# Patient Record
Sex: MALE | Race: WHITE | NOT HISPANIC OR LATINO | Employment: OTHER | URBAN - METROPOLITAN AREA
[De-identification: names, ages, dates, MRNs, and addresses within clinical notes are randomized per-mention and may not be internally consistent; named-entity substitution may affect disease eponyms.]

---

## 2017-01-04 ENCOUNTER — HOSPITAL ENCOUNTER (OUTPATIENT)
Dept: LAB | Facility: CLINIC | Age: 67
Discharge: HOME | End: 2017-01-04
Attending: INTERNAL MEDICINE | Admitting: INTERNAL MEDICINE
Payer: MEDICARE

## 2017-01-04 DIAGNOSIS — Z48.288 ENCOUNTER FOR AFTERCARE FOLLOWING MULTIPLE ORGAN TRANSPLANT: ICD-10-CM

## 2017-01-04 DIAGNOSIS — Z48.298 AFTERCARE FOLLOWING ORGAN TRANSPLANT: Primary | ICD-10-CM

## 2017-01-04 DIAGNOSIS — T86.10 COMPLICATION OF TRANSPLANTED KIDNEY: ICD-10-CM

## 2017-01-04 DIAGNOSIS — Z94.0 KIDNEY REPLACED BY TRANSPLANT: ICD-10-CM

## 2017-01-04 DIAGNOSIS — Z48.298 AFTERCARE FOLLOWING ORGAN TRANSPLANT: ICD-10-CM

## 2017-01-04 LAB
ANION GAP SERPL CALCULATED.3IONS-SCNC: 7 MMOL/L (ref 3–14)
BUN SERPL-MCNC: 31 MG/DL (ref 7–30)
CALCIUM SERPL-MCNC: 9.1 MG/DL (ref 8.5–10.1)
CHLORIDE SERPL-SCNC: 105 MMOL/L (ref 94–109)
CO2 SERPL-SCNC: 27 MMOL/L (ref 20–32)
CREAT SERPL-MCNC: 1.29 MG/DL (ref 0.66–1.25)
ERYTHROCYTE [DISTWIDTH] IN BLOOD BY AUTOMATED COUNT: 13.3 % (ref 10–15)
GFR SERPL CREATININE-BSD FRML MDRD: 56 ML/MIN/1.7M2
GLUCOSE SERPL-MCNC: 108 MG/DL (ref 70–99)
HCT VFR BLD AUTO: 40.4 % (ref 40–53)
HGB BLD-MCNC: 12.9 G/DL (ref 13.3–17.7)
MCH RBC QN AUTO: 28.8 PG (ref 26.5–33)
MCHC RBC AUTO-ENTMCNC: 31.9 G/DL (ref 31.5–36.5)
MCV RBC AUTO: 90 FL (ref 78–100)
PLATELET # BLD AUTO: 174 10E9/L (ref 150–450)
POTASSIUM SERPL-SCNC: 3.9 MMOL/L (ref 3.4–5.3)
RBC # BLD AUTO: 4.48 10E12/L (ref 4.4–5.9)
SODIUM SERPL-SCNC: 139 MMOL/L (ref 133–144)
TACROLIMUS BLD-MCNC: 9 UG/L (ref 5–15)
TME LAST DOSE: NORMAL H
WBC # BLD AUTO: 4.1 10E9/L (ref 4–11)

## 2017-01-04 PROCEDURE — 85027 COMPLETE CBC AUTOMATED: CPT | Performed by: INTERNAL MEDICINE

## 2017-01-04 PROCEDURE — 36415 COLL VENOUS BLD VENIPUNCTURE: CPT | Performed by: INTERNAL MEDICINE

## 2017-01-04 PROCEDURE — 80048 BASIC METABOLIC PNL TOTAL CA: CPT | Performed by: INTERNAL MEDICINE

## 2017-01-04 PROCEDURE — 80197 ASSAY OF TACROLIMUS: CPT | Performed by: INTERNAL MEDICINE

## 2017-01-05 LAB
CMV DNA SPEC NAA+PROBE-ACNC: NORMAL [IU]/ML
CMV DNA SPEC NAA+PROBE-LOG#: NORMAL {LOG_IU}/ML
SPECIMEN SOURCE: NORMAL

## 2017-01-09 DIAGNOSIS — Z94.0 STATUS POST KIDNEY TRANSPLANT: Primary | ICD-10-CM

## 2017-01-09 DIAGNOSIS — Z94.0 KIDNEY TRANSPLANTED: Primary | ICD-10-CM

## 2017-01-09 RX ORDER — PANTOPRAZOLE SODIUM 20 MG/1
20 TABLET, DELAYED RELEASE ORAL DAILY
Qty: 90 TABLET | Refills: 0 | Status: SHIPPED | OUTPATIENT
Start: 2017-01-09 | End: 2017-09-12

## 2017-01-09 NOTE — TELEPHONE ENCOUNTER
Last Office Visit with Nephrologist:  12/8/2016.  Medication refilled per Nephrology Clinic protocol.    Eliot Wasserman RN

## 2017-01-10 RX ORDER — TACROLIMUS 0.5 MG/1
1 CAPSULE, GELATIN COATED ORAL 2 TIMES DAILY
Qty: 120 CAPSULE | Refills: 6 | Status: SHIPPED | OUTPATIENT
Start: 2017-01-10 | End: 2017-02-09

## 2017-01-10 NOTE — TELEPHONE ENCOUNTER
Spoke to patient regarding tac level = 9.0, above goal.  Patient confirms that this was a good 12 hour trough level, confirmed current dose.  Patient verbalizes understanding of medication dose decrease to 1 mg BID.  Reviewed monthly 1 year post kidney transplant labs.  Patient states that he has a f/u appt. With Dr. Varela 2/2017.  Patient has no concerns/questions at this time.

## 2017-01-12 ENCOUNTER — TELEPHONE (OUTPATIENT)
Dept: TRANSPLANT | Facility: CLINIC | Age: 67
End: 2017-01-12

## 2017-01-12 DIAGNOSIS — I15.1 HYPERTENSION SECONDARY TO OTHER RENAL DISORDERS (CODE): Primary | ICD-10-CM

## 2017-01-12 RX ORDER — LOSARTAN POTASSIUM 25 MG/1
TABLET ORAL
Qty: 30 TABLET | Refills: 10 | Status: SHIPPED | OUTPATIENT
Start: 2017-01-12 | End: 2017-12-06

## 2017-01-12 NOTE — TELEPHONE ENCOUNTER
Last Office Visit with Nephrologist:  12/8/16.  Medication refilled per Nephrology Clinic protocol.     Kiarra Harvey RN

## 2017-01-12 NOTE — TELEPHONE ENCOUNTER
Prior Authorization Specialty Medication Request    Medication/Dose: Prograf 1 mg  Frequency: BID    Route: oral  Diagnosis and ICD: Kidney Transplant Z94.0  New/Renewal/Insurance Change PA:     Important Lab Values:     Previously Tried and Failed Therapies:     Rationale:     Would you like to include any research articles?    If yes please include the hyperlink(s) below or fax @ [phone].    (Include Name and MRN)    If you received a fax notification from an outside Pharmacy;  Pharmacy Name:[facility]  Pharmacy #:[phone]  Pharmacy Fax:

## 2017-01-16 DIAGNOSIS — Z94.0 STATUS POST KIDNEY TRANSPLANT: Primary | ICD-10-CM

## 2017-01-16 RX ORDER — MYCOPHENOLATE MOFETIL 250 MG/1
750 CAPSULE ORAL 2 TIMES DAILY
Qty: 180 CAPSULE | Refills: 11 | Status: CANCELLED | OUTPATIENT
Start: 2017-01-16

## 2017-01-16 RX ORDER — MYCOPHENOLATE MOFETIL 250 MG/1
750 CAPSULE ORAL 2 TIMES DAILY
Qty: 180 CAPSULE | Refills: 11 | Status: SHIPPED | OUTPATIENT
Start: 2017-01-16 | End: 2018-01-05

## 2017-01-16 NOTE — TELEPHONE ENCOUNTER
Drug Name: cellcept 250mg  Last Fill Date: 12/16/16  Quantity: 180    Dede Elia   [facility]  [phone]

## 2017-01-20 NOTE — TELEPHONE ENCOUNTER
[facility] Prior Authorization Team   Phone: [phone]  Fax: [phone]    Prior Authorization Not Needed    Medication: Prograf 1 mg - PA Not Needed  Approved Dose/Quantity: 150 per 30 days  Reference #:     Insurance Company: Other (see comments)Comment:  Medicare Part B  Expected CoPay:       CoPay Card Available:      Foundation Assistance Needed:    Which Pharmacy is filling the prescription (Not needed for infusion/clinic administered): [facility] - [place], [place] - [facility] [address]   Pharmacy Notified: Yes    Patient received 30 day supply of Prograf from [facility] on 1/18/17.  Patient Notified: Yes

## 2017-01-25 ENCOUNTER — TELEPHONE (OUTPATIENT)
Dept: PHARMACY | Facility: CLINIC | Age: 67
End: 2017-01-25

## 2017-02-07 ENCOUNTER — RESULTS ONLY (OUTPATIENT)
Dept: OTHER | Facility: CLINIC | Age: 67
End: 2017-02-07

## 2017-02-07 ENCOUNTER — HOSPITAL ENCOUNTER (OUTPATIENT)
Dept: LAB | Facility: CLINIC | Age: 67
Discharge: HOME | End: 2017-02-07
Attending: INTERNAL MEDICINE | Admitting: TRANSPLANT SURGERY
Payer: MEDICARE

## 2017-02-07 DIAGNOSIS — Z94.0 KIDNEY REPLACED BY TRANSPLANT: ICD-10-CM

## 2017-02-07 DIAGNOSIS — Z48.298 AFTERCARE FOLLOWING ORGAN TRANSPLANT: ICD-10-CM

## 2017-02-07 DIAGNOSIS — T86.10 COMPLICATION OF TRANSPLANTED KIDNEY: ICD-10-CM

## 2017-02-07 LAB
ANION GAP SERPL CALCULATED.3IONS-SCNC: 6 MMOL/L (ref 3–14)
BUN SERPL-MCNC: 22 MG/DL (ref 7–30)
CALCIUM SERPL-MCNC: 9.2 MG/DL (ref 8.5–10.1)
CHLORIDE SERPL-SCNC: 104 MMOL/L (ref 94–109)
CO2 SERPL-SCNC: 29 MMOL/L (ref 20–32)
CREAT SERPL-MCNC: 1.26 MG/DL (ref 0.66–1.25)
ERYTHROCYTE [DISTWIDTH] IN BLOOD BY AUTOMATED COUNT: 13.7 % (ref 10–15)
GFR SERPL CREATININE-BSD FRML MDRD: 58 ML/MIN/1.7M2
GLUCOSE SERPL-MCNC: 112 MG/DL (ref 70–99)
HCT VFR BLD AUTO: 43.6 % (ref 40–53)
HGB BLD-MCNC: 13.5 G/DL (ref 13.3–17.7)
MCH RBC QN AUTO: 27.3 PG (ref 26.5–33)
MCHC RBC AUTO-ENTMCNC: 31 G/DL (ref 31.5–36.5)
MCV RBC AUTO: 88 FL (ref 78–100)
PLATELET # BLD AUTO: 209 10E9/L (ref 150–450)
POTASSIUM SERPL-SCNC: 4 MMOL/L (ref 3.4–5.3)
RBC # BLD AUTO: 4.95 10E12/L (ref 4.4–5.9)
SODIUM SERPL-SCNC: 139 MMOL/L (ref 133–144)
TACROLIMUS BLD-MCNC: 7.5 UG/L (ref 5–15)
TME LAST DOSE: NORMAL H
WBC # BLD AUTO: 5.1 10E9/L (ref 4–11)

## 2017-02-07 PROCEDURE — 87799 DETECT AGENT NOS DNA QUANT: CPT | Performed by: INTERNAL MEDICINE

## 2017-02-07 PROCEDURE — 85027 COMPLETE CBC AUTOMATED: CPT | Performed by: INTERNAL MEDICINE

## 2017-02-07 PROCEDURE — 86833 HLA CLASS II HIGH DEFIN QUAL: CPT | Performed by: TRANSPLANT SURGERY

## 2017-02-07 PROCEDURE — 80048 BASIC METABOLIC PNL TOTAL CA: CPT | Performed by: INTERNAL MEDICINE

## 2017-02-07 PROCEDURE — 36415 COLL VENOUS BLD VENIPUNCTURE: CPT | Performed by: INTERNAL MEDICINE

## 2017-02-07 PROCEDURE — 86832 HLA CLASS I HIGH DEFIN QUAL: CPT | Performed by: TRANSPLANT SURGERY

## 2017-02-07 PROCEDURE — 80197 ASSAY OF TACROLIMUS: CPT | Performed by: INTERNAL MEDICINE

## 2017-02-08 LAB
BKV DNA # SPEC NAA+PROBE: NORMAL COPIES/ML
BKV DNA SPEC NAA+PROBE-LOG#: NORMAL LOG COPIES/ML
CMV DNA SPEC NAA+PROBE-ACNC: NORMAL [IU]/ML
CMV DNA SPEC NAA+PROBE-LOG#: NORMAL {LOG_IU}/ML
PRA DONOR SPECIFIC ABY: NORMAL
SPECIMEN SOURCE: NORMAL
SPECIMEN SOURCE: NORMAL

## 2017-02-09 DIAGNOSIS — Z94.0 STATUS POST KIDNEY TRANSPLANT: Primary | ICD-10-CM

## 2017-02-09 NOTE — TELEPHONE ENCOUNTER
Spoke with patient: Patient states a good twelve hour level and confirmed current dose. Patient verbalize understanding decrease dose to 1 mg in the am and 0.5 mg in the pm and that he will repeat his prograf level in one week

## 2017-02-09 NOTE — TELEPHONE ENCOUNTER
Issue Prograf tacrolimus level 7.5  Slight above goal level   Plan   Call confirm 12 hour trough Prograf tacrolimus level   Confirm taking 1.0 mg twice per day   IF the above accurate lower Prograf tacrolimus 1.0 in am and 0.5 mg in pm   Repeat Prograf tacrolimus level one week after dose change

## 2017-02-14 LAB
DONOR IDENTIFICATION: NORMAL
DSA COMMENTS: NORMAL
DSA PRESENT: NO
DSA TEST METHOD: NORMAL
ORGAN: NORMAL
SA1 CELL: NORMAL
SA1 COMMENTS: NORMAL
SA1 HI RISK ABY: NORMAL
SA1 MOD RISK ABY: NORMAL
SA1 TEST METHOD: NORMAL
SA2 CELL: NORMAL
SA2 COMMENTS: NORMAL
SA2 HI RISK ABY UA: NORMAL
SA2 MOD RISK ABY: NORMAL
SA2 TEST METHOD: NORMAL

## 2017-02-16 DIAGNOSIS — Z94.0 STATUS POST KIDNEY TRANSPLANT: Primary | ICD-10-CM

## 2017-02-16 DIAGNOSIS — Z94.0 RENAL TRANSPLANT RECIPIENT: ICD-10-CM

## 2017-02-16 RX ORDER — SULFAMETHOXAZOLE AND TRIMETHOPRIM 400; 80 MG/1; MG/1
1 TABLET ORAL
Qty: 14 TABLET | Refills: 11 | Status: SHIPPED | OUTPATIENT
Start: 2017-02-17 | End: 2018-02-22

## 2017-02-16 RX ORDER — TACROLIMUS 0.5 MG/1
1.5 CAPSULE, GELATIN COATED ORAL 2 TIMES DAILY
Qty: 120 CAPSULE | Refills: 6 | Status: SHIPPED | OUTPATIENT
Start: 2017-02-16 | End: 2017-04-19

## 2017-02-16 NOTE — TELEPHONE ENCOUNTER
Drug Name: smz/tmp 400-80mg  Last Fill Date: 12/8/16  Quantity: 30    Dedehans Ferreiraroger   [facility]  [phone]

## 2017-02-20 ENCOUNTER — OFFICE VISIT (OUTPATIENT)
Dept: NEPHROLOGY | Facility: CLINIC | Age: 67
End: 2017-02-20
Attending: INTERNAL MEDICINE
Payer: MEDICARE

## 2017-02-20 VITALS
SYSTOLIC BLOOD PRESSURE: 129 MMHG | WEIGHT: 174.8 LBS | TEMPERATURE: 97.6 F | HEIGHT: 70 IN | HEART RATE: 64 BPM | DIASTOLIC BLOOD PRESSURE: 86 MMHG | BODY MASS INDEX: 25.03 KG/M2

## 2017-02-20 DIAGNOSIS — E55.9 VITAMIN D DEFICIENCY: ICD-10-CM

## 2017-02-20 DIAGNOSIS — Z48.298 AFTERCARE FOLLOWING ORGAN TRANSPLANT: ICD-10-CM

## 2017-02-20 DIAGNOSIS — N25.81 SECONDARY RENAL HYPERPARATHYROIDISM (H): Primary | ICD-10-CM

## 2017-02-20 DIAGNOSIS — D84.9 IMMUNOSUPPRESSED STATUS (H): ICD-10-CM

## 2017-02-20 DIAGNOSIS — Z94.0 KIDNEY REPLACED BY TRANSPLANT: ICD-10-CM

## 2017-02-20 DIAGNOSIS — I15.1 HYPERTENSION SECONDARY TO OTHER RENAL DISORDERS: ICD-10-CM

## 2017-02-20 PROCEDURE — 99212 OFFICE O/P EST SF 10 MIN: CPT | Mod: ZF

## 2017-02-20 ASSESSMENT — PAIN SCALES - GENERAL: PAINLEVEL: NO PAIN (0)

## 2017-02-20 NOTE — MR AVS SNAPSHOT
After Visit Summary   2/20/2017    Marlo Vee    MRN: [id number]           Patient Information     Date Of Birth          1950        Visit Information        Provider Department      2/20/2017 11:30 AM Samuel Varela MD [facility] Nephrology        Today's Diagnoses     Secondary renal hyperparathyroidism (H)    -  1    Vitamin D deficiency        Kidney replaced by transplant        Immunosuppressed status (H)        Aftercare following organ transplant        Hypertension secondary to other renal disorders           Follow-ups after your visit        Follow-up notes from your care team     Return in about 6 months (around 8/20/2017).      Your next 10 appointments already scheduled     Sep 12, 2017  3:10 PM CDT   (Arrive by 2:40 PM)   Return Kidney Transplant with Samuel Varela MD   [facility] Nephrology ([facility])    [address] [place] [id number] [phone]              Future tests that were ordered for you today     Open Future Orders        Priority Expected Expires Ordered    Parathyroid Hormone Intact Routine  3/22/2017 2/20/2017    Vitamin D Deficiency Routine  3/22/2017 2/20/2017            Who to contact     If you have questions or need follow up information about today's clinic visit or your schedule please contact [facility] NEPHROLOGY directly at [phone].  Normal or non-critical lab and imaging results will be communicated to you by MyChart, letter or phone within 4 business days after the clinic has received the results. If you do not hear from us within 7 days, please contact the clinic through MyChart or phone. If you have a critical or abnormal lab result, we will notify you by phone as soon as possible.  Submit refill requests through [employer] or call your pharmacy and they will forward the refill request to us. Please allow 3 business days for your refill to be completed.          Additional  "Information About Your Visit        MyChart Information     [employer] gives you secure access to your electronic health record. If you see a primary care provider, you can also send messages to your care team and make appointments. If you have questions, please call your primary care clinic.  If you do not have a primary care provider, please call [phone] and they will assist you.        Care EveryWhere ID     This is your Care EveryWhere ID. This could be used by other organizations to access your [place] medical records  [id number]        Your Vitals Were     Pulse Temperature Height BMI (Body Mass Index)          64 97.6  F (36.4  C) (Oral) 1.778 m (5' 10\") 25.08 kg/m2         Blood Pressure from Last 3 Encounters:   02/20/17 129/86   11/29/16 (!) 155/99   08/02/16 129/89    Weight from Last 3 Encounters:   02/20/17 79.3 kg (174 lb 12.8 oz)   11/29/16 79.4 kg (175 lb 1.6 oz)   08/02/16 73.9 kg (163 lb)                 Today's Medication Changes          These changes are accurate as of: 2/20/17 11:51 AM.  If you have any questions, ask your nurse or doctor.               These medicines have changed or have updated prescriptions.        Dose/Directions    COREG PO   This may have changed:  Another medication with the same name was removed. Continue taking this medication, and follow the directions you see here.        Dose:  25 mg   Take 25 mg by mouth daily   Refills:  0                Primary Care Provider Office Phone # Fax #    Yassine Mary [phone]       [facility] [address]   [facility] [address]        Thank you!     Thank you for choosing [facility] NEPHROLOGY  for your care. Our goal is always to provide you with excellent care. Hearing back from our patients is one way we can continue to improve our services. Please take a few minutes to complete the written survey that you may receive in the mail after your visit with us. Thank you!             Your Updated " Medication List - Protect others around you: Learn how to safely use, store and throw away your medicines at www.disposemymeds.org.          This list is accurate as of: 2/20/17 11:51 AM.  Always use your most recent med list.                   Brand Name Dispense Instructions for use    aspirin 81 MG chewable tablet     36 tablet    Take 1 tablet (81 mg) by mouth daily       atorvastatin 10 MG tablet    LIPITOR     Take 0.5 tablets (5 mg) by mouth daily       clopidogrel 75 MG tablet    PLAVIX    90 tablet    Take 1 tablet (75 mg) by mouth daily       COREG PO      Take 25 mg by mouth daily       losartan 25 MG tablet    COZAAR    30 tablet    TAKE 1 TABLET BY MOUTH DAILY       mycophenolate 250 MG capsule    CELLCEPT - GENERIC EQUIVALENT    180 capsule    Take 3 capsules (750 mg) by mouth 2 times daily       pantoprazole 20 MG EC tablet    PROTONIX    90 tablet    Take 1 tablet (20 mg) by mouth daily       SERTRALINE HCL PO      Take 25 mg by mouth daily       sulfamethoxazole-trimethoprim 400-80 MG per tablet    BACTRIM/SEPTRA    14 tablet    Take 1 tablet by mouth Every Mon, Wed, Fri Morning       tacrolimus capsule     120 capsule    Take 3 capsules (1.5 mg) by mouth 2 times daily       VITAMIN D3 PO      Take 2,000 Units by mouth daily

## 2017-02-20 NOTE — LETTER
2/20/2017      RE: Marlo Vee  [address] [facility] [address]       Assessment and Plan:  1. LDKT - baseline Cr ~ 1.0-1.2, which has remained stable running at high end of baseline lately.  Minimal proteinuria.  No DSA.  Will make no changes in immunosuppression.  2. HTN - well controlled at target of less than 140/90.  No changes.  3. CAD - asymptomatic.  Recommend follow up with Cardiology.  4. Secondary renal hyperparathyroidism - mildly elevated PTH when last checked.  Will treat vitamin D deficiency and recheck PTH with next labs.  5. Vitamin D deficiency - low vitamin D level and finished course of ergocalciferol, now on oral cholecalciferol 2000 iu daily.  Will recheck vitamin D level with next labs.  6. CMV IgG Ab discordance - patient is now off Valcyte.  He has seroconverted with detectable CMV PCR, although less than quantifiable and patient is asymptomatic.  No CMV PCR with last check.  No need to follow any further unless patient has symptoms.  7. Skin cancer risk - no new skin cancer lesions.  Recommend regular follow up with Dermatology.  8. GERD - no symptoms and recommend patient decrease or stop PPI.  9. Recommend return visit in 6 months.    Assessment and plan was discussed with patient and he voiced his understanding and agreement.    Reason for Visit:  Mr. Vee is here for routine follow up.    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         Transplant Hx:       Tx: LDKT  Date: 1/21/16       Present Maintenance IS: Tacrolimus and Mycophenolate mofetil       Baseline Creatinine: 1.0-1.2       Recent DSA: No  Date last checked: 2/2017       Biopsy: No    Mr. Vee reports feeling good overall with minimal medical complaints.  His energy level is good and remains normal.  He is active and does get some exercise.  Denies any chest pain or shortness of breath with exertion.  Appetite  is good and weight has been stable.  No nausea, vomiting or diarrhea.  No fever, sweats or chills.  No leg swelling.    Home BP: 110-120/70-80s and 100s systolic standing.      ROS:   A comprehensive review of systems was obtained and negative, except as noted in the HPI or PMH.    Active Medical Problems:  Patient Active Problem List   Diagnosis     Hypertension secondary to other renal disorders     Membranous glomerulonephritis     Secondary renal hyperparathyroidism (H)     Vitamin D deficiency     Dyslipidemia     Anxiety     Status post coronary angiogram     CAD, multiple vessel     Multiple vessel coronary artery disease     Kidney replaced by transplant     Immunosuppressed status (H)     Aftercare following organ transplant       Personal Hx:  Social History     Social History     Marital status:      Spouse name: N/A     Number of children: 4     Years of education: N/A     Occupational History     Not on file.     Social History Main Topics     Smoking status: Never Smoker     Smokeless tobacco: Never Used     Alcohol use No      Comment: Patient reports drinking beer on a rare ocassion.     Drug use: No     Sexual activity: Not on file     Other Topics Concern     Not on file     Social History Narrative       Allergies:  No Known Allergies    Medications:  Prior to Admission medications    Medication Sig Start Date End Date Taking? Authorizing Provider   phosphorus tablet 250 mg (K PHOS NEUTRAL) 250 MG tablet Take 1 tablet (250 mg) by mouth 2 times daily 1/27/16   Samuel Varela MD   PROGRAF 0.5 MG PO CAPSULE Take 1 capsule (0.5 mg) by mouth 2 times daily 1/27/16   Samuel Varela MD   HYDROmorphone (DILAUDID) 2 MG tablet Take 1 tablet (2 mg) by mouth every 4 hours as needed for moderate to severe pain 1/26/16   Maryellen Joe PA-C   atorvastatin (LIPITOR) 10 MG tablet Take 0.5 tablets (5 mg) by mouth daily 1/26/16   Maryellen Joe PA-C   hydrALAZINE (APRESOLINE) 100  "MG TABS Take 1 tablet (100 mg) by mouth every 8 hours 1/26/16   Maryellen Joe PA-C   valGANciclovir (VALCYTE) 450 MG tablet Take 1 tablet (450 mg) by mouth daily 1/26/16   Maryellen Joe PA-C   PROGRAF 1 MG PO CAPSULE Take 2 capsules (2 mg) by mouth 2 times daily 1/26/16 1/20/17  Maryellen Joe PA-C   mycophenolate (CELLCEPT - GENERIC EQUIVALENT) 250 MG capsule Take 3 capsules (750 mg) by mouth 2 times daily 1/26/16 1/20/17  Maryellen Joe PA-C   carvedilol (COREG) 25 MG tablet Take 1 tablet (25 mg) by mouth 2 times daily (with meals) 1/26/16   Maryellen Joe PA-C   senna-docusate (SENOKOT-S;PERICOLACE) 8.6-50 MG per tablet Take 1-2 tablets by mouth 2 times daily 1/26/16   Maryellen Joe PA-C   sulfamethoxazole-trimethoprim (BACTRIM,SEPTRA) 400-80 MG per tablet Take 1 tablet by mouth daily 1/26/16   Maryellen Joe PA-C   clotrimazole (MYCELEX) 10 MG LOZG Place 1 lozenge (10 mg) inside cheek 3 times daily 1/26/16   Maryellen Joe PA-C   pantoprazole (PROTONIX) 40 MG enteric coated tablet Take 1 tablet (40 mg) by mouth daily 1/26/16 4/25/16  Maryellen Joe PA-C   Clopidogrel Bisulfate (PLAVIX PO) Take 75 mg by mouth daily     Reported, Patient   aspirin 81 MG chewable tablet Take 1 tablet (81 mg) by mouth daily 4/25/15   Jimmy Jennings MD   B Complex-C-Folic Acid (TRIPHROCAPS) 1 MG CAPS Take 1 capsule by mouth daily    Reported, Patient   Cholecalciferol (VITAMIN D3 PO) Take 2,000 Units by mouth daily    Reported, Patient   SERTRALINE HCL PO Take 25 mg by mouth daily    Reported, Patient   TRAZODONE HCL PO Take 50 mg by mouth At Bedtime    Reported, Patient       Vitals:  /86  Pulse 64  Temp 97.6  F (36.4  C) (Oral)  Ht 1.778 m (5' 10\")  Wt 79.3 kg (174 lb 12.8 oz)  BMI 25.08 kg/m2    Exam:   GENERAL APPEARANCE: alert and no distress  HENT: mouth without ulcers or lesions  LYMPHATICS: no cervical or supraclavicular nodes  RESP: lungs clear to " auscultation - no rales, rhonchi or wheezes  CV: regular rhythm, normal rate, no rub, no murmur  EDEMA: no LE edema bilaterally  ABDOMEN: soft, nondistended, nontender, bowel sounds normal  MS: extremities normal - no gross deformities noted, no evidence of inflammation in joints, no muscle tenderness  SKIN: no rash  TX KIDNEY: normal    Results:   Recent Results (from the past 504 hour(s))   HLA Donor Specific Antibody    Collection Time: 02/07/17 12:00 AM   Result Value Ref Range    Donor Identification 01/21/2016     Organ Left Kidney     DSA Present NO     DSA Comments        Flow Single Antigen Beads assays are intended for   detection/identification of IgG anti-HLA antibodies. Mfi values may not   accurately quantify donor-specific antibody levels in all instances.      DSA Test Method SA FCS    HLA Leticia Class I Single Antigen    Collection Time: 02/07/17 12:00 AM   Result Value Ref Range    SA1 Test Method  FCS     SA1 Cell Class I     SA1 Hi Risk Leticia None     SA1 Mod Risk Leticia B:75 Cw:7     SA1 Comments       Test performed by modified procedure. Serum heat inactivated and tested by   a modified ([facility]ondo) protocol including fetal calf serum addition.   High-risk, mfi >3,000. Mod-risk, mfi 500-3,000.      HLA Leticia Class II Single Antigen    Collection Time: 02/07/17 12:00 AM   Result Value Ref Range    SA2 Test Method  FCS     SA2 Cell Class II     SA2 Hi Risk Leticia None     SA2 Mod Risk Leticia DQ:5     SA2 Comments       Test performed by modified procedure. Serum heat inactivated and tested by   a modified (Hondo) protocol including fetal calf serum addition.   High-risk, mfi >3,000. Mod-risk, mfi 500-3,000.      CMV DNA quantification    Collection Time: 02/07/17 10:06 AM   Result Value Ref Range    CMV DNA Quantitation Specimen EDTA PLASMA     CMV Quant IU/mL  CMVND [IU]/mL     CMV DNA Not Detected   The ALYSON AmpliPrep/ALYSON TaqMan CMV Test is an FDA-approved in vitro nucleic   acid amplification test for the  quantitation of cytomegalovirus DNA in human   plasma (EDTA plasma) using the [employer]YSON AmpliPrep Instrument for automated viral   nucleic acid extraction and the ALYSON TaqMan Analyzer or ALYSON TaqMan for   automated Real Time amplification and detection of the viral nucleic acid   target.   Titer results are reported in International Units/mL (IU/mL using 1st WHO   International standard for Human Cytomegalovirus for Nucleic Acid Amplification   based assays. The conversion factor between CMV DNA copis/mL (as defined by the   Roche ALYSON TaqMan CMV test) and International Units is the CMV DNA   concentration in IU/mL x 1.1 copies/IU = CMV DNA in copies/mL.   This assay has received FDA approval for the testing of human plasma only. The   Infectious Disease Diagnostic Laboratory at the [facility], [place], has validated the pe rformance characteristics of the Roche   CMV assay for plasma, bronchial alveolar lavage/wash and urine.      Log IU/mL of CMVQNT Not Calculated <2.1 [Log_IU]/mL   CBC with platelets    Collection Time: 02/07/17 10:06 AM   Result Value Ref Range    WBC 5.1 4.0 - 11.0 10e9/L    RBC Count 4.95 4.4 - 5.9 10e12/L    Hemoglobin 13.5 13.3 - 17.7 g/dL    Hematocrit 43.6 40.0 - 53.0 %    MCV 88 78 - 100 fl    MCH 27.3 26.5 - 33.0 pg    MCHC 31.0 (L) 31.5 - 36.5 g/dL    RDW 13.7 10.0 - 15.0 %    Platelet Count 209 150 - 450 10e9/L   Basic metabolic panel    Collection Time: 02/07/17 10:06 AM   Result Value Ref Range    Sodium 139 133 - 144 mmol/L    Potassium 4.0 3.4 - 5.3 mmol/L    Chloride 104 94 - 109 mmol/L    Carbon Dioxide 29 20 - 32 mmol/L    Anion Gap 6 3 - 14 mmol/L    Glucose 112 (H) 70 - 99 mg/dL    Urea Nitrogen 22 7 - 30 mg/dL    Creatinine 1.26 (H) 0.66 - 1.25 mg/dL    GFR Estimate 58 (L) >60 mL/min/1.7m2    GFR Estimate If Black 70 >60 mL/min/1.7m2    Calcium 9.2 8.5 - 10.1 mg/dL   Tacrolimus level    Collection Time: 02/07/17 10:06 AM   Result Value Ref Range     Tacrolimus Last Dose 2.6.17 @ 2030     Tacrolimus Level 7.5 5.0 - 15.0 ug/L   BK virus PCR quantitative    Collection Time: 02/07/17 10:06 AM   Result Value Ref Range    BK Virus Specimen Plasma     BK Virus Result BK Virus DNA Not Detected BKNEG copies/mL    BK Virus Log  <2.7 Log copies/mL     Not Calculated   The Real-Time quantitative BK Virus assay was developed and its performance   characteristics determined by the Infectious Diseases Diagnostic Laboratory at   the [facility] [place] in [place], [place]. The   primers and probes for each analyte are Analyte Specific Reagents (ASRs)   manufactured by [employer].   ASRs are used in many laboratory tests necessary for standard medical care and   generally do not require U.S. Food and Drug Administration approval. The FDA   has determined that such clearance or approval is not necessary.   This test is used for clinical purposes. It should not be regarded as   investigational or for research. This laboratory is certified under the   Clinical Laboratory Improvement Amendments of 1988 (CLIA-88) as qualified to   perform high complexity clinical laboratory testing.     PRA Donor Specific Antibody    Collection Time: 02/07/17 10:06 AM   Result Value Ref Range    PRA Donor Specific Leticia       Specimen received - Immunology report to follow upon completion.   UNOS Unacceptable Antigens    Collection Time: 02/14/17 12:00 AM   Result Value Ref Range    Protocol Cutoff Plan A FCS treated sera, 500 mfi cumulative     Unacceptable Antigen A:25 26 43 66 69 B:37 57 58 75 DQ:5 6    UNOS cPRA    Collection Time: 02/14/17 12:00 AM   Result Value Ref Range    UNOS cPRA 72        Samuel Varela MD

## 2017-02-20 NOTE — LETTER
2/20/2017       RE: Marlo Vee  [address] [facility] [address]     Dear Colleague,    Thank you for referring your patient, Marlo Vee, to the [facility] NEPHROLOGY at [facility]. Please see a copy of my visit note below.    Assessment and Plan:  1. LDKT - baseline Cr ~ 1.0-1.2, which has remained stable running at high end of baseline lately.  Minimal proteinuria.  No DSA.  Will make no changes in immunosuppression.  2. HTN - well controlled at target of less than 140/90.  No changes.  3. CAD - asymptomatic.  Recommend follow up with Cardiology.  4. Secondary renal hyperparathyroidism - mildly elevated PTH when last checked.  Will treat vitamin D deficiency and recheck PTH with next labs.  5. Vitamin D deficiency - low vitamin D level and finished course of ergocalciferol, now on oral cholecalciferol 2000 iu daily.  Will recheck vitamin D level with next labs.  6. CMV IgG Ab discordance - patient is now off Valcyte.  He has seroconverted with detectable CMV PCR, although less than quantifiable and patient is asymptomatic.  No CMV PCR with last check.  No need to follow any further unless patient has symptoms.  7. Skin cancer risk - no new skin cancer lesions.  Recommend regular follow up with Dermatology.  8. GERD - no symptoms and recommend patient decrease or stop PPI.  9. Recommend return visit in 6 months.    Assessment and plan was discussed with patient and he voiced his understanding and agreement.    Reason for Visit:  Mr. Vee is here for routine follow up.    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         Transplant Hx:       Tx: LDKT  Date: 1/21/16       Present Maintenance IS: Tacrolimus and Mycophenolate mofetil       Baseline Creatinine: 1.0-1.2       Recent DSA: No  Date last checked: 2/2017       Biopsy: No    Mr. Vee reports feeling  good overall with minimal medical complaints.  His energy level is good and remains normal.  He is active and does get some exercise.  Denies any chest pain or shortness of breath with exertion.  Appetite is good and weight has been stable.  No nausea, vomiting or diarrhea.  No fever, sweats or chills.  No leg swelling.    Home BP: 110-120/70-80s and 100s systolic standing.      ROS:   A comprehensive review of systems was obtained and negative, except as noted in the HPI or PMH.    Active Medical Problems:  Patient Active Problem List   Diagnosis     Hypertension secondary to other renal disorders     Membranous glomerulonephritis     Secondary renal hyperparathyroidism (H)     Vitamin D deficiency     Dyslipidemia     Anxiety     Status post coronary angiogram     CAD, multiple vessel     Multiple vessel coronary artery disease     Kidney replaced by transplant     Immunosuppressed status (H)     Aftercare following organ transplant       Personal Hx:  Social History     Social History     Marital status:      Spouse name: N/A     Number of children: 4     Years of education: N/A     Occupational History     Not on file.     Social History Main Topics     Smoking status: Never Smoker     Smokeless tobacco: Never Used     Alcohol use No      Comment: Patient reports drinking beer on a rare ocassion.     Drug use: No     Sexual activity: Not on file     Other Topics Concern     Not on file     Social History Narrative       Allergies:  No Known Allergies    Medications:  Prior to Admission medications    Medication Sig Start Date End Date Taking? Authorizing Provider   phosphorus tablet 250 mg (K PHOS NEUTRAL) 250 MG tablet Take 1 tablet (250 mg) by mouth 2 times daily 1/27/16   Samuel Varela MD   PROGRAF 0.5 MG PO CAPSULE Take 1 capsule (0.5 mg) by mouth 2 times daily 1/27/16   Samuel Varela MD   HYDROmorphone (DILAUDID) 2 MG tablet Take 1 tablet (2 mg) by mouth every 4 hours as needed for  "moderate to severe pain 1/26/16   Maryellen Joe PA-C   atorvastatin (LIPITOR) 10 MG tablet Take 0.5 tablets (5 mg) by mouth daily 1/26/16   Maryellen Joe PA-C   hydrALAZINE (APRESOLINE) 100 MG TABS Take 1 tablet (100 mg) by mouth every 8 hours 1/26/16   Maryellen Joe PA-C   valGANciclovir (VALCYTE) 450 MG tablet Take 1 tablet (450 mg) by mouth daily 1/26/16   Maryellen Joe PA-C   PROGRAF 1 MG PO CAPSULE Take 2 capsules (2 mg) by mouth 2 times daily 1/26/16 1/20/17  Maryellen Joe PA-C   mycophenolate (CELLCEPT - GENERIC EQUIVALENT) 250 MG capsule Take 3 capsules (750 mg) by mouth 2 times daily 1/26/16 1/20/17  Maryellen Joe PA-C   carvedilol (COREG) 25 MG tablet Take 1 tablet (25 mg) by mouth 2 times daily (with meals) 1/26/16   Maryellen Joe PA-C   senna-docusate (SENOKOT-S;PERICOLACE) 8.6-50 MG per tablet Take 1-2 tablets by mouth 2 times daily 1/26/16   Maryellen Joe PA-C   sulfamethoxazole-trimethoprim (BACTRIM,SEPTRA) 400-80 MG per tablet Take 1 tablet by mouth daily 1/26/16   Maryellen Joe PA-C   clotrimazole (MYCELEX) 10 MG LOZG Place 1 lozenge (10 mg) inside cheek 3 times daily 1/26/16   Maryellen Joe PA-C   pantoprazole (PROTONIX) 40 MG enteric coated tablet Take 1 tablet (40 mg) by mouth daily 1/26/16 4/25/16  Maryellen Joe PA-C   Clopidogrel Bisulfate (PLAVIX PO) Take 75 mg by mouth daily     Reported, Patient   aspirin 81 MG chewable tablet Take 1 tablet (81 mg) by mouth daily 4/25/15   Jimmy Jennings MD   B Complex-C-Folic Acid (TRIPHROCAPS) 1 MG CAPS Take 1 capsule by mouth daily    Reported, Patient   Cholecalciferol (VITAMIN D3 PO) Take 2,000 Units by mouth daily    Reported, Patient   SERTRALINE HCL PO Take 25 mg by mouth daily    Reported, Patient   TRAZODONE HCL PO Take 50 mg by mouth At Bedtime    Reported, Patient       Vitals:  /86  Pulse 64  Temp 97.6  F (36.4  C) (Oral)  Ht 1.778 m (5' 10\")  Wt 79.3 kg (174 " lb 12.8 oz)  BMI 25.08 kg/m2    Exam:   GENERAL APPEARANCE: alert and no distress  HENT: mouth without ulcers or lesions  LYMPHATICS: no cervical or supraclavicular nodes  RESP: lungs clear to auscultation - no rales, rhonchi or wheezes  CV: regular rhythm, normal rate, no rub, no murmur  EDEMA: no LE edema bilaterally  ABDOMEN: soft, nondistended, nontender, bowel sounds normal  MS: extremities normal - no gross deformities noted, no evidence of inflammation in joints, no muscle tenderness  SKIN: no rash  TX KIDNEY: normal    Results:   Recent Results (from the past 504 hour(s))   HLA Donor Specific Antibody    Collection Time: 02/07/17 12:00 AM   Result Value Ref Range    Donor Identification 01/21/2016     Organ Left Kidney     DSA Present NO     DSA Comments        Flow Single Antigen Beads assays are intended for   detection/identification of IgG anti-HLA antibodies. Mfi values may not   accurately quantify donor-specific antibody levels in all instances.      DSA Test Method SA FCS    HLA Leticia Class I Single Antigen    Collection Time: 02/07/17 12:00 AM   Result Value Ref Range    SA1 Test Method SA FCS     SA1 Cell Class I     SA1 Hi Risk Leticia None     SA1 Mod Risk Leticia B:75 Cw:7     SA1 Comments       Test performed by modified procedure. Serum heat inactivated and tested by   a modified ([place]) protocol including fetal calf serum addition.   High-risk, mfi >3,000. Mod-risk, mfi 500-3,000.      HLA Leticia Class II Single Antigen    Collection Time: 02/07/17 12:00 AM   Result Value Ref Range    SA2 Test Method SA FCS     SA2 Cell Class II     SA2 Hi Risk Leticia None     SA2 Mod Risk Leticia DQ:5     SA2 Comments       Test performed by modified procedure. Serum heat inactivated and tested by   a modified ([place]) protocol including fetal calf serum addition.   High-risk, mfi >3,000. Mod-risk, mfi 500-3,000.      CMV DNA quantification    Collection Time: 02/07/17 10:06 AM   Result Value Ref Range    CMV DNA Quantitation  Specimen EDTA PLASMA     CMV Quant IU/mL  CMVND [IU]/mL     CMV DNA Not Detected   The ALYSON AmpliPrep/ALYSON TaqMan CMV Test is an FDA-approved in vitro nucleic   acid amplification test for the quantitation of cytomegalovirus DNA in human   plasma (EDTA plasma) using the ALYS[employer] AmpliPrep Instrument for automated viral   nucleic acid extraction and the ALYSON TaqMan Analyzer or ALYSON TaqMan for   automated Real Time amplification and detection of the viral nucleic acid   target.   Titer results are reported in International Units/mL (IU/mL using 1st WHO   International standard for Human Cytomegalovirus for Nucleic Acid Amplification   based assays. The conversion factor between CMV DNA copis/mL (as defined by the   Roche ALYSON TaqMan CMV test) and International Units is the CMV DNA   concentration in IU/mL x 1.1 copies/IU = CMV DNA in copies/mL.   This assay has received FDA approval for the testing of human plasma only. The   Infectious Disease Diagnostic Laboratory at the [facility], [place], has validated the pe rformance characteristics of the Roche   CMV assay for plasma, bronchial alveolar lavage/wash and urine.      Log IU/mL of CMVQNT Not Calculated <2.1 [Log_IU]/mL   CBC with platelets    Collection Time: 02/07/17 10:06 AM   Result Value Ref Range    WBC 5.1 4.0 - 11.0 10e9/L    RBC Count 4.95 4.4 - 5.9 10e12/L    Hemoglobin 13.5 13.3 - 17.7 g/dL    Hematocrit 43.6 40.0 - 53.0 %    MCV 88 78 - 100 fl    MCH 27.3 26.5 - 33.0 pg    MCHC 31.0 (L) 31.5 - 36.5 g/dL    RDW 13.7 10.0 - 15.0 %    Platelet Count 209 150 - 450 10e9/L   Basic metabolic panel    Collection Time: 02/07/17 10:06 AM   Result Value Ref Range    Sodium 139 133 - 144 mmol/L    Potassium 4.0 3.4 - 5.3 mmol/L    Chloride 104 94 - 109 mmol/L    Carbon Dioxide 29 20 - 32 mmol/L    Anion Gap 6 3 - 14 mmol/L    Glucose 112 (H) 70 - 99 mg/dL    Urea Nitrogen 22 7 - 30 mg/dL    Creatinine 1.26 (H) 0.66 - 1.25 mg/dL     GFR Estimate 58 (L) >60 mL/min/1.7m2    GFR Estimate If Black 70 >60 mL/min/1.7m2    Calcium 9.2 8.5 - 10.1 mg/dL   Tacrolimus level    Collection Time: 02/07/17 10:06 AM   Result Value Ref Range    Tacrolimus Last Dose 2.6.17 @ 2030     Tacrolimus Level 7.5 5.0 - 15.0 ug/L   BK virus PCR quantitative    Collection Time: 02/07/17 10:06 AM   Result Value Ref Range    BK Virus Specimen Plasma     BK Virus Result BK Virus DNA Not Detected BKNEG copies/mL    BK Virus Log  <2.7 Log copies/mL     Not Calculated   The Real-Time quantitative BK Virus assay was developed and its performance   characteristics determined by the Infectious Diseases Diagnostic Laboratory at   the [facility] in [place], [place]. The   primers and probes for each analyte are Analyte Specific Reagents (ASRs)   manufactured by [employer].   ASRs are used in many laboratory tests necessary for standard medical care and   generally do not require U.S. Food and Drug Administration approval. The FDA   has determined that such clearance or approval is not necessary.   This test is used for clinical purposes. It should not be regarded as   investigational or for research. This laboratory is certified under the   Clinical Laboratory Improvement Amendments of 1988 (CLIA-88) as qualified to   perform high complexity clinical laboratory testing.     PRA Donor Specific Antibody    Collection Time: 02/07/17 10:06 AM   Result Value Ref Range    PRA Donor Specific Leticia       Specimen received - Immunology report to follow upon completion.   UNOS Unacceptable Antigens    Collection Time: 02/14/17 12:00 AM   Result Value Ref Range    Protocol Cutoff Plan A FCS treated sera, 500 mfi cumulative     Unacceptable Antigen A:25 26 43 66 69 B:37 57 58 75 DQ:5 6    UNOS cPRA    Collection Time: 02/14/17 12:00 AM   Result Value Ref Range    UNOS cPRA 72        Again, thank you for allowing me to participate in the care of your patient.       Sincerely,    Samuel Varela MD

## 2017-02-20 NOTE — PROGRESS NOTES
Assessment and Plan:  1. LDKT - baseline Cr ~ 1.0-1.2, which has remained stable running at high end of baseline lately.  Minimal proteinuria.  No DSA.  Will make no changes in immunosuppression.  2. HTN - well controlled at target of less than 140/90.  No changes.  3. CAD - asymptomatic.  Recommend follow up with Cardiology.  4. Secondary renal hyperparathyroidism - mildly elevated PTH when last checked.  Will treat vitamin D deficiency and recheck PTH with next labs.  5. Vitamin D deficiency - low vitamin D level and finished course of ergocalciferol, now on oral cholecalciferol 2000 iu daily.  Will recheck vitamin D level with next labs.  6. CMV IgG Ab discordance - patient is now off Valcyte.  He has seroconverted with detectable CMV PCR, although less than quantifiable and patient is asymptomatic.  No CMV PCR with last check.  No need to follow any further unless patient has symptoms.  7. Skin cancer risk - no new skin cancer lesions.  Recommend regular follow up with Dermatology.  8. GERD - no symptoms and recommend patient decrease or stop PPI.  9. Recommend return visit in 6 months.    Assessment and plan was discussed with patient and he voiced his understanding and agreement.    Reason for Visit:  Mr. Vee is here for routine follow up.    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         Transplant Hx:       Tx: LDKT  Date: 1/21/16       Present Maintenance IS: Tacrolimus and Mycophenolate mofetil       Baseline Creatinine: 1.0-1.2       Recent DSA: No  Date last checked: 2/2017       Biopsy: No    Mr. Vee reports feeling good overall with minimal medical complaints.  His energy level is good and remains normal.  He is active and does get some exercise.  Denies any chest pain or shortness of breath with exertion.  Appetite is good and weight has been stable.  No nausea, vomiting or diarrhea.  No fever, sweats or  chills.  No leg swelling.    Home BP: 110-120/70-80s and 100s systolic standing.      ROS:   A comprehensive review of systems was obtained and negative, except as noted in the HPI or PMH.    Active Medical Problems:  Patient Active Problem List   Diagnosis     Hypertension secondary to other renal disorders     Membranous glomerulonephritis     Secondary renal hyperparathyroidism (H)     Vitamin D deficiency     Dyslipidemia     Anxiety     Status post coronary angiogram     CAD, multiple vessel     Multiple vessel coronary artery disease     Kidney replaced by transplant     Immunosuppressed status (H)     Aftercare following organ transplant       Personal Hx:  Social History     Social History     Marital status:      Spouse name: N/A     Number of children: 4     Years of education: N/A     Occupational History     Not on file.     Social History Main Topics     Smoking status: Never Smoker     Smokeless tobacco: Never Used     Alcohol use No      Comment: Patient reports drinking beer on a rare ocassion.     Drug use: No     Sexual activity: Not on file     Other Topics Concern     Not on file     Social History Narrative       Allergies:  No Known Allergies    Medications:  Prior to Admission medications    Medication Sig Start Date End Date Taking? Authorizing Provider   phosphorus tablet 250 mg (K PHOS NEUTRAL) 250 MG tablet Take 1 tablet (250 mg) by mouth 2 times daily 1/27/16   Samuel Varela MD   PROGRAF 0.5 MG PO CAPSULE Take 1 capsule (0.5 mg) by mouth 2 times daily 1/27/16   Samuel Varela MD   HYDROmorphone (DILAUDID) 2 MG tablet Take 1 tablet (2 mg) by mouth every 4 hours as needed for moderate to severe pain 1/26/16   Maryellen Joe PA-C   atorvastatin (LIPITOR) 10 MG tablet Take 0.5 tablets (5 mg) by mouth daily 1/26/16   Maryellen Joe PA-C   hydrALAZINE (APRESOLINE) 100 MG TABS Take 1 tablet (100 mg) by mouth every 8 hours 1/26/16   Maryellen Joe PA-C  "  valGANciclovir (VALCYTE) 450 MG tablet Take 1 tablet (450 mg) by mouth daily 1/26/16   Maryellen Joe PA-C   PROGRAF 1 MG PO CAPSULE Take 2 capsules (2 mg) by mouth 2 times daily 1/26/16 1/20/17  Maryellen Joe PA-C   mycophenolate (CELLCEPT - GENERIC EQUIVALENT) 250 MG capsule Take 3 capsules (750 mg) by mouth 2 times daily 1/26/16 1/20/17  Maryellen Joe PA-C   carvedilol (COREG) 25 MG tablet Take 1 tablet (25 mg) by mouth 2 times daily (with meals) 1/26/16   Maryellen Joe PA-C   senna-docusate (SENOKOT-S;PERICOLACE) 8.6-50 MG per tablet Take 1-2 tablets by mouth 2 times daily 1/26/16   Maryellen Joe PA-C   sulfamethoxazole-trimethoprim (BACTRIM,SEPTRA) 400-80 MG per tablet Take 1 tablet by mouth daily 1/26/16   Maryellen Joe PA-C   clotrimazole (MYCELEX) 10 MG LOZG Place 1 lozenge (10 mg) inside cheek 3 times daily 1/26/16   Maryellen Joe PA-C   pantoprazole (PROTONIX) 40 MG enteric coated tablet Take 1 tablet (40 mg) by mouth daily 1/26/16 4/25/16  Maryellen Joe PA-C   Clopidogrel Bisulfate (PLAVIX PO) Take 75 mg by mouth daily     Reported, Patient   aspirin 81 MG chewable tablet Take 1 tablet (81 mg) by mouth daily 4/25/15   Jimmy Jennings MD   B Complex-C-Folic Acid (TRIPHROCAPS) 1 MG CAPS Take 1 capsule by mouth daily    Reported, Patient   Cholecalciferol (VITAMIN D3 PO) Take 2,000 Units by mouth daily    Reported, Patient   SERTRALINE HCL PO Take 25 mg by mouth daily    Reported, Patient   TRAZODONE HCL PO Take 50 mg by mouth At Bedtime    Reported, Patient       Vitals:  /86  Pulse 64  Temp 97.6  F (36.4  C) (Oral)  Ht 1.778 m (5' 10\")  Wt 79.3 kg (174 lb 12.8 oz)  BMI 25.08 kg/m2    Exam:   GENERAL APPEARANCE: alert and no distress  HENT: mouth without ulcers or lesions  LYMPHATICS: no cervical or supraclavicular nodes  RESP: lungs clear to auscultation - no rales, rhonchi or wheezes  CV: regular rhythm, normal rate, no rub, no " murmur  EDEMA: no LE edema bilaterally  ABDOMEN: soft, nondistended, nontender, bowel sounds normal  MS: extremities normal - no gross deformities noted, no evidence of inflammation in joints, no muscle tenderness  SKIN: no rash  TX KIDNEY: normal    Results:   Recent Results (from the past 504 hour(s))   HLA Donor Specific Antibody    Collection Time: 02/07/17 12:00 AM   Result Value Ref Range    Donor Identification 01/21/2016     Organ Left Kidney     DSA Present NO     DSA Comments        Flow Single Antigen Beads assays are intended for   detection/identification of IgG anti-HLA antibodies. Mfi values may not   accurately quantify donor-specific antibody levels in all instances.      DSA Test Method SA FCS    HLA Leticia Class I Single Antigen    Collection Time: 02/07/17 12:00 AM   Result Value Ref Range    SA1 Test Method SA FCS     SA1 Cell Class I     SA1 Hi Risk Leticia None     SA1 Mod Risk Leticia B:75 Cw:7     SA1 Comments       Test performed by modified procedure. Serum heat inactivated and tested by   a modified ([place]) protocol including fetal calf serum addition.   High-risk, mfi >3,000. Mod-risk, mfi 500-3,000.      HLA Leticia Class II Single Antigen    Collection Time: 02/07/17 12:00 AM   Result Value Ref Range    SA2 Test Method SA FCS     SA2 Cell Class II     SA2 Hi Risk Leticia None     SA2 Mod Risk Leticia DQ:5     SA2 Comments       Test performed by modified procedure. Serum heat inactivated and tested by   a modified ([place]) protocol including fetal calf serum addition.   High-risk, mfi >3,000. Mod-risk, mfi 500-3,000.      CMV DNA quantification    Collection Time: 02/07/17 10:06 AM   Result Value Ref Range    CMV DNA Quantitation Specimen EDTA PLASMA     CMV Quant IU/mL  CMVND [IU]/mL     CMV DNA Not Detected   The ALYSON AmpliPrep/ALYSON TaqMan CMV Test is an FDA-approved in vitro nucleic   acid amplification test for the quantitation of cytomegalovirus DNA in human   plasma (EDTA plasma) using the [employer]  [employer] Instrument for automated viral   nucleic acid extraction and the ALYSON TaqMan Analyzer or ALYSON TaqMan for   automated Real Time amplification and detection of the viral nucleic acid   target.   Titer results are reported in International Units/mL (IU/mL using 1st WHO   International standard for Human Cytomegalovirus for Nucleic Acid Amplification   based assays. The conversion factor between CMV DNA copis/mL (as defined by the   Roche ALYSON TaqMan CMV test) and International Units is the CMV DNA   concentration in IU/mL x 1.1 copies/IU = CMV DNA in copies/mL.   This assay has received FDA approval for the testing of human plasma only. The   Infectious Disease Diagnostic Laboratory at the [facility], [place], has validated the pe rformance characteristics of the Roche   CMV assay for plasma, bronchial alveolar lavage/wash and urine.      Log IU/mL of CMVQNT Not Calculated <2.1 [Log_IU]/mL   CBC with platelets    Collection Time: 02/07/17 10:06 AM   Result Value Ref Range    WBC 5.1 4.0 - 11.0 10e9/L    RBC Count 4.95 4.4 - 5.9 10e12/L    Hemoglobin 13.5 13.3 - 17.7 g/dL    Hematocrit 43.6 40.0 - 53.0 %    MCV 88 78 - 100 fl    MCH 27.3 26.5 - 33.0 pg    MCHC 31.0 (L) 31.5 - 36.5 g/dL    RDW 13.7 10.0 - 15.0 %    Platelet Count 209 150 - 450 10e9/L   Basic metabolic panel    Collection Time: 02/07/17 10:06 AM   Result Value Ref Range    Sodium 139 133 - 144 mmol/L    Potassium 4.0 3.4 - 5.3 mmol/L    Chloride 104 94 - 109 mmol/L    Carbon Dioxide 29 20 - 32 mmol/L    Anion Gap 6 3 - 14 mmol/L    Glucose 112 (H) 70 - 99 mg/dL    Urea Nitrogen 22 7 - 30 mg/dL    Creatinine 1.26 (H) 0.66 - 1.25 mg/dL    GFR Estimate 58 (L) >60 mL/min/1.7m2    GFR Estimate If Black 70 >60 mL/min/1.7m2    Calcium 9.2 8.5 - 10.1 mg/dL   Tacrolimus level    Collection Time: 02/07/17 10:06 AM   Result Value Ref Range    Tacrolimus Last Dose 2.6.17 @ 2030     Tacrolimus Level 7.5 5.0 - 15.0 ug/L   BK  virus PCR quantitative    Collection Time: 02/07/17 10:06 AM   Result Value Ref Range    BK Virus Specimen Plasma     BK Virus Result BK Virus DNA Not Detected BKNEG copies/mL    BK Virus Log  <2.7 Log copies/mL     Not Calculated   The Real-Time quantitative BK Virus assay was developed and its performance   characteristics determined by the Infectious Diseases Diagnostic Laboratory at   the [facility] in [place], [place]. The   primers and probes for each analyte are Analyte Specific Reagents (ASRs)   manufactured by [employer].   ASRs are used in many laboratory tests necessary for standard medical care and   generally do not require U.S. Food and Drug Administration approval. The FDA   has determined that such clearance or approval is not necessary.   This test is used for clinical purposes. It should not be regarded as   investigational or for research. This laboratory is certified under the   Clinical Laboratory Improvement Amendments of 1988 (CLIA-88) as qualified to   perform high complexity clinical laboratory testing.     PRA Donor Specific Antibody    Collection Time: 02/07/17 10:06 AM   Result Value Ref Range    PRA Donor Specific Leticia       Specimen received - Immunology report to follow upon completion.   UNOS Unacceptable Antigens    Collection Time: 02/14/17 12:00 AM   Result Value Ref Range    Protocol Cutoff Plan A FCS treated sera, 500 mfi cumulative     Unacceptable Antigen A:25 26 43 66 69 B:37 57 58 75 DQ:5 6    UNOS cPRA    Collection Time: 02/14/17 12:00 AM   Result Value Ref Range    UNOS cPRA 72

## 2017-02-20 NOTE — NURSING NOTE
"Chief Complaint   Patient presents with     RECHECK     Annual kidney transplant visit.       Initial /86  Pulse 64  Temp 97.6  F (36.4  C) (Oral)  Ht 1.778 m (5' 10\")  Wt 79.3 kg (174 lb 12.8 oz)  BMI 25.08 kg/m2 Estimated body mass index is 25.08 kg/(m^2) as calculated from the following:    Height as of this encounter: 1.778 m (5' 10\").    Weight as of this encounter: 79.3 kg (174 lb 12.8 oz).  Medication Reconciliation: complete  "

## 2017-03-09 ENCOUNTER — HOSPITAL ENCOUNTER (OUTPATIENT)
Dept: LAB | Facility: CLINIC | Age: 67
Discharge: HOME | End: 2017-03-09
Attending: INTERNAL MEDICINE | Admitting: INTERNAL MEDICINE
Payer: MEDICARE

## 2017-03-09 DIAGNOSIS — N25.81 SECONDARY RENAL HYPERPARATHYROIDISM (H): ICD-10-CM

## 2017-03-09 DIAGNOSIS — Z48.298 AFTERCARE FOLLOWING ORGAN TRANSPLANT: ICD-10-CM

## 2017-03-09 DIAGNOSIS — Z94.0 KIDNEY REPLACED BY TRANSPLANT: ICD-10-CM

## 2017-03-09 DIAGNOSIS — E55.9 VITAMIN D DEFICIENCY: ICD-10-CM

## 2017-03-09 LAB
ANION GAP SERPL CALCULATED.3IONS-SCNC: 7 MMOL/L (ref 3–14)
BUN SERPL-MCNC: 23 MG/DL (ref 7–30)
CALCIUM SERPL-MCNC: 9 MG/DL (ref 8.5–10.1)
CHLORIDE SERPL-SCNC: 107 MMOL/L (ref 94–109)
CO2 SERPL-SCNC: 25 MMOL/L (ref 20–32)
CREAT SERPL-MCNC: 1.25 MG/DL (ref 0.66–1.25)
DEPRECATED CALCIDIOL+CALCIFEROL SERPL-MC: 26 UG/L (ref 20–75)
ERYTHROCYTE [DISTWIDTH] IN BLOOD BY AUTOMATED COUNT: 13.4 % (ref 10–15)
GFR SERPL CREATININE-BSD FRML MDRD: 58 ML/MIN/1.7M2
GLUCOSE SERPL-MCNC: 120 MG/DL (ref 70–99)
HCT VFR BLD AUTO: 40.3 % (ref 40–53)
HGB BLD-MCNC: 12.8 G/DL (ref 13.3–17.7)
MCH RBC QN AUTO: 28.5 PG (ref 26.5–33)
MCHC RBC AUTO-ENTMCNC: 31.8 G/DL (ref 31.5–36.5)
MCV RBC AUTO: 90 FL (ref 78–100)
PLATELET # BLD AUTO: 189 10E9/L (ref 150–450)
POTASSIUM SERPL-SCNC: 3.6 MMOL/L (ref 3.4–5.3)
PTH-INTACT SERPL-MCNC: 135 PG/ML (ref 12–72)
RBC # BLD AUTO: 4.49 10E12/L (ref 4.4–5.9)
SODIUM SERPL-SCNC: 139 MMOL/L (ref 133–144)
TACROLIMUS BLD-MCNC: 6.2 UG/L (ref 5–15)
TME LAST DOSE: NORMAL H
WBC # BLD AUTO: 4.2 10E9/L (ref 4–11)

## 2017-03-09 PROCEDURE — 83970 ASSAY OF PARATHORMONE: CPT | Performed by: INTERNAL MEDICINE

## 2017-03-09 PROCEDURE — 80197 ASSAY OF TACROLIMUS: CPT | Performed by: INTERNAL MEDICINE

## 2017-03-09 PROCEDURE — 36415 COLL VENOUS BLD VENIPUNCTURE: CPT | Performed by: INTERNAL MEDICINE

## 2017-03-09 PROCEDURE — 82306 VITAMIN D 25 HYDROXY: CPT | Performed by: INTERNAL MEDICINE

## 2017-03-09 PROCEDURE — 85027 COMPLETE CBC AUTOMATED: CPT | Performed by: INTERNAL MEDICINE

## 2017-03-09 PROCEDURE — 80048 BASIC METABOLIC PNL TOTAL CA: CPT | Performed by: INTERNAL MEDICINE

## 2017-04-11 ENCOUNTER — HOSPITAL ENCOUNTER (OUTPATIENT)
Dept: LAB | Facility: CLINIC | Age: 67
Discharge: HOME | End: 2017-04-11
Attending: INTERNAL MEDICINE | Admitting: INTERNAL MEDICINE
Payer: MEDICARE

## 2017-04-11 DIAGNOSIS — Z48.298 AFTERCARE FOLLOWING ORGAN TRANSPLANT: ICD-10-CM

## 2017-04-11 DIAGNOSIS — Z94.0 KIDNEY REPLACED BY TRANSPLANT: ICD-10-CM

## 2017-04-11 LAB
ANION GAP SERPL CALCULATED.3IONS-SCNC: 7 MMOL/L (ref 3–14)
BUN SERPL-MCNC: 25 MG/DL (ref 7–30)
CALCIUM SERPL-MCNC: 9.1 MG/DL (ref 8.5–10.1)
CHLORIDE SERPL-SCNC: 107 MMOL/L (ref 94–109)
CO2 SERPL-SCNC: 26 MMOL/L (ref 20–32)
CREAT SERPL-MCNC: 1.11 MG/DL (ref 0.66–1.25)
ERYTHROCYTE [DISTWIDTH] IN BLOOD BY AUTOMATED COUNT: 13.5 % (ref 10–15)
GFR SERPL CREATININE-BSD FRML MDRD: 67 ML/MIN/1.7M2
GLUCOSE SERPL-MCNC: 112 MG/DL (ref 70–99)
HCT VFR BLD AUTO: 42.2 % (ref 40–53)
HGB BLD-MCNC: 13.4 G/DL (ref 13.3–17.7)
MCH RBC QN AUTO: 28.3 PG (ref 26.5–33)
MCHC RBC AUTO-ENTMCNC: 31.8 G/DL (ref 31.5–36.5)
MCV RBC AUTO: 89 FL (ref 78–100)
PLATELET # BLD AUTO: 180 10E9/L (ref 150–450)
POTASSIUM SERPL-SCNC: 3.8 MMOL/L (ref 3.4–5.3)
RBC # BLD AUTO: 4.73 10E12/L (ref 4.4–5.9)
SODIUM SERPL-SCNC: 140 MMOL/L (ref 133–144)
TACROLIMUS BLD-MCNC: 5.7 UG/L (ref 5–15)
TME LAST DOSE: NORMAL H
WBC # BLD AUTO: 4.9 10E9/L (ref 4–11)

## 2017-04-11 PROCEDURE — 85027 COMPLETE CBC AUTOMATED: CPT | Performed by: INTERNAL MEDICINE

## 2017-04-11 PROCEDURE — 36415 COLL VENOUS BLD VENIPUNCTURE: CPT | Performed by: INTERNAL MEDICINE

## 2017-04-11 PROCEDURE — 80048 BASIC METABOLIC PNL TOTAL CA: CPT | Performed by: INTERNAL MEDICINE

## 2017-04-11 PROCEDURE — 80197 ASSAY OF TACROLIMUS: CPT | Performed by: INTERNAL MEDICINE

## 2017-04-12 LAB
CMV DNA SPEC NAA+PROBE-ACNC: ABNORMAL [IU]/ML
CMV DNA SPEC NAA+PROBE-LOG#: <2.1 {LOG_IU}/ML
SPECIMEN SOURCE: ABNORMAL

## 2017-04-19 DIAGNOSIS — Z94.0 STATUS POST KIDNEY TRANSPLANT: ICD-10-CM

## 2017-04-19 RX ORDER — TACROLIMUS 0.5 MG/1
1.5 CAPSULE, GELATIN COATED ORAL 2 TIMES DAILY
Qty: 180 CAPSULE | Refills: 6 | Status: SHIPPED | OUTPATIENT
Start: 2017-04-19 | End: 2017-09-12

## 2017-04-19 NOTE — TELEPHONE ENCOUNTER
Drug Name: prograf 0.5mg  Last Fill Date: 2/16/17  Quantity: 150    Dede Elia   [facility]  [phone]

## 2017-04-27 ENCOUNTER — CARE COORDINATION (OUTPATIENT)
Dept: CARDIOLOGY | Facility: CLINIC | Age: 67
End: 2017-04-27

## 2017-04-27 NOTE — PROGRESS NOTES
S-(situation): patient calling in for a refill on Plavix. He has been on DAPT for 2 years. He denies chest pain and shortness of breath. States that he is in good health.    B-(background): Patient with CKD.Had multivessel PCU.Doing well.no cardiac complaints.  Patient may proceed with transplant any time.Hold Plavix for surgery and restart ASAP post Transplant.  Continue plavix richards atleast 2 years.  Check echo for LV function.  Per orders.   Return to Clinic PRN.    A-(assessment): coronary artery disease    R-(recommendations): Date: 4/27/2017    Time of Call: 3:13 PM     Diagnosis:  CAD     [ TORB ] Ordering provider: Dr. TJ Delaney  Order: discontinue the Plavix  Stay on aspirin at 81 mg by mouth for life     Order received by: Josie Jimenez RN     Follow-up/additional notes: patient understands and agrees to the plan.

## 2017-05-03 DIAGNOSIS — I15.1 HYPERTENSION SECONDARY TO OTHER RENAL DISORDERS: ICD-10-CM

## 2017-05-04 RX ORDER — CARVEDILOL 25 MG/1
TABLET ORAL
Qty: 60 TABLET | Refills: 11 | Status: SHIPPED | OUTPATIENT
Start: 2017-05-04 | End: 2017-09-12 | Stop reason: DRUGHIGH

## 2017-05-11 ENCOUNTER — RESULTS ONLY (OUTPATIENT)
Dept: OTHER | Facility: CLINIC | Age: 67
End: 2017-05-11

## 2017-05-11 ENCOUNTER — HOSPITAL ENCOUNTER (OUTPATIENT)
Dept: LAB | Facility: CLINIC | Age: 67
Discharge: HOME | End: 2017-05-11
Attending: INTERNAL MEDICINE | Admitting: TRANSPLANT SURGERY
Payer: MEDICARE

## 2017-05-11 DIAGNOSIS — Z48.298 AFTERCARE FOLLOWING ORGAN TRANSPLANT: ICD-10-CM

## 2017-05-11 DIAGNOSIS — Z94.0 KIDNEY REPLACED BY TRANSPLANT: ICD-10-CM

## 2017-05-11 LAB
ANION GAP SERPL CALCULATED.3IONS-SCNC: 8 MMOL/L (ref 3–14)
BUN SERPL-MCNC: 22 MG/DL (ref 7–30)
CALCIUM SERPL-MCNC: 8.8 MG/DL (ref 8.5–10.1)
CHLORIDE SERPL-SCNC: 105 MMOL/L (ref 94–109)
CO2 SERPL-SCNC: 25 MMOL/L (ref 20–32)
CREAT SERPL-MCNC: 1.17 MG/DL (ref 0.66–1.25)
CREAT UR-MCNC: 137 MG/DL
ERYTHROCYTE [DISTWIDTH] IN BLOOD BY AUTOMATED COUNT: 13.4 % (ref 10–15)
GFR SERPL CREATININE-BSD FRML MDRD: 63 ML/MIN/1.7M2
GLUCOSE SERPL-MCNC: 113 MG/DL (ref 70–99)
HCT VFR BLD AUTO: 42.4 % (ref 40–53)
HGB BLD-MCNC: 13.3 G/DL (ref 13.3–17.7)
MCH RBC QN AUTO: 28.2 PG (ref 26.5–33)
MCHC RBC AUTO-ENTMCNC: 31.4 G/DL (ref 31.5–36.5)
MCV RBC AUTO: 90 FL (ref 78–100)
PLATELET # BLD AUTO: 187 10E9/L (ref 150–450)
POTASSIUM SERPL-SCNC: 3.6 MMOL/L (ref 3.4–5.3)
PROT UR-MCNC: 0.36 G/L
PROT/CREAT 24H UR: 0.26 G/G CR (ref 0–0.2)
RBC # BLD AUTO: 4.71 10E12/L (ref 4.4–5.9)
SODIUM SERPL-SCNC: 138 MMOL/L (ref 133–144)
TACROLIMUS BLD-MCNC: 5.1 UG/L (ref 5–15)
TME LAST DOSE: NORMAL H
WBC # BLD AUTO: 4 10E9/L (ref 4–11)

## 2017-05-11 PROCEDURE — 36415 COLL VENOUS BLD VENIPUNCTURE: CPT | Performed by: INTERNAL MEDICINE

## 2017-05-11 PROCEDURE — 80048 BASIC METABOLIC PNL TOTAL CA: CPT | Performed by: INTERNAL MEDICINE

## 2017-05-11 PROCEDURE — 86832 HLA CLASS I HIGH DEFIN QUAL: CPT | Performed by: TRANSPLANT SURGERY

## 2017-05-11 PROCEDURE — 84156 ASSAY OF PROTEIN URINE: CPT | Performed by: INTERNAL MEDICINE

## 2017-05-11 PROCEDURE — 80197 ASSAY OF TACROLIMUS: CPT | Performed by: INTERNAL MEDICINE

## 2017-05-11 PROCEDURE — 87799 DETECT AGENT NOS DNA QUANT: CPT | Performed by: INTERNAL MEDICINE

## 2017-05-11 PROCEDURE — 86833 HLA CLASS II HIGH DEFIN QUAL: CPT | Performed by: TRANSPLANT SURGERY

## 2017-05-11 PROCEDURE — 85027 COMPLETE CBC AUTOMATED: CPT | Performed by: INTERNAL MEDICINE

## 2017-05-12 LAB
BKV DNA # SPEC NAA+PROBE: NORMAL COPIES/ML
BKV DNA SPEC NAA+PROBE-LOG#: NORMAL LOG COPIES/ML
CMV DNA SPEC NAA+PROBE-ACNC: ABNORMAL [IU]/ML
CMV DNA SPEC NAA+PROBE-LOG#: <2.1 {LOG_IU}/ML
PRA DONOR SPECIFIC ABY: NORMAL
SPECIMEN SOURCE: ABNORMAL
SPECIMEN SOURCE: NORMAL

## 2017-05-17 ENCOUNTER — APPOINTMENT (OUTPATIENT)
Age: 67
Setting detail: DERMATOLOGY
End: 2017-05-25

## 2017-05-17 PROBLEM — C44.91 BASAL CELL CARCINOMA OF SKIN, UNSPECIFIED: Status: ACTIVE | Noted: 2017-05-17

## 2017-05-17 PROCEDURE — OTHER MOHS SURGERY PHONE CONSULTATION: OTHER

## 2017-05-17 NOTE — PROCEDURE: MOHS SURGERY PHONE CONSULTATION
Does The Patient Have A Pacemaker Or Defibrillator?: No
Detail Level: Detailed
Referring Provider: Paz Alfredo MD
If Yes- Additional Details: Kidney Jan 2016
Date Of Surgical Consultation If Needed: 05/17/2017
Date Of Mohs Surgery: 05/23/2017
Does The Patient Take Blood Thinners?: Yes
If Yes- What Blood Thinners Do They Take?: Aspirin 81mg
Patient Reported Location: R side of neck
Pathology Report Dx If Different Than Diagnosis Selected: Superficial multifocal BCC with invasion
Office Location Of Mohs Surgery: [facility]

## 2017-05-23 ENCOUNTER — APPOINTMENT (OUTPATIENT)
Age: 67
Setting detail: DERMATOLOGY
End: 2017-05-25

## 2017-05-23 PROBLEM — C44.41 BASAL CELL CARCINOMA OF SKIN OF SCALP AND NECK: Status: ACTIVE | Noted: 2017-05-23

## 2017-05-23 PROCEDURE — 17312 MOHS ADDL STAGE: CPT

## 2017-05-23 PROCEDURE — 17311 MOHS 1 STAGE H/N/HF/G: CPT

## 2017-05-23 PROCEDURE — OTHER MOHS SURGERY: OTHER

## 2017-05-23 PROCEDURE — 99201: CPT | Mod: 25

## 2017-05-23 PROCEDURE — OTHER CONSULTATION FOR MOHS SURGERY: OTHER

## 2017-05-23 PROCEDURE — 14041 TIS TRNFR F/C/C/M/N/A/G/H/F: CPT

## 2017-05-23 PROCEDURE — OTHER MIPS QUALITY: OTHER

## 2017-05-23 NOTE — PROCEDURE: MOHS SURGERY
Post-Care Instructions: The patient was provided with detailed verbal and written instructions for daily wound care, including use of H2O2 cleansing, followed by application of plain Vaseline and a bandage.  These instructions including Dr. Mancuso's contact information should there be any questions or concerns.  The patient is not to engage in any heavy lifting, exercise, or swimming for the next week.  Should the patient develop any fevers, chills, bleeding, or pain not controlled by OTC analgesics, s/he should contact the office immediately.

## 2017-05-23 NOTE — PROCEDURE: CONSULTATION FOR MOHS SURGERY
Name Of The Referring Provider For Procedure: Paz Alfredo MD
Detail Level: Detailed
Body Location Override (Optional - Billing Will Still Be Based On Selected Body Map Location If Applicable): Right Infra-auricular Neck
Location Indication Override (Is Already Calculated Based On Selected Body Location): Area M
X Size Of Lesion In Cm (Optional): 0.9
Size Of Lesion: 2.5
Incorporate Mauc In Note: Yes

## 2017-05-23 NOTE — PROCEDURE: MOHS SURGERY
Body Location Override (Optional - Billing Will Still Be Based On Selected Body Map Location If Applicable): Right Infra-auricular Neck

## 2017-05-23 NOTE — PROCEDURE: MIPS QUALITY
Detail Level: Detailed
Quality 226: Preventive Care And Screening: Tobacco Use: Screening And Cessation Intervention: Patient screened for tobacco and never smoked
Quality 143: Oncology: Medical And Radiation- Pain Intensity Quantified: Pain severity quantified, no pain present
Quality 431: Preventive Care And Screening: Unhealthy Alcohol Use - Screening: Patient screened for unhealthy alcohol use using a single question and scores less than 2 times per year
Quality 110: Preventive Care And Screening: Influenza Immunization: Influenza Immunization previously received during influenza season
Quality 130: Documentation Of Current Medications In The Medical Record: Current Medications Documented

## 2017-06-01 ENCOUNTER — APPOINTMENT (OUTPATIENT)
Age: 67
Setting detail: DERMATOLOGY
End: 2017-07-04

## 2017-06-01 PROBLEM — C44.41 BASAL CELL CARCINOMA OF SKIN OF SCALP AND NECK: Status: ACTIVE | Noted: 2017-06-01

## 2017-06-01 PROCEDURE — OTHER SUTURE REMOVAL (GLOBAL PERIOD): OTHER

## 2017-06-09 ENCOUNTER — HOSPITAL ENCOUNTER (OUTPATIENT)
Dept: LAB | Facility: CLINIC | Age: 67
Discharge: HOME | End: 2017-06-09
Attending: INTERNAL MEDICINE | Admitting: INTERNAL MEDICINE
Payer: MEDICARE

## 2017-06-09 DIAGNOSIS — Z94.0 KIDNEY REPLACED BY TRANSPLANT: ICD-10-CM

## 2017-06-09 DIAGNOSIS — Z48.298 AFTERCARE FOLLOWING ORGAN TRANSPLANT: ICD-10-CM

## 2017-06-09 LAB
ANION GAP SERPL CALCULATED.3IONS-SCNC: 6 MMOL/L (ref 3–14)
BUN SERPL-MCNC: 27 MG/DL (ref 7–30)
CALCIUM SERPL-MCNC: 8.7 MG/DL (ref 8.5–10.1)
CHLORIDE SERPL-SCNC: 109 MMOL/L (ref 94–109)
CO2 SERPL-SCNC: 22 MMOL/L (ref 20–32)
CREAT SERPL-MCNC: 1.13 MG/DL (ref 0.66–1.25)
ERYTHROCYTE [DISTWIDTH] IN BLOOD BY AUTOMATED COUNT: 13.7 % (ref 10–15)
GFR SERPL CREATININE-BSD FRML MDRD: 65 ML/MIN/1.7M2
GLUCOSE SERPL-MCNC: 101 MG/DL (ref 70–99)
HCT VFR BLD AUTO: 40.3 % (ref 40–53)
HGB BLD-MCNC: 12.6 G/DL (ref 13.3–17.7)
MCH RBC QN AUTO: 27.2 PG (ref 26.5–33)
MCHC RBC AUTO-ENTMCNC: 31.3 G/DL (ref 31.5–36.5)
MCV RBC AUTO: 87 FL (ref 78–100)
PLATELET # BLD AUTO: 186 10E9/L (ref 150–450)
POTASSIUM SERPL-SCNC: 3.7 MMOL/L (ref 3.4–5.3)
RBC # BLD AUTO: 4.63 10E12/L (ref 4.4–5.9)
SODIUM SERPL-SCNC: 137 MMOL/L (ref 133–144)
TACROLIMUS BLD-MCNC: 4.1 UG/L (ref 5–15)
TME LAST DOSE: ABNORMAL H
WBC # BLD AUTO: 3.8 10E9/L (ref 4–11)

## 2017-06-09 PROCEDURE — 80197 ASSAY OF TACROLIMUS: CPT | Performed by: INTERNAL MEDICINE

## 2017-06-09 PROCEDURE — 80048 BASIC METABOLIC PNL TOTAL CA: CPT | Performed by: INTERNAL MEDICINE

## 2017-06-09 PROCEDURE — 36415 COLL VENOUS BLD VENIPUNCTURE: CPT | Performed by: INTERNAL MEDICINE

## 2017-06-09 PROCEDURE — 85027 COMPLETE CBC AUTOMATED: CPT | Performed by: INTERNAL MEDICINE

## 2017-07-14 ENCOUNTER — HOSPITAL ENCOUNTER (OUTPATIENT)
Dept: LAB | Facility: CLINIC | Age: 67
Discharge: HOME | End: 2017-07-14
Attending: INTERNAL MEDICINE | Admitting: INTERNAL MEDICINE
Payer: MEDICARE

## 2017-07-14 DIAGNOSIS — Z48.298 AFTERCARE FOLLOWING ORGAN TRANSPLANT: ICD-10-CM

## 2017-07-14 DIAGNOSIS — Z79.899 ENCOUNTER FOR LONG-TERM CURRENT USE OF MEDICATION: ICD-10-CM

## 2017-07-14 DIAGNOSIS — Z94.0 KIDNEY REPLACED BY TRANSPLANT: Primary | ICD-10-CM

## 2017-07-14 DIAGNOSIS — Z94.0 KIDNEY REPLACED BY TRANSPLANT: ICD-10-CM

## 2017-07-14 LAB
ANION GAP SERPL CALCULATED.3IONS-SCNC: 9 MMOL/L (ref 3–14)
BUN SERPL-MCNC: 21 MG/DL (ref 7–30)
CALCIUM SERPL-MCNC: 9.5 MG/DL (ref 8.5–10.1)
CHLORIDE SERPL-SCNC: 108 MMOL/L (ref 94–109)
CO2 SERPL-SCNC: 24 MMOL/L (ref 20–32)
CREAT SERPL-MCNC: 1.09 MG/DL (ref 0.66–1.25)
ERYTHROCYTE [DISTWIDTH] IN BLOOD BY AUTOMATED COUNT: 13.9 % (ref 10–15)
GFR SERPL CREATININE-BSD FRML MDRD: 68 ML/MIN/1.7M2
GLUCOSE SERPL-MCNC: 93 MG/DL (ref 70–99)
HCT VFR BLD AUTO: 40.1 % (ref 40–53)
HGB BLD-MCNC: 12.8 G/DL (ref 13.3–17.7)
MCH RBC QN AUTO: 28.1 PG (ref 26.5–33)
MCHC RBC AUTO-ENTMCNC: 31.9 G/DL (ref 31.5–36.5)
MCV RBC AUTO: 88 FL (ref 78–100)
PLATELET # BLD AUTO: 183 10E9/L (ref 150–450)
POTASSIUM SERPL-SCNC: 3.9 MMOL/L (ref 3.4–5.3)
RBC # BLD AUTO: 4.55 10E12/L (ref 4.4–5.9)
SODIUM SERPL-SCNC: 141 MMOL/L (ref 133–144)
TACROLIMUS BLD-MCNC: 4.1 UG/L (ref 5–15)
TME LAST DOSE: ABNORMAL H
WBC # BLD AUTO: 4.3 10E9/L (ref 4–11)

## 2017-07-14 PROCEDURE — 85027 COMPLETE CBC AUTOMATED: CPT | Performed by: INTERNAL MEDICINE

## 2017-07-14 PROCEDURE — 36415 COLL VENOUS BLD VENIPUNCTURE: CPT | Performed by: INTERNAL MEDICINE

## 2017-07-14 PROCEDURE — 80048 BASIC METABOLIC PNL TOTAL CA: CPT | Performed by: INTERNAL MEDICINE

## 2017-07-14 PROCEDURE — 80197 ASSAY OF TACROLIMUS: CPT | Performed by: INTERNAL MEDICINE

## 2017-07-18 ENCOUNTER — OFFICE VISIT (OUTPATIENT)
Dept: CARDIOLOGY | Facility: CLINIC | Age: 67
End: 2017-07-18
Attending: INTERNAL MEDICINE
Payer: MEDICARE

## 2017-07-18 ENCOUNTER — PRE VISIT (OUTPATIENT)
Dept: CARDIOLOGY | Facility: CLINIC | Age: 67
End: 2017-07-18

## 2017-07-18 VITALS
WEIGHT: 167.7 LBS | HEART RATE: 60 BPM | HEIGHT: 70 IN | OXYGEN SATURATION: 100 % | SYSTOLIC BLOOD PRESSURE: 130 MMHG | BODY MASS INDEX: 24.01 KG/M2 | DIASTOLIC BLOOD PRESSURE: 88 MMHG

## 2017-07-18 DIAGNOSIS — I25.10 CAD, MULTIPLE VESSEL: Primary | ICD-10-CM

## 2017-07-18 PROCEDURE — 99213 OFFICE O/P EST LOW 20 MIN: CPT | Mod: ZP | Performed by: INTERNAL MEDICINE

## 2017-07-18 PROCEDURE — 99213 OFFICE O/P EST LOW 20 MIN: CPT | Mod: ZF

## 2017-07-18 ASSESSMENT — PAIN SCALES - GENERAL: PAINLEVEL: NO PAIN (0)

## 2017-07-18 NOTE — PROGRESS NOTES
SUBJECTIVE:  Marlo Vee is a 67 year old male who presents for follow up.  S/P Renal Tx.  Had PCI/Stent to m.dRCA,mLAD and D1 on 4/24/15. EF improved from 35% to low normal 50-55%.  Currently very active with no symptoms.    Patient Active Problem List    Diagnosis Date Noted     Aftercare following organ transplant 12/08/2016     Priority: Medium     Kidney replaced by transplant 01/26/2016     Priority: Medium     Immunosuppressed status (H) 01/26/2016     Priority: Medium     Multiple vessel coronary artery disease 04/23/2015     Priority: Medium     Status post coronary angiogram 04/21/2015     Priority: Medium     CAD, multiple vessel 04/21/2015     Priority: Medium     Secondary renal hyperparathyroidism (H)      Priority: Medium     Vitamin D deficiency      Priority: Medium     Dyslipidemia      Priority: Medium     Anxiety      Priority: Medium     Hypertension secondary to other renal disorders 03/14/2015     Priority: Medium     Membranous glomerulonephritis 10/30/2002     Priority: Medium     Kidney biopsy [facility] [place] scanned into Epic       .  Current Outpatient Prescriptions   Medication Sig     PROGRAF 0.5 MG PO CAPSULE Take 3 capsules (1.5 mg) by mouth 2 times daily     Carvedilol (COREG PO) Take 25 mg by mouth daily Take 12.5mg in the am and 25mg in the pm     sulfamethoxazole-trimethoprim (BACTRIM/SEPTRA) 400-80 MG per tablet Take 1 tablet by mouth Every Mon, Wed, Fri Morning     mycophenolate (CELLCEPT - GENERIC EQUIVALENT) 250 MG capsule Take 3 capsules (750 mg) by mouth 2 times daily     losartan (COZAAR) 25 MG tablet TAKE 1 TABLET BY MOUTH DAILY     atorvastatin (LIPITOR) 10 MG tablet Take 0.5 tablets (5 mg) by mouth daily     aspirin 81 MG chewable tablet Take 1 tablet (81 mg) by mouth daily     Cholecalciferol (VITAMIN D3 PO) Take 2,000 Units by mouth daily     SERTRALINE HCL PO Take 25 mg by mouth daily     carvedilol (COREG) 25 MG tablet TAKE ONE TABLET BY MOUTH TWICE A  DAY WITH MEALS (Patient not taking: Reported on 7/18/2017)     pantoprazole (PROTONIX) 20 MG EC tablet Take 1 tablet (20 mg) by mouth daily (Patient not taking: Reported on 7/18/2017)     No current facility-administered medications for this visit.      Past Medical History:   Diagnosis Date     Anemia in ESRD (end-stage renal disease) (H)      Antiplatelet or antithrombotic long-term use      Anxiety      Dialysis patient (H)      Dyslipidemia      End stage kidney disease (H)      Heart attack (H)     not sure     Hypertension      Membranous glomerulonephritis 2002     PONV (postoperative nausea and vomiting)      PUD (peptic ulcer disease)      Secondary hyperparathyroidism (of renal origin)      Stented coronary artery      Vitamin D deficiency      Past Surgical History:   Procedure Laterality Date     CYSTOSCOPY, REMOVE STENT(S), COMBINED Right 2/23/2016    Procedure: COMBINED CYSTOSCOPY, REMOVE STENT(S);  Surgeon: Cody Temple MD;  Location: [facility] OR     s/p right upper extremity AV fistula       TRANSPLANT      kidney transplant      VASCULAR SURGERY       No Known Allergies  Social History     Social History     Marital status:      Spouse name: N/A     Number of children: 4     Years of education: N/A     Occupational History     Not on file.     Social History Main Topics     Smoking status: Never Smoker     Smokeless tobacco: Never Used     Alcohol use No      Comment: Patient reports drinking beer on a rare ocassion.     Drug use: No     Sexual activity: Not on file     Other Topics Concern     Not on file     Social History Narrative     Family History   Problem Relation Age of Onset     Breast Cancer Mother      Cancer - colorectal Father      Coronary Artery Disease Father      Hypertension Father      Breast Cancer Sister      CANCER Brother      Pancreatic CA          REVIEW OF SYSTEMS:  General: negative, fever, chills, night sweats  Skin: negative, acne, rash and scaling  Eyes: negative,  "double vision, eye pain and photophobia  Ears/Nose/Throat: negative, nasal congestion and purulent rhinorrhea  Respiratory: No dyspnea on exertion, No cough, No hemoptysis and negative  Cardiovascular: negative, palpitations, tachycardia, irregular heart beat, chest pain, exertional chest pain or pressure, paroxysmal nocturnal dyspnea, dyspnea on exertion and orthopnea       OBJECTIVE:  Blood pressure 130/88, pulse 60, height 1.778 m (5' 10\"), weight 76.1 kg (167 lb 11.2 oz), SpO2 100 %.  General Appearance: healthy, alert, active and no distress  Head: Normocephalic. No masses, lesions, tenderness or abnormalities  Eyes: conjuctiva clear, PERRL, EOM intact  Ears: External ears normal. Canals clear. TM's normal.  Nose: Nares normal  Mouth: normal  Neck: Supple, no cervical adenopathy, no thyromegaly  Lungs: clear to auscultation  Cardiac: regular rate and rhythm, normal S1 and S2, no murmur       ASSESSMENT/PLAN:  S/P PCI to m.dRCA,mLAD and D1 in 2015. Remain asymptomatic.  Had renal Tx.  Doing very well.  EF was low but near normal in last echo.  Will check an echo for LV function.  Continue current meds.  Per orders.   Return to Clinic 1yr.  "

## 2017-07-18 NOTE — LETTER
7/18/2017      RE: Marlo Vee  [address] [facility] [address]       Dear Colleague,    Thank you for the opportunity to participate in the care of your patient, Mralo Vee, at the [facility]. Please see a copy of my visit note below.       SUBJECTIVE:  Marlo Vee is a 67 year old male who presents for follow up.  S/P Renal Tx.  Had PCI/Stent to m.dRCA,mLAD and D1 on 4/24/15. EF improved from 35% to low normal 50-55%.  Currently very active with no symptoms.    Patient Active Problem List    Diagnosis Date Noted     Aftercare following organ transplant 12/08/2016     Priority: Medium     Kidney replaced by transplant 01/26/2016     Priority: Medium     Immunosuppressed status (H) 01/26/2016     Priority: Medium     Multiple vessel coronary artery disease 04/23/2015     Priority: Medium     Status post coronary angiogram 04/21/2015     Priority: Medium     CAD, multiple vessel 04/21/2015     Priority: Medium     Secondary renal hyperparathyroidism (H)      Priority: Medium     Vitamin D deficiency      Priority: Medium     Dyslipidemia      Priority: Medium     Anxiety      Priority: Medium     Hypertension secondary to other renal disorders 03/14/2015     Priority: Medium     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  Current Outpatient Prescriptions   Medication Sig     PROGRAF 0.5 MG PO CAPSULE Take 3 capsules (1.5 mg) by mouth 2 times daily     Carvedilol (COREG PO) Take 25 mg by mouth daily Take 12.5mg in the am and 25mg in the pm     sulfamethoxazole-trimethoprim (BACTRIM/SEPTRA) 400-80 MG per tablet Take 1 tablet by mouth Every Mon, Wed, Fri Morning     mycophenolate (CELLCEPT - GENERIC EQUIVALENT) 250 MG capsule Take 3 capsules (750 mg) by mouth 2 times daily     losartan (COZAAR) 25 MG tablet TAKE 1 TABLET BY MOUTH DAILY      atorvastatin (LIPITOR) 10 MG tablet Take 0.5 tablets (5 mg) by mouth daily     aspirin 81 MG chewable tablet Take 1 tablet (81 mg) by mouth daily     Cholecalciferol (VITAMIN D3 PO) Take 2,000 Units by mouth daily     SERTRALINE HCL PO Take 25 mg by mouth daily     carvedilol (COREG) 25 MG tablet TAKE ONE TABLET BY MOUTH TWICE A DAY WITH MEALS (Patient not taking: Reported on 7/18/2017)     pantoprazole (PROTONIX) 20 MG EC tablet Take 1 tablet (20 mg) by mouth daily (Patient not taking: Reported on 7/18/2017)     No current facility-administered medications for this visit.      Past Medical History:   Diagnosis Date     Anemia in ESRD (end-stage renal disease) (H)      Antiplatelet or antithrombotic long-term use      Anxiety      Dialysis patient (H)      Dyslipidemia      End stage kidney disease (H)      Heart attack (H)     not sure     Hypertension      Membranous glomerulonephritis 2002     PONV (postoperative nausea and vomiting)      PUD (peptic ulcer disease)      Secondary hyperparathyroidism (of renal origin)      Stented coronary artery      Vitamin D deficiency      Past Surgical History:   Procedure Laterality Date     CYSTOSCOPY, REMOVE STENT(S), COMBINED Right 2/23/2016    Procedure: COMBINED CYSTOSCOPY, REMOVE STENT(S);  Surgeon: Cody Temple MD;  Location: [facility] OR     s/p right upper extremity AV fistula       TRANSPLANT      kidney transplant      VASCULAR SURGERY       No Known Allergies  Social History     Social History     Marital status:      Spouse name: N/A     Number of children: 4     Years of education: N/A     Occupational History     Not on file.     Social History Main Topics     Smoking status: Never Smoker     Smokeless tobacco: Never Used     Alcohol use No      Comment: Patient reports drinking beer on a rare ocassion.     Drug use: No     Sexual activity: Not on file     Other Topics Concern     Not on file     Social History Narrative     Family History   Problem  "Relation Age of Onset     Breast Cancer Mother      Cancer - colorectal Father      Coronary Artery Disease Father      Hypertension Father      Breast Cancer Sister      CANCER Brother      Pancreatic CA          REVIEW OF SYSTEMS:  General: negative, fever, chills, night sweats  Skin: negative, acne, rash and scaling  Eyes: negative, double vision, eye pain and photophobia  Ears/Nose/Throat: negative, nasal congestion and purulent rhinorrhea  Respiratory: No dyspnea on exertion, No cough, No hemoptysis and negative  Cardiovascular: negative, palpitations, tachycardia, irregular heart beat, chest pain, exertional chest pain or pressure, paroxysmal nocturnal dyspnea, dyspnea on exertion and orthopnea       OBJECTIVE:  Blood pressure 130/88, pulse 60, height 1.778 m (5' 10\"), weight 76.1 kg (167 lb 11.2 oz), SpO2 100 %.  General Appearance: healthy, alert, active and no distress  Head: Normocephalic. No masses, lesions, tenderness or abnormalities  Eyes: conjuctiva clear, PERRL, EOM intact  Ears: External ears normal. Canals clear. TM's normal.  Nose: Nares normal  Mouth: normal  Neck: Supple, no cervical adenopathy, no thyromegaly  Lungs: clear to auscultation  Cardiac: regular rate and rhythm, normal S1 and S2, no murmur       ASSESSMENT/PLAN:  S/P PCI to m.dRCA,mLAD and D1 in 2015. Remain asymptomatic.  Had renal Tx.  Doing very well.  EF was low but near normal in last echo.  Will check an echo for LV function.  Continue current meds.  Per orders.   Return to Clinic 1yr.      ALF Ayala MD    "

## 2017-07-18 NOTE — NURSING NOTE
Cardiac Testing: Patient given instructions regarding  echocardiogram in the near future.. Discussed purpose, preparation, procedure and when to expect results reported back to the patient. Patient demonstrated understanding of this information and agreed to call with further questions or concerns.  Med Reconcile: Reviewed and verified all current medications with the patient. The updated medication list was printed and given to the patient.  Return Appointment: Follow up in one year. Patient given instructions regarding scheduling next clinic visit. Patient demonstrated understanding of this information and agreed to call with further questions or concerns.  Patient stated he understood all health information given and agreed to call with further questions or concerns.

## 2017-07-18 NOTE — NURSING NOTE
Chief Complaint   Patient presents with     Follow Up For     Cardiac evaluation related to possible kidney transplant.

## 2017-07-18 NOTE — TELEPHONE ENCOUNTER
10/20/15--Echo  Interpretation Summary  Borderline left ventricular dilation is present.  The Ejection Fraction is estimated at 50-55%.  Right ventricular function, chamber size, wall motion, and thickness are  normal.  Pulmonary artery systolic pressure is normal.  The inferior vena cava is normal.  No pericardial effusion is present.    6/30/15--Echo  Interpretation Summary     Left ventricular systolic function is moderate to severely reduced. The  visual ejection fraction is estimated at 30-35%. The left ventricle is mildly  dilated.  There is mild concentric left ventricular hypertrophy.  There is moderate global hypokinesia of the left ventricle but there is  moderately severe inferior and basal to mid-septal wall hypokinesis.  The right ventricular systolic function is moderate to severely reduced. The  right ventricle is mild to moderately dilated.  There is mild to moderate (1-2+) mitral regurgitation.  MR velocity is 160 mmHg suggesting the systemic pressure remains high.  At least moderate (46-55mmHg) pulmonary hypertension is present.  The results are similar to the prior echo.    4/24/15--Coronary angiogram  Conclusions:  1. Successful PCI to mid RCA, distal RCA, mid LAD and D1 with 5x ALANNA.     Recommendations:  1. DAPT with aspirin and clopidogrel for at least 1 year, preferably longer.  2. Continued medical management for cardiovascular risk factor optimization.  3. Routine post PCI cares (femoral access)

## 2017-07-18 NOTE — PATIENT INSTRUCTIONS
Thank you for your visit today.  Please call me with any questions or concerns.   Jae Gaines RN  Cardiology Care Coordinator  [phone], press option 1 then option 3

## 2017-07-18 NOTE — MR AVS SNAPSHOT
After Visit Summary   7/18/2017    Marlo Vee    MRN: [id number]           Patient Information     Date Of Birth          1950        Visit Information        Provider Department      7/18/2017 2:30 PM ALF Ayala MD [facility]        Today's Diagnoses     CAD, multiple vessel    -  1      Care Instructions    Thank you for your visit today.  Please call me with any questions or concerns.   Jae Gaines RN  Cardiology Care Coordinator  [phone], press option 1 then option 3          Follow-ups after your visit        Follow-up notes from your care team     Return in about 1 year (around 7/18/2018) for CAD, Dr. Delaney.      Your next 10 appointments already scheduled     Jul 25, 2017  2:00 PM CDT   Ech Complete with [id number]   [facility] CV Echocardiography (Card[facility] at [facility])    [address] [place] [id number] [phone]           1.  Please bring or wear a comfortable two-piece outfit. 2.  You may eat, drink and take your normal medicines. 3.  For any questions that cannot be answered, please contact the ordering physician            Sep 12, 2017  3:10 PM CDT   (Arrive by 2:40 PM)   Return Kidney Transplant with Samuel Varela MD   [facility] ([facility])    [address] [place] [id number] [phone]              Future tests that were ordered for you today     Open Future Orders        Priority Expected Expires Ordered    Echocardiogram Complete Routine  7/18/2018 7/18/2017            Who to contact     If you have questions or need follow up information about today's clinic visit or your schedule please contact [facility] directly at [phone].  Normal or non-critical lab and imaging results will be communicated to you by MyChart, letter or phone within 4 business days after the clinic has received the results.  "If you do not hear from us within 7 days, please contact the clinic through [facility] or phone. If you have a critical or abnormal lab result, we will notify you by phone as soon as possible.  Submit refill requests through [facility] or call your pharmacy and they will forward the refill request to us. Please allow 3 business days for your refill to be completed.          Additional Information About Your Visit        [facility] Information     [facility] gives you secure access to your electronic health record. If you see a primary care provider, you can also send messages to your care team and make appointments. If you have questions, please call your primary care clinic.  If you do not have a primary care provider, please call [phone] and they will assist you.        Care EveryWhere ID     This is your Care EveryWhere ID. This could be used by other organizations to access your [facility] medical records  [id number]        Your Vitals Were     Pulse Height Pulse Oximetry BMI (Body Mass Index)          60 1.778 m (5' 10\") 100% 24.06 kg/m2         Blood Pressure from Last 3 Encounters:   07/18/17 130/88   02/20/17 129/86   11/29/16 (!) 155/99    Weight from Last 3 Encounters:   07/18/17 76.1 kg (167 lb 11.2 oz)   02/20/17 79.3 kg (174 lb 12.8 oz)   11/29/16 79.4 kg (175 lb 1.6 oz)               Primary Care Provider Office Phone # Fax #    Yassine Hernandez [phone]       [facility] [address]   [facility] [address]        Equal Access to Services     BRADLEY JONES : Hadii aad ku hadasho Soomaali, waaxda luqadaha, qaybta kaalmada adeegyada, yves combs. So [facility] [phone].    ATENCIÓN: Si habla español, tiene a wild disposición servicios gratuitos de asistencia lingüística. Llame al [phone].    We comply with applicable federal civil rights laws and [place] laws. We do not discriminate on the basis of race, color, national origin, age, disability " sex, sexual orientation or gender identity.            Thank you!     Thank you for choosing [facility]  for your care. Our goal is always to provide you with excellent care. Hearing back from our patients is one way we can continue to improve our services. Please take a few minutes to complete the written survey that you may receive in the mail after your visit with us. Thank you!             Your Updated Medication List - Protect others around you: Learn how to safely use, store and throw away your medicines at www.disposemymeds.org.          This list is accurate as of: 7/18/17  2:44 PM.  Always use your most recent med list.                   Brand Name Dispense Instructions for use Diagnosis    aspirin 81 MG chewable tablet     36 tablet    Take 1 tablet (81 mg) by mouth daily    CAD, multiple vessel       atorvastatin 10 MG tablet    LIPITOR     Take 0.5 tablets (5 mg) by mouth daily        * COREG PO      Take 25 mg by mouth daily Take 12.5mg in the am and 25mg in the pm        * carvedilol 25 MG tablet    COREG    60 tablet    TAKE ONE TABLET BY MOUTH TWICE A DAY WITH MEALS    Hypertension secondary to other renal disorders       losartan 25 MG tablet    COZAAR    30 tablet    TAKE 1 TABLET BY MOUTH DAILY    Hypertension secondary to other renal disorders (CODE)       mycophenolate 250 MG capsule    CELLCEPT - GENERIC EQUIVALENT    180 capsule    Take 3 capsules (750 mg) by mouth 2 times daily    Status post kidney transplant       pantoprazole 20 MG EC tablet    PROTONIX    90 tablet    Take 1 tablet (20 mg) by mouth daily    Kidney transplanted       SERTRALINE HCL PO      Take 25 mg by mouth daily    Hepatitis B surface antigen positive       sulfamethoxazole-trimethoprim 400-80 MG per tablet    BACTRIM/SEPTRA    14 tablet    Take 1 tablet by mouth Every Mon, Wed, Fri Morning    Status post kidney transplant, Renal transplant recipient       tacrolimus capsule     180 capsule    Take 3 capsules  (1.5 mg) by mouth 2 times daily    Status post kidney transplant       VITAMIN D3 PO      Take 2,000 Units by mouth daily    Hepatitis B surface antigen positive       * Notice:  This list has 2 medication(s) that are the same as other medications prescribed for you. Read the directions carefully, and ask your doctor or other care provider to review them with you.

## 2017-07-25 ENCOUNTER — HOSPITAL ENCOUNTER (OUTPATIENT)
Dept: CARDIOLOGY | Facility: CLINIC | Age: 67
Discharge: HOME | End: 2017-07-25
Attending: INTERNAL MEDICINE | Admitting: INTERNAL MEDICINE
Payer: MEDICARE

## 2017-07-25 DIAGNOSIS — Z79.899 ENCOUNTER FOR LONG-TERM CURRENT USE OF MEDICATION: ICD-10-CM

## 2017-07-25 DIAGNOSIS — Z94.0 KIDNEY REPLACED BY TRANSPLANT: Primary | ICD-10-CM

## 2017-07-25 DIAGNOSIS — Z48.298 AFTERCARE FOLLOWING ORGAN TRANSPLANT: ICD-10-CM

## 2017-07-25 DIAGNOSIS — I25.10 CAD, MULTIPLE VESSEL: ICD-10-CM

## 2017-07-25 PROCEDURE — 93306 TTE W/DOPPLER COMPLETE: CPT

## 2017-07-25 PROCEDURE — 93306 TTE W/DOPPLER COMPLETE: CPT | Mod: 26 | Performed by: INTERNAL MEDICINE

## 2017-08-11 ENCOUNTER — HOSPITAL ENCOUNTER (OUTPATIENT)
Dept: LAB | Facility: CLINIC | Age: 67
Discharge: HOME | End: 2017-08-11
Admitting: INTERNAL MEDICINE
Payer: MEDICARE

## 2017-08-11 ENCOUNTER — RESULTS ONLY (OUTPATIENT)
Dept: OTHER | Facility: CLINIC | Age: 67
End: 2017-08-11

## 2017-08-11 DIAGNOSIS — Z94.0 KIDNEY REPLACED BY TRANSPLANT: ICD-10-CM

## 2017-08-11 DIAGNOSIS — Z79.899 ENCOUNTER FOR LONG-TERM CURRENT USE OF MEDICATION: ICD-10-CM

## 2017-08-11 DIAGNOSIS — Z48.298 AFTERCARE FOLLOWING ORGAN TRANSPLANT: ICD-10-CM

## 2017-08-11 LAB
ANION GAP SERPL CALCULATED.3IONS-SCNC: 6 MMOL/L (ref 3–14)
BUN SERPL-MCNC: 23 MG/DL (ref 7–30)
CALCIUM SERPL-MCNC: 9.1 MG/DL (ref 8.5–10.1)
CHLORIDE SERPL-SCNC: 106 MMOL/L (ref 94–109)
CO2 SERPL-SCNC: 25 MMOL/L (ref 20–32)
CREAT SERPL-MCNC: 1.12 MG/DL (ref 0.66–1.25)
ERYTHROCYTE [DISTWIDTH] IN BLOOD BY AUTOMATED COUNT: 13.8 % (ref 10–15)
GFR SERPL CREATININE-BSD FRML MDRD: 66 ML/MIN/1.7M2
GLUCOSE SERPL-MCNC: 101 MG/DL (ref 70–99)
HCT VFR BLD AUTO: 40.4 % (ref 40–53)
HGB BLD-MCNC: 12.8 G/DL (ref 13.3–17.7)
MCH RBC QN AUTO: 28.1 PG (ref 26.5–33)
MCHC RBC AUTO-ENTMCNC: 31.7 G/DL (ref 31.5–36.5)
MCV RBC AUTO: 89 FL (ref 78–100)
PLATELET # BLD AUTO: 182 10E9/L (ref 150–450)
POTASSIUM SERPL-SCNC: 3.9 MMOL/L (ref 3.4–5.3)
RBC # BLD AUTO: 4.56 10E12/L (ref 4.4–5.9)
SODIUM SERPL-SCNC: 137 MMOL/L (ref 133–144)
TACROLIMUS BLD-MCNC: 4.4 UG/L (ref 5–15)
TME LAST DOSE: ABNORMAL H
WBC # BLD AUTO: 4.1 10E9/L (ref 4–11)

## 2017-08-11 PROCEDURE — 85027 COMPLETE CBC AUTOMATED: CPT | Performed by: INTERNAL MEDICINE

## 2017-08-11 PROCEDURE — 80048 BASIC METABOLIC PNL TOTAL CA: CPT | Performed by: INTERNAL MEDICINE

## 2017-08-11 PROCEDURE — 36415 COLL VENOUS BLD VENIPUNCTURE: CPT | Performed by: INTERNAL MEDICINE

## 2017-08-11 PROCEDURE — 86833 HLA CLASS II HIGH DEFIN QUAL: CPT | Performed by: TRANSPLANT SURGERY

## 2017-08-11 PROCEDURE — 80197 ASSAY OF TACROLIMUS: CPT | Performed by: INTERNAL MEDICINE

## 2017-08-11 PROCEDURE — 86832 HLA CLASS I HIGH DEFIN QUAL: CPT | Performed by: TRANSPLANT SURGERY

## 2017-08-11 PROCEDURE — 87799 DETECT AGENT NOS DNA QUANT: CPT | Performed by: INTERNAL MEDICINE

## 2017-08-14 LAB
BKV DNA # SPEC NAA+PROBE: NORMAL COPIES/ML
BKV DNA SPEC NAA+PROBE-LOG#: NORMAL LOG COPIES/ML
SPECIMEN SOURCE: NORMAL

## 2017-08-16 LAB — PRA DONOR SPECIFIC ABY: NORMAL

## 2017-08-30 ENCOUNTER — TELEPHONE (OUTPATIENT)
Dept: TRANSPLANT | Facility: CLINIC | Age: 67
End: 2017-08-30

## 2017-08-30 DIAGNOSIS — Z94.0 KIDNEY REPLACED BY TRANSPLANT: Primary | ICD-10-CM

## 2017-08-30 LAB
CW4: 582
DONOR IDENTIFICATION: NORMAL
DSA COMMENTS: NORMAL
DSA PRESENT: YES
DSA TEST METHOD: NORMAL
ORGAN: NORMAL
SA1 CELL: NORMAL
SA1 COMMENTS: NORMAL
SA1 HI RISK ABY: NORMAL
SA1 MOD RISK ABY: NORMAL
SA1 TEST METHOD: NORMAL
SA2 CELL: NORMAL
SA2 COMMENTS: NORMAL
SA2 HI RISK ABY UA: NORMAL
SA2 MOD RISK ABY: NORMAL
SA2 TEST METHOD: NORMAL

## 2017-08-30 NOTE — TELEPHONE ENCOUNTER
ISSUE:  New DSA    PLAN:   Attempt to call pt: busy signal. Will try again tomorrow to assess for missed IS doses and will check DSA monthly x3 then resume Q6 month if stable.     8/31: VM left for pt.   Please adjust lab schedule to reflect DSA monthly x 3

## 2017-09-08 ENCOUNTER — RESULTS ONLY (OUTPATIENT)
Dept: OTHER | Facility: CLINIC | Age: 67
End: 2017-09-08

## 2017-09-08 ENCOUNTER — HOSPITAL ENCOUNTER (OUTPATIENT)
Dept: LAB | Facility: CLINIC | Age: 67
Discharge: HOME | End: 2017-09-08
Attending: INTERNAL MEDICINE | Admitting: TRANSPLANT SURGERY
Payer: MEDICARE

## 2017-09-08 DIAGNOSIS — Z79.899 ENCOUNTER FOR LONG-TERM CURRENT USE OF MEDICATION: ICD-10-CM

## 2017-09-08 DIAGNOSIS — Z94.0 KIDNEY REPLACED BY TRANSPLANT: ICD-10-CM

## 2017-09-08 DIAGNOSIS — Z48.298 AFTERCARE FOLLOWING ORGAN TRANSPLANT: ICD-10-CM

## 2017-09-08 LAB
ANION GAP SERPL CALCULATED.3IONS-SCNC: 8 MMOL/L (ref 3–14)
BUN SERPL-MCNC: 25 MG/DL (ref 7–30)
CALCIUM SERPL-MCNC: 9 MG/DL (ref 8.5–10.1)
CHLORIDE SERPL-SCNC: 105 MMOL/L (ref 94–109)
CO2 SERPL-SCNC: 25 MMOL/L (ref 20–32)
CREAT SERPL-MCNC: 1.19 MG/DL (ref 0.66–1.25)
CREAT UR-MCNC: 125 MG/DL
ERYTHROCYTE [DISTWIDTH] IN BLOOD BY AUTOMATED COUNT: 13.8 % (ref 10–15)
GFR SERPL CREATININE-BSD FRML MDRD: 61 ML/MIN/1.7M2
GLUCOSE SERPL-MCNC: 99 MG/DL (ref 70–99)
HCT VFR BLD AUTO: 40 % (ref 40–53)
HGB BLD-MCNC: 12.7 G/DL (ref 13.3–17.7)
MCH RBC QN AUTO: 28.3 PG (ref 26.5–33)
MCHC RBC AUTO-ENTMCNC: 31.8 G/DL (ref 31.5–36.5)
MCV RBC AUTO: 89 FL (ref 78–100)
PLATELET # BLD AUTO: 166 10E9/L (ref 150–450)
POTASSIUM SERPL-SCNC: 3.7 MMOL/L (ref 3.4–5.3)
PROT UR-MCNC: 0.28 G/L
PROT/CREAT 24H UR: 0.22 G/G CR (ref 0–0.2)
RBC # BLD AUTO: 4.49 10E12/L (ref 4.4–5.9)
SODIUM SERPL-SCNC: 138 MMOL/L (ref 133–144)
TACROLIMUS BLD-MCNC: 4.2 UG/L (ref 5–15)
TME LAST DOSE: ABNORMAL H
WBC # BLD AUTO: 3.8 10E9/L (ref 4–11)

## 2017-09-08 PROCEDURE — 36415 COLL VENOUS BLD VENIPUNCTURE: CPT | Performed by: INTERNAL MEDICINE

## 2017-09-08 PROCEDURE — 86832 HLA CLASS I HIGH DEFIN QUAL: CPT | Performed by: TRANSPLANT SURGERY

## 2017-09-08 PROCEDURE — 80197 ASSAY OF TACROLIMUS: CPT | Performed by: INTERNAL MEDICINE

## 2017-09-08 PROCEDURE — 84156 ASSAY OF PROTEIN URINE: CPT | Performed by: INTERNAL MEDICINE

## 2017-09-08 PROCEDURE — 86833 HLA CLASS II HIGH DEFIN QUAL: CPT | Performed by: TRANSPLANT SURGERY

## 2017-09-08 PROCEDURE — 85027 COMPLETE CBC AUTOMATED: CPT | Performed by: INTERNAL MEDICINE

## 2017-09-08 PROCEDURE — 80048 BASIC METABOLIC PNL TOTAL CA: CPT | Performed by: INTERNAL MEDICINE

## 2017-09-11 LAB — PRA DONOR SPECIFIC ABY: NORMAL

## 2017-09-12 ENCOUNTER — OFFICE VISIT (OUTPATIENT)
Dept: NEPHROLOGY | Facility: CLINIC | Age: 67
End: 2017-09-12
Attending: INTERNAL MEDICINE
Payer: MEDICARE

## 2017-09-12 VITALS
BODY MASS INDEX: 23.74 KG/M2 | HEART RATE: 60 BPM | DIASTOLIC BLOOD PRESSURE: 81 MMHG | WEIGHT: 165.8 LBS | HEIGHT: 70 IN | TEMPERATURE: 97.8 F | SYSTOLIC BLOOD PRESSURE: 133 MMHG

## 2017-09-12 DIAGNOSIS — D84.9 IMMUNOSUPPRESSED STATUS (H): ICD-10-CM

## 2017-09-12 DIAGNOSIS — N25.81 SECONDARY RENAL HYPERPARATHYROIDISM (H): ICD-10-CM

## 2017-09-12 DIAGNOSIS — Z94.0 KIDNEY REPLACED BY TRANSPLANT: Primary | ICD-10-CM

## 2017-09-12 DIAGNOSIS — D63.1 ANEMIA IN STAGE 3 CHRONIC KIDNEY DISEASE (H): ICD-10-CM

## 2017-09-12 DIAGNOSIS — E55.9 VITAMIN D DEFICIENCY: ICD-10-CM

## 2017-09-12 DIAGNOSIS — Z94.0 STATUS POST KIDNEY TRANSPLANT: ICD-10-CM

## 2017-09-12 DIAGNOSIS — Z48.298 AFTERCARE FOLLOWING ORGAN TRANSPLANT: ICD-10-CM

## 2017-09-12 DIAGNOSIS — N18.30 ANEMIA IN STAGE 3 CHRONIC KIDNEY DISEASE (H): ICD-10-CM

## 2017-09-12 DIAGNOSIS — I15.1 HYPERTENSION SECONDARY TO OTHER RENAL DISORDERS: ICD-10-CM

## 2017-09-12 PROCEDURE — 99212 OFFICE O/P EST SF 10 MIN: CPT | Mod: ZF

## 2017-09-12 RX ORDER — TACROLIMUS 0.5 MG/1
1 CAPSULE, GELATIN COATED ORAL 2 TIMES DAILY
Qty: 120 CAPSULE | Refills: 11 | Status: SHIPPED | OUTPATIENT
Start: 2017-09-12 | End: 2018-10-03

## 2017-09-12 ASSESSMENT — PAIN SCALES - GENERAL: PAINLEVEL: NO PAIN (0)

## 2017-09-12 NOTE — LETTER
9/12/2017      RE: Marlo Vee  [address] [facility] [address]       Assessment and Plan:  1. LDKT - baseline Cr ~ 1.0-1.2, which has remained stable.  Minimal proteinuria.  New, low level DSA to Cw, which is felt to be less antigenic.  Will increase tacrolimus to 1 mg bid with goal closer to 6 in the 4-6 range.  Will make no other changes in immunosuppression.  2. HTN - well controlled at target of less than 140/90.  No changes.  3. CAD - asymptomatic.  Recommend follow up with Cardiology.  4. Anemia in chronic renal disease - stable Hgb, near normal.  Will follow.  5. Secondary renal hyperparathyroidism - mildly elevated PTH when last checked.  Will recheck PTH with next labs.  6. Vitamin D deficiency - low vitamin D level with last check and will continue cholecalciferol.  Will recheck vitamin D level with next labs.  7. CMV IgG Ab discordance - patient is now off Valcyte.  He has seroconverted with detectable CMV PCR in the past, but last CMV PCR was negative.  No need to follow any further unless patient has symptoms.  8. Skin cancer risk - one new skin cancer lesion removed.  Recommend regular follow up with Dermatology.  9. Recommend return visit in 6 months.    Assessment and plan was discussed with patient and he voiced his understanding and agreement.    Reason for Visit:  Mr. Vee is here for routine follow up.    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         Transplant Hx:       Tx: LDKT  Date: 1/21/16       Present Maintenance IS: Tacrolimus and Mycophenolate mofetil       Baseline Creatinine: 1.0-1.2       Recent DSA: Yes  Date last checked: 8/2017       Biopsy: No    Mr. Vee reports feeling good overall with minimal medical complaints.  His energy level is good and has remained normal.  He is active and does get some exercise, mostly with walking.  Denies any chest pain or shortness  of breath with exertion.  Appetite is good and weight has been stable.  No nausea, vomiting or diarrhea.  No fever, sweats or chills.  No leg swelling.    Home BP: Not checked.      ROS:   A comprehensive review of systems was obtained and negative, except as noted in the HPI or PMH.    Active Medical Problems:  Patient Active Problem List   Diagnosis     Hypertension secondary to other renal disorders     Membranous glomerulonephritis     Anemia in chronic renal disease     Secondary renal hyperparathyroidism (H)     Vitamin D deficiency     Dyslipidemia     Anxiety     Status post coronary angiogram     CAD, multiple vessel     Multiple vessel coronary artery disease     Kidney replaced by transplant     Immunosuppressed status (H)     Aftercare following organ transplant       Personal Hx:  Social History     Social History     Marital status:      Spouse name: N/A     Number of children: 4     Years of education: N/A     Occupational History     Not on file.     Social History Main Topics     Smoking status: Never Smoker     Smokeless tobacco: Never Used     Alcohol use No      Comment: Patient reports drinking beer on a rare ocassion.     Drug use: No     Sexual activity: Not on file     Other Topics Concern     Not on file     Social History Narrative       Allergies:  No Known Allergies    Medications:  Prior to Admission medications    Medication Sig Start Date End Date Taking? Authorizing Provider   phosphorus tablet 250 mg (K PHOS NEUTRAL) 250 MG tablet Take 1 tablet (250 mg) by mouth 2 times daily 1/27/16   Samuel Varela MD   PROGRAF 0.5 MG PO CAPSULE Take 1 capsule (0.5 mg) by mouth 2 times daily 1/27/16   Samuel Varela MD   HYDROmorphone (DILAUDID) 2 MG tablet Take 1 tablet (2 mg) by mouth every 4 hours as needed for moderate to severe pain 1/26/16   Maryellen Joe PA-C   atorvastatin (LIPITOR) 10 MG tablet Take 0.5 tablets (5 mg) by mouth daily 1/26/16   Maryellen Joe  "CHRISTINA Junior   hydrALAZINE (APRESOLINE) 100 MG TABS Take 1 tablet (100 mg) by mouth every 8 hours 1/26/16   Maryellen Joe PA-C   valGANciclovir (VALCYTE) 450 MG tablet Take 1 tablet (450 mg) by mouth daily 1/26/16   Maryellen Joe PA-C   PROGRAF 1 MG PO CAPSULE Take 2 capsules (2 mg) by mouth 2 times daily 1/26/16 1/20/17  Maryellen Joe PA-C   mycophenolate (CELLCEPT - GENERIC EQUIVALENT) 250 MG capsule Take 3 capsules (750 mg) by mouth 2 times daily 1/26/16 1/20/17  Maryellen Joe PA-C   carvedilol (COREG) 25 MG tablet Take 1 tablet (25 mg) by mouth 2 times daily (with meals) 1/26/16   Maryellen Joe PA-C   senna-docusate (SENOKOT-S;PERICOLACE) 8.6-50 MG per tablet Take 1-2 tablets by mouth 2 times daily 1/26/16   Maryellen Joe PA-C   sulfamethoxazole-trimethoprim (BACTRIM,SEPTRA) 400-80 MG per tablet Take 1 tablet by mouth daily 1/26/16   Maryellen Joe PA-C   clotrimazole (MYCELEX) 10 MG LOZG Place 1 lozenge (10 mg) inside cheek 3 times daily 1/26/16   Maryellen Joe PA-C   pantoprazole (PROTONIX) 40 MG enteric coated tablet Take 1 tablet (40 mg) by mouth daily 1/26/16 4/25/16  Maryellen Joe PA-C   Clopidogrel Bisulfate (PLAVIX PO) Take 75 mg by mouth daily     Reported, Patient   aspirin 81 MG chewable tablet Take 1 tablet (81 mg) by mouth daily 4/25/15   Jimmy Jennings MD   B Complex-C-Folic Acid (TRIPHROCAPS) 1 MG CAPS Take 1 capsule by mouth daily    Reported, Patient   Cholecalciferol (VITAMIN D3 PO) Take 2,000 Units by mouth daily    Reported, Patient   SERTRALINE HCL PO Take 25 mg by mouth daily    Reported, Patient   TRAZODONE HCL PO Take 50 mg by mouth At Bedtime    Reported, Patient       Vitals:  /81  Pulse 60  Temp 97.8  F (36.6  C) (Oral)  Ht 1.778 m (5' 10\")  Wt 75.2 kg (165 lb 12.8 oz)  BMI 23.79 kg/m2    Exam:   GENERAL APPEARANCE: alert and no distress  HENT: mouth without ulcers or lesions  LYMPHATICS: no cervical or " supraclavicular nodes  RESP: lungs clear to auscultation - no rales, rhonchi or wheezes  CV: regular rhythm, normal rate, no rub, no murmur  EDEMA: no LE edema bilaterally  ABDOMEN: soft, nondistended, nontender, bowel sounds normal  MS: extremities normal - no gross deformities noted, no evidence of inflammation in joints, no muscle tenderness  SKIN: no rash  TX KIDNEY: normal    Results:   Recent Results (from the past 504 hour(s))   UNOS Unacceptable Antigens    Collection Time: 08/30/17 12:00 AM   Result Value Ref Range    Protocol Cutoff Plan A FCS treated sera, 500 mfi cumulative     Unacceptable Antigen A:25 26 30 43 66 69 B:37 57 58 75 DQ:5 6    UNOS cPRA    Collection Time: 08/30/17 12:00 AM   Result Value Ref Range    UNOS cPRA 74    CBC with platelets    Collection Time: 09/08/17  8:30 AM   Result Value Ref Range    WBC 3.8 (L) 4.0 - 11.0 10e9/L    RBC Count 4.49 4.4 - 5.9 10e12/L    Hemoglobin 12.7 (L) 13.3 - 17.7 g/dL    Hematocrit 40.0 40.0 - 53.0 %    MCV 89 78 - 100 fl    MCH 28.3 26.5 - 33.0 pg    MCHC 31.8 31.5 - 36.5 g/dL    RDW 13.8 10.0 - 15.0 %    Platelet Count 166 150 - 450 10e9/L   Basic metabolic panel    Collection Time: 09/08/17  8:30 AM   Result Value Ref Range    Sodium 138 133 - 144 mmol/L    Potassium 3.7 3.4 - 5.3 mmol/L    Chloride 105 94 - 109 mmol/L    Carbon Dioxide 25 20 - 32 mmol/L    Anion Gap 8 3 - 14 mmol/L    Glucose 99 70 - 99 mg/dL    Urea Nitrogen 25 7 - 30 mg/dL    Creatinine 1.19 0.66 - 1.25 mg/dL    GFR Estimate 61 >60 mL/min/1.7m2    GFR Estimate If Black 74 >60 mL/min/1.7m2    Calcium 9.0 8.5 - 10.1 mg/dL   Tacrolimus level    Collection Time: 09/08/17  8:30 AM   Result Value Ref Range    Tacrolimus Last Dose 2030 09/7/2017     Tacrolimus Level 4.2 (L) 5.0 - 15.0 ug/L   CMV DNA quantification    Collection Time: 09/08/17  8:30 AM   Result Value Ref Range    CMV DNA Quantitation Specimen Plasma, EDTA anticoagulant     CMV Quant IU/mL CMV DNA Not Detected CMVND^CMV  DNA Not Detected [IU]/mL    Log IU/mL of CMVQNT Not Calculated <2.1 [Log_IU]/mL   PRA Donor Specific Antibody    Collection Time: 09/08/17  8:30 AM   Result Value Ref Range    PRA Donor Specific Leticia       Specimen received - Immunology report to follow upon completion.   Protein  random urine with Creat Ratio    Collection Time: 09/08/17  8:40 AM   Result Value Ref Range    Protein Random Urine 0.28 g/L    Protein Total Urine g/gr Creatinine 0.22 (H) 0 - 0.2 g/g Cr   Creatinine urine calculation only    Collection Time: 09/08/17  8:40 AM   Result Value Ref Range    Creatinine Urine 125 mg/dL       Samuel Varela MD

## 2017-09-12 NOTE — MR AVS SNAPSHOT
After Visit Summary   9/12/2017    Marlo Vee    MRN: [id number]           Patient Information     Date Of Birth          1950        Visit Information        Provider Department      9/12/2017 3:10 PM Samuel Varlea MD [facility] Nephrology        Today's Diagnoses     Kidney replaced by transplant    -  1    Status post kidney transplant        Secondary renal hyperparathyroidism (H)        Vitamin D deficiency        Aftercare following organ transplant        Immunosuppressed status (H)        Hypertension secondary to other renal disorders        Anemia in stage 3 chronic kidney disease           Follow-ups after your visit        Follow-up notes from your care team     Return in about 6 months (around 3/12/2018).      Future tests that were ordered for you today     Open Future Orders        Priority Expected Expires Ordered    Parathyroid Hormone Intact Routine  10/12/2017 9/12/2017    Vitamin D Deficiency Routine  10/12/2017 9/12/2017            Who to contact     If you have questions or need follow up information about today's clinic visit or your schedule please contact [facility] NEPHROLOG[facility] directly at [phone].  Normal or non-critical lab and imaging results will be communicated to you by iKaazhart, letter or phone within 4 business days after the clinic has received the results. If you do not hear from us within 7 days, please contact the clinic through Mixbook or phone. If you have a critical or abnormal lab result, we will notify you by phone as soon as possible.  Submit refill requests through Mixbook or call your pharmacy and they will forward the refill request to us. Please allow 3 business days for your refill to be completed.          Additional Information About Your Visit        iKaazhart Information     Mixbook gives you secure access to your electronic health record. If you see a primary care provider, you can also send messages to your care team and make  "appointments. If you have questions, please call your primary care clinic.  If you do not have a primary care provider, please call [phone] and they will assist you.        Care EveryWhere ID     This is your Care EveryWhere ID. This could be used by other organizations to access your [place] medical records  [id number]        Your Vitals Were     Pulse Temperature Height BMI (Body Mass Index)          60 97.8  F (36.6  C) (Oral) 1.778 m (5' 10\") 23.79 kg/m2         Blood Pressure from Last 3 Encounters:   09/12/17 133/81   07/18/17 130/88   02/20/17 129/86    Weight from Last 3 Encounters:   09/12/17 75.2 kg (165 lb 12.8 oz)   07/18/17 76.1 kg (167 lb 11.2 oz)   02/20/17 79.3 kg (174 lb 12.8 oz)                 Today's Medication Changes          These changes are accurate as of: 9/12/17  3:25 PM.  If you have any questions, ask your nurse or doctor.               These medicines have changed or have updated prescriptions.        Dose/Directions    COREG PO   This may have changed:  Another medication with the same name was removed. Continue taking this medication, and follow the directions you see here.        Dose:  25 mg   Take 25 mg by mouth daily Take 12.5mg in the am and 25mg in the pm   Refills:  0       tacrolimus capsule   This may have changed:  how much to take   Used for:  Kidney replaced by transplant        Dose:  1 mg   Take 2 capsules (1 mg) by mouth 2 times daily   Quantity:  120 capsule   Refills:  11            Where to get your medicines      These medications were sent to [facility] - [place], [place] - [address]  [address] [facility] [address]    Hours:  Mon-Fri 8:30am-5:00pm Toll Free [phone] Phone:  [phone]     tacrolimus capsule                Primary Care Provider Office Phone # Fax #    Yassine Hernandez [phone]       [facility] [address] [place] [facility] [address]        Equal Access to " Services     [facility]: Hadii aad ku hadvictor manuelbrayan Mary Annali, wayaritzada luqadaha, qaybta kaalmada aman, yves maharaj . So [facility] [phone].    ATENCIÓN: Si ronniela evelio, tiene a wild disposición servicios gratuitos de asistencia lingüística. Llame al [phone].    We comply with applicable federal civil rights laws and [place] laws. We do not discriminate on the basis of race, color, national origin, age, disability sex, sexual orientation or gender identity.            Thank you!     Thank you for choosing [facility] NEPHROLOGY  for your care. Our goal is always to provide you with excellent care. Hearing back from our patients is one way we can continue to improve our services. Please take a few minutes to complete the written survey that you may receive in the mail after your visit with us. Thank you!             Your Updated Medication List - Protect others around you: Learn how to safely use, store and throw away your medicines at www.disposemymeds.org.          This list is accurate as of: 9/12/17  3:25 PM.  Always use your most recent med list.                   Brand Name Dispense Instructions for use Diagnosis    aspirin 81 MG chewable tablet     36 tablet    Take 1 tablet (81 mg) by mouth daily    CAD, multiple vessel       atorvastatin 10 MG tablet    LIPITOR     Take 0.5 tablets (5 mg) by mouth daily        COREG PO      Take 25 mg by mouth daily Take 12.5mg in the am and 25mg in the pm        losartan 25 MG tablet    COZAAR    30 tablet    TAKE 1 TABLET BY MOUTH DAILY    Hypertension secondary to other renal disorders (CODE)       mycophenolate 250 MG capsule    GENERIC EQUIVALENT    180 capsule    Take 3 capsules (750 mg) by mouth 2 times daily    Status post kidney transplant       SERTRALINE HCL PO      Take 25 mg by mouth daily    Hepatitis B surface antigen positive       sulfamethoxazole-trimethoprim 400-80 MG per tablet    BACTRIM/SEPTRA    14 tablet    Take 1 tablet  by mouth Every Mon, Wed, Fri Morning    Status post kidney transplant, Renal transplant recipient       tacrolimus capsule     120 capsule    Take 2 capsules (1 mg) by mouth 2 times daily    Kidney replaced by transplant       VITAMIN D3 PO      Take 2,000 Units by mouth daily    Hepatitis B surface antigen positive

## 2017-09-12 NOTE — NURSING NOTE
"Chief Complaint   Patient presents with     RECHECK     follow up on kidney transplant       Initial /81  Pulse 60  Temp 97.8  F (36.6  C) (Oral)  Ht 1.778 m (5' 10\")  Wt 75.2 kg (165 lb 12.8 oz)  BMI 23.79 kg/m2 Estimated body mass index is 23.79 kg/(m^2) as calculated from the following:    Height as of this encounter: 1.778 m (5' 10\").    Weight as of this encounter: 75.2 kg (165 lb 12.8 oz).  Medication Reconciliation: complete  "

## 2017-09-12 NOTE — PROGRESS NOTES
Assessment and Plan:  1. LDKT - baseline Cr ~ 1.0-1.2, which has remained stable.  Minimal proteinuria.  New, low level DSA to Cw, which is felt to be less antigenic.  Will increase tacrolimus to 1 mg bid with goal closer to 6 in the 4-6 range.  Will make no other changes in immunosuppression.  2. HTN - well controlled at target of less than 140/90.  No changes.  3. CAD - asymptomatic.  Recommend follow up with Cardiology.  4. Anemia in chronic renal disease - stable Hgb, near normal.  Will follow.  5. Secondary renal hyperparathyroidism - mildly elevated PTH when last checked.  Will recheck PTH with next labs.  6. Vitamin D deficiency - low vitamin D level with last check and will continue cholecalciferol.  Will recheck vitamin D level with next labs.  7. CMV IgG Ab discordance - patient is now off Valcyte.  He has seroconverted with detectable CMV PCR in the past, but last CMV PCR was negative.  No need to follow any further unless patient has symptoms.  8. Skin cancer risk - one new skin cancer lesion removed.  Recommend regular follow up with Dermatology.  9. Recommend return visit in 6 months.    Assessment and plan was discussed with patient and he voiced his understanding and agreement.    Reason for Visit:  Mr. Vee is here for routine follow up.    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         Transplant Hx:       Tx: LDKT  Date: 1/21/16       Present Maintenance IS: Tacrolimus and Mycophenolate mofetil       Baseline Creatinine: 1.0-1.2       Recent DSA: Yes  Date last checked: 8/2017       Biopsy: No    Mr. Vee reports feeling good overall with minimal medical complaints.  His energy level is good and has remained normal.  He is active and does get some exercise, mostly with walking.  Denies any chest pain or shortness of breath with exertion.  Appetite is good and weight has been stable.  No nausea, vomiting  or diarrhea.  No fever, sweats or chills.  No leg swelling.    Home BP: Not checked.      ROS:   A comprehensive review of systems was obtained and negative, except as noted in the HPI or PMH.    Active Medical Problems:  Patient Active Problem List   Diagnosis     Hypertension secondary to other renal disorders     Membranous glomerulonephritis     Anemia in chronic renal disease     Secondary renal hyperparathyroidism (H)     Vitamin D deficiency     Dyslipidemia     Anxiety     Status post coronary angiogram     CAD, multiple vessel     Multiple vessel coronary artery disease     Kidney replaced by transplant     Immunosuppressed status (H)     Aftercare following organ transplant       Personal Hx:  Social History     Social History     Marital status:      Spouse name: N/A     Number of children: 4     Years of education: N/A     Occupational History     Not on file.     Social History Main Topics     Smoking status: Never Smoker     Smokeless tobacco: Never Used     Alcohol use No      Comment: Patient reports drinking beer on a rare ocassion.     Drug use: No     Sexual activity: Not on file     Other Topics Concern     Not on file     Social History Narrative       Allergies:  No Known Allergies    Medications:  Prior to Admission medications    Medication Sig Start Date End Date Taking? Authorizing Provider   phosphorus tablet 250 mg (K PHOS NEUTRAL) 250 MG tablet Take 1 tablet (250 mg) by mouth 2 times daily 1/27/16   Samuel Varela MD   PROGRAF 0.5 MG PO CAPSULE Take 1 capsule (0.5 mg) by mouth 2 times daily 1/27/16   Samuel Varela MD   HYDROmorphone (DILAUDID) 2 MG tablet Take 1 tablet (2 mg) by mouth every 4 hours as needed for moderate to severe pain 1/26/16   Maryellen Joe PA-C   atorvastatin (LIPITOR) 10 MG tablet Take 0.5 tablets (5 mg) by mouth daily 1/26/16   Maryellen Joe PA-C   hydrALAZINE (APRESOLINE) 100 MG TABS Take 1 tablet (100 mg) by mouth every 8  "hours 1/26/16   Maryellen Joe PA-C   valGANciclovir (VALCYTE) 450 MG tablet Take 1 tablet (450 mg) by mouth daily 1/26/16   Maryellen Joe PA-C   PROGRAF 1 MG PO CAPSULE Take 2 capsules (2 mg) by mouth 2 times daily 1/26/16 1/20/17  Maryellen Joe PA-C   mycophenolate (CELLCEPT - GENERIC EQUIVALENT) 250 MG capsule Take 3 capsules (750 mg) by mouth 2 times daily 1/26/16 1/20/17  Maryellen Joe PA-C   carvedilol (COREG) 25 MG tablet Take 1 tablet (25 mg) by mouth 2 times daily (with meals) 1/26/16   Maryellen Joe PA-C   senna-docusate (SENOKOT-S;PERICOLACE) 8.6-50 MG per tablet Take 1-2 tablets by mouth 2 times daily 1/26/16   Maryellen Joe PA-C   sulfamethoxazole-trimethoprim (BACTRIM,SEPTRA) 400-80 MG per tablet Take 1 tablet by mouth daily 1/26/16   Maryellen Joe PA-C   clotrimazole (MYCELEX) 10 MG LOZG Place 1 lozenge (10 mg) inside cheek 3 times daily 1/26/16   Maryellen Joe PA-C   pantoprazole (PROTONIX) 40 MG enteric coated tablet Take 1 tablet (40 mg) by mouth daily 1/26/16 4/25/16  Maryellen Joe PA-C   Clopidogrel Bisulfate (PLAVIX PO) Take 75 mg by mouth daily     Reported, Patient   aspirin 81 MG chewable tablet Take 1 tablet (81 mg) by mouth daily 4/25/15   Jimmy Jennings MD   B Complex-C-Folic Acid (TRIPHROCAPS) 1 MG CAPS Take 1 capsule by mouth daily    Reported, Patient   Cholecalciferol (VITAMIN D3 PO) Take 2,000 Units by mouth daily    Reported, Patient   SERTRALINE HCL PO Take 25 mg by mouth daily    Reported, Patient   TRAZODONE HCL PO Take 50 mg by mouth At Bedtime    Reported, Patient       Vitals:  /81  Pulse 60  Temp 97.8  F (36.6  C) (Oral)  Ht 1.778 m (5' 10\")  Wt 75.2 kg (165 lb 12.8 oz)  BMI 23.79 kg/m2    Exam:   GENERAL APPEARANCE: alert and no distress  HENT: mouth without ulcers or lesions  LYMPHATICS: no cervical or supraclavicular nodes  RESP: lungs clear to auscultation - no rales, rhonchi or wheezes  CV: " regular rhythm, normal rate, no rub, no murmur  EDEMA: no LE edema bilaterally  ABDOMEN: soft, nondistended, nontender, bowel sounds normal  MS: extremities normal - no gross deformities noted, no evidence of inflammation in joints, no muscle tenderness  SKIN: no rash  TX KIDNEY: normal    Results:   Recent Results (from the past 504 hour(s))   UNOS Unacceptable Antigens    Collection Time: 08/30/17 12:00 AM   Result Value Ref Range    Protocol Cutoff Plan A FCS treated sera, 500 mfi cumulative     Unacceptable Antigen A:25 26 30 43 66 69 B:37 57 58 75 DQ:5 6    UNOS cPRA    Collection Time: 08/30/17 12:00 AM   Result Value Ref Range    UNOS cPRA 74    CBC with platelets    Collection Time: 09/08/17  8:30 AM   Result Value Ref Range    WBC 3.8 (L) 4.0 - 11.0 10e9/L    RBC Count 4.49 4.4 - 5.9 10e12/L    Hemoglobin 12.7 (L) 13.3 - 17.7 g/dL    Hematocrit 40.0 40.0 - 53.0 %    MCV 89 78 - 100 fl    MCH 28.3 26.5 - 33.0 pg    MCHC 31.8 31.5 - 36.5 g/dL    RDW 13.8 10.0 - 15.0 %    Platelet Count 166 150 - 450 10e9/L   Basic metabolic panel    Collection Time: 09/08/17  8:30 AM   Result Value Ref Range    Sodium 138 133 - 144 mmol/L    Potassium 3.7 3.4 - 5.3 mmol/L    Chloride 105 94 - 109 mmol/L    Carbon Dioxide 25 20 - 32 mmol/L    Anion Gap 8 3 - 14 mmol/L    Glucose 99 70 - 99 mg/dL    Urea Nitrogen 25 7 - 30 mg/dL    Creatinine 1.19 0.66 - 1.25 mg/dL    GFR Estimate 61 >60 mL/min/1.7m2    GFR Estimate If Black 74 >60 mL/min/1.7m2    Calcium 9.0 8.5 - 10.1 mg/dL   Tacrolimus level    Collection Time: 09/08/17  8:30 AM   Result Value Ref Range    Tacrolimus Last Dose 2030 09/7/2017     Tacrolimus Level 4.2 (L) 5.0 - 15.0 ug/L   CMV DNA quantification    Collection Time: 09/08/17  8:30 AM   Result Value Ref Range    CMV DNA Quantitation Specimen Plasma, EDTA anticoagulant     CMV Quant IU/mL CMV DNA Not Detected CMVND^CMV DNA Not Detected [IU]/mL    Log IU/mL of CMVQNT Not Calculated <2.1 [Log_IU]/mL   PRA Donor  Specific Antibody    Collection Time: 09/08/17  8:30 AM   Result Value Ref Range    PRA Donor Specific Leticia       Specimen received - Immunology report to follow upon completion.   Protein  random urine with Creat Ratio    Collection Time: 09/08/17  8:40 AM   Result Value Ref Range    Protein Random Urine 0.28 g/L    Protein Total Urine g/gr Creatinine 0.22 (H) 0 - 0.2 g/g Cr   Creatinine urine calculation only    Collection Time: 09/08/17  8:40 AM   Result Value Ref Range    Creatinine Urine 125 mg/dL

## 2017-10-09 ENCOUNTER — HOSPITAL ENCOUNTER (OUTPATIENT)
Dept: LAB | Facility: CLINIC | Age: 67
Discharge: HOME | End: 2017-10-09
Attending: INTERNAL MEDICINE | Admitting: TRANSPLANT SURGERY
Payer: MEDICARE

## 2017-10-09 ENCOUNTER — RESULTS ONLY (OUTPATIENT)
Dept: OTHER | Facility: CLINIC | Age: 67
End: 2017-10-09

## 2017-10-09 DIAGNOSIS — Z79.899 ENCOUNTER FOR LONG-TERM CURRENT USE OF MEDICATION: ICD-10-CM

## 2017-10-09 DIAGNOSIS — Z94.0 KIDNEY REPLACED BY TRANSPLANT: ICD-10-CM

## 2017-10-09 DIAGNOSIS — Z48.298 AFTERCARE FOLLOWING ORGAN TRANSPLANT: ICD-10-CM

## 2017-10-09 DIAGNOSIS — N25.81 SECONDARY RENAL HYPERPARATHYROIDISM (H): ICD-10-CM

## 2017-10-09 DIAGNOSIS — E55.9 VITAMIN D DEFICIENCY: ICD-10-CM

## 2017-10-09 LAB
ANION GAP SERPL CALCULATED.3IONS-SCNC: 5 MMOL/L (ref 3–14)
BUN SERPL-MCNC: 22 MG/DL (ref 7–30)
CALCIUM SERPL-MCNC: 9.2 MG/DL (ref 8.5–10.1)
CHLORIDE SERPL-SCNC: 105 MMOL/L (ref 94–109)
CO2 SERPL-SCNC: 27 MMOL/L (ref 20–32)
CREAT SERPL-MCNC: 1.09 MG/DL (ref 0.66–1.25)
DEPRECATED CALCIDIOL+CALCIFEROL SERPL-MC: 32 UG/L (ref 20–75)
ERYTHROCYTE [DISTWIDTH] IN BLOOD BY AUTOMATED COUNT: 13.7 % (ref 10–15)
GFR SERPL CREATININE-BSD FRML MDRD: 68 ML/MIN/1.7M2
GLUCOSE SERPL-MCNC: 97 MG/DL (ref 70–99)
HCT VFR BLD AUTO: 41.5 % (ref 40–53)
HGB BLD-MCNC: 13.1 G/DL (ref 13.3–17.7)
MCH RBC QN AUTO: 28.1 PG (ref 26.5–33)
MCHC RBC AUTO-ENTMCNC: 31.6 G/DL (ref 31.5–36.5)
MCV RBC AUTO: 89 FL (ref 78–100)
PLATELET # BLD AUTO: 168 10E9/L (ref 150–450)
POTASSIUM SERPL-SCNC: 3.8 MMOL/L (ref 3.4–5.3)
PTH-INTACT SERPL-MCNC: 94 PG/ML (ref 12–72)
RBC # BLD AUTO: 4.66 10E12/L (ref 4.4–5.9)
SODIUM SERPL-SCNC: 137 MMOL/L (ref 133–144)
TACROLIMUS BLD-MCNC: 6.3 UG/L (ref 5–15)
TME LAST DOSE: NORMAL H
WBC # BLD AUTO: 3.9 10E9/L (ref 4–11)

## 2017-10-09 PROCEDURE — 82306 VITAMIN D 25 HYDROXY: CPT | Performed by: INTERNAL MEDICINE

## 2017-10-09 PROCEDURE — 36415 COLL VENOUS BLD VENIPUNCTURE: CPT | Performed by: INTERNAL MEDICINE

## 2017-10-09 PROCEDURE — 80048 BASIC METABOLIC PNL TOTAL CA: CPT | Performed by: INTERNAL MEDICINE

## 2017-10-09 PROCEDURE — 85027 COMPLETE CBC AUTOMATED: CPT | Performed by: INTERNAL MEDICINE

## 2017-10-09 PROCEDURE — 83970 ASSAY OF PARATHORMONE: CPT | Performed by: INTERNAL MEDICINE

## 2017-10-09 PROCEDURE — 80197 ASSAY OF TACROLIMUS: CPT | Performed by: INTERNAL MEDICINE

## 2017-10-09 PROCEDURE — 86832 HLA CLASS I HIGH DEFIN QUAL: CPT | Performed by: TRANSPLANT SURGERY

## 2017-10-09 PROCEDURE — 86833 HLA CLASS II HIGH DEFIN QUAL: CPT | Performed by: TRANSPLANT SURGERY

## 2017-10-10 LAB
CMV DNA SPEC NAA+PROBE-ACNC: NORMAL [IU]/ML
CMV DNA SPEC NAA+PROBE-LOG#: NORMAL {LOG_IU}/ML
PRA DONOR SPECIFIC ABY: NORMAL
SPECIMEN SOURCE: NORMAL

## 2017-11-08 ENCOUNTER — RESULTS ONLY (OUTPATIENT)
Dept: OTHER | Facility: CLINIC | Age: 67
End: 2017-11-08

## 2017-11-08 ENCOUNTER — HOSPITAL ENCOUNTER (OUTPATIENT)
Dept: LAB | Facility: CLINIC | Age: 67
Discharge: HOME | End: 2017-11-08
Attending: INTERNAL MEDICINE | Admitting: TRANSPLANT SURGERY
Payer: MEDICARE

## 2017-11-08 DIAGNOSIS — Z79.899 ENCOUNTER FOR LONG-TERM CURRENT USE OF MEDICATION: ICD-10-CM

## 2017-11-08 DIAGNOSIS — Z48.298 AFTERCARE FOLLOWING ORGAN TRANSPLANT: ICD-10-CM

## 2017-11-08 DIAGNOSIS — Z94.0 KIDNEY REPLACED BY TRANSPLANT: ICD-10-CM

## 2017-11-08 LAB
ANION GAP SERPL CALCULATED.3IONS-SCNC: 6 MMOL/L (ref 3–14)
BUN SERPL-MCNC: 19 MG/DL (ref 7–30)
CALCIUM SERPL-MCNC: 9.2 MG/DL (ref 8.5–10.1)
CHLORIDE SERPL-SCNC: 103 MMOL/L (ref 94–109)
CO2 SERPL-SCNC: 27 MMOL/L (ref 20–32)
CREAT SERPL-MCNC: 1.17 MG/DL (ref 0.66–1.25)
ERYTHROCYTE [DISTWIDTH] IN BLOOD BY AUTOMATED COUNT: 13.5 % (ref 10–15)
GFR SERPL CREATININE-BSD FRML MDRD: 62 ML/MIN/1.7M2
GLUCOSE SERPL-MCNC: 95 MG/DL (ref 70–99)
HCT VFR BLD AUTO: 43.6 % (ref 40–53)
HGB BLD-MCNC: 13.9 G/DL (ref 13.3–17.7)
MCH RBC QN AUTO: 28.3 PG (ref 26.5–33)
MCHC RBC AUTO-ENTMCNC: 31.9 G/DL (ref 31.5–36.5)
MCV RBC AUTO: 89 FL (ref 78–100)
PLATELET # BLD AUTO: 167 10E9/L (ref 150–450)
POTASSIUM SERPL-SCNC: 3.5 MMOL/L (ref 3.4–5.3)
RBC # BLD AUTO: 4.91 10E12/L (ref 4.4–5.9)
SODIUM SERPL-SCNC: 136 MMOL/L (ref 133–144)
TACROLIMUS BLD-MCNC: 5.9 UG/L (ref 5–15)
WBC # BLD AUTO: 4.4 10E9/L (ref 4–11)

## 2017-11-08 PROCEDURE — 36415 COLL VENOUS BLD VENIPUNCTURE: CPT | Performed by: INTERNAL MEDICINE

## 2017-11-08 PROCEDURE — 86833 HLA CLASS II HIGH DEFIN QUAL: CPT | Performed by: TRANSPLANT SURGERY

## 2017-11-08 PROCEDURE — 87799 DETECT AGENT NOS DNA QUANT: CPT | Performed by: INTERNAL MEDICINE

## 2017-11-08 PROCEDURE — 86832 HLA CLASS I HIGH DEFIN QUAL: CPT | Performed by: TRANSPLANT SURGERY

## 2017-11-08 PROCEDURE — 80048 BASIC METABOLIC PNL TOTAL CA: CPT | Performed by: INTERNAL MEDICINE

## 2017-11-08 PROCEDURE — 85027 COMPLETE CBC AUTOMATED: CPT | Performed by: INTERNAL MEDICINE

## 2017-11-08 PROCEDURE — 80197 ASSAY OF TACROLIMUS: CPT | Performed by: INTERNAL MEDICINE

## 2017-11-09 LAB — PRA DONOR SPECIFIC ABY: NORMAL

## 2017-11-11 LAB
CMV DNA SPEC NAA+PROBE-ACNC: <137 [IU]/ML
CMV DNA SPEC NAA+PROBE-LOG#: <2.1 {LOG_IU}/ML
SPECIMEN SOURCE: ABNORMAL

## 2017-12-06 DIAGNOSIS — I15.1 HYPERTENSION SECONDARY TO OTHER RENAL DISORDERS (CODE): ICD-10-CM

## 2017-12-06 RX ORDER — LOSARTAN POTASSIUM 25 MG/1
TABLET ORAL
Qty: 30 TABLET | Refills: 11 | Status: SHIPPED | OUTPATIENT
Start: 2017-12-06 | End: 2018-10-03

## 2017-12-11 ENCOUNTER — HOSPITAL ENCOUNTER (OUTPATIENT)
Dept: LAB | Facility: CLINIC | Age: 67
Discharge: HOME | End: 2017-12-11
Attending: INTERNAL MEDICINE | Admitting: INTERNAL MEDICINE
Payer: MEDICARE

## 2017-12-11 DIAGNOSIS — Z79.899 ENCOUNTER FOR LONG-TERM CURRENT USE OF MEDICATION: ICD-10-CM

## 2017-12-11 DIAGNOSIS — Z94.0 KIDNEY REPLACED BY TRANSPLANT: ICD-10-CM

## 2017-12-11 DIAGNOSIS — Z48.298 AFTERCARE FOLLOWING ORGAN TRANSPLANT: ICD-10-CM

## 2017-12-11 LAB
ANION GAP SERPL CALCULATED.3IONS-SCNC: 6 MMOL/L (ref 3–14)
BUN SERPL-MCNC: 23 MG/DL (ref 7–30)
CALCIUM SERPL-MCNC: 9.4 MG/DL (ref 8.5–10.1)
CHLORIDE SERPL-SCNC: 102 MMOL/L (ref 94–109)
CO2 SERPL-SCNC: 28 MMOL/L (ref 20–32)
CREAT SERPL-MCNC: 1.18 MG/DL (ref 0.66–1.25)
CREAT UR-MCNC: 130 MG/DL
ERYTHROCYTE [DISTWIDTH] IN BLOOD BY AUTOMATED COUNT: 13.6 % (ref 10–15)
GFR SERPL CREATININE-BSD FRML MDRD: 62 ML/MIN/1.7M2
GLUCOSE SERPL-MCNC: 102 MG/DL (ref 70–99)
HCT VFR BLD AUTO: 43.4 % (ref 40–53)
HGB BLD-MCNC: 13.6 G/DL (ref 13.3–17.7)
MCH RBC QN AUTO: 27.7 PG (ref 26.5–33)
MCHC RBC AUTO-ENTMCNC: 31.3 G/DL (ref 31.5–36.5)
MCV RBC AUTO: 88 FL (ref 78–100)
PLATELET # BLD AUTO: 182 10E9/L (ref 150–450)
POTASSIUM SERPL-SCNC: 3.7 MMOL/L (ref 3.4–5.3)
PROT UR-MCNC: 0.28 G/L
PROT/CREAT 24H UR: 0.22 G/G CR (ref 0–0.2)
RBC # BLD AUTO: 4.91 10E12/L (ref 4.4–5.9)
SODIUM SERPL-SCNC: 136 MMOL/L (ref 133–144)
TACROLIMUS BLD-MCNC: 5.8 UG/L (ref 5–15)
TME LAST DOSE: 2030 H
WBC # BLD AUTO: 4.6 10E9/L (ref 4–11)

## 2017-12-11 PROCEDURE — 80197 ASSAY OF TACROLIMUS: CPT | Performed by: INTERNAL MEDICINE

## 2017-12-11 PROCEDURE — 85027 COMPLETE CBC AUTOMATED: CPT | Performed by: INTERNAL MEDICINE

## 2017-12-11 PROCEDURE — 80048 BASIC METABOLIC PNL TOTAL CA: CPT | Performed by: INTERNAL MEDICINE

## 2017-12-11 PROCEDURE — 36415 COLL VENOUS BLD VENIPUNCTURE: CPT | Performed by: INTERNAL MEDICINE

## 2017-12-11 PROCEDURE — 84156 ASSAY OF PROTEIN URINE: CPT | Performed by: INTERNAL MEDICINE

## 2018-01-05 DIAGNOSIS — Z94.0 STATUS POST KIDNEY TRANSPLANT: ICD-10-CM

## 2018-01-05 RX ORDER — MYCOPHENOLATE MOFETIL 250 MG/1
CAPSULE ORAL
Qty: 180 CAPSULE | Refills: 11 | Status: SHIPPED | OUTPATIENT
Start: 2018-01-05 | End: 2020-12-05

## 2018-01-09 ENCOUNTER — HOSPITAL ENCOUNTER (OUTPATIENT)
Dept: LAB | Facility: CLINIC | Age: 68
Discharge: HOME | End: 2018-01-09
Attending: INTERNAL MEDICINE | Admitting: TRANSPLANT SURGERY
Payer: MEDICARE

## 2018-01-09 ENCOUNTER — RESULTS ONLY (OUTPATIENT)
Dept: OTHER | Facility: CLINIC | Age: 68
End: 2018-01-09

## 2018-01-09 DIAGNOSIS — Z79.899 ENCOUNTER FOR LONG-TERM CURRENT USE OF MEDICATION: ICD-10-CM

## 2018-01-09 DIAGNOSIS — Z94.0 KIDNEY REPLACED BY TRANSPLANT: ICD-10-CM

## 2018-01-09 DIAGNOSIS — Z48.298 AFTERCARE FOLLOWING ORGAN TRANSPLANT: ICD-10-CM

## 2018-01-09 LAB
ANION GAP SERPL CALCULATED.3IONS-SCNC: 5 MMOL/L (ref 3–14)
BUN SERPL-MCNC: 23 MG/DL (ref 7–30)
CALCIUM SERPL-MCNC: 8.9 MG/DL (ref 8.5–10.1)
CHLORIDE SERPL-SCNC: 104 MMOL/L (ref 94–109)
CO2 SERPL-SCNC: 29 MMOL/L (ref 20–32)
CREAT SERPL-MCNC: 1.15 MG/DL (ref 0.66–1.25)
ERYTHROCYTE [DISTWIDTH] IN BLOOD BY AUTOMATED COUNT: 13.7 % (ref 10–15)
GFR SERPL CREATININE-BSD FRML MDRD: 64 ML/MIN/1.7M2
GLUCOSE SERPL-MCNC: 98 MG/DL (ref 70–99)
HCT VFR BLD AUTO: 41.1 % (ref 40–53)
HGB BLD-MCNC: 13 G/DL (ref 13.3–17.7)
MCH RBC QN AUTO: 28 PG (ref 26.5–33)
MCHC RBC AUTO-ENTMCNC: 31.6 G/DL (ref 31.5–36.5)
MCV RBC AUTO: 88 FL (ref 78–100)
PLATELET # BLD AUTO: 183 10E9/L (ref 150–450)
POTASSIUM SERPL-SCNC: 3.5 MMOL/L (ref 3.4–5.3)
RBC # BLD AUTO: 4.65 10E12/L (ref 4.4–5.9)
SODIUM SERPL-SCNC: 138 MMOL/L (ref 133–144)
WBC # BLD AUTO: 4 10E9/L (ref 4–11)

## 2018-01-09 PROCEDURE — 80048 BASIC METABOLIC PNL TOTAL CA: CPT | Performed by: INTERNAL MEDICINE

## 2018-01-09 PROCEDURE — 85027 COMPLETE CBC AUTOMATED: CPT | Performed by: INTERNAL MEDICINE

## 2018-01-09 PROCEDURE — 86832 HLA CLASS I HIGH DEFIN QUAL: CPT | Performed by: TRANSPLANT SURGERY

## 2018-01-09 PROCEDURE — 86833 HLA CLASS II HIGH DEFIN QUAL: CPT | Performed by: TRANSPLANT SURGERY

## 2018-01-09 PROCEDURE — 36415 COLL VENOUS BLD VENIPUNCTURE: CPT | Performed by: INTERNAL MEDICINE

## 2018-01-09 PROCEDURE — 80197 ASSAY OF TACROLIMUS: CPT | Performed by: INTERNAL MEDICINE

## 2018-01-10 LAB
CMV DNA SPEC NAA+PROBE-ACNC: <137 [IU]/ML
CMV DNA SPEC NAA+PROBE-LOG#: <2.1 {LOG_IU}/ML
PRA DONOR SPECIFIC ABY: NORMAL
SPECIMEN SOURCE: ABNORMAL
TACROLIMUS BLD-MCNC: 5 UG/L (ref 5–15)
TME LAST DOSE: NORMAL H

## 2018-01-12 ENCOUNTER — TELEPHONE (OUTPATIENT)
Dept: TRANSPLANT | Facility: CLINIC | Age: 68
End: 2018-01-12

## 2018-01-12 ENCOUNTER — TELEPHONE (OUTPATIENT)
Dept: PHARMACY | Facility: CLINIC | Age: 68
End: 2018-01-12

## 2018-01-12 NOTE — TELEPHONE ENCOUNTER
Patient Call: Transplant Lab  Reason for call: left  to have Desiree call him. ?   I tried calling him back to see what he need and left  to call the K/P pod line.

## 2018-01-15 NOTE — TELEPHONE ENCOUNTER
Spoke with Marlo. He just wanted to check on labs and see when he needed an appointment. Message to scheduling.

## 2018-02-12 ENCOUNTER — RESULTS ONLY (OUTPATIENT)
Dept: OTHER | Facility: CLINIC | Age: 68
End: 2018-02-12

## 2018-02-12 ENCOUNTER — HOSPITAL ENCOUNTER (OUTPATIENT)
Dept: LAB | Facility: CLINIC | Age: 68
Discharge: HOME | End: 2018-02-12
Attending: INTERNAL MEDICINE | Admitting: TRANSPLANT SURGERY
Payer: MEDICARE

## 2018-02-12 DIAGNOSIS — Z79.899 ENCOUNTER FOR LONG-TERM CURRENT USE OF MEDICATION: ICD-10-CM

## 2018-02-12 DIAGNOSIS — Z48.298 AFTERCARE FOLLOWING ORGAN TRANSPLANT: ICD-10-CM

## 2018-02-12 DIAGNOSIS — Z94.0 KIDNEY REPLACED BY TRANSPLANT: ICD-10-CM

## 2018-02-12 LAB
ANION GAP SERPL CALCULATED.3IONS-SCNC: 5 MMOL/L (ref 3–14)
BUN SERPL-MCNC: 21 MG/DL (ref 7–30)
CALCIUM SERPL-MCNC: 9 MG/DL (ref 8.5–10.1)
CHLORIDE SERPL-SCNC: 106 MMOL/L (ref 94–109)
CO2 SERPL-SCNC: 27 MMOL/L (ref 20–32)
CREAT SERPL-MCNC: 1.1 MG/DL (ref 0.66–1.25)
ERYTHROCYTE [DISTWIDTH] IN BLOOD BY AUTOMATED COUNT: 13.8 % (ref 10–15)
GFR SERPL CREATININE-BSD FRML MDRD: 67 ML/MIN/1.7M2
GLUCOSE SERPL-MCNC: 102 MG/DL (ref 70–99)
HCT VFR BLD AUTO: 42.3 % (ref 40–53)
HGB BLD-MCNC: 13.3 G/DL (ref 13.3–17.7)
MCH RBC QN AUTO: 28.1 PG (ref 26.5–33)
MCHC RBC AUTO-ENTMCNC: 31.4 G/DL (ref 31.5–36.5)
MCV RBC AUTO: 89 FL (ref 78–100)
PLATELET # BLD AUTO: 207 10E9/L (ref 150–450)
POTASSIUM SERPL-SCNC: 3.8 MMOL/L (ref 3.4–5.3)
RBC # BLD AUTO: 4.74 10E12/L (ref 4.4–5.9)
SODIUM SERPL-SCNC: 138 MMOL/L (ref 133–144)
TACROLIMUS BLD-MCNC: 6.5 UG/L (ref 5–15)
TME LAST DOSE: NORMAL H
WBC # BLD AUTO: 4.6 10E9/L (ref 4–11)

## 2018-02-12 PROCEDURE — 86833 HLA CLASS II HIGH DEFIN QUAL: CPT | Performed by: TRANSPLANT SURGERY

## 2018-02-12 PROCEDURE — 85027 COMPLETE CBC AUTOMATED: CPT | Performed by: INTERNAL MEDICINE

## 2018-02-12 PROCEDURE — 86832 HLA CLASS I HIGH DEFIN QUAL: CPT | Performed by: TRANSPLANT SURGERY

## 2018-02-12 PROCEDURE — 80197 ASSAY OF TACROLIMUS: CPT | Performed by: INTERNAL MEDICINE

## 2018-02-12 PROCEDURE — 87799 DETECT AGENT NOS DNA QUANT: CPT | Performed by: INTERNAL MEDICINE

## 2018-02-12 PROCEDURE — 80048 BASIC METABOLIC PNL TOTAL CA: CPT | Performed by: INTERNAL MEDICINE

## 2018-02-12 PROCEDURE — 36415 COLL VENOUS BLD VENIPUNCTURE: CPT | Performed by: INTERNAL MEDICINE

## 2018-02-22 ENCOUNTER — DOCUMENTATION ONLY (OUTPATIENT)
Dept: TRANSPLANT | Facility: CLINIC | Age: 68
End: 2018-02-22

## 2018-02-22 DIAGNOSIS — Z94.0 RENAL TRANSPLANT RECIPIENT: ICD-10-CM

## 2018-02-22 DIAGNOSIS — Z94.0 STATUS POST KIDNEY TRANSPLANT: ICD-10-CM

## 2018-02-23 RX ORDER — SULFAMETHOXAZOLE AND TRIMETHOPRIM 400; 80 MG/1; MG/1
TABLET ORAL
Qty: 14 TABLET | Refills: 11 | Status: SHIPPED | OUTPATIENT
Start: 2018-02-23 | End: 2020-12-05

## 2018-03-08 ENCOUNTER — RESULTS ONLY (OUTPATIENT)
Dept: OTHER | Facility: CLINIC | Age: 68
End: 2018-03-08

## 2018-03-08 ENCOUNTER — HOSPITAL ENCOUNTER (OUTPATIENT)
Dept: LAB | Facility: CLINIC | Age: 68
Discharge: HOME | End: 2018-03-08
Attending: INTERNAL MEDICINE | Admitting: TRANSPLANT SURGERY
Payer: MEDICARE

## 2018-03-08 DIAGNOSIS — Z94.0 KIDNEY REPLACED BY TRANSPLANT: ICD-10-CM

## 2018-03-08 DIAGNOSIS — Z79.899 ENCOUNTER FOR LONG-TERM CURRENT USE OF MEDICATION: ICD-10-CM

## 2018-03-08 DIAGNOSIS — Z48.298 AFTERCARE FOLLOWING ORGAN TRANSPLANT: ICD-10-CM

## 2018-03-08 LAB
ANION GAP SERPL CALCULATED.3IONS-SCNC: 6 MMOL/L (ref 3–14)
BUN SERPL-MCNC: 22 MG/DL (ref 7–30)
CALCIUM SERPL-MCNC: 8.8 MG/DL (ref 8.5–10.1)
CHLORIDE SERPL-SCNC: 105 MMOL/L (ref 94–109)
CO2 SERPL-SCNC: 25 MMOL/L (ref 20–32)
CREAT SERPL-MCNC: 1.14 MG/DL (ref 0.66–1.25)
ERYTHROCYTE [DISTWIDTH] IN BLOOD BY AUTOMATED COUNT: 14 % (ref 10–15)
GFR SERPL CREATININE-BSD FRML MDRD: 64 ML/MIN/1.7M2
GLUCOSE SERPL-MCNC: 102 MG/DL (ref 70–99)
HCT VFR BLD AUTO: 40.2 % (ref 40–53)
HGB BLD-MCNC: 12.5 G/DL (ref 13.3–17.7)
MCH RBC QN AUTO: 27.5 PG (ref 26.5–33)
MCHC RBC AUTO-ENTMCNC: 31.1 G/DL (ref 31.5–36.5)
MCV RBC AUTO: 88 FL (ref 78–100)
PLATELET # BLD AUTO: 176 10E9/L (ref 150–450)
POTASSIUM SERPL-SCNC: 3.8 MMOL/L (ref 3.4–5.3)
RBC # BLD AUTO: 4.55 10E12/L (ref 4.4–5.9)
SODIUM SERPL-SCNC: 136 MMOL/L (ref 133–144)
TACROLIMUS BLD-MCNC: 6.4 UG/L (ref 5–15)
TME LAST DOSE: NORMAL H
WBC # BLD AUTO: 4.6 10E9/L (ref 4–11)

## 2018-03-08 PROCEDURE — 80197 ASSAY OF TACROLIMUS: CPT | Performed by: INTERNAL MEDICINE

## 2018-03-08 PROCEDURE — 80048 BASIC METABOLIC PNL TOTAL CA: CPT | Performed by: INTERNAL MEDICINE

## 2018-03-08 PROCEDURE — 36415 COLL VENOUS BLD VENIPUNCTURE: CPT | Performed by: INTERNAL MEDICINE

## 2018-03-08 PROCEDURE — 85027 COMPLETE CBC AUTOMATED: CPT | Performed by: INTERNAL MEDICINE

## 2018-03-08 PROCEDURE — 86833 HLA CLASS II HIGH DEFIN QUAL: CPT | Performed by: TRANSPLANT SURGERY

## 2018-03-08 PROCEDURE — 86832 HLA CLASS I HIGH DEFIN QUAL: CPT | Performed by: TRANSPLANT SURGERY

## 2018-04-11 ENCOUNTER — RESULTS ONLY (OUTPATIENT)
Dept: OTHER | Facility: CLINIC | Age: 68
End: 2018-04-11

## 2018-04-11 ENCOUNTER — HOSPITAL ENCOUNTER (OUTPATIENT)
Dept: LAB | Facility: CLINIC | Age: 68
Discharge: HOME | End: 2018-04-11
Attending: INTERNAL MEDICINE | Admitting: INTERNAL MEDICINE
Payer: MEDICARE

## 2018-04-11 DIAGNOSIS — Z94.0 KIDNEY REPLACED BY TRANSPLANT: ICD-10-CM

## 2018-04-11 DIAGNOSIS — Z48.298 AFTERCARE FOLLOWING ORGAN TRANSPLANT: ICD-10-CM

## 2018-04-11 DIAGNOSIS — Z79.899 ENCOUNTER FOR LONG-TERM CURRENT USE OF MEDICATION: ICD-10-CM

## 2018-04-11 LAB
ANION GAP SERPL CALCULATED.3IONS-SCNC: 7 MMOL/L (ref 3–14)
BUN SERPL-MCNC: 21 MG/DL (ref 7–30)
CALCIUM SERPL-MCNC: 9 MG/DL (ref 8.5–10.1)
CHLORIDE SERPL-SCNC: 106 MMOL/L (ref 94–109)
CO2 SERPL-SCNC: 24 MMOL/L (ref 20–32)
CREAT SERPL-MCNC: 1.14 MG/DL (ref 0.66–1.25)
ERYTHROCYTE [DISTWIDTH] IN BLOOD BY AUTOMATED COUNT: 14 % (ref 10–15)
GFR SERPL CREATININE-BSD FRML MDRD: 64 ML/MIN/1.7M2
GLUCOSE SERPL-MCNC: 104 MG/DL (ref 70–99)
HCT VFR BLD AUTO: 40.7 % (ref 40–53)
HGB BLD-MCNC: 12.8 G/DL (ref 13.3–17.7)
MCH RBC QN AUTO: 27.8 PG (ref 26.5–33)
MCHC RBC AUTO-ENTMCNC: 31.4 G/DL (ref 31.5–36.5)
MCV RBC AUTO: 89 FL (ref 78–100)
PLATELET # BLD AUTO: 173 10E9/L (ref 150–450)
POTASSIUM SERPL-SCNC: 3.9 MMOL/L (ref 3.4–5.3)
RBC # BLD AUTO: 4.6 10E12/L (ref 4.4–5.9)
SODIUM SERPL-SCNC: 137 MMOL/L (ref 133–144)
TACROLIMUS BLD-MCNC: 5.2 UG/L (ref 5–15)
TME LAST DOSE: NORMAL H
WBC # BLD AUTO: 4.1 10E9/L (ref 4–11)

## 2018-04-11 PROCEDURE — 80197 ASSAY OF TACROLIMUS: CPT | Performed by: INTERNAL MEDICINE

## 2018-04-11 PROCEDURE — 36415 COLL VENOUS BLD VENIPUNCTURE: CPT | Performed by: INTERNAL MEDICINE

## 2018-04-11 PROCEDURE — 80048 BASIC METABOLIC PNL TOTAL CA: CPT | Performed by: INTERNAL MEDICINE

## 2018-04-11 PROCEDURE — 86833 HLA CLASS II HIGH DEFIN QUAL: CPT | Performed by: TRANSPLANT SURGERY

## 2018-04-11 PROCEDURE — 85027 COMPLETE CBC AUTOMATED: CPT | Performed by: INTERNAL MEDICINE

## 2018-04-11 PROCEDURE — 86832 HLA CLASS I HIGH DEFIN QUAL: CPT | Performed by: TRANSPLANT SURGERY

## 2018-04-12 LAB
CMV DNA SPEC NAA+PROBE-ACNC: <137 [IU]/ML
CMV DNA SPEC NAA+PROBE-LOG#: <2.1 {LOG_IU}/ML
PRA DONOR SPECIFIC ABY: NORMAL
SPECIMEN SOURCE: ABNORMAL

## 2018-05-07 DIAGNOSIS — I10 HTN (HYPERTENSION): Primary | ICD-10-CM

## 2018-05-07 RX ORDER — CARVEDILOL 25 MG/1
25 TABLET ORAL DAILY
Qty: 60 TABLET | Refills: 11 | Status: SHIPPED | OUTPATIENT
Start: 2018-05-07 | End: 2018-05-18

## 2018-05-07 NOTE — TELEPHONE ENCOUNTER
Carvedilol 25mg  QTY 60  Fast Fill 04/25/2018    Thank you  Emely Urena [facility]

## 2018-05-08 ENCOUNTER — HOSPITAL ENCOUNTER (OUTPATIENT)
Dept: LAB | Facility: CLINIC | Age: 68
Discharge: HOME | End: 2018-05-08
Attending: INTERNAL MEDICINE | Admitting: TRANSPLANT SURGERY
Payer: MEDICARE

## 2018-05-08 ENCOUNTER — RESULTS ONLY (OUTPATIENT)
Dept: OTHER | Facility: CLINIC | Age: 68
End: 2018-05-08

## 2018-05-08 DIAGNOSIS — Z48.298 AFTERCARE FOLLOWING ORGAN TRANSPLANT: ICD-10-CM

## 2018-05-08 DIAGNOSIS — Z79.899 ENCOUNTER FOR LONG-TERM CURRENT USE OF MEDICATION: ICD-10-CM

## 2018-05-08 DIAGNOSIS — Z94.0 KIDNEY REPLACED BY TRANSPLANT: ICD-10-CM

## 2018-05-08 LAB
ANION GAP SERPL CALCULATED.3IONS-SCNC: 5 MMOL/L (ref 3–14)
BUN SERPL-MCNC: 28 MG/DL (ref 7–30)
CALCIUM SERPL-MCNC: 9 MG/DL (ref 8.5–10.1)
CHLORIDE SERPL-SCNC: 107 MMOL/L (ref 94–109)
CO2 SERPL-SCNC: 27 MMOL/L (ref 20–32)
CREAT SERPL-MCNC: 1.18 MG/DL (ref 0.66–1.25)
ERYTHROCYTE [DISTWIDTH] IN BLOOD BY AUTOMATED COUNT: 13.7 % (ref 10–15)
GFR SERPL CREATININE-BSD FRML MDRD: 62 ML/MIN/1.7M2
GLUCOSE SERPL-MCNC: 109 MG/DL (ref 70–99)
HCT VFR BLD AUTO: 40.1 % (ref 40–53)
HGB BLD-MCNC: 12.5 G/DL (ref 13.3–17.7)
MCH RBC QN AUTO: 27.5 PG (ref 26.5–33)
MCHC RBC AUTO-ENTMCNC: 31.2 G/DL (ref 31.5–36.5)
MCV RBC AUTO: 88 FL (ref 78–100)
PLATELET # BLD AUTO: 176 10E9/L (ref 150–450)
POTASSIUM SERPL-SCNC: 3.8 MMOL/L (ref 3.4–5.3)
RBC # BLD AUTO: 4.55 10E12/L (ref 4.4–5.9)
SODIUM SERPL-SCNC: 139 MMOL/L (ref 133–144)
TACROLIMUS BLD-MCNC: 5.5 UG/L (ref 5–15)
TME LAST DOSE: NORMAL H
WBC # BLD AUTO: 4 10E9/L (ref 4–11)

## 2018-05-08 PROCEDURE — 80197 ASSAY OF TACROLIMUS: CPT | Performed by: INTERNAL MEDICINE

## 2018-05-08 PROCEDURE — 85027 COMPLETE CBC AUTOMATED: CPT | Performed by: INTERNAL MEDICINE

## 2018-05-08 PROCEDURE — 86833 HLA CLASS II HIGH DEFIN QUAL: CPT | Performed by: TRANSPLANT SURGERY

## 2018-05-08 PROCEDURE — 80048 BASIC METABOLIC PNL TOTAL CA: CPT | Performed by: INTERNAL MEDICINE

## 2018-05-08 PROCEDURE — 87799 DETECT AGENT NOS DNA QUANT: CPT | Performed by: INTERNAL MEDICINE

## 2018-05-08 PROCEDURE — 86832 HLA CLASS I HIGH DEFIN QUAL: CPT | Performed by: TRANSPLANT SURGERY

## 2018-05-08 PROCEDURE — 36415 COLL VENOUS BLD VENIPUNCTURE: CPT | Performed by: INTERNAL MEDICINE

## 2018-05-11 LAB — UNOS CPRA: 70

## 2018-05-16 DIAGNOSIS — Z48.298 AFTERCARE FOLLOWING ORGAN TRANSPLANT: Primary | ICD-10-CM

## 2018-05-18 ENCOUNTER — TELEPHONE (OUTPATIENT)
Dept: NEPHROLOGY | Facility: CLINIC | Age: 68
End: 2018-05-18

## 2018-05-18 DIAGNOSIS — I10 HTN (HYPERTENSION): ICD-10-CM

## 2018-05-18 RX ORDER — CARVEDILOL 25 MG/1
TABLET ORAL
Qty: 45 TABLET | Refills: 11 | Status: SHIPPED | OUTPATIENT
Start: 2018-05-18 | End: 2019-06-13

## 2018-05-18 NOTE — TELEPHONE ENCOUNTER
Spoke with patient, confirmed he takes 12.5mg in the morning and 25mg in the evening. Updated rx sent, pharmacy notified.    Kiarra Harvey RN

## 2018-05-18 NOTE — TELEPHONE ENCOUNTER
[facility]    Phone Message    May a detailed message be left on voicemail: yes    Reason for Call: Other: Requesting clarificaiton on RX Coreg directions:  ((Take 1 tablet (25 mg) by mouth daily Take 12.5mg in the am and 25mg in the pm))     Action Taken: Message routed to:  [facility] ([facility]): kathy

## 2018-06-12 ENCOUNTER — HOSPITAL ENCOUNTER (OUTPATIENT)
Dept: LAB | Facility: CLINIC | Age: 68
Discharge: HOME | End: 2018-06-12
Attending: INTERNAL MEDICINE | Admitting: INTERNAL MEDICINE
Payer: MEDICARE

## 2018-06-12 DIAGNOSIS — Z79.899 ENCOUNTER FOR LONG-TERM CURRENT USE OF MEDICATION: ICD-10-CM

## 2018-06-12 DIAGNOSIS — Z48.298 AFTERCARE FOLLOWING ORGAN TRANSPLANT: ICD-10-CM

## 2018-06-12 DIAGNOSIS — Z94.0 KIDNEY REPLACED BY TRANSPLANT: ICD-10-CM

## 2018-06-12 LAB
ANION GAP SERPL CALCULATED.3IONS-SCNC: 6 MMOL/L (ref 3–14)
BUN SERPL-MCNC: 18 MG/DL (ref 7–30)
CALCIUM SERPL-MCNC: 8.9 MG/DL (ref 8.5–10.1)
CHLORIDE SERPL-SCNC: 104 MMOL/L (ref 94–109)
CO2 SERPL-SCNC: 28 MMOL/L (ref 20–32)
CREAT SERPL-MCNC: 1.11 MG/DL (ref 0.66–1.25)
ERYTHROCYTE [DISTWIDTH] IN BLOOD BY AUTOMATED COUNT: 13.7 % (ref 10–15)
GFR SERPL CREATININE-BSD FRML MDRD: 66 ML/MIN/1.7M2
GLUCOSE SERPL-MCNC: 96 MG/DL (ref 70–99)
HCT VFR BLD AUTO: 41.6 % (ref 40–53)
HGB BLD-MCNC: 13 G/DL (ref 13.3–17.7)
MCH RBC QN AUTO: 27.6 PG (ref 26.5–33)
MCHC RBC AUTO-ENTMCNC: 31.3 G/DL (ref 31.5–36.5)
MCV RBC AUTO: 88 FL (ref 78–100)
PLATELET # BLD AUTO: 168 10E9/L (ref 150–450)
POTASSIUM SERPL-SCNC: 3.5 MMOL/L (ref 3.4–5.3)
RBC # BLD AUTO: 4.71 10E12/L (ref 4.4–5.9)
SODIUM SERPL-SCNC: 138 MMOL/L (ref 133–144)
TACROLIMUS BLD-MCNC: 6.5 UG/L (ref 5–15)
TME LAST DOSE: NORMAL H
WBC # BLD AUTO: 4.5 10E9/L (ref 4–11)

## 2018-06-12 PROCEDURE — 85027 COMPLETE CBC AUTOMATED: CPT | Performed by: INTERNAL MEDICINE

## 2018-06-12 PROCEDURE — 80197 ASSAY OF TACROLIMUS: CPT | Performed by: INTERNAL MEDICINE

## 2018-06-12 PROCEDURE — 80048 BASIC METABOLIC PNL TOTAL CA: CPT | Performed by: INTERNAL MEDICINE

## 2018-06-12 PROCEDURE — 36415 COLL VENOUS BLD VENIPUNCTURE: CPT | Performed by: INTERNAL MEDICINE

## 2018-07-11 ENCOUNTER — HOSPITAL ENCOUNTER (OUTPATIENT)
Dept: LAB | Facility: CLINIC | Age: 68
Discharge: HOME | End: 2018-07-11
Attending: INTERNAL MEDICINE | Admitting: INTERNAL MEDICINE
Payer: MEDICARE

## 2018-07-11 DIAGNOSIS — Z48.298 AFTERCARE FOLLOWING ORGAN TRANSPLANT: ICD-10-CM

## 2018-07-11 LAB
ANION GAP SERPL CALCULATED.3IONS-SCNC: 8 MMOL/L (ref 3–14)
BUN SERPL-MCNC: 20 MG/DL (ref 7–30)
CALCIUM SERPL-MCNC: 8.8 MG/DL (ref 8.5–10.1)
CHLORIDE SERPL-SCNC: 105 MMOL/L (ref 94–109)
CO2 SERPL-SCNC: 26 MMOL/L (ref 20–32)
CREAT SERPL-MCNC: 1.06 MG/DL (ref 0.66–1.25)
ERYTHROCYTE [DISTWIDTH] IN BLOOD BY AUTOMATED COUNT: 13.7 % (ref 10–15)
GFR SERPL CREATININE-BSD FRML MDRD: 70 ML/MIN/1.7M2
GLUCOSE SERPL-MCNC: 94 MG/DL (ref 70–99)
HCT VFR BLD AUTO: 38.6 % (ref 40–53)
HGB BLD-MCNC: 12 G/DL (ref 13.3–17.7)
MCH RBC QN AUTO: 27.8 PG (ref 26.5–33)
MCHC RBC AUTO-ENTMCNC: 31.1 G/DL (ref 31.5–36.5)
MCV RBC AUTO: 89 FL (ref 78–100)
PLATELET # BLD AUTO: 181 10E9/L (ref 150–450)
POTASSIUM SERPL-SCNC: 3.7 MMOL/L (ref 3.4–5.3)
RBC # BLD AUTO: 4.32 10E12/L (ref 4.4–5.9)
SODIUM SERPL-SCNC: 139 MMOL/L (ref 133–144)
TACROLIMUS BLD-MCNC: 5.6 UG/L (ref 5–15)
TME LAST DOSE: NORMAL H
WBC # BLD AUTO: 3.9 10E9/L (ref 4–11)

## 2018-07-11 PROCEDURE — 80197 ASSAY OF TACROLIMUS: CPT | Performed by: INTERNAL MEDICINE

## 2018-07-11 PROCEDURE — 80048 BASIC METABOLIC PNL TOTAL CA: CPT | Performed by: INTERNAL MEDICINE

## 2018-07-11 PROCEDURE — 85027 COMPLETE CBC AUTOMATED: CPT | Performed by: INTERNAL MEDICINE

## 2018-07-11 PROCEDURE — 36415 COLL VENOUS BLD VENIPUNCTURE: CPT | Performed by: INTERNAL MEDICINE

## 2018-07-17 ENCOUNTER — OFFICE VISIT (OUTPATIENT)
Dept: CARDIOLOGY | Facility: CLINIC | Age: 68
End: 2018-07-17
Attending: INTERNAL MEDICINE
Payer: MEDICARE

## 2018-07-17 VITALS
HEIGHT: 70 IN | SYSTOLIC BLOOD PRESSURE: 115 MMHG | DIASTOLIC BLOOD PRESSURE: 75 MMHG | BODY MASS INDEX: 23.65 KG/M2 | OXYGEN SATURATION: 98 % | WEIGHT: 165.2 LBS | HEART RATE: 61 BPM

## 2018-07-17 DIAGNOSIS — E78.5 DYSLIPIDEMIA: ICD-10-CM

## 2018-07-17 DIAGNOSIS — I25.10 CAD, MULTIPLE VESSEL: Primary | ICD-10-CM

## 2018-07-17 PROCEDURE — G0463 HOSPITAL OUTPT CLINIC VISIT: HCPCS | Mod: ZF

## 2018-07-17 PROCEDURE — 99214 OFFICE O/P EST MOD 30 MIN: CPT | Mod: ZP | Performed by: INTERNAL MEDICINE

## 2018-07-17 ASSESSMENT — PAIN SCALES - GENERAL: PAINLEVEL: NO PAIN (0)

## 2018-07-17 NOTE — NURSING NOTE
Cardiac Testing: Patient given instructions regarding  echocardiogram in one year. Discussed purpose, preparation, procedure and when to expect results reported back to the patient. Patient demonstrated understanding of this information and agreed to call with further questions or concerns.  Labs: Lipids in the near future.  Patient was instructed to return for the next laboratory testing fasting . Patient demonstrated understanding of this information and agreed to call with further questions or concerns.   Med Reconcile: Reviewed and verified all current medications with the patient. The updated medication list was printed and given to the patient.  Return Appointment: Follow up in one year. Patient given instructions regarding scheduling next clinic visit. Patient demonstrated understanding of this information and agreed to call with further questions or concerns.  Patient stated he understood all health information given and agreed to call with further questions or concerns.

## 2018-07-17 NOTE — LETTER
7/17/2018      RE: Marlo Vee  [address] [facility] [address]       Dear Colleague,    Thank you for the opportunity to participate in the care of your patient, Marlo Vee, at the [facility] at [facility]. Please see a copy of my visit note below.       SUBJECTIVE:  Marlo Vee is a 68 year old male who presents for  Yearly follow up.    Had PCI/Stent to .dRCA,mLAD and D1 on 4/24/15. EF improved from 35% to low normal 50-55%.    Had Living unrelated donor kidney Tx in 1/2016. Normal function. No complaints.    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    Do [employer] work. Very active. No symptoms.  Patient Active Problem List    Diagnosis Date Noted     Aftercare following organ transplant 12/08/2016     Priority: Medium     Kidney replaced by transplant 01/26/2016     Priority: Medium     Immunosuppressed status (H) 01/26/2016     Priority: Medium     Multiple vessel coronary artery disease 04/23/2015     Priority: Medium     Status post coronary angiogram 04/21/2015     Priority: Medium     CAD, multiple vessel 04/21/2015     Priority: Medium     Anemia in chronic renal disease      Priority: Medium     Secondary renal hyperparathyroidism (H)      Priority: Medium     Vitamin D deficiency      Priority: Medium     Dyslipidemia      Priority: Medium     Anxiety      Priority: Medium     Hypertension secondary to other renal disorders 03/14/2015     Priority: Medium     Membranous glomerulonephritis 10/30/2002     Priority: Medium     Kidney biopsy [facility] 10/30/2002 scanned into [facility]       .  Current Outpatient Prescriptions   Medication Sig     aspirin 81 MG chewable tablet Take 1 tablet (81 mg) by mouth daily     atorvastatin (LIPITOR) 10 MG tablet Take 0.5 tablets (5 mg) by mouth daily     carvedilol (COREG) 25 MG tablet Take 12.5mg in the am and 25mg in the pm     CELLCEPT (BRAND) 250 MG CAPSULE TAKE THREE CAPSULES BY MOUTH  TWICE A DAY. CLARK 1     Cholecalciferol (VITAMIN D3 PO) Take 2,000 Units by mouth daily     losartan (COZAAR) 25 MG tablet TAKE 1 TABLET BY MOUTH DAILY     PROGRAF (BRAND) 0.5 MG CAPSULE Take 2 capsules (1 mg) by mouth 2 times daily     SERTRALINE HCL PO Take 25 mg by mouth daily     sulfamethoxazole-trimethoprim (BACTRIM/SEPTRA) 400-80 MG per tablet TAKE ONE TABLET BY MOUTH THREE TIMES WEEKLY ON MONDAY, WEDNESDAY, AND FRIDAY MORNING     No current facility-administered medications for this visit.      Past Medical History:   Diagnosis Date     Anemia in ESRD (end-stage renal disease) (H)      Antiplatelet or antithrombotic long-term use      Anxiety      Dialysis patient (H)      Dyslipidemia      End stage kidney disease (H)      Heart attack     not sure     Hypertension      Membranous glomerulonephritis 2002     PONV (postoperative nausea and vomiting)      PUD (peptic ulcer disease)      Secondary hyperparathyroidism (of renal origin)      Stented coronary artery      Vitamin D deficiency      Past Surgical History:   Procedure Laterality Date     CYSTOSCOPY, REMOVE STENT(S), COMBINED Right 2/23/2016    Procedure: COMBINED CYSTOSCOPY, REMOVE STENT(S);  Surgeon: Cody Temple MD;  Location: [facility] OR     s/p right upper extremity AV fistula       TRANSPLANT      kidney transplant      VASCULAR SURGERY       No Known Allergies  Social History     Social History     Marital status:      Spouse name: N/A     Number of children: 4     Years of education: N/A     Occupational History     Not on file.     Social History Main Topics     Smoking status: Never Smoker     Smokeless tobacco: Never Used     Alcohol use No      Comment: Patient reports drinking beer on a rare ocassion.     Drug use: No     Sexual activity: Not on file     Other Topics Concern     Not on file     Social History Narrative     Family History   Problem Relation Age of Onset     Breast Cancer Mother      Cancer - colorectal Father       "Coronary Artery Disease Father      Hypertension Father      Breast Cancer Sister      Cancer Brother      Pancreatic CA          REVIEW OF SYSTEMS:  General: negative, fever, chills, night sweats  Skin: negative, acne, rash and scaling  Eyes: negative, double vision, eye pain and photophobia  Ears/Nose/Throat: negative  Respiratory: No shortness of breath, No dyspnea on exertion, No cough, No hemoptysis and negative  Cardiovascular: negative, palpitations, tachycardia, irregular heart beat, chest pain, exertional chest pain or pressure, paroxysmal nocturnal dyspnea, dyspnea on exertion and orthopnea         OBJECTIVE:  Blood pressure 115/75, pulse 61, height 1.778 m (5' 10\"), weight 74.9 kg (165 lb 3.2 oz), SpO2 98 %.  General Appearance: healthy, alert, active and no distress  Head: Normocephalic. No masses, lesions, tenderness or abnormalities  Eyes: conjuctiva clear, PERRL, EOM intact  Ears: External ears normal. Canals clear. TM's normal.  Nose: Nares normal  Mouth: normal  Neck: Supple, no cervical adenopathy, no thyromegaly  Lungs: clear to auscultation  Cardiac: regular rate and rhythm, normal S1 and S2, no murmur         ASSESSMENT/PLAN:  Very active male with no symptoms.  S/p Multivessel PCI as above.  S/P Renal Tx 1/2016.  Last EF 50-55%.  Last lipids 2016.  Will continue current meds.  Will check a Lipid profile.  Per orders.   Return to Clinic 1yr with echo.    Please do not hesitate to contact me if you have any questions/concerns.     Sincerely,     ALF Ayala MD    "

## 2018-07-17 NOTE — NURSING NOTE
Chief Complaint   Patient presents with     Follow Up For     1 Year F/U CAD, per Pt     Vitals were taken and medications were reconciled.  JONES Lr  10:28 AM

## 2018-07-17 NOTE — MR AVS SNAPSHOT
After Visit Summary   7/17/2018    Marlo Vee    MRN: [id number]           Patient Information     Date Of Birth          1950        Visit Information        Provider Department      7/17/2018 11:00 AM ALF Ayala MD [facility]        Today's Diagnoses     CAD, multiple vessel    -  1    Dyslipidemia           Follow-ups after your visit        Additional Services     Follow-Up with Cardiologist       Please schedule fasting labs in the near future, can be today if he is fasting.  Please schedule an echocardiogram with a clinic visit same day in one year.                  Your next 10 appointments already scheduled     Jul 17, 2018 11:00 AM CDT   (Arrive by 10:45 AM)   Return Visit with ALF Ayala MD   [facility] ([facility])    [address]  [address]  [facility] [id number] [phone]            Jul 18, 2018  9:45 AM CDT   LAB with  LAB   [facility] ([facility])    [address]  1st Floor  [facility] [id number] [phone]           Please do not eat 10-12 hours before your appointment if you are coming in fasting for labs on lipids, cholesterol, or glucose (sugar). This does not apply to pregnant women. Water, hot tea and black coffee (with nothing added) are okay. Do not drink other fluids, diet soda or chew gum.            Jul 23, 2018  4:25 PM CDT   (Arrive by 3:55 PM)   Return Kidney Transplant with  Kidney/Pancreas Recipient   [facility] ([facility])    [address]  [address]  [facility] [id number] [phone]              Future tests that were ordered for you today     Open Future Orders        Priority Expected Expires Ordered    Lipid Profile Routine 7/18/2018 7/17/2019 7/17/2018    Follow-Up with Cardiologist Routine 7/17/2019 7/18/2019 7/17/2018    Echo Complete Routine 7/17/2019 7/18/2019 7/17/2018        "     Who to contact     If you have questions or need follow up information about today's clinic visit or your schedule please contact [facility] directly at [phone].  Normal or non-critical lab and imaging results will be communicated to you by MyChart, letter or phone within 4 business days after the clinic has received the results. If you do not hear from us within 7 days, please contact the clinic through 3VRhart or phone. If you have a critical or abnormal lab result, we will notify you by phone as soon as possible.  Submit refill requests through RealtimeBoard or call your pharmacy and they will forward the refill request to us. Please allow 3 business days for your refill to be completed.          Additional Information About Your Visit        RealtimeBoard Information     RealtimeBoard gives you secure access to your electronic health record. If you see a primary care provider, you can also send messages to your care team and make appointments. If you have questions, please call your primary care clinic.  If you do not have a primary care provider, please call [phone] and they will assist you.        Care EveryWhere ID     This is your Care EveryWhere ID. This could be used by other organizations to access your [place] medical records  [id number]        Your Vitals Were     Pulse Height Pulse Oximetry BMI (Body Mass Index)          61 1.778 m (5' 10\") 98% 23.7 kg/m2         Blood Pressure from Last 3 Encounters:   07/17/18 115/75   09/12/17 133/81   07/18/17 130/88    Weight from Last 3 Encounters:   07/17/18 74.9 kg (165 lb 3.2 oz)   09/12/17 75.2 kg (165 lb 12.8 oz)   07/18/17 76.1 kg (167 lb 11.2 oz)               Primary Care Provider Office Phone # Fax #    Yassine Mary [phone]       [facility] [address]   [facility] [address]        Equal Access to Services     BRADLEY JONES AH: Felisha Ernst, wayaritzada luluz, qaybta kayves ward " sarkis pattonyon gibbs'aan ah. So [facility] [phone].    ATENCIÓN: Si amaury fraser, tiene a wild disposición servicios gratuitos de asistencia lingüística. [place] al [phone].    We comply with applicable federal civil rights laws and [place] laws. We do not discriminate on the basis of race, color, national origin, age, disability, sex, sexual orientation, or gender identity.            Thank you!     Thank you for choosing [facility]  for your care. Our goal is always to provide you with excellent care. Hearing back from our patients is one way we can continue to improve our services. Please take a few minutes to complete the written survey that you may receive in the mail after your visit with us. Thank you!             Your Updated Medication List - Protect others around you: Learn how to safely use, store and throw away your medicines at www.disposemymeds.org.          This list is accurate as of 7/17/18 10:59 AM.  Always use your most recent med list.                   Brand Name Dispense Instructions for use Diagnosis    aspirin 81 MG chewable tablet     36 tablet    Take 1 tablet (81 mg) by mouth daily    CAD, multiple vessel       atorvastatin 10 MG tablet    LIPITOR     Take 0.5 tablets (5 mg) by mouth daily        carvedilol 25 MG tablet    COREG    45 tablet    Take 12.5mg in the am and 25mg in the pm    HTN (hypertension)       losartan 25 MG tablet    COZAAR    30 tablet    TAKE 1 TABLET BY MOUTH DAILY    Hypertension secondary to other renal disorders (CODE)       mycophenolate 250 MG capsule     180 capsule    TAKE THREE CAPSULES BY MOUTH TWICE A DAY. CLARK 1    Status post kidney transplant       SERTRALINE HCL PO      Take 25 mg by mouth daily    Hepatitis B surface antigen positive       sulfamethoxazole-trimethoprim 400-80 MG per tablet    BACTRIM/SEPTRA    14 tablet    TAKE ONE TABLET BY MOUTH THREE TIMES WEEKLY ON MONDAY, WEDNESDAY, AND FRIDAY MORNING    Status post kidney  transplant, Renal transplant recipient       tacrolimus 0.5 MG capsule     120 capsule    Take 2 capsules (1 mg) by mouth 2 times daily    Kidney replaced by transplant       VITAMIN D3 PO      Take 2,000 Units by mouth daily    Hepatitis B surface antigen positive

## 2018-07-17 NOTE — PATIENT INSTRUCTIONS
Thank you for your visit today.  Please call me with any questions or concerns.   Jae Gaines RN  Cardiology Care Coordinator  [phone], press option [phone] then option 3

## 2018-07-17 NOTE — PROGRESS NOTES
SUBJECTIVE:  Marlo Vee is a 68 year old male who presents for  Yearly follow up.    Had PCI/Stent to m.dRCA,mLAD and D1 on 4/24/15. EF improved from 35% to low normal 50-55%.    Had Living unrelated donor kidney Tx in 1/2016. Normal function. No complaints.    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    Do [employer] work. Very active. No symptoms.  Patient Active Problem List    Diagnosis Date Noted     Aftercare following organ transplant 12/08/2016     Priority: Medium     Kidney replaced by transplant 01/26/2016     Priority: Medium     Immunosuppressed status (H) 01/26/2016     Priority: Medium     Multiple vessel coronary artery disease 04/23/2015     Priority: Medium     Status post coronary angiogram 04/21/2015     Priority: Medium     CAD, multiple vessel 04/21/2015     Priority: Medium     Anemia in chronic renal disease      Priority: Medium     Secondary renal hyperparathyroidism (H)      Priority: Medium     Vitamin D deficiency      Priority: Medium     Dyslipidemia      Priority: Medium     Anxiety      Priority: Medium     Hypertension secondary to other renal disorders 03/14/2015     Priority: Medium     Membranous glomerulonephritis 10/30/2002     Priority: Medium     Kidney [place] [facility] [place] scanned into [facility]       .  Current Outpatient Prescriptions   Medication Sig     aspirin 81 MG chewable tablet Take 1 tablet (81 mg) by mouth daily     atorvastatin (LIPITOR) 10 MG tablet Take 0.5 tablets (5 mg) by mouth daily     carvedilol (COREG) 25 MG tablet Take 12.5mg in the am and 25mg in the pm     CELLCEPT (BRAND) 250 MG CAPSULE TAKE THREE CAPSULES BY MOUTH TWICE A DAY. CLARK 1     Cholecalciferol (VITAMIN D3 PO) Take 2,000 Units by mouth daily     losartan (COZAAR) 25 MG tablet TAKE 1 TABLET BY MOUTH DAILY     PROGRAF (BRAND) 0.5 MG CAPSULE Take 2 capsules (1 mg) by mouth 2 times daily     SERTRALINE HCL PO Take 25 mg by mouth daily     sulfamethoxazole-trimethoprim (BACTRIM/SEPTRA)  400-80 MG per tablet TAKE ONE TABLET BY MOUTH THREE TIMES WEEKLY ON MONDAY, WEDNESDAY, AND FRIDAY MORNING     No current facility-administered medications for this visit.      Past Medical History:   Diagnosis Date     Anemia in ESRD (end-stage renal disease) (H)      Antiplatelet or antithrombotic long-term use      Anxiety      Dialysis patient (H)      Dyslipidemia      End stage kidney disease (H)      Heart attack     not sure     Hypertension      Membranous glomerulonephritis 2002     PONV (postoperative nausea and vomiting)      PUD (peptic ulcer disease)      Secondary hyperparathyroidism (of renal origin)      Stented coronary artery      Vitamin D deficiency      Past Surgical History:   Procedure Laterality Date     CYSTOSCOPY, REMOVE STENT(S), COMBINED Right 2/23/2016    Procedure: COMBINED CYSTOSCOPY, REMOVE STENT(S);  Surgeon: Cody Temple MD;  Location: [facility] OR     s/p right upper extremity AV fistula       TRANSPLANT      kidney transplant      VASCULAR SURGERY       No Known Allergies  Social History     Social History     Marital status:      Spouse name: N/A     Number of children: 4     Years of education: N/A     Occupational History     Not on file.     Social History Main Topics     Smoking status: Never Smoker     Smokeless tobacco: Never Used     Alcohol use No      Comment: Patient reports drinking beer on a rare ocassion.     Drug use: No     Sexual activity: Not on file     Other Topics Concern     Not on file     Social History Narrative     Family History   Problem Relation Age of Onset     Breast Cancer Mother      Cancer - colorectal Father      Coronary Artery Disease Father      Hypertension Father      Breast Cancer Sister      Cancer Brother      Pancreatic CA          REVIEW OF SYSTEMS:  General: negative, fever, chills, night sweats  Skin: negative, acne, rash and scaling  Eyes: negative, double vision, eye pain and photophobia  Ears/Nose/Throat: negative  Respiratory:  "No shortness of breath, No dyspnea on exertion, No cough, No hemoptysis and negative  Cardiovascular: negative, palpitations, tachycardia, irregular heart beat, chest pain, exertional chest pain or pressure, paroxysmal nocturnal dyspnea, dyspnea on exertion and orthopnea         OBJECTIVE:  Blood pressure 115/75, pulse 61, height 1.778 m (5' 10\"), weight 74.9 kg (165 lb 3.2 oz), SpO2 98 %.  General Appearance: healthy, alert, active and no distress  Head: Normocephalic. No masses, lesions, tenderness or abnormalities  Eyes: conjuctiva clear, PERRL, EOM intact  Ears: External ears normal. Canals clear. TM's normal.  Nose: Nares normal  Mouth: normal  Neck: Supple, no cervical adenopathy, no thyromegaly  Lungs: clear to auscultation  Cardiac: regular rate and rhythm, normal S1 and S2, no murmur         ASSESSMENT/PLAN:  Very active male with no symptoms.  S/p Multivessel PCI as above.  S/P Renal Tx 1/2016.  Last EF 50-55%.  Last lipids 2016.  Will continue current meds.  Will check a Lipid profile.  Per orders.   Return to Clinic 1yr with echo.  "

## 2018-07-18 DIAGNOSIS — E78.5 DYSLIPIDEMIA: ICD-10-CM

## 2018-07-18 LAB
CHOLEST SERPL-MCNC: 172 MG/DL
HDLC SERPL-MCNC: 30 MG/DL
LDLC SERPL CALC-MCNC: 82 MG/DL
NONHDLC SERPL-MCNC: 142 MG/DL
TRIGL SERPL-MCNC: 296 MG/DL

## 2018-07-27 ENCOUNTER — OFFICE VISIT (OUTPATIENT)
Dept: NEPHROLOGY | Facility: CLINIC | Age: 68
End: 2018-07-27
Attending: INTERNAL MEDICINE
Payer: MEDICARE

## 2018-07-27 VITALS
HEIGHT: 70 IN | WEIGHT: 165.9 LBS | DIASTOLIC BLOOD PRESSURE: 83 MMHG | OXYGEN SATURATION: 99 % | BODY MASS INDEX: 23.75 KG/M2 | SYSTOLIC BLOOD PRESSURE: 127 MMHG | HEART RATE: 64 BPM

## 2018-07-27 DIAGNOSIS — I15.1 HYPERTENSION SECONDARY TO OTHER RENAL DISORDERS: ICD-10-CM

## 2018-07-27 DIAGNOSIS — N18.30 ANEMIA IN STAGE 3 CHRONIC KIDNEY DISEASE (H): ICD-10-CM

## 2018-07-27 DIAGNOSIS — E55.9 VITAMIN D DEFICIENCY: ICD-10-CM

## 2018-07-27 DIAGNOSIS — D63.1 ANEMIA IN STAGE 3 CHRONIC KIDNEY DISEASE (H): ICD-10-CM

## 2018-07-27 DIAGNOSIS — Z48.298 AFTERCARE FOLLOWING ORGAN TRANSPLANT: ICD-10-CM

## 2018-07-27 DIAGNOSIS — D84.9 IMMUNOSUPPRESSED STATUS (H): ICD-10-CM

## 2018-07-27 DIAGNOSIS — Z94.0 KIDNEY REPLACED BY TRANSPLANT: Primary | ICD-10-CM

## 2018-07-27 PROCEDURE — G0463 HOSPITAL OUTPT CLINIC VISIT: HCPCS | Mod: ZF

## 2018-07-27 ASSESSMENT — PAIN SCALES - GENERAL: PAINLEVEL: NO PAIN (0)

## 2018-07-27 NOTE — MR AVS SNAPSHOT
After Visit Summary   7/27/2018    Marlo Vee    MRN: [id number]           Patient Information     Date Of Birth          1950        Visit Information        Provider Department      7/27/2018 1:55 PM Samuel Varela MD [facility]         Follow-ups after your visit        Follow-up notes from your care team     Return in about 6 months (around 1/27/2019).      Your next 10 appointments already scheduled     Jan 28, 2019  1:55 PM CST   (Arrive by 1:25 PM)   Return Kidney Transplant with [facility] Kidney/Pancreas Recipient   [facility] ([facility] [place])    [address]  [address]  [facility] [address] [phone]              Who to contact     If you have questions or need follow up information about today's clinic visit or your schedule please contact [facility] directly at [phone].  Normal or non-critical lab and imaging results will be communicated to you by WappZapphart, letter or phone within 4 business days after the clinic has received the results. If you do not hear from us within 7 days, please contact the clinic through WappZapphart or phone. If you have a critical or abnormal lab result, we will notify you by phone as soon as possible.  Submit refill requests through [employer] or call your pharmacy and they will forward the refill request to us. Please allow 3 business days for your refill to be completed.          Additional Information About Your Visit        MyChart Information     [employer] gives you secure access to your electronic health record. If you see a primary care provider, you can also send messages to your care team and make appointments. If you have questions, please call your primary care clinic.  If you do not have a primary care provider, please call [phone] and they will assist you.        Care EveryWhere ID     This is your Care EveryWhere ID. This could be used by other organizations to access  "your [place] medical records  [id number]        Your Vitals Were     Pulse Height Pulse Oximetry BMI (Body Mass Index)          64 1.778 m (5' 10\") 99% 23.8 kg/m2         Blood Pressure from Last 3 Encounters:   07/27/18 127/83   07/17/18 115/75   09/12/17 133/81    Weight from Last 3 Encounters:   07/27/18 75.3 kg (165 lb 14.4 oz)   07/17/18 74.9 kg (165 lb 3.2 oz)   09/12/17 75.2 kg (165 lb 12.8 oz)              Today, you had the following     No orders found for display       Primary Care Provider Office Phone # Fax #    Yassine Hernandez [phone]       [facility] [address]   [facility] [address]        Equal Access to Services     VERONICA JONES : Hadii aad ku hadasho Solatrice, waaxda luqadaha, qaybta kaalmada adeyonyaananya, yves maharaj . So [facility] [phone].    ATENCIÓN: Si habla español, tiene a wild disposición servicios gratuitos de asistencia lingüística. [place] al [phone].    We comply with applicable federal civil rights laws and [place] laws. We do not discriminate on the basis of race, color, national origin, age, disability, sex, sexual orientation, or gender identity.            Thank you!     Thank you for choosing [facility]  for your care. Our goal is always to provide you with excellent care. Hearing back from our patients is one way we can continue to improve our services. Please take a few minutes to complete the written survey that you may receive in the mail after your visit with us. Thank you!             Your Updated Medication List - Protect others around you: Learn how to safely use, store and throw away your medicines at www.disposemymeds.org.          This list is accurate as of 7/27/18  2:25 PM.  Always use your most recent med list.                   Brand Name Dispense Instructions for use Diagnosis    aspirin 81 MG chewable tablet     36 tablet    Take 1 tablet (81 mg) by mouth daily    CAD, multiple vessel "       atorvastatin 10 MG tablet    LIPITOR     Take 0.5 tablets (5 mg) by mouth daily        carvedilol 25 MG tablet    COREG    45 tablet    Take 12.5mg in the am and 25mg in the pm    HTN (hypertension)       losartan 25 MG tablet    COZAAR    30 tablet    TAKE 1 TABLET BY MOUTH DAILY    Hypertension secondary to other renal disorders (CODE)       mycophenolate 250 MG capsule     180 capsule    TAKE THREE CAPSULES BY MOUTH TWICE A DAY. CLARK 1    Status post kidney transplant       SERTRALINE HCL PO      Take 25 mg by mouth daily    Hepatitis B surface antigen positive       sulfamethoxazole-trimethoprim 400-80 MG per tablet    BACTRIM/SEPTRA    14 tablet    TAKE ONE TABLET BY MOUTH THREE TIMES WEEKLY ON MONDAY, WEDNESDAY, AND FRIDAY MORNING    Status post kidney transplant, Renal transplant recipient       tacrolimus 0.5 MG capsule     120 capsule    Take 2 capsules (1 mg) by mouth 2 times daily    Kidney replaced by transplant       VITAMIN D3 PO      Take 2,000 Units by mouth daily    Hepatitis B surface antigen positive

## 2018-07-27 NOTE — NURSING NOTE
"Chief Complaint   Patient presents with     RECHECK     Kidney tx follow up     /83  Pulse 64  Ht 1.778 m (5' 10\")  Wt 75.3 kg (165 lb 14.4 oz)  SpO2 99%  BMI 23.8 kg/m2  GENE TOBAR CMA    "

## 2018-07-27 NOTE — LETTER
7/27/2018      RE: Marlo Vee  [address] [facility] [address]       Assessment and Plan:  # LDKT - baseline Cr ~ 1.0-1.2, which has remained stable.  Minimal proteinuria.  No DSA with last check.  # Immunosuppression - maintenance immunosuppression with tacrolimus and mycophenolate mofetil.  Target tacrolimus level 4-6.  Will make no other changes in immunosuppression.  # HTN - well controlled at target of less than 140/90.  No changes.  # CAD - asymptomatic.  Recommend follow up with Cardiology as needed.  # Anemia in chronic renal disease - stable Hgb, near normal.  Will follow.  # Secondary renal hyperparathyroidism - minimally elevated PTH and no need to follow further.  # Vitamin D deficiency - normal vitamin D level with last check and will continue cholecalciferol.  # Skin cancer risk - no new skin lesions.  Recommend regular follow up with Dermatology.  # Recommend return visit in 6 months.    Assessment and plan was discussed with patient and he voiced his understanding and agreement.    Reason for Visit:  Mr. Vee is here for routine follow up.    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         Transplant Hx:       Tx: LDKT  Date: 1/21/16       Present Maintenance IS: Tacrolimus and Mycophenolate mofetil       Baseline Creatinine: 1.0-1.2       Recent DSA: Yes  Date last checked: 5/2018       Biopsy: No    Mr. Vee reports feeling good overall with minimal medical complaints.  Since last clinic visit, patient reports no hospitalizations or new medical complaints and has been doing well overall.  His energy level is good and remains normal.  He is active and gets some exercise.  Denies any chest pain or shortness of breath with exertion.  Appetite is good and weight is unchanged.  No nausea, vomiting or diarrhea.  No fever, sweats or chills.  No leg swelling.    Home BP: Not checked, but  slight lightheadedness with standing.      ROS:   A comprehensive review of systems was obtained and negative, except as noted in the HPI or PMH.    Active Medical Problems:  Patient Active Problem List   Diagnosis     Hypertension secondary to other renal disorders     Membranous glomerulonephritis     Anemia in chronic renal disease     Secondary renal hyperparathyroidism (H)     Vitamin D deficiency     Dyslipidemia     Anxiety     Status post coronary angiogram     CAD, multiple vessel     Multiple vessel coronary artery disease     Kidney replaced by transplant     Immunosuppressed status (H)     Aftercare following organ transplant       Personal Hx:  Social History     Social History     Marital status:      Spouse name: N/A     Number of children: 4     Years of education: N/A     Occupational History     Not on file.     Social History Main Topics     Smoking status: Never Smoker     Smokeless tobacco: Never Used     Alcohol use No      Comment: Patient reports drinking beer on a rare ocassion.     Drug use: No     Sexual activity: Not on file     Other Topics Concern     Not on file     Social History Narrative       Allergies:  No Known Allergies    Medications:  Prior to Admission medications    Medication Sig Start Date End Date Taking? Authorizing Provider   phosphorus tablet 250 mg (K PHOS NEUTRAL) 250 MG tablet Take 1 tablet (250 mg) by mouth 2 times daily 1/27/16   Samuel Varela MD   PROGRAF 0.5 MG PO CAPSULE Take 1 capsule (0.5 mg) by mouth 2 times daily 1/27/16   Samuel Varela MD   HYDROmorphone (DILAUDID) 2 MG tablet Take 1 tablet (2 mg) by mouth every 4 hours as needed for moderate to severe pain 1/26/16   Maryellen Joe PA-C   atorvastatin (LIPITOR) 10 MG tablet Take 0.5 tablets (5 mg) by mouth daily 1/26/16   Maryellen Joe PA-C   hydrALAZINE (APRESOLINE) 100 MG TABS Take 1 tablet (100 mg) by mouth every 8 hours 1/26/16   Maryellen Joe PA-C  "  valGANciclovir (VALCYTE) 450 MG tablet Take 1 tablet (450 mg) by mouth daily 1/26/16   Maryellen Joe PA-C   PROGRAF 1 MG PO CAPSULE Take 2 capsules (2 mg) by mouth 2 times daily 1/26/16 1/20/17  Maryellen Joe PA-C   mycophenolate (CELLCEPT - GENERIC EQUIVALENT) 250 MG capsule Take 3 capsules (750 mg) by mouth 2 times daily 1/26/16 1/20/17  Maryellen Joe PA-C   carvedilol (COREG) 25 MG tablet Take 1 tablet (25 mg) by mouth 2 times daily (with meals) 1/26/16   Maryellen Joe PA-C   senna-docusate (SENOKOT-S;PERICOLACE) 8.6-50 MG per tablet Take 1-2 tablets by mouth 2 times daily 1/26/16   Maryellen Joe PA-C   sulfamethoxazole-trimethoprim (BACTRIM,SEPTRA) 400-80 MG per tablet Take 1 tablet by mouth daily 1/26/16   Maryellen Joe PA-C   clotrimazole (MYCELEX) 10 MG LOZG Place 1 lozenge (10 mg) inside cheek 3 times daily 1/26/16   Maryellen Joe PA-C   pantoprazole (PROTONIX) 40 MG enteric coated tablet Take 1 tablet (40 mg) by mouth daily 1/26/16 4/25/16  Maryellen Joe PA-C   Clopidogrel Bisulfate (PLAVIX PO) Take 75 mg by mouth daily     Reported, Patient   aspirin 81 MG chewable tablet Take 1 tablet (81 mg) by mouth daily 4/25/15   Jimmy Jennings MD   B Complex-C-Folic Acid (TRIPHROCAPS) 1 MG CAPS Take 1 capsule by mouth daily    Reported, Patient   Cholecalciferol (VITAMIN D3 PO) Take 2,000 Units by mouth daily    Reported, Patient   SERTRALINE HCL PO Take 25 mg by mouth daily    Reported, Patient   TRAZODONE HCL PO Take 50 mg by mouth At Bedtime    Reported, Patient       Vitals:  /83  Pulse 64  Ht 1.778 m (5' 10\")  Wt 75.3 kg (165 lb 14.4 oz)  SpO2 99%  BMI 23.8 kg/m2    Exam:   GENERAL APPEARANCE: alert and no distress  HENT: mouth without ulcers or lesions  LYMPHATICS: no cervical or supraclavicular nodes  RESP: lungs clear to auscultation - no rales, rhonchi or wheezes  CV: regular rhythm, normal rate, no rub, no murmur  EDEMA: no LE edema " bilaterally  ABDOMEN: soft, nondistended, nontender, bowel sounds normal  MS: extremities normal - no gross deformities noted, no evidence of inflammation in joints, no muscle tenderness  SKIN: no rash  TX KIDNEY: normal    Results:   Recent Results (from the past 504 hour(s))   CBC with platelets    Collection Time: 07/11/18  9:21 AM   Result Value Ref Range    WBC 3.9 (L) 4.0 - 11.0 10e9/L    RBC Count 4.32 (L) 4.4 - 5.9 10e12/L    Hemoglobin 12.0 (L) 13.3 - 17.7 g/dL    Hematocrit 38.6 (L) 40.0 - 53.0 %    MCV 89 78 - 100 fl    MCH 27.8 26.5 - 33.0 pg    MCHC 31.1 (L) 31.5 - 36.5 g/dL    RDW 13.7 10.0 - 15.0 %    Platelet Count 181 150 - 450 10e9/L   Basic metabolic panel    Collection Time: 07/11/18  9:21 AM   Result Value Ref Range    Sodium 139 133 - 144 mmol/L    Potassium 3.7 3.4 - 5.3 mmol/L    Chloride 105 94 - 109 mmol/L    Carbon Dioxide 26 20 - 32 mmol/L    Anion Gap 8 3 - 14 mmol/L    Glucose 94 70 - 99 mg/dL    Urea Nitrogen 20 7 - 30 mg/dL    Creatinine 1.06 0.66 - 1.25 mg/dL    GFR Estimate 70 >60 mL/min/1.7m2    GFR Estimate If Black 85 >60 mL/min/1.7m2    Calcium 8.8 8.5 - 10.1 mg/dL   Tacrolimus level    Collection Time: 07/11/18  9:21 AM   Result Value Ref Range    Tacrolimus Last Dose 101206 AT 2100     Tacrolimus Level 5.6 5.0 - 15.0 ug/L   Lipid Profile    Collection Time: 07/18/18 10:11 AM   Result Value Ref Range    Cholesterol 172 <200 mg/dL    Triglycerides 296 (H) <150 mg/dL    HDL Cholesterol 30 (L) >39 mg/dL    LDL Cholesterol Calculated 82 <100 mg/dL    Non HDL Cholesterol 142 (H) <130 mg/dL       Samuel Varela MD

## 2018-07-29 NOTE — PROGRESS NOTES
Assessment and Plan:  # LDKT - baseline Cr ~ 1.0-1.2, which has remained stable.  Minimal proteinuria.  No DSA with last check.  # Immunosuppression - maintenance immunosuppression with tacrolimus and mycophenolate mofetil.  Target tacrolimus level 4-6.  Will make no other changes in immunosuppression.  # HTN - well controlled at target of less than 140/90.  No changes.  # CAD - asymptomatic.  Recommend follow up with Cardiology as needed.  # Anemia in chronic renal disease - stable Hgb, near normal.  Will follow.  # Secondary renal hyperparathyroidism - minimally elevated PTH and no need to follow further.  # Vitamin D deficiency - normal vitamin D level with last check and will continue cholecalciferol.  # Skin cancer risk - no new skin lesions.  Recommend regular follow up with Dermatology.  # Recommend return visit in 6 months.    Assessment and plan was discussed with patient and he voiced his understanding and agreement.    Reason for Visit:  Mr. Vee is here for routine follow up.    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         Transplant Hx:       Tx: LDKT  Date: 1/21/16       Present Maintenance IS: Tacrolimus and Mycophenolate mofetil       Baseline Creatinine: 1.0-1.2       Recent DSA: Yes  Date last checked: 5/2018       Biopsy: No    Mr. Vee reports feeling good overall with minimal medical complaints.  Since last clinic visit, patient reports no hospitalizations or new medical complaints and has been doing well overall.  His energy level is good and remains normal.  He is active and gets some exercise.  Denies any chest pain or shortness of breath with exertion.  Appetite is good and weight is unchanged.  No nausea, vomiting or diarrhea.  No fever, sweats or chills.  No leg swelling.    Home BP: Not checked, but slight lightheadedness with standing.      ROS:   A comprehensive review of systems was  obtained and negative, except as noted in the HPI or PMH.    Active Medical Problems:  Patient Active Problem List   Diagnosis     Hypertension secondary to other renal disorders     Membranous glomerulonephritis     Anemia in chronic renal disease     Secondary renal hyperparathyroidism (H)     Vitamin D deficiency     Dyslipidemia     Anxiety     Status post coronary angiogram     CAD, multiple vessel     Multiple vessel coronary artery disease     Kidney replaced by transplant     Immunosuppressed status (H)     Aftercare following organ transplant       Personal Hx:  Social History     Social History     Marital status:      Spouse name: N/A     Number of children: 4     Years of education: N/A     Occupational History     Not on file.     Social History Main Topics     Smoking status: Never Smoker     Smokeless tobacco: Never Used     Alcohol use No      Comment: Patient reports drinking beer on a rare ocassion.     Drug use: No     Sexual activity: Not on file     Other Topics Concern     Not on file     Social History Narrative       Allergies:  No Known Allergies    Medications:  Prior to Admission medications    Medication Sig Start Date End Date Taking? Authorizing Provider   phosphorus tablet 250 mg (K PHOS NEUTRAL) 250 MG tablet Take 1 tablet (250 mg) by mouth 2 times daily 1/27/16   Samuel Varela MD   PROGRAF 0.5 MG PO CAPSULE Take 1 capsule (0.5 mg) by mouth 2 times daily 1/27/16   Samuel Varela MD   HYDROmorphone (DILAUDID) 2 MG tablet Take 1 tablet (2 mg) by mouth every 4 hours as needed for moderate to severe pain 1/26/16   Maryellen Joe PA-C   atorvastatin (LIPITOR) 10 MG tablet Take 0.5 tablets (5 mg) by mouth daily 1/26/16   Maryellen Joe PA-C   hydrALAZINE (APRESOLINE) 100 MG TABS Take 1 tablet (100 mg) by mouth every 8 hours 1/26/16   Maryellen Joe PA-C   valGANciclovir (VALCYTE) 450 MG tablet Take 1 tablet (450 mg) by mouth daily 1/26/16   Tatyana  "Maryellen Junior PA-C   PROGRAF 1 MG PO CAPSULE Take 2 capsules (2 mg) by mouth 2 times daily 1/26/16 1/20/17  Maryellen Joe PA-C   mycophenolate (CELLCEPT - GENERIC EQUIVALENT) 250 MG capsule Take 3 capsules (750 mg) by mouth 2 times daily 1/26/16 1/20/17  Maryellen Joe PA-C   carvedilol (COREG) 25 MG tablet Take 1 tablet (25 mg) by mouth 2 times daily (with meals) 1/26/16   Maryellen Joe PA-C   senna-docusate (SENOKOT-S;PERICOLACE) 8.6-50 MG per tablet Take 1-2 tablets by mouth 2 times daily 1/26/16   Maryellen Joe PA-C   sulfamethoxazole-trimethoprim (BACTRIM,SEPTRA) 400-80 MG per tablet Take 1 tablet by mouth daily 1/26/16   Maryellen Joe PA-C   clotrimazole (MYCELEX) 10 MG LOZG Place 1 lozenge (10 mg) inside cheek 3 times daily 1/26/16   Maryellen Joe PA-C   pantoprazole (PROTONIX) 40 MG enteric coated tablet Take 1 tablet (40 mg) by mouth daily 1/26/16 4/25/16  Maryellen Joe PA-C   Clopidogrel Bisulfate (PLAVIX PO) Take 75 mg by mouth daily     Reported, Patient   aspirin 81 MG chewable tablet Take 1 tablet (81 mg) by mouth daily 4/25/15   Jimmy Jennings MD   B Complex-C-Folic Acid (TRIPHROCAPS) 1 MG CAPS Take 1 capsule by mouth daily    Reported, Patient   Cholecalciferol (VITAMIN D3 PO) Take 2,000 Units by mouth daily    Reported, Patient   SERTRALINE HCL PO Take 25 mg by mouth daily    Reported, Patient   TRAZODONE HCL PO Take 50 mg by mouth At Bedtime    Reported, Patient       Vitals:  /83  Pulse 64  Ht 1.778 m (5' 10\")  Wt 75.3 kg (165 lb 14.4 oz)  SpO2 99%  BMI 23.8 kg/m2    Exam:   GENERAL APPEARANCE: alert and no distress  HENT: mouth without ulcers or lesions  LYMPHATICS: no cervical or supraclavicular nodes  RESP: lungs clear to auscultation - no rales, rhonchi or wheezes  CV: regular rhythm, normal rate, no rub, no murmur  EDEMA: no LE edema bilaterally  ABDOMEN: soft, nondistended, nontender, bowel sounds normal  MS: extremities normal - " no gross deformities noted, no evidence of inflammation in joints, no muscle tenderness  SKIN: no rash  TX KIDNEY: normal    Results:   Recent Results (from the past 504 hour(s))   CBC with platelets    Collection Time: 07/11/18  9:21 AM   Result Value Ref Range    WBC 3.9 (L) 4.0 - 11.0 10e9/L    RBC Count 4.32 (L) 4.4 - 5.9 10e12/L    Hemoglobin 12.0 (L) 13.3 - 17.7 g/dL    Hematocrit 38.6 (L) 40.0 - 53.0 %    MCV 89 78 - 100 fl    MCH 27.8 26.5 - 33.0 pg    MCHC 31.1 (L) 31.5 - 36.5 g/dL    RDW 13.7 10.0 - 15.0 %    Platelet Count 181 150 - 450 10e9/L   Basic metabolic panel    Collection Time: 07/11/18  9:21 AM   Result Value Ref Range    Sodium 139 133 - 144 mmol/L    Potassium 3.7 3.4 - 5.3 mmol/L    Chloride 105 94 - 109 mmol/L    Carbon Dioxide 26 20 - 32 mmol/L    Anion Gap 8 3 - 14 mmol/L    Glucose 94 70 - 99 mg/dL    Urea Nitrogen 20 7 - 30 mg/dL    Creatinine 1.06 0.66 - 1.25 mg/dL    GFR Estimate 70 >60 mL/min/1.7m2    GFR Estimate If Black 85 >60 mL/min/1.7m2    Calcium 8.8 8.5 - 10.1 mg/dL   Tacrolimus level    Collection Time: 07/11/18  9:21 AM   Result Value Ref Range    Tacrolimus Last Dose 107110 AT 2100     Tacrolimus Level 5.6 5.0 - 15.0 ug/L   Lipid Profile    Collection Time: 07/18/18 10:11 AM   Result Value Ref Range    Cholesterol 172 <200 mg/dL    Triglycerides 296 (H) <150 mg/dL    HDL Cholesterol 30 (L) >39 mg/dL    LDL Cholesterol Calculated 82 <100 mg/dL    Non HDL Cholesterol 142 (H) <130 mg/dL

## 2018-08-10 ENCOUNTER — HOSPITAL ENCOUNTER (OUTPATIENT)
Dept: LAB | Facility: CLINIC | Age: 68
Discharge: HOME | End: 2018-08-10
Attending: INTERNAL MEDICINE | Admitting: INTERNAL MEDICINE
Payer: MEDICARE

## 2018-08-10 DIAGNOSIS — Z48.298 AFTERCARE FOLLOWING ORGAN TRANSPLANT: ICD-10-CM

## 2018-08-10 LAB
ANION GAP SERPL CALCULATED.3IONS-SCNC: 8 MMOL/L (ref 3–14)
BUN SERPL-MCNC: 26 MG/DL (ref 7–30)
CALCIUM SERPL-MCNC: 8.7 MG/DL (ref 8.5–10.1)
CHLORIDE SERPL-SCNC: 105 MMOL/L (ref 94–109)
CO2 SERPL-SCNC: 26 MMOL/L (ref 20–32)
CREAT SERPL-MCNC: 1.09 MG/DL (ref 0.66–1.25)
ERYTHROCYTE [DISTWIDTH] IN BLOOD BY AUTOMATED COUNT: 13.9 % (ref 10–15)
GFR SERPL CREATININE-BSD FRML MDRD: 68 ML/MIN/1.7M2
GLUCOSE SERPL-MCNC: 98 MG/DL (ref 70–99)
HCT VFR BLD AUTO: 39.3 % (ref 40–53)
HGB BLD-MCNC: 12.4 G/DL (ref 13.3–17.7)
MCH RBC QN AUTO: 28.2 PG (ref 26.5–33)
MCHC RBC AUTO-ENTMCNC: 31.6 G/DL (ref 31.5–36.5)
MCV RBC AUTO: 89 FL (ref 78–100)
PLATELET # BLD AUTO: 178 10E9/L (ref 150–450)
POTASSIUM SERPL-SCNC: 3.8 MMOL/L (ref 3.4–5.3)
RBC # BLD AUTO: 4.4 10E12/L (ref 4.4–5.9)
SODIUM SERPL-SCNC: 139 MMOL/L (ref 133–144)
TACROLIMUS BLD-MCNC: 5.1 UG/L (ref 5–15)
TME LAST DOSE: NORMAL H
WBC # BLD AUTO: 4.2 10E9/L (ref 4–11)

## 2018-08-10 PROCEDURE — 36415 COLL VENOUS BLD VENIPUNCTURE: CPT | Performed by: INTERNAL MEDICINE

## 2018-08-10 PROCEDURE — 80197 ASSAY OF TACROLIMUS: CPT | Performed by: INTERNAL MEDICINE

## 2018-08-10 PROCEDURE — 80048 BASIC METABOLIC PNL TOTAL CA: CPT | Performed by: INTERNAL MEDICINE

## 2018-08-10 PROCEDURE — 85027 COMPLETE CBC AUTOMATED: CPT | Performed by: INTERNAL MEDICINE

## 2018-09-13 ENCOUNTER — HOSPITAL ENCOUNTER (OUTPATIENT)
Dept: LAB | Facility: CLINIC | Age: 68
Discharge: HOME | End: 2018-09-13
Attending: INTERNAL MEDICINE | Admitting: INTERNAL MEDICINE
Payer: MEDICARE

## 2018-09-13 DIAGNOSIS — Z48.298 AFTERCARE FOLLOWING ORGAN TRANSPLANT: ICD-10-CM

## 2018-09-13 LAB
ANION GAP SERPL CALCULATED.3IONS-SCNC: 2 MMOL/L (ref 3–14)
BUN SERPL-MCNC: 19 MG/DL (ref 7–30)
CALCIUM SERPL-MCNC: 8.8 MG/DL (ref 8.5–10.1)
CHLORIDE SERPL-SCNC: 106 MMOL/L (ref 94–109)
CO2 SERPL-SCNC: 29 MMOL/L (ref 20–32)
CREAT SERPL-MCNC: 1.06 MG/DL (ref 0.66–1.25)
ERYTHROCYTE [DISTWIDTH] IN BLOOD BY AUTOMATED COUNT: 13.8 % (ref 10–15)
GFR SERPL CREATININE-BSD FRML MDRD: 70 ML/MIN/1.7M2
GLUCOSE SERPL-MCNC: 98 MG/DL (ref 70–99)
HCT VFR BLD AUTO: 39.1 % (ref 40–53)
HGB BLD-MCNC: 12.1 G/DL (ref 13.3–17.7)
MCH RBC QN AUTO: 27.8 PG (ref 26.5–33)
MCHC RBC AUTO-ENTMCNC: 30.9 G/DL (ref 31.5–36.5)
MCV RBC AUTO: 90 FL (ref 78–100)
PLATELET # BLD AUTO: 185 10E9/L (ref 150–450)
POTASSIUM SERPL-SCNC: 4.1 MMOL/L (ref 3.4–5.3)
RBC # BLD AUTO: 4.36 10E12/L (ref 4.4–5.9)
SODIUM SERPL-SCNC: 137 MMOL/L (ref 133–144)
TACROLIMUS BLD-MCNC: 6.3 UG/L (ref 5–15)
TME LAST DOSE: 2030 H
WBC # BLD AUTO: 4.3 10E9/L (ref 4–11)

## 2018-09-13 PROCEDURE — 85027 COMPLETE CBC AUTOMATED: CPT | Performed by: INTERNAL MEDICINE

## 2018-09-13 PROCEDURE — 36415 COLL VENOUS BLD VENIPUNCTURE: CPT | Performed by: INTERNAL MEDICINE

## 2018-09-13 PROCEDURE — 80048 BASIC METABOLIC PNL TOTAL CA: CPT | Performed by: INTERNAL MEDICINE

## 2018-09-13 PROCEDURE — 80197 ASSAY OF TACROLIMUS: CPT | Performed by: INTERNAL MEDICINE

## 2018-09-21 ENCOUNTER — TELEPHONE (OUTPATIENT)
Dept: TRANSPLANT | Facility: CLINIC | Age: 68
End: 2018-09-21

## 2018-09-21 NOTE — TELEPHONE ENCOUNTER
I spoke with patient. He is having routine dental cleaning on Monday at 1pm. Discussed that it is okay, he does not need prophylactic antibiotics.   I also called [facility] to let them know that he is okay for cleaning. They were not open, but I left a message that he was okay to have cleaning and if their office had questions they could call [facility] office Monday morning.

## 2018-09-21 NOTE — TELEPHONE ENCOUNTER
Patient Call: General    Reason for call: Patient called to get dental clearance from coordinator. Dentist won't see the patient until they have approval. Patient has his appt on Monday. Dental clinic is [facility].    Call back needed? Yes    Return Call Needed  Same as documented in contacts section  When to return call?: Same day: Route High Priority

## 2018-10-03 DIAGNOSIS — Z94.0 KIDNEY REPLACED BY TRANSPLANT: ICD-10-CM

## 2018-10-03 DIAGNOSIS — I15.1 HYPERTENSION SECONDARY TO OTHER RENAL DISORDERS (CODE): ICD-10-CM

## 2018-10-03 RX ORDER — LOSARTAN POTASSIUM 25 MG/1
TABLET ORAL
Qty: 30 TABLET | Refills: 11 | Status: SHIPPED | OUTPATIENT
Start: 2018-10-03 | End: 2019-10-07

## 2018-10-03 RX ORDER — TACROLIMUS 0.5 MG/1
CAPSULE, GELATIN COATED ORAL
Qty: 120 CAPSULE | Refills: 11 | Status: SHIPPED | OUTPATIENT
Start: 2018-10-03 | End: 2019-07-09

## 2018-11-01 ENCOUNTER — HOSPITAL ENCOUNTER (OUTPATIENT)
Dept: LAB | Facility: CLINIC | Age: 68
Discharge: HOME | End: 2018-11-01
Attending: INTERNAL MEDICINE | Admitting: INTERNAL MEDICINE
Payer: MEDICARE

## 2018-11-01 DIAGNOSIS — Z48.298 AFTERCARE FOLLOWING ORGAN TRANSPLANT: ICD-10-CM

## 2018-11-01 LAB
ANION GAP SERPL CALCULATED.3IONS-SCNC: 4 MMOL/L (ref 3–14)
BUN SERPL-MCNC: 25 MG/DL (ref 7–30)
CALCIUM SERPL-MCNC: 8.8 MG/DL (ref 8.5–10.1)
CHLORIDE SERPL-SCNC: 106 MMOL/L (ref 94–109)
CO2 SERPL-SCNC: 28 MMOL/L (ref 20–32)
CREAT SERPL-MCNC: 1.14 MG/DL (ref 0.66–1.25)
ERYTHROCYTE [DISTWIDTH] IN BLOOD BY AUTOMATED COUNT: 13.6 % (ref 10–15)
GFR SERPL CREATININE-BSD FRML MDRD: 64 ML/MIN/1.7M2
GLUCOSE SERPL-MCNC: 93 MG/DL (ref 70–99)
HCT VFR BLD AUTO: 40 % (ref 40–53)
HGB BLD-MCNC: 12.3 G/DL (ref 13.3–17.7)
MCH RBC QN AUTO: 27.8 PG (ref 26.5–33)
MCHC RBC AUTO-ENTMCNC: 30.8 G/DL (ref 31.5–36.5)
MCV RBC AUTO: 90 FL (ref 78–100)
PLATELET # BLD AUTO: 169 10E9/L (ref 150–450)
POTASSIUM SERPL-SCNC: 3.9 MMOL/L (ref 3.4–5.3)
RBC # BLD AUTO: 4.43 10E12/L (ref 4.4–5.9)
SODIUM SERPL-SCNC: 138 MMOL/L (ref 133–144)
TACROLIMUS BLD-MCNC: 6 UG/L (ref 5–15)
TME LAST DOSE: NORMAL H
WBC # BLD AUTO: 4.7 10E9/L (ref 4–11)

## 2018-11-01 PROCEDURE — 80197 ASSAY OF TACROLIMUS: CPT | Performed by: INTERNAL MEDICINE

## 2018-11-01 PROCEDURE — 80048 BASIC METABOLIC PNL TOTAL CA: CPT | Performed by: INTERNAL MEDICINE

## 2018-11-01 PROCEDURE — 36415 COLL VENOUS BLD VENIPUNCTURE: CPT | Performed by: INTERNAL MEDICINE

## 2018-11-01 PROCEDURE — 85027 COMPLETE CBC AUTOMATED: CPT | Performed by: INTERNAL MEDICINE

## 2018-12-03 ENCOUNTER — HOSPITAL ENCOUNTER (OUTPATIENT)
Dept: LAB | Facility: CLINIC | Age: 68
Discharge: HOME | End: 2018-12-03
Attending: INTERNAL MEDICINE | Admitting: INTERNAL MEDICINE
Payer: MEDICARE

## 2018-12-03 DIAGNOSIS — Z48.298 AFTERCARE FOLLOWING ORGAN TRANSPLANT: ICD-10-CM

## 2018-12-03 LAB
ANION GAP SERPL CALCULATED.3IONS-SCNC: 3 MMOL/L (ref 3–14)
BUN SERPL-MCNC: 25 MG/DL (ref 7–30)
CALCIUM SERPL-MCNC: 9.1 MG/DL (ref 8.5–10.1)
CHLORIDE SERPL-SCNC: 105 MMOL/L (ref 94–109)
CO2 SERPL-SCNC: 30 MMOL/L (ref 20–32)
CREAT SERPL-MCNC: 1.24 MG/DL (ref 0.66–1.25)
CREAT UR-MCNC: 103 MG/DL
ERYTHROCYTE [DISTWIDTH] IN BLOOD BY AUTOMATED COUNT: 13.5 % (ref 10–15)
GFR SERPL CREATININE-BSD FRML MDRD: 58 ML/MIN/1.7M2
GLUCOSE SERPL-MCNC: 97 MG/DL (ref 70–99)
HCT VFR BLD AUTO: 41.4 % (ref 40–53)
HGB BLD-MCNC: 13.1 G/DL (ref 13.3–17.7)
MCH RBC QN AUTO: 28.1 PG (ref 26.5–33)
MCHC RBC AUTO-ENTMCNC: 31.6 G/DL (ref 31.5–36.5)
MCV RBC AUTO: 89 FL (ref 78–100)
PLATELET # BLD AUTO: 203 10E9/L (ref 150–450)
POTASSIUM SERPL-SCNC: 3.9 MMOL/L (ref 3.4–5.3)
PROT UR-MCNC: 0.14 G/L
PROT/CREAT 24H UR: 0.13 G/G CR (ref 0–0.2)
RBC # BLD AUTO: 4.67 10E12/L (ref 4.4–5.9)
SODIUM SERPL-SCNC: 138 MMOL/L (ref 133–144)
TACROLIMUS BLD-MCNC: 6.4 UG/L (ref 5–15)
TME LAST DOSE: NORMAL H
WBC # BLD AUTO: 4.8 10E9/L (ref 4–11)

## 2018-12-03 PROCEDURE — 86833 HLA CLASS II HIGH DEFIN QUAL: CPT | Performed by: INTERNAL MEDICINE

## 2018-12-03 PROCEDURE — 36415 COLL VENOUS BLD VENIPUNCTURE: CPT | Performed by: INTERNAL MEDICINE

## 2018-12-03 PROCEDURE — 86832 HLA CLASS I HIGH DEFIN QUAL: CPT | Performed by: INTERNAL MEDICINE

## 2018-12-03 PROCEDURE — 87799 DETECT AGENT NOS DNA QUANT: CPT | Performed by: INTERNAL MEDICINE

## 2018-12-03 PROCEDURE — 84156 ASSAY OF PROTEIN URINE: CPT | Performed by: INTERNAL MEDICINE

## 2018-12-03 PROCEDURE — 85027 COMPLETE CBC AUTOMATED: CPT | Performed by: INTERNAL MEDICINE

## 2018-12-03 PROCEDURE — 80048 BASIC METABOLIC PNL TOTAL CA: CPT | Performed by: INTERNAL MEDICINE

## 2018-12-03 PROCEDURE — 80197 ASSAY OF TACROLIMUS: CPT | Performed by: INTERNAL MEDICINE

## 2018-12-04 NOTE — PROCEDURE: SUTURE REMOVAL (GLOBAL PERIOD)
Add 94145 Cpt? (Important Note: In 2017 The Use Of 49902 Is Being Tracked By Cms To Determine Future Global Period Reimbursement For Global Periods): no
Detail Level: Detailed
None

## 2018-12-06 LAB
DONOR IDENTIFICATION: NORMAL
DSA COMMENTS: NORMAL
DSA PRESENT: NO
DSA TEST METHOD: NORMAL
ORGAN: NORMAL
SA1 CELL: NORMAL
SA1 COMMENTS: NORMAL
SA1 HI RISK ABY: NORMAL
SA1 MOD RISK ABY: NORMAL
SA1 TEST METHOD: NORMAL
SA2 CELL: NORMAL
SA2 COMMENTS: NORMAL
SA2 HI RISK ABY UA: NORMAL
SA2 MOD RISK ABY: NORMAL
SA2 TEST METHOD: NORMAL
UNACCEPTABLE ANTIGEN: NORMAL
UNOS CPRA: 70

## 2018-12-13 ENCOUNTER — TELEPHONE (OUTPATIENT)
Dept: TRANSPLANT | Facility: CLINIC | Age: 68
End: 2018-12-13

## 2018-12-13 DIAGNOSIS — Z94.0 KIDNEY TRANSPLANTED: Primary | ICD-10-CM

## 2018-12-13 NOTE — TELEPHONE ENCOUNTER
Patient Call: General  Route to LPN    Reason for call: Pt would like to connect w/ his care coordinator. Please return his call when available     Call back needed? Yes    Return Call Needed  Same as documented in contacts section  When to return call?: Greater than one day: Route standard priority

## 2018-12-14 NOTE — TELEPHONE ENCOUNTER
Returned call to Marlo.  He has questions about his labs.  His creatinine was 1.24 and his baseline is 1.0-1.2.  Pt. states he has been feeling great recently, no illness or diarrhea. I explained to Marlo that his creatine was slightly elevated.  We also discussed his tacrolimus level that was 6.4, goal 4-6, and it was a 14 hour trough.  I recommend that he repeat his level next week ensuring a good 12 hour trough.  I explained to him that a higher tac level could cause his creatinine to be slightly elevated.  Pt. verbalized understanding and plans to hydrate well and repeat creatinine and tacrolimus level next week ensuring an accurate trough.  Orders entered.

## 2018-12-18 ENCOUNTER — HOSPITAL ENCOUNTER (OUTPATIENT)
Dept: LAB | Facility: CLINIC | Age: 68
Discharge: HOME | End: 2018-12-18
Attending: INTERNAL MEDICINE | Admitting: INTERNAL MEDICINE
Payer: MEDICARE

## 2018-12-18 DIAGNOSIS — Z94.0 KIDNEY TRANSPLANTED: ICD-10-CM

## 2018-12-18 LAB
CREAT SERPL-MCNC: 1.17 MG/DL (ref 0.66–1.25)
GFR SERPL CREATININE-BSD FRML MDRD: 62 ML/MIN/1.7M2
TACROLIMUS BLD-MCNC: 6.1 UG/L (ref 5–15)
TME LAST DOSE: NORMAL H

## 2018-12-18 PROCEDURE — 80197 ASSAY OF TACROLIMUS: CPT | Performed by: INTERNAL MEDICINE

## 2018-12-18 PROCEDURE — 82565 ASSAY OF CREATININE: CPT | Performed by: INTERNAL MEDICINE

## 2018-12-18 PROCEDURE — 36415 COLL VENOUS BLD VENIPUNCTURE: CPT | Performed by: INTERNAL MEDICINE

## 2018-12-22 ENCOUNTER — DOCUMENTATION ONLY (OUTPATIENT)
Dept: TRANSPLANT | Facility: CLINIC | Age: 68
End: 2018-12-22

## 2018-12-22 NOTE — LETTER
PHYSICIAN ORDERS    DATE & TIME ISSUED: 2018 11:44 PM  PATIENT NAME: Marlo Vee   : 1950     [facility] MR# [if applicable]: [id number]     DIAGNOSIS / ICD - 10 CODES    Kidney Transplanted (Z94.0)    After Care Following Organ Transplant (Z48.298)    Long Term Use of Medication (Z79.899)      Monthly    Hemogram and Platelet    Basic Metabolic Panel (Sodium,potassium,chloride,CO2,creatinine,urea,nitrogen,glucose,calcium)     / tacrolimus / Prograf drug level    Every 6 months    BK (polyoma virus) PCR Quantitative - Plasma    Urine for protein creatinine ratio    Yearly    PRA / DSA level (mailers provided by patient)      Patient should release information to the [facility].   Please fax results to the Transplant Center at [phone].  Any questions please call [phone] or [phone].      .

## 2018-12-23 NOTE — PROGRESS NOTES
Chart Prep    Clinic Visit on:  1/28/19    Last lab completed: 12/3/18    Lab letter updated: 12/22/18    Lab: [facility]    Lab orders up to date in [facility].

## 2019-01-04 DIAGNOSIS — Z94.0 STATUS POST KIDNEY TRANSPLANT: Primary | ICD-10-CM

## 2019-01-04 RX ORDER — MYCOPHENOLATE MOFETIL 250 MG/1
750 CAPSULE ORAL 2 TIMES DAILY
Qty: 180 CAPSULE | Refills: 11 | Status: SHIPPED | OUTPATIENT
Start: 2019-01-04 | End: 2019-12-30

## 2019-01-07 ENCOUNTER — HOSPITAL ENCOUNTER (OUTPATIENT)
Dept: LAB | Facility: CLINIC | Age: 69
Discharge: HOME | End: 2019-01-07
Attending: INTERNAL MEDICINE | Admitting: INTERNAL MEDICINE
Payer: MEDICARE

## 2019-01-07 DIAGNOSIS — Z48.298 AFTERCARE FOLLOWING ORGAN TRANSPLANT: ICD-10-CM

## 2019-01-07 LAB
ANION GAP SERPL CALCULATED.3IONS-SCNC: 4 MMOL/L (ref 3–14)
BUN SERPL-MCNC: 22 MG/DL (ref 7–30)
CALCIUM SERPL-MCNC: 9 MG/DL (ref 8.5–10.1)
CHLORIDE SERPL-SCNC: 106 MMOL/L (ref 94–109)
CO2 SERPL-SCNC: 27 MMOL/L (ref 20–32)
CREAT SERPL-MCNC: 1.18 MG/DL (ref 0.66–1.25)
ERYTHROCYTE [DISTWIDTH] IN BLOOD BY AUTOMATED COUNT: 13.6 % (ref 10–15)
GFR SERPL CREATININE-BSD FRML MDRD: 64 ML/MIN/{1.73_M2}
GLUCOSE SERPL-MCNC: 93 MG/DL (ref 70–99)
HCT VFR BLD AUTO: 39.2 % (ref 40–53)
HGB BLD-MCNC: 12.4 G/DL (ref 13.3–17.7)
MCH RBC QN AUTO: 27.9 PG (ref 26.5–33)
MCHC RBC AUTO-ENTMCNC: 31.6 G/DL (ref 31.5–36.5)
MCV RBC AUTO: 88 FL (ref 78–100)
PLATELET # BLD AUTO: 162 10E9/L (ref 150–450)
POTASSIUM SERPL-SCNC: 3.8 MMOL/L (ref 3.4–5.3)
RBC # BLD AUTO: 4.44 10E12/L (ref 4.4–5.9)
SODIUM SERPL-SCNC: 137 MMOL/L (ref 133–144)
TACROLIMUS BLD-MCNC: 6.9 UG/L (ref 5–15)
TME LAST DOSE: NORMAL H
WBC # BLD AUTO: 4.3 10E9/L (ref 4–11)

## 2019-01-07 PROCEDURE — 80197 ASSAY OF TACROLIMUS: CPT | Performed by: INTERNAL MEDICINE

## 2019-01-07 PROCEDURE — 85027 COMPLETE CBC AUTOMATED: CPT | Performed by: INTERNAL MEDICINE

## 2019-01-07 PROCEDURE — 80048 BASIC METABOLIC PNL TOTAL CA: CPT | Performed by: INTERNAL MEDICINE

## 2019-01-07 PROCEDURE — 36415 COLL VENOUS BLD VENIPUNCTURE: CPT | Performed by: INTERNAL MEDICINE

## 2019-01-10 ENCOUNTER — TELEPHONE (OUTPATIENT)
Dept: PHARMACY | Facility: CLINIC | Age: 69
End: 2019-01-10

## 2019-01-28 ENCOUNTER — OFFICE VISIT (OUTPATIENT)
Dept: NEPHROLOGY | Facility: CLINIC | Age: 69
End: 2019-01-28
Attending: INTERNAL MEDICINE
Payer: MEDICARE

## 2019-01-28 VITALS
OXYGEN SATURATION: 100 % | HEIGHT: 70 IN | TEMPERATURE: 97.4 F | SYSTOLIC BLOOD PRESSURE: 152 MMHG | HEART RATE: 55 BPM | WEIGHT: 167 LBS | BODY MASS INDEX: 23.91 KG/M2 | DIASTOLIC BLOOD PRESSURE: 78 MMHG

## 2019-01-28 DIAGNOSIS — D64.9 ANEMIA, UNSPECIFIED TYPE: ICD-10-CM

## 2019-01-28 DIAGNOSIS — D84.9 IMMUNOSUPPRESSION (H): ICD-10-CM

## 2019-01-28 DIAGNOSIS — Z29.89 NEED FOR PNEUMOCYSTIS PROPHYLAXIS: ICD-10-CM

## 2019-01-28 DIAGNOSIS — Z94.0 HTN, KIDNEY TRANSPLANT RELATED: ICD-10-CM

## 2019-01-28 DIAGNOSIS — Z23 NEED FOR INFLUENZA VACCINATION: ICD-10-CM

## 2019-01-28 DIAGNOSIS — I15.1 HTN, KIDNEY TRANSPLANT RELATED: ICD-10-CM

## 2019-01-28 DIAGNOSIS — Z94.0 KIDNEY REPLACED BY TRANSPLANT: Primary | ICD-10-CM

## 2019-01-28 PROCEDURE — 90662 IIV NO PRSV INCREASED AG IM: CPT | Mod: ZF | Performed by: INTERNAL MEDICINE

## 2019-01-28 PROCEDURE — G0463 HOSPITAL OUTPT CLINIC VISIT: HCPCS | Mod: 25,ZF

## 2019-01-28 PROCEDURE — G0008 ADMIN INFLUENZA VIRUS VAC: HCPCS | Mod: ZF

## 2019-01-28 PROCEDURE — 25000128 H RX IP 250 OP 636: Mod: ZF | Performed by: INTERNAL MEDICINE

## 2019-01-28 RX ADMIN — INFLUENZA A VIRUS A/MICHIGAN/45/2015 X-275 (H1N1) ANTIGEN (FORMALDEHYDE INACTIVATED), INFLUENZA A VIRUS A/SINGAPORE/INFIMH-16-0019/2016 IVR-186 (H3N2) ANTIGEN (FORMALDEHYDE INACTIVATED), AND INFLUENZA B VIRUS B/MARYLAND/15/2016 BX-69A (A B/COLORADO/6/2017-LIKE VIRUS) ANTIGEN (FORMALDEHYDE INACTIVATED) 0.5 ML: 60; 60; 60 INJECTION, SUSPENSION INTRAMUSCULAR at 14:33

## 2019-01-28 ASSESSMENT — PAIN SCALES - GENERAL: PAINLEVEL: NO PAIN (0)

## 2019-01-28 ASSESSMENT — MIFFLIN-ST. JEOR: SCORE: 1543.76

## 2019-01-28 NOTE — NURSING NOTE
"Chief Complaint   Patient presents with     RECHECK     kidney tx     /78   Pulse 55   Ht 1.778 m (5' 10\")   Wt 75.8 kg (167 lb)   SpO2 100%   BMI 23.96 kg/m    Kaitlin Horner MA    "

## 2019-01-28 NOTE — LETTER
2019      RE: Marlo Vee  [address] [facility] [place], [place] [address]           [facility]  Nephrology Clinic  Kidney/Pancreas Recipient  2019     Name: Marlo Vee  MRN: [id number]   Age: 68 year old  : 1950  Referring provider: Yassine Hernandez     CHRONIC TRANSPLANT NEPHROLOGY VISIT    Assessment and Plan:   # LDKT:    - Baseline Cr ~ 1.1-1.2, most recently 1.18; Stable   - Proteinuria: Normal 0.13 g / g on    - Date of DSA last checked: 2018 Latest DSA: No   - BK Viremia: No, last checked 2018.   - Kidney Tx Biopsy: No.    # Immunosuppression: Tacrolimus immediate release (goal  4-6) and Mycophenolate mofetil (goal  1-3.5)   - Changes: No     If continues > 6 ng / ml will drop to 0.5 mg po q am and 1 mg po q pm.    # Prophylaxis:    - PJP on Bactrim.     # Hypertension: elevated today; Goal BP: < 130/80   - Changes: No     I have recommended that the patient check his blood pressure cuff at home against a clinic cuff for concordance.  If concordant goal blood pressure is for sure less than 140/90.  If above this he will contact us and we will make adjustments.    # Anemia in chronic renal disease: Hgb: Stable, 12.4 on 2019.   - Iron studies: Not checked recently    # Mineral Bone Disorder:    - Secondary renal hyperparathyroidism; PTH level is: Not checked recently, 94 on 10/09/2017   - Vitamin D; level is: Not checked recently, 32 on 10/09/2017. On vitamin D supplement.    - Calcium; level is: Normal, 9.0 on 2019    # Electrolytes:   - Potassium; level: Normal  - Magnesium; level: Not checked recently  - Bicarbonate; level: Normal    #Flu shot today.    # Skin Cancer Risk:    - Discussed sun protection and recommend regular follow up with Dermatology.    # Medical Compliance: Yes    Follow-up:  Return in 12 months     # Transplant History:  Etiology of kidney failure: membranous nephropathy  Tx: LDKT  Transplant: 2016 (Kidney)  Donor Type:  Living Donor Class:     Transplant Office Phone Number: [phone]    Assessment and plan was discussed with the patient and they voiced understanding and agreement.    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    The patient was last evaluated on 07/27/2018 by Dr. Varela. At that time, no changes made to immunosuppression or hypertension management. Please see note for further details.     Today, the patient reports he has been doing well. Per chart review, he has not had any recent hospitalizations. He notes this past weekend he had a stomach bug, but this resolved on its own. He denies any problems with immunosuppression medications, chest pain, shortness of breath, constipation, diarrhea, leg swelling, or weight changes. He does follow-up regularly with dermatology and will be receiving a flu shot today.      Recent Hospitalizations:  [x] No [] Yes    New Medical Issues: [x] No [] Yes    Chest pain or SOB with exertion:  [x] No [] Yes    Appetite change or weight change: [x] No [] Yes    Nausea, vomiting or diarrhea:  [x] No [] Yes    Leg swelling: [x] No [] Yes      Other medical issues:  No    Home BP: lower than normal, 107 systolic.      Review of Systems:   A comprehensive review of systems was obtained and negative, except as noted in the HPI or past medical history.     Active Medications:   Current Outpatient Medications:      aspirin 81 MG chewable tablet, Take 1 tablet (81 mg) by mouth daily, Disp: 36 tablet, Rfl:      atorvastatin (LIPITOR) 10 MG tablet, Take 0.5 tablets (5 mg) by mouth daily, Disp: , Rfl:      carvedilol (COREG) 25 MG tablet, Take 12.5mg in the am and 25mg in the pm, Disp: 45 tablet, Rfl: 11     CELLCEPT (BRAND) 250 MG capsule, Take 3 capsules (750 mg) by mouth 2 times  "daily, Disp: 180 capsule, Rfl: 11     CELLCEPT (BRAND) 250 MG CAPSULE, TAKE THREE CAPSULES BY MOUTH TWICE A DAY. CLARK 1, Disp: 180 capsule, Rfl: 11     Cholecalciferol (VITAMIN D3 PO), Take 2,000 Units by mouth daily, Disp: , Rfl:      losartan (COZAAR) 25 MG tablet, TAKE 1 TABLET BY MOUTH DAILY, Disp: 30 tablet, Rfl: 11     PROGRAF (BRAND) 0.5 MG CAPSULE, TAKE TWO CAPSULES BY MOUTH TWICE A DAY (CLARK 1), Disp: 120 capsule, Rfl: 11     SERTRALINE HCL PO, Take 25 mg by mouth daily, Disp: , Rfl:      sulfamethoxazole-trimethoprim (BACTRIM/SEPTRA) 400-80 MG per tablet, TAKE ONE TABLET BY MOUTH THREE TIMES WEEKLY ON MONDAY, WEDNESDAY, AND FRIDAY MORNING, Disp: 14 tablet, Rfl: 11    Current Facility-Administered Medications:      influenza Vac Split High-Dose (FLUZONE) injection 0.5 mL, 0.5 mL, Intramuscular, Once, Hua Landin MD     influenza Vac Split High-Dose (FLUZONE) injection 0.5 mL, 0.5 mL, Intramuscular, Once, Hua Landin MD      Allergies:   Patient has no known allergies.      Active Medical Problems:  Patient Active Problem List   Diagnosis     Hypertension secondary to other renal disorders     Membranous glomerulonephritis     Anemia in chronic renal disease     Secondary renal hyperparathyroidism (H)     Vitamin D deficiency     Dyslipidemia     Anxiety     Status post coronary angiogram     CAD, multiple vessel     Multiple vessel coronary artery disease     Kidney replaced by transplant     Immunosuppressed status (H)     Aftercare following organ transplant     Social History:   The patient has never smoked. Denies alcohol use.    Physical Exam:   /78   Pulse 55   Temp 97.4  F (36.3  C) (Oral)   Ht 1.778 m (5' 10\")   Wt 75.8 kg (167 lb)   SpO2 100%   BMI 23.96 kg/m      Wt Readings from Last 4 Encounters:   01/28/19 75.8 kg (167 lb)   07/27/18 75.3 kg (165 lb 14.4 oz)   07/17/18 74.9 kg (165 lb 3.2 oz)   09/12/17 75.2 kg (165 lb 12.8 oz)     GENERAL APPEARANCE: alert and no " distress  HENT: mouth without ulcers or lesions  LYMPHATICS: no cervical or supraclavicular nodes  RESP: lungs clear to auscultation - no rales, rhonchi or wheezes  CV: regular rhythm, normal rate, no rub, no murmur  EDEMA: no LE edema bilaterally  ABDOMEN: soft, nondistended, nontender, bowel sounds normal  MS: extremities normal - no gross deformities noted, no evidence of inflammation in joints, no muscle tenderness  SKIN: no rash    Data:   Renal Latest Ref Rng & Units 1/7/2019 12/18/2018 12/3/2018   Na 133 - 144 mmol/L 137 - 138   Na (external) 133 - 144 mmol/L - - -   K 3.4 - 5.3 mmol/L 3.8 - 3.9   K (external) 3.4 - 5.3 mmol/L - - -   Cl 94 - 109 mmol/L 106 - 105   Cl (external) 94 - 109 mmol/L - - -   CO2 20 - 32 mmol/L 27 - 30   CO2 (external) 20 - 32 mmol/L - - -   BUN 7 - 30 mg/dL 22 - 25   BUN (external) 7 - 30 mg/dL - - -   Cr 0.66 - 1.25 mg/dL 1.18 1.17 1.24   Cr (external) 0.66 - 1.25 mg/dL - - -   Glucose 70 - 99 mg/dL 93 - 97   Glucose (external) 70 - 99 mg/dL - - -   Ca  8.5 - 10.1 mg/dL 9.0 - 9.1   Ca (external) 8.5 - 10.1 mg/dL - - -   Mg 1.6 - 2.3 mg/dL - - -     Bone Health Latest Ref Rng & Units 10/9/2017 3/9/2017 12/6/2016   Phos 2.5 - 4.5 mg/dL - - 2.6   PTHi 12 - 72 pg/mL 94(H) 135(H) -   Vit D Def 20 - 75 ug/L 32 26 -     Heme Latest Ref Rng & Units 1/7/2019 12/3/2018 11/1/2018   WBC 4.0 - 11.0 10e9/L 4.3 4.8 4.7   WBC (external) 4.0 - 11.0 10*9/L - - -   Hgb 13.3 - 17.7 g/dL 12.4(L) 13.1(L) 12.3(L)   Hgb (external) 13.3 - 17.7 g/dL - - -   Plt 150 - 450 10e9/L 162 203 169   Plt (external) 150 - 450 10*9/L - - -     Liver Latest Ref Rng & Units 12/6/2016 11/16/2016 7/19/2016   AP 40 - 150 U/L - 116 142   TBili 0.2 - 1.3 mg/dL - 0.7 0.4   DBili 0.0 - 0.2 mg/dL - 0.1 0.1   ALT 0 - 70 U/L - 25 21   AST 0 - 45 U/L - 15 20   Tot Protein 6.8 - 8.8 g/dL - 7.5 7.1   Albumin 3.4 - 5.0 g/dL 3.7 3.7 3.5        Iron studies Latest Ref Rng & Units 1/28/2016   Iron 35 - 180 ug/dL 62   Iron sat 15 - 46 %  34   Ferritin 26 - 388 ng/mL 787(H)     [facility] Txp Virology Latest Ref Rng & Units 12/3/2018 6/12/2018 5/8/2018   CVM DNA Quant - - Plasma, EDTA anticoagulant EDTA PLASMA   BK Spec - Plasma - Plasma   BK Res BKNEG:BK Virus DNA Not Detected copies/mL BK Virus DNA Not Detected - BK Virus DNA Not Detected   BK Log <2.7 Log copies/mL Not Calculated - Not Calculated   Hep B Core NR - - -        Recent Labs   Lab Test 12/03/18  1030 12/18/18  0842 01/07/19  0856   DOSTAC 12.2.18 2030 12/17/2018 at 2030 2030 ON 1.6.2019   TACROL 6.4 6.1 6.9     Recent Labs   Lab Test 04/18/16  0824 04/25/16  0826 05/03/16  0853   DOSMPA 04/17/2016@2030 4.24.2016 2030 2,030   MPACID 2.42 4.01* 3.81*   MPAG 43.4 46.6 38.6       Scribe Disclosure:   I, Barbara Brown, am serving as a scribe to document services personally performed by Dr. Landin at this visit, based upon the provider's statements to me. All documentation has been reviewed by the aforementioned provider prior to being entered into the official medical record.     Portions of this medical record were completed by a scribe. UPON MY REVIEW AND AUTHENTICATION BY ELECTRONIC SIGNATURE, this confirms (a) I performed the applicable clinical services, and (b) the record is accurate.   Kidney/Pancreas Recipient

## 2019-01-28 NOTE — PROGRESS NOTES
[facility]  Nephrology Clinic  Kidney/Pancreas Recipient  2019     Name: Marlo Vee  MRN: [id number]   Age: 68 year old  : 1950  Referring provider: Yassine Hernandez     CHRONIC TRANSPLANT NEPHROLOGY VISIT    Assessment and Plan:   # LDKT:    - Baseline Cr ~ 1.1-1.2, most recently 1.18; Stable   - Proteinuria: Normal 0.13 g / g on    - Date of DSA last checked: 2018 Latest DSA: No   - BK Viremia: No, last checked 2018.   - Kidney Tx Biopsy: No.    # Immunosuppression: Tacrolimus immediate release (goal  4-6) and Mycophenolate mofetil (goal  1-3.5)   - Changes: No     If continues > 6 ng / ml will drop to 0.5 mg po q am and 1 mg po q pm.    # Prophylaxis:    - PJP on Bactrim.     # Hypertension: elevated today; Goal BP: < 130/80   - Changes: No     I have recommended that the patient check his blood pressure cuff at home against a clinic cuff for concordance.  If concordant goal blood pressure is for sure less than 140/90.  If above this he will contact us and we will make adjustments.    # Anemia in chronic renal disease: Hgb: Stable, 12.4 on 2019.   - Iron studies: Not checked recently    # Mineral Bone Disorder:    - Secondary renal hyperparathyroidism; PTH level is: Not checked recently, 94 on 10/09/2017   - Vitamin D; level is: Not checked recently, 32 on 10/09/2017. On vitamin D supplement.    - Calcium; level is: Normal, 9.0 on 2019    # Electrolytes:   - Potassium; level: Normal  - Magnesium; level: Not checked recently  - Bicarbonate; level: Normal    #Flu shot today.    # Skin Cancer Risk:    - Discussed sun protection and recommend regular follow up with Dermatology.    # Medical Compliance: Yes    Follow-up:  Return in 12 months     # Transplant History:  Etiology of kidney failure: membranous nephropathy  Tx: LDKT  Transplant: 2016 (Kidney)  Donor Type: Living Donor Class:     Transplant Office Phone Number: [phone]    Assessment and plan was  discussed with the patient and they voiced understanding and agreement.    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    The patient was last evaluated on 07/27/2018 by Dr. Varela. At that time, no changes made to immunosuppression or hypertension management. Please see note for further details.     Today, the patient reports he has been doing well. Per chart review, he has not had any recent hospitalizations. He notes this past weekend he had a stomach bug, but this resolved on its own. He denies any problems with immunosuppression medications, chest pain, shortness of breath, constipation, diarrhea, leg swelling, or weight changes. He does follow-up regularly with dermatology and will be receiving a flu shot today.      Recent Hospitalizations:  [x] No [] Yes    New Medical Issues: [x] No [] Yes    Chest pain or SOB with exertion:  [x] No [] Yes    Appetite change or weight change: [x] No [] Yes    Nausea, vomiting or diarrhea:  [x] No [] Yes    Leg swelling: [x] No [] Yes      Other medical issues:  No    Home BP: lower than normal, 107 systolic.      Review of Systems:   A comprehensive review of systems was obtained and negative, except as noted in the HPI or past medical history.     Active Medications:   Current Outpatient Medications:      aspirin 81 MG chewable tablet, Take 1 tablet (81 mg) by mouth daily, Disp: 36 tablet, Rfl:      atorvastatin (LIPITOR) 10 MG tablet, Take 0.5 tablets (5 mg) by mouth daily, Disp: , Rfl:      carvedilol (COREG) 25 MG tablet, Take 12.5mg in the am and 25mg in the pm, Disp: 45 tablet, Rfl: 11     CELLCEPT (BRAND) 250 MG capsule, Take 3 capsules (750 mg) by mouth 2 times daily, Disp: 180 capsule, Rfl: 11     CELLCEPT (BRAND) 250 MG CAPSULE, TAKE THREE CAPSULES BY MOUTH  "TWICE A DAY. CLARK 1, Disp: 180 capsule, Rfl: 11     Cholecalciferol (VITAMIN D3 PO), Take 2,000 Units by mouth daily, Disp: , Rfl:      losartan (COZAAR) 25 MG tablet, TAKE 1 TABLET BY MOUTH DAILY, Disp: 30 tablet, Rfl: 11     PROGRAF (BRAND) 0.5 MG CAPSULE, TAKE TWO CAPSULES BY MOUTH TWICE A DAY (CLARK 1), Disp: 120 capsule, Rfl: 11     SERTRALINE HCL PO, Take 25 mg by mouth daily, Disp: , Rfl:      sulfamethoxazole-trimethoprim (BACTRIM/SEPTRA) 400-80 MG per tablet, TAKE ONE TABLET BY MOUTH THREE TIMES WEEKLY ON MONDAY, WEDNESDAY, AND FRIDAY MORNING, Disp: 14 tablet, Rfl: 11    Current Facility-Administered Medications:      influenza Vac Split High-Dose (FLUZONE) injection 0.5 mL, 0.5 mL, Intramuscular, Once, Hua Landin MD     influenza Vac Split High-Dose (FLUZONE) injection 0.5 mL, 0.5 mL, Intramuscular, Once, Hua Landin MD      Allergies:   Patient has no known allergies.      Active Medical Problems:  Patient Active Problem List   Diagnosis     Hypertension secondary to other renal disorders     Membranous glomerulonephritis     Anemia in chronic renal disease     Secondary renal hyperparathyroidism (H)     Vitamin D deficiency     Dyslipidemia     Anxiety     Status post coronary angiogram     CAD, multiple vessel     Multiple vessel coronary artery disease     Kidney replaced by transplant     Immunosuppressed status (H)     Aftercare following organ transplant     Social History:   The patient has never smoked. Denies alcohol use.    Physical Exam:   /78   Pulse 55   Temp 97.4  F (36.3  C) (Oral)   Ht 1.778 m (5' 10\")   Wt 75.8 kg (167 lb)   SpO2 100%   BMI 23.96 kg/m     Wt Readings from Last 4 Encounters:   01/28/19 75.8 kg (167 lb)   07/27/18 75.3 kg (165 lb 14.4 oz)   07/17/18 74.9 kg (165 lb 3.2 oz)   09/12/17 75.2 kg (165 lb 12.8 oz)     GENERAL APPEARANCE: alert and no distress  HENT: mouth without ulcers or lesions  LYMPHATICS: no cervical or supraclavicular nodes  RESP: " lungs clear to auscultation - no rales, rhonchi or wheezes  CV: regular rhythm, normal rate, no rub, no murmur  EDEMA: no LE edema bilaterally  ABDOMEN: soft, nondistended, nontender, bowel sounds normal  MS: extremities normal - no gross deformities noted, no evidence of inflammation in joints, no muscle tenderness  SKIN: no rash    Data:   Renal Latest Ref Rng & Units 1/7/2019 12/18/2018 12/3/2018   Na 133 - 144 mmol/L 137 - 138   Na (external) 133 - 144 mmol/L - - -   K 3.4 - 5.3 mmol/L 3.8 - 3.9   K (external) 3.4 - 5.3 mmol/L - - -   Cl 94 - 109 mmol/L 106 - 105   Cl (external) 94 - 109 mmol/L - - -   CO2 20 - 32 mmol/L 27 - 30   CO2 (external) 20 - 32 mmol/L - - -   BUN 7 - 30 mg/dL 22 - 25   BUN (external) 7 - 30 mg/dL - - -   Cr 0.66 - 1.25 mg/dL 1.18 1.17 1.24   Cr (external) 0.66 - 1.25 mg/dL - - -   Glucose 70 - 99 mg/dL 93 - 97   Glucose (external) 70 - 99 mg/dL - - -   Ca  8.5 - 10.1 mg/dL 9.0 - 9.1   Ca (external) 8.5 - 10.1 mg/dL - - -   Mg 1.6 - 2.3 mg/dL - - -     Bone Health Latest Ref Rng & Units 10/9/2017 3/9/2017 12/6/2016   Phos 2.5 - 4.5 mg/dL - - 2.6   PTHi 12 - 72 pg/mL 94(H) 135(H) -   Vit D Def 20 - 75 ug/L 32 26 -     Heme Latest Ref Rng & Units 1/7/2019 12/3/2018 11/1/2018   WBC 4.0 - 11.0 10e9/L 4.3 4.8 4.7   WBC (external) 4.0 - 11.0 10*9/L - - -   Hgb 13.3 - 17.7 g/dL 12.4(L) 13.1(L) 12.3(L)   Hgb (external) 13.3 - 17.7 g/dL - - -   Plt 150 - 450 10e9/L 162 203 169   Plt (external) 150 - 450 10*9/L - - -     Liver Latest Ref Rng & Units 12/6/2016 11/16/2016 7/19/2016   AP 40 - 150 U/L - 116 142   TBili 0.2 - 1.3 mg/dL - 0.7 0.4   DBili 0.0 - 0.2 mg/dL - 0.1 0.1   ALT 0 - 70 U/L - 25 21   AST 0 - 45 U/L - 15 20   Tot Protein 6.8 - 8.8 g/dL - 7.5 7.1   Albumin 3.4 - 5.0 g/dL 3.7 3.7 3.5        Iron studies Latest Ref Rng & Units 1/28/2016   Iron 35 - 180 ug/dL 62   Iron sat 15 - 46 % 34   Ferritin 26 - 388 ng/mL 787(H)     [facility] Txp Virology Latest Ref Rng & Units 12/3/2018 6/12/2018  5/8/2018   CVM DNA Quant - - Plasma, EDTA anticoagulant EDTA PLASMA   BK Spec - Plasma - Plasma   BK Res BKNEG:BK Virus DNA Not Detected copies/mL BK Virus DNA Not Detected - BK Virus DNA Not Detected   BK Log <2.7 Log copies/mL Not Calculated - Not Calculated   Hep B Core NR - - -        Recent Labs   Lab Test 12/03/18  1030 12/18/18  0842 01/07/19  0856   DOSTAC 12.2.18 2030 12/17/2018 at 2030 2030 ON 1.6.2019   TACROL 6.4 6.1 6.9     Recent Labs   Lab Test 04/18/16  0824 04/25/16  0826 05/03/16  0853   DOSMPA 04/17/2016@2030 4.24.2016 2030 2,030   MPACID 2.42 4.01* 3.81*   MPAG 43.4 46.6 38.6       Scribe Disclosure:   I, Barbara Brown, am serving as a scribe to document services personally performed by Dr. Landin at this visit, based upon the provider's statements to me. All documentation has been reviewed by the aforementioned provider prior to being entered into the official medical record.     Portions of this medical record were completed by a scribe. UPON MY REVIEW AND AUTHENTICATION BY ELECTRONIC SIGNATURE, this confirms (a) I performed the applicable clinical services, and (b) the record is accurate.

## 2019-03-04 DIAGNOSIS — Z94.0 RENAL TRANSPLANT RECIPIENT: ICD-10-CM

## 2019-03-04 DIAGNOSIS — Z94.0 STATUS POST KIDNEY TRANSPLANT: ICD-10-CM

## 2019-03-04 RX ORDER — SULFAMETHOXAZOLE AND TRIMETHOPRIM 400; 80 MG/1; MG/1
TABLET ORAL
Qty: 14 TABLET | Refills: 11 | Status: SHIPPED | OUTPATIENT
Start: 2019-03-04 | End: 2020-12-05

## 2019-03-06 ENCOUNTER — HOSPITAL ENCOUNTER (OUTPATIENT)
Dept: LAB | Facility: CLINIC | Age: 69
Discharge: HOME | End: 2019-03-06
Attending: INTERNAL MEDICINE | Admitting: INTERNAL MEDICINE
Payer: MEDICARE

## 2019-03-06 DIAGNOSIS — Z48.298 AFTERCARE FOLLOWING ORGAN TRANSPLANT: ICD-10-CM

## 2019-03-06 LAB
ANION GAP SERPL CALCULATED.3IONS-SCNC: 5 MMOL/L (ref 3–14)
BUN SERPL-MCNC: 26 MG/DL (ref 7–30)
CALCIUM SERPL-MCNC: 9.2 MG/DL (ref 8.5–10.1)
CHLORIDE SERPL-SCNC: 107 MMOL/L (ref 94–109)
CO2 SERPL-SCNC: 27 MMOL/L (ref 20–32)
CREAT SERPL-MCNC: 1.16 MG/DL (ref 0.66–1.25)
ERYTHROCYTE [DISTWIDTH] IN BLOOD BY AUTOMATED COUNT: 13.7 % (ref 10–15)
GFR SERPL CREATININE-BSD FRML MDRD: 65 ML/MIN/{1.73_M2}
GLUCOSE SERPL-MCNC: 98 MG/DL (ref 70–99)
HCT VFR BLD AUTO: 39.6 % (ref 40–53)
HGB BLD-MCNC: 12.3 G/DL (ref 13.3–17.7)
MCH RBC QN AUTO: 27.8 PG (ref 26.5–33)
MCHC RBC AUTO-ENTMCNC: 31.1 G/DL (ref 31.5–36.5)
MCV RBC AUTO: 89 FL (ref 78–100)
PLATELET # BLD AUTO: 175 10E9/L (ref 150–450)
POTASSIUM SERPL-SCNC: 3.7 MMOL/L (ref 3.4–5.3)
RBC # BLD AUTO: 4.43 10E12/L (ref 4.4–5.9)
SODIUM SERPL-SCNC: 139 MMOL/L (ref 133–144)
TACROLIMUS BLD-MCNC: 6.1 UG/L (ref 5–15)
TME LAST DOSE: NORMAL H
WBC # BLD AUTO: 4.4 10E9/L (ref 4–11)

## 2019-03-06 PROCEDURE — 36415 COLL VENOUS BLD VENIPUNCTURE: CPT | Performed by: INTERNAL MEDICINE

## 2019-03-06 PROCEDURE — 80197 ASSAY OF TACROLIMUS: CPT | Performed by: INTERNAL MEDICINE

## 2019-03-06 PROCEDURE — 85027 COMPLETE CBC AUTOMATED: CPT | Performed by: INTERNAL MEDICINE

## 2019-03-06 PROCEDURE — 80048 BASIC METABOLIC PNL TOTAL CA: CPT | Performed by: INTERNAL MEDICINE

## 2019-05-01 ENCOUNTER — DOCUMENTATION ONLY (OUTPATIENT)
Dept: CARE COORDINATION | Facility: CLINIC | Age: 69
End: 2019-05-01

## 2019-05-06 ENCOUNTER — HOSPITAL ENCOUNTER (OUTPATIENT)
Dept: LAB | Facility: CLINIC | Age: 69
Discharge: HOME | End: 2019-05-06
Attending: INTERNAL MEDICINE | Admitting: INTERNAL MEDICINE
Payer: MEDICARE

## 2019-05-06 DIAGNOSIS — Z48.298 AFTERCARE FOLLOWING ORGAN TRANSPLANT: ICD-10-CM

## 2019-05-06 LAB
ANION GAP SERPL CALCULATED.3IONS-SCNC: 7 MMOL/L (ref 3–14)
BUN SERPL-MCNC: 20 MG/DL (ref 7–30)
CALCIUM SERPL-MCNC: 9 MG/DL (ref 8.5–10.1)
CHLORIDE SERPL-SCNC: 107 MMOL/L (ref 94–109)
CO2 SERPL-SCNC: 26 MMOL/L (ref 20–32)
CREAT SERPL-MCNC: 1.13 MG/DL (ref 0.66–1.25)
ERYTHROCYTE [DISTWIDTH] IN BLOOD BY AUTOMATED COUNT: 13.5 % (ref 10–15)
GFR SERPL CREATININE-BSD FRML MDRD: 67 ML/MIN/{1.73_M2}
GLUCOSE SERPL-MCNC: 96 MG/DL (ref 70–99)
HCT VFR BLD AUTO: 39.3 % (ref 40–53)
HGB BLD-MCNC: 12.3 G/DL (ref 13.3–17.7)
MCH RBC QN AUTO: 28.1 PG (ref 26.5–33)
MCHC RBC AUTO-ENTMCNC: 31.3 G/DL (ref 31.5–36.5)
MCV RBC AUTO: 90 FL (ref 78–100)
PLATELET # BLD AUTO: 170 10E9/L (ref 150–450)
POTASSIUM SERPL-SCNC: 4 MMOL/L (ref 3.4–5.3)
RBC # BLD AUTO: 4.37 10E12/L (ref 4.4–5.9)
SODIUM SERPL-SCNC: 140 MMOL/L (ref 133–144)
TACROLIMUS BLD-MCNC: 6.2 UG/L (ref 5–15)
TME LAST DOSE: NORMAL H
WBC # BLD AUTO: 4 10E9/L (ref 4–11)

## 2019-05-06 PROCEDURE — 80197 ASSAY OF TACROLIMUS: CPT | Performed by: INTERNAL MEDICINE

## 2019-05-06 PROCEDURE — 36415 COLL VENOUS BLD VENIPUNCTURE: CPT | Performed by: INTERNAL MEDICINE

## 2019-05-06 PROCEDURE — 85027 COMPLETE CBC AUTOMATED: CPT | Performed by: INTERNAL MEDICINE

## 2019-05-06 PROCEDURE — 80048 BASIC METABOLIC PNL TOTAL CA: CPT | Performed by: INTERNAL MEDICINE

## 2019-06-05 ENCOUNTER — TELEPHONE (OUTPATIENT)
Dept: CARDIOLOGY | Facility: CLINIC | Age: 69
End: 2019-06-05

## 2019-06-05 NOTE — TELEPHONE ENCOUNTER
Patient must reschedule 7/2/19 appt with Dr. Delaney due to Dr. Delaney blocking his afternoon clinic time off. Left message on 6/5/19 requesting patient call main call center to reschedule. Ok to reschedule in new patient spot.

## 2019-06-13 DIAGNOSIS — I10 HTN (HYPERTENSION): ICD-10-CM

## 2019-06-13 RX ORDER — CARVEDILOL 25 MG/1
TABLET ORAL
Qty: 45 TABLET | Refills: 11 | Status: SHIPPED | OUTPATIENT
Start: 2019-06-13 | End: 2020-04-27

## 2019-07-08 ENCOUNTER — RESULTS ONLY (OUTPATIENT)
Dept: OTHER | Facility: CLINIC | Age: 69
End: 2019-07-08

## 2019-07-08 ENCOUNTER — HOSPITAL ENCOUNTER (OUTPATIENT)
Dept: LAB | Facility: CLINIC | Age: 69
Discharge: HOME | End: 2019-07-08
Attending: INTERNAL MEDICINE | Admitting: INTERNAL MEDICINE
Payer: MEDICARE

## 2019-07-08 ENCOUNTER — TELEPHONE (OUTPATIENT)
Dept: TRANSPLANT | Facility: CLINIC | Age: 69
End: 2019-07-08

## 2019-07-08 DIAGNOSIS — Z48.298 AFTERCARE FOLLOWING ORGAN TRANSPLANT: ICD-10-CM

## 2019-07-08 DIAGNOSIS — Z79.899 ENCOUNTER FOR LONG-TERM CURRENT USE OF MEDICATION: ICD-10-CM

## 2019-07-08 DIAGNOSIS — Z94.0 KIDNEY REPLACED BY TRANSPLANT: ICD-10-CM

## 2019-07-08 DIAGNOSIS — Z94.0 KIDNEY REPLACED BY TRANSPLANT: Primary | ICD-10-CM

## 2019-07-08 LAB
ANION GAP SERPL CALCULATED.3IONS-SCNC: 7 MMOL/L (ref 3–14)
BUN SERPL-MCNC: 27 MG/DL (ref 7–30)
CALCIUM SERPL-MCNC: 9 MG/DL (ref 8.5–10.1)
CHLORIDE SERPL-SCNC: 107 MMOL/L (ref 94–109)
CO2 SERPL-SCNC: 26 MMOL/L (ref 20–32)
CREAT SERPL-MCNC: 1.23 MG/DL (ref 0.66–1.25)
ERYTHROCYTE [DISTWIDTH] IN BLOOD BY AUTOMATED COUNT: 13.4 % (ref 10–15)
GFR SERPL CREATININE-BSD FRML MDRD: 60 ML/MIN/{1.73_M2}
GLUCOSE SERPL-MCNC: 98 MG/DL (ref 70–99)
HCT VFR BLD AUTO: 39.1 % (ref 40–53)
HGB BLD-MCNC: 12.3 G/DL (ref 13.3–17.7)
MCH RBC QN AUTO: 28 PG (ref 26.5–33)
MCHC RBC AUTO-ENTMCNC: 31.5 G/DL (ref 31.5–36.5)
MCV RBC AUTO: 89 FL (ref 78–100)
PLATELET # BLD AUTO: 179 10E9/L (ref 150–450)
POTASSIUM SERPL-SCNC: 3.8 MMOL/L (ref 3.4–5.3)
RBC # BLD AUTO: 4.4 10E12/L (ref 4.4–5.9)
SODIUM SERPL-SCNC: 140 MMOL/L (ref 133–144)
TACROLIMUS BLD-MCNC: 6.9 UG/L (ref 5–15)
TME LAST DOSE: NORMAL H
WBC # BLD AUTO: 4 10E9/L (ref 4–11)

## 2019-07-08 PROCEDURE — 80197 ASSAY OF TACROLIMUS: CPT | Performed by: INTERNAL MEDICINE

## 2019-07-08 PROCEDURE — 85027 COMPLETE CBC AUTOMATED: CPT | Performed by: INTERNAL MEDICINE

## 2019-07-08 PROCEDURE — 80048 BASIC METABOLIC PNL TOTAL CA: CPT | Performed by: INTERNAL MEDICINE

## 2019-07-08 PROCEDURE — 86833 HLA CLASS II HIGH DEFIN QUAL: CPT | Performed by: INTERNAL MEDICINE

## 2019-07-08 PROCEDURE — 36415 COLL VENOUS BLD VENIPUNCTURE: CPT | Performed by: INTERNAL MEDICINE

## 2019-07-08 PROCEDURE — 86832 HLA CLASS I HIGH DEFIN QUAL: CPT | Performed by: INTERNAL MEDICINE

## 2019-07-08 NOTE — TELEPHONE ENCOUNTER
Patient Call: Transplant Lab/Orders  Route to LPN  Post Transplant Days: 1264  When patient is less than 60 days post-transplant, route high priority    Reason for Call: need updated lab order in epic for  [facility]  Callback needed? If needed

## 2019-07-08 NOTE — TELEPHONE ENCOUNTER
Patient would like to schedule his annual appointment for next January.    RT called for duoneb administration

## 2019-07-09 DIAGNOSIS — Z94.0 KIDNEY REPLACED BY TRANSPLANT: Primary | ICD-10-CM

## 2019-07-09 RX ORDER — TACROLIMUS 0.5 MG/1
CAPSULE, GELATIN COATED ORAL
Qty: 90 CAPSULE | Refills: 11 | Status: SHIPPED | OUTPATIENT
Start: 2019-07-09 | End: 2019-10-07

## 2019-07-09 NOTE — TELEPHONE ENCOUNTER
ISSUE:   Tacrolimus level 6.9 on 7/8/19, goal 4-6, dose 1 mg BID    PLAN:   Please call pt and confirm this was a good 12-hour trough. Verify dose 1 mg BID. Confirm no new medications or illness (leonila. Diarrhea). If good trough, decrease dose to 1mg am and 0.5mg pm and recheck txp labs in the next two weeks. Update rx, enter lab order.

## 2019-07-09 NOTE — TELEPHONE ENCOUNTER
Call placed to patient. Patient confirms current dose and accurate trough level. Denies any recent illness or medication changes. Notes a little diarrhea today and yesterday. Patient v\u to decrease tacrolimus to 1/0.5 and repeat level in two weeks. Order/rx sent

## 2019-07-10 NOTE — TELEPHONE ENCOUNTER
Pt scheduled for his annual January 6th 2020. He will get labs at [facility] prior to his appointment

## 2019-07-16 ENCOUNTER — ANCILLARY PROCEDURE (OUTPATIENT)
Dept: CARDIOLOGY | Facility: CLINIC | Age: 69
End: 2019-07-16
Payer: MEDICARE

## 2019-07-16 ENCOUNTER — OFFICE VISIT (OUTPATIENT)
Dept: CARDIOLOGY | Facility: CLINIC | Age: 69
End: 2019-07-16
Attending: INTERNAL MEDICINE
Payer: MEDICARE

## 2019-07-16 VITALS
OXYGEN SATURATION: 99 % | HEIGHT: 70 IN | BODY MASS INDEX: 23.55 KG/M2 | SYSTOLIC BLOOD PRESSURE: 133 MMHG | WEIGHT: 164.5 LBS | HEART RATE: 54 BPM | DIASTOLIC BLOOD PRESSURE: 87 MMHG

## 2019-07-16 DIAGNOSIS — Z94.0 KIDNEY REPLACED BY TRANSPLANT: ICD-10-CM

## 2019-07-16 DIAGNOSIS — I25.10 CAD, MULTIPLE VESSEL: ICD-10-CM

## 2019-07-16 DIAGNOSIS — Z98.890 STATUS POST CORONARY ANGIOGRAM: Primary | ICD-10-CM

## 2019-07-16 PROCEDURE — G0463 HOSPITAL OUTPT CLINIC VISIT: HCPCS | Mod: ZF

## 2019-07-16 PROCEDURE — 99214 OFFICE O/P EST MOD 30 MIN: CPT | Mod: 25 | Performed by: INTERNAL MEDICINE

## 2019-07-16 ASSESSMENT — MIFFLIN-ST. JEOR: SCORE: 1527.42

## 2019-07-16 ASSESSMENT — PAIN SCALES - GENERAL: PAINLEVEL: NO PAIN (0)

## 2019-07-16 NOTE — LETTER
7/16/2019      RE: Marlo Vee  [address] [facility] [address]       Dear Colleague,    Thank you for the opportunity to participate in the care of your patient, Marlo Vee, at the [facility]. Please see a copy of my visit note below.       SUBJECTIVE:  Marlo Vee is a 69 year old male who presents for yearly follow-up.    Had PCI/Stent to .dRCA,mLAD and D1 on 4/24/15. EF improved from 35% to low normal 50-55%.     Had Living unrelated donor kidney Tx in 1/2016. Normal function. No complaints.     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     Patient gone on [employer].  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    Patient Active Problem List    Diagnosis Date Noted     Aftercare following organ transplant 12/08/2016     Priority: Medium     Kidney replaced by transplant 01/26/2016     Priority: Medium     Immunosuppressed status (H) 01/26/2016     Priority: Medium     Multiple vessel coronary artery disease 04/23/2015     Priority: Medium     Status post coronary angiogram 04/21/2015     Priority: Medium     CAD, multiple vessel 04/21/2015     Priority: Medium     Anemia in chronic renal disease      Priority: Medium     Secondary renal hyperparathyroidism (H)      Priority: Medium     Vitamin D deficiency      Priority: Medium     Dyslipidemia      Priority: Medium     Anxiety      Priority: Medium     Hypertension secondary to other renal disorders 03/14/2015     Priority: Medium     Membranous glomerulonephritis 10/30/2002     Priority: Medium     Kidney biopsy [facility] 10/30/2002 scanned into [facility]       .  Current Outpatient Medications   Medication Sig     aspirin 81 MG chewable tablet Take 1 tablet (81 mg) by mouth daily     atorvastatin (LIPITOR) 10 MG tablet Take 0.5 tablets (5 mg) by mouth daily     carvedilol (COREG) 25 MG tablet Take 12.5mg in the am and 25mg in the pm     CELLCEPT (BRAND) 250 MG capsule  Take 3 capsules (750 mg) by mouth 2 times daily     CELLCEPT (BRAND) 250 MG CAPSULE TAKE THREE CAPSULES BY MOUTH TWICE A DAY. CLARK 1     Cholecalciferol (VITAMIN D3 PO) Take 2,000 Units by mouth daily     losartan (COZAAR) 25 MG tablet TAKE 1 TABLET BY MOUTH DAILY     PROGRAF (BRAND) 0.5 MG capsule Take 2 capsules (1 mg) by mouth every morning AND 1 capsule (0.5 mg) every evening. Dose change     SERTRALINE HCL PO Take 25 mg by mouth daily     sulfamethoxazole-trimethoprim (BACTRIM/SEPTRA) 400-80 MG per tablet TAKE ONE TABLET BY MOUTH THREE TIMES WEEKLY ON MONDAY, WEDNESDAY, AND FRIDAY MORNING     sulfamethoxazole-trimethoprim (BACTRIM/SEPTRA) 400-80 MG tablet TAKE ONE TABLET BY MOUTH THREE TIMES WEEKLY ON MONDAY, WEDNESDAY, AND FRIDAY MORNING     No current facility-administered medications for this visit.      Past Medical History:   Diagnosis Date     Anemia in ESRD (end-stage renal disease) (H)      Antiplatelet or antithrombotic long-term use      Anxiety      Dialysis patient (H)      Dyslipidemia      End stage kidney disease (H)      Heart attack (H)     not sure     Hypertension      Membranous glomerulonephritis 2002     PONV (postoperative nausea and vomiting)      PUD (peptic ulcer disease)      Secondary hyperparathyroidism (of renal origin)      Stented coronary artery      Vitamin D deficiency      Past Surgical History:   Procedure Laterality Date     CYSTOSCOPY, REMOVE STENT(S), COMBINED Right 2/23/2016    Procedure: COMBINED CYSTOSCOPY, REMOVE STENT(S);  Surgeon: Cody Temple MD;  Location: [facility] OR     s/p right upper extremity AV fistula       TRANSPLANT      kidney transplant      VASCULAR SURGERY       No Known Allergies  Social History     Socioeconomic History     Marital status:      Spouse name: Not on file     Number of children: 4     Years of education: Not on file     Highest education level: Not on file   Occupational History     Not on file   Social Needs     Financial resource  strain: Not on file     Food insecurity:     Worry: Not on file     Inability: Not on file     Transportation needs:     Medical: Not on file     Non-medical: Not on file   Tobacco Use     Smoking status: Never Smoker     Smokeless tobacco: Never Used   Substance and Sexual Activity     Alcohol use: No     Alcohol/week: 0.0 oz     Comment: Patient reports drinking beer on a rare ocassion.     Drug use: No     Sexual activity: Not on file   Lifestyle     Physical activity:     Days per week: Not on file     Minutes per session: Not on file     Stress: Not on file   Relationships     Social connections:     Talks on phone: Not on file     Gets together: Not on file     Attends Christian service: Not on file     Active member of club or organization: Not on file     Attends meetings of clubs or organizations: Not on file     Relationship status: Not on file     Intimate partner violence:     Fear of current or ex partner: Not on file     Emotionally abused: Not on file     Physically abused: Not on file     Forced sexual activity: Not on file   Other Topics Concern     Parent/sibling w/ CABG, MI or angioplasty before 65F 55M? Not Asked   Social History Narrative     Not on file     Family History   Problem Relation Age of Onset     Breast Cancer Mother      Cancer - colorectal Father      Coronary Artery Disease Father      Hypertension Father      Breast Cancer Sister      Cancer Brother         Pancreatic CA          REVIEW OF SYSTEMS:  General: negative, fever, chills, night sweats  Skin: negative, acne, rash and scaling  Eyes: negative, double vision, eye pain and photophobia  Ears/Nose/Throat: negative, nasal congestion and purulent rhinorrhea  Respiratory: No dyspnea on exertion, No cough, No hemoptysis and negative  Cardiovascular: negative, palpitations, tachycardia, irregular heart beat, chest pain, exertional chest pain or pressure, paroxysmal nocturnal dyspnea, dyspnea on exertion and orthopnea          "OBJECTIVE:  Blood pressure 133/87, pulse 54, height 1.778 m (5' 10\"), weight 74.6 kg (164 lb 8 oz), SpO2 99 %.  General Appearance: healthy, alert, active and no distress  Head: Normocephalic. No masses, lesions, tenderness or abnormalities  Eyes: conjuctiva clear, PERRL, EOM intact  Ears: External ears normal. Canals clear. TM's normal.  Nose: Nares normal  Mouth: normal  Neck: Supple, no cervical adenopathy, no thyromegaly  Lungs: clear to auscultation  Cardiac: regular rate and rhythm, normal S1 and S2, no murmur       ASSESSMENT/PLAN:  Patient here for yearly follow-up.  Status post RCA, LAD D1 stenting in 2015.  Also had renal transplant for idiopathic membranous GN in 2016.  Patient is very active and asymptomatic.  Had no cardiac complaints today.  Reviewed the echocardiogram done today and result discussed with patient.  Normal biventricular function.  Small area of basal inferior akinesis.  No significant valvular abnormality.  Patient is advised to continue current medication.  He will return to clinic in 1 year.    Per orders.   Return to Clinic 1yr.    Please do not hesitate to contact me if you have any questions/concerns.     Sincerely,     ALF Ayala MD    "

## 2019-07-16 NOTE — PROGRESS NOTES
SUBJECTIVE:  Marlo Vee is a 69 year old male who presents for yearly follow-up.    Had PCI/Stent to m.dRCA,mLAD and D1 on 4/24/15. EF improved from 35% to low normal 50-55%.     Had Living unrelated donor kidney Tx in 1/2016. Normal function. No complaints.     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     Patient gone on [employer].  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    Patient Active Problem List    Diagnosis Date Noted     Aftercare following organ transplant 12/08/2016     Priority: Medium     Kidney replaced by transplant 01/26/2016     Priority: Medium     Immunosuppressed status (H) 01/26/2016     Priority: Medium     Multiple vessel coronary artery disease 04/23/2015     Priority: Medium     Status post coronary angiogram 04/21/2015     Priority: Medium     CAD, multiple vessel 04/21/2015     Priority: Medium     Anemia in chronic renal disease      Priority: Medium     Secondary renal hyperparathyroidism (H)      Priority: Medium     Vitamin D deficiency      Priority: Medium     Dyslipidemia      Priority: Medium     Anxiety      Priority: Medium     Hypertension secondary to other renal disorders 03/14/2015     Priority: Medium     Membranous glomerulonephritis 10/30/2002     Priority: Medium     Kidney biopsy [facility] 10/30/2002 scanned into [facility]       .  Current Outpatient Medications   Medication Sig     aspirin 81 MG chewable tablet Take 1 tablet (81 mg) by mouth daily     atorvastatin (LIPITOR) 10 MG tablet Take 0.5 tablets (5 mg) by mouth daily     carvedilol (COREG) 25 MG tablet Take 12.5mg in the am and 25mg in the pm     CELLCEPT (BRAND) 250 MG capsule Take 3 capsules (750 mg) by mouth 2 times daily     CELLCEPT (BRAND) 250 MG CAPSULE TAKE THREE CAPSULES BY MOUTH TWICE A DAY. CLARK 1     Cholecalciferol (VITAMIN D3 PO) Take 2,000 Units by mouth daily     losartan (COZAAR) 25 MG tablet TAKE 1 TABLET BY MOUTH DAILY     PROGRAF (BRAND) 0.5 MG capsule Take 2 capsules (1 mg) by mouth  every morning AND 1 capsule (0.5 mg) every evening. Dose change     SERTRALINE HCL PO Take 25 mg by mouth daily     sulfamethoxazole-trimethoprim (BACTRIM/SEPTRA) 400-80 MG per tablet TAKE ONE TABLET BY MOUTH THREE TIMES WEEKLY ON MONDAY, WEDNESDAY, AND FRIDAY MORNING     sulfamethoxazole-trimethoprim (BACTRIM/SEPTRA) 400-80 MG tablet TAKE ONE TABLET BY MOUTH THREE TIMES WEEKLY ON MONDAY, WEDNESDAY, AND FRIDAY MORNING     No current facility-administered medications for this visit.      Past Medical History:   Diagnosis Date     Anemia in ESRD (end-stage renal disease) (H)      Antiplatelet or antithrombotic long-term use      Anxiety      Dialysis patient (H)      Dyslipidemia      End stage kidney disease (H)      Heart attack (H)     not sure     Hypertension      Membranous glomerulonephritis 2002     PONV (postoperative nausea and vomiting)      PUD (peptic ulcer disease)      Secondary hyperparathyroidism (of renal origin)      Stented coronary artery      Vitamin D deficiency      Past Surgical History:   Procedure Laterality Date     CYSTOSCOPY, REMOVE STENT(S), COMBINED Right 2/23/2016    Procedure: COMBINED CYSTOSCOPY, REMOVE STENT(S);  Surgeon: Cody Temple MD;  Location: [facility] OR     s/p right upper extremity AV fistula       TRANSPLANT      kidney transplant      VASCULAR SURGERY       No Known Allergies  Social History     Socioeconomic History     Marital status:      Spouse name: Not on file     Number of children: 4     Years of education: Not on file     Highest education level: Not on file   Occupational History     Not on file   Social Needs     Financial resource strain: Not on file     Food insecurity:     Worry: Not on file     Inability: Not on file     Transportation needs:     Medical: Not on file     Non-medical: Not on file   Tobacco Use     Smoking status: Never Smoker     Smokeless tobacco: Never Used   Substance and Sexual Activity     Alcohol use: No     Alcohol/week: 0.0 oz     " Comment: Patient reports drinking beer on a rare ocassion.     Drug use: No     Sexual activity: Not on file   Lifestyle     Physical activity:     Days per week: Not on file     Minutes per session: Not on file     Stress: Not on file   Relationships     Social connections:     Talks on phone: Not on file     Gets together: Not on file     Attends Episcopalian service: Not on file     Active member of club or organization: Not on file     Attends meetings of clubs or organizations: Not on file     Relationship status: Not on file     Intimate partner violence:     Fear of current or ex partner: Not on file     Emotionally abused: Not on file     Physically abused: Not on file     Forced sexual activity: Not on file   Other Topics Concern     Parent/sibling w/ CABG, MI or angioplasty before 65F 55M? Not Asked   Social History Narrative     Not on file     Family History   Problem Relation Age of Onset     Breast Cancer Mother      Cancer - colorectal Father      Coronary Artery Disease Father      Hypertension Father      Breast Cancer Sister      Cancer Brother         Pancreatic CA          REVIEW OF SYSTEMS:  General: negative, fever, chills, night sweats  Skin: negative, acne, rash and scaling  Eyes: negative, double vision, eye pain and photophobia  Ears/Nose/Throat: negative, nasal congestion and purulent rhinorrhea  Respiratory: No dyspnea on exertion, No cough, No hemoptysis and negative  Cardiovascular: negative, palpitations, tachycardia, irregular heart beat, chest pain, exertional chest pain or pressure, paroxysmal nocturnal dyspnea, dyspnea on exertion and orthopnea         OBJECTIVE:  Blood pressure 133/87, pulse 54, height 1.778 m (5' 10\"), weight 74.6 kg (164 lb 8 oz), SpO2 99 %.  General Appearance: healthy, alert, active and no distress  Head: Normocephalic. No masses, lesions, tenderness or abnormalities  Eyes: conjuctiva clear, PERRL, EOM intact  Ears: External ears normal. Canals clear. TM's " normal.  Nose: Nares normal  Mouth: normal  Neck: Supple, no cervical adenopathy, no thyromegaly  Lungs: clear to auscultation  Cardiac: regular rate and rhythm, normal S1 and S2, no murmur       ASSESSMENT/PLAN:  Patient here for yearly follow-up.  Status post RCA, LAD D1 stenting in 2015.  Also had renal transplant for idiopathic membranous GN in 2016.  Patient is very active and asymptomatic.  Had no cardiac complaints today.  Reviewed the echocardiogram done today and result discussed with patient.  Normal biventricular function.  Small area of basal inferior akinesis.  No significant valvular abnormality.  Patient is advised to continue current medication.  He will return to clinic in 1 year.    Per orders.   Return to Clinic 1yr.  Answers for HPI/ROS submitted by the patient on 7/16/2019   General Symptoms: No  Skin Symptoms: No  HENT Symptoms: No  EYE SYMPTOMS: No  HEART SYMPTOMS: No  LUNG SYMPTOMS: No  INTESTINAL SYMPTOMS: No  URINARY SYMPTOMS: No  REPRODUCTIVE SYMPTOMS: No  SKELETAL SYMPTOMS: No  BLOOD SYMPTOMS: No  NERVOUS SYSTEM SYMPTOMS: No  MENTAL HEALTH SYMPTOMS: No

## 2019-07-16 NOTE — NURSING NOTE
Chief Complaint   Patient presents with     Follow Up     1 year return      Vitals were taken and medications were reconciled.     Wendi Bazan RMA  9:47 AM

## 2019-07-16 NOTE — PATIENT INSTRUCTIONS
Patient Instructions:  Follow up in one year with Dr. Delaney.  You will receive a scheduling reminder in advance.       It was a pleasure to see you in the cardiology clinic today.    We are encouraging the use of [employer] to communicate with your Healthcare Provider.  If you have any questions, call  Ayaz Paulino LPN, at [phone].  Press Option #1 for the [facility], and then press Option #4 for nursing.      If you have an urgent need after hours (8:00 am to 4:30 pm) please call [phone] and ask for the cardiology fellow on call.

## 2019-07-24 ENCOUNTER — HOSPITAL ENCOUNTER (OUTPATIENT)
Dept: LAB | Facility: CLINIC | Age: 69
Discharge: HOME | End: 2019-07-24
Attending: INTERNAL MEDICINE | Admitting: INTERNAL MEDICINE
Payer: MEDICARE

## 2019-07-24 DIAGNOSIS — Z94.0 KIDNEY REPLACED BY TRANSPLANT: ICD-10-CM

## 2019-07-24 LAB
TACROLIMUS BLD-MCNC: 4.7 UG/L (ref 5–15)
TME LAST DOSE: ABNORMAL H

## 2019-07-24 PROCEDURE — 80197 ASSAY OF TACROLIMUS: CPT | Performed by: INTERNAL MEDICINE

## 2019-07-24 PROCEDURE — 36415 COLL VENOUS BLD VENIPUNCTURE: CPT | Performed by: INTERNAL MEDICINE

## 2019-07-29 ENCOUNTER — TELEPHONE (OUTPATIENT)
Dept: TRANSPLANT | Facility: CLINIC | Age: 69
End: 2019-07-29

## 2019-07-29 NOTE — TELEPHONE ENCOUNTER
Returned call to Marlo.  He wanted to confirm his tacrolimus level was where it should be.  Explained it was 4.7 and his goal is 4-6.  I recommended he continue on his current dose and we will continue to monitor his routine transplant labs.  Marlo verbalized understanding.

## 2019-07-29 NOTE — TELEPHONE ENCOUNTER
Patient Call: General  Route to LPN    Reason for call: PT left vm on Jul 29, 2019 11:35:32 AM regarding a test result. Please connect.    Call back needed? Yes    Return Call Needed  Same as documented in contacts section  When to return call?: Greater than one day: Route standard priority

## 2019-09-10 ENCOUNTER — HOSPITAL ENCOUNTER (OUTPATIENT)
Dept: LAB | Facility: CLINIC | Age: 69
Discharge: HOME | End: 2019-09-10
Attending: INTERNAL MEDICINE | Admitting: INTERNAL MEDICINE
Payer: MEDICARE

## 2019-09-10 DIAGNOSIS — Z79.899 ENCOUNTER FOR LONG-TERM CURRENT USE OF MEDICATION: ICD-10-CM

## 2019-09-10 DIAGNOSIS — Z48.298 AFTERCARE FOLLOWING ORGAN TRANSPLANT: ICD-10-CM

## 2019-09-10 DIAGNOSIS — Z94.0 KIDNEY REPLACED BY TRANSPLANT: ICD-10-CM

## 2019-09-10 LAB
ANION GAP SERPL CALCULATED.3IONS-SCNC: 2 MMOL/L (ref 3–14)
BUN SERPL-MCNC: 27 MG/DL (ref 7–30)
CALCIUM SERPL-MCNC: 9.6 MG/DL (ref 8.5–10.1)
CHLORIDE SERPL-SCNC: 106 MMOL/L (ref 94–109)
CO2 SERPL-SCNC: 31 MMOL/L (ref 20–32)
CREAT SERPL-MCNC: 1.13 MG/DL (ref 0.66–1.25)
ERYTHROCYTE [DISTWIDTH] IN BLOOD BY AUTOMATED COUNT: 13.4 % (ref 10–15)
GFR SERPL CREATININE-BSD FRML MDRD: 67 ML/MIN/{1.73_M2}
GLUCOSE SERPL-MCNC: 102 MG/DL (ref 70–99)
HCT VFR BLD AUTO: 39.5 % (ref 40–53)
HGB BLD-MCNC: 12.3 G/DL (ref 13.3–17.7)
MCH RBC QN AUTO: 28.1 PG (ref 26.5–33)
MCHC RBC AUTO-ENTMCNC: 31.1 G/DL (ref 31.5–36.5)
MCV RBC AUTO: 90 FL (ref 78–100)
PLATELET # BLD AUTO: 176 10E9/L (ref 150–450)
POTASSIUM SERPL-SCNC: 3.6 MMOL/L (ref 3.4–5.3)
RBC # BLD AUTO: 4.38 10E12/L (ref 4.4–5.9)
SODIUM SERPL-SCNC: 139 MMOL/L (ref 133–144)
TACROLIMUS BLD-MCNC: 5.5 UG/L (ref 5–15)
TME LAST DOSE: NORMAL H
WBC # BLD AUTO: 4.2 10E9/L (ref 4–11)

## 2019-09-10 PROCEDURE — 80197 ASSAY OF TACROLIMUS: CPT | Performed by: INTERNAL MEDICINE

## 2019-09-10 PROCEDURE — 36415 COLL VENOUS BLD VENIPUNCTURE: CPT | Performed by: INTERNAL MEDICINE

## 2019-09-10 PROCEDURE — 85027 COMPLETE CBC AUTOMATED: CPT | Performed by: INTERNAL MEDICINE

## 2019-09-10 PROCEDURE — 80048 BASIC METABOLIC PNL TOTAL CA: CPT | Performed by: INTERNAL MEDICINE

## 2019-10-03 ENCOUNTER — HEALTH MAINTENANCE LETTER (OUTPATIENT)
Age: 69
End: 2019-10-03

## 2019-10-07 DIAGNOSIS — I15.1 HYPERTENSION SECONDARY TO OTHER RENAL DISORDERS (CODE): ICD-10-CM

## 2019-10-07 DIAGNOSIS — Z94.0 KIDNEY REPLACED BY TRANSPLANT: Primary | ICD-10-CM

## 2019-10-07 RX ORDER — LOSARTAN POTASSIUM 25 MG/1
TABLET ORAL
Qty: 30 TABLET | Refills: 11 | Status: SHIPPED | OUTPATIENT
Start: 2019-10-07 | End: 2020-08-28

## 2019-10-07 RX ORDER — TACROLIMUS 0.5 MG/1
CAPSULE, GELATIN COATED ORAL
Qty: 90 CAPSULE | Refills: 11 | Status: SHIPPED | OUTPATIENT
Start: 2019-10-07 | End: 2020-10-29

## 2019-10-24 ENCOUNTER — TELEPHONE (OUTPATIENT)
Dept: TRANSPLANT | Facility: CLINIC | Age: 69
End: 2019-10-24

## 2019-10-24 NOTE — TELEPHONE ENCOUNTER
Patient Call: General  Route to LPN    Reason for call: please connect with pt regarding his tacro levels and few other questions    Call back needed? Yes    Return Call Needed  Same as documented in contacts section  When to return call?: Greater than one day: Route standard priority

## 2019-11-12 ENCOUNTER — HOSPITAL ENCOUNTER (OUTPATIENT)
Dept: LAB | Facility: CLINIC | Age: 69
Discharge: HOME | End: 2019-11-12
Admitting: INTERNAL MEDICINE
Payer: MEDICARE

## 2019-11-12 DIAGNOSIS — Z48.298 AFTERCARE FOLLOWING ORGAN TRANSPLANT: ICD-10-CM

## 2019-11-12 DIAGNOSIS — Z94.0 KIDNEY REPLACED BY TRANSPLANT: ICD-10-CM

## 2019-11-12 DIAGNOSIS — Z79.899 ENCOUNTER FOR LONG-TERM CURRENT USE OF MEDICATION: ICD-10-CM

## 2019-11-12 LAB
ANION GAP SERPL CALCULATED.3IONS-SCNC: 3 MMOL/L (ref 3–14)
BUN SERPL-MCNC: 24 MG/DL (ref 7–30)
CALCIUM SERPL-MCNC: 9.1 MG/DL (ref 8.5–10.1)
CHLORIDE SERPL-SCNC: 105 MMOL/L (ref 94–109)
CO2 SERPL-SCNC: 29 MMOL/L (ref 20–32)
CREAT SERPL-MCNC: 1.2 MG/DL (ref 0.66–1.25)
CREAT UR-MCNC: 79 MG/DL
ERYTHROCYTE [DISTWIDTH] IN BLOOD BY AUTOMATED COUNT: 13.2 % (ref 10–15)
GFR SERPL CREATININE-BSD FRML MDRD: 62 ML/MIN/{1.73_M2}
GLUCOSE SERPL-MCNC: 98 MG/DL (ref 70–99)
HCT VFR BLD AUTO: 42.5 % (ref 40–53)
HGB BLD-MCNC: 13 G/DL (ref 13.3–17.7)
MCH RBC QN AUTO: 27.5 PG (ref 26.5–33)
MCHC RBC AUTO-ENTMCNC: 30.6 G/DL (ref 31.5–36.5)
MCV RBC AUTO: 90 FL (ref 78–100)
PLATELET # BLD AUTO: 176 10E9/L (ref 150–450)
POTASSIUM SERPL-SCNC: 3.5 MMOL/L (ref 3.4–5.3)
PROT UR-MCNC: 0.24 G/L
PROT/CREAT 24H UR: 0.31 G/G CR (ref 0–0.2)
RBC # BLD AUTO: 4.72 10E12/L (ref 4.4–5.9)
SODIUM SERPL-SCNC: 137 MMOL/L (ref 133–144)
TACROLIMUS BLD-MCNC: 4.8 UG/L (ref 5–15)
TME LAST DOSE: 11 H
WBC # BLD AUTO: 4.1 10E9/L (ref 4–11)

## 2019-11-12 PROCEDURE — 36415 COLL VENOUS BLD VENIPUNCTURE: CPT | Performed by: INTERNAL MEDICINE

## 2019-11-12 PROCEDURE — 80048 BASIC METABOLIC PNL TOTAL CA: CPT | Performed by: INTERNAL MEDICINE

## 2019-11-12 PROCEDURE — 80197 ASSAY OF TACROLIMUS: CPT | Performed by: INTERNAL MEDICINE

## 2019-11-12 PROCEDURE — 85027 COMPLETE CBC AUTOMATED: CPT | Performed by: INTERNAL MEDICINE

## 2019-11-12 PROCEDURE — 84156 ASSAY OF PROTEIN URINE: CPT | Performed by: INTERNAL MEDICINE

## 2019-12-30 ENCOUNTER — TELEPHONE (OUTPATIENT)
Dept: TRANSPLANT | Facility: CLINIC | Age: 69
End: 2019-12-30

## 2019-12-30 DIAGNOSIS — Z94.0 STATUS POST KIDNEY TRANSPLANT: ICD-10-CM

## 2019-12-30 DIAGNOSIS — Z94.0 STATUS POST KIDNEY TRANSPLANT: Primary | ICD-10-CM

## 2019-12-30 RX ORDER — MYCOPHENOLATE MOFETIL 250 MG/1
750 CAPSULE ORAL 2 TIMES DAILY
Qty: 180 CAPSULE | Refills: 11 | Status: SHIPPED | OUTPATIENT
Start: 2019-12-30 | End: 2020-12-18

## 2019-12-30 RX ORDER — MYCOPHENOLATE MOFETIL 250 MG/1
750 CAPSULE ORAL 2 TIMES DAILY
Qty: 180 CAPSULE | Refills: 11 | Status: SHIPPED | OUTPATIENT
Start: 2019-12-30 | End: 2019-12-30

## 2019-12-30 NOTE — TELEPHONE ENCOUNTER
Medication/Refill approved per CPA:    [facility] COLLABORATIVE AGREEMENT FOR  IMMUNOSUPPRESSENT PRESCRIPTION MODIFICATION.      Routing encounter to Transplant as an FYI.    Thanks,  Myra Pimentel, PharmD  Specialty Pharmacist/Transplant  [facility]  [phone]

## 2019-12-30 NOTE — TELEPHONE ENCOUNTER
Medication/Refill approved per CPA:    [facility] COLLABORATIVE AGREEMENT FOR  IMMUNOSUPPRESSENT PRESCRIPTION MODIFICATION.      Routing encounter to Transplant as an FYI.    Thanks,  Myra Pimentel, PharmD  Specialty Pharmacist/Transplant  [facility]  [phone]

## 2020-01-06 ENCOUNTER — TELEPHONE (OUTPATIENT)
Dept: PHARMACY | Facility: CLINIC | Age: 70
End: 2020-01-06

## 2020-01-06 ENCOUNTER — HOSPITAL ENCOUNTER (OUTPATIENT)
Dept: LAB | Facility: CLINIC | Age: 70
Discharge: HOME | End: 2020-01-06
Attending: INTERNAL MEDICINE | Admitting: INTERNAL MEDICINE
Payer: MEDICARE

## 2020-01-06 ENCOUNTER — RESULTS ONLY (OUTPATIENT)
Dept: OTHER | Facility: CLINIC | Age: 70
End: 2020-01-06

## 2020-01-06 ENCOUNTER — OFFICE VISIT (OUTPATIENT)
Dept: NEPHROLOGY | Facility: CLINIC | Age: 70
End: 2020-01-06
Attending: INTERNAL MEDICINE
Payer: MEDICARE

## 2020-01-06 VITALS
TEMPERATURE: 97.6 F | BODY MASS INDEX: 24.42 KG/M2 | DIASTOLIC BLOOD PRESSURE: 87 MMHG | SYSTOLIC BLOOD PRESSURE: 131 MMHG | HEART RATE: 61 BPM | WEIGHT: 170.2 LBS | OXYGEN SATURATION: 100 %

## 2020-01-06 DIAGNOSIS — Z48.298 AFTERCARE FOLLOWING ORGAN TRANSPLANT: ICD-10-CM

## 2020-01-06 DIAGNOSIS — E55.9 VITAMIN D DEFICIENCY: ICD-10-CM

## 2020-01-06 DIAGNOSIS — Z94.0 KIDNEY REPLACED BY TRANSPLANT: Primary | ICD-10-CM

## 2020-01-06 DIAGNOSIS — Z79.899 ENCOUNTER FOR LONG-TERM CURRENT USE OF MEDICATION: ICD-10-CM

## 2020-01-06 DIAGNOSIS — D84.9 IMMUNOSUPPRESSION (H): ICD-10-CM

## 2020-01-06 DIAGNOSIS — D63.1 ANEMIA IN STAGE 3 CHRONIC KIDNEY DISEASE (H): ICD-10-CM

## 2020-01-06 DIAGNOSIS — N18.30 ANEMIA IN STAGE 3 CHRONIC KIDNEY DISEASE (H): ICD-10-CM

## 2020-01-06 DIAGNOSIS — Z94.0 HTN, KIDNEY TRANSPLANT RELATED: ICD-10-CM

## 2020-01-06 DIAGNOSIS — Z94.0 KIDNEY REPLACED BY TRANSPLANT: ICD-10-CM

## 2020-01-06 DIAGNOSIS — N25.81 SECONDARY RENAL HYPERPARATHYROIDISM (H): ICD-10-CM

## 2020-01-06 DIAGNOSIS — I15.1 HTN, KIDNEY TRANSPLANT RELATED: ICD-10-CM

## 2020-01-06 LAB
ANION GAP SERPL CALCULATED.3IONS-SCNC: 2 MMOL/L (ref 3–14)
BUN SERPL-MCNC: 21 MG/DL (ref 7–30)
CALCIUM SERPL-MCNC: 9.6 MG/DL (ref 8.5–10.1)
CHLORIDE SERPL-SCNC: 108 MMOL/L (ref 94–109)
CO2 SERPL-SCNC: 30 MMOL/L (ref 20–32)
CREAT SERPL-MCNC: 1.2 MG/DL (ref 0.66–1.25)
ERYTHROCYTE [DISTWIDTH] IN BLOOD BY AUTOMATED COUNT: 13.6 % (ref 10–15)
GFR SERPL CREATININE-BSD FRML MDRD: 62 ML/MIN/{1.73_M2}
GLUCOSE SERPL-MCNC: 106 MG/DL (ref 70–99)
HCT VFR BLD AUTO: 42.9 % (ref 40–53)
HGB BLD-MCNC: 13.2 G/DL (ref 13.3–17.7)
MCH RBC QN AUTO: 27.4 PG (ref 26.5–33)
MCHC RBC AUTO-ENTMCNC: 30.8 G/DL (ref 31.5–36.5)
MCV RBC AUTO: 89 FL (ref 78–100)
PLATELET # BLD AUTO: 192 10E9/L (ref 150–450)
POTASSIUM SERPL-SCNC: 4.1 MMOL/L (ref 3.4–5.3)
RBC # BLD AUTO: 4.81 10E12/L (ref 4.4–5.9)
SODIUM SERPL-SCNC: 140 MMOL/L (ref 133–144)
TACROLIMUS BLD-MCNC: 5.9 UG/L (ref 5–15)
TME LAST DOSE: NORMAL H
WBC # BLD AUTO: 4.3 10E9/L (ref 4–11)

## 2020-01-06 PROCEDURE — 80048 BASIC METABOLIC PNL TOTAL CA: CPT | Performed by: INTERNAL MEDICINE

## 2020-01-06 PROCEDURE — 86833 HLA CLASS II HIGH DEFIN QUAL: CPT | Performed by: INTERNAL MEDICINE

## 2020-01-06 PROCEDURE — 85027 COMPLETE CBC AUTOMATED: CPT | Performed by: INTERNAL MEDICINE

## 2020-01-06 PROCEDURE — 36415 COLL VENOUS BLD VENIPUNCTURE: CPT | Performed by: INTERNAL MEDICINE

## 2020-01-06 PROCEDURE — 86832 HLA CLASS I HIGH DEFIN QUAL: CPT | Performed by: INTERNAL MEDICINE

## 2020-01-06 PROCEDURE — G0463 HOSPITAL OUTPT CLINIC VISIT: HCPCS | Mod: ZF

## 2020-01-06 PROCEDURE — 80197 ASSAY OF TACROLIMUS: CPT | Performed by: INTERNAL MEDICINE

## 2020-01-06 ASSESSMENT — PAIN SCALES - GENERAL: PAINLEVEL: NO PAIN (0)

## 2020-01-06 NOTE — NURSING NOTE
"Chief Complaint   Patient presents with     RECHECK     Follow up Kidney TX     Vital signs:  Temp: 97.6  F (36.4  C) Temp src: Oral BP: 131/87 Pulse: 61     SpO2: 100 %       Weight: 77.2 kg (170 lb 3.2 oz)  Estimated body mass index is 24.42 kg/m  as calculated from the following:    Height as of 7/16/19: 1.778 m (5' 10\").    Weight as of this encounter: 77.2 kg (170 lb 3.2 oz).        Moira Arango, CMA    "

## 2020-01-06 NOTE — LETTER
1/6/2020      RE: Marlo Vee  [address] [facility] [address]       CHRONIC TRANSPLANT NEPHROLOGY VISIT    Assessment & Plan   # LDKT: Stable   - Baseline Cr ~ 1.1-1.3   - Proteinuria: Minimal (0.2-0.5 grams)   - Date DSA Last Checked: Jul/2019      Latest DSA: No   - BK Viremia: No   - Kidney Tx Biopsy: No    # Immunosuppression: Tacrolimus immediate release (goal 4-6) and Mycophenolate mofetil (goal not followed)   - Changes: No    # Infection Prophylaxis:   - PJP: Sulfa/TMP (Bactrim)    # Hypertension: Controlled;  Goal BP: < 130/80   - Changes: No    # Anemia in Chronic Renal Disease: Hgb: Stable, near normal      LULY: No   - Iron studies: Not checked recently    # Mineral Bone Disorder:   - Vitamin D; level: Not checked recently        On Supplement: Yes  - Calcium; level: Normal        On Supplement: No    # CAD; s/p PCI: Asymptomatic    # Skin Cancer Risk:    - History of a skin lesion around his neck removed via MOHS procedure. No new lesions. Discussed sun protection and recommend regular follow up with Dermatology.    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    Return visit: Return in about 1 year (around 1/6/2021).      Chief Complaint   Mr. Vee is a 69 year old here for routine follow up.    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 Patient was last evaluated by Dr. Landin on 1/28/19; please see that note for  further details.    Patient denies any recent hospitalizations or new medical issues. Denies chest pain or shortness of breath with exertion. Energy level is stable. No recent changes in weight or appetite. No nausea, vomiting, or diarrhea. No fevers, chills, or sweats. No lower extremity edema. Patient had a skin lesion around his neck that was removed via MOHS procedure. Continues annual follow-up with Dermatology.    Recent Hospitalizations:  [x] No [] Yes    New Medical Issues: [x] No [] Yes    Decreased energy: [x] No [] Yes    Chest pain or SOB with exertion:  [x] No [] Yes    Appetite change or weight change: [x] No [] Yes    Nausea, vomiting or diarrhea:  [x] No [] Yes    Fever, sweats or chills: [x] No [] Yes    Leg swelling: [x] No [] Yes      Home BP: 110s/70s    Review of Systems   A comprehensive review of systems was obtained and negative, except as noted in the HPI or PMH.    Problem List   Patient Active Problem List   Diagnosis     HTN, kidney transplant related     Membranous glomerulonephritis     Anemia in chronic renal disease     Secondary renal hyperparathyroidism (H)     Vitamin D deficiency     Dyslipidemia     Anxiety     Status post coronary angiogram     CAD, multiple vessel     Multiple vessel coronary artery disease     Kidney replaced by transplant     Immunosuppression (H)     Aftercare following organ transplant       Social History   Social History     Tobacco Use     Smoking status: Never Smoker     Smokeless tobacco: Never Used   Substance Use Topics     Alcohol use: No     Alcohol/week: 0.0 standard drinks     Comment: Patient reports drinking beer on a rare ocassion.     Drug use: No       Allergies   No Known Allergies    Medications   Current Outpatient Medications   Medication Sig     aspirin 81 MG chewable tablet Take 1 tablet (81 mg) by mouth daily     atorvastatin (LIPITOR) 10 MG tablet Take 0.5 tablets (5 mg) by mouth daily     carvedilol (COREG) 25 MG tablet Take 12.5mg  in the am and 25mg in the pm     CELLCEPT (BRAND) 250 MG CAPSULE TAKE THREE CAPSULES BY MOUTH TWICE A DAY. CLARK 1     Cholecalciferol (VITAMIN D3 PO) Take 2,000 Units by mouth daily     losartan (COZAAR) 25 MG tablet TAKE 1 TABLET BY MOUTH DAILY     PROGRAF (BRAND) 0.5 MG capsule Take 2 capsules (1 mg) by mouth every morning AND 1 capsule (0.5 mg) every evening.     SERTRALINE HCL PO Take 25 mg by mouth daily     sulfamethoxazole-trimethoprim (BACTRIM/SEPTRA) 400-80 MG per tablet TAKE ONE TABLET BY MOUTH THREE TIMES WEEKLY ON MONDAY, WEDNESDAY, AND FRIDAY MORNING     CELLCEPT (BRAND) 250 MG capsule Take 3 capsules (750 mg) by mouth 2 times daily Do not open cap for feeding tube. BLOOD LEVEL may be needed BEFORE giving dose. (Patient not taking: Reported on 1/6/2020)     sulfamethoxazole-trimethoprim (BACTRIM/SEPTRA) 400-80 MG tablet TAKE ONE TABLET BY MOUTH THREE TIMES WEEKLY ON MONDAY, WEDNESDAY, AND FRIDAY MORNING (Patient not taking: Reported on 1/6/2020)     No current facility-administered medications for this visit.      There are no discontinued medications.    Physical Exam   Vital Signs: /87 (BP Location: Right arm)   Pulse 61   Temp 97.6  F (36.4  C) (Oral)   Wt 77.2 kg (170 lb 3.2 oz)   SpO2 100%   BMI 24.42 kg/m       GENERAL APPEARANCE: alert and no distress  HENT: mouth without ulcers or lesions  LYMPHATICS: no cervical or supraclavicular nodes  RESP: lungs clear to auscultation - no rales, rhonchi or wheezes  CV: regular rhythm, normal rate, no rub, no murmur  EDEMA: no LE edema bilaterally  ABDOMEN: soft, nondistended, nontender, bowel sounds normal  MS: extremities normal - no gross deformities noted, no evidence of inflammation in joints, no muscle tenderness  SKIN: no rash  TX KIDNEY: normal  DIALYSIS ACCESS:  None      Data     Renal Latest Ref Rng & Units 1/6/2020 11/12/2019 9/10/2019   Na 133 - 144 mmol/L 140 137 139   Na (external) 133 - 144 mmol/L - - -   K 3.4 - 5.3 mmol/L 4.1 3.5  3.6   K (external) 3.4 - 5.3 mmol/L - - -   Cl 94 - 109 mmol/L 108 105 106   Cl (external) 94 - 109 mmol/L - - -   CO2 20 - 32 mmol/L 30 29 31   CO2 (external) 20 - 32 mmol/L - - -   BUN 7 - 30 mg/dL 21 24 27   BUN (external) 7 - 30 mg/dL - - -   Cr 0.66 - 1.25 mg/dL 1.20 1.20 1.13   Cr (external) 0.66 - 1.25 mg/dL - - -   Glucose 70 - 99 mg/dL 106(H) 98 102(H)   Glucose (external) 70 - 99 mg/dL - - -   Ca  8.5 - 10.1 mg/dL 9.6 9.1 9.6   Ca (external) 8.5 - 10.1 mg/dL - - -   Mg 1.6 - 2.3 mg/dL - - -     Bone Health Latest Ref Rng & Units 10/9/2017 3/9/2017 12/6/2016   Phos 2.5 - 4.5 mg/dL - - 2.6   PTHi 12 - 72 pg/mL 94(H) 135(H) -   Vit D Def 20 - 75 ug/L 32 26 -     Heme Latest Ref Rng & Units 1/6/2020 11/12/2019 9/10/2019   WBC 4.0 - 11.0 10e9/L 4.3 4.1 4.2   WBC (external) 4.0 - 11.0 10*9/L - - -   Hgb 13.3 - 17.7 g/dL 13.2(L) 13.0(L) 12.3(L)   Hgb (external) 13.3 - 17.7 g/dL - - -   Plt 150 - 450 10e9/L 192 176 176   Plt (external) 150 - 450 10*9/L - - -     Liver Latest Ref Rng & Units 12/6/2016 11/16/2016 7/19/2016   AP 40 - 150 U/L - 116 142   TBili 0.2 - 1.3 mg/dL - 0.7 0.4   DBili 0.0 - 0.2 mg/dL - 0.1 0.1   ALT 0 - 70 U/L - 25 21   AST 0 - 45 U/L - 15 20   Tot Protein 6.8 - 8.8 g/dL - 7.5 7.1   Albumin 3.4 - 5.0 g/dL 3.7 3.7 3.5        Iron studies Latest Ref Rng & Units 1/28/2016   Iron 35 - 180 ug/dL 62   Iron sat 15 - 46 % 34   Ferritin 26 - 388 ng/mL 787(H)     [facility] Txp Virology Latest Ref Rng & Units 12/3/2018 6/12/2018 5/8/2018   CVM DNA Quant - - Plasma, EDTA anticoagulant EDTA PLASMA   BK Spec - Plasma - Plasma   BK Res BKNEG:BK Virus DNA Not Detected copies/mL BK Virus DNA Not Detected - BK Virus DNA Not Detected   BK Log <2.7 Log copies/mL Not Calculated - Not Calculated   Hep B Core NR - - -        Recent Labs   Lab Test 09/10/19  0851 11/12/19  0920 01/06/20  0940   DOSTAC 09/09/19 @2015 11 1/5/20 2030   TACROL 5.5 4.8* 5.9     Recent Labs   Lab Test 04/18/16  0824 04/25/16  0826  05/03/16  0853   DOSMPA 04/17/2016@2030 4.24.2016 2030 2,030   MPACID 2.42 4.01* 3.81*   MPAG 43.4 46.6 38.6       Scribe Disclosure:  I, Angelo Collazo, am serving as a scribe to document services personally performed by Samuel Varela MD at this visit, based upon the provider's statements to me. All documentation has been reviewed by the aforementioned provider prior to being entered into the official medical record.    Samuel Varela MD

## 2020-01-06 NOTE — PROGRESS NOTES
CHRONIC TRANSPLANT NEPHROLOGY VISIT    Assessment & Plan   # LDKT: Stable   - Baseline Cr ~ 1.1-1.3   - Proteinuria: Minimal (0.2-0.5 grams)   - Date DSA Last Checked: Jul/2019      Latest DSA: No   - BK Viremia: No   - Kidney Tx Biopsy: No    # Immunosuppression: Tacrolimus immediate release (goal 4-6) and Mycophenolate mofetil (goal not followed)   - Changes: No    # Infection Prophylaxis:   - PJP: Sulfa/TMP (Bactrim)    # Hypertension: Controlled;  Goal BP: < 130/80   - Changes: No    # Anemia in Chronic Renal Disease: Hgb: Stable, near normal      LULY: No   - Iron studies: Not checked recently    # Mineral Bone Disorder:   - Vitamin D; level: Not checked recently        On Supplement: Yes  - Calcium; level: Normal        On Supplement: No    # CAD; s/p PCI: Asymptomatic    # Skin Cancer Risk:    - History of a skin lesion around his neck removed via MOHS procedure. No new lesions. Discussed sun protection and recommend regular follow up with Dermatology.    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    Return visit: Return in about 1 year (around 1/6/2021).      Chief Complaint   Mr. Vee is a 69 year old here for routine follow up.    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 Patient was last evaluated by Dr. Landin on 1/28/19; please see that note for further details.    Patient denies any recent hospitalizations or new medical issues. Denies  chest pain or shortness of breath with exertion. Energy level is stable. No recent changes in weight or appetite. No nausea, vomiting, or diarrhea. No fevers, chills, or sweats. No lower extremity edema. Patient had a skin lesion around his neck that was removed via MOHS procedure. Continues annual follow-up with Dermatology.    Recent Hospitalizations:  [x] No [] Yes    New Medical Issues: [x] No [] Yes    Decreased energy: [x] No [] Yes    Chest pain or SOB with exertion:  [x] No [] Yes    Appetite change or weight change: [x] No [] Yes    Nausea, vomiting or diarrhea:  [x] No [] Yes    Fever, sweats or chills: [x] No [] Yes    Leg swelling: [x] No [] Yes      Home BP: 110s/70s    Review of Systems   A comprehensive review of systems was obtained and negative, except as noted in the HPI or PMH.    Problem List   Patient Active Problem List   Diagnosis     HTN, kidney transplant related     Membranous glomerulonephritis     Anemia in chronic renal disease     Secondary renal hyperparathyroidism (H)     Vitamin D deficiency     Dyslipidemia     Anxiety     Status post coronary angiogram     CAD, multiple vessel     Multiple vessel coronary artery disease     Kidney replaced by transplant     Immunosuppression (H)     Aftercare following organ transplant       Social History   Social History     Tobacco Use     Smoking status: Never Smoker     Smokeless tobacco: Never Used   Substance Use Topics     Alcohol use: No     Alcohol/week: 0.0 standard drinks     Comment: Patient reports drinking beer on a rare ocassion.     Drug use: No       Allergies   No Known Allergies    Medications   Current Outpatient Medications   Medication Sig     aspirin 81 MG chewable tablet Take 1 tablet (81 mg) by mouth daily     atorvastatin (LIPITOR) 10 MG tablet Take 0.5 tablets (5 mg) by mouth daily     carvedilol (COREG) 25 MG tablet Take 12.5mg in the am and 25mg in the pm     CELLCEPT (BRAND) 250 MG CAPSULE TAKE THREE CAPSULES BY MOUTH  TWICE A DAY. CLARK 1     Cholecalciferol (VITAMIN D3 PO) Take 2,000 Units by mouth daily     losartan (COZAAR) 25 MG tablet TAKE 1 TABLET BY MOUTH DAILY     PROGRAF (BRAND) 0.5 MG capsule Take 2 capsules (1 mg) by mouth every morning AND 1 capsule (0.5 mg) every evening.     SERTRALINE HCL PO Take 25 mg by mouth daily     sulfamethoxazole-trimethoprim (BACTRIM/SEPTRA) 400-80 MG per tablet TAKE ONE TABLET BY MOUTH THREE TIMES WEEKLY ON MONDAY, WEDNESDAY, AND FRIDAY MORNING     CELLCEPT (BRAND) 250 MG capsule Take 3 capsules (750 mg) by mouth 2 times daily Do not open cap for feeding tube. BLOOD LEVEL may be needed BEFORE giving dose. (Patient not taking: Reported on 1/6/2020)     sulfamethoxazole-trimethoprim (BACTRIM/SEPTRA) 400-80 MG tablet TAKE ONE TABLET BY MOUTH THREE TIMES WEEKLY ON MONDAY, WEDNESDAY, AND FRIDAY MORNING (Patient not taking: Reported on 1/6/2020)     No current facility-administered medications for this visit.      There are no discontinued medications.    Physical Exam   Vital Signs: /87 (BP Location: Right arm)   Pulse 61   Temp 97.6  F (36.4  C) (Oral)   Wt 77.2 kg (170 lb 3.2 oz)   SpO2 100%   BMI 24.42 kg/m      GENERAL APPEARANCE: alert and no distress  HENT: mouth without ulcers or lesions  LYMPHATICS: no cervical or supraclavicular nodes  RESP: lungs clear to auscultation - no rales, rhonchi or wheezes  CV: regular rhythm, normal rate, no rub, no murmur  EDEMA: no LE edema bilaterally  ABDOMEN: soft, nondistended, nontender, bowel sounds normal  MS: extremities normal - no gross deformities noted, no evidence of inflammation in joints, no muscle tenderness  SKIN: no rash  TX KIDNEY: normal  DIALYSIS ACCESS:  None      Data     Renal Latest Ref Rng & Units 1/6/2020 11/12/2019 9/10/2019   Na 133 - 144 mmol/L 140 137 139   Na (external) 133 - 144 mmol/L - - -   K 3.4 - 5.3 mmol/L 4.1 3.5 3.6   K (external) 3.4 - 5.3 mmol/L - - -   Cl 94 - 109 mmol/L 108 105 106   Cl (external) 94 -  109 mmol/L - - -   CO2 20 - 32 mmol/L 30 29 31   CO2 (external) 20 - 32 mmol/L - - -   BUN 7 - 30 mg/dL 21 24 27   BUN (external) 7 - 30 mg/dL - - -   Cr 0.66 - 1.25 mg/dL 1.20 1.20 1.13   Cr (external) 0.66 - 1.25 mg/dL - - -   Glucose 70 - 99 mg/dL 106(H) 98 102(H)   Glucose (external) 70 - 99 mg/dL - - -   Ca  8.5 - 10.1 mg/dL 9.6 9.1 9.6   Ca (external) 8.5 - 10.1 mg/dL - - -   Mg 1.6 - 2.3 mg/dL - - -     Bone Health Latest Ref Rng & Units 10/9/2017 3/9/2017 12/6/2016   Phos 2.5 - 4.5 mg/dL - - 2.6   PTHi 12 - 72 pg/mL 94(H) 135(H) -   Vit D Def 20 - 75 ug/L 32 26 -     Heme Latest Ref Rng & Units 1/6/2020 11/12/2019 9/10/2019   WBC 4.0 - 11.0 10e9/L 4.3 4.1 4.2   WBC (external) 4.0 - 11.0 10*9/L - - -   Hgb 13.3 - 17.7 g/dL 13.2(L) 13.0(L) 12.3(L)   Hgb (external) 13.3 - 17.7 g/dL - - -   Plt 150 - 450 10e9/L 192 176 176   Plt (external) 150 - 450 10*9/L - - -     Liver Latest Ref Rng & Units 12/6/2016 11/16/2016 7/19/2016   AP 40 - 150 U/L - 116 142   TBili 0.2 - 1.3 mg/dL - 0.7 0.4   DBili 0.0 - 0.2 mg/dL - 0.1 0.1   ALT 0 - 70 U/L - 25 21   AST 0 - 45 U/L - 15 20   Tot Protein 6.8 - 8.8 g/dL - 7.5 7.1   Albumin 3.4 - 5.0 g/dL 3.7 3.7 3.5        Iron studies Latest Ref Rng & Units 1/28/2016   Iron 35 - 180 ug/dL 62   Iron sat 15 - 46 % 34   Ferritin 26 - 388 ng/mL 787(H)     [facility] Txp Virology Latest Ref Rng & Units 12/3/2018 6/12/2018 5/8/2018   CVM DNA Quant - - Plasma, EDTA anticoagulant EDTA PLASMA   BK Spec - Plasma - Plasma   BK Res BKNEG:BK Virus DNA Not Detected copies/mL BK Virus DNA Not Detected - BK Virus DNA Not Detected   BK Log <2.7 Log copies/mL Not Calculated - Not Calculated   Hep B Core NR - - -        Recent Labs   Lab Test 09/10/19  0851 11/12/19  0920 01/06/20  0940   DOSTAC 09/09/19 @2015 11 1/5/20 2030   TACROL 5.5 4.8* 5.9     Recent Labs   Lab Test 04/18/16  0824 04/25/16  0826 05/03/16  0853   DOSMPA 04/17/2016@2030 4.24.2016 2030 2,030   MPACID 2.42 4.01* 3.81*   MPAG 43.4 46.6 38.6        Scribe Disclosure:  I, Angelo Collazo, am serving as a scribe to document services personally performed by Samuel Varela MD at this visit, based upon the provider's statements to me. All documentation has been reviewed by the aforementioned provider prior to being entered into the official medical record.

## 2020-02-08 ENCOUNTER — HEALTH MAINTENANCE LETTER (OUTPATIENT)
Age: 70
End: 2020-02-08

## 2020-03-09 ENCOUNTER — HOSPITAL ENCOUNTER (OUTPATIENT)
Dept: LAB | Facility: CLINIC | Age: 70
Discharge: HOME | End: 2020-03-09
Attending: INTERNAL MEDICINE | Admitting: INTERNAL MEDICINE
Payer: MEDICARE

## 2020-03-09 DIAGNOSIS — Z48.298 AFTERCARE FOLLOWING ORGAN TRANSPLANT: ICD-10-CM

## 2020-03-09 DIAGNOSIS — Z79.899 ENCOUNTER FOR LONG-TERM CURRENT USE OF MEDICATION: ICD-10-CM

## 2020-03-09 DIAGNOSIS — Z94.0 KIDNEY REPLACED BY TRANSPLANT: ICD-10-CM

## 2020-03-09 LAB
ANION GAP SERPL CALCULATED.3IONS-SCNC: 1 MMOL/L (ref 3–14)
BUN SERPL-MCNC: 23 MG/DL (ref 7–30)
CALCIUM SERPL-MCNC: 9.2 MG/DL (ref 8.5–10.1)
CHLORIDE SERPL-SCNC: 106 MMOL/L (ref 94–109)
CO2 SERPL-SCNC: 29 MMOL/L (ref 20–32)
CREAT SERPL-MCNC: 1.13 MG/DL (ref 0.66–1.25)
ERYTHROCYTE [DISTWIDTH] IN BLOOD BY AUTOMATED COUNT: 13.8 % (ref 10–15)
GFR SERPL CREATININE-BSD FRML MDRD: 67 ML/MIN/{1.73_M2}
GLUCOSE SERPL-MCNC: 96 MG/DL (ref 70–99)
HCT VFR BLD AUTO: 41.1 % (ref 40–53)
HGB BLD-MCNC: 12.5 G/DL (ref 13.3–17.7)
MCH RBC QN AUTO: 27.5 PG (ref 26.5–33)
MCHC RBC AUTO-ENTMCNC: 30.4 G/DL (ref 31.5–36.5)
MCV RBC AUTO: 90 FL (ref 78–100)
PLATELET # BLD AUTO: 192 10E9/L (ref 150–450)
POTASSIUM SERPL-SCNC: 3.9 MMOL/L (ref 3.4–5.3)
RBC # BLD AUTO: 4.55 10E12/L (ref 4.4–5.9)
SODIUM SERPL-SCNC: 136 MMOL/L (ref 133–144)
WBC # BLD AUTO: 4.4 10E9/L (ref 4–11)

## 2020-03-09 PROCEDURE — 36415 COLL VENOUS BLD VENIPUNCTURE: CPT | Performed by: INTERNAL MEDICINE

## 2020-03-09 PROCEDURE — 80197 ASSAY OF TACROLIMUS: CPT | Performed by: INTERNAL MEDICINE

## 2020-03-09 PROCEDURE — 80048 BASIC METABOLIC PNL TOTAL CA: CPT | Performed by: INTERNAL MEDICINE

## 2020-03-09 PROCEDURE — 85027 COMPLETE CBC AUTOMATED: CPT | Performed by: INTERNAL MEDICINE

## 2020-03-10 LAB
TACROLIMUS BLD-MCNC: 5.4 UG/L (ref 5–15)
TME LAST DOSE: NORMAL H

## 2020-03-18 DIAGNOSIS — Z94.0 STATUS POST KIDNEY TRANSPLANT: Primary | ICD-10-CM

## 2020-03-18 DIAGNOSIS — Z94.0 RENAL TRANSPLANT RECIPIENT: ICD-10-CM

## 2020-03-18 RX ORDER — SULFAMETHOXAZOLE AND TRIMETHOPRIM 400; 80 MG/1; MG/1
1 TABLET ORAL
Qty: 14 TABLET | Refills: 11 | Status: SHIPPED | OUTPATIENT
Start: 2020-03-18 | End: 2021-03-25

## 2020-04-24 DIAGNOSIS — I10 HTN (HYPERTENSION): ICD-10-CM

## 2020-04-27 RX ORDER — CARVEDILOL 25 MG/1
TABLET ORAL
Qty: 45 TABLET | Refills: 11 | Status: SHIPPED | OUTPATIENT
Start: 2020-04-27 | End: 2020-12-05

## 2020-05-05 ENCOUNTER — TELEPHONE (OUTPATIENT)
Dept: TRANSPLANT | Facility: CLINIC | Age: 70
End: 2020-05-05

## 2020-05-05 NOTE — TELEPHONE ENCOUNTER
Returned call to John.  He is wondering if he should get his labs done or wait due to COVID 19.  We discussed the need to continue to monitor his transplant labs.  I explained we have designated a lab for high risk patients and I will have our scheduling team reach out to him to get him scheduled.  Message sent to schedule labs at [facility].

## 2020-05-05 NOTE — TELEPHONE ENCOUNTER
May 5, 2020 4:32 PM -  AIVERSE1: called pt to [facility] [employer] 5/11 confirmed

## 2020-05-05 NOTE — TELEPHONE ENCOUNTER
Patient Call: Voicemail  Date/Time: 5/5/2020-11:32am  Reason for call: PT is looking for return call from coordinator

## 2020-05-11 DIAGNOSIS — Z79.899 ENCOUNTER FOR LONG-TERM CURRENT USE OF MEDICATION: ICD-10-CM

## 2020-05-11 DIAGNOSIS — Z48.298 AFTERCARE FOLLOWING ORGAN TRANSPLANT: ICD-10-CM

## 2020-05-11 DIAGNOSIS — Z94.0 KIDNEY REPLACED BY TRANSPLANT: ICD-10-CM

## 2020-05-11 LAB
ANION GAP SERPL CALCULATED.3IONS-SCNC: 4 MMOL/L (ref 3–14)
BUN SERPL-MCNC: 28 MG/DL (ref 7–30)
CALCIUM SERPL-MCNC: 9.2 MG/DL (ref 8.5–10.1)
CHLORIDE SERPL-SCNC: 107 MMOL/L (ref 94–109)
CO2 SERPL-SCNC: 28 MMOL/L (ref 20–32)
CREAT SERPL-MCNC: 1.19 MG/DL (ref 0.66–1.25)
ERYTHROCYTE [DISTWIDTH] IN BLOOD BY AUTOMATED COUNT: 13.2 % (ref 10–15)
GFR SERPL CREATININE-BSD FRML MDRD: 62 ML/MIN/{1.73_M2}
GLUCOSE SERPL-MCNC: 97 MG/DL (ref 70–99)
HCT VFR BLD AUTO: 42.5 % (ref 40–53)
HGB BLD-MCNC: 12.7 G/DL (ref 13.3–17.7)
MCH RBC QN AUTO: 27.3 PG (ref 26.5–33)
MCHC RBC AUTO-ENTMCNC: 29.9 G/DL (ref 31.5–36.5)
MCV RBC AUTO: 91 FL (ref 78–100)
PLATELET # BLD AUTO: 180 10E9/L (ref 150–450)
POTASSIUM SERPL-SCNC: 4 MMOL/L (ref 3.4–5.3)
RBC # BLD AUTO: 4.66 10E12/L (ref 4.4–5.9)
SODIUM SERPL-SCNC: 139 MMOL/L (ref 133–144)
TACROLIMUS BLD-MCNC: 4.8 UG/L (ref 5–15)
TME LAST DOSE: ABNORMAL H
WBC # BLD AUTO: 4.4 10E9/L (ref 4–11)

## 2020-05-11 PROCEDURE — 80197 ASSAY OF TACROLIMUS: CPT | Performed by: INTERNAL MEDICINE

## 2020-06-23 ENCOUNTER — TELEPHONE (OUTPATIENT)
Dept: TRANSPLANT | Facility: CLINIC | Age: 70
End: 2020-06-23

## 2020-06-23 DIAGNOSIS — R50.9 FEVER: ICD-10-CM

## 2020-06-23 DIAGNOSIS — Z94.0 KIDNEY TRANSPLANTED: ICD-10-CM

## 2020-06-23 DIAGNOSIS — Z94.0 KIDNEY TRANSPLANTED: Primary | ICD-10-CM

## 2020-06-23 LAB
ALBUMIN UR-MCNC: NEGATIVE MG/DL
ANION GAP SERPL CALCULATED.3IONS-SCNC: 2 MMOL/L (ref 3–14)
APPEARANCE UR: CLEAR
BILIRUB UR QL STRIP: NEGATIVE
BUN SERPL-MCNC: 20 MG/DL (ref 7–30)
CALCIUM SERPL-MCNC: 9 MG/DL (ref 8.5–10.1)
CHLORIDE SERPL-SCNC: 104 MMOL/L (ref 94–109)
CO2 SERPL-SCNC: 29 MMOL/L (ref 20–32)
COLOR UR AUTO: YELLOW
CREAT SERPL-MCNC: 1.28 MG/DL (ref 0.66–1.25)
ERYTHROCYTE [DISTWIDTH] IN BLOOD BY AUTOMATED COUNT: 13.3 % (ref 10–15)
GFR SERPL CREATININE-BSD FRML MDRD: 57 ML/MIN/{1.73_M2}
GLUCOSE SERPL-MCNC: 99 MG/DL (ref 70–99)
GLUCOSE UR STRIP-MCNC: NEGATIVE MG/DL
HCT VFR BLD AUTO: 42.5 % (ref 40–53)
HGB BLD-MCNC: 13 G/DL (ref 13.3–17.7)
HGB UR QL STRIP: ABNORMAL
KETONES UR STRIP-MCNC: NEGATIVE MG/DL
LEUKOCYTE ESTERASE UR QL STRIP: NEGATIVE
MCH RBC QN AUTO: 27.5 PG (ref 26.5–33)
MCHC RBC AUTO-ENTMCNC: 30.6 G/DL (ref 31.5–36.5)
MCV RBC AUTO: 90 FL (ref 78–100)
MUCOUS THREADS #/AREA URNS LPF: PRESENT /LPF
NITRATE UR QL: NEGATIVE
PH UR STRIP: 5 PH (ref 5–7)
PLATELET # BLD AUTO: 171 10E9/L (ref 150–450)
POTASSIUM SERPL-SCNC: 4 MMOL/L (ref 3.4–5.3)
RBC # BLD AUTO: 4.72 10E12/L (ref 4.4–5.9)
RBC #/AREA URNS AUTO: 1 /HPF (ref 0–2)
SODIUM SERPL-SCNC: 135 MMOL/L (ref 133–144)
SOURCE: ABNORMAL
SP GR UR STRIP: 1.01 (ref 1–1.03)
UROBILINOGEN UR STRIP-MCNC: 0 MG/DL (ref 0–2)
WBC # BLD AUTO: 7.6 10E9/L (ref 4–11)
WBC #/AREA URNS AUTO: <1 /HPF (ref 0–5)

## 2020-06-23 NOTE — TELEPHONE ENCOUNTER
Patient Call: Transplant Illness    Duration of illness: 12 hours   Transplanted organ? kidney  Illness: Fever  100.7    Has not taken any NSAID's  Is wondering what he should do??

## 2020-06-23 NOTE — TELEPHONE ENCOUNTER
Returned call to Marlo.  He reports a fever of 100.7 that started last night.  He denies any respiratory symptoms, nausea, vomiting, or symptoms of a UTI.  He denies any changes in taste or smell, increased fatigue, or malaise.  We discussed obtaining a COVID 19 swab.  Marlo is aware that scheduling will contact him to schedule the swab.  He will call back if fever increases or he develops any additional symptoms and will be referred to his PCP for further evaluation. In the mean time he will try some tylenol, hydrate, and rest.

## 2020-06-23 NOTE — TELEPHONE ENCOUNTER
Per Dr. Varela, obtain a UA/UC with transplant labs.  Lab appt. Scheduled at [facility] today.    Marlo instructed to wear a mask and notify them that he has had a fever in the last 24 hours.  He is aware that he will have to schedule the COVID swab through central scheduling who will call him.  Marlo states his fever has resolved without any tylenol.       Samuel Varela MD Hasebroock, Rebecca, RN               Still think it wise to do UA/UCx.    Previous Messages     ----- Message -----   From: Christal Porter, RN   Sent: 6/23/2020  10:49 AM CDT   To: Samuel Varela MD   Subject: fevers                                           Hi Dr. Varela-   Marlo has had a fever since last night (100.7) and some abdominal tenderness around his belly button, not over his kidney graft.  He denies any other n/v/d, respiratory symptoms, uti symptoms, fatigue, or malaise.  We discussed obtaining a covid swab and taking some tylenol.  Do you have any further recommendations?

## 2020-06-24 NOTE — TELEPHONE ENCOUNTER
Returned call to Marlo.  Discussed his creatinine was at the upper end of his baseline.  Encouraged good hydration.  UA did not show infection.  He reports his temp was 99 last night but normal this morning.  He is feeling a little nauseated, but no vomiting, diarrhea, or respiratory symptoms.  He hydrate will wait to hear to get his COVID swab scheduled.

## 2020-07-01 DIAGNOSIS — R50.9 FEVER: ICD-10-CM

## 2020-07-01 DIAGNOSIS — Z94.0 KIDNEY TRANSPLANTED: ICD-10-CM

## 2020-07-01 LAB
SARS-COV-2 RNA SPEC QL NAA+PROBE: NOT DETECTED
SPECIMEN SOURCE: NORMAL

## 2020-07-01 PROCEDURE — 99207 ZZC NO BILLABLE SERVICE THIS VISIT: CPT

## 2020-07-01 PROCEDURE — U0003 INFECTIOUS AGENT DETECTION BY NUCLEIC ACID (DNA OR RNA); SEVERE ACUTE RESPIRATORY SYNDROME CORONAVIRUS 2 (SARS-COV-2) (CORONAVIRUS DISEASE [COVID-19]), AMPLIFIED PROBE TECHNIQUE, MAKING USE OF HIGH THROUGHPUT TECHNOLOGIES AS DESCRIBED BY CMS-2020-01-R: HCPCS | Performed by: INTERNAL MEDICINE

## 2020-07-01 NOTE — LETTER
July 5, 2020        Marlo Boweryaneliannalise  [address] [facility] [address]    This letter provides a written record that you were tested for COVID-19 on 7/1/20.       Your result was negative. This means that we didn t find the virus that causes COVID-19 in your sample. A test may show negative when you do actually have the virus. This can happen when the virus is in the early stages of infection, before you feel illness symptoms.    If you have symptoms   Stay home and away from others (self-isolate) until you meet ALL of the guidelines below:    You ve had no fever--and no medicine that reduces fever--for 3 full days (72 hours). And      Your other symptoms have gotten better. For example, your cough or breathing has improved. And     At least 10 days have passed since your symptoms started.    During this time:    Stay home. Don t go to work, school or anywhere else.     Stay in your own room, including for meals. Use your own bathroom if you can.    Stay away from others in your home. No hugging, kissing or shaking hands. No visitors.    Clean  high touch  surfaces often (doorknobs, counters, handles, etc.). Use a household cleaning spray or wipes. You can find a full list on the EPA website at www.epa.gov/pesticide-registration/list-n-disinfectants-use-against-sars-cov-2.    Cover your mouth and nose with a mask, tissue or washcloth to avoid spreading germs.    Wash your hands and face often with soap and water.    Going back to work  Check with your employer for any guidelines to follow for going back to work.    Employers: This document serves as formal notice that your employee tested negative for COVID-19, as of the testing date shown above.

## 2020-07-07 ENCOUNTER — VIRTUAL VISIT (OUTPATIENT)
Dept: CARDIOLOGY | Facility: CLINIC | Age: 70
End: 2020-07-07
Attending: INTERNAL MEDICINE
Payer: MEDICARE

## 2020-07-07 DIAGNOSIS — I25.10 CAD, MULTIPLE VESSEL: ICD-10-CM

## 2020-07-07 DIAGNOSIS — I15.1 HTN, KIDNEY TRANSPLANT RELATED: ICD-10-CM

## 2020-07-07 DIAGNOSIS — N05.2 MEMBRANOUS GLOMERULONEPHRITIS: ICD-10-CM

## 2020-07-07 DIAGNOSIS — I25.10 MULTIPLE VESSEL CORONARY ARTERY DISEASE: ICD-10-CM

## 2020-07-07 DIAGNOSIS — Z98.890 STATUS POST CORONARY ANGIOGRAM: ICD-10-CM

## 2020-07-07 DIAGNOSIS — Z98.61 S/P PTCA (PERCUTANEOUS TRANSLUMINAL CORONARY ANGIOPLASTY): Primary | ICD-10-CM

## 2020-07-07 DIAGNOSIS — Z48.298 AFTERCARE FOLLOWING ORGAN TRANSPLANT: ICD-10-CM

## 2020-07-07 DIAGNOSIS — Z94.0 KIDNEY REPLACED BY TRANSPLANT: ICD-10-CM

## 2020-07-07 DIAGNOSIS — Z94.0 HTN, KIDNEY TRANSPLANT RELATED: ICD-10-CM

## 2020-07-07 PROCEDURE — 99443 ZZC PHYSICIAN TELEPHONE EVALUATION 21-30 MIN: CPT | Performed by: INTERNAL MEDICINE

## 2020-07-07 ASSESSMENT — PAIN SCALES - GENERAL: PAINLEVEL: NO PAIN (0)

## 2020-07-07 NOTE — PATIENT INSTRUCTIONS
Patient Instructions:  Return in one year for echocardiogram and follow up with Dr. Delaney.  You will receive a scheduling reminder in advance.       It was a pleasure to see you in the cardiology clinic today.    We are encouraging the use of [employer] to communicate with your Healthcare Provider.  If you have any questions, call  Ayaz Paulino LPN, at [phone].  Press Option #1 for the [facility], and then press Option #4 for nursing.  Cardiology Fax  : [phone]      If you have an urgent need after hours (8:00 am to 4:30 pm) please call [phone] and ask for the cardiology fellow on call.

## 2020-07-07 NOTE — LETTER
7/7/2020    RE: Marlo Vee  [address] [facility] [address]       Dear Colleague,    Thank you for the opportunity to participate in the care of your patient, Marlo Vee, at the [facility]. Please see a copy of my visit note below.    Marlo Vee is a 70 year old male who is being evaluated via a billable telephone visit.      Medications were reconciled.   Deborah Bazan CMA  12:28 PM      SUBJECTIVE:  Marlo Vee is a 70 year old male who presents for follow-up.    Had PCI/Stent to m.dRCA,mLAD and D1 on 4/24/15. EF improved from 35% to low normal 50-55%.     Had Living unrelated donor kidney Tx in 1/2016. Normal function. No complaints.     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      Patient gone on [employer].  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     Currently patient is active and asymptomatic.  Had no cardiac complaints today.      Patient Active Problem List    Diagnosis Date Noted     Aftercare following organ transplant 12/08/2016     Priority: Medium     Kidney replaced by transplant 01/26/2016     Priority: Medium     Immunosuppression (H) 01/26/2016     Priority: Medium     Multiple vessel coronary artery disease 04/23/2015     Priority: Medium     Status post coronary angiogram 04/21/2015     Priority: Medium     CAD, multiple vessel 04/21/2015     Priority: Medium     Anemia in chronic renal disease      Priority: Medium     Secondary renal hyperparathyroidism (H)      Priority: Medium     Vitamin D deficiency      Priority: Medium     Dyslipidemia      Priority: Medium     Anxiety      Priority: Medium     HTN, kidney transplant related 03/14/2015     Priority: Medium     Membranous glomerulonephritis 10/30/2002     Priority: Medium     [place] [facility] [place] [facility]       .  Current Outpatient Medications   Medication Sig     aspirin 81 MG chewable tablet Take 1  tablet (81 mg) by mouth daily     atorvastatin (LIPITOR) 10 MG tablet Take 0.5 tablets (5 mg) by mouth daily     carvedilol (COREG) 25 MG tablet Take 12.5mg in the am and 25mg in the pm     CELLCEPT (BRAND) 250 MG capsule Take 3 capsules (750 mg) by mouth 2 times daily Do not open cap for feeding tube. BLOOD LEVEL may be needed BEFORE giving dose.     CELLCEPT (BRAND) 250 MG CAPSULE TAKE THREE CAPSULES BY MOUTH TWICE A DAY. CLARK 1     Cholecalciferol (VITAMIN D3 PO) Take 2,000 Units by mouth daily     losartan (COZAAR) 25 MG tablet TAKE 1 TABLET BY MOUTH DAILY     PROGRAF (BRAND) 0.5 MG capsule Take 2 capsules (1 mg) by mouth every morning AND 1 capsule (0.5 mg) every evening.     SERTRALINE HCL PO Take 25 mg by mouth daily     sulfamethoxazole-trimethoprim (BACTRIM) 400-80 MG tablet Take 1 tablet by mouth Every Mon, Wed, Fri Morning     sulfamethoxazole-trimethoprim (BACTRIM/SEPTRA) 400-80 MG per tablet TAKE ONE TABLET BY MOUTH THREE TIMES WEEKLY ON MONDAY, WEDNESDAY, AND FRIDAY MORNING     sulfamethoxazole-trimethoprim (BACTRIM/SEPTRA) 400-80 MG tablet TAKE ONE TABLET BY MOUTH THREE TIMES WEEKLY ON MONDAY, WEDNESDAY, AND FRIDAY MORNING     No current facility-administered medications for this visit.      Past Medical History:   Diagnosis Date     Anemia in ESRD (end-stage renal disease) (H)      Antiplatelet or antithrombotic long-term use      Anxiety      Dialysis patient (H)      Dyslipidemia      End stage kidney disease (H)      Heart attack (H)     not sure     Hypertension      Membranous glomerulonephritis 2002     PONV (postoperative nausea and vomiting)      PUD (peptic ulcer disease)      Secondary hyperparathyroidism (of renal origin)      Stented coronary artery      Vitamin D deficiency      Past Surgical History:   Procedure Laterality Date     CYSTOSCOPY, REMOVE STENT(S), COMBINED Right 2/23/2016    Procedure: COMBINED CYSTOSCOPY, REMOVE STENT(S);  Surgeon: Cody Temple MD;  Location:  OR      s/p right upper extremity AV fistula       TRANSPLANT      kidney transplant      VASCULAR SURGERY       No Known Allergies  Social History     Socioeconomic History     Marital status:      Spouse name: Not on file     Number of children: 4     Years of education: Not on file     Highest education level: Not on file   Occupational History     Not on file   Social Needs     Financial resource strain: Not on file     Food insecurity     Worry: Not on file     Inability: Not on file     Transportation needs     Medical: Not on file     Non-medical: Not on file   Tobacco Use     Smoking status: Never Smoker     Smokeless tobacco: Never Used   Substance and Sexual Activity     Alcohol use: No     Alcohol/week: 0.0 standard drinks     Comment: Patient reports drinking beer on a rare ocassion.     Drug use: No     Sexual activity: Not on file   Lifestyle     Physical activity     Days per week: Not on file     Minutes per session: Not on file     Stress: Not on file   Relationships     Social connections     Talks on phone: Not on file     Gets together: Not on file     Attends Jainism service: Not on file     Active member of club or organization: Not on file     Attends meetings of clubs or organizations: Not on file     Relationship status: Not on file     Intimate partner violence     Fear of current or ex partner: Not on file     Emotionally abused: Not on file     Physically abused: Not on file     Forced sexual activity: Not on file   Other Topics Concern     Parent/sibling w/ CABG, MI or angioplasty before 65F 55M? Not Asked   Social History Narrative     Not on file     Family History   Problem Relation Age of Onset     Breast Cancer Mother      Cancer - colorectal Father      Coronary Artery Disease Father      Hypertension Father      Breast Cancer Sister      Cancer Brother         Pancreatic CA          REVIEW OF SYSTEMS:  General: negative, fever, chills, night sweats  Skin: negative, acne, rash,  scaling and itching  Eyes: negative, double vision, eye pain and photophobia  Ears/Nose/Throat: negative, nasal congestion and purulent rhinorrhea  Respiratory: No dyspnea on exertion, No cough, No hemoptysis and negative  Cardiovascular: negative, palpitations, tachycardia, irregular heart beat, chest pain, exertional chest pain or pressure, paroxysmal nocturnal dyspnea, dyspnea on exertion and orthopnea         ASSESSMENT:/PLAN:  Patient here for yearly follow-up.    Status post LAD RCA and D1 stenting in 2015  Status post renal transplant for CKD due to membranous glomerulonephritis.  Currently patient is very active with no cardiac symptoms.  Reviewed last echocardiogram.  LV function is normal at 55-60.  There is inferior wall echinosis.  No significant valvular abnormality.  Overall patient is doing very well.  Advised to continue current medications.  Patient will return to clinic in 1 year with an echocardiogram.      ALF Ayala MD

## 2020-07-07 NOTE — PROGRESS NOTES
"Marlo Vee is a 70 year old male who is being evaluated via a billable telephone visit.      The patient has been notified of following:     \"This telephone visit will be conducted via a call between you and your physician/provider. We have found that certain health care needs can be provided without the need for a physical exam.  This service lets us provide the care you need with a short phone conversation.  If a prescription is necessary we can send it directly to your pharmacy.  If lab work is needed we can place an order for that and you can then stop by our lab to have the test done at a later time.    Telephone visits are billed at different rates depending on your insurance coverage. During this emergency period, for some insurers they may be billed the same as an in-person visit.  Please reach out to your insurance provider with any questions.    If during the course of the call the physician/provider feels a telephone visit is not appropriate, you will not be charged for this service.\"    Patient has given verbal consent for Telephone visit?  Yes    What phone number would you like to be contacted at? [phone]    How would you like to obtain your AVS? Mail a copy    Medications were reconciled.     Deborah Bazan CMA    12:28 PM     TOTAL VISIT TIME 14 MINUTES.     SUBJECTIVE:  Marlo Vee is a 70 year old male who presents for follow-up.    Had PCI/Stent to Gerardo,mLAD and D1 on 4/24/15. EF improved from 35% to low normal 50-55%.     Had Living unrelated donor kidney Tx in 1/2016. Normal function. No complaints.     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      Patient gone on [employer].  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     Currently patient is active and asymptomatic.  Had no cardiac complaints today.      Patient Active Problem List    Diagnosis Date Noted     Aftercare following organ transplant 12/08/2016     Priority: Medium     Kidney replaced by transplant " 01/26/2016     Priority: Medium     Immunosuppression (H) 01/26/2016     Priority: Medium     Multiple vessel coronary artery disease 04/23/2015     Priority: Medium     Status post coronary angiogram 04/21/2015     Priority: Medium     CAD, multiple vessel 04/21/2015     Priority: Medium     Anemia in chronic renal disease      Priority: Medium     Secondary renal hyperparathyroidism (H)      Priority: Medium     Vitamin D deficiency      Priority: Medium     Dyslipidemia      Priority: Medium     Anxiety      Priority: Medium     HTN, kidney transplant related 03/14/2015     Priority: Medium     Membranous glomerulonephritis 10/30/2002     Priority: Medium     Kidney biopsy [facility] [place] [place] 10/30/2002 scanned into [facility]       .  Current Outpatient Medications   Medication Sig     aspirin 81 MG chewable tablet Take 1 tablet (81 mg) by mouth daily     atorvastatin (LIPITOR) 10 MG tablet Take 0.5 tablets (5 mg) by mouth daily     carvedilol (COREG) 25 MG tablet Take 12.5mg in the am and 25mg in the pm     CELLCEPT (BRAND) 250 MG capsule Take 3 capsules (750 mg) by mouth 2 times daily Do not open cap for feeding tube. BLOOD LEVEL may be needed BEFORE giving dose.     CELLCEPT (BRAND) 250 MG CAPSULE TAKE THREE CAPSULES BY MOUTH TWICE A DAY. CLARK 1     Cholecalciferol (VITAMIN D3 PO) Take 2,000 Units by mouth daily     losartan (COZAAR) 25 MG tablet TAKE 1 TABLET BY MOUTH DAILY     PROGRAF (BRAND) 0.5 MG capsule Take 2 capsules (1 mg) by mouth every morning AND 1 capsule (0.5 mg) every evening.     SERTRALINE HCL PO Take 25 mg by mouth daily     sulfamethoxazole-trimethoprim (BACTRIM) 400-80 MG tablet Take 1 tablet by mouth Every Mon, Wed, Fri Morning     sulfamethoxazole-trimethoprim (BACTRIM/SEPTRA) 400-80 MG per tablet TAKE ONE TABLET BY MOUTH THREE TIMES WEEKLY ON MONDAY, WEDNESDAY, AND FRIDAY MORNING     sulfamethoxazole-trimethoprim (BACTRIM/SEPTRA) 400-80 MG tablet TAKE ONE TABLET BY MOUTH THREE TIMES WEEKLY ON MONDAY,  WEDNESDAY, AND FRIDAY MORNING     No current facility-administered medications for this visit.      Past Medical History:   Diagnosis Date     Anemia in ESRD (end-stage renal disease) (H)      Antiplatelet or antithrombotic long-term use      Anxiety      Dialysis patient (H)      Dyslipidemia      End stage kidney disease (H)      Heart attack (H)     not sure     Hypertension      Membranous glomerulonephritis 2002     PONV (postoperative nausea and vomiting)      PUD (peptic ulcer disease)      Secondary hyperparathyroidism (of renal origin)      Stented coronary artery      Vitamin D deficiency      Past Surgical History:   Procedure Laterality Date     CYSTOSCOPY, REMOVE STENT(S), COMBINED Right 2/23/2016    Procedure: COMBINED CYSTOSCOPY, REMOVE STENT(S);  Surgeon: Cody Temple MD;  Location: [facility] OR     s/p right upper extremity AV fistula       TRANSPLANT      kidney transplant      VASCULAR SURGERY       No Known Allergies  Social History     Socioeconomic History     Marital status:      Spouse name: Not on file     Number of children: 4     Years of education: Not on file     Highest education level: Not on file   Occupational History     Not on file   Social Needs     Financial resource strain: Not on file     Food insecurity     Worry: Not on file     Inability: Not on file     Transportation needs     Medical: Not on file     Non-medical: Not on file   Tobacco Use     Smoking status: Never Smoker     Smokeless tobacco: Never Used   Substance and Sexual Activity     Alcohol use: No     Alcohol/week: 0.0 standard drinks     Comment: Patient reports drinking beer on a rare ocassion.     Drug use: No     Sexual activity: Not on file   Lifestyle     Physical activity     Days per week: Not on file     Minutes per session: Not on file     Stress: Not on file   Relationships     Social connections     Talks on phone: Not on file     Gets together: Not on file     Attends Latter day service: Not on file      Active member of club or organization: Not on file     Attends meetings of clubs or organizations: Not on file     Relationship status: Not on file     Intimate partner violence     Fear of current or ex partner: Not on file     Emotionally abused: Not on file     Physically abused: Not on file     Forced sexual activity: Not on file   Other Topics Concern     Parent/sibling w/ CABG, MI or angioplasty before 65F 55M? Not Asked   Social History Narrative     Not on file     Family History   Problem Relation Age of Onset     Breast Cancer Mother      Cancer - colorectal Father      Coronary Artery Disease Father      Hypertension Father      Breast Cancer Sister      Cancer Brother         Pancreatic CA          REVIEW OF SYSTEMS:  General: negative, fever, chills, night sweats  Skin: negative, acne, rash, scaling and itching  Eyes: negative, double vision, eye pain and photophobia  Ears/Nose/Throat: negative, nasal congestion and purulent rhinorrhea  Respiratory: No dyspnea on exertion, No cough, No hemoptysis and negative  Cardiovascular: negative, palpitations, tachycardia, irregular heart beat, chest pain, exertional chest pain or pressure, paroxysmal nocturnal dyspnea, dyspnea on exertion and orthopnea         ASSESSMENT:/PLAN:  Patient here for yearly follow-up.    Status post LAD RCA and D1 stenting in 2015  Status post renal transplant for CKD due to membranous glomerulonephritis.  Currently patient is very active with no cardiac symptoms.  Reviewed last echocardiogram.  LV function is normal at 55-60.  There is inferior wall echinosis.  No significant valvular abnormality.  Overall patient is doing very well.  Advised to continue current medications.  Patient will return to clinic in 1 year with an echocardiogram.

## 2020-08-19 ENCOUNTER — TELEPHONE (OUTPATIENT)
Dept: TRANSPLANT | Facility: CLINIC | Age: 70
End: 2020-08-19

## 2020-08-19 DIAGNOSIS — Z94.0 KIDNEY TRANSPLANTED: Primary | ICD-10-CM

## 2020-08-19 DIAGNOSIS — Z48.298 AFTERCARE FOLLOWING ORGAN TRANSPLANT: ICD-10-CM

## 2020-08-19 NOTE — TELEPHONE ENCOUNTER
Patient Call: Voicemail  Date/Time: 8/19/20 / 3:34 pm  Reason for call: Patient is leaving message for [employer] and would like a call back.  No last name for Jimena was provided.

## 2020-08-27 ENCOUNTER — RESULTS ONLY (OUTPATIENT)
Dept: OTHER | Facility: CLINIC | Age: 70
End: 2020-08-27

## 2020-08-27 DIAGNOSIS — Z79.899 ENCOUNTER FOR LONG-TERM CURRENT USE OF MEDICATION: ICD-10-CM

## 2020-08-27 DIAGNOSIS — Z94.0 KIDNEY TRANSPLANTED: ICD-10-CM

## 2020-08-27 DIAGNOSIS — Z94.0 KIDNEY REPLACED BY TRANSPLANT: ICD-10-CM

## 2020-08-27 DIAGNOSIS — Z48.298 AFTERCARE FOLLOWING ORGAN TRANSPLANT: ICD-10-CM

## 2020-08-27 LAB
ANION GAP SERPL CALCULATED.3IONS-SCNC: 5 MMOL/L (ref 3–14)
BUN SERPL-MCNC: 19 MG/DL (ref 7–30)
CALCIUM SERPL-MCNC: 8.8 MG/DL (ref 8.5–10.1)
CHLORIDE SERPL-SCNC: 106 MMOL/L (ref 94–109)
CO2 SERPL-SCNC: 27 MMOL/L (ref 20–32)
CREAT SERPL-MCNC: 1.21 MG/DL (ref 0.66–1.25)
ERYTHROCYTE [DISTWIDTH] IN BLOOD BY AUTOMATED COUNT: 13.8 % (ref 10–15)
GFR SERPL CREATININE-BSD FRML MDRD: 61 ML/MIN/{1.73_M2}
GLUCOSE SERPL-MCNC: 96 MG/DL (ref 70–99)
HCT VFR BLD AUTO: 39 % (ref 40–53)
HGB BLD-MCNC: 11.6 G/DL (ref 13.3–17.7)
MCH RBC QN AUTO: 27.2 PG (ref 26.5–33)
MCHC RBC AUTO-ENTMCNC: 29.7 G/DL (ref 31.5–36.5)
MCV RBC AUTO: 91 FL (ref 78–100)
PLATELET # BLD AUTO: 169 10E9/L (ref 150–450)
POTASSIUM SERPL-SCNC: 3.9 MMOL/L (ref 3.4–5.3)
RBC # BLD AUTO: 4.27 10E12/L (ref 4.4–5.9)
SODIUM SERPL-SCNC: 138 MMOL/L (ref 133–144)
TACROLIMUS BLD-MCNC: 4.1 UG/L (ref 5–15)
TME LAST DOSE: ABNORMAL H
WBC # BLD AUTO: 3.7 10E9/L (ref 4–11)

## 2020-08-27 PROCEDURE — 86833 HLA CLASS II HIGH DEFIN QUAL: CPT | Performed by: INTERNAL MEDICINE

## 2020-08-27 PROCEDURE — 80197 ASSAY OF TACROLIMUS: CPT | Performed by: INTERNAL MEDICINE

## 2020-08-27 PROCEDURE — 86832 HLA CLASS I HIGH DEFIN QUAL: CPT | Performed by: INTERNAL MEDICINE

## 2020-08-28 DIAGNOSIS — I15.1 HYPERTENSION SECONDARY TO OTHER RENAL DISORDERS (CODE): ICD-10-CM

## 2020-08-28 RX ORDER — LOSARTAN POTASSIUM 25 MG/1
TABLET ORAL
Qty: 30 TABLET | Refills: 11 | Status: SHIPPED | OUTPATIENT
Start: 2020-08-28 | End: 2021-09-02

## 2020-10-22 DIAGNOSIS — Z48.298 AFTERCARE FOLLOWING ORGAN TRANSPLANT: ICD-10-CM

## 2020-10-22 DIAGNOSIS — Z79.899 ENCOUNTER FOR LONG-TERM CURRENT USE OF MEDICATION: ICD-10-CM

## 2020-10-22 DIAGNOSIS — Z94.0 KIDNEY TRANSPLANTED: ICD-10-CM

## 2020-10-22 DIAGNOSIS — Z94.0 KIDNEY REPLACED BY TRANSPLANT: ICD-10-CM

## 2020-10-22 LAB
ANION GAP SERPL CALCULATED.3IONS-SCNC: 4 MMOL/L (ref 3–14)
BUN SERPL-MCNC: 21 MG/DL (ref 7–30)
CALCIUM SERPL-MCNC: 9.1 MG/DL (ref 8.5–10.1)
CHLORIDE SERPL-SCNC: 106 MMOL/L (ref 94–109)
CO2 SERPL-SCNC: 27 MMOL/L (ref 20–32)
CREAT SERPL-MCNC: 1.12 MG/DL (ref 0.66–1.25)
CREAT UR-MCNC: 9 MG/DL
ERYTHROCYTE [DISTWIDTH] IN BLOOD BY AUTOMATED COUNT: 13.6 % (ref 10–15)
GFR SERPL CREATININE-BSD FRML MDRD: 67 ML/MIN/{1.73_M2}
GLUCOSE SERPL-MCNC: 96 MG/DL (ref 70–99)
HCT VFR BLD AUTO: 42.2 % (ref 40–53)
HGB BLD-MCNC: 12.6 G/DL (ref 13.3–17.7)
MCH RBC QN AUTO: 27.1 PG (ref 26.5–33)
MCHC RBC AUTO-ENTMCNC: 29.9 G/DL (ref 31.5–36.5)
MCV RBC AUTO: 91 FL (ref 78–100)
PLATELET # BLD AUTO: 172 10E9/L (ref 150–450)
POTASSIUM SERPL-SCNC: 3.9 MMOL/L (ref 3.4–5.3)
PROT UR-MCNC: <0.05 G/L
PROT/CREAT 24H UR: NORMAL G/G CR (ref 0–0.2)
RBC # BLD AUTO: 4.65 10E12/L (ref 4.4–5.9)
SODIUM SERPL-SCNC: 137 MMOL/L (ref 133–144)
TACROLIMUS BLD-MCNC: 4.3 UG/L (ref 5–15)
TME LAST DOSE: ABNORMAL H
WBC # BLD AUTO: 4.7 10E9/L (ref 4–11)

## 2020-10-22 PROCEDURE — 80197 ASSAY OF TACROLIMUS: CPT | Performed by: PATHOLOGY

## 2020-10-22 PROCEDURE — 36415 COLL VENOUS BLD VENIPUNCTURE: CPT | Performed by: PATHOLOGY

## 2020-10-22 PROCEDURE — 80048 BASIC METABOLIC PNL TOTAL CA: CPT | Performed by: PATHOLOGY

## 2020-10-22 PROCEDURE — 84156 ASSAY OF PROTEIN URINE: CPT | Performed by: PATHOLOGY

## 2020-10-22 PROCEDURE — 85027 COMPLETE CBC AUTOMATED: CPT | Performed by: PATHOLOGY

## 2020-10-29 DIAGNOSIS — Z94.0 KIDNEY REPLACED BY TRANSPLANT: Primary | ICD-10-CM

## 2020-10-29 RX ORDER — TACROLIMUS 0.5 MG/1
CAPSULE, GELATIN COATED ORAL
Qty: 90 CAPSULE | Refills: 11 | Status: SHIPPED | OUTPATIENT
Start: 2020-10-29 | End: 2020-12-05

## 2020-10-30 ENCOUNTER — TELEPHONE (OUTPATIENT)
Dept: TRANSPLANT | Facility: CLINIC | Age: 70
End: 2020-10-30

## 2020-11-07 ENCOUNTER — HEALTH MAINTENANCE LETTER (OUTPATIENT)
Age: 70
End: 2020-11-07

## 2020-12-05 ENCOUNTER — APPOINTMENT (OUTPATIENT)
Dept: GENERAL RADIOLOGY | Facility: CLINIC | Age: 70
DRG: 481 | End: 2020-12-05
Attending: PHYSICIAN ASSISTANT
Payer: MEDICARE

## 2020-12-05 ENCOUNTER — HOSPITAL ENCOUNTER (INPATIENT)
Facility: CLINIC | Age: 70
LOS: 4 days | Discharge: HOME HEALTH | DRG: 481 | End: 2020-12-09
Attending: PHYSICIAN ASSISTANT | Admitting: STUDENT IN AN ORGANIZED HEALTH CARE EDUCATION/TRAINING PROGRAM
Payer: MEDICARE

## 2020-12-05 ENCOUNTER — APPOINTMENT (OUTPATIENT)
Dept: CT IMAGING | Facility: CLINIC | Age: 70
DRG: 481 | End: 2020-12-05
Attending: PHYSICIAN ASSISTANT
Payer: MEDICARE

## 2020-12-05 ENCOUNTER — TELEPHONE (OUTPATIENT)
Dept: TRANSPLANT | Facility: CLINIC | Age: 70
End: 2020-12-05

## 2020-12-05 DIAGNOSIS — S72.101A CLOSED FRACTURE OF TROCHANTER OF RIGHT FEMUR, INITIAL ENCOUNTER (H): ICD-10-CM

## 2020-12-05 PROBLEM — R73.01 IMPAIRED FASTING GLUCOSE: Status: ACTIVE | Noted: 2018-01-23

## 2020-12-05 LAB
ABO + RH BLD: NORMAL
ABO + RH BLD: NORMAL
ANION GAP SERPL CALCULATED.3IONS-SCNC: 5 MMOL/L (ref 3–14)
APTT PPP: 26 SEC (ref 22–37)
BASOPHILS # BLD AUTO: 0 10E9/L (ref 0–0.2)
BASOPHILS NFR BLD AUTO: 0.2 %
BLD GP AB SCN SERPL QL: NORMAL
BLOOD BANK CMNT PATIENT-IMP: NORMAL
BUN SERPL-MCNC: 30 MG/DL (ref 7–30)
CALCIUM SERPL-MCNC: 9.3 MG/DL (ref 8.5–10.1)
CHLORIDE SERPL-SCNC: 108 MMOL/L (ref 94–109)
CO2 SERPL-SCNC: 26 MMOL/L (ref 20–32)
CREAT SERPL-MCNC: 1.09 MG/DL (ref 0.66–1.25)
DIFFERENTIAL METHOD BLD: ABNORMAL
EOSINOPHIL # BLD AUTO: 0.1 10E9/L (ref 0–0.7)
EOSINOPHIL NFR BLD AUTO: 0.8 %
ERYTHROCYTE [DISTWIDTH] IN BLOOD BY AUTOMATED COUNT: 13.5 % (ref 10–15)
GFR SERPL CREATININE-BSD FRML MDRD: 69 ML/MIN/{1.73_M2}
GLUCOSE SERPL-MCNC: 98 MG/DL (ref 70–99)
HCT VFR BLD AUTO: 43.1 % (ref 40–53)
HGB BLD-MCNC: 13.5 G/DL (ref 13.3–17.7)
IMM GRANULOCYTES # BLD: 0 10E9/L (ref 0–0.4)
IMM GRANULOCYTES NFR BLD: 0.3 %
INR PPP: 1.08 (ref 0.86–1.14)
LABORATORY COMMENT REPORT: NORMAL
LYMPHOCYTES # BLD AUTO: 0.8 10E9/L (ref 0.8–5.3)
LYMPHOCYTES NFR BLD AUTO: 13.7 %
MCH RBC QN AUTO: 27.7 PG (ref 26.5–33)
MCHC RBC AUTO-ENTMCNC: 31.3 G/DL (ref 31.5–36.5)
MCV RBC AUTO: 88 FL (ref 78–100)
MONOCYTES # BLD AUTO: 0.5 10E9/L (ref 0–1.3)
MONOCYTES NFR BLD AUTO: 8.5 %
NEUTROPHILS # BLD AUTO: 4.7 10E9/L (ref 1.6–8.3)
NEUTROPHILS NFR BLD AUTO: 76.5 %
NRBC # BLD AUTO: 0 10*3/UL
NRBC BLD AUTO-RTO: 0 /100
PLATELET # BLD AUTO: 166 10E9/L (ref 150–450)
POTASSIUM SERPL-SCNC: 4.4 MMOL/L (ref 3.4–5.3)
RBC # BLD AUTO: 4.88 10E12/L (ref 4.4–5.9)
SARS-COV-2 RNA SPEC QL NAA+PROBE: NEGATIVE
SARS-COV-2 RNA SPEC QL NAA+PROBE: NORMAL
SODIUM SERPL-SCNC: 139 MMOL/L (ref 133–144)
SPECIMEN EXP DATE BLD: NORMAL
SPECIMEN SOURCE: NORMAL
SPECIMEN SOURCE: NORMAL
WBC # BLD AUTO: 6.1 10E9/L (ref 4–11)

## 2020-12-05 PROCEDURE — 72170 X-RAY EXAM OF PELVIS: CPT

## 2020-12-05 PROCEDURE — C9803 HOPD COVID-19 SPEC COLLECT: HCPCS

## 2020-12-05 PROCEDURE — 85025 COMPLETE CBC W/AUTO DIFF WBC: CPT | Performed by: PHYSICIAN ASSISTANT

## 2020-12-05 PROCEDURE — 250N000013 HC RX MED GY IP 250 OP 250 PS 637: Performed by: STUDENT IN AN ORGANIZED HEALTH CARE EDUCATION/TRAINING PROGRAM

## 2020-12-05 PROCEDURE — 86901 BLOOD TYPING SEROLOGIC RH(D): CPT | Performed by: PHYSICIAN ASSISTANT

## 2020-12-05 PROCEDURE — 86850 RBC ANTIBODY SCREEN: CPT | Performed by: PHYSICIAN ASSISTANT

## 2020-12-05 PROCEDURE — 86900 BLOOD TYPING SEROLOGIC ABO: CPT | Performed by: PHYSICIAN ASSISTANT

## 2020-12-05 PROCEDURE — 120N000001 HC R&B MED SURG/OB

## 2020-12-05 PROCEDURE — 99285 EMERGENCY DEPT VISIT HI MDM: CPT | Mod: 25

## 2020-12-05 PROCEDURE — 73700 CT LOWER EXTREMITY W/O DYE: CPT | Mod: RT

## 2020-12-05 PROCEDURE — 250N000012 HC RX MED GY IP 250 OP 636 PS 637: Performed by: STUDENT IN AN ORGANIZED HEALTH CARE EDUCATION/TRAINING PROGRAM

## 2020-12-05 PROCEDURE — 85730 THROMBOPLASTIN TIME PARTIAL: CPT | Performed by: PHYSICIAN ASSISTANT

## 2020-12-05 PROCEDURE — 80048 BASIC METABOLIC PNL TOTAL CA: CPT | Performed by: PHYSICIAN ASSISTANT

## 2020-12-05 PROCEDURE — 250N000013 HC RX MED GY IP 250 OP 250 PS 637

## 2020-12-05 PROCEDURE — 73552 X-RAY EXAM OF FEMUR 2/>: CPT | Mod: RT

## 2020-12-05 PROCEDURE — 99223 1ST HOSP IP/OBS HIGH 75: CPT | Mod: AI | Performed by: STUDENT IN AN ORGANIZED HEALTH CARE EDUCATION/TRAINING PROGRAM

## 2020-12-05 PROCEDURE — U0003 INFECTIOUS AGENT DETECTION BY NUCLEIC ACID (DNA OR RNA); SEVERE ACUTE RESPIRATORY SYNDROME CORONAVIRUS 2 (SARS-COV-2) (CORONAVIRUS DISEASE [COVID-19]), AMPLIFIED PROBE TECHNIQUE, MAKING USE OF HIGH THROUGHPUT TECHNOLOGIES AS DESCRIBED BY CMS-2020-01-R: HCPCS | Performed by: PHYSICIAN ASSISTANT

## 2020-12-05 PROCEDURE — 85610 PROTHROMBIN TIME: CPT | Performed by: PHYSICIAN ASSISTANT

## 2020-12-05 RX ORDER — SERTRALINE HYDROCHLORIDE 25 MG/1
25 TABLET, FILM COATED ORAL EVERY MORNING
Status: DISCONTINUED | OUTPATIENT
Start: 2020-12-06 | End: 2020-12-09 | Stop reason: HOSPADM

## 2020-12-05 RX ORDER — ACETAMINOPHEN 325 MG/1
650 TABLET ORAL EVERY 4 HOURS PRN
Status: DISCONTINUED | OUTPATIENT
Start: 2020-12-05 | End: 2020-12-06

## 2020-12-05 RX ORDER — CARVEDILOL 25 MG/1
25 TABLET ORAL EVERY EVENING
COMMUNITY
End: 2021-12-06

## 2020-12-05 RX ORDER — NALOXONE HYDROCHLORIDE 0.4 MG/ML
0.2 INJECTION, SOLUTION INTRAMUSCULAR; INTRAVENOUS; SUBCUTANEOUS
Status: DISCONTINUED | OUTPATIENT
Start: 2020-12-05 | End: 2020-12-06

## 2020-12-05 RX ORDER — CARVEDILOL 25 MG/1
12.5 TABLET ORAL EVERY MORNING
COMMUNITY
End: 2021-05-07

## 2020-12-05 RX ORDER — TACROLIMUS 0.5 MG/1
0.5 CAPSULE, GELATIN COATED ORAL EVERY EVENING
COMMUNITY
End: 2021-04-29 | Stop reason: DRUGHIGH

## 2020-12-05 RX ORDER — TACROLIMUS 1 MG/1
1 CAPSULE, GELATIN COATED ORAL EVERY MORNING
Status: DISCONTINUED | OUTPATIENT
Start: 2020-12-06 | End: 2020-12-09 | Stop reason: HOSPADM

## 2020-12-05 RX ORDER — AMOXICILLIN 250 MG
1 CAPSULE ORAL 2 TIMES DAILY
Status: DISCONTINUED | OUTPATIENT
Start: 2020-12-05 | End: 2020-12-06

## 2020-12-05 RX ORDER — MYCOPHENOLATE MOFETIL 250 MG/1
750 CAPSULE ORAL 2 TIMES DAILY
Status: DISCONTINUED | OUTPATIENT
Start: 2020-12-05 | End: 2020-12-09 | Stop reason: HOSPADM

## 2020-12-05 RX ORDER — CARVEDILOL 25 MG/1
25 TABLET ORAL EVERY EVENING
Status: DISCONTINUED | OUTPATIENT
Start: 2020-12-05 | End: 2020-12-09 | Stop reason: HOSPADM

## 2020-12-05 RX ORDER — CARVEDILOL 3.12 MG/1
12.5 TABLET ORAL EVERY MORNING
Status: DISCONTINUED | OUTPATIENT
Start: 2020-12-06 | End: 2020-12-09 | Stop reason: HOSPADM

## 2020-12-05 RX ORDER — OXYCODONE HYDROCHLORIDE 5 MG/1
5-10 TABLET ORAL
Status: DISCONTINUED | OUTPATIENT
Start: 2020-12-05 | End: 2020-12-06

## 2020-12-05 RX ORDER — NALOXONE HYDROCHLORIDE 0.4 MG/ML
0.4 INJECTION, SOLUTION INTRAMUSCULAR; INTRAVENOUS; SUBCUTANEOUS
Status: DISCONTINUED | OUTPATIENT
Start: 2020-12-05 | End: 2020-12-06

## 2020-12-05 RX ORDER — POLYETHYLENE GLYCOL 3350 17 G/17G
17 POWDER, FOR SOLUTION ORAL DAILY
Status: DISCONTINUED | OUTPATIENT
Start: 2020-12-05 | End: 2020-12-06

## 2020-12-05 RX ORDER — TACROLIMUS 0.5 MG/1
1 CAPSULE, GELATIN COATED ORAL EVERY MORNING
COMMUNITY
End: 2021-01-20

## 2020-12-05 RX ORDER — LOSARTAN POTASSIUM 25 MG/1
25 TABLET ORAL EVERY EVENING
Status: DISCONTINUED | OUTPATIENT
Start: 2020-12-06 | End: 2020-12-05

## 2020-12-05 RX ORDER — AMOXICILLIN 250 MG
2 CAPSULE ORAL 2 TIMES DAILY
Status: DISCONTINUED | OUTPATIENT
Start: 2020-12-05 | End: 2020-12-06

## 2020-12-05 RX ORDER — LIDOCAINE 40 MG/G
CREAM TOPICAL
Status: DISCONTINUED | OUTPATIENT
Start: 2020-12-05 | End: 2020-12-06

## 2020-12-05 RX ORDER — LOSARTAN POTASSIUM 25 MG/1
25 TABLET ORAL EVERY EVENING
Status: DISCONTINUED | OUTPATIENT
Start: 2020-12-05 | End: 2020-12-09 | Stop reason: HOSPADM

## 2020-12-05 RX ORDER — CHOLECALCIFEROL (VITAMIN D3) 50 MCG
1 TABLET ORAL EVERY MORNING
COMMUNITY

## 2020-12-05 RX ORDER — SERTRALINE HYDROCHLORIDE 25 MG/1
25 TABLET, FILM COATED ORAL EVERY MORNING
COMMUNITY
End: 2021-04-29

## 2020-12-05 RX ORDER — TACROLIMUS 0.5 MG/1
0.5 CAPSULE, GELATIN COATED ORAL EVERY EVENING
Status: DISCONTINUED | OUTPATIENT
Start: 2020-12-05 | End: 2020-12-09 | Stop reason: HOSPADM

## 2020-12-05 RX ORDER — ASPIRIN 81 MG/1
81 TABLET ORAL EVERY MORNING
COMMUNITY

## 2020-12-05 RX ORDER — SULFAMETHOXAZOLE AND TRIMETHOPRIM 400; 80 MG/1; MG/1
1 TABLET ORAL
Status: DISCONTINUED | OUTPATIENT
Start: 2020-12-07 | End: 2020-12-09 | Stop reason: HOSPADM

## 2020-12-05 RX ADMIN — CARVEDILOL 25 MG: 25 TABLET, FILM COATED ORAL at 20:33

## 2020-12-05 RX ADMIN — ACETAMINOPHEN 650 MG: 325 TABLET, FILM COATED ORAL at 22:49

## 2020-12-05 RX ADMIN — TACROLIMUS 0.5 MG: 0.5 CAPSULE, GELATIN COATED ORAL at 20:33

## 2020-12-05 RX ADMIN — LOSARTAN POTASSIUM 25 MG: 25 TABLET, FILM COATED ORAL at 22:49

## 2020-12-05 RX ADMIN — ACETAMINOPHEN 650 MG: 325 TABLET, FILM COATED ORAL at 17:52

## 2020-12-05 RX ADMIN — ATORVASTATIN CALCIUM 5 MG: 10 TABLET, FILM COATED ORAL at 22:49

## 2020-12-05 RX ADMIN — MYCOPHENOLATE MOFETIL 750 MG: 250 CAPSULE ORAL at 20:47

## 2020-12-05 ASSESSMENT — ACTIVITIES OF DAILY LIVING (ADL): ADLS_ACUITY_SCORE: 11

## 2020-12-05 ASSESSMENT — ENCOUNTER SYMPTOMS
HEADACHES: 0
ARTHRALGIAS: 1

## 2020-12-05 ASSESSMENT — MIFFLIN-ST. JEOR: SCORE: 1524.69

## 2020-12-05 NOTE — ED NOTES
"[facility]  ED Nurse Handoff Report    ED Chief complaint: Groin Pain      ED Diagnosis:   Final diagnoses:   None       Code Status: Full Code    Allergies: No Known Allergies    Patient Story: Mechanical fall while walking dog. Dog chased squirrel pulling pt to ground. No head strike or LOC. Arrived by EMS. Complains of right groin pain.    Focused Assessment:  Right groin pain with movement. Non-ambulatory. A&Ox4    Treatments and/or interventions provided: xray, ct scan, labs  Patient's response to treatments and/or interventions: unchanged. Offered pain management; he declines. Does not like the way opioids make him feel side effect nausea.    To be done/followed up on inpatient unit:  admission orders    Does this patient have any cognitive concerns?: none    Activity level - Baseline/Home:  Independent  Activity Level - Current:   Unknown    Patient's Preferred language: English   Needed?: No    Isolation: None  Infection: Not Applicable  Patient tested for COVID 19 prior to admission: YES  Bariatric?: No    Vital Signs:   Vitals:    12/05/20 1242 12/05/20 1243 12/05/20 1245 12/05/20 1445   BP: (!) 149/88  (!) 149/88 (!) 162/118   Pulse: 52 65 69 97   Resp:  14 17 16   Temp:  98  F (36.7  C)     TempSrc:  Oral     SpO2:  97% 98% 97%   Weight:  74.8 kg (165 lb)     Height:  1.778 m (5' 10\")       Labs Ordered and Resulted from Time of ED Arrival Up to the Time of Departure from the ED   CBC WITH PLATELETS DIFFERENTIAL - Abnormal; Notable for the following components:       Result Value    MCHC 31.3 (*)     All other components within normal limits   BASIC METABOLIC PANEL   INR   PARTIAL THROMBOPLASTIN TIME   COVID-19 VIRUS (CORONAVIRUS) BY PCR   ABO/RH TYPE AND SCREEN     Cardiac Rhythm:Cardiac Rhythm: Sinus bradycardia    Was the PSS-3 completed:   Yes  What interventions are required if any?               Family Comments: n/a  OBS brochure/video discussed/provided to " patient/family: No              Name of person given brochure if not patient: n/a              Relationship to patient: n/a    For the majority of the shift this patient's behavior was Green.   Behavioral interventions performed were n/a.    ED NURSE PHONE NUMBER: *[phone]

## 2020-12-05 NOTE — ED PROVIDER NOTES
"  History     Chief Complaint:  Groin Pain    HPI   Marlo Vee is a 70 year old male who presents with right hip pain. The patient reports that he was walking his dog and then it began chasing after a squirrel which pulled him off balance and he then fell onto his hip. He states that he felt a bit of a crunch. He is now reporting pain in the anterior right groin area. There is no pelvic instability. Denies hitting his head. Denies any other pain. Of note, patient had a kidney transplant 5 years ago in he similar area. Patient also states that he has 6 stents in his heart.     Allergies:  No Known Allergies    Medications:    Aspirin 81 mg  Cozaar  Coreg  Bactrim  Brand  Lipitor  Sertraline    Past Medical History:    Anemia  Anxiety  Dialysis  Dyslipidemia  Kidney disease, end stage  Heart attack  Hypertension   Membranous glomerulonephritis  Peptic ulcer disease  Secondary hyperparathyroidism  Stented coronary artery  Coronary artery disease    Past Surgical History:    Vascular surgery  Kidney transplant    Family History:    Mother- breast cancer  Father- colorectal cancer, coronary artery disease, hypertension   Sister- breast cancer  Brother- pancreatic cancer    Social History:  Smoking status: Never  Alcohol use: On occasion  Drug use: No  PCP: CORINE JAFFE  Presents to the ED by himself  Marital Status:   [2]    Review of Systems   Musculoskeletal: Positive for arthralgias (right hip pain).   Neurological: Negative for headaches.   All other systems reviewed and are negative.    Physical Exam     Patient Vitals for the past 24 hrs:   BP Temp Temp src Pulse Resp SpO2 Height Weight   12/05/20 1600 (!) 155/102 -- -- 80 -- 100 % -- --   12/05/20 1500 (!) 151/102 -- -- 81 -- 98 % -- --   12/05/20 1445 (!) 162/118 -- -- 97 16 97 % -- --   12/05/20 1245 (!) 149/88 -- -- 69 17 98 % -- --   12/05/20 1243 -- 98  F (36.7  C) Oral 65 14 97 % 1.778 m (5' 10\") 74.8 kg (165 lb)   12/05/20 1242 (!) " 149/88 -- -- 52 -- -- -- --        Physical Exam  General: Alert and cooperative with exam. Resting comfortably on gurney  Head:  Scalp is NC/AT without bruising, hematomas.  Eyes:  No scleral icterus, PERRL, EOMI   ENT:  The external nose and ears are normal.    The oropharynx is normal without evidence of dental trauma or intraoral lacerations.  Neck:  Normal range of motion without rigidity. Able to rotate 45 degrees BL.  CV:  Regular rate and rhythm    No pathologic murmur, rubs, or gallops.  Resp:  Breath sounds are clear bilaterally.  No stridor in neck.    Non-labored, no retractions or accessory muscle use  Abdomen: Abdomen is soft, no distension, no tenderness, no masses.  Bowel sounds present in all quadrants.  No flank tenderness.  MS:  No midline cervical, thoracic, or lumbar tenderness    No tenderness over sternum, scapula, ribs, clavicles.    PROM of all other major joints performed and unremarkable.    Pelvis stable to palpation, pain with ROM and axial loading of right hip.  Skin:  Warm and dry, No bruising.  2+ DP and PT pulses warm and well perfused.  Neuro: Oriented x 3. Strength and sensation grossly intact in all 4 extremities.  Strength in right hip limited secondary to pain.  Cranial nerves  2-12 intact. GCS: 15.   Psych: Awake. Alert. Normal affect. Appropriate interactions.        Emergency Department Course   Imaging:  Radiographic findings were communicated with the patient who voiced understanding of the findings.  CT Hip Right, per radiology:  1.  Nondisplaced comminuted fractured greater trochanter of the right proximal femur. An additional fracture line extends from the greater trochanteric fracture in the anterior intertrochanteric cortex indicating a partial intertrochanteric fracture   also.   2.  Mild osteoarthritis of the right hip. Vascular calcifications.     Right Femur XR, per radiology:    Findings are very suspicious for an intertrochanteric   right hip fracture. Due to an  overlying skinfold on the lateral view,   an internal rotation or frog-leg view would be helpful for   confirmation if indicated clinically.     Laboratory:  CBC: WBC 6.1, HGB 13.5,   BMP: WNL (Creatinine 1.09)    INR: 1.08    PTT: 26    ABO/Rh type and screen: A+ (Antibody Negative)     Asymptomatic COVID-19 Virus (Coronavirus) by PCR: Pending     Emergency Department Course:  Past medical records, nursing notes, and vitals reviewed.  1240: I performed an exam of the patient and obtained history, as documented above.     IV inserted and blood drawn.    The patient's nose and throat were swabbed and this sample was sent for COVID testing, findings above.      The patient was sent for a femur x-ray and right hip CT while in the emergency department, findings above.     1414: I spoke with Dr. Stoner of Orthopedics    Findings and plan explained to the Patient who consents to admission.     1609: Discussed the patient with Dr. Quinonez, who will admit the patient to a medical bed for further monitoring, evaluation, and treatment.      Impression & Plan    Medical Decision Makin-year-old male who presents after mechanical fall for right hip pain.  X-rays somewhat uncertain and discussed with Ortho and CT was obtained which shows nondisplaced right intertrochanteric femoral fracture.  Neurovascular status intact.  Head to toe trauma exam otherwise unremarkable.  Will admit to hospital for planned intervention tomorrow morning.  He is a transplant patient.  Discussed with hospitalist who agrees to accept the patient.    Covid-19  Marlo Vee was evaluated during a global COVID-19 pandemic, which necessitated consideration that the patient might be at risk for infection with the SARS-CoV-2 virus that causes COVID-19.   Applicable protocols for evaluation were followed during the patient's care.   COVID-19 was considered as part of the patient's evaluation. The plan for testing is:  a test was obtained  during this visit.    Diagnosis:    ICD-10-CM    1. Closed fracture of trochanter of right femur, initial encounter (H)  S72.101A Asymptomatic COVID-19 Virus (Coronavirus) by PCR       Disposition:  Admitted to Dr. Quionnez    Discharge Medications:  Current Discharge Medication List          Irma Ramesh  12/5/2020   [facility] EMERGENCY DEPT    I, Irma Ramesh, emani serving as a scribe at 3:30 PM on 12/5/2020 to document services personally performed by Anthony Sanchez PA-C based on my observations and the provider's statements to me.         Anthony Sanchez PA-C  12/05/20 1711

## 2020-12-05 NOTE — ED TRIAGE NOTES
Arrived by EMS. While walking dog his dog chased a squirrel causing him to fall on his right hip. Reports right hip pain. Unable to get up after fall.

## 2020-12-05 NOTE — ED NOTES
Bed: ED28  Expected date: 12/5/20  Expected time: 12:29 PM  Means of arrival: Ambulance  Comments:  419 70m hip injury ETA 1231

## 2020-12-05 NOTE — ED PROVIDER NOTES
Emergency Department Attending Supervision Note  12/5/2020  3:35 PM    I evaluated this patient in conjunction with ELLIE Morrow    Briefly, the patient presented with concern of right groin pain.  The patient had a fall the day after his dog was chasing a squirrel and pulled him down after losing his balance.    On my exam:  Physical Exam   General:  Sitting on bed by self, comfortable appearing.   HENT:  No obvious trauma to head  Right Ear:  External ear normal.   Left Ear:  External ear normal.   Nose:  Nose normal.   Eyes:  Conjunctivae and EOM are normal.  Neck: Normal range of motion. Neck supple. No tracheal deviation present.   Pulm/Chest: No respiratory distress  M/S: Normal range of motion. DP pulses +2 bilateral.  Neuro: Alert. GCS 15.  Skin: Skin is warm and dry. No rash noted. Not diaphoretic.   Psych: Normal mood and affect. Behavior is normal.     Brief MDM:  Marlo Vee is a very pleasant 70 year old year old male who presents to the emergency department with concern of right hip pain after a fall.  X-ray was inconclusive so follow-up CT confirmed a fracture of the right hip.  The patient requires admission for surgery.  He has a prior kidney transplant.  There is no other injury associated with this.  The orthopedic surgeon, Dr. Stoner, was contacted who agrees to provide surgical intervention.  The hospitalist, Dr. Quinonez, was also contacted to provide medical cares during the hospitalization.    My Impression and diagnosis:    ICD-10-CM    1. Closed fracture of trochanter of right femur, initial encounter (H)  S72.101A Asymptomatic COVID-19 Virus (Coronavirus) by PCR         DO Dom Becerra Robert James, DO  12/05/20 4989

## 2020-12-05 NOTE — LETTER
Transition Communication Hand-off for Care Transitions to Next Level of Care Provider    Name: Marlo Vee  : 1950  MRN #: [id number]  Primary Care Provider:  Dr. Himanshu Hernandez   Primary Clinic: [facility] [address] [place] [facility] [address]     Reason for Hospitalization:  Closed fracture of trochanter of right femur, initial encounter (H) [S72.101A]  Admit Date/Time: 2020 12:40 PM  Discharge Date: 20  Payor Source: Payor: MEDICARE / Plan: MEDICARE / Product Type: Medicare /     22% Risk of Unplanned readmission           Reason for Communication Hand-off Referral: Other Meets criteria for OP Care Coordination due to 22% Risk of Unplanned readmission    Discharge Plan:  Discharge Plan:      Most Recent Value   Disposition Comments  [facility] not able to accept pt and have referred him to [facility] [phone]           Concern for non-adherence with plan of care:   Y/N no concern  Discharge Needs Assessment:  Needs      Most Recent Value   Equipment Currently Used at Home  none   # of Referrals Placed by CTS  Communication hand-offs to next level of Care Providers            Follow-up specialty is recommended: Yes    Follow-up plan:    Future Appointments   Date Time Provider Department Center   2021  9:40 AM SH LAB ONLY [facility]   2021  3:05 PM 1,  Kidney/Pancreas Recipient [employer]       Any outstanding tests or procedures:        Referrals     Future Labs/Procedures    Home Care OT Referral for Hospital Discharge     Comments:    OT to eval and treat    Your provider has ordered home care - occupational therapy. If you have not been contacted within 2 days of your discharge please call the department phone number listed on the top of this document.    Home Care PT Referral for Hospital Discharge     Comments:    PT to eval and treat    NOTE:  Your doctor has ordered home care to help you after your hospital stay. The staff will  contact you to schedule your first visit. This service will be provided by [facility] . If you have any question, or have not received a call within 48 hours of discharge, please call them at [phone].  *please see homecare quality ratings for all homecares in your area at www.medicare.gov  Pleased note that [facility] was not able to accept you because of their high case load at this time and this is a company they refer patients to.    Your provider has ordered home care - physical therapy. If you have not been contacted within 2 days of your discharge please call the department phone number listed on the top of this document.    Home Care PT Referral for Hospital Discharge     Comments:    PT to eval and treat    Your provider has ordered home care - physical therapy. If you have not been contacted within 2 days of your discharge please call the department phone number listed on the top of this document.          Supplies     Future Labs/Procedures    [facility] [facility]     Process Instructions:    By signing this order, the Authorizing Provider is attesting that they have completed a face-to-face evaluation on the patient to determine their need for this equipment in the last 60 days. A new face-to-face evaluation is required each time  A new prescription for one of the specified items is ordered.   If an additional provider completed the evaluation, please indicate their name below.     **As of 2018, an order requisition and face sheet will print for all DME orders. Please give printed order and face sheet to patient if not obtaining product from [facility] DME closet.     Comments:    DME Documentation: Describe the reason for need to support medical necessity: Impaired gait status post hip surgery. I, the undersigned, certify that the above prescribed supplies are medically necessary for this patient and is both reasonable and necessary in reference to accepted standards of medical practice  in the treatment of this patient's condition and is not prescribed as a convenience.    [facility]     Process Instructions:    By signing this order, the Authorizing Provider is attesting that they have completed a face-to-face evaluation on the patient to determine their need for this equipment in the last 60 days. A new face-to-face evaluation is required each time  A new prescription for one of the specified items is ordered.   If an additional provider completed the evaluation, please indicate their name below.     **As of 2018, an order requisition and face sheet will print for all DME orders. Please give printed order and face sheet to patient if not obtaining product from [facility] DME closet.     Comments:    : DME Documentation: Describe the reason for need to support medical necessity: Impaired gait status post hip surgery. I, the undersigned, certify that the above prescribed supplies are medically necessary for this patient and is both reasonable and necessary in reference to accepted standards of medical practice in the treatment of this patient's condition and is not prescribed as a convenience.          Key Recommendations:    OP Care Coordination d/t 22% Risk of Unplanned readmission    Ladan Rai RN    AVS/Discharge Summary is the source of truth; this is a helpful guide for improved communication of patient story

## 2020-12-05 NOTE — PHARMACY-ADMISSION MEDICATION HISTORY
Pharmacy Medication History  Admission medication history interview status for the 12/5/2020  admission is complete. See EPIC admission navigator for prior to admission medications     Medication history sources: Patient, [employer] and [employer]  Location of interview: Phone  Adherence Assessment: Good    Significant changes made to the medication list:  None    Additional medication history information:   Verified in [employer] that Cellcept and Prograf are dispensed as brand.      Medication reconciliation completed by provider prior to medication history? No    Time spent in this activity: 10 minutes    Prior to Admission medications    Medication Sig Last Dose Taking? Auth Provider   aspirin 81 MG EC tablet Take 81 mg by mouth every morning 12/5/2020 at AM Yes Unknown, Entered By History   atorvastatin (LIPITOR) 10 MG tablet Take 5 mg by mouth At Bedtime (0.5 x 10 mg tablet) 12/4/2020 at HS Yes Maryellen Joe PA-C   carvedilol (COREG) 25 MG tablet Take 12.5mg in the am and 25mg in the pm 12/5/2020 at AM, 0.5 tab Yes Samuel Varela MD   CELLCEPT (BRAND) 250 MG capsule Take 3 capsules (750 mg) by mouth 2 times daily Do not open cap for feeding tube. BLOOD LEVEL may be needed BEFORE giving dose. 12/5/2020 at AM Yes Samuel Varela MD   losartan (COZAAR) 25 MG tablet TAKE 1 TABLET BY MOUTH DAILY 12/4/2020 at HS Yes Samuel Varela MD   PROGRAF (BRAND) 0.5 MG capsule Total dose = 1 mg in the AM and 0.5 mg in the PM 12/5/2020 at AM, 2 capsules Yes Samuel Varela MD   sertraline (ZOLOFT) 25 MG tablet Take 25 mg by mouth every morning 12/5/2020 at AM Yes Unknown, Entered By History   sulfamethoxazole-trimethoprim (BACTRIM) 400-80 MG tablet Take 1 tablet by mouth Every Mon, Wed, Fri Morning 12/4/2020 at AM Yes Samuel Varela MD   vitamin D3 (CHOLECALCIFEROL) 50 mcg (2000 units) tablet Take 1 tablet by mouth every morning 12/5/2020 at AM Yes Unknown, Entered By History      The information provided in this note is only as accurate as the sources available at the time of the update(s).

## 2020-12-05 NOTE — PROGRESS NOTES
RECEIVING UNIT ED HANDOFF REVIEW    ED Nurse Handoff Report was reviewed by: Johnson Culp RN on December 5, 2020 at 4:27 PM

## 2020-12-05 NOTE — TELEPHONE ENCOUNTER
Pt is currently at [facility] in the ER. He had a fall while he was walking his dog. The dog started to yahir a squirrel and he fell. He has a broken hip that is requiring surgery tomorrow. Patient will ensure that he passes on to the team that he requires his transplant medication. Routed to dr Irwin.

## 2020-12-06 ENCOUNTER — ANESTHESIA EVENT (OUTPATIENT)
Dept: SURGERY | Facility: CLINIC | Age: 70
DRG: 481 | End: 2020-12-06
Payer: MEDICARE

## 2020-12-06 ENCOUNTER — ANESTHESIA (OUTPATIENT)
Dept: SURGERY | Facility: CLINIC | Age: 70
DRG: 481 | End: 2020-12-06
Payer: MEDICARE

## 2020-12-06 ENCOUNTER — APPOINTMENT (OUTPATIENT)
Dept: GENERAL RADIOLOGY | Facility: CLINIC | Age: 70
DRG: 481 | End: 2020-12-06
Attending: PHYSICIAN ASSISTANT
Payer: MEDICARE

## 2020-12-06 LAB
CREAT SERPL-MCNC: 1.22 MG/DL (ref 0.66–1.25)
GFR SERPL CREATININE-BSD FRML MDRD: 60 ML/MIN/{1.73_M2}

## 2020-12-06 PROCEDURE — 250N000009 HC RX 250: Performed by: ANESTHESIOLOGY

## 2020-12-06 PROCEDURE — 250N000011 HC RX IP 250 OP 636: Performed by: ANESTHESIOLOGY

## 2020-12-06 PROCEDURE — 250N000009 HC RX 250: Performed by: ORTHOPAEDIC SURGERY

## 2020-12-06 PROCEDURE — 258N000003 HC RX IP 258 OP 636: Performed by: ANESTHESIOLOGY

## 2020-12-06 PROCEDURE — C1769 GUIDE WIRE: HCPCS | Performed by: ORTHOPAEDIC SURGERY

## 2020-12-06 PROCEDURE — 120N000001 HC R&B MED SURG/OB

## 2020-12-06 PROCEDURE — 360N000027 HC SURGERY LEVEL 4 EA 15 ADDTL MIN: Performed by: ORTHOPAEDIC SURGERY

## 2020-12-06 PROCEDURE — 360N000030 HC SURGERY LEVEL 4 W FLUORO 1ST 30 MIN: Performed by: ORTHOPAEDIC SURGERY

## 2020-12-06 PROCEDURE — 258N000003 HC RX IP 258 OP 636: Performed by: NURSE ANESTHETIST, CERTIFIED REGISTERED

## 2020-12-06 PROCEDURE — 250N000011 HC RX IP 250 OP 636: Performed by: NURSE ANESTHETIST, CERTIFIED REGISTERED

## 2020-12-06 PROCEDURE — 99232 SBSQ HOSP IP/OBS MODERATE 35: CPT | Performed by: STUDENT IN AN ORGANIZED HEALTH CARE EDUCATION/TRAINING PROGRAM

## 2020-12-06 PROCEDURE — P9041 ALBUMIN (HUMAN),5%, 50ML: HCPCS | Performed by: NURSE ANESTHETIST, CERTIFIED REGISTERED

## 2020-12-06 PROCEDURE — 272N000001 HC OR GENERAL SUPPLY STERILE: Performed by: ORTHOPAEDIC SURGERY

## 2020-12-06 PROCEDURE — 250N000013 HC RX MED GY IP 250 OP 250 PS 637: Performed by: STUDENT IN AN ORGANIZED HEALTH CARE EDUCATION/TRAINING PROGRAM

## 2020-12-06 PROCEDURE — 250N000009 HC RX 250: Performed by: NURSE ANESTHETIST, CERTIFIED REGISTERED

## 2020-12-06 PROCEDURE — 36415 COLL VENOUS BLD VENIPUNCTURE: CPT | Performed by: PHYSICIAN ASSISTANT

## 2020-12-06 PROCEDURE — 370N000001 HC ANESTHESIA TECHNICAL FEE, 1ST 30 MIN: Performed by: ORTHOPAEDIC SURGERY

## 2020-12-06 PROCEDURE — 0QH636Z INSERTION OF INTRAMEDULLARY INTERNAL FIXATION DEVICE INTO RIGHT UPPER FEMUR, PERCUTANEOUS APPROACH: ICD-10-PCS | Performed by: ORTHOPAEDIC SURGERY

## 2020-12-06 PROCEDURE — 258N000003 HC RX IP 258 OP 636: Performed by: PHYSICIAN ASSISTANT

## 2020-12-06 PROCEDURE — 250N000013 HC RX MED GY IP 250 OP 250 PS 637: Performed by: PHYSICIAN ASSISTANT

## 2020-12-06 PROCEDURE — 93010 ELECTROCARDIOGRAM REPORT: CPT | Performed by: INTERNAL MEDICINE

## 2020-12-06 PROCEDURE — 93005 ELECTROCARDIOGRAM TRACING: CPT

## 2020-12-06 PROCEDURE — 761N000001 HC RECOVERY PHASE 1 LEVEL 1 FIRST HR: Performed by: ORTHOPAEDIC SURGERY

## 2020-12-06 PROCEDURE — 999N000179 XR SURGERY CARM FLUORO LESS THAN 5 MIN W STILLS

## 2020-12-06 PROCEDURE — 250N000012 HC RX MED GY IP 250 OP 636 PS 637: Performed by: STUDENT IN AN ORGANIZED HEALTH CARE EDUCATION/TRAINING PROGRAM

## 2020-12-06 PROCEDURE — 370N000002 HC ANESTHESIA TECHNICAL FEE, EACH ADDTL 15 MIN: Performed by: ORTHOPAEDIC SURGERY

## 2020-12-06 PROCEDURE — 250N000003 HC SEVOFLURANE, EA 15 MIN: Performed by: ORTHOPAEDIC SURGERY

## 2020-12-06 PROCEDURE — 250N000011 HC RX IP 250 OP 636: Performed by: PHYSICIAN ASSISTANT

## 2020-12-06 PROCEDURE — C1713 ANCHOR/SCREW BN/BN,TIS/BN: HCPCS | Performed by: ORTHOPAEDIC SURGERY

## 2020-12-06 PROCEDURE — 250N000011 HC RX IP 250 OP 636: Performed by: ORTHOPAEDIC SURGERY

## 2020-12-06 PROCEDURE — 999N000139 HC STATISTIC PRE-PROCEDURE ASSESSMENT II: Performed by: ORTHOPAEDIC SURGERY

## 2020-12-06 PROCEDURE — 82565 ASSAY OF CREATININE: CPT | Performed by: PHYSICIAN ASSISTANT

## 2020-12-06 DEVICE — 11MM/130 DEG TI CANN TFNA 170MM - STERILE
Type: IMPLANTABLE DEVICE | Site: HIP | Status: FUNCTIONAL
Brand: TFN-ADVANCE

## 2020-12-06 DEVICE — TFNA FENESTRATED SCREW 100MM - STERILE
Type: IMPLANTABLE DEVICE | Site: HIP | Status: FUNCTIONAL
Brand: TFN-ADVANCE

## 2020-12-06 DEVICE — 5.0MM TI LOCKING SCREW W/T25 STARDRIVE 34MM F/IM NAIL-STER: Type: IMPLANTABLE DEVICE | Site: HIP | Status: FUNCTIONAL

## 2020-12-06 RX ORDER — ACETAMINOPHEN 325 MG/1
975 TABLET ORAL EVERY 8 HOURS
Status: DISPENSED | OUTPATIENT
Start: 2020-12-06 | End: 2020-12-09

## 2020-12-06 RX ORDER — PROPOFOL 10 MG/ML
INJECTION, EMULSION INTRAVENOUS CONTINUOUS PRN
Status: DISCONTINUED | OUTPATIENT
Start: 2020-12-06 | End: 2020-12-06

## 2020-12-06 RX ORDER — LIDOCAINE HYDROCHLORIDE 20 MG/ML
INJECTION, SOLUTION INFILTRATION; PERINEURAL PRN
Status: DISCONTINUED | OUTPATIENT
Start: 2020-12-06 | End: 2020-12-06

## 2020-12-06 RX ORDER — TRAMADOL HYDROCHLORIDE 50 MG/1
50 TABLET ORAL EVERY 6 HOURS PRN
Status: DISCONTINUED | OUTPATIENT
Start: 2020-12-06 | End: 2020-12-09 | Stop reason: HOSPADM

## 2020-12-06 RX ORDER — FENTANYL CITRATE 50 UG/ML
INJECTION, SOLUTION INTRAMUSCULAR; INTRAVENOUS PRN
Status: DISCONTINUED | OUTPATIENT
Start: 2020-12-06 | End: 2020-12-06

## 2020-12-06 RX ORDER — MAGNESIUM HYDROXIDE 1200 MG/15ML
LIQUID ORAL PRN
Status: DISCONTINUED | OUTPATIENT
Start: 2020-12-06 | End: 2020-12-06 | Stop reason: HOSPADM

## 2020-12-06 RX ORDER — SODIUM CHLORIDE, SODIUM LACTATE, POTASSIUM CHLORIDE, CALCIUM CHLORIDE 600; 310; 30; 20 MG/100ML; MG/100ML; MG/100ML; MG/100ML
INJECTION, SOLUTION INTRAVENOUS CONTINUOUS
Status: DISCONTINUED | OUTPATIENT
Start: 2020-12-06 | End: 2020-12-06 | Stop reason: HOSPADM

## 2020-12-06 RX ORDER — ONDANSETRON 2 MG/ML
INJECTION INTRAMUSCULAR; INTRAVENOUS PRN
Status: DISCONTINUED | OUTPATIENT
Start: 2020-12-06 | End: 2020-12-06

## 2020-12-06 RX ORDER — CEFAZOLIN SODIUM 2 G/100ML
2 INJECTION, SOLUTION INTRAVENOUS
Status: COMPLETED | OUTPATIENT
Start: 2020-12-06 | End: 2020-12-06

## 2020-12-06 RX ORDER — EPHEDRINE SULFATE 50 MG/ML
INJECTION, SOLUTION INTRAMUSCULAR; INTRAVENOUS; SUBCUTANEOUS PRN
Status: DISCONTINUED | OUTPATIENT
Start: 2020-12-06 | End: 2020-12-06

## 2020-12-06 RX ORDER — LIDOCAINE 40 MG/G
CREAM TOPICAL
Status: DISCONTINUED | OUTPATIENT
Start: 2020-12-06 | End: 2020-12-09 | Stop reason: HOSPADM

## 2020-12-06 RX ORDER — SODIUM CHLORIDE, SODIUM LACTATE, POTASSIUM CHLORIDE, CALCIUM CHLORIDE 600; 310; 30; 20 MG/100ML; MG/100ML; MG/100ML; MG/100ML
INJECTION, SOLUTION INTRAVENOUS CONTINUOUS
Status: DISCONTINUED | OUTPATIENT
Start: 2020-12-06 | End: 2020-12-06

## 2020-12-06 RX ORDER — SODIUM CHLORIDE, SODIUM LACTATE, POTASSIUM CHLORIDE, CALCIUM CHLORIDE 600; 310; 30; 20 MG/100ML; MG/100ML; MG/100ML; MG/100ML
INJECTION, SOLUTION INTRAVENOUS CONTINUOUS
Status: DISCONTINUED | OUTPATIENT
Start: 2020-12-06 | End: 2020-12-08

## 2020-12-06 RX ORDER — PROCHLORPERAZINE MALEATE 5 MG
5 TABLET ORAL EVERY 6 HOURS PRN
Status: DISCONTINUED | OUTPATIENT
Start: 2020-12-06 | End: 2020-12-09 | Stop reason: HOSPADM

## 2020-12-06 RX ORDER — ONDANSETRON 4 MG/1
4 TABLET, ORALLY DISINTEGRATING ORAL EVERY 6 HOURS PRN
Status: DISCONTINUED | OUTPATIENT
Start: 2020-12-06 | End: 2020-12-09 | Stop reason: HOSPADM

## 2020-12-06 RX ORDER — AMOXICILLIN 250 MG
1 CAPSULE ORAL 2 TIMES DAILY
Status: DISCONTINUED | OUTPATIENT
Start: 2020-12-06 | End: 2020-12-09 | Stop reason: HOSPADM

## 2020-12-06 RX ORDER — DEXAMETHASONE SODIUM PHOSPHATE 4 MG/ML
INJECTION, SOLUTION INTRA-ARTICULAR; INTRALESIONAL; INTRAMUSCULAR; INTRAVENOUS; SOFT TISSUE PRN
Status: DISCONTINUED | OUTPATIENT
Start: 2020-12-06 | End: 2020-12-06

## 2020-12-06 RX ORDER — HYDROMORPHONE HYDROCHLORIDE 1 MG/ML
.3-.5 INJECTION, SOLUTION INTRAMUSCULAR; INTRAVENOUS; SUBCUTANEOUS EVERY 5 MIN PRN
Status: DISCONTINUED | OUTPATIENT
Start: 2020-12-06 | End: 2020-12-06

## 2020-12-06 RX ORDER — SCOLOPAMINE TRANSDERMAL SYSTEM 1 MG/1
1 PATCH, EXTENDED RELEASE TRANSDERMAL ONCE
Status: COMPLETED | OUTPATIENT
Start: 2020-12-06 | End: 2020-12-07

## 2020-12-06 RX ORDER — MEPERIDINE HYDROCHLORIDE 25 MG/ML
12.5 INJECTION INTRAMUSCULAR; INTRAVENOUS; SUBCUTANEOUS EVERY 5 MIN PRN
Status: DISCONTINUED | OUTPATIENT
Start: 2020-12-06 | End: 2020-12-06

## 2020-12-06 RX ORDER — CEFAZOLIN SODIUM 1 G/3ML
1 INJECTION, POWDER, FOR SOLUTION INTRAMUSCULAR; INTRAVENOUS SEE ADMIN INSTRUCTIONS
Status: DISCONTINUED | OUTPATIENT
Start: 2020-12-06 | End: 2020-12-06 | Stop reason: HOSPADM

## 2020-12-06 RX ORDER — CEFAZOLIN SODIUM 1 G/3ML
1 INJECTION, POWDER, FOR SOLUTION INTRAMUSCULAR; INTRAVENOUS EVERY 8 HOURS
Status: COMPLETED | OUTPATIENT
Start: 2020-12-06 | End: 2020-12-07

## 2020-12-06 RX ORDER — DOCUSATE SODIUM 100 MG/1
100 CAPSULE, LIQUID FILLED ORAL 2 TIMES DAILY
Status: DISCONTINUED | OUTPATIENT
Start: 2020-12-06 | End: 2020-12-09 | Stop reason: HOSPADM

## 2020-12-06 RX ORDER — BUPIVACAINE HYDROCHLORIDE AND EPINEPHRINE 5; 5 MG/ML; UG/ML
INJECTION, SOLUTION PERINEURAL PRN
Status: DISCONTINUED | OUTPATIENT
Start: 2020-12-06 | End: 2020-12-06 | Stop reason: HOSPADM

## 2020-12-06 RX ORDER — BISACODYL 10 MG
10 SUPPOSITORY, RECTAL RECTAL DAILY PRN
Status: DISCONTINUED | OUTPATIENT
Start: 2020-12-06 | End: 2020-12-09 | Stop reason: HOSPADM

## 2020-12-06 RX ORDER — NALOXONE HYDROCHLORIDE 0.4 MG/ML
0.2 INJECTION, SOLUTION INTRAMUSCULAR; INTRAVENOUS; SUBCUTANEOUS
Status: ACTIVE | OUTPATIENT
Start: 2020-12-06 | End: 2020-12-07

## 2020-12-06 RX ORDER — ONDANSETRON 4 MG/1
4 TABLET, ORALLY DISINTEGRATING ORAL EVERY 30 MIN PRN
Status: DISCONTINUED | OUTPATIENT
Start: 2020-12-06 | End: 2020-12-06

## 2020-12-06 RX ORDER — POLYETHYLENE GLYCOL 3350 17 G/17G
17 POWDER, FOR SOLUTION ORAL DAILY
Status: DISCONTINUED | OUTPATIENT
Start: 2020-12-07 | End: 2020-12-09 | Stop reason: HOSPADM

## 2020-12-06 RX ORDER — NALOXONE HYDROCHLORIDE 0.4 MG/ML
0.4 INJECTION, SOLUTION INTRAMUSCULAR; INTRAVENOUS; SUBCUTANEOUS
Status: ACTIVE | OUTPATIENT
Start: 2020-12-06 | End: 2020-12-07

## 2020-12-06 RX ORDER — PROPOFOL 10 MG/ML
INJECTION, EMULSION INTRAVENOUS PRN
Status: DISCONTINUED | OUTPATIENT
Start: 2020-12-06 | End: 2020-12-06

## 2020-12-06 RX ORDER — ACETAMINOPHEN 325 MG/1
650 TABLET ORAL EVERY 4 HOURS PRN
Status: DISCONTINUED | OUTPATIENT
Start: 2020-12-09 | End: 2020-12-09 | Stop reason: HOSPADM

## 2020-12-06 RX ORDER — PROPOFOL 10 MG/ML
INJECTION, EMULSION INTRAVENOUS
Status: COMPLETED | OUTPATIENT
Start: 2020-12-06 | End: 2020-12-06

## 2020-12-06 RX ORDER — ONDANSETRON 2 MG/ML
4 INJECTION INTRAMUSCULAR; INTRAVENOUS EVERY 6 HOURS PRN
Status: DISCONTINUED | OUTPATIENT
Start: 2020-12-06 | End: 2020-12-09 | Stop reason: HOSPADM

## 2020-12-06 RX ORDER — ONDANSETRON 2 MG/ML
4 INJECTION INTRAMUSCULAR; INTRAVENOUS EVERY 30 MIN PRN
Status: DISCONTINUED | OUTPATIENT
Start: 2020-12-06 | End: 2020-12-06

## 2020-12-06 RX ORDER — FENTANYL CITRATE 50 UG/ML
50 INJECTION, SOLUTION INTRAMUSCULAR; INTRAVENOUS
Status: DISCONTINUED | OUTPATIENT
Start: 2020-12-06 | End: 2020-12-06 | Stop reason: HOSPADM

## 2020-12-06 RX ORDER — ALBUMIN, HUMAN INJ 5% 5 %
SOLUTION INTRAVENOUS CONTINUOUS PRN
Status: DISCONTINUED | OUTPATIENT
Start: 2020-12-06 | End: 2020-12-06

## 2020-12-06 RX ORDER — FENTANYL CITRATE 50 UG/ML
25-50 INJECTION, SOLUTION INTRAMUSCULAR; INTRAVENOUS
Status: DISCONTINUED | OUTPATIENT
Start: 2020-12-06 | End: 2020-12-06

## 2020-12-06 RX ADMIN — PHENYLEPHRINE HYDROCHLORIDE 100 MCG: 10 INJECTION INTRAVENOUS at 10:45

## 2020-12-06 RX ADMIN — PHENYLEPHRINE HYDROCHLORIDE 100 MCG: 10 INJECTION INTRAVENOUS at 09:12

## 2020-12-06 RX ADMIN — PHENYLEPHRINE HYDROCHLORIDE 150 MCG: 10 INJECTION INTRAVENOUS at 09:38

## 2020-12-06 RX ADMIN — ATORVASTATIN CALCIUM 5 MG: 10 TABLET, FILM COATED ORAL at 21:02

## 2020-12-06 RX ADMIN — CEFAZOLIN 1 G: 1 INJECTION, POWDER, FOR SOLUTION INTRAMUSCULAR; INTRAVENOUS at 16:52

## 2020-12-06 RX ADMIN — ONDANSETRON 4 MG: 2 INJECTION INTRAMUSCULAR; INTRAVENOUS at 10:21

## 2020-12-06 RX ADMIN — HYDROMORPHONE HYDROCHLORIDE 0.25 MG: 1 INJECTION, SOLUTION INTRAMUSCULAR; INTRAVENOUS; SUBCUTANEOUS at 11:00

## 2020-12-06 RX ADMIN — SODIUM CHLORIDE, POTASSIUM CHLORIDE, SODIUM LACTATE AND CALCIUM CHLORIDE: 600; 310; 30; 20 INJECTION, SOLUTION INTRAVENOUS at 08:46

## 2020-12-06 RX ADMIN — FENTANYL CITRATE 50 MCG: 50 INJECTION, SOLUTION INTRAMUSCULAR; INTRAVENOUS at 09:02

## 2020-12-06 RX ADMIN — PHENYLEPHRINE HYDROCHLORIDE 100 MCG: 10 INJECTION INTRAVENOUS at 09:35

## 2020-12-06 RX ADMIN — CARVEDILOL 12.5 MG: 3.12 TABLET, FILM COATED ORAL at 07:49

## 2020-12-06 RX ADMIN — PROPOFOL 100 MCG/KG/MIN: 10 INJECTION, EMULSION INTRAVENOUS at 09:10

## 2020-12-06 RX ADMIN — PHENYLEPHRINE HYDROCHLORIDE 100 MCG: 10 INJECTION INTRAVENOUS at 09:14

## 2020-12-06 RX ADMIN — TACROLIMUS 1 MG: 1 CAPSULE, GELATIN COATED ORAL at 07:50

## 2020-12-06 RX ADMIN — PHENYLEPHRINE HYDROCHLORIDE 150 MCG: 10 INJECTION INTRAVENOUS at 10:15

## 2020-12-06 RX ADMIN — SODIUM CHLORIDE, POTASSIUM CHLORIDE, SODIUM LACTATE AND CALCIUM CHLORIDE: 600; 310; 30; 20 INJECTION, SOLUTION INTRAVENOUS at 09:52

## 2020-12-06 RX ADMIN — PHENYLEPHRINE HYDROCHLORIDE 100 MCG: 10 INJECTION INTRAVENOUS at 09:17

## 2020-12-06 RX ADMIN — ACETAMINOPHEN 975 MG: 325 TABLET, FILM COATED ORAL at 21:01

## 2020-12-06 RX ADMIN — PHENYLEPHRINE HYDROCHLORIDE 0.5 MCG/KG/MIN: 10 INJECTION INTRAVENOUS at 10:13

## 2020-12-06 RX ADMIN — PROPOFOL 150 MG: 10 INJECTION, EMULSION INTRAVENOUS at 09:08

## 2020-12-06 RX ADMIN — PHENYLEPHRINE HYDROCHLORIDE 100 MCG: 10 INJECTION INTRAVENOUS at 09:29

## 2020-12-06 RX ADMIN — TACROLIMUS 0.5 MG: 0.5 CAPSULE, GELATIN COATED ORAL at 20:00

## 2020-12-06 RX ADMIN — HYDROMORPHONE HYDROCHLORIDE 0.25 MG: 1 INJECTION, SOLUTION INTRAMUSCULAR; INTRAVENOUS; SUBCUTANEOUS at 10:39

## 2020-12-06 RX ADMIN — Medication 5 MG: at 10:18

## 2020-12-06 RX ADMIN — Medication 5 MG: at 10:39

## 2020-12-06 RX ADMIN — MYCOPHENOLATE MOFETIL 750 MG: 250 CAPSULE ORAL at 20:11

## 2020-12-06 RX ADMIN — SERTRALINE HYDROCHLORIDE 25 MG: 25 TABLET ORAL at 07:49

## 2020-12-06 RX ADMIN — PHENYLEPHRINE HYDROCHLORIDE 100 MCG: 10 INJECTION INTRAVENOUS at 09:32

## 2020-12-06 RX ADMIN — PHENYLEPHRINE HYDROCHLORIDE 100 MCG: 10 INJECTION INTRAVENOUS at 10:11

## 2020-12-06 RX ADMIN — MYCOPHENOLATE MOFETIL 750 MG: 250 CAPSULE ORAL at 07:49

## 2020-12-06 RX ADMIN — PROPOFOL 150 MG: 10 INJECTION, EMULSION INTRAVENOUS at 09:05

## 2020-12-06 RX ADMIN — PHENYLEPHRINE HYDROCHLORIDE 100 MCG: 10 INJECTION INTRAVENOUS at 10:17

## 2020-12-06 RX ADMIN — PHENYLEPHRINE HYDROCHLORIDE 100 MCG: 10 INJECTION INTRAVENOUS at 10:02

## 2020-12-06 RX ADMIN — CEFAZOLIN SODIUM 2 G: 2 INJECTION, SOLUTION INTRAVENOUS at 09:10

## 2020-12-06 RX ADMIN — ROCURONIUM BROMIDE 50 MG: 10 INJECTION INTRAVENOUS at 09:08

## 2020-12-06 RX ADMIN — SUGAMMADEX 200 MG: 100 INJECTION, SOLUTION INTRAVENOUS at 10:40

## 2020-12-06 RX ADMIN — LIDOCAINE HYDROCHLORIDE 60 MG: 20 INJECTION, SOLUTION INFILTRATION; PERINEURAL at 09:05

## 2020-12-06 RX ADMIN — LOSARTAN POTASSIUM 25 MG: 25 TABLET, FILM COATED ORAL at 21:02

## 2020-12-06 RX ADMIN — PHENYLEPHRINE HYDROCHLORIDE 150 MCG: 10 INJECTION INTRAVENOUS at 10:08

## 2020-12-06 RX ADMIN — Medication 10 MG: at 09:44

## 2020-12-06 RX ADMIN — ROCURONIUM BROMIDE 50 MG: 10 INJECTION INTRAVENOUS at 09:06

## 2020-12-06 RX ADMIN — Medication 10 MG: at 09:56

## 2020-12-06 RX ADMIN — PHENYLEPHRINE HYDROCHLORIDE 100 MCG: 10 INJECTION INTRAVENOUS at 10:32

## 2020-12-06 RX ADMIN — FENTANYL CITRATE 50 MCG: 50 INJECTION, SOLUTION INTRAMUSCULAR; INTRAVENOUS at 09:51

## 2020-12-06 RX ADMIN — Medication 5 MG: at 10:32

## 2020-12-06 RX ADMIN — Medication 5 MG: at 10:02

## 2020-12-06 RX ADMIN — ROCURONIUM BROMIDE 20 MG: 10 INJECTION INTRAVENOUS at 09:49

## 2020-12-06 RX ADMIN — ALBUMIN HUMAN: 0.05 INJECTION, SOLUTION INTRAVENOUS at 10:15

## 2020-12-06 RX ADMIN — CARVEDILOL 25 MG: 25 TABLET, FILM COATED ORAL at 19:59

## 2020-12-06 RX ADMIN — PHENYLEPHRINE HYDROCHLORIDE 200 MCG: 10 INJECTION INTRAVENOUS at 09:44

## 2020-12-06 RX ADMIN — SODIUM CHLORIDE, POTASSIUM CHLORIDE, SODIUM LACTATE AND CALCIUM CHLORIDE: 600; 310; 30; 20 INJECTION, SOLUTION INTRAVENOUS at 13:05

## 2020-12-06 RX ADMIN — MIDAZOLAM 2 MG: 1 INJECTION INTRAMUSCULAR; INTRAVENOUS at 09:01

## 2020-12-06 RX ADMIN — Medication 5 MG: at 10:29

## 2020-12-06 RX ADMIN — DEXAMETHASONE SODIUM PHOSPHATE 4 MG: 4 INJECTION, SOLUTION INTRA-ARTICULAR; INTRALESIONAL; INTRAMUSCULAR; INTRAVENOUS; SOFT TISSUE at 09:37

## 2020-12-06 RX ADMIN — SCOPOLAMINE 1 PATCH: 1 PATCH TRANSDERMAL at 08:30

## 2020-12-06 RX ADMIN — ACETAMINOPHEN 975 MG: 325 TABLET, FILM COATED ORAL at 13:41

## 2020-12-06 RX ADMIN — Medication 5 MG: at 10:45

## 2020-12-06 RX ADMIN — SODIUM CHLORIDE, POTASSIUM CHLORIDE, SODIUM LACTATE AND CALCIUM CHLORIDE: 600; 310; 30; 20 INJECTION, SOLUTION INTRAVENOUS at 11:26

## 2020-12-06 ASSESSMENT — ACTIVITIES OF DAILY LIVING (ADL)
ADLS_ACUITY_SCORE: 11

## 2020-12-06 ASSESSMENT — MIFFLIN-ST. JEOR: SCORE: 1535.57

## 2020-12-06 ASSESSMENT — COPD QUESTIONNAIRES: COPD: 0

## 2020-12-06 NOTE — ANESTHESIA PROCEDURE NOTES
Airway   Date/Time: 12/6/2020 9:08 AM        Staff -   Performed By: CRNA    Indications and Patient Condition  Indications for airway management: airway protection  Induction type:intravenousMask difficulty assessment: 2 - vent by mask + OA or adjuvant +/- NMBA    Final Airway Details  Final airway type: endotracheal airway  Successful airway:ETT - single  Endotracheal Airway Details   ETT size (mm): 8.0  Cuffed: yes  Successful intubation technique: direct laryngoscopy  Grade View of Cords: 1  Adjucts: stylet  Measured from: lips  Secured at (cm): 23  Bite block used: None    Post intubation assessment   Placement verified by: capnometry and equal breath sounds   Number of attempts at approach: 1  Ease of procedure: easy  Dentition: Intact and Unchanged    Medications Administered  Propofol (DIPRIVAN) injection 10 mg/mL vial, 150 mg  rocuronium (ZEMURON) 10 mg/mL injection, 50 mg

## 2020-12-06 NOTE — PLAN OF CARE
Returned to unit @ 1300 from PACU. Alert and oriented X 4, drowsy. VSS on 2L. Denies pain at rest. Small serosanguinous drainage on inferior dressing. CMS intact. Tolerating ice chips/water. Denies nausea. Due to void. Plan to dangle this afternoon/martin. Continue with current POC.

## 2020-12-06 NOTE — CONSULTS
Orthopedic Surgery Consult / History and Physical    Marlo Vee MRN# [id number]   Age: 70 year old YOB: 1950     Date of Admission:   12/5/2020  Date of Consult: 12/06/20   Location:    [facility]             Assessment and Plan:   Assessment:  Right femur intertrochanteric fracture, nondisplaced     Plan:  1. I discussed with patient the risk benefits and alternatives to operative stabilization of this fracture as a means for pain control mobilization.  He would like to proceed.  2. Surgery this morning will be: Open reduction internal fixation right femur with intramedullary nail             Chief Complaint:   Right hip pain           History of Present Illness:   This patient is a 70 year old male with a significant past medical history of renal transplant who presents with the above injury.    He sustained a mechanical ground-level fall when his dog pulled on the leash very hard, landing on his right hip.  At that point he was able to sit up but not able to rise secondary to severe right groin pain.  He was transported by ambulance to the hospital where imaging demonstrated a right intertrochanteric femur fracture.  He was admitted for pain control.    At time my interview, patient reports ongoing right groin pain.  Worsened with any movement of the right lower extremity, but acceptable pain control at rest.  No injuries to any other body parts.  No numbness or tingling down the right lower extremity.  Achy pain, moderate to severe, exacerbated with movement and alleviated with rest.             Past Medical History:     Past Medical History:   Diagnosis Date     Anemia in ESRD (end-stage renal disease) (H)      Antiplatelet or antithrombotic long-term use      Anxiety      Dialysis patient (H)      Dyslipidemia      End stage kidney disease (H)      Heart attack (H)     not sure     Hypertension      Membranous glomerulonephritis 2002     PONV (postoperative  nausea and vomiting)      PUD (peptic ulcer disease)      Secondary hyperparathyroidism (of renal origin)      Stented coronary artery      Vitamin D deficiency             Past Surgical History:     Past Surgical History:   Procedure Laterality Date     CYSTOSCOPY, REMOVE STENT(S), COMBINED Right 2/23/2016    Procedure: COMBINED CYSTOSCOPY, REMOVE STENT(S);  Surgeon: Cody Temple MD;  Location: [facility] OR     s/p right upper extremity AV fistula       TRANSPLANT      kidney transplant      VASCULAR SURGERY              Social History:   Living situation: In own home with wife  Ambulatory status: Independent    Social History     Tobacco Use     Smoking status: Never Smoker     Smokeless tobacco: Never Used   Substance Use Topics     Alcohol use: No     Alcohol/week: 0.0 standard drinks     Comment: Patient reports drinking beer on a rare ocassion.            Family History:   Denies family history of bleeding or clotting disorders  Family History   Problem Relation Age of Onset     Breast Cancer Mother      Cancer - colorectal Father      Coronary Artery Disease Father      Hypertension Father      Breast Cancer Sister      Cancer Brother         Pancreatic CA            Allergies:   No Known Allergies         Medications:   Anticoagulants: Aspirin  Medications Prior to Admission   Medication Sig Dispense Refill Last Dose     aspirin 81 MG EC tablet Take 81 mg by mouth every morning   12/5/2020 at AM     atorvastatin (LIPITOR) 10 MG tablet Take 5 mg by mouth At Bedtime (0.5 x 10 mg tablet)   12/4/2020 at HS     carvedilol (COREG) 25 MG tablet Take 25 mg by mouth every evening In addition to 12.5 mg in the morning.   12/4/2020 at PM     carvedilol (COREG) 25 MG tablet Take 12.5 mg by mouth every morning In addition to 25 mg every evening.    12/5/2020 at AM     CELLCEPT (BRAND) 250 MG capsule Take 3 capsules (750 mg) by mouth 2 times daily Do not open cap for feeding tube. BLOOD LEVEL may be needed BEFORE giving dose.  180 capsule 11 12/5/2020 at AM     losartan (COZAAR) 25 MG tablet TAKE 1 TABLET BY MOUTH DAILY 30 tablet 11 12/4/2020 at HS     PROGRAF (BRAND) 0.5 MG capsule Take 1 mg by mouth every morning   12/5/2020 at am     PROGRAF (BRAND) 0.5 MG capsule Take 0.5 mg by mouth every evening   12/4/2020 at PM     sertraline (ZOLOFT) 25 MG tablet Take 25 mg by mouth every morning   12/5/2020 at AM     sulfamethoxazole-trimethoprim (BACTRIM) 400-80 MG tablet Take 1 tablet by mouth Every Mon, Wed, Fri Morning 14 tablet 11 12/4/2020 at AM     vitamin D3 (CHOLECALCIFEROL) 50 mcg (2000 units) tablet Take 1 tablet by mouth every morning   12/5/2020 at AM             Review of Systems:   A 10 point review of systems was performed, and was negative except as noted in HPI.         Physical Exam:     Patient Vitals for the past 8 hrs:   BP Temp Temp src Pulse Resp SpO2   12/06/20 0753 (!) 142/95 98.9  F (37.2  C) Oral 70 14 95 %       General: awake, alert, cooperative, no apparent distress, appears stated age  HEENT: normal  Respiratory: breathing non-labored  Cardiovascular: skin warm and well perfused  Skin: no rashes or lesions  Abdomen:  non-distended  Lymph:  No abnormal swelling of uninjured extremities  Heme:  No petechiae  Neurological: CN II-XII grossly intact  Musculoskeletal:     RLE   Skin C/D/I   Motor: EHL, TA, FHL, GS 5/5   SILT on SP, DP, S, S, and T nerve territories   Circulation: palpable DP and TP, foot warm   Severe right groin pain exacerbated with mild rotational movements of the right lower extremity             Data:   Imaging:  CT and XR right femur demonstrates a nondisplaced incomplete intertrochanteric femur fracture, with an associated greater trochanter fracture            Jimmy Stoner MD  Orthopaedic Spine Surgery  [facility]

## 2020-12-06 NOTE — OP NOTE
Orthopedic Surgery Operative Report    Patient:   Marlo Vee  MRN:   [id number]   :  1950  Facility: [facility]   Date:  2020        PREOPERATIVE DIAGNOSIS:    Right intertrochanteric hip fracture    POSTOPERATIVE DIAGNOSIS:    Right intertrochanteric hip fracture    PROCEDURE PERFORMED:    Right femur open reduction internal fixation with intramedullary nail    SURGEON:    Jimmy Stoner MD    SURGICAL ASSISTANT:    Ines Franco PA-C    ANESTHESIA:  General    FINDINGS:    Nondisplaced intertrochanteric hip fracture    COMPLICATIONS:     None apparent    SPECIMENS:    None    ESTIMATED BLOOD LOSS:  150 cc    IMPLANTS:    Synthes TFNA nail:  170 mm x 11 mm, 130 degree.  100 mm cephalad screw.  5 mm distal interlocking screw.    INDICATION FOR OPERATION:  The patient is a 70-year-old male with a history of a kidney transplant who unfortunately sustained a ground-level mechanical fall yesterday resulting in a nondisplaced right intertrochanteric femur fracture.  I discussed with him the risks benefits and alternatives of the above operation as a means for pain control and mobilization.  He wished to proceed with that operation.    DESCRIPTION OF PROCEDURE:    The patient was met in the preoperative holding area and the operative site was confirmed and marked.  We once again reviewed the risks, benefits, and alternatives of the operation and the patient wished to proceed with the surgery.    The patient was taken back to the operating room and induced under general anesthesia.  The patient was positioned supine on the [place] table with all bony prominences well padded.  The surgical site was prepped and draped in the usual standard fashion.    The patient was met in the preoperative holding area and the operative site was confirmed and marked.  We once again reviewed the risks, benefits, and alternatives of the operation and the patient wished to proceed with  the surgery.     The patient was taken back to the operating room and induced under general anesthesia.  The patient was positioned supine on the [place] table with all bony prominences well padded.  We brought in the fluoroscopy machine and obtained images while adjusting the [place] table to confirm the fracture had not displaced.  Thereafter the surgical site was prepped and draped in the usual standard fashion.     I injected local anesthetic and then made a longitudinal incision proximal to the greater trochanter in the skin, and carried dissection deeper through the fascia tono.  I then placed my start pin on the tip of the greater trochanter and drove it under AP and lateral fluoroscopy into the proximal portion of the femur.  This was in good position, and therefore I used my opening reamer over the top of it.  I advanced the intramedullary nail through this opening to the level where the cephalad screw would capture the femoral head appropriately.  The fracture remained reduced as the nail was inserted.     The guidewire was removed.  I next drilled, measured, and passed my cephalad screw confirming appropriate position on AP and lateral fluoroscopy.   I placed the cephalad screw locking bolt, and locked it statically. I then drilled and placed my distal interlock screw through the targeting jig.     The operative sites were irrigated, and then the fascia tono was closed over the primary nail insertion incision with #1 Vicryl.  All incisions were closed with 2-0 subcutaneous Vicryl, and skin staples were applied.  The patient was transferred off the [place] table, and emerged from anesthesia and was extubated without incident.  The patient was transferred to PACU in stable condition.         A surgical assistant was critical for this case to assist in retraction of the soft tissues to minimize tissue trauma, maintenance of reduction during instrumentation, and evacuation blood from the surgical field to facilitate a  safer operation with improved visualization and less time under anesthesia.        All sponge and needle counts were correct at case conclusion.      POSTOPERATIVE PLAN:  -Activity:   Up with assist using a walker.  -Weight Bearing Status: WBAT   -Bracing:   None  -Antibiotics:   Ancef x24h  -Anticoagulation:  Lovenox x2 weeks, then Aspirin 325 mg daily x2 weeks  -Pain control:   Patient would like to minimize opiates if possible due to nausea; will try Tramadol.  No NSAIDs due to renal transplant status.    -Drain:    None  -Diet:    ADAT  -Labs:    AM Hgb, BMP  -Dressing:   Ok to shower with dressing in place, dressing ok to get wet.  Leave dressing in place until POD 3, then remove and ok to leave incision open to air; ok to shower and let incision get wet after dressing removal.      -Disposition:   Pending medical stability, PT, anticipate discharge around POD 3-5  -Follow up:   2 weeks via Zoom or in my clinic for surgical site check, 6 weeks in my clinic for for clinical exam and XR AP + lateral femur      Jimmy Stoner MD

## 2020-12-06 NOTE — ANESTHESIA CARE TRANSFER NOTE
Patient: Marlo Vee    Procedure(s):  OPEN REDUCTION INTERNAL FIXATION, FRACTURE, FEMUR, USING INTRAMEDULLARY SHU.    Diagnosis: Other fracture of right femur, initial encounter for closed fracture (H) [S72.8X1A]  Diagnosis Additional Information: No value filed.    Anesthesia Type:   General     Note:  Airway :Face Mask  Patient transferred to:PACU  Comments: Pt exhibits spont resps, all monitors and alarms on in pacu, report given to RN, vss.Handoff Report: Identifed the Patient, Identified the Reponsible Provider, Reviewed the pertinent medical history, Discussed the surgical course, Reviewed Intra-OP anesthesia mangement and issues during anesthesia, Set expectations for post-procedure period and Allowed opportunity for questions and acknowledgement of understanding      Vitals: (Last set prior to Anesthesia Care Transfer)    CRNA VITALS  12/6/2020 1031 - 12/6/2020 1109      12/6/2020             Pulse:  57    SpO2:  96 %    Resp Rate (set):  10                Electronically Signed By: KIERSTEN Mascorro CRNA  December 6, 2020  11:09 AM

## 2020-12-06 NOTE — ANESTHESIA PREPROCEDURE EVALUATION
Anesthesia Pre-Procedure Evaluation    Patient: Marlo Vee   MRN: [id number] : 1950          Preoperative Diagnosis: Other fracture of right femur, initial encounter for closed fracture (H) [S72.8X1A]    Procedure(s):  OPEN REDUCTION INTERNAL FIXATION, FRACTURE, FEMUR, USING INTRAMEDULLARY SHU.    Past Medical History:   Diagnosis Date     Anemia in ESRD (end-stage renal disease) (H)      Antiplatelet or antithrombotic long-term use      Anxiety      Dialysis patient (H)      Dyslipidemia      End stage kidney disease (H)      Heart attack (H)     not sure     Hypertension      Membranous glomerulonephritis      PONV (postoperative nausea and vomiting)      PUD (peptic ulcer disease)      Secondary hyperparathyroidism (of renal origin)      Stented coronary artery      Vitamin D deficiency      Past Surgical History:   Procedure Laterality Date     CYSTOSCOPY, REMOVE STENT(S), COMBINED Right 2016    Procedure: COMBINED CYSTOSCOPY, REMOVE STENT(S);  Surgeon: Cody Temple MD;  Location: [facility] OR     s/p right upper extremity AV fistula       TRANSPLANT      kidney transplant      VASCULAR SURGERY         Anesthesia Evaluation     . Pt has had prior anesthetic.     History of anesthetic complications   - PONV        ROS/MED HX    ENT/Pulmonary:      (-) asthma and COPD   Neurologic:       Cardiovascular:     (+) Dyslipidemia, hypertension--CAD, --stent,. Taking blood thinners : . . . :. .       METS/Exercise Tolerance:     Hematologic:     (+) Anemia, -      Musculoskeletal:         GI/Hepatic:     (+) GERD       Renal/Genitourinary:     (+) chronic renal disease, Pt has history of transplant,       Endo:         Psychiatric:     (+) psychiatric history anxiety      Infectious Disease:         Malignancy:         Other:                          Physical Exam  Normal systems: dental    Airway   Mallampati: II  TM distance: >3 FB  Neck ROM: full    Dental     Cardiovascular   Rhythm and rate:  "regular      Pulmonary    breath sounds clear to auscultation            Lab Results   Component Value Date    WBC 6.1 12/05/2020    HGB 13.5 12/05/2020    HCT 43.1 12/05/2020     12/05/2020     12/05/2020    POTASSIUM 4.4 12/05/2020    CHLORIDE 108 12/05/2020    CO2 26 12/05/2020    BUN 30 12/05/2020    CR 1.09 12/05/2020    GLC 98 12/05/2020    JUAN 9.3 12/05/2020    PHOS 2.6 12/06/2016    MAG 2.0 05/03/2016    ALBUMIN 3.7 12/06/2016    PROTTOTAL 7.5 11/16/2016    ALT 25 11/16/2016    AST 15 11/16/2016    ALKPHOS 116 11/16/2016    BILITOTAL 0.7 11/16/2016    PTT 26 12/05/2020    INR 1.08 12/05/2020       Preop Vitals  BP Readings from Last 3 Encounters:   12/06/20 (!) 142/95   01/06/20 131/87   07/16/19 133/87    Pulse Readings from Last 3 Encounters:   12/06/20 70   01/06/20 61   07/16/19 54      Resp Readings from Last 3 Encounters:   12/06/20 14   02/23/16 16   01/28/16 16    SpO2 Readings from Last 3 Encounters:   12/06/20 95%   01/06/20 100%   07/16/19 99%      Temp Readings from Last 1 Encounters:   12/06/20 37.2  C (98.9  F) (Oral)    Ht Readings from Last 1 Encounters:   12/05/20 1.778 m (5' 10\")      Wt Readings from Last 1 Encounters:   12/05/20 74.8 kg (165 lb)    Estimated body mass index is 23.68 kg/m  as calculated from the following:    Height as of this encounter: 1.778 m (5' 10\").    Weight as of this encounter: 74.8 kg (165 lb).       Anesthesia Plan      History & Physical Review  History and physical reviewed and following examination; no interval change.    ASA Status:  3 .    NPO Status:  > 8 hours    Plan for General   PONV prophylaxis:  Ondansetron (or other 5HT-3), Dexamethasone or Solumedrol and Scopolamine patch  Propofol gtt         Postoperative Care      Consents  Anesthetic plan, risks, benefits and alternatives discussed with:  Patient..                 Moira Brown  "

## 2020-12-06 NOTE — PROGRESS NOTES
[facility]    Medicine Progress Note - Hospitalist Service       Date of Admission:  12/5/2020  Assessment & Plan       Marlo Vee is a 70 year old male admitted on 12/5/2020. He presents with right hip pain. FTH right femur fracture, s/p intramedullary nailing 12/6.     Closed fracture of trochanter of right femur, initial encounter  S/p Right femur open reduction internal fixation with intramedullary nail 12/6/2020  CT hip right -> There is a comminuted nondisplaced fracture of the right greater trochanter, anterior most fracture line also extends through the anterior cortex in the intertrochanteric region. An intertrochanteric fracture line does not extend through  the posterior cortex. No other acute bony abnormality  - Orthopedic surgery consult appreciated  - Pain control as needed  - Fall precautions  - PT/OT     HTN, kidney transplant related  Membranous glomerulonephritis  Immunosuppression   Cr on admission stable at 1.09  - continue PTA Prograf / Bactrim / Cellcept  - Consult nephrology     Dyslipidemia  - continue PTA Lipitor     CAD, multiple vessel  Multiple vessel coronary artery disease  - continue PTA ASA / Carvedilol       Diet: Advance Diet as Tolerated: Regular Diet Adult    DVT Prophylaxis: defer to ortho  Ashford Catheter: not present  Code Status: Full Code           Disposition Plan   Expected discharge: 1-3 days, recommended to TBD once PT/OT consults, ortho clearance.  Entered: Winston Bennett MD 12/06/2020, 3:28 PM       The patient's care was discussed with the Bedside Nurse and Patient.    Winston Bennett MD  Hospitalist Service  [facility]  Contact information available via [facility] Paging/Directory    ______________________________________________________________________    Interval History   Seen post op. Feels okay, pain tolerable. No SOB, dyspnea. ROS x8 otherwise negative. No other complaints. Questions answered.       Data reviewed today: I reviewed all medications, new labs and imaging results over the last 24 hours. I personally reviewed no images or EKG's today.    Physical Exam   Vital Signs: Temp: 98  F (36.7  C) Temp src: Oral BP: 113/68 Pulse: 66   Resp: 19 SpO2: 96 % O2 Device: Nasal cannula Oxygen Delivery: 2 LPM  Weight: 165 lbs 0 oz  Constitutional: Awake, alert, cooperative, no apparent cardiopulmonary distress.  Eyes: Conjunctiva and pupils examined and normal.  HEENT: Moist mucous membranes, normal dentition.  Respiratory: Clear to auscultation bilaterally, no crackles or wheezing.  Cardiovascular: Regular rate and rhythm, normal S1 and S2, and no murmur noted.  GI: Soft, non-distended, non-tender, normal bowel sounds.  Lymph/Hematologic: No anterior cervical or supraclavicular adenopathy.  Skin: No rashes, no cyanosis, no edema noted on exposed skin.  Musculoskeletal: No joint swelling, erythema or tenderness. No gross bony abnormalities right hip with two small cdi bandages.   Neurologic: Cranial nerves 2-12 grossly intact, normal strength and sensation.  Psychiatric: Alert, oriented to person, place and time, no obvious anxiety or depression.      Data   Recent Labs   Lab 12/06/20  1420 12/05/20  1428   WBC  --  6.1   HGB  --  13.5   MCV  --  88   PLT  --  166   INR  --  1.08   NA  --  139   POTASSIUM  --  4.4   CHLORIDE  --  108   CO2  --  26   BUN  --  30   CR 1.22 1.09   ANIONGAP  --  5   JUAN  --  9.3   GLC  --  98     Recent Results (from the past 24 hour(s))   XR Surgery LARRY Fluoro L/T 5 Min w Stills    Narrative    SURGERY C-ARM FLUOROSCOPY LESS THAN FIVE MINUTES WITH STILLS   12/6/2020 10:20 AM     HISTORY: ORIF right hip.    FLUOROSCOPY TIME: 1.6 minutes  NUMBER OF IMAGES ACQUIRED: 4  VIEWS: 2      Impression    IMPRESSION: Shiv-pin fixation of the proximal femur fracture.    ALEJANDRO CHÁVEZ MD     Medications     lactated ringers 100 mL/hr at 12/06/20 1305       acetaminophen  975 mg Oral Q8H      atorvastatin  5 mg Oral At Bedtime     carvedilol  12.5 mg Oral QAM     carvedilol  25 mg Oral QPM     ceFAZolin  1 g Intravenous Q8H     docusate sodium  100 mg Oral BID     [START ON 12/7/2020] enoxaparin ANTICOAGULANT  40 mg Subcutaneous Q24H     losartan  25 mg Oral QPM     mycophenolate  750 mg Oral BID     [START ON 12/7/2020] polyethylene glycol  17 g Oral Daily     scopolamine  1 patch Transdermal Once     scopolamine   Transdermal Q8H     [START ON 12/7/2020] scopolamine   Transdermal Once     senna-docusate  1 tablet Oral BID     sertraline  25 mg Oral QAM     sodium chloride (PF)  3 mL Intracatheter Q8H     [START ON 12/7/2020] sulfamethoxazole-trimethoprim  1 tablet Oral Q Mon Wed Fri AM     tacrolimus  0.5 mg Oral QPM     tacrolimus  1 mg Oral QAM

## 2020-12-06 NOTE — ANESTHESIA POSTPROCEDURE EVALUATION
Patient: Marlo Vee    Procedure(s):  OPEN REDUCTION INTERNAL FIXATION, FRACTURE, FEMUR, USING INTRAMEDULLARY SHU.    Diagnosis:Other fracture of right femur, initial encounter for closed fracture (H) [S72.8X1A]  Diagnosis Additional Information: No value filed.    Anesthesia Type:  General    Note:  Anesthesia Post Evaluation    Patient location during evaluation: PACU  Patient participation: Able to fully participate in evaluation  Level of consciousness: awake, awake and alert and responsive to verbal stimuli  Pain management: adequate  Airway patency: patent  Cardiovascular status: acceptable  Respiratory status: acceptable  Hydration status: acceptable  PONV: none     Anesthetic complications: None          Last vitals:  Vitals:    12/06/20 1230 12/06/20 1234 12/06/20 1300   BP: 94/61  103/62   Pulse: 66  67   Resp: 22  18   Temp: 36.2  C (97.2  F)  36.8  C (98.2  F)   SpO2: 96% 96% 97%         Electronically Signed By: Moira Brown  December 6, 2020  1:07 PM

## 2020-12-06 NOTE — H&P
[facility]    History and Physical - Hospitalist Service       Date of Admission:  12/5/2020    Assessment & Plan   Marlo Vee is a 70 year old male admitted on 12/5/2020. He presents with right hip pain.       Closed fracture of trochanter of right femur, initial encounter (H)    Assessment:  CT hip right -> There is a comminuted nondisplaced fracture of the right greater trochanter, anterior most fracture line also extends through the anterior cortex in the intertrochanteric region. An intertrochanteric fracture line does not extend through  the posterior cortex. No other acute bony abnormality    Plan:   - admit to inpatient  - Orthopedic surgery eval  - Pain control as needed  - NPO after midnight  - Fall precautions      HTN, kidney transplant related    Membranous glomerulonephritis    Immunosuppression (H)    Assessment/Plan: Cr on admission stable at 1.09, continue PTA Prograf / Zoloft / Bactrim / Cellcept once verified      Dyslipidemia    AssessmentPlan:continue PTA Lipitor      CAD, multiple vessel    Multiple vessel coronary artery disease    Assessment/Plan: continue PTA ASA / Carvedilol       Diet: Regular Diet Adult  NPO for Medical/Clinical Reasons Except for: Ice Chips, Meds    DVT Prophylaxis: Pneumatic Compression Devices  Ashford Catheter: not present  Code Status: Full Code           Disposition Plan   Expected discharge: 2 - 3 days, recommended to transitional care unit once adequate pain management/ tolerating PO medications and safe disposition plan/ TCU bed available.  Entered: Ponce Bryant MD 12/05/2020, 8:09 PM     The patient's care was discussed with the Patient and ED Provider.    Ponce Bryant MD  [facility]  Contact information available via [facility] Paging/Directory      ______________________________________________________________________    Chief Complaint     Right Hip Pain     History is obtained from the patient    History  of Present Illness      Marlo Vee is a 70 year old male with past medical history of kidney transplant, membranous, nephritis, hyperlipidemia, coronary disease, who presents for evaluation of right hip pain following a mechanical fall.    Patient ports that he was  in his usual state of health, he was walking his dog and it began chasing a squirrel, which pulled him off balance and he landed on his right hip.  When he fell, he heard a crunching sound.  Following his fall he had significant anterior groin/hip pain.  The pain is worsened significantly with any movement of his right hip.  He denies any loss of consciousness, he denies any head trauma.  He denies any chest pain or shortness of breath prior to his fall.  He denies any recent fevers or chills, no nausea/vomiting or abdominal pain.  He denies any calf pain or leg swelling, he has no PND/orthopnea.  He has a history of coronary disease including 6/10 in his coronaries.  He denies any recent blood in his stool, no weight loss or night sweats.  He denies any urinary complaints of urgency or frequency, no recent hematuria.  He had a kidney transplant roughly 5 years ago.  He is currently on immunosuppression.  At this time he has no complaints.    Review of Systems      The 10 point Review of Systems is negative other than noted in the HPI or here.     Past Medical History    I have reviewed this patient's medical history and updated it with pertinent information if needed.   Past Medical History:   Diagnosis Date     Anemia in ESRD (end-stage renal disease) (H)      Antiplatelet or antithrombotic long-term use      Anxiety      Dialysis patient (H)      Dyslipidemia      End stage kidney disease (H)      Heart attack (H)     not sure     Hypertension      Membranous glomerulonephritis 2002     PONV (postoperative nausea and vomiting)      PUD (peptic ulcer disease)      Secondary hyperparathyroidism (of renal origin)      Stented coronary artery       Vitamin D deficiency        Past Surgical History   I have reviewed this patient's surgical history and updated it with pertinent information if needed.  Past Surgical History:   Procedure Laterality Date     CYSTOSCOPY, REMOVE STENT(S), COMBINED Right 2/23/2016    Procedure: COMBINED CYSTOSCOPY, REMOVE STENT(S);  Surgeon: Cody Temple MD;  Location: [facility] OR     s/p right upper extremity AV fistula       TRANSPLANT      kidney transplant      VASCULAR SURGERY         Social History   I have reviewed this patient's social history and updated it with pertinent information if needed.  Social History     Tobacco Use     Smoking status: Never Smoker     Smokeless tobacco: Never Used   Substance Use Topics     Alcohol use: No     Alcohol/week: 0.0 standard drinks     Comment: Patient reports drinking beer on a rare ocassion.     Drug use: No       Family History   I have reviewed this patient's family history and updated it with pertinent information if needed.  Family History   Problem Relation Age of Onset     Breast Cancer Mother      Cancer - colorectal Father      Coronary Artery Disease Father      Hypertension Father      Breast Cancer Sister      Cancer Brother         Pancreatic CA       Prior to Admission Medications   Prior to Admission Medications   Prescriptions Last Dose Informant Patient Reported? Taking?   CELLCEPT (BRAND) 250 MG capsule 12/5/2020 at AM Self No Yes   Sig: Take 3 capsules (750 mg) by mouth 2 times daily Do not open cap for feeding tube. BLOOD LEVEL may be needed BEFORE giving dose.   PROGRAF (BRAND) 0.5 MG capsule 12/5/2020 at am Self Yes Yes   Sig: Take 1 mg by mouth every morning   PROGRAF (BRAND) 0.5 MG capsule 12/4/2020 at PM Self Yes Yes   Sig: Take 0.5 mg by mouth every evening   aspirin 81 MG EC tablet 12/5/2020 at AM Self Yes Yes   Sig: Take 81 mg by mouth every morning   atorvastatin (LIPITOR) 10 MG tablet 12/4/2020 at HS Self Yes Yes   Sig: Take 5 mg by mouth At Bedtime (0.5  x 10 mg tablet)   carvedilol (COREG) 25 MG tablet 12/4/2020 at PM Self Yes Yes   Sig: Take 25 mg by mouth every evening In addition to 12.5 mg in the morning.   carvedilol (COREG) 25 MG tablet 12/5/2020 at AM Self Yes Yes   Sig: Take 12.5 mg by mouth every morning In addition to 25 mg every evening.    losartan (COZAAR) 25 MG tablet 12/4/2020 at HS Self No Yes   Sig: TAKE 1 TABLET BY MOUTH DAILY   sertraline (ZOLOFT) 25 MG tablet 12/5/2020 at AM Self Yes Yes   Sig: Take 25 mg by mouth every morning   sulfamethoxazole-trimethoprim (BACTRIM) 400-80 MG tablet 12/4/2020 at AM Self No Yes   Sig: Take 1 tablet by mouth Every Mon, Wed, Fri Morning   vitamin D3 (CHOLECALCIFEROL) 50 mcg (2000 units) tablet 12/5/2020 at AM Self Yes Yes   Sig: Take 1 tablet by mouth every morning      Facility-Administered Medications: None     Allergies   No Known Allergies    Physical Exam   Vital Signs: Temp: 99.5  F (37.5  C) Temp src: Oral BP: (!) 164/100 Pulse: 78   Resp: 12 SpO2: 100 % O2 Device: None (Room air)    Weight: 165 lbs 0 oz    Constitutional: awake, alert, cooperative, no apparent distress.   Eyes: Lids and lashes normal, pupils equal, round and reactive to light   ENT: Normocephalic, without obvious abnormality, atraumatic, sinuses nontender on palpation   Hematologic / Lymphatic: no cervical lymphadenopathy   Respiratory: CTABL   Cardiovascular: RRR with no m/r/g   GI: Normal bowel sounds, soft, non-distended, non-tender.   Skin: normal skin color, texture, turgor   Musculoskeletal: There is no redness, warmth, or swelling of the joints.  range of motion in the right hip is significantly limited secondary to pain.  Neurologic: Awake, alert, oriented to name, place and time. Cranial nerves II-XII are grossly intact. Motor is 5 out of 5 bilaterally. Sensory is intact.   Neuropsychiatric: normal mood and affect      Data   Data reviewed today: I reviewed all medications, new labs and imaging results over the last 24 hours. I  personally reviewed the right hip XR and CT hip Right image(s) showing see below.    CT Hip Right, per radiology:  1.  Nondisplaced comminuted fractured greater trochanter of the right proximal femur. An additional fracture line extends from the greater trochanteric fracture in the anterior intertrochanteric cortex indicating a partial intertrochanteric fracture   also.   2.  Mild osteoarthritis of the right hip. Vascular calcifications.      Right Femur XR, per radiology:    Findings are very suspicious for an intertrochanteric   right hip fracture. Due to an overlying skinfold on the lateral view,   an internal rotation or frog-leg view would be helpful for   confirmation if indicated clinically.     Most Recent 3 CBC's:  Recent Labs   Lab Test 12/05/20  1428 10/22/20  1010 08/27/20  0859   WBC 6.1 4.7 3.7*   HGB 13.5 12.6* 11.6*   MCV 88 91 91    172 169     Most Recent 3 BMP's:  Recent Labs   Lab Test 12/05/20  1428 10/22/20  1010 08/27/20  0859    137 138   POTASSIUM 4.4 3.9 3.9   CHLORIDE 108 106 106   CO2 26 27 27   BUN 30 21 19   CR 1.09 1.12 1.21   ANIONGAP 5 4 5   JUAN 9.3 9.1 8.8   GLC 98 96 96     Most Recent 2 LFT's:  Recent Labs   Lab Test 11/16/16  0934 07/19/16  0720   AST 15 20   ALT 25 21   ALKPHOS 116 142   BILITOTAL 0.7 0.4     Most Recent 3 INR's:  Recent Labs   Lab Test 12/05/20  1428 01/19/16  0809 04/09/15  0913   INR 1.08 1.12 1.24*     Recent Results (from the past 24 hour(s))   XR Pelvis 1/2 Views    Narrative    PELVIS ONE TO TWO VIEWS;  RIGHT FEMUR TWO VIEWS  12/5/2020 1:05 PM     HISTORY:  Fall.  Pelvic pain.    FINDINGS: Pelvic surgical clips.       Impression    IMPRESSION: Findings are very suspicious for an intertrochanteric  right hip fracture. Due to an overlying skinfold on the lateral view,  an internal rotation or frog-leg view would be helpful for  confirmation if indicated clinically.    ALEJANDRO CHÁVEZ MD   XR Femur Right 2 Views    Narrative    PELVIS ONE TO TWO  VIEWS;  RIGHT FEMUR TWO VIEWS  12/5/2020 1:05 PM     HISTORY:  Fall.  Pelvic pain.    FINDINGS: Pelvic surgical clips.       Impression    IMPRESSION: Findings are very suspicious for an intertrochanteric  right hip fracture. Due to an overlying skinfold on the lateral view,  an internal rotation or frog-leg view would be helpful for  confirmation if indicated clinically.    ALEJANDRO CHÁVEZ MD   CT Hip Right w/o Contrast    Narrative    EXAM: CT HIP RIGHT W/O CONTRAST  LOCATION: [facility]  DATE/TIME: 12/5/2020 2:42 PM    INDICATION: Evaluate fracture.  COMPARISON: Plain film 12/05/2020 at 1255.  TECHNIQUE: Noncontrast. Axial, sagittal and coronal thin-section reconstruction. Dose reduction techniques were used.   CONTRAST: None.    FINDINGS: There is a comminuted nondisplaced fracture of the right greater trochanter. The anterior most fracture line also extends through the anterior cortex in the intertrochanteric region. An intertrochanteric fracture line does not extend through   the posterior cortex. No other acute bony abnormality. Mild osteoarthritis of the right hip. No acute soft tissue abnormality. Vascular calcifications are seen. The visualized internal pelvic structures show no acute abnormality or free fluid.      Impression    IMPRESSION:  1.  Nondisplaced comminuted fractured greater trochanter of the right proximal femur. An additional fracture line extends from the greater trochanteric fracture in the anterior intertrochanteric cortex indicating a partial intertrochanteric fracture   also.  2.  Mild osteoarthritis of the right hip. Vascular calcifications.

## 2020-12-06 NOTE — PLAN OF CARE
Pt is alert and oriented. VSS on RA, afebrile. CMS intact, bedrest. Pain managed by Tylenol. Voids adequately per urinal. Plan for surgery today at 9 am. Will continue to monitor.

## 2020-12-06 NOTE — PLAN OF CARE
ED admit, arrived at 1730. Elevated /100 and low grade temp 99.5. other vitals stable. CMS intact. Rating pain 10/10 with movement, declined narcotic. NPO after midnight. A&OX4.

## 2020-12-07 ENCOUNTER — APPOINTMENT (OUTPATIENT)
Dept: PHYSICAL THERAPY | Facility: CLINIC | Age: 70
DRG: 481 | End: 2020-12-07
Attending: STUDENT IN AN ORGANIZED HEALTH CARE EDUCATION/TRAINING PROGRAM
Payer: MEDICARE

## 2020-12-07 ENCOUNTER — APPOINTMENT (OUTPATIENT)
Dept: PHYSICAL THERAPY | Facility: CLINIC | Age: 70
DRG: 481 | End: 2020-12-07
Payer: MEDICARE

## 2020-12-07 LAB
ANION GAP SERPL CALCULATED.3IONS-SCNC: 5 MMOL/L (ref 3–14)
BUN SERPL-MCNC: 26 MG/DL (ref 7–30)
CALCIUM SERPL-MCNC: 8.8 MG/DL (ref 8.5–10.1)
CHLORIDE SERPL-SCNC: 108 MMOL/L (ref 94–109)
CO2 SERPL-SCNC: 26 MMOL/L (ref 20–32)
CREAT SERPL-MCNC: 1.12 MG/DL (ref 0.66–1.25)
GFR SERPL CREATININE-BSD FRML MDRD: 67 ML/MIN/{1.73_M2}
GLUCOSE SERPL-MCNC: 132 MG/DL (ref 70–99)
GLUCOSE SERPL-MCNC: 148 MG/DL (ref 70–99)
HGB BLD-MCNC: 9.3 G/DL (ref 13.3–17.7)
POTASSIUM SERPL-SCNC: 3.7 MMOL/L (ref 3.4–5.3)
SODIUM SERPL-SCNC: 139 MMOL/L (ref 133–144)

## 2020-12-07 PROCEDURE — 97110 THERAPEUTIC EXERCISES: CPT | Mod: GP | Performed by: PHYSICAL THERAPIST

## 2020-12-07 PROCEDURE — 80048 BASIC METABOLIC PNL TOTAL CA: CPT | Performed by: INTERNAL MEDICINE

## 2020-12-07 PROCEDURE — 250N000011 HC RX IP 250 OP 636: Performed by: PHYSICIAN ASSISTANT

## 2020-12-07 PROCEDURE — 250N000013 HC RX MED GY IP 250 OP 250 PS 637: Performed by: PHYSICIAN ASSISTANT

## 2020-12-07 PROCEDURE — 120N000001 HC R&B MED SURG/OB

## 2020-12-07 PROCEDURE — 97116 GAIT TRAINING THERAPY: CPT | Mod: GP | Performed by: PHYSICAL THERAPIST

## 2020-12-07 PROCEDURE — 250N000013 HC RX MED GY IP 250 OP 250 PS 637: Performed by: STUDENT IN AN ORGANIZED HEALTH CARE EDUCATION/TRAINING PROGRAM

## 2020-12-07 PROCEDURE — 85018 HEMOGLOBIN: CPT | Performed by: PHYSICIAN ASSISTANT

## 2020-12-07 PROCEDURE — 36415 COLL VENOUS BLD VENIPUNCTURE: CPT | Performed by: PHYSICIAN ASSISTANT

## 2020-12-07 PROCEDURE — 250N000012 HC RX MED GY IP 250 OP 636 PS 637: Performed by: STUDENT IN AN ORGANIZED HEALTH CARE EDUCATION/TRAINING PROGRAM

## 2020-12-07 PROCEDURE — 97161 PT EVAL LOW COMPLEX 20 MIN: CPT | Mod: GP | Performed by: PHYSICAL THERAPIST

## 2020-12-07 PROCEDURE — 82947 ASSAY GLUCOSE BLOOD QUANT: CPT | Performed by: PHYSICIAN ASSISTANT

## 2020-12-07 PROCEDURE — 36415 COLL VENOUS BLD VENIPUNCTURE: CPT | Performed by: INTERNAL MEDICINE

## 2020-12-07 PROCEDURE — 99232 SBSQ HOSP IP/OBS MODERATE 35: CPT | Performed by: STUDENT IN AN ORGANIZED HEALTH CARE EDUCATION/TRAINING PROGRAM

## 2020-12-07 RX ADMIN — ENOXAPARIN SODIUM 40 MG: 40 INJECTION SUBCUTANEOUS at 09:04

## 2020-12-07 RX ADMIN — TACROLIMUS 1 MG: 1 CAPSULE, GELATIN COATED ORAL at 09:04

## 2020-12-07 RX ADMIN — CEFAZOLIN 1 G: 1 INJECTION, POWDER, FOR SOLUTION INTRAMUSCULAR; INTRAVENOUS at 00:09

## 2020-12-07 RX ADMIN — TACROLIMUS 0.5 MG: 0.5 CAPSULE, GELATIN COATED ORAL at 20:19

## 2020-12-07 RX ADMIN — ACETAMINOPHEN 975 MG: 325 TABLET, FILM COATED ORAL at 12:08

## 2020-12-07 RX ADMIN — DOCUSATE SODIUM 50 MG AND SENNOSIDES 8.6 MG 1 TABLET: 8.6; 5 TABLET, FILM COATED ORAL at 20:18

## 2020-12-07 RX ADMIN — LOSARTAN POTASSIUM 25 MG: 25 TABLET, FILM COATED ORAL at 20:19

## 2020-12-07 RX ADMIN — ATORVASTATIN CALCIUM 5 MG: 10 TABLET, FILM COATED ORAL at 21:39

## 2020-12-07 RX ADMIN — SERTRALINE HYDROCHLORIDE 25 MG: 25 TABLET ORAL at 09:04

## 2020-12-07 RX ADMIN — DOCUSATE SODIUM 100 MG: 100 CAPSULE, LIQUID FILLED ORAL at 20:19

## 2020-12-07 RX ADMIN — CARVEDILOL 25 MG: 25 TABLET, FILM COATED ORAL at 20:20

## 2020-12-07 RX ADMIN — CARVEDILOL 12.5 MG: 3.12 TABLET, FILM COATED ORAL at 09:04

## 2020-12-07 RX ADMIN — ACETAMINOPHEN 975 MG: 325 TABLET, FILM COATED ORAL at 05:41

## 2020-12-07 RX ADMIN — ACETAMINOPHEN 975 MG: 325 TABLET, FILM COATED ORAL at 20:18

## 2020-12-07 RX ADMIN — SULFAMETHOXAZOLE AND TRIMETHOPRIM 1 TABLET: 400; 80 TABLET ORAL at 10:06

## 2020-12-07 RX ADMIN — MYCOPHENOLATE MOFETIL 750 MG: 250 CAPSULE ORAL at 20:18

## 2020-12-07 RX ADMIN — MYCOPHENOLATE MOFETIL 750 MG: 250 CAPSULE ORAL at 09:04

## 2020-12-07 ASSESSMENT — ACTIVITIES OF DAILY LIVING (ADL)
ADLS_ACUITY_SCORE: 11

## 2020-12-07 NOTE — PROGRESS NOTES
[facility]    Medicine Progress Note - Hospitalist Service       Date of Admission:  12/5/2020  Assessment & Plan       Marlo Vee is a 70 year old male admitted on 12/5/2020. He presents with right hip pain. FTH right femur fracture, s/p intramedullary nailing 12/6.     Closed fracture of trochanter of right femur, initial encounter  S/p Right femur open reduction internal fixation with intramedullary nail 12/6/2020  CT hip right -> There is a comminuted nondisplaced fracture of the right greater trochanter, anterior most fracture line also extends through the anterior cortex in the intertrochanteric region. An intertrochanteric fracture line does not extend through  the posterior cortex. No other acute bony abnormality  - Orthopedic surgery consult appreciated  - Pain control as needed (most opiates cause N/V. Trial tramadol. May have tolerated dilaudid in the past.)  - Fall precautions  - PT/OT     HTN, kidney transplant related  Membranous glomerulonephritis  Immunosuppression   Cr on admission stable at 1.09  - continue PTA Prograf / Bactrim / Cellcept  - Consult nephrology     Dyslipidemia  - continue PTA Lipitor     CAD, multiple vessel  Multiple vessel coronary artery disease  - continue PTA ASA / Carvedilol       Diet: Advance Diet as Tolerated: Regular Diet Adult    DVT Prophylaxis: defer to ortho  Ashford Catheter: not present  Code Status: Full Code           Disposition Plan   Expected discharge: Tomorrow, recommended to prior living arrangement once PT/OT consults, ortho clearance. Medically stable for discharge.   Entered: Winston Bennett MD 12/07/2020, 3:19 PM       The patient's care was discussed with the Bedside Nurse and Patient.    Winston Bennett MD  Hospitalist Service  [facility]  Contact information available via [facility] Paging/Directory    ______________________________________________________________________    Interval  History   Seen in AM. Feels okay, pain tolerable, 1/10 sitting in the chair. No SOB, dyspnea. ROS x8 otherwise negative. No other complaints. Questions answered.      Data reviewed today: I reviewed all medications, new labs and imaging results over the last 24 hours. I personally reviewed no images or EKG's today.    Physical Exam   Vital Signs: Temp: 97.7  F (36.5  C) Temp src: Oral BP: 115/66 Pulse: 60   Resp: 16 SpO2: 99 % O2 Device: Nasal cannula Oxygen Delivery: 2 LPM  Weight: 167 lbs 6.4 oz  Constitutional: Awake, alert, cooperative, no apparent cardiopulmonary distress.  Eyes: Conjunctiva and pupils examined and normal.  HEENT: Moist mucous membranes, normal dentition.  Respiratory: Clear to auscultation bilaterally, no crackles or wheezing.  Cardiovascular: Regular rate and rhythm, normal S1 and S2, and no murmur noted.  GI: Soft, non-distended, non-tender, normal bowel sounds.  Lymph/Hematologic: No anterior cervical or supraclavicular adenopathy.  Skin: No rashes, no cyanosis, no edema noted on exposed skin.  Musculoskeletal: No joint swelling, erythema or tenderness. No gross bony abnormalities right hip with two small cdi bandages.   Neurologic: Cranial nerves 2-12 grossly intact, normal strength and sensation.  Psychiatric: Alert, oriented to person, place and time, no obvious anxiety or depression.      Data   Recent Labs   Lab 12/07/20  1133 12/07/20  0645 12/06/20  1420 12/05/20  1428   WBC  --   --   --  6.1   HGB  --  9.3*  --  13.5   MCV  --   --   --  88   PLT  --   --   --  166   INR  --   --   --  1.08     --   --  139   POTASSIUM 3.7  --   --  4.4   CHLORIDE 108  --   --  108   CO2 26  --   --  26   BUN 26  --   --  30   CR 1.12  --  1.22 1.09   ANIONGAP 5  --   --  5   JUAN 8.8  --   --  9.3   * 132*  --  98     No results found for this or any previous visit (from the past 24 hour(s)).  Medications     lactated ringers Stopped (12/06/20 2016)       acetaminophen  975 mg Oral Q8H      atorvastatin  5 mg Oral At Bedtime     carvedilol  12.5 mg Oral QAM     carvedilol  25 mg Oral QPM     docusate sodium  100 mg Oral BID     enoxaparin ANTICOAGULANT  40 mg Subcutaneous Q24H     losartan  25 mg Oral QPM     mycophenolate  750 mg Oral BID     polyethylene glycol  17 g Oral Daily     scopolamine   Transdermal Q8H     scopolamine   Transdermal Once     senna-docusate  1 tablet Oral BID     sertraline  25 mg Oral QAM     sodium chloride (PF)  3 mL Intracatheter Q8H     sulfamethoxazole-trimethoprim  1 tablet Oral Q Mon Wed Fri AM     tacrolimus  0.5 mg Oral QPM     tacrolimus  1 mg Oral QAM

## 2020-12-07 NOTE — PLAN OF CARE
Pt is A&O X4. VSS on 1 L of oxygen NC, afebrile. CMS intact and dressing has a small dried drainage. Pain is managed by ice and Tylenol. IV Sl. Voiding adequately per urinal. Up with assist of 1-2 using a walker & GB. Will continue to monitor.

## 2020-12-07 NOTE — PROGRESS NOTES
12/07/20 0944   Quick Adds   Type of Visit Initial PT Evaluation   Living Environment   People in home spouse   Current Living Arrangements house   Home Accessibility stairs within home;stairs to enter home   Number of Stairs, Main Entrance   (10)   Stair Railings, Main Entrance railing on left side (ascending)   Number of Stairs, Within Home, Primary   (13)   Stair Railings, Within Home, Primary railing on left side (ascending)   Transportation Anticipated car, drives self   Living Environment Comments Pt's spouse is able to assist at discharge.    Self-Care   Usual Activity Tolerance good   Current Activity Tolerance moderate   Regular Exercise Yes   Activity/Exercise Type walking   Equipment Currently Used at Home none   Disability/Function   Fall history within last six months yes   Number of times patient has fallen within last six months 1   General Information   Onset of Illness/Injury or Date of Surgery 12/05/20   Referring Physician Anthony Sanchez PA-C   Patient/Family Therapy Goals Statement (PT) Return home    Pertinent History of Current Problem (include personal factors and/or comorbidities that impact the POC) 69 y/o male POD # 1 R femur ORIF with IM nailing.    Existing Precautions/Restrictions fall   Weight-Bearing Status - RLE weight-bearing as tolerated   General Observations Pt in supine upon arrival of therapist.    Cognition   Orientation Status (Cognition) oriented x 3   Affect/Mental Status (Cognition) WFL   Follows Commands (Cognition) WFL   Pain Assessment   Patient Currently in Pain   (R hip pain at rest: 6/10)   Integumentary/Edema   Integumentary/Edema Comments R hip incisions covered with dressing.    Posture    Posture Comments Noted forward head and shoulder posture upon sitting EOB and standing at FWW.    Range of Motion (ROM)   ROM Comment Limited R hip ROM secondary to pain, otherwise B LEs WFL.    Strength   Strength Comments Not formally assessed, pt demonstrates at  least 3/5 grossly in B LEs with functional mobility.    Bed Mobility   Comment (Bed Mobility) Supine-sit with Willie.    Transfers   Transfer Safety Comments Sit <> stand with FWW and CGA.    Gait/Stairs (Locomotion)   Comment (Gait/Stairs) Pt amb 5' with FWW and CGA.    Balance   Balance Comments Noted good seated and standing balance at FWW.    Sensory Examination   Sensory Perception Comments Pt denies numbness/tingling in B LEs.    Clinical Impression   Criteria for Skilled Therapeutic Intervention yes, treatment indicated   PT Diagnosis (PT) Difficulty with functional mobility.    Influenced by the following impairments Pain, Impaired R hip ROM, Decreased strength, Decreased activity tolerance   Functional limitations due to impairments Limited functional mobility requiring AD and assist.    Clinical Presentation Stable/Uncomplicated   Clinical Presentation Rationale Based on PMH, current presentation, and social support.    Clinical Decision Making (Complexity) low complexity   Therapy Frequency (PT) 2x/day   Predicted Duration of Therapy Intervention (days/wks) 3 days   Planned Therapy Interventions (PT) bed mobility training;cryotherapy;gait training;ROM (range of motion);stair training;strengthening;transfer training   Anticipated Equipment Needs at Discharge (PT) walker, rolling   Risk & Benefits of therapy have been explained patient   PT Discharge Planning    PT Discharge Recommendation (DC Rec) home with home care physical therapy   PT Rationale for DC Rec Anticipate pt will continue to progress towards independence in order to safely discharge home with assist of spouse and Home PT. Pt will benefit from Home PT in order to progress independence and safety with functional mobility.    PT Brief overview of current status  Pt is currently requiring CGA-Willie with use of FWW, limited by nausea and pain.   Total Evaluation Time   Total Evaluation Time (Minutes) 5

## 2020-12-07 NOTE — PLAN OF CARE
A/Ox4. VSS RA, 1 L to sleep. Up with Ax2, GB, walker. Turn Q2. Dangled legs at bedside and took a few steps after standing a bedside. Denies nausea. IV SL. Pt wants to limit narcotics, scheduled tylenol for pain. Lower dressing small draining, ice applied. Tolerated soup and toast for dinner. Plan for PT/OT consult. Continue to monitor.

## 2020-12-07 NOTE — CONSULTS
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 Patient was walking his dog when the dog took off after a squirrel. He fell and broke his right hip. He had ORIF on 12/5. Currently, his pain is well-controlled. He denies fever, cough and flu-like symptoms. No chest or abdominal pain. No N/V/D.     He had a living unrelated kidney transplant in January of 2016. He allograft serum creatinine is ~ 1.1-1.3 mg/dl. The takes  mg bid, tacrolimus 1 mg in the morning and 0.5 mg at night. He takes Bactrim for PCP prophylaxis. He does not take prednisone.     ROS:  A complete review of systems was performed and is negative except as noted above.    PMH:    Past Medical History:   Diagnosis Date     Anemia in ESRD (end-stage renal disease) (H)      Antiplatelet or antithrombotic long-term use      Anxiety      Dialysis patient (H)      Dyslipidemia      End stage kidney disease (H)      Heart attack (H)     not sure     Hypertension      Membranous glomerulonephritis 2002     PONV (postoperative nausea and vomiting)      PUD (peptic ulcer disease)      Secondary hyperparathyroidism (of renal origin)      Stented coronary artery      Vitamin D deficiency        PSH:    Past Surgical History:   Procedure Laterality Date     CYSTOSCOPY, REMOVE STENT(S), COMBINED Right 2/23/2016    Procedure: COMBINED CYSTOSCOPY, REMOVE STENT(S);  Surgeon: Cody Temple MD;  Location: [facility] OR     s/p right upper extremity AV fistula       TRANSPLANT      kidney transplant      VASCULAR SURGERY         MEDICATIONS:      acetaminophen  975 mg Oral Q8H     atorvastatin  5 mg Oral At Bedtime     carvedilol  12.5 mg Oral QAM     carvedilol  25 mg Oral QPM     docusate sodium  100 mg Oral BID     enoxaparin ANTICOAGULANT  40 mg Subcutaneous  Q24H     losartan  25 mg Oral QPM     mycophenolate  750 mg Oral BID     polyethylene glycol  17 g Oral Daily     scopolamine   Transdermal Q8H     scopolamine   Transdermal Once     senna-docusate  1 tablet Oral BID     sertraline  25 mg Oral QAM     sodium chloride (PF)  3 mL Intracatheter Q8H     sulfamethoxazole-trimethoprim  1 tablet Oral Q Mon Wed Fri AM     tacrolimus  0.5 mg Oral QPM     tacrolimus  1 mg Oral QAM       ALLERGIES:    Allergies as of 12/05/2020     (No Known Allergies)       FH:    Family History   Problem Relation Age of Onset     Breast Cancer Mother      Cancer - colorectal Father      Coronary Artery Disease Father      Hypertension Father      Breast Cancer Sister      Cancer Brother         Pancreatic CA       SH:    Social History     Socioeconomic History     Marital status:      Spouse name: Not on file     Number of children: 4     Years of education: Not on file     Highest education level: Not on file   Occupational History     Not on file   Social Needs     Financial resource strain: Not on file     Food insecurity     Worry: Not on file     Inability: Not on file     Transportation needs     Medical: Not on file     Non-medical: Not on file   Tobacco Use     Smoking status: Never Smoker     Smokeless tobacco: Never Used   Substance and Sexual Activity     Alcohol use: No     Alcohol/week: 0.0 standard drinks     Comment: Patient reports drinking beer on a rare ocassion.     Drug use: No     Sexual activity: Not on file   Lifestyle     Physical activity     Days per week: Not on file     Minutes per session: Not on file     Stress: Not on file   Relationships     Social connections     Talks on phone: Not on file     Gets together: Not on file     Attends Congregational service: Not on file     Active member of club or organization: Not on file     Attends meetings of clubs or organizations: Not on file     Relationship status: Not on file     Intimate partner violence     Fear  "of current or ex partner: Not on file     Emotionally abused: Not on file     Physically abused: Not on file     Forced sexual activity: Not on file   Other Topics Concern     Parent/sibling w/ CABG, MI or angioplasty before 65F 55M? Not Asked   Social History Narrative     Not on file       PHYSICAL EXAM:    /66 (BP Location: Right arm)   Pulse 60   Temp 97.7  F (36.5  C) (Oral)   Resp 16   Ht 1.778 m (5' 10\")   Wt 75.9 kg (167 lb 6.4 oz)   SpO2 99%   BMI 24.02 kg/m    GENERAL: pleasant, alert, NAD  HEENT:  Normocephalic. No gross abnormalities.  Pupils equal.  MMM.  Dentition is ok.  CV: RRR, no murmurs, no clicks, gallops, or rubs, no edema, no carotid bruits  RESP: Clear bilaterally with good efforts  GI: Abdomen o/s/nt/nd, BS present. No masses, organomegaly. No pain over the kidney allograft.   MUSCULOSKELETAL: extremities nl - no gross deformities noted. No edema.   SKIN: no suspicious lesions or rashes, dry to touch  NEURO:  Strength normal and symmetric.   PSYCH: mood good, affect appropriate  LYMPH: No palpable ant/post cervical    LABS:      CBC RESULTS:     Recent Labs   Lab 12/07/20  0645 12/05/20  1428   WBC  --  6.1   RBC  --  4.88   HGB 9.3* 13.5   HCT  --  43.1   PLT  --  166       BMP RESULTS:  Recent Labs   Lab 12/07/20  0645 12/06/20  1420 12/05/20  1428   NA  --   --  139   POTASSIUM  --   --  4.4   CHLORIDE  --   --  108   CO2  --   --  26   BUN  --   --  30   CR  --  1.22 1.09   *  --  98   JUAN  --   --  9.3       INR  Recent Labs   Lab 12/05/20  1428   INR 1.08        DIAGNOSTICS:  Reviewed    A/P:  70 y.o man with kidney transplant.     # ESRD 2/2 MN s/p LDKT 12/2016:   -baseline ~ 1.1-1.3 mg/dl   -seems stable.   # Immunosuppressions:   - mg bid   -TAC 1 mg int he morning and 0.5 mg at bedtime   -TAC goal level 4-6  # PJP prophylaysix:   -Bactrim every MWF  # R hip fracture s/p mechanical fall s/p ORIF  # Hypertension: BP and may be on the low side.    -Coreg 12.5 " mg in the morning and 25 mg in the evening   -losartan 25 mg in the evening  # Anemia 2/2 acute blood loss from surgery    Plan:  # Continue home IS regimen  # Daily renal panel  # Avoid NSAIDs, IV dye and other nephrotoxins      Ezequiel Chavira MD  [facility] - Nephrology  Office Phone: [phone]  Pager: [phone]

## 2020-12-07 NOTE — PROGRESS NOTES
Orthopedic Surgery  Marlo Vee  2020  Admit Date:  2020  POD: 1 Day Post-Op   Procedure(s):  OPEN REDUCTION INTERNAL FIXATION, FRACTURE, FEMUR, USING INTRAMEDULLARY SHU.    Alert and oriented to person, place, and time.  Patient resting comfortably in chair.    Pain controlled at rest, increases with motion.  Tolerating oral intake.    Denies nausea or vomiting  Denies chest pain or shortness of breath    Vital Sign Ranges  Temperature Temp  Av.2  F (36.8  C)  Min: 97.7  F (36.5  C)  Max: 99  F (37.2  C)   Blood pressure Systolic (24hrs), Av , Min:103 , Max:154        Diastolic (24hrs), Av, Min:59, Max:89      Pulse Pulse  Av.3  Min: 60  Max: 89   Respirations Resp  Av.8  Min: 15  Max: 21   Pulse oximetry SpO2  Av.4 %  Min: 94 %  Max: 99 %       Dressing is intact with some drainage.    Minimal erythema of the surrounding skin.   Bilateral calves are soft, non-tender.  Right lower extremity is NVI.  Sensation intact bilateral lower extremities  Patient able to resist dorsi and plantar flexion bilaterally  +Dp pulse    Labs:  Recent Labs   Lab Test 20  1133 20  1428 10/22/20  1010   POTASSIUM 3.7 4.4 3.9     Recent Labs   Lab Test 20  0645 20  1428 10/22/20  1010   HGB 9.3* 13.5 12.6*     Recent Labs   Lab Test 20  1428 16  0809 04/09/15  0913   INR 1.08 1.12 1.24*     Recent Labs   Lab Test 20  1428 10/22/20  1010 20  0859    172 169       1. PLAN:   Continue Lovenox x 2 weeks for DVT prophylaxis.     Mobilize with PT/OT    WBAT with walker    Continue current pain regiment.   Dressings: Change prior to discharge home.      2. Disposition   Anticipate d/c to home 1-2 days when medically cleared and progressing in PT.    Moira Hickey PA-C

## 2020-12-07 NOTE — PLAN OF CARE
Pt A&Ox4. CMS intact. VSS. Up w/ A1-sometimes shaky on feet. Taking tylenol for pain. Voiding adequately in BR/urinal. BMx1 this shift. Discharge tomorrow with home care PT Continue to monitor.

## 2020-12-08 ENCOUNTER — APPOINTMENT (OUTPATIENT)
Dept: PHYSICAL THERAPY | Facility: CLINIC | Age: 70
DRG: 481 | End: 2020-12-08
Payer: MEDICARE

## 2020-12-08 ENCOUNTER — APPOINTMENT (OUTPATIENT)
Dept: GENERAL RADIOLOGY | Facility: CLINIC | Age: 70
DRG: 481 | End: 2020-12-08
Attending: ORTHOPAEDIC SURGERY
Payer: MEDICARE

## 2020-12-08 ENCOUNTER — APPOINTMENT (OUTPATIENT)
Dept: OCCUPATIONAL THERAPY | Facility: CLINIC | Age: 70
DRG: 481 | End: 2020-12-08
Attending: PHYSICIAN ASSISTANT
Payer: MEDICARE

## 2020-12-08 LAB
GLUCOSE BLDC GLUCOMTR-MCNC: 107 MG/DL (ref 70–99)
HGB BLD-MCNC: 8.7 G/DL (ref 13.3–17.7)

## 2020-12-08 PROCEDURE — 97116 GAIT TRAINING THERAPY: CPT | Mod: GP | Performed by: PHYSICAL THERAPY ASSISTANT

## 2020-12-08 PROCEDURE — 73552 X-RAY EXAM OF FEMUR 2/>: CPT | Mod: RT

## 2020-12-08 PROCEDURE — 97535 SELF CARE MNGMENT TRAINING: CPT | Mod: GO

## 2020-12-08 PROCEDURE — 99232 SBSQ HOSP IP/OBS MODERATE 35: CPT | Performed by: STUDENT IN AN ORGANIZED HEALTH CARE EDUCATION/TRAINING PROGRAM

## 2020-12-08 PROCEDURE — 250N000013 HC RX MED GY IP 250 OP 250 PS 637: Performed by: PHYSICIAN ASSISTANT

## 2020-12-08 PROCEDURE — 97530 THERAPEUTIC ACTIVITIES: CPT | Mod: GO

## 2020-12-08 PROCEDURE — 97165 OT EVAL LOW COMPLEX 30 MIN: CPT | Mod: GO

## 2020-12-08 PROCEDURE — 85018 HEMOGLOBIN: CPT | Performed by: PHYSICIAN ASSISTANT

## 2020-12-08 PROCEDURE — 999N001017 HC STATISTIC GLUCOSE BY METER IP

## 2020-12-08 PROCEDURE — 97110 THERAPEUTIC EXERCISES: CPT | Mod: GP | Performed by: PHYSICAL THERAPY ASSISTANT

## 2020-12-08 PROCEDURE — 36415 COLL VENOUS BLD VENIPUNCTURE: CPT | Performed by: PHYSICIAN ASSISTANT

## 2020-12-08 PROCEDURE — 250N000012 HC RX MED GY IP 250 OP 636 PS 637: Performed by: STUDENT IN AN ORGANIZED HEALTH CARE EDUCATION/TRAINING PROGRAM

## 2020-12-08 PROCEDURE — 120N000001 HC R&B MED SURG/OB

## 2020-12-08 PROCEDURE — 250N000013 HC RX MED GY IP 250 OP 250 PS 637: Performed by: STUDENT IN AN ORGANIZED HEALTH CARE EDUCATION/TRAINING PROGRAM

## 2020-12-08 PROCEDURE — 250N000011 HC RX IP 250 OP 636: Performed by: PHYSICIAN ASSISTANT

## 2020-12-08 PROCEDURE — 97530 THERAPEUTIC ACTIVITIES: CPT | Mod: GP | Performed by: PHYSICAL THERAPY ASSISTANT

## 2020-12-08 RX ORDER — TRAMADOL HYDROCHLORIDE 50 MG/1
50 TABLET ORAL EVERY 6 HOURS PRN
Qty: 20 TABLET | Refills: 0 | Status: SHIPPED | OUTPATIENT
Start: 2020-12-08 | End: 2021-04-29

## 2020-12-08 RX ORDER — ACETAMINOPHEN 325 MG/1
650 TABLET ORAL EVERY 6 HOURS PRN
Qty: 30 TABLET | Refills: 0 | Status: SHIPPED | OUTPATIENT
Start: 2020-12-08 | End: 2021-04-29

## 2020-12-08 RX ORDER — AMOXICILLIN 250 MG
1 CAPSULE ORAL 2 TIMES DAILY
Qty: 20 TABLET | Refills: 0 | Status: SHIPPED | OUTPATIENT
Start: 2020-12-08 | End: 2021-04-29

## 2020-12-08 RX ORDER — ONDANSETRON 4 MG/1
4 TABLET, ORALLY DISINTEGRATING ORAL EVERY 6 HOURS PRN
Qty: 8 TABLET | Refills: 0 | Status: SHIPPED | OUTPATIENT
Start: 2020-12-08 | End: 2022-09-24

## 2020-12-08 RX ADMIN — SERTRALINE HYDROCHLORIDE 25 MG: 25 TABLET ORAL at 08:47

## 2020-12-08 RX ADMIN — ONDANSETRON 4 MG: 4 TABLET, ORALLY DISINTEGRATING ORAL at 08:46

## 2020-12-08 RX ADMIN — ENOXAPARIN SODIUM 40 MG: 40 INJECTION SUBCUTANEOUS at 08:49

## 2020-12-08 RX ADMIN — ATORVASTATIN CALCIUM 5 MG: 10 TABLET, FILM COATED ORAL at 22:44

## 2020-12-08 RX ADMIN — CARVEDILOL 12.5 MG: 3.12 TABLET, FILM COATED ORAL at 08:47

## 2020-12-08 RX ADMIN — MYCOPHENOLATE MOFETIL 750 MG: 250 CAPSULE ORAL at 20:43

## 2020-12-08 RX ADMIN — CARVEDILOL 25 MG: 25 TABLET, FILM COATED ORAL at 20:43

## 2020-12-08 RX ADMIN — TACROLIMUS 1 MG: 1 CAPSULE, GELATIN COATED ORAL at 08:47

## 2020-12-08 RX ADMIN — LOSARTAN POTASSIUM 25 MG: 25 TABLET, FILM COATED ORAL at 20:44

## 2020-12-08 RX ADMIN — MYCOPHENOLATE MOFETIL 750 MG: 250 CAPSULE ORAL at 08:47

## 2020-12-08 RX ADMIN — TACROLIMUS 0.5 MG: 0.5 CAPSULE, GELATIN COATED ORAL at 20:43

## 2020-12-08 ASSESSMENT — ACTIVITIES OF DAILY LIVING (ADL)
PREVIOUS_RESPONSIBILITIES: MEAL PREP;HOUSEKEEPING;LAUNDRY;SHOPPING;FINANCES;DRIVING;WORK
ADLS_ACUITY_SCORE: 11

## 2020-12-08 ASSESSMENT — MIFFLIN-ST. JEOR: SCORE: 1592.27

## 2020-12-08 NOTE — PROGRESS NOTES
[facility]    Medicine Progress Note - Hospitalist Service       Date of Admission:  12/5/2020  Assessment & Plan       Marlo Vee is a 70 year old male admitted on 12/5/2020. He presents with right hip pain. FTH right femur fracture, s/p intramedullary nailing 12/6.     Closed fracture of trochanter of right femur, initial encounter  S/p Right femur open reduction internal fixation with intramedullary nail 12/6/2020  CT hip right -> There is a comminuted nondisplaced fracture of the right greater trochanter, anterior most fracture line also extends through the anterior cortex in the intertrochanteric region. An intertrochanteric fracture line does not extend through  the posterior cortex. No other acute bony abnormality  - Orthopedic surgery consult appreciated  - Pain control as needed (most opiates cause N/V. Trial tramadol. May have tolerated dilaudid in the past.)  - Fall precautions  - PT/OT     HTN, kidney transplant related  Membranous glomerulonephritis  Immunosuppression   Cr on admission stable at 1.09  - continue PTA Prograf / Bactrim / Cellcept  - Consult nephrology     Dyslipidemia  - continue PTA Lipitor     CAD, multiple vessel  Multiple vessel coronary artery disease  - continue PTA ASA / Carvedilol       Diet: Advance Diet as Tolerated: Regular Diet Adult  Diet  Diet    DVT Prophylaxis: defer to ortho  Ashford Catheter: not present  Code Status: Full Code           Disposition Plan   Expected discharge: Tomorrow, recommended to prior living arrangement once PT/OT consults, ortho clearance. Medically stable for discharge.   Entered: Winston Bennett MD 12/08/2020, 4:35 PM       The patient's care was discussed with the Bedside Nurse and Patient.    Winston Bennett MD  Hospitalist Service  [facility]  Contact information available via [facility]  Paging/Directory    ______________________________________________________________________    Interval History   Seen in AM. Feels okay, pain tolerable, only using tylenol so far. No SOB, dyspnea. Today a little more fatigued than yesterday. Mild lightheadedness while getting to the bathroom.  ROS x8 otherwise negative. No other complaints. Questions answered.      Data reviewed today: I reviewed all medications, new labs and imaging results over the last 24 hours. I personally reviewed no images or EKG's today.    Physical Exam   Vital Signs: Temp: 98.2  F (36.8  C) Temp src: Oral BP: 130/70 Pulse: 75   Resp: 18 SpO2: 92 % O2 Device: None (Room air)    Weight: 179 lbs 14.4 oz  Constitutional: Awake, alert, cooperative, no apparent cardiopulmonary distress.  Eyes: Conjunctiva and pupils examined and normal.  HEENT: Moist mucous membranes, normal dentition.  Respiratory: Clear to auscultation bilaterally, no crackles or wheezing.  Cardiovascular: Regular rate and rhythm, normal S1 and S2, and no murmur noted.  GI: Soft, non-distended, non-tender, normal bowel sounds.  Lymph/Hematologic: No anterior cervical or supraclavicular adenopathy.  Skin: No rashes, no cyanosis, no edema noted on exposed skin.  Musculoskeletal: No joint swelling, erythema or tenderness. No gross bony abnormalities right hip with two small cdi bandages.   Neurologic: Cranial nerves 2-12 grossly intact, normal strength and sensation.  Psychiatric: Alert, oriented to person, place and time, no obvious anxiety or depression.      Data   Recent Labs   Lab 12/08/20  0753 12/07/20  1133 12/07/20  0645 12/06/20  1420 12/05/20  1428   WBC  --   --   --   --  6.1   HGB 8.7*  --  9.3*  --  13.5   MCV  --   --   --   --  88   PLT  --   --   --   --  166   INR  --   --   --   --  1.08   NA  --  139  --   --  139   POTASSIUM  --  3.7  --   --  4.4   CHLORIDE  --  108  --   --  108   CO2  --  26  --   --  26   BUN  --  26  --   --  30   CR  --  1.12  --  1.22  1.09   ANIONGAP  --  5  --   --  5   JUAN  --  8.8  --   --  9.3   GLC  --  148* 132*  --  98     Recent Results (from the past 24 hour(s))   XR Femur Right 2 Views    Narrative    FEMUR RIGHT TWO VIEWS December 8, 2020 10:17 AM     HISTORY: Status post IM nail.    COMPARISON: Operative images 12/6/2020.      Impression    IMPRESSION: Again there is nikky-pin fixation of the proximal femur  fracture. Overlying skin wilberto.    ALEJANDRO CHÁVEZ MD     Medications       acetaminophen  975 mg Oral Q8H     atorvastatin  5 mg Oral At Bedtime     carvedilol  12.5 mg Oral QAM     carvedilol  25 mg Oral QPM     docusate sodium  100 mg Oral BID     enoxaparin ANTICOAGULANT  40 mg Subcutaneous Q24H     losartan  25 mg Oral QPM     mycophenolate  750 mg Oral BID     polyethylene glycol  17 g Oral Daily     scopolamine   Transdermal Once     senna-docusate  1 tablet Oral BID     sertraline  25 mg Oral QAM     sulfamethoxazole-trimethoprim  1 tablet Oral Q Mon Wed Fri AM     tacrolimus  0.5 mg Oral QPM     tacrolimus  1 mg Oral QAM

## 2020-12-08 NOTE — PLAN OF CARE
OT: Order received, chart reviewed, eval attempted. Pt requesting to reschedule OT eval for this afternoon due to fatigue and pain.

## 2020-12-08 NOTE — PROGRESS NOTES
12/08/20 1400   Quick Adds   Type of Visit Initial Occupational Therapy Evaluation   Living Environment   People in home spouse   Current Living Arrangements house   Home Accessibility stairs within home;stairs to enter home   Number of Stairs, Main Entrance 10   Stair Railings, Main Entrance railing on left side (ascending)   Number of Stairs, Within Home, Primary   (13)   Stair Railings, Within Home, Primary railing on left side (ascending)   Transportation Anticipated car, drives self   Living Environment Comments Pt lives at home w/his spouse. Pt's bedroom and bathoom is upstairs. Pt has a walk in shower, no shower chair or grab bar.   Self-Care   Usual Activity Tolerance good   Current Activity Tolerance moderate   Equipment Currently Used at Home none   Activity/Exercise/Self-Care Comment Pt reports being independent in ADLs and IADLs including cooking, cleaning ,driving. 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    Disability/Function   Hearing Difficulty or Deaf no   Wear Glasses or Blind yes   Vision Management glasses   Concentrating, Remembering or Making Decisions Difficulty no   Difficulty Communicating no   Difficulty Eating/Swallowing no   Walking or Climbing Stairs Difficulty no   Dressing/Bathing Difficulty no   Toileting issues no   Doing Errands Independently Difficulty (such as shopping) no   Fall history within last six months yes   Number of times patient has fallen within last six months 1   Change in Functional Status Since Onset of Current Illness/Injury yes   General Information   Onset of Illness/Injury or Date of Surgery 12/05/20   Referring Physician Ines Franco PA-C   Additional Occupational Profile Info/Pertinent History of Current Problem 71 y/o male POD # 1 R femur ORIF with IM nailing.   Existing Precautions/Restrictions fall   Right Lower Extremity (Weight-bearing Status) weight-bearing as tolerated (WBAT)   Cognitive Status Examination   Orientation  "Status orientation to person, place and time   Cognitive Status Comments Oriented to place, day of week, date, month, year,    Visual Perception   Visual Impairment/Limitations WNL   Sensory   Sensory Quick Adds Pain   Sensory Comments Pain is 8/10 during movement, but feels fine at rest   Pain Assessment   Patient Currently in Pain Yes, see Vital Sign flowsheet   Range of Motion Comprehensive   General Range of Motion other (see comments)   Comment, General Range of Motion Decreased ROM in RLE 2\" pain; BUE Shoulders, biceps, and triceps WFL   Strength Comprehensive (MMT)   General Manual Muscle Testing (MMT) Assessment no strength deficits identified   Comment, General Manual Muscle Testing (MMT) Assessment BUE biceps, triceps, and shoulders 5/5, grasp WFL   Bed Mobility   Bed Mobility supine-sit;sit-supine   Supine-Sit Arthur (Bed Mobility) contact guard   Sit-Supine Arthur (Bed Mobility) moderate assist (50% patient effort)   Transfers   Transfers sit-stand transfer   Sit-Stand Transfer   Sit-Stand Arthur (Transfers) minimum assist (75% patient effort)   Activities of Daily Living   BADL Assessment/Intervention upper body dressing;lower body dressing;grooming;toileting   Upper Body Dressing Assessment/Training   Arthur Level (Upper Body Dressing) contact guard assist   Lower Body Dressing Assessment/Training   Arthur Level (Lower Body Dressing) moderate assist (50% patient effort)   Grooming Assessment/Training   Arthur Level (Grooming) contact guard assist   Toileting   Arthur Level (Toileting) minimum assist (75% patient effort)   Instrumental Activities of Daily Living (IADL)   Previous Responsibilities meal prep;housekeeping;laundry;shopping;finances;driving;work   Clinical Impression   Criteria for Skilled Therapeutic Interventions Met (OT) yes   OT Diagnosis Decreased independence and safety w/ADLs and IADLs   OT Problem List-Impairments impacting ADL problems " related to;activity tolerance impaired;balance;flexibility;range of motion (ROM);mobility;pain   Assessment of Occupational Performance 1-3 Performance Deficits   Identified Performance Deficits Decreased independence and safety w/bathing, dressing, toileting, g/h, oral-self cares, functional mobility, and all IADLs   Planned Therapy Interventions (OT) ADL retraining;IADL retraining   Clinical Decision Making Complexity (OT) low complexity   Therapy Frequency (OT) Daily   Predicted Duration of Therapy 2 days   Risks and Benefits of Treatment have been explained. Yes   Patient, Family & other staff in agreement with plan of care Yes   OT Discharge Planning    OT Discharge Recommendation (DC Rec) Home with assist   OT Rationale for DC Rec Pt is progressing well following surgery aside from pain and dizziness. Anticipate pt will be able to D/C safely w/A from wife. Pt would benefit from A w/bathing initially and all IADLs.    Total Evaluation Time (Minutes)   Total Evaluation Time (Minutes) 8

## 2020-12-08 NOTE — PROGRESS NOTES
Orthopedic Surgery  Marlo Vee  2020  Admit Date:  2020  POD: 2 Days Post-Op   Procedure(s):  OPEN REDUCTION INTERNAL FIXATION, FRACTURE, FEMUR, USING INTRAMEDULLARY SHU.    Alert and oriented to person, place, and time.  Patient resting comfortably in bed.  Did have some light headedness this am.    Pain controlled at rest but quite painful attempting to get out of bed.   Tolerating oral intake.    Denies nausea or vomiting  Denies chest pain or shortness of breath    Vital Sign Ranges  Temperature Temp  Av.9  F (37.2  C)  Min: 98  F (36.7  C)  Max: 99.7  F (37.6  C)   Blood pressure Systolic (24hrs), Av , Min:121 , Max:128        Diastolic (24hrs), Av, Min:65, Max:80      Pulse Pulse  Av.7  Min: 61  Max: 82   Respirations Resp  Av  Min: 16  Max: 16   Pulse oximetry SpO2  Av.3 %  Min: 90 %  Max: 96 %       Dressing is intact with some drainage.   Minimal erythema of the surrounding skin.   Bilateral calves are soft, non-tender.  Right lower extremity is NVI.  Sensation intact bilateral lower extremities  Patient able to resist dorsi and plantar flexion bilaterally  +Dp pulse    Labs:  Recent Labs   Lab Test 20  1133 20  1428 10/22/20  1010   POTASSIUM 3.7 4.4 3.9     Recent Labs   Lab Test 20  0753 20  0645 20  1428   HGB 8.7* 9.3* 13.5     Recent Labs   Lab Test 20  1428 16  0809 04/09/15  0913   INR 1.08 1.12 1.24*     Recent Labs   Lab Test 20  1428 10/22/20  1010 20  0859    172 169        1. PLAN:              Continue Lovenox x 2 weeks then ASA 325mg x 2 weeks for DVT prophylaxis.                Mobilize with PT/OT               WBAT with walker               Continue current pain regiment.              Dressings: Change prior to discharge home.       2. Disposition              Anticipate d/c to home tomorrow. Patient in agreement with plan.      Moira Hickey PA-C

## 2020-12-08 NOTE — PROGRESS NOTES
On license of [facility]  Due to a high volume of referrals, [facility] has requested this patient's home care episode be transferred to their deferral partner [facility] [phone]. Care team and patient notified.     Lucille Vigil RN   [facility] Care Liaison   [phone]

## 2020-12-08 NOTE — PLAN OF CARE
A&Ox 4. Assist X1 with gait belt and walker. VSS on RA. Dressing Small amt of drainage. Pain controlled with Tylenol. Tolerating Reg Diet. Progressing per POC. May discharge today.

## 2020-12-08 NOTE — PROGRESS NOTES
" Renal Medicine Progress Note            Assessment/Plan:     A/P:  70 y.o man with kidney transplant.      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   # Immunosuppressions:               - mg bid               -TAC 1 mg int he morning and 0.5 mg at bedtime               -TAC goal level 4-6  # PJP prophylaysix:               -Bactrim every MWF  # R hip fracture s/p mechanical fall s/p ORIF  # Hypertension: BP and may be on the low side.                -Coreg 12.5 mg in the morning and 25 mg in the evening               -losartan 25 mg in the evening  # Anemia 2/2 acute blood loss from surgery     Plan:  # Cont IS  # Renal panel in AM        Interval History:     Afebrile. VSS. \"I feel good,\" he says. Pain is controlled. No cardiopulmonary complaints. No labs today.           Medications and Allergies:       acetaminophen  975 mg Oral Q8H     atorvastatin  5 mg Oral At Bedtime     carvedilol  12.5 mg Oral QAM     carvedilol  25 mg Oral QPM     docusate sodium  100 mg Oral BID     enoxaparin ANTICOAGULANT  40 mg Subcutaneous Q24H     losartan  25 mg Oral QPM     mycophenolate  750 mg Oral BID     polyethylene glycol  17 g Oral Daily     scopolamine   Transdermal Once     senna-docusate  1 tablet Oral BID     sertraline  25 mg Oral QAM     sodium chloride (PF)  3 mL Intracatheter Q8H     sulfamethoxazole-trimethoprim  1 tablet Oral Q Mon Wed Fri AM     tacrolimus  0.5 mg Oral QPM     tacrolimus  1 mg Oral QAM      No Known Allergies         Physical Exam:   Vitals were reviewed   , Blood pressure 128/78, pulse 78, temperature 98  F (36.7  C), temperature source Oral, resp. rate 16, height 1.778 m (5' 10\"), weight 81.6 kg (179 lb 14.4 oz), SpO2 96 %.    Wt Readings from Last 3 Encounters:   12/08/20 81.6 kg (179 lb 14.4 oz)   01/06/20 77.2 kg (170 lb 3.2 oz)   07/16/19 74.6 kg (164 lb 8 oz)       Intake/Output Summary (Last 24 hours) at 12/8/2020 " 1303  Last data filed at 12/8/2020 0845  Gross per 24 hour   Intake 700 ml   Output 850 ml   Net -150 ml     GENERAL: pleasant, alert, NAD  HEENT:  Normocephalic. No gross abnormalities.  Pupils equal.  MMM.  Dentition is ok.  CV: RRR, no murmurs, no clicks, gallops, or rubs, no edema, no carotid bruits  RESP: Clear bilaterally with good efforts  GI: Abdomen o/s/nt/nd, BS present. No masses, organomegaly. No pain over the kidney allograft.   MUSCULOSKELETAL: extremities nl - no gross deformities noted. No edema.   SKIN: no suspicious lesions or rashes, dry to touch  NEURO:  Strength normal and symmetric.   PSYCH: mood good, affect appropriate         Data:     CBC RESULTS:     Recent Labs   Lab 12/08/20  0753 12/07/20  0645 12/05/20  1428   WBC  --   --  6.1   RBC  --   --  4.88   HGB 8.7* 9.3* 13.5   HCT  --   --  43.1   PLT  --   --  166       Basic Metabolic Panel:  Recent Labs   Lab 12/07/20  1133 12/07/20  0645 12/06/20  1420 12/05/20  1428     --   --  139   POTASSIUM 3.7  --   --  4.4   CHLORIDE 108  --   --  108   CO2 26  --   --  26   BUN 26  --   --  30   CR 1.12  --  1.22 1.09   * 132*  --  98   JUAN 8.8  --   --  9.3       INR  Recent Labs   Lab 12/05/20  1428   INR 1.08      Attestation:   I have reviewed today's relevant vital signs, notes, medications, labs and imaging.    Ezequiel Chavira MD  [facility] - Nephrology  Office phone :[phone]  Pager: [phone]

## 2020-12-09 ENCOUNTER — APPOINTMENT (OUTPATIENT)
Dept: PHYSICAL THERAPY | Facility: CLINIC | Age: 70
DRG: 481 | End: 2020-12-09
Payer: MEDICARE

## 2020-12-09 ENCOUNTER — APPOINTMENT (OUTPATIENT)
Dept: OCCUPATIONAL THERAPY | Facility: CLINIC | Age: 70
DRG: 481 | End: 2020-12-09
Payer: MEDICARE

## 2020-12-09 VITALS
SYSTOLIC BLOOD PRESSURE: 122 MMHG | RESPIRATION RATE: 16 BRPM | TEMPERATURE: 98.7 F | WEIGHT: 179.87 LBS | BODY MASS INDEX: 25.75 KG/M2 | HEART RATE: 65 BPM | DIASTOLIC BLOOD PRESSURE: 74 MMHG | OXYGEN SATURATION: 92 % | HEIGHT: 70 IN

## 2020-12-09 LAB
ALBUMIN SERPL-MCNC: 2.8 G/DL (ref 3.4–5)
ANION GAP SERPL CALCULATED.3IONS-SCNC: 2 MMOL/L (ref 3–14)
BUN SERPL-MCNC: 26 MG/DL (ref 7–30)
CALCIUM SERPL-MCNC: 8.4 MG/DL (ref 8.5–10.1)
CHLORIDE SERPL-SCNC: 109 MMOL/L (ref 94–109)
CO2 SERPL-SCNC: 28 MMOL/L (ref 20–32)
CREAT SERPL-MCNC: 1.01 MG/DL (ref 0.66–1.25)
GFR SERPL CREATININE-BSD FRML MDRD: 76 ML/MIN/{1.73_M2}
GLUCOSE SERPL-MCNC: 101 MG/DL (ref 70–99)
INTERPRETATION ECG - MUSE: NORMAL
PHOSPHATE SERPL-MCNC: 2.4 MG/DL (ref 2.5–4.5)
PLATELET # BLD AUTO: 125 10E9/L (ref 150–450)
POTASSIUM SERPL-SCNC: 3.6 MMOL/L (ref 3.4–5.3)
SODIUM SERPL-SCNC: 139 MMOL/L (ref 133–144)

## 2020-12-09 PROCEDURE — 97535 SELF CARE MNGMENT TRAINING: CPT | Mod: GO

## 2020-12-09 PROCEDURE — 250N000011 HC RX IP 250 OP 636: Performed by: PHYSICIAN ASSISTANT

## 2020-12-09 PROCEDURE — 80069 RENAL FUNCTION PANEL: CPT | Performed by: PHYSICIAN ASSISTANT

## 2020-12-09 PROCEDURE — 99239 HOSP IP/OBS DSCHRG MGMT >30: CPT | Performed by: STUDENT IN AN ORGANIZED HEALTH CARE EDUCATION/TRAINING PROGRAM

## 2020-12-09 PROCEDURE — 97116 GAIT TRAINING THERAPY: CPT | Mod: GP

## 2020-12-09 PROCEDURE — 250N000013 HC RX MED GY IP 250 OP 250 PS 637: Performed by: STUDENT IN AN ORGANIZED HEALTH CARE EDUCATION/TRAINING PROGRAM

## 2020-12-09 PROCEDURE — 85049 AUTOMATED PLATELET COUNT: CPT | Performed by: PHYSICIAN ASSISTANT

## 2020-12-09 PROCEDURE — 97530 THERAPEUTIC ACTIVITIES: CPT | Mod: GP

## 2020-12-09 PROCEDURE — 250N000012 HC RX MED GY IP 250 OP 636 PS 637: Performed by: STUDENT IN AN ORGANIZED HEALTH CARE EDUCATION/TRAINING PROGRAM

## 2020-12-09 PROCEDURE — 36415 COLL VENOUS BLD VENIPUNCTURE: CPT | Performed by: PHYSICIAN ASSISTANT

## 2020-12-09 RX ADMIN — SULFAMETHOXAZOLE AND TRIMETHOPRIM 1 TABLET: 400; 80 TABLET ORAL at 08:46

## 2020-12-09 RX ADMIN — CARVEDILOL 12.5 MG: 3.12 TABLET, FILM COATED ORAL at 08:46

## 2020-12-09 RX ADMIN — MYCOPHENOLATE MOFETIL 750 MG: 250 CAPSULE ORAL at 08:46

## 2020-12-09 RX ADMIN — ENOXAPARIN SODIUM 40 MG: 40 INJECTION SUBCUTANEOUS at 08:52

## 2020-12-09 RX ADMIN — SERTRALINE HYDROCHLORIDE 25 MG: 25 TABLET ORAL at 08:46

## 2020-12-09 RX ADMIN — TACROLIMUS 1 MG: 1 CAPSULE, GELATIN COATED ORAL at 08:46

## 2020-12-09 ASSESSMENT — MIFFLIN-ST. JEOR: SCORE: 1592.15

## 2020-12-09 ASSESSMENT — ACTIVITIES OF DAILY LIVING (ADL)
ADLS_ACUITY_SCORE: 11
ADLS_ACUITY_SCORE: 13

## 2020-12-09 NOTE — PROGRESS NOTES
Orthopedic Surgery  Marlo Vee  2020  Admit Date:  2020  POD: 3 Days Post-Op   Procedure(s):  OPEN REDUCTION INTERNAL FIXATION, FRACTURE, FEMUR, USING INTRAMEDULLARY SHU.    Alert and oriented to person, place, and time.  Patient resting comfortably in bed.    Pain improving.  Tolerating oral intake.    Denies nausea or vomiting  Denies chest pain or shortness of breath    Vital Sign Ranges  Temperature Temp  Av.8  F (37.1  C)  Min: 98.2  F (36.8  C)  Max: 99.2  F (37.3  C)   Blood pressure Systolic (24hrs), Av , Min:130 , Max:145        Diastolic (24hrs), Av, Min:70, Max:91      Pulse Pulse  Av.3  Min: 68  Max: 78   Respirations Resp  Av.5  Min: 16  Max: 18   Pulse oximetry SpO2  Av.5 %  Min: 90 %  Max: 95 %       Dressing is clean, dry, and intact.   Minimal erythema of the surrounding skin.   Bilateral calves are soft, non-tender.  Right lower extremity is NVI.  Sensation intact bilateral lower extremities  Patient able to resist dorsi and plantar flexion bilaterally  +Dp pulse    Labs:  Recent Labs   Lab Test 20  0716 20  1133 20  1428   POTASSIUM 3.6 3.7 4.4     Recent Labs   Lab Test 20  0753 20  0645 20  1428   HGB 8.7* 9.3* 13.5     Recent Labs   Lab Test 20  1428 16  0809 04/09/15  0913   INR 1.08 1.12 1.24*     Recent Labs   Lab Test 20  0716 20  1428 10/22/20  1010   * 166 172        1. PLAN:              Continue Lovenox x 2 weeks then ASA 325mg x 2 weeks for DVT prophylaxis.                Mobilize with PT/OT               WBAT with walker               Continue current pain regiment.              Dressings: Change prior to discharge home today.       2. Disposition              Anticipate d/c to home today. Patient in agreement with plan.      Moira Hickey PA-C

## 2020-12-09 NOTE — DISCHARGE INSTRUCTIONS
HOMECARE NOTE:   Due to a high volume of referrals, [facility] has requested this patient's home care episode be transferred to their deferral partner [facility] [phone]. Care team and patient notified.   *please see homecare quality ratings for all homecares in your area at www.medicare.gov     An appointment with your Primary care provider, Dr. Vaishnavi Hernandez has been scheduled for you on December 15th ath 1 PM at the [facility].  [phone] [facility]   [address] ,   [place]*         Per Dr Bennett: Take 81mg Aspirin with Lovenox for 2 weeks. Then take 325mg Aspirin for another 2 weeks. Then Back to Aspirin 81mg

## 2020-12-09 NOTE — PROGRESS NOTES
Care Management Discharge Note    Discharge Date: 12/09/20       Discharge Disposition: Home Care    Discharge Services:      Discharge DME: Walker    Discharge Transportation: family or friend will provide    Private pay costs discussed: Not applicable    PAS Confirmation Code:  NA  Patient/family educated on Medicare website which has current facility and service quality ratings: yes    Education Provided on the Discharge Plan:  yes  Persons Notified of Discharge Plans: Marlo via phone  Patient/Family in Agreement with the Plan: yes    Handoff Referral Completed: Yes    Additional Information:  I spoke with Marlo and he stated his PCP has retired.  A new PCP has been assigned to him at [facility].  I am not able to add Dr. Vaishnavi Hernandez as his PCP in [facility] so I placed it in comments section with his old PCP listed.     Appointment has been added to the AVS:  Follow up with primary care provider, Dr. Vaishnavi Hernandez within 7 days for hospital follow- up and establish care.  The following labs/tests are recommended: BMP.  You have a Follow up Appointment with Dr. Vaishnavi Hernandez at [facility] on Tuesday 12/15 at 1:00PM  Address :    [address], [place] [address]   If you have any questions about this appointment or need to reschedule it please call the clinic at Phone Number [phone]    The pt already has a completed home care referral and it is on the AVS:  Due to a high volume of referrals, Replaced by [facility] has requested this patient's home care episode be transferred to their deferral partner [facility] [phone]. Care team and patient notified.   *please see homecare quality ratings for all homecares in your area at www.medicare.gov     The pt will be picked up from his wife.  The pt stated his wife will be able to assist him at home and he will be sent home with a walker and crutches.      The pt is 22% Risk of Unplanned readmission  therefore a handoff was sent to his Health Partner PCP for OP Care Coordination need to be assessed.       Ladan Rai RN, BSN Care Coordinator  [facility]  Mobile: [phone]

## 2020-12-09 NOTE — PLAN OF CARE
Pt up w/ A1+walker. Voiding adequately per urinal. CMS intact. Unchanged dried drainage. Denies pain. Plan to discharge home today.

## 2020-12-09 NOTE — PLAN OF CARE
Pt is A & O x 4. Lungs sound clear, bowel sounds active, cms intact. Up with 1 and walker. Dressing was changed. Complain of pain but refused pain meds. Had 2-3 loose BMs today. Discharge instructions and meds were reviewed and given. Was discharged home.

## 2020-12-09 NOTE — PROGRESS NOTES
" Renal Medicine Progress Note            Assessment/Plan:     A/P:  70 y.o man with kidney transplant.      # [place] 2/2 [place] LDKT 12/2016:               -baseline ~ 1.1-1.3 mg/dl               -stable  # Immunosuppressions:               - mg bid               -TAC 1 mg int he morning and 0.5 mg at bedtime               -TAC goal level 4-6  # PJP prophylaysix:               -Bactrim every MWF  # R hip fracture s/p mechanical fall s/p ORIF  # Hypertension: BP and may be on the low side.                -Coreg 12.5 mg in the morning and 25 mg in the evening               -losartan 25 mg in the evening  # Anemia 2/2 acute blood loss from surgery    Plan:  # Kidney allograft function is at baseline. Cont same IS.         Interval History:     Afebrile. VSS.  He feels well. He is ready to go home. No complaints.          Medications and Allergies:       acetaminophen  975 mg Oral Q8H     atorvastatin  5 mg Oral At Bedtime     carvedilol  12.5 mg Oral QAM     carvedilol  25 mg Oral QPM     docusate sodium  100 mg Oral BID     enoxaparin ANTICOAGULANT  40 mg Subcutaneous Q24H     losartan  25 mg Oral QPM     mycophenolate  750 mg Oral BID     polyethylene glycol  17 g Oral Daily     scopolamine   Transdermal Once     senna-docusate  1 tablet Oral BID     sertraline  25 mg Oral QAM     sulfamethoxazole-trimethoprim  1 tablet Oral Q Mon Wed Fri AM     tacrolimus  0.5 mg Oral QPM     tacrolimus  1 mg Oral QAM      No Known Allergies         Physical Exam:   Vitals were reviewed   , Blood pressure (!) 143/81, pulse 68, temperature 99  F (37.2  C), temperature source Oral, resp. rate 16, height 1.778 m (5' 10\"), weight 81.6 kg (179 lb 14 oz), SpO2 95 %.    Wt Readings from Last 3 Encounters:   12/09/20 81.6 kg (179 lb 14 oz)   01/06/20 77.2 kg (170 lb 3.2 oz)   07/16/19 74.6 kg (164 lb 8 oz)       Intake/Output Summary (Last 24 hours) at 12/9/2020 0938  Last data filed at 12/8/2020 1800  Gross per 24 hour   Intake 560 ml "   Output 595 ml   Net -35 ml     GENERAL: pleasant, alert, NAD  HEENT:  Normocephalic. No gross abnormalities.  Pupils equal.  MMM.  Dentition is ok.  CV: RRR, no murmurs, no clicks, gallops, or rubs, no edema, no carotid bruits  RESP: Clear bilaterally with good efforts  GI: Abdomen o/s/nt/nd, BS present. No masses, organomegaly. No pain over the kidney allograft.   MUSCULOSKELETAL: extremities nl - no gross deformities noted. No edema.   SKIN: no suspicious lesions or rashes, dry to touch  NEURO:  Strength normal and symmetric.   PSYCH: mood good, affect appropriate         Data:     CBC RESULTS:     Recent Labs   Lab 12/09/20  0716 12/08/20  0753 12/07/20  0645 12/05/20  1428   WBC  --   --   --  6.1   RBC  --   --   --  4.88   HGB  --  8.7* 9.3* 13.5   HCT  --   --   --  43.1   *  --   --  166       Basic Metabolic Panel:  Recent Labs   Lab 12/09/20  0716 12/07/20  1133 12/07/20  0645 12/06/20  1420 12/05/20  1428    139  --   --  139   POTASSIUM 3.6 3.7  --   --  4.4   CHLORIDE 109 108  --   --  108   CO2 28 26  --   --  26   BUN 26 26  --   --  30   CR 1.01 1.12  --  1.22 1.09   * 148* 132*  --  98   JUAN 8.4* 8.8  --   --  9.3       INR  Recent Labs   Lab 12/05/20  1428   INR 1.08      Attestation:   I have reviewed today's relevant vital signs, notes, medications, labs and imaging.    Ezequiel Chavira MD  [facility] - Nephrology  Office phone :[phone]  Pager: [phone]

## 2020-12-09 NOTE — DISCHARGE SUMMARY
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 Stoner at [facility] 2 weeks after surgery.  Call [phone] for appointment and questions.         Follow-up and recommended labs and tests       Follow up with primary care provider, CORINE JAFFE, within 7 days for   hospital follow- up and establish care.  The following labs/tests are   recommended: BMP.             Unresulted Labs Ordered in the Past 30 Days of this Admission     No orders found from 11/5/2020 to 12/6/2020.      These results will be followed up by N/A    Discharge Disposition   Discharged to home  Condition at discharge: Stable    Hospital Course   Marlo Vee is a 70-year-old male with a history of ESRD status post living donor kidney transplant in 2016, multivessel coronary artery disease who presents after mechanical fall in the community.  He was found to have a closed fracture of the right trochanter and underwent intramedullary nailing on 12/6/2020.  His postoperative course was uncomplicated.  He was discharged home with orders for home PT and OT.  He will complete 2 weeks of Lovenox followed by 2 weeks of full dose aspirin for anticoagulation.  He should follow-up with PCP and orthopedics.    Consultations This Hospital Stay   ORTHOPEDIC SURGERY IP CONSULT  PHYSICAL THERAPY ADULT IP CONSULT  OCCUPATIONAL THERAPY ADULT IP  CONSULT  NEPHROLOGY IP CONSULT  PHYSICAL THERAPY ADULT IP CONSULT  OCCUPATIONAL THERAPY ADULT IP CONSULT  CARE MANAGEMENT / SOCIAL WORK IP CONSULT    Code Status   Full Code    Time Spent on this Encounter   I, Winston Bennett MD, personally saw the patient today and spent greater than 30 minutes discharging this patient.       Winston Bennett MD  [place] [address] [address]  [address] [place] [address]  Phone: [phone]  ______________________________________________________________________    Physical Exam   Vital Signs: Temp: 99  F (37.2  C) Temp src: Oral BP: (!) 143/81 Pulse: 68   Resp: 16 SpO2: 95 % O2 Device: None (Room air)    Weight: 179 lbs 13.97 oz  Constitutional: Awake, alert, cooperative, no apparent cardiopulmonary distress.  Eyes: Conjunctiva and pupils examined and normal.  HEENT: Moist mucous membranes, normal dentition.  Respiratory: Clear to auscultation bilaterally, no crackles or wheezing.  Cardiovascular: Regular rate and rhythm, normal S1 and S2, and no murmur noted.  GI: Soft, non-distended, non-tender, normal bowel sounds.  Lymph/Hematologic: No anterior cervical or supraclavicular adenopathy.  Skin: No rashes, no cyanosis, no edema noted on exposed skin. Right hip dressing CDI  Musculoskeletal: No joint swelling, erythema or tenderness. No gross bony abnormalities  Neurologic: Cranial nerves 2-12 grossly intact, normal strength and sensation.  Psychiatric: Alert, oriented to person, place and time, no obvious anxiety or depression.         Primary Care Physician   CORINE JAFFE    Discharge Orders      Home Care PT Referral for Hospital Discharge      Home Care OT Referral for Hospital Discharge      Home Care PT Referral for Hospital Discharge      Reason for your hospital stay    You were in the hospital due to a fractured hip. You underwent an intramedullary nailing.     Follow-up and recommended labs and tests     Follow up with primary care  provider, CORINE JAFFE, within 7 days for hospital follow- up and establish care.  The following labs/tests are recommended: BMP.     Activity    Your activity upon discharge: activity as tolerated and ambulate in house     MD face to face encounter    Documentation of Face to Face and Certification for Home Health Services    I certify that patient: Marlo Vee is under my care and that I, or a nurse practitioner or physician's assistant working with me, had a face-to-face encounter that meets the physician face-to-face encounter requirements with this patient on: December 8, 2020.    This encounter with the patient was in whole, or in part, for the following medical condition, which is the primary reason for home health care: hip fracture s/p intramedullary nailing, deconditioning.    I certify that, based on my findings, the following services are medically necessary home health services: Occupational Therapy and Physical Therapy.    My clinical findings support the need for the above services because: Occupational Therapy Services are needed to assess and treat cognitive ability and address ADL safety due to impairment in ambulation and self cares. and Physical Therapy Services are needed to assess and treat the following functional impairments: ambulation, stair climbing.    Further, I certify that my clinical findings support that this patient is homebound (i.e. absences from home require considerable and taxing effort and are for medical reasons or [employer] services or infrequently or of short duration when for other reasons) because: Requires assistance of another person or specialized equipment to access medical services because patient: Has prohibitive pain during ambulation. and Is unable to exit home safely on own due to: numerous stairs into house that he is unable to traverse in current post-op state. ...    Based on the above findings. I certify that this patient is confined to the home  and needs intermittent skilled nursing care, physical therapy and/or speech therapy.  The patient is under my care, and I have initiated the establishment of the plan of care.  This patient will be followed by a physician who will periodically review the plan of care.  Physician/Provider to provide follow up care: Yassine Hernandez    [facility] physician (the Medicare certified [place] provider): Winston Bennett MD  Physician Signature: See electronic signature associated with these discharge orders.  Date: 12/8/2020     Reason for your hospital stay    Right hip fracture:  Short IM Nail     Activity    Your activity upon discharge: able to WBAT on right LE with walker or crutches.     Wound care and dressings    Instructions to care for your wound at home: Right lateral hip:  Leave intact until POD #7, then remove and leave open to air.  Ok to shower.  No soaking incisions.     Discharge Instructions    Call if increasing pain or drainage at surgical site, consistently worsening calf pain or feeling of spasm, shortness of breath, fevers >101     MD face to face encounter    Documentation of Face to Face and Certification for Home Health Services    I certify that patient: Marlo Vee is under my care and that I, or a nurse practitioner or physician's assistant working with me, had a face-to-face encounter that meets the physician face-to-face encounter requirements with this patient on: 12/8/2020.    This encounter with the patient was in whole, or in part, for the following medical condition, which is the primary reason for home health care: Right hip fracture:  Short IM Nail .    I certify that, based on my findings, the following services are medically necessary home health services: Nursing and Physical Therapy.    My clinical findings support the need for the above services because: Nurse is needed: For complex aftercare of surgical procedures because the patient needs instruction and cannot  perform care on their own due to: weakness, difficulty ambulating. and To assess wounds, changes in medications or other medical regimen.. and Physical Therapy Services are needed to assess and treat the following functional impairments: weakess, difficulty ambulating.    Further, I certify that my clinical findings support that this patient is homebound (i.e. absences from home require considerable and taxing effort and are for medical reasons or Worship services or infrequently or of short duration when for other reasons) because: Leaving home is medically contraindicated for the following reason(s): Dyspnea on exertion that makes it so they cannot leave their home for needed services without clinical deterioration. and Unable to tolerate sitting for more than 15 minutes.. and Requires assistance of another person or specialized equipment to access medical services because patient: Has prohibitive pain during ambulation., Is unable to operate assistive equipment on their own. and Requires supervision of another for safe transfer...    Based on the above findings. I certify that this patient is confined to the home and needs intermittent skilled nursing care, physical therapy and/or speech therapy.  The patient is under my care, and I have initiated the establishment of the plan of care.  This patient will be followed by a physician who will periodically review the plan of care.  Physician/Provider to provide follow up care: Yassine Hernandez    [facility] physician (the Medicare certified [place] provider): Anthony Sanchez, *  Physician Signature: See electronic signature associated with these discharge orders.  Date: 12/8/2020     Follow-up and recommended labs and tests    Follow up with Dr. Stoner at [facility] 2 weeks after surgery.  Call [phone] for appointment and questions.     Crutches DME    DME Documentation: Describe the reason for need to support medical necessity: Impaired gait status post hip  surgery. I, the undersigned, certify that the above prescribed supplies are medically necessary for this patient and is both reasonable and necessary in reference to accepted standards of medical practice in the treatment of this patient's condition and is not prescribed as a convenience.     [facility] DME    : DME Documentation: Describe the reason for need to support medical necessity: Impaired gait status post hip surgery. I, the undersigned, certify that the above prescribed supplies are medically necessary for this patient and is both reasonable and necessary in reference to accepted standards of medical practice in the treatment of this patient's condition and is not prescribed as a convenience.     Diet    Follow this diet upon discharge: Orders Placed This Encounter      Advance Diet as Tolerated: Regular Diet Adult     Diet    Follow this diet upon discharge: Orders Placed This Encounter      Advance Diet as Tolerated: Regular Diet Adult      Diet         Significant Results and Procedures   Most Recent 3 CBC's:  Recent Labs   Lab Test 12/09/20  0716 12/08/20  0753 12/07/20  0645 12/05/20  1428 10/22/20  1010 08/27/20  0859   WBC  --   --   --  6.1 4.7 3.7*   HGB  --  8.7* 9.3* 13.5 12.6* 11.6*   MCV  --   --   --  88 91 91   *  --   --  166 172 169     Most Recent 3 BMP's:  Recent Labs   Lab Test 12/09/20  0716 12/07/20  1133 12/07/20  0645 12/06/20  1420 12/05/20  1428    139  --   --  139   POTASSIUM 3.6 3.7  --   --  4.4   CHLORIDE 109 108  --   --  108   CO2 28 26  --   --  26   BUN 26 26  --   --  30   CR 1.01 1.12  --  1.22 1.09   ANIONGAP 2* 5  --   --  5   JUAN 8.4* 8.8  --   --  9.3   * 148* 132*  --  98   ,   Results for orders placed or performed during the hospital encounter of 12/05/20   XR Pelvis 1/2 Views    Narrative    PELVIS ONE TO TWO VIEWS;  RIGHT FEMUR TWO VIEWS  12/5/2020 1:05 PM     HISTORY:  Fall.  Pelvic pain.    FINDINGS: Pelvic surgical clips.       Impression     IMPRESSION: Findings are very suspicious for an intertrochanteric  right hip fracture. Due to an overlying skinfold on the lateral view,  an internal rotation or frog-leg view would be helpful for  confirmation if indicated clinically.    ALEJANDRO CHÁVEZ MD   XR Femur Right 2 Views    Narrative    PELVIS ONE TO TWO VIEWS;  RIGHT FEMUR TWO VIEWS  12/5/2020 1:05 PM     HISTORY:  Fall.  Pelvic pain.    FINDINGS: Pelvic surgical clips.       Impression    IMPRESSION: Findings are very suspicious for an intertrochanteric  right hip fracture. Due to an overlying skinfold on the lateral view,  an internal rotation or frog-leg view would be helpful for  confirmation if indicated clinically.    ALEJANDRO CHÁVEZ MD   CT Hip Right w/o Contrast    Narrative    EXAM: CT HIP RIGHT W/O CONTRAST  LOCATION: [facility]  DATE/TIME: 12/5/2020 2:42 PM    INDICATION: Evaluate fracture.  COMPARISON: Plain film 12/05/2020 at 1255.  TECHNIQUE: Noncontrast. Axial, sagittal and coronal thin-section reconstruction. Dose reduction techniques were used.   CONTRAST: None.    FINDINGS: There is a comminuted nondisplaced fracture of the right greater trochanter. The anterior most fracture line also extends through the anterior cortex in the intertrochanteric region. An intertrochanteric fracture line does not extend through   the posterior cortex. No other acute bony abnormality. Mild osteoarthritis of the right hip. No acute soft tissue abnormality. Vascular calcifications are seen. The visualized internal pelvic structures show no acute abnormality or free fluid.      Impression    IMPRESSION:  1.  Nondisplaced comminuted fractured greater trochanter of the right proximal femur. An additional fracture line extends from the greater trochanteric fracture in the anterior intertrochanteric cortex indicating a partial intertrochanteric fracture   also.  2.  Mild osteoarthritis of the right hip. Vascular calcifications.         XR Surgery LARRY  Fluoro L/T 5 Min w Stills    Narrative    SURGERY C-ARM FLUOROSCOPY LESS THAN FIVE MINUTES WITH STILLS   12/6/2020 10:20 AM     HISTORY: ORIF right hip.    FLUOROSCOPY TIME: 1.6 minutes  NUMBER OF IMAGES ACQUIRED: 4  VIEWS: 2      Impression    IMPRESSION: Shiv-pin fixation of the proximal femur fracture.    ALEJANDRO CHÁVEZ MD   XR Femur Right 2 Views    Narrative    FEMUR RIGHT TWO VIEWS December 8, 2020 10:17 AM     HISTORY: Status post IM nail.    COMPARISON: Operative images 12/6/2020.      Impression    IMPRESSION: Again there is shiv-pin fixation of the proximal femur  fracture. Overlying skin wilberto.    ALEJANDRO CHÁVEZ MD       Discharge Medications   Current Discharge Medication List      START taking these medications    Details   acetaminophen (TYLENOL) 325 MG tablet Take 2 tablets (650 mg) by mouth every 6 hours as needed for other (For optimal non-opioid multimodal pain management to improve pain control.)  Qty: 30 tablet, Refills: 0    Associated Diagnoses: Closed fracture of trochanter of right femur, initial encounter (H)      enoxaparin ANTICOAGULANT (LOVENOX) 40 MG/0.4ML syringe Inject 0.4 mLs (40 mg) Subcutaneous every 24 hours for 14 days  Qty: 5.6 mL, Refills: 0    Associated Diagnoses: Closed fracture of trochanter of right femur, initial encounter (H)      ondansetron (ZOFRAN-ODT) 4 MG ODT tab Take 1 tablet (4 mg) by mouth every 6 hours as needed for nausea or vomiting  Qty: 8 tablet, Refills: 0    Associated Diagnoses: Closed fracture of trochanter of right femur, initial encounter (H)      senna-docusate (SENOKOT-S/PERICOLACE) 8.6-50 MG tablet Take 1 tablet by mouth 2 times daily  Qty: 20 tablet, Refills: 0    Associated Diagnoses: Closed fracture of trochanter of right femur, initial encounter (H)      traMADol (ULTRAM) 50 MG tablet Take 1 tablet (50 mg) by mouth every 6 hours as needed for moderate pain or breakthrough pain  Qty: 20 tablet, Refills: 0    Associated Diagnoses: Closed fracture of  trochanter of right femur, initial encounter (H)         CONTINUE these medications which have NOT CHANGED    Details   aspirin 81 MG EC tablet Take 81 mg by mouth every morning      atorvastatin (LIPITOR) 10 MG tablet Take 5 mg by mouth At Bedtime (0.5 x 10 mg tablet)      !! carvedilol (COREG) 25 MG tablet Take 25 mg by mouth every evening In addition to 12.5 mg in the morning.      !! carvedilol (COREG) 25 MG tablet Take 12.5 mg by mouth every morning In addition to 25 mg every evening.       CELLCEPT (BRAND) 250 MG capsule Take 3 capsules (750 mg) by mouth 2 times daily Do not open cap for feeding tube. BLOOD LEVEL may be needed BEFORE giving dose.  Qty: 180 capsule, Refills: 11    Associated Diagnoses: Status post kidney transplant      losartan (COZAAR) 25 MG tablet TAKE 1 TABLET BY MOUTH DAILY  Qty: 30 tablet, Refills: 11    Associated Diagnoses: Hypertension secondary to other renal disorders (CODE)      !! PROGRAF (BRAND) 0.5 MG capsule Take 1 mg by mouth every morning      !! PROGRAF (BRAND) 0.5 MG capsule Take 0.5 mg by mouth every evening      sertraline (ZOLOFT) 25 MG tablet Take 25 mg by mouth every morning      sulfamethoxazole-trimethoprim (BACTRIM) 400-80 MG tablet Take 1 tablet by mouth Every Mon, Wed, Fri Morning  Qty: 14 tablet, Refills: 11    Associated Diagnoses: Status post kidney transplant; Renal transplant recipient      vitamin D3 (CHOLECALCIFEROL) 50 mcg (2000 units) tablet Take 1 tablet by mouth every morning       !! - Potential duplicate medications found. Please discuss with provider.        Allergies   No Known Allergies

## 2020-12-17 ENCOUNTER — MYC MEDICAL ADVICE (OUTPATIENT)
Dept: TRANSPLANT | Facility: CLINIC | Age: 70
End: 2020-12-17

## 2020-12-18 ENCOUNTER — TELEPHONE (OUTPATIENT)
Dept: TRANSPLANT | Facility: CLINIC | Age: 70
End: 2020-12-18

## 2020-12-18 DIAGNOSIS — Z94.0 STATUS POST KIDNEY TRANSPLANT: ICD-10-CM

## 2020-12-18 RX ORDER — MYCOPHENOLATE MOFETIL 250 MG/1
CAPSULE ORAL
Qty: 180 CAPSULE | Refills: 11 | Status: SHIPPED | OUTPATIENT
Start: 2020-12-18 | End: 2021-08-06

## 2020-12-18 NOTE — TELEPHONE ENCOUNTER
"Mary Kate Garay RN Harris, Kathleen, RN Hello Kathleen,     John Jacksonstevenernst had hip fracture recently at [facility].   He sent a question which I'll send to post team to answer.     \"I'm wondering if it's ok to take Tylenol for the pain and sleeping. I've been taking it pretty regularly since the fall. I don't want to injure my kidneys.\" Thanks, John.          PLAN:  Okay for tylenol, no more than 3grams daily (1,000mg three times daily).  AVOID any non-steroidal anti-inflammatory (ibuprophen, naproxen, etc).  "

## 2020-12-18 NOTE — TELEPHONE ENCOUNTER
"John Vee had hip fracture recently at [facility].   He sent a question which I'll send to post team to answer.    \"I'm wondering if it's ok to take Tylenol for the pain and sleeping. I've been taking it pretty regularly since the fall. I don't want to injure my kidneys.\" Thanks, John.          "

## 2020-12-28 ENCOUNTER — TELEPHONE (OUTPATIENT)
Dept: TRANSPLANT | Facility: CLINIC | Age: 70
End: 2020-12-28

## 2021-01-04 ENCOUNTER — VIRTUAL VISIT (OUTPATIENT)
Dept: NEPHROLOGY | Facility: CLINIC | Age: 71
End: 2021-01-04
Attending: INTERNAL MEDICINE
Payer: MEDICARE

## 2021-01-04 VITALS — DIASTOLIC BLOOD PRESSURE: 70 MMHG | SYSTOLIC BLOOD PRESSURE: 120 MMHG

## 2021-01-04 DIAGNOSIS — Z94.0 HTN, KIDNEY TRANSPLANT RELATED: ICD-10-CM

## 2021-01-04 DIAGNOSIS — Z94.0 KIDNEY REPLACED BY TRANSPLANT: Primary | ICD-10-CM

## 2021-01-04 DIAGNOSIS — E55.9 VITAMIN D DEFICIENCY: ICD-10-CM

## 2021-01-04 DIAGNOSIS — I15.1 HTN, KIDNEY TRANSPLANT RELATED: ICD-10-CM

## 2021-01-04 DIAGNOSIS — D63.1 ANEMIA IN STAGE 2 CHRONIC KIDNEY DISEASE: ICD-10-CM

## 2021-01-04 DIAGNOSIS — Z48.298 AFTERCARE FOLLOWING ORGAN TRANSPLANT: ICD-10-CM

## 2021-01-04 DIAGNOSIS — D84.9 IMMUNOSUPPRESSION (H): ICD-10-CM

## 2021-01-04 DIAGNOSIS — N18.2 ANEMIA IN STAGE 2 CHRONIC KIDNEY DISEASE: ICD-10-CM

## 2021-01-04 PROCEDURE — 99442 PR PHYSICIAN TELEPHONE EVALUATION 11-20 MIN: CPT | Mod: 95

## 2021-01-04 ASSESSMENT — PAIN SCALES - GENERAL: PAINLEVEL: MODERATE PAIN (4)

## 2021-01-04 NOTE — PROGRESS NOTES
Marlo is a 70 year old who is being evaluated via a billable video visit.      How would you like to obtain your AVS? Mail a copy  If the video visit is dropped, the invitation should be resent by: Text to cell phone: [phone]  Will anyone else be joining your video visit? No    Video Start Time: 1505  Video-Visit Details    Type of service:  Video Visit    Video End Time:1517    Originating Location (pt. Location): Home    Distant Location (provider location):  [facility] [place]     Platform used for Video Visit: NewsFixed      CHRONIC TRANSPLANT NEPHROLOGY VISIT    Assessment & Plan   # LDKT: Stable   - Baseline Creatinine:  ~ 1.1-1.3   - Proteinuria: Normal (<0.2 grams)   - Date DSA Last Checked: Aug/2020      Latest DSA: No   - BK Viremia: Not checked recently   - Kidney Tx Biopsy: No    # Immunosuppression: Tacrolimus immediate release (goal 4-6) and Mycophenolate mofetil (dose 750 mg every 12 hours)          - Continue with intensive monitoring of immunosuppression for efficacy and toxicity.          - Changes: No    # Infection Prophylaxis:   - PJP: Sulfa/TMP (Bactrim)    # Hypertension: Controlled;  Goal BP: < 130/80   - Changes: No    # Anemia in Chronic Renal Disease: Hgb: Decreased following recent surgery     LULY: No   - Iron studies: Not checked recently    # Mineral Bone Disorder:   - Vitamin D; level: Not checked recently        On supplement: Yes  - Calcium; level: Normal        On supplement: No    # CAD, s/p PCI: Asymptomatic.    # Skin Cancer: New lesions: a couple   - Discussed sun protection and recommend regular follow up with Dermatology.    # Medical Compliance: Yes     # COVID-19 Virus Review: Discussed COVID-19 virus and the potential medical risks.  Reviewed preventative health recommendations, which includes washing hands for 20 seconds, avoid touching your face, and social distancing.  Asked patient to inform the transplant center if they are exposed or  diagnosed with this virus.    # Transplant History:  Etiology of Kidney Failure: Membranous nephropathy ([place])  Tx: LDKT  Transplant: 1/21/2016 (Kidney)  Donor Type: Living Donor Class:   Significant changes in immunosuppression: None  Significant transplant-related complications: None    Transplant Office Phone Number: [phone]    Assessment and plan was discussed with the patient and he voiced his understanding and agreement.    Return visit: Return in about 1 year (around 1/4/2022).    Samuel Varela MD    Chief Complaint   Mr. Vee is a 70 year old here for kidney transplant and immunosuppression management.    History of Present Illness    Mr. Vee reports feeling good overall with some medical complaints.  He was recently hospitalized in December after an accidental fall and fractured his right hip.  Patient underwent ORIF and is doing well.  He reports being ahead of schedule with his recovery.    His energy level is okay and pretty close to normal.  He had been active and was getting some exercise prior to his hip fracture.  Denies any chest pain or shortness of breath with exertion.  Appetite is good and weight is stable.  No nausea, vomiting or diarrhea.  No fever, sweats or chills.  No leg swelling.    Home BP: Not checked often, but was good during recent hospitalization.    Problem List   Patient Active Problem List   Diagnosis     HTN, kidney transplant related     Membranous glomerulonephritis     Anemia in chronic renal disease     Secondary renal hyperparathyroidism (H)     Vitamin D deficiency     Dyslipidemia     Anxiety     Status post coronary angiogram     CAD, multiple vessel     Multiple vessel coronary artery disease     Kidney replaced by transplant     Immunosuppression (H)     Aftercare following organ transplant     Closed fracture of trochanter of right femur, initial encounter (H)     Impaired fasting glucose     Erectile dysfunction     Essential hypertension      Generalized ischemic myocardial dysfunction     Old myocardial infarction       Allergies   No Known Allergies    Medications   Current Outpatient Medications   Medication Sig     acetaminophen (TYLENOL) 325 MG tablet Take 2 tablets (650 mg) by mouth every 6 hours as needed for other (For optimal non-opioid multimodal pain management to improve pain control.)     aspirin 81 MG EC tablet Take 81 mg by mouth every morning     atorvastatin (LIPITOR) 10 MG tablet Take 5 mg by mouth At Bedtime (0.5 x 10 mg tablet)     carvedilol (COREG) 25 MG tablet Take 25 mg by mouth every evening In addition to 12.5 mg in the morning.     carvedilol (COREG) 25 MG tablet Take 12.5 mg by mouth every morning In addition to 25 mg every evening.      CELLCEPT (BRAND) 250 MG capsule TAKE THREE CAPSULES BY MOUTH TWICE A DAY. CLARK 1     losartan (COZAAR) 25 MG tablet TAKE 1 TABLET BY MOUTH DAILY     ondansetron (ZOFRAN-ODT) 4 MG ODT tab Take 1 tablet (4 mg) by mouth every 6 hours as needed for nausea or vomiting     PROGRAF (BRAND) 0.5 MG capsule Take 1 mg by mouth every morning     PROGRAF (BRAND) 0.5 MG capsule Take 0.5 mg by mouth every evening     senna-docusate (SENOKOT-S/PERICOLACE) 8.6-50 MG tablet Take 1 tablet by mouth 2 times daily     sertraline (ZOLOFT) 25 MG tablet Take 25 mg by mouth every morning     sulfamethoxazole-trimethoprim (BACTRIM) 400-80 MG tablet Take 1 tablet by mouth Every Mon, Wed, Fri Morning     traMADol (ULTRAM) 50 MG tablet Take 1 tablet (50 mg) by mouth every 6 hours as needed for moderate pain or breakthrough pain     vitamin D3 (CHOLECALCIFEROL) 50 mcg (2000 units) tablet Take 1 tablet by mouth every morning     No current facility-administered medications for this visit.      There are no discontinued medications.    Physical Exam   Vital Signs: /70     GENERAL APPEARANCE: alert and no distress  HENT: no obvious abnormalities on appearance  RESP: breathing appears unremarkable with normal rate, no  audible wheezing or cough and no apparent shortness of breath with conversation  MS: extremities normal - no gross deformities noted  SKIN: no apparent rash and normal skin tone  NEURO: speech is clear with no obvious neurological deficits  PSYCH: mentation appears normal and affect normal    Data     Renal Latest Ref Rng & Units 1/7/2021 12/9/2020 12/7/2020   Na 133 - 144 mmol/L 137 139 139   Na (external) 133 - 144 mmol/L - - -   K 3.4 - 5.3 mmol/L 3.5 3.6 3.7   K (external) 3.4 - 5.3 mmol/L - - -   Cl 94 - 109 mmol/L 106 109 108   Cl (external) 94 - 109 mmol/L - - -   CO2 20 - 32 mmol/L 27 28 26   CO2 (external) 20 - 32 mmol/L - - -   BUN 7 - 30 mg/dL 25 26 26   BUN (external) 7 - 30 mg/dL - - -   Cr 0.66 - 1.25 mg/dL 1.08 1.01 1.12   Cr (external) 0.66 - 1.25 mg/dL - - -   Glucose 70 - 99 mg/dL 89 101(H) 148(H)   Glucose (external) 70 - 99 mg/dL - - -   Ca  8.5 - 10.1 mg/dL 9.2 8.4(L) 8.8   Ca (external) 8.5 - 10.1 mg/dL - - -   Mg 1.6 - 2.3 mg/dL - - -     Bone Health Latest Ref Rng & Units 12/9/2020 10/9/2017 3/9/2017   Phos 2.5 - 4.5 mg/dL 2.4(L) - -   PTHi 12 - 72 pg/mL - 94(H) 135(H)   Vit D Def 20 - 75 ug/L - 32 26     Heme Latest Ref Rng & Units 1/7/2021 12/9/2020 12/8/2020   WBC 4.0 - 11.0 10e9/L 5.5 - -   WBC (external) 4.0 - 11.0 10*9/L - - -   Hgb 13.3 - 17.7 g/dL 10.5(L) - 8.7(L)   Hgb (external) 13.3 - 17.7 g/dL - - -   Plt 150 - 450 10e9/L 208 125(L) -   Plt (external) 150 - 450 10*9/L - - -   ABSOLUTE NEUTROPHIL 1.6 - 8.3 10e9/L - - -   ABSOLUTE LYMPHOCYTES 0.8 - 5.3 10e9/L - - -   ABSOLUTE MONOCYTES 0.0 - 1.3 10e9/L - - -   ABSOLUTE EOSINOPHILS 0.0 - 0.7 10e9/L - - -   ABSOLUTE BASOPHILS 0.0 - 0.2 10e9/L - - -   ABS IMMATURE GRANULOCYTES 0 - 0.4 10e9/L - - -   ABSOLUTE NUCLEATED RBC - - - -     Liver Latest Ref Rng & Units 12/9/2020 12/6/2016 11/16/2016   AP 40 - 150 U/L - - 116   TBili 0.2 - 1.3 mg/dL - - 0.7   DBili 0.0 - 0.2 mg/dL - - 0.1   ALT 0 - 70 U/L - - 25   AST 0 - 45 U/L - - 15   Tot  Protein 6.8 - 8.8 g/dL - - 7.5   Albumin 3.4 - 5.0 g/dL 2.8(L) 3.7 3.7        Iron studies Latest Ref Rng & Units 1/28/2016   Iron 35 - 180 ug/dL 62   Iron sat 15 - 46 % 34   Ferritin 26 - 388 ng/mL 787(H)     [facility] Txp Virology Latest Ref Rng & Units 12/3/2018 6/12/2018 5/8/2018   CVM DNA Quant - - Plasma, EDTA anticoagulant EDTA PLASMA   CMV QUANT IU/ML CMVND:CMV DNA Not Detected [IU]/mL - CMV DNA Not Detected CMV DNA Not Detected   LOG IU/ML OF CMVQNT <2.1 [Log:IU]/mL - Not Calculated Not Calculated   BK Spec - Plasma - Plasma   BK Res BKNEG:BK Virus DNA Not Detected copies/mL BK Virus DNA Not Detected - BK Virus DNA Not Detected   BK Log <2.7 Log copies/mL Not Calculated - Not Calculated   EBV CAPSID ANTIBODY IGG 0.0 - 0.8 AI - - -   Hep B Core NR - - -        Recent Labs   Lab Test 08/27/20  0858 10/22/20  1011 01/07/21  0938   DOSTAC Not Provided 10/21/20 845PM [id number]   TACROL 4.1* 4.3* 3.6*     Recent Labs   Lab Test 04/18/16  0824 04/25/16  0826 05/03/16  0853   DOSMPA 04/17/2016@2030 4.24.2016 2030 2,030   MPACID 2.42 4.01* 3.81*   MPAG 43.4 46.6 38.6

## 2021-01-04 NOTE — LETTER
1/4/2021       RE: Marlo Vee  [address] [facility] [address]     Dear Colleague,    Thank you for referring your patient, Marlo Vee, to the [facility] [place] [facility]. Please see a copy of my visit note below.    Marlo is a 70 year old who is being evaluated via a billable video visit.      How would you like to obtain your AVS? Mail a copy  If the video visit is dropped, the invitation should be resent by: Text to cell phone: [phone]  Will anyone else be joining your video visit? No    Video Start Time: 1505  Video-Visit Details    Type of service:  Video Visit    Video End Time:1517    Originating Location (pt. Location): Home    Distant Location (provider location):  [facility] [place]     Platform used for Video Visit: TrepUp      CHRONIC TRANSPLANT NEPHROLOGY VISIT    Assessment & Plan   # LDKT: Stable   - Baseline Creatinine:  ~ 1.1-1.3   - Proteinuria: Normal (<0.2 grams)   - Date DSA Last Checked: Aug/2020      Latest DSA: No   - BK Viremia: Not checked recently   - Kidney Tx Biopsy: No    # Immunosuppression: Tacrolimus immediate release (goal 4-6) and Mycophenolate mofetil (dose 750 mg every 12 hours)          - Continue with intensive monitoring of immunosuppression for efficacy and toxicity.          - Changes: No    # Infection Prophylaxis:   - PJP: Sulfa/TMP (Bactrim)    # Hypertension: Controlled;  Goal BP: < 130/80   - Changes: No    # Anemia in Chronic Renal Disease: Hgb: Decreased following recent surgery     LULY: No   - Iron studies: Not checked recently    # Mineral Bone Disorder:   - Vitamin D; level: Not checked recently        On supplement: Yes  - Calcium; level: Normal        On supplement: No    # CAD, s/p PCI: Asymptomatic.    # Skin Cancer: New lesions: a couple   - Discussed sun protection and recommend regular follow up with Dermatology.    #  Medical Compliance: Yes     # COVID-19 Virus Review: Discussed COVID-19 virus and the potential medical risks.  Reviewed preventative health recommendations, which includes washing hands for 20 seconds, avoid touching your face, and social distancing.  Asked patient to inform the transplant center if they are exposed or diagnosed with this virus.    # Transplant History:  Etiology of Kidney Failure: Membranous nephropathy ([place])  Tx: LDKT  Transplant: 1/21/2016 (Kidney)  Donor Type: Living Donor Class:   Significant changes in immunosuppression: None  Significant transplant-related complications: None    Transplant Office Phone Number: [phone]    Assessment and plan was discussed with the patient and he voiced his understanding and agreement.    Return visit: Return in about 1 year (around 1/4/2022).    Samuel Varela MD    Chief Complaint   Mr. Vee is a 70 year old here for kidney transplant and immunosuppression management.    History of Present Illness    Mr. Vee reports feeling good overall with some medical complaints.  He was recently hospitalized in December after an accidental fall and fractured his right hip.  Patient underwent ORIF and is doing well.  He reports being ahead of schedule with his recovery.    His energy level is okay and pretty close to normal.  He had been active and was getting some exercise prior to his hip fracture.  Denies any chest pain or shortness of breath with exertion.  Appetite is good and weight is stable.  No nausea, vomiting or diarrhea.  No fever, sweats or chills.  No leg swelling.    Home BP: Not checked often, but was good during recent hospitalization.    Problem List   Patient Active Problem List   Diagnosis     HTN, kidney transplant related     Membranous glomerulonephritis     Anemia in chronic renal disease     Secondary renal hyperparathyroidism (H)     Vitamin D deficiency     Dyslipidemia     Anxiety     Status post coronary angiogram      CAD, multiple vessel     Multiple vessel coronary artery disease     Kidney replaced by transplant     Immunosuppression (H)     Aftercare following organ transplant     Closed fracture of trochanter of right femur, initial encounter (H)     Impaired fasting glucose     Erectile dysfunction     Essential hypertension     Generalized ischemic myocardial dysfunction     Old myocardial infarction       Allergies   No Known Allergies    Medications   Current Outpatient Medications   Medication Sig     acetaminophen (TYLENOL) 325 MG tablet Take 2 tablets (650 mg) by mouth every 6 hours as needed for other (For optimal non-opioid multimodal pain management to improve pain control.)     aspirin 81 MG EC tablet Take 81 mg by mouth every morning     atorvastatin (LIPITOR) 10 MG tablet Take 5 mg by mouth At Bedtime (0.5 x 10 mg tablet)     carvedilol (COREG) 25 MG tablet Take 25 mg by mouth every evening In addition to 12.5 mg in the morning.     carvedilol (COREG) 25 MG tablet Take 12.5 mg by mouth every morning In addition to 25 mg every evening.      CELLCEPT (BRAND) 250 MG capsule TAKE THREE CAPSULES BY MOUTH TWICE A DAY. CLARK 1     losartan (COZAAR) 25 MG tablet TAKE 1 TABLET BY MOUTH DAILY     ondansetron (ZOFRAN-ODT) 4 MG ODT tab Take 1 tablet (4 mg) by mouth every 6 hours as needed for nausea or vomiting     PROGRAF (BRAND) 0.5 MG capsule Take 1 mg by mouth every morning     PROGRAF (BRAND) 0.5 MG capsule Take 0.5 mg by mouth every evening     senna-docusate (SENOKOT-S/PERICOLACE) 8.6-50 MG tablet Take 1 tablet by mouth 2 times daily     sertraline (ZOLOFT) 25 MG tablet Take 25 mg by mouth every morning     sulfamethoxazole-trimethoprim (BACTRIM) 400-80 MG tablet Take 1 tablet by mouth Every Mon, Wed, Fri Morning     traMADol (ULTRAM) 50 MG tablet Take 1 tablet (50 mg) by mouth every 6 hours as needed for moderate pain or breakthrough pain     vitamin D3 (CHOLECALCIFEROL) 50 mcg (2000 units) tablet Take 1 tablet by  mouth every morning     No current facility-administered medications for this visit.      There are no discontinued medications.    Physical Exam   Vital Signs: /70     GENERAL APPEARANCE: alert and no distress  HENT: no obvious abnormalities on appearance  RESP: breathing appears unremarkable with normal rate, no audible wheezing or cough and no apparent shortness of breath with conversation  MS: extremities normal - no gross deformities noted  SKIN: no apparent rash and normal skin tone  NEURO: speech is clear with no obvious neurological deficits  PSYCH: mentation appears normal and affect normal    Data     Renal Latest Ref Rng & Units 1/7/2021 12/9/2020 12/7/2020   Na 133 - 144 mmol/L 137 139 139   Na (external) 133 - 144 mmol/L - - -   K 3.4 - 5.3 mmol/L 3.5 3.6 3.7   K (external) 3.4 - 5.3 mmol/L - - -   Cl 94 - 109 mmol/L 106 109 108   Cl (external) 94 - 109 mmol/L - - -   CO2 20 - 32 mmol/L 27 28 26   CO2 (external) 20 - 32 mmol/L - - -   BUN 7 - 30 mg/dL 25 26 26   BUN (external) 7 - 30 mg/dL - - -   Cr 0.66 - 1.25 mg/dL 1.08 1.01 1.12   Cr (external) 0.66 - 1.25 mg/dL - - -   Glucose 70 - 99 mg/dL 89 101(H) 148(H)   Glucose (external) 70 - 99 mg/dL - - -   Ca  8.5 - 10.1 mg/dL 9.2 8.4(L) 8.8   Ca (external) 8.5 - 10.1 mg/dL - - -   Mg 1.6 - 2.3 mg/dL - - -     Bone Health Latest Ref Rng & Units 12/9/2020 10/9/2017 3/9/2017   Phos 2.5 - 4.5 mg/dL 2.4(L) - -   PTHi 12 - 72 pg/mL - 94(H) 135(H)   Vit D Def 20 - 75 ug/L - 32 26     Heme Latest Ref Rng & Units 1/7/2021 12/9/2020 12/8/2020   WBC 4.0 - 11.0 10e9/L 5.5 - -   WBC (external) 4.0 - 11.0 10*9/L - - -   Hgb 13.3 - 17.7 g/dL 10.5(L) - 8.7(L)   Hgb (external) 13.3 - 17.7 g/dL - - -   Plt 150 - 450 10e9/L 208 125(L) -   Plt (external) 150 - 450 10*9/L - - -   ABSOLUTE NEUTROPHIL 1.6 - 8.3 10e9/L - - -   ABSOLUTE LYMPHOCYTES 0.8 - 5.3 10e9/L - - -   ABSOLUTE MONOCYTES 0.0 - 1.3 10e9/L - - -   ABSOLUTE EOSINOPHILS 0.0 - 0.7 10e9/L - - -   ABSOLUTE  BASOPHILS 0.0 - 0.2 10e9/L - - -   ABS IMMATURE GRANULOCYTES 0 - 0.4 10e9/L - - -   ABSOLUTE NUCLEATED RBC - - - -     Liver Latest Ref Rng & Units 12/9/2020 12/6/2016 11/16/2016   AP 40 - 150 U/L - - 116   TBili 0.2 - 1.3 mg/dL - - 0.7   DBili 0.0 - 0.2 mg/dL - - 0.1   ALT 0 - 70 U/L - - 25   AST 0 - 45 U/L - - 15   Tot Protein 6.8 - 8.8 g/dL - - 7.5   Albumin 3.4 - 5.0 g/dL 2.8(L) 3.7 3.7        Iron studies Latest Ref Rng & Units 1/28/2016   Iron 35 - 180 ug/dL 62   Iron sat 15 - 46 % 34   Ferritin 26 - 388 ng/mL 787(H)     [facility] Txp Virology Latest Ref Rng & Units 12/3/2018 6/12/2018 5/8/2018   CVM DNA Quant - - Plasma, EDTA anticoagulant EDTA PLASMA   CMV QUANT IU/ML CMVND:CMV DNA Not Detected [IU]/mL - CMV DNA Not Detected CMV DNA Not Detected   LOG IU/ML OF CMVQNT <2.1 [Log:IU]/mL - Not Calculated Not Calculated   BK Spec - Plasma - Plasma   BK Res BKNEG:BK Virus DNA Not Detected copies/mL BK Virus DNA Not Detected - BK Virus DNA Not Detected   BK Log <2.7 Log copies/mL Not Calculated - Not Calculated   EBV CAPSID ANTIBODY IGG 0.0 - 0.8 AI - - -   Hep B Core NR - - -        Recent Labs   Lab Test 08/27/20  0858 10/22/20  1011 01/07/21  0938   DOSTAC Not Provided 10/21/20 845PM [id number]   TACROL 4.1* 4.3* 3.6*     Recent Labs   Lab Test 04/18/16  0824 04/25/16  0826 05/03/16  0853   DOSMPA 04/17/2016@2030 4.24.2016 2030 2,030   MPACID 2.42 4.01* 3.81*   MPAG 43.4 46.6 38.6       Again, thank you for allowing me to participate in the care of your patient.      Sincerely,    Kidney/Pancreas Recipient

## 2021-01-04 NOTE — LETTER
1/4/2021      RE: Marlo Boweryaneliannalise  [address] [facility] [place] [address]       Marlo is a 70 year old who is being evaluated via a billable video visit.      How would you like to obtain your AVS? Mail a copy  If the video visit is dropped, the invitation should be resent by: Text to cell phone: [phone]  Will anyone else be joining your video visit? No    Video Start Time: 1505  Video-Visit Details    Type of service:  Video Visit    Video End Time:1517    Originating Location (pt. Location): Home    Distant Location (provider location):  [facility] [place]     Platform used for Video Visit: [employer]      CHRONIC TRANSPLANT NEPHROLOGY VISIT    Assessment & Plan   # LDKT: Stable   - Baseline Creatinine:  ~ 1.1-1.3   - Proteinuria: Normal (<0.2 grams)   - Date DSA Last Checked: Aug/2020      Latest DSA: No   - BK Viremia: Not checked recently   - Kidney Tx Biopsy: No    # Immunosuppression: Tacrolimus immediate release (goal 4-6) and Mycophenolate mofetil (dose 750 mg every 12 hours)          - Continue with intensive monitoring of immunosuppression for efficacy and toxicity.          - Changes: No    # Infection Prophylaxis:   - PJP: Sulfa/TMP (Bactrim)    # Hypertension: Controlled;  Goal BP: < 130/80   - Changes: No    # Anemia in Chronic Renal Disease: Hgb: Decreased following recent surgery     LULY: No   - Iron studies: Not checked recently    # Mineral Bone Disorder:   - Vitamin D; level: Not checked recently        On supplement: Yes  - Calcium; level: Normal        On supplement: No    # CAD, s/p PCI: Asymptomatic.    # Skin Cancer: New lesions: a couple   - Discussed sun protection and recommend regular follow up with Dermatology.    # Medical Compliance: Yes     # COVID-19 Virus Review: Discussed COVID-19 virus and the potential medical risks.  Reviewed preventative health recommendations, which includes washing hands for 20 seconds, avoid touching your face, and  social distancing.  Asked patient to inform the transplant center if they are exposed or diagnosed with this virus.    # Transplant History:  Etiology of Kidney Failure: Membranous nephropathy ([place])  Tx: LDKT  Transplant: 1/21/2016 (Kidney)  Donor Type: Living Donor Class:   Significant changes in immunosuppression: None  Significant transplant-related complications: None    Transplant Office Phone Number: [phone]    Assessment and plan was discussed with the patient and he voiced his understanding and agreement.    Return visit: Return in about 1 year (around 1/4/2022).    Samuel Varela MD    Chief Complaint   Mr. Vee is a 70 year old here for kidney transplant and immunosuppression management.    History of Present Illness    Mr. Vee reports feeling good overall with some medical complaints.  He was recently hospitalized in December after an accidental fall and fractured his right hip.  Patient underwent ORIF and is doing well.  He reports being ahead of schedule with his recovery.    His energy level is okay and pretty close to normal.  He had been active and was getting some exercise prior to his hip fracture.  Denies any chest pain or shortness of breath with exertion.  Appetite is good and weight is stable.  No nausea, vomiting or diarrhea.  No fever, sweats or chills.  No leg swelling.    Home BP: Not checked often, but was good during recent hospitalization.    Problem List   Patient Active Problem List   Diagnosis     HTN, kidney transplant related     Membranous glomerulonephritis     Anemia in chronic renal disease     Secondary renal hyperparathyroidism (H)     Vitamin D deficiency     Dyslipidemia     Anxiety     Status post coronary angiogram     CAD, multiple vessel     Multiple vessel coronary artery disease     Kidney replaced by transplant     Immunosuppression (H)     Aftercare following organ transplant     Closed fracture of trochanter of right femur, initial encounter  (H)     Impaired fasting glucose     Erectile dysfunction     Essential hypertension     Generalized ischemic myocardial dysfunction     Old myocardial infarction       Allergies   No Known Allergies    Medications   Current Outpatient Medications   Medication Sig     acetaminophen (TYLENOL) 325 MG tablet Take 2 tablets (650 mg) by mouth every 6 hours as needed for other (For optimal non-opioid multimodal pain management to improve pain control.)     aspirin 81 MG EC tablet Take 81 mg by mouth every morning     atorvastatin (LIPITOR) 10 MG tablet Take 5 mg by mouth At Bedtime (0.5 x 10 mg tablet)     carvedilol (COREG) 25 MG tablet Take 25 mg by mouth every evening In addition to 12.5 mg in the morning.     carvedilol (COREG) 25 MG tablet Take 12.5 mg by mouth every morning In addition to 25 mg every evening.      CELLCEPT (BRAND) 250 MG capsule TAKE THREE CAPSULES BY MOUTH TWICE A DAY. CLARK 1     losartan (COZAAR) 25 MG tablet TAKE 1 TABLET BY MOUTH DAILY     ondansetron (ZOFRAN-ODT) 4 MG ODT tab Take 1 tablet (4 mg) by mouth every 6 hours as needed for nausea or vomiting     PROGRAF (BRAND) 0.5 MG capsule Take 1 mg by mouth every morning     PROGRAF (BRAND) 0.5 MG capsule Take 0.5 mg by mouth every evening     senna-docusate (SENOKOT-S/PERICOLACE) 8.6-50 MG tablet Take 1 tablet by mouth 2 times daily     sertraline (ZOLOFT) 25 MG tablet Take 25 mg by mouth every morning     sulfamethoxazole-trimethoprim (BACTRIM) 400-80 MG tablet Take 1 tablet by mouth Every Mon, Wed, Fri Morning     traMADol (ULTRAM) 50 MG tablet Take 1 tablet (50 mg) by mouth every 6 hours as needed for moderate pain or breakthrough pain     vitamin D3 (CHOLECALCIFEROL) 50 mcg (2000 units) tablet Take 1 tablet by mouth every morning     No current facility-administered medications for this visit.      There are no discontinued medications.    Physical Exam   Vital Signs: /70     GENERAL APPEARANCE: alert and no distress  HENT: no obvious  abnormalities on appearance  RESP: breathing appears unremarkable with normal rate, no audible wheezing or cough and no apparent shortness of breath with conversation  MS: extremities normal - no gross deformities noted  SKIN: no apparent rash and normal skin tone  NEURO: speech is clear with no obvious neurological deficits  PSYCH: mentation appears normal and affect normal    Data     Renal Latest Ref Rng & Units 1/7/2021 12/9/2020 12/7/2020   Na 133 - 144 mmol/L 137 139 139   Na (external) 133 - 144 mmol/L - - -   K 3.4 - 5.3 mmol/L 3.5 3.6 3.7   K (external) 3.4 - 5.3 mmol/L - - -   Cl 94 - 109 mmol/L 106 109 108   Cl (external) 94 - 109 mmol/L - - -   CO2 20 - 32 mmol/L 27 28 26   CO2 (external) 20 - 32 mmol/L - - -   BUN 7 - 30 mg/dL 25 26 26   BUN (external) 7 - 30 mg/dL - - -   Cr 0.66 - 1.25 mg/dL 1.08 1.01 1.12   Cr (external) 0.66 - 1.25 mg/dL - - -   Glucose 70 - 99 mg/dL 89 101(H) 148(H)   Glucose (external) 70 - 99 mg/dL - - -   Ca  8.5 - 10.1 mg/dL 9.2 8.4(L) 8.8   Ca (external) 8.5 - 10.1 mg/dL - - -   Mg 1.6 - 2.3 mg/dL - - -     Bone Health Latest Ref Rng & Units 12/9/2020 10/9/2017 3/9/2017   Phos 2.5 - 4.5 mg/dL 2.4(L) - -   PTHi 12 - 72 pg/mL - 94(H) 135(H)   Vit D Def 20 - 75 ug/L - 32 26     Heme Latest Ref Rng & Units 1/7/2021 12/9/2020 12/8/2020   WBC 4.0 - 11.0 10e9/L 5.5 - -   WBC (external) 4.0 - 11.0 10*9/L - - -   Hgb 13.3 - 17.7 g/dL 10.5(L) - 8.7(L)   Hgb (external) 13.3 - 17.7 g/dL - - -   Plt 150 - 450 10e9/L 208 125(L) -   Plt (external) 150 - 450 10*9/L - - -   ABSOLUTE NEUTROPHIL 1.6 - 8.3 10e9/L - - -   ABSOLUTE LYMPHOCYTES 0.8 - 5.3 10e9/L - - -   ABSOLUTE MONOCYTES 0.0 - 1.3 10e9/L - - -   ABSOLUTE EOSINOPHILS 0.0 - 0.7 10e9/L - - -   ABSOLUTE BASOPHILS 0.0 - 0.2 10e9/L - - -   ABS IMMATURE GRANULOCYTES 0 - 0.4 10e9/L - - -   ABSOLUTE NUCLEATED RBC - - - -     Liver Latest Ref Rng & Units 12/9/2020 12/6/2016 11/16/2016   AP 40 - 150 U/L - - 116   TBili 0.2 - 1.3 mg/dL - - 0.7    DBili 0.0 - 0.2 mg/dL - - 0.1   ALT 0 - 70 U/L - - 25   AST 0 - 45 U/L - - 15   Tot Protein 6.8 - 8.8 g/dL - - 7.5   Albumin 3.4 - 5.0 g/dL 2.8(L) 3.7 3.7        Iron studies Latest Ref Rng & Units 1/28/2016   Iron 35 - 180 ug/dL 62   Iron sat 15 - 46 % 34   Ferritin 26 - 388 ng/mL 787(H)     [facility] Txp Virology Latest Ref Rng & Units 12/3/2018 6/12/2018 5/8/2018   CVM DNA Quant - - Plasma, EDTA anticoagulant EDTA PLASMA   CMV QUANT IU/ML CMVND:CMV DNA Not Detected [IU]/mL - CMV DNA Not Detected CMV DNA Not Detected   LOG IU/ML OF CMVQNT <2.1 [Log:IU]/mL - Not Calculated Not Calculated   BK Spec - Plasma - Plasma   BK Res BKNEG:BK Virus DNA Not Detected copies/mL BK Virus DNA Not Detected - BK Virus DNA Not Detected   BK Log <2.7 Log copies/mL Not Calculated - Not Calculated   EBV CAPSID ANTIBODY IGG 0.0 - 0.8 AI - - -   Hep B Core NR - - -        Recent Labs   Lab Test 08/27/20  0858 10/22/20  1011 01/07/21  0938   DOSTAC Not Provided 10/21/20 845PM [id number]   TACROL 4.1* 4.3* 3.6*     Recent Labs   Lab Test 04/18/16  0824 04/25/16  0826 05/03/16  0853   DOSMPA 04/17/2016@2030 4.24.2016 2030 2,030   MPACID 2.42 4.01* 3.81*   MPAG 43.4 46.6 38.6     Samuel Varela MD

## 2021-01-07 ENCOUNTER — HOSPITAL ENCOUNTER (OUTPATIENT)
Dept: LAB | Facility: CLINIC | Age: 71
Discharge: HOME | End: 2021-01-07
Attending: INTERNAL MEDICINE | Admitting: INTERNAL MEDICINE
Payer: MEDICARE

## 2021-01-07 DIAGNOSIS — Z94.0 KIDNEY TRANSPLANTED: ICD-10-CM

## 2021-01-07 DIAGNOSIS — Z48.298 AFTERCARE FOLLOWING ORGAN TRANSPLANT: ICD-10-CM

## 2021-01-07 LAB
ANION GAP SERPL CALCULATED.3IONS-SCNC: 4 MMOL/L (ref 3–14)
BUN SERPL-MCNC: 25 MG/DL (ref 7–30)
CALCIUM SERPL-MCNC: 9.2 MG/DL (ref 8.5–10.1)
CHLORIDE SERPL-SCNC: 106 MMOL/L (ref 94–109)
CO2 SERPL-SCNC: 27 MMOL/L (ref 20–32)
CREAT SERPL-MCNC: 1.08 MG/DL (ref 0.66–1.25)
ERYTHROCYTE [DISTWIDTH] IN BLOOD BY AUTOMATED COUNT: 14.4 % (ref 10–15)
GFR SERPL CREATININE-BSD FRML MDRD: 70 ML/MIN/{1.73_M2}
GLUCOSE SERPL-MCNC: 89 MG/DL (ref 70–99)
HCT VFR BLD AUTO: 35.4 % (ref 40–53)
HGB BLD-MCNC: 10.5 G/DL (ref 13.3–17.7)
MCH RBC QN AUTO: 27.3 PG (ref 26.5–33)
MCHC RBC AUTO-ENTMCNC: 29.7 G/DL (ref 31.5–36.5)
MCV RBC AUTO: 92 FL (ref 78–100)
PLATELET # BLD AUTO: 208 10E9/L (ref 150–450)
POTASSIUM SERPL-SCNC: 3.5 MMOL/L (ref 3.4–5.3)
RBC # BLD AUTO: 3.85 10E12/L (ref 4.4–5.9)
SODIUM SERPL-SCNC: 137 MMOL/L (ref 133–144)
TACROLIMUS BLD-MCNC: 3.6 UG/L (ref 5–15)
TME LAST DOSE: ABNORMAL H
WBC # BLD AUTO: 5.5 10E9/L (ref 4–11)

## 2021-01-07 PROCEDURE — 80197 ASSAY OF TACROLIMUS: CPT | Performed by: INTERNAL MEDICINE

## 2021-01-07 PROCEDURE — 36415 COLL VENOUS BLD VENIPUNCTURE: CPT | Performed by: INTERNAL MEDICINE

## 2021-01-07 PROCEDURE — 80048 BASIC METABOLIC PNL TOTAL CA: CPT | Performed by: INTERNAL MEDICINE

## 2021-01-07 PROCEDURE — 85027 COMPLETE CBC AUTOMATED: CPT | Performed by: INTERNAL MEDICINE

## 2021-01-19 ENCOUNTER — HOSPITAL ENCOUNTER (OUTPATIENT)
Dept: LAB | Facility: CLINIC | Age: 71
Discharge: HOME | End: 2021-01-19
Attending: INTERNAL MEDICINE | Admitting: INTERNAL MEDICINE
Payer: MEDICARE

## 2021-01-19 DIAGNOSIS — Z48.298 AFTERCARE FOLLOWING ORGAN TRANSPLANT: ICD-10-CM

## 2021-01-19 DIAGNOSIS — Z94.0 KIDNEY TRANSPLANTED: ICD-10-CM

## 2021-01-19 LAB
TACROLIMUS BLD-MCNC: 3.5 UG/L (ref 5–15)
TME LAST DOSE: ABNORMAL H

## 2021-01-19 PROCEDURE — 36415 COLL VENOUS BLD VENIPUNCTURE: CPT | Performed by: INTERNAL MEDICINE

## 2021-01-19 PROCEDURE — 80197 ASSAY OF TACROLIMUS: CPT | Performed by: INTERNAL MEDICINE

## 2021-01-20 DIAGNOSIS — Z48.298 AFTERCARE FOLLOWING ORGAN TRANSPLANT: Primary | ICD-10-CM

## 2021-01-20 RX ORDER — TACROLIMUS 0.5 MG/1
1 CAPSULE, GELATIN COATED ORAL 2 TIMES DAILY
Qty: 120 CAPSULE | Refills: 11 | Status: SHIPPED | OUTPATIENT
Start: 2021-01-20 | End: 2022-01-28

## 2021-01-20 NOTE — TELEPHONE ENCOUNTER
OUTCOME:   Spoke with patient, they confirm accurate trough level and current dose 1/0.5 mg daily. Patient confirmed dose change to 1 mg BID and to repeat labs in 1 weeks. Orders sent to preferred pharmacy for dose change and lab for repeat labs. Patient voiced understanding of plan.

## 2021-01-20 NOTE — TELEPHONE ENCOUNTER
ISSUE:   Tacrolimus IR level 3.5 on 1/19/21, goal 4-6, dose 1.0/0.5 mg.    PLAN:   Please call patient and confirm this was an accurate 12-hour trough. Verify Tacrolimus IR dose 1.0/0.5 mg. Confirm no new medications or illness. Confirm no missed doses. If accurate trough and accurate dose, increase Tacrolimus IR dose to 1.0 mg BID and repeat labs in 1 week.    *Second sub-therapeutic level in a row, dose increase needed*    Onelia Richardson, RNCC

## 2021-01-29 ENCOUNTER — HOSPITAL ENCOUNTER (OUTPATIENT)
Dept: LAB | Facility: CLINIC | Age: 71
Discharge: HOME | End: 2021-01-29
Attending: INTERNAL MEDICINE | Admitting: INTERNAL MEDICINE
Payer: MEDICARE

## 2021-01-29 DIAGNOSIS — Z48.298 AFTERCARE FOLLOWING ORGAN TRANSPLANT: ICD-10-CM

## 2021-01-29 LAB
TACROLIMUS BLD-MCNC: 5.4 UG/L (ref 5–15)
TME LAST DOSE: NORMAL H

## 2021-01-29 PROCEDURE — 36415 COLL VENOUS BLD VENIPUNCTURE: CPT | Performed by: INTERNAL MEDICINE

## 2021-01-29 PROCEDURE — 80197 ASSAY OF TACROLIMUS: CPT | Performed by: INTERNAL MEDICINE

## 2021-02-01 ENCOUNTER — TELEPHONE (OUTPATIENT)
Dept: TRANSPLANT | Facility: CLINIC | Age: 71
End: 2021-02-01

## 2021-02-01 DIAGNOSIS — Z48.298 AFTERCARE FOLLOWING ORGAN TRANSPLANT: Primary | ICD-10-CM

## 2021-02-01 DIAGNOSIS — Z94.0 KIDNEY TRANSPLANTED: ICD-10-CM

## 2021-02-01 NOTE — TELEPHONE ENCOUNTER
Returned a call to John. Let him know his Tac was 5.2 which is at goal 4-6, so to continue his dose of 1.0 mg BID.  John wants to know when/where he can get the vaccine. I advised him to continue to monitor the [place] dept. Of health website, and to continue to watch his My Chart for messages from [employer]. He verbalized understanding.

## 2021-02-10 ENCOUNTER — TRANSFERRED RECORDS (OUTPATIENT)
Dept: HEALTH INFORMATION MANAGEMENT | Facility: CLINIC | Age: 71
End: 2021-02-10

## 2021-03-09 ENCOUNTER — HOSPITAL ENCOUNTER (OUTPATIENT)
Dept: LAB | Facility: CLINIC | Age: 71
Discharge: HOME | End: 2021-03-09
Attending: INTERNAL MEDICINE | Admitting: INTERNAL MEDICINE
Payer: MEDICARE

## 2021-03-09 ENCOUNTER — RESULTS ONLY (OUTPATIENT)
Dept: OTHER | Facility: CLINIC | Age: 71
End: 2021-03-09

## 2021-03-09 DIAGNOSIS — Z48.298 AFTERCARE FOLLOWING ORGAN TRANSPLANT: ICD-10-CM

## 2021-03-09 DIAGNOSIS — Z94.0 KIDNEY TRANSPLANTED: ICD-10-CM

## 2021-03-09 LAB
ANION GAP SERPL CALCULATED.3IONS-SCNC: 5 MMOL/L (ref 3–14)
BUN SERPL-MCNC: 22 MG/DL (ref 7–30)
CALCIUM SERPL-MCNC: 9.6 MG/DL (ref 8.5–10.1)
CHLORIDE SERPL-SCNC: 107 MMOL/L (ref 94–109)
CO2 SERPL-SCNC: 26 MMOL/L (ref 20–32)
CREAT SERPL-MCNC: 1.09 MG/DL (ref 0.66–1.25)
CREAT UR-MCNC: 96 MG/DL
ERYTHROCYTE [DISTWIDTH] IN BLOOD BY AUTOMATED COUNT: 13.6 % (ref 10–15)
GFR SERPL CREATININE-BSD FRML MDRD: 69 ML/MIN/{1.73_M2}
GLUCOSE SERPL-MCNC: 102 MG/DL (ref 70–99)
HCT VFR BLD AUTO: 40.6 % (ref 40–53)
HGB BLD-MCNC: 12.2 G/DL (ref 13.3–17.7)
MCH RBC QN AUTO: 26.6 PG (ref 26.5–33)
MCHC RBC AUTO-ENTMCNC: 30 G/DL (ref 31.5–36.5)
MCV RBC AUTO: 89 FL (ref 78–100)
PLATELET # BLD AUTO: 221 10E9/L (ref 150–450)
POTASSIUM SERPL-SCNC: 3.8 MMOL/L (ref 3.4–5.3)
PROT UR-MCNC: 0.26 G/L
PROT/CREAT 24H UR: 0.27 G/G CR (ref 0–0.2)
RBC # BLD AUTO: 4.59 10E12/L (ref 4.4–5.9)
SODIUM SERPL-SCNC: 138 MMOL/L (ref 133–144)
TACROLIMUS BLD-MCNC: 5.4 UG/L (ref 5–15)
TME LAST DOSE: NORMAL H
WBC # BLD AUTO: 5.2 10E9/L (ref 4–11)

## 2021-03-09 PROCEDURE — 85027 COMPLETE CBC AUTOMATED: CPT | Performed by: INTERNAL MEDICINE

## 2021-03-09 PROCEDURE — 80048 BASIC METABOLIC PNL TOTAL CA: CPT | Performed by: INTERNAL MEDICINE

## 2021-03-09 PROCEDURE — 86832 HLA CLASS I HIGH DEFIN QUAL: CPT | Performed by: INTERNAL MEDICINE

## 2021-03-09 PROCEDURE — 36415 COLL VENOUS BLD VENIPUNCTURE: CPT | Performed by: INTERNAL MEDICINE

## 2021-03-09 PROCEDURE — 86833 HLA CLASS II HIGH DEFIN QUAL: CPT | Performed by: INTERNAL MEDICINE

## 2021-03-09 PROCEDURE — 80197 ASSAY OF TACROLIMUS: CPT | Performed by: INTERNAL MEDICINE

## 2021-03-09 PROCEDURE — 84156 ASSAY OF PROTEIN URINE: CPT | Performed by: INTERNAL MEDICINE

## 2021-03-10 LAB
DONOR IDENTIFICATION: NORMAL
DPB1*04 01: 941
DSA COMMENTS: NORMAL
DSA PRESENT: YES
DSA TEST METHOD: NORMAL
ORGAN: NORMAL
SA1 CELL: NORMAL
SA1 COMMENTS: NORMAL
SA1 HI RISK ABY: NORMAL
SA1 MOD RISK ABY: NORMAL
SA1 TEST METHOD: NORMAL
SA2 CELL: NORMAL
SA2 COMMENTS: NORMAL
SA2 HI RISK ABY UA: NORMAL
SA2 MOD RISK ABY: NORMAL
SA2 TEST METHOD: NORMAL
UNACCEPTABLE ANTIGEN: NORMAL
UNOS CPRA: 70

## 2021-03-10 NOTE — RESULT ENCOUNTER NOTE
John has some new DSA to DPB1. He does have history of DSA to CW4 and CW2 back in 2016/2017, but none since then.  He is on Tac 4-6 and  mg BID. Tac is at goal, was briefly low in January, but only to 3.5. Creatinine is stable at baseline. No history of biopsies or rejection.  Any need for intervention? Or just continue to monitor?  Thanks!

## 2021-03-11 ENCOUNTER — TELEPHONE (OUTPATIENT)
Dept: TRANSPLANT | Facility: CLINIC | Age: 71
End: 2021-03-11

## 2021-03-11 DIAGNOSIS — Z94.0 KIDNEY TRANSPLANTED: ICD-10-CM

## 2021-03-11 DIAGNOSIS — Z48.298 AFTERCARE FOLLOWING ORGAN TRANSPLANT: Primary | ICD-10-CM

## 2021-03-11 NOTE — TELEPHONE ENCOUNTER
ISSUE  New DSA to DPB1.   History of DSA to CW2 and CW4 in 2016/2017.  Current IS Tac 4-6/IAS269 mg BID.    PLAN  Mason Irwin MD Harris, Kathleen, RN             Continue to trend in 3 months. We typically won't try to biopsy for dnDSA after the first year or so after transplant here.      OUTCOME  Orders placed for repeat DSA with July labs.  My Chart message sent to John.

## 2021-03-25 DIAGNOSIS — Z94.0 RENAL TRANSPLANT RECIPIENT: ICD-10-CM

## 2021-03-25 DIAGNOSIS — Z94.0 STATUS POST KIDNEY TRANSPLANT: Primary | ICD-10-CM

## 2021-03-25 RX ORDER — SULFAMETHOXAZOLE AND TRIMETHOPRIM 400; 80 MG/1; MG/1
1 TABLET ORAL
Qty: 13 TABLET | Refills: 11 | Status: SHIPPED | OUTPATIENT
Start: 2021-03-26 | End: 2021-09-02

## 2021-03-27 ENCOUNTER — HEALTH MAINTENANCE LETTER (OUTPATIENT)
Age: 71
End: 2021-03-27

## 2021-03-29 ENCOUNTER — TELEPHONE (OUTPATIENT)
Dept: TRANSPLANT | Facility: CLINIC | Age: 71
End: 2021-03-29

## 2021-03-29 NOTE — TELEPHONE ENCOUNTER
Patient Call: was wondering if you have heard anything back yet     Call back needed? Yes    Return Call Needed  Same as documented in contacts section  When to return call?: Same day: [facility]

## 2021-03-29 NOTE — TELEPHONE ENCOUNTER
Returned a call to John. Gave him the number to the primary care clinic at [facility]. Advised him to call and request to set up care with a general medicine doctor. John verbalized understanding.

## 2021-03-30 ENCOUNTER — TELEPHONE (OUTPATIENT)
Dept: CARDIOLOGY | Facility: CLINIC | Age: 71
End: 2021-03-30

## 2021-03-30 NOTE — TELEPHONE ENCOUNTER
LVM for patient to call back to schedule follow up appointment. Patient will also need to have an echocardiogram previous to appt with MD. The [employer] message sent to patient also as a reminder to call and schedule follow up appointment.

## 2021-04-29 ENCOUNTER — OFFICE VISIT (OUTPATIENT)
Dept: INTERNAL MEDICINE | Facility: CLINIC | Age: 71
End: 2021-04-29
Payer: MEDICARE

## 2021-04-29 VITALS
HEART RATE: 61 BPM | SYSTOLIC BLOOD PRESSURE: 137 MMHG | OXYGEN SATURATION: 98 % | BODY MASS INDEX: 23.82 KG/M2 | WEIGHT: 166 LBS | DIASTOLIC BLOOD PRESSURE: 75 MMHG

## 2021-04-29 DIAGNOSIS — F41.9 ANXIETY: Primary | ICD-10-CM

## 2021-04-29 DIAGNOSIS — Z80.0 FAMILY HISTORY OF COLON CANCER: ICD-10-CM

## 2021-04-29 DIAGNOSIS — Z12.5 SCREENING FOR PROSTATE CANCER: ICD-10-CM

## 2021-04-29 DIAGNOSIS — Z13.1 SCREENING FOR DIABETES MELLITUS: ICD-10-CM

## 2021-04-29 DIAGNOSIS — Z12.11 SPECIAL SCREENING FOR MALIGNANT NEOPLASMS, COLON: ICD-10-CM

## 2021-04-29 DIAGNOSIS — R79.9 ABNORMAL FINDING OF BLOOD CHEMISTRY, UNSPECIFIED: ICD-10-CM

## 2021-04-29 DIAGNOSIS — N52.9 ERECTILE DYSFUNCTION, UNSPECIFIED ERECTILE DYSFUNCTION TYPE: ICD-10-CM

## 2021-04-29 DIAGNOSIS — E78.5 DYSLIPIDEMIA: ICD-10-CM

## 2021-04-29 PROCEDURE — 99387 INIT PM E/M NEW PAT 65+ YRS: CPT | Mod: GC | Performed by: STUDENT IN AN ORGANIZED HEALTH CARE EDUCATION/TRAINING PROGRAM

## 2021-04-29 RX ORDER — SILDENAFIL 100 MG/1
100 TABLET, FILM COATED ORAL DAILY PRN
Qty: 90 TABLET | Refills: 1 | Status: SHIPPED | OUTPATIENT
Start: 2021-04-29 | End: 2022-06-22

## 2021-04-29 RX ORDER — SERTRALINE HYDROCHLORIDE 25 MG/1
25 TABLET, FILM COATED ORAL EVERY MORNING
Qty: 90 TABLET | Refills: 3 | Status: SHIPPED | OUTPATIENT
Start: 2021-04-29 | End: 2022-04-18

## 2021-04-29 ASSESSMENT — PAIN SCALES - GENERAL: PAINLEVEL: NO PAIN (0)

## 2021-04-29 NOTE — NURSING NOTE
Chief Complaint   Patient presents with     Establish Care     pt here to re-etablish care       Hernán Bonner CMA, EMT at 7:55 AM on 4/29/2021.

## 2021-04-29 NOTE — PROGRESS NOTES
PRIMARY CARE CENTER       SUBJECTIVE:  Marlo Vee is a 70 year old man with living donor kidney transplant (2016) due to membranous glomerulonephropathy, dyslipidemia who presents to establish care.     Pt had followed with another PCP in [facility] who retired. He has been doing well overall not many concerns today.  He would like refills of sertraline and sildenafil.     Lives with wife in [place], had a dog. Had a fall in 11/2020 related to his dog, broke femur. Now doing well, walking without any assistance.     He did have a colonoscopy in 2011 and was recommended 5 year follow up. Pt reports both colonoscopies he has had have been terrible. Difficulty to doing the scope and with sedation. He is also worried now taht he has a new kidney about getting a colonoscopy. Has significnat family history of cancer.     Current Outpatient Medications   Medication     aspirin 81 MG EC tablet     atorvastatin (LIPITOR) 10 MG tablet     carvedilol (COREG) 25 MG tablet     carvedilol (COREG) 25 MG tablet     CELLCEPT (BRAND) 250 MG capsule     losartan (COZAAR) 25 MG tablet     ondansetron (ZOFRAN-ODT) 4 MG ODT tab     PROGRAF (BRAND) 0.5 MG capsule     sertraline (ZOLOFT) 25 MG tablet     sildenafil (VIAGRA) 100 MG tablet     sulfamethoxazole-trimethoprim (BACTRIM) 400-80 MG tablet     vitamin D3 (CHOLECALCIFEROL) 50 mcg (2000 units) tablet     No current facility-administered medications for this visit.       No Known Allergies   Past Medical History:   Diagnosis Date     Anemia in ESRD (end-stage renal disease) (H)      Antiplatelet or antithrombotic long-term use      Anxiety      Dyslipidemia      End stage kidney disease (H)      Heart attack (H)     not sure     Hypertension      Membranous glomerulonephritis 2002     PONV (postoperative nausea and vomiting)      PUD (peptic ulcer disease)      Secondary hyperparathyroidism (of renal origin)      Skin abnormalities      Stented  coronary artery      Vitamin D deficiency      Family History   Problem Relation Age of Onset     Breast Cancer Mother      Cancer - colorectal Father      Coronary Artery Disease Father      Hypertension Father      Breast Cancer Sister      Cancer Brother         Pancreatic CA     Social History     Social History Narrative    Lives in [place] with wife an Erick dog     Owns [employer]     Has 4 kids - one donated kidney    Walks everyday     Doesn't eat a lot of meat     Review of systems: 10 point ROS reviewed and negative except as listed in HPI.     OBJECTIVE:  /75 (BP Location: Right arm, Patient Position: Sitting, Cuff Size: Adult Regular)   Pulse 61   Wt 75.3 kg (166 lb)   SpO2 98%   BMI 23.82 kg/m     Wt Readings from Last 1 Encounters:   04/29/21 75.3 kg (166 lb)     Physical Examination:  Gen: well appearing man, in NAD  HEENT: EOMI, nonicteric, TMs visible some cerumen  CV: regular rate, no murmurs auscultated  Pulm: normal WOB, CTAB   Abd: soft, ND, NT  Ext: no peripheral edema  Neuro: alert, conversant, able to get on and off exam table without assistance  Psych: appropriate     ASSESSMENT/PLAN:  Marlo Vee is a 70 year old man with living donor kidney transplant (2016) due to membranous glomerulonephropathy, dyslipidemia who presents to \[facility]\"" care.     Family history of colon cancer, breast cancer, pancreatic cancer  Screening for colon cancer  Screening for prostate cancer  Patient's last colonoscopy was 2011 at [facility] and he was to follow up in 5 years. He reports the endoscopist had a difficult time both times and he had to have a lot of sedation. Would like to avoid colonoscopy. Patient would benefit from colonoscopy especially given significant family hx and current immunosuppression. For no we will start with FIT test and genetic counseling, would consider referral to GI for further discussion of colonoscopy given unique situation and hx of difficulty with  colonoscopy.   - Genetic counseling referral  - PSA  - FIT Test   - Consider GI referral for further discussion     Health maintenance  Pt doing well overall. Discussed advance care planning, pt will think about it, maybe fill out documents next time. BP is adequately controlled, follows with nephrology.     Dyslipidemia  Screening for diabetes mellitus  - Lipids and A1c ordered   - Pt can get labs at next lab draw    Anxiety  Pt has been on zoloft since transplant, feels like it works well. No adverse effects.   - sertraline 25 mg daily refilled    Erectile dysfunction  Has been using 1/4 tab as needed.   - refilled sildenafil     Pt should return to clinic for f/u with me in 6 months.     Shannon Harding MD  Apr 29, 2021   PGY-1, Internal Medicine    Pt was seen and examined by me;  I agree with the detailed A/P documentation above    Leah Thayer MD

## 2021-05-06 ENCOUNTER — HOSPITAL ENCOUNTER (OUTPATIENT)
Dept: LAB | Facility: CLINIC | Age: 71
Discharge: HOME | End: 2021-05-06
Admitting: INTERNAL MEDICINE
Payer: MEDICARE

## 2021-05-06 DIAGNOSIS — Z94.0 KIDNEY TRANSPLANTED: ICD-10-CM

## 2021-05-06 DIAGNOSIS — Z80.0 FAMILY HISTORY OF COLON CANCER: ICD-10-CM

## 2021-05-06 DIAGNOSIS — Z48.298 AFTERCARE FOLLOWING ORGAN TRANSPLANT: ICD-10-CM

## 2021-05-06 DIAGNOSIS — Z12.5 SCREENING FOR PROSTATE CANCER: ICD-10-CM

## 2021-05-06 DIAGNOSIS — R79.9 ABNORMAL FINDING OF BLOOD CHEMISTRY, UNSPECIFIED: ICD-10-CM

## 2021-05-06 DIAGNOSIS — Z48.298 AFTERCARE FOLLOWING ORGAN TRANSPLANT: Primary | ICD-10-CM

## 2021-05-06 DIAGNOSIS — Z13.1 SCREENING FOR DIABETES MELLITUS: ICD-10-CM

## 2021-05-06 DIAGNOSIS — Z12.5 SCREENING FOR PROSTATE CANCER: Primary | ICD-10-CM

## 2021-05-06 DIAGNOSIS — E78.5 DYSLIPIDEMIA: ICD-10-CM

## 2021-05-06 DIAGNOSIS — Z12.11 SPECIAL SCREENING FOR MALIGNANT NEOPLASMS, COLON: ICD-10-CM

## 2021-05-06 LAB
ANION GAP SERPL CALCULATED.3IONS-SCNC: 1 MMOL/L (ref 3–14)
BUN SERPL-MCNC: 21 MG/DL (ref 7–30)
CALCIUM SERPL-MCNC: 8.9 MG/DL (ref 8.5–10.1)
CHLORIDE SERPL-SCNC: 107 MMOL/L (ref 94–109)
CHOLEST SERPL-MCNC: 138 MG/DL
CO2 SERPL-SCNC: 29 MMOL/L (ref 20–32)
CREAT SERPL-MCNC: 1.04 MG/DL (ref 0.66–1.25)
ERYTHROCYTE [DISTWIDTH] IN BLOOD BY AUTOMATED COUNT: 14 % (ref 10–15)
GFR SERPL CREATININE-BSD FRML MDRD: 73 ML/MIN/{1.73_M2}
GLUCOSE SERPL-MCNC: 99 MG/DL (ref 70–99)
HBA1C MFR BLD: 5.7 % (ref 0–5.6)
HCT VFR BLD AUTO: 41 % (ref 40–53)
HDLC SERPL-MCNC: 33 MG/DL
HGB BLD-MCNC: 12.1 G/DL (ref 13.3–17.7)
LDLC SERPL CALC-MCNC: 67 MG/DL
MCH RBC QN AUTO: 26.1 PG (ref 26.5–33)
MCHC RBC AUTO-ENTMCNC: 29.5 G/DL (ref 31.5–36.5)
MCV RBC AUTO: 89 FL (ref 78–100)
NONHDLC SERPL-MCNC: 105 MG/DL
PLATELET # BLD AUTO: 220 10E9/L (ref 150–450)
POTASSIUM SERPL-SCNC: 3.7 MMOL/L (ref 3.4–5.3)
PSA SERPL-ACNC: 6.08 UG/L (ref 0–4)
RBC # BLD AUTO: 4.63 10E12/L (ref 4.4–5.9)
SODIUM SERPL-SCNC: 137 MMOL/L (ref 133–144)
TACROLIMUS BLD-MCNC: 5.1 UG/L (ref 5–15)
TME LAST DOSE: NORMAL H
TRIGL SERPL-MCNC: 188 MG/DL
WBC # BLD AUTO: 5.5 10E9/L (ref 4–11)

## 2021-05-06 PROCEDURE — 80061 LIPID PANEL: CPT | Performed by: INTERNAL MEDICINE

## 2021-05-06 PROCEDURE — 83036 HEMOGLOBIN GLYCOSYLATED A1C: CPT | Performed by: INTERNAL MEDICINE

## 2021-05-06 PROCEDURE — 36415 COLL VENOUS BLD VENIPUNCTURE: CPT | Performed by: INTERNAL MEDICINE

## 2021-05-06 PROCEDURE — 80048 BASIC METABOLIC PNL TOTAL CA: CPT | Performed by: INTERNAL MEDICINE

## 2021-05-06 PROCEDURE — G0103 PSA SCREENING: HCPCS | Performed by: INTERNAL MEDICINE

## 2021-05-06 PROCEDURE — 80197 ASSAY OF TACROLIMUS: CPT | Performed by: INTERNAL MEDICINE

## 2021-05-06 PROCEDURE — 85027 COMPLETE CBC AUTOMATED: CPT | Performed by: INTERNAL MEDICINE

## 2021-05-07 DIAGNOSIS — Z94.0 KIDNEY TRANSPLANTED: Primary | ICD-10-CM

## 2021-05-07 RX ORDER — CARVEDILOL 25 MG/1
12.5 TABLET ORAL EVERY MORNING
Qty: 45 TABLET | Refills: 3 | Status: SHIPPED | OUTPATIENT
Start: 2021-05-07 | End: 2021-08-26

## 2021-05-28 ENCOUNTER — TELEPHONE (OUTPATIENT)
Dept: TRANSPLANT | Facility: CLINIC | Age: 71
End: 2021-05-28

## 2021-05-28 NOTE — TELEPHONE ENCOUNTER
Returned call to John.  Discussed his labs at the beginning of May looked very stable and there are no concerns.    He was concerned about the antibodies.  Explained that the plan is to recheck these in July, but it is reassuring that his creatinine is stable.   Encouraged him to continue to be compliant with all his medications.  John verbalized understanding.

## 2021-06-02 ENCOUNTER — VIRTUAL VISIT (OUTPATIENT)
Dept: ONCOLOGY | Facility: CLINIC | Age: 71
End: 2021-06-02
Attending: INTERNAL MEDICINE
Payer: MEDICARE

## 2021-06-02 DIAGNOSIS — Z80.3 FAMILY HISTORY OF MALIGNANT NEOPLASM OF BREAST: Primary | ICD-10-CM

## 2021-06-02 DIAGNOSIS — Z80.0 FAMILY HISTORY OF PANCREATIC CANCER: ICD-10-CM

## 2021-06-02 DIAGNOSIS — Z80.0 FAMILY HISTORY OF COLON CANCER: ICD-10-CM

## 2021-06-02 PROCEDURE — 96040 HC GENETIC COUNSELING, EACH 30 MINUTES: CPT | Mod: GT | Performed by: GENETIC COUNSELOR, MS

## 2021-06-02 NOTE — LETTER
"    6/2/2021         RE: Marlo Vee  [address] [facility] [address]        Dear Colleague,    Thank you for referring your patient, Marlo Vee, to the [facility]. Please see a copy of my visit note below.    Cancer Risk Management Program Genetic Counseling Note    6/3/2021    Referring Provider:  Dr. Leah Haynes    Presenting Information:   I had a video visit with John Vee today for genetic counseling through the Cancer Risk Management Program, in order discuss his family history of breast, pancreatic, and colon cancer.  He presents today to review this history, cancer screening recommendations, and available genetic testing options.  This consultation took place through a video visit because of current COVID-19 restrictions (see attestation below).    Personal History:  Marlo Tilley) is a 70 year old male.  He reports that he has had some skin lesions \"burned off\"; he does report having had a previous Mohs procedure for a basal cell skin cancer.  He reports that he thinks he has had a few of these basal cell skin cancers in areas of the skin that were sun exposed (eg. face/neck).  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  John reports that prior to his kidney transplant, he underwent placement of multiple cardiac stents because of heart disease.  He reports no other significant medical events besides his heart disease and kidney disease.      With respect to cancer screening, it is noted that John's most recent PSA level was higher than his previous PSA level; therefore, he has been referred to urology for further prostate assessment.  There are reports of 2 colonoscopies in John's chart; it is noted that a colonoscopy in 2007 found one polyp, but there was no pathology report associated with that polyp.  It is noted that his " colonoscopy in  did not observe any colon polyps.  It is also noted in his colonoscopy report that they had difficulty routing the scope through the colon; John reports that these procedures were difficult, and he is concerned about undergoing additional colonoscopy because of his previous experiences.    John reports no personal history of smoking and no personal history of chronic, excessive alcohol use.  John states that he is not aware of any other personal history of any specific, potentially concerning environmental exposures with respect to cancer risk.  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    Family History: (Please see scanned pedigree for detailed family history information)    John reports that his brother was diagnosed with pancreatic cancer in his 60s and, unfortunately,  from complications of this cancer at age 65.  John states that he thinks that one of his brother's daughters may have had some type of cancer, but he did not have detailed information available to him about this.  He was not aware of his brother having had any cancer genetic testing before his death.    John reports that his sister was diagnosed with breast cancer at the age of 60; he reports that she has gone through several therapies for this breast cancer, as she has had metastases to the brain and lung.  John reports that he thinks his sister may have had genetic testing previously that identified a hereditary risk factor for breast cancer related to her own cancer history.  However, he does not know the details of that previous testing.    John reports that his mother  of complications of breast cancer at age 48.  He reports that he has no medical information regarding her biological family.    John reports that his father was diagnosed with colon cancer at age 55, for  which he was treated with surgery only.  John is not aware of any other cancers on his father's side of the family.    John reports that his biological family is of [place] ancestry; he reports that his sister took a DNA ancestry test that indicated that they have a high proportion of Ashkenazi Denominational ancestry.    Discussion:    We reviewed the features of sporadic, familial, and hereditary cancers. In looking at John's family history, it is possible that a cancer susceptibility gene is present due to his mother's history of early onset breast cancer, in combination with another breast cancer in his sister.  In addition, we talked about that pancreatic cancer can be related to breast cancer in some hereditary cancer syndromes, and so it is possible that the family history of those cancers could be related to each other.  In addition, we talked about that there are specific gene mutations known to be associated with an increased risk for breast and pancreatic cancer (as well as other cancers) that occur with increased frequency in individuals of Ashkenazi Denominational ancestry.  We also talked about that it has been estimated that 10 to 20% of pancreatic cancers are thought to be related to the inheritance of an identifiable, specific gene mutation in a hereditary cancer gene.  In addition, there are some hereditary cancer syndromes associated with pancreatic cancer that are also associated with colon cancer.      We discussed the natural history and genetics of hereditary cancer syndromes associated with breast, colon, and pancreatic cancer. A detailed handout regarding some of these hereditary cancer syndromes and the information we discussed was mailed to John after our appointment today and can be found in the after visit summary. Topics included: inheritance pattern, cancer risks, cancer screening recommendations, and also risks, benefits and limitations of testing.      Based on his family history, John meets current  National Comprehensive Cancer Network (NCCN) criteria for genetic testing of high-penetrance breast and/or ovarian cancer susceptibility genes (including BRCA1, BRCA2, CDH1, PALB2, PTEN, and TP53) and pancreatic cancer genes (such as MARCOS, BRCA1, BRCA2, CDKN2A, MLH1, MSH2, MSH6, EPCAM, PALB2, STK11, and TP53), because of his family history of breast cancer (in the context of at least one early onset breast cancer and Ashkenazi Mandaeism ancestry), as well as his family history of pancreatic cancer in a brother.      We discussed that there are additional genes that could cause increased risk for breast, pancreatic, and colon cancer. As many of these genes present with overlapping features in a family and accurate cancer risk cannot always be established based upon the pedigree analysis alone, it would be reasonable for John to consider panel genetic testing to analyze multiple genes at once.      John stated that he was interested in pursuing genetic testing through a panel of genes associated with hereditary cancer syndromes, and so we reviewed the various panel options and their potential benefits and limitations.  We talked about, however, that it would first be helpful to find out more information about the previous genetic testing that was done in John's sister.  We talked about that if a gene mutation associated with hereditary breast cancer risk has been noted in John's sister, it would be important to have documentation of the specific gene change, in order to provide the most accurate results for him.  John stated that he thinks he can get this information from his sister.  Details of John's testing will be worked out after these records are obtained.      Medical Management: For John, we reviewed that the information from genetic testing may determine:    additional cancer screening for which John may qualify (e.g.pancreatic cancer screening, more frequent colonoscopies, more frequent dermatologic exams, etc.),    and  targeted therapies for John , if he were to develop certain cancers in the future (i.e. immunotherapy for individuals with Denney syndrome, PARP inhibitors, etc.).       These recommendations and possible targeted therapies will be discussed in detail once genetic testing is completed.     Plan:  1) John was interested today in pursuing cancer-related genetic testing.  We talked about that the results of this testing could impact screening recommendations for John, including possible options for pancreatic cancer screening.  In addition, John was interested in being able to provide more genetic information for his children.    2) However, before John has this genetic testing completed, we talked about that it would first be helpful to obtain copies of his sister's previous genetic testing report, as he states that he thinks his sister has been identified to have a hereditary cancer gene mutation.  We talked about that it is generally preferred that genetic testing be done at the laboratory that has previously identified the familial gene mutation, in order to try to be as accurate as possible for John's genetic testing.  In addition, the documentation of the specific family gene mutation can be helpful in interpretation of an individual's test results.    3) John plans to try to collect this genetic testing information from his sister and then recontact me, after which time specific plans will be made for his genetic testing.    Jhoana Mortensen MS, [facility]  Genetic Counselor  Ph: [phone]    Face to face time in video: 43 minutes    John is a 70 year old who is being evaluated via a billable video visit.      How would you like to obtain your AVS? mail  Will anyone else be joining your video visit? no    Video Start Time: 9:13AM     Video-Visit Details    Type of service:  Video Visit    Video End Time: 9:56AM    Originating Location (pt. Location): Home    Distant Location (provider location):  [facility]  CLINIC     Platform used for Video Visit: AmWell              Again, thank you for allowing me to participate in the care of your patient.        Sincerely,        Carmen Mortensen, GC

## 2021-06-02 NOTE — LETTER
"    Cancer Risk Management  Program Locations    [facility]  [facility]  [facility]  [facility]  Mailing Address  Cancer Risk Management Program  [facility]  [address]  [place] [address]    New patient appointments  [phone]  Nitza 3, 2021    Marlo FLORES Renettaannalise  [address] [facility] [place] [address]      Dear Marlo,    It was a pleasure speaking with you over video on June 2, 2021. Here is a copy of the progress note from our discussion. If you have any additional questions, please feel free to call.    Cancer Risk Management Program Genetic Counseling Note    6/3/2021    Referring Provider:  Dr. Leah Haynes    Presenting Information:   I had a video visit with John Mariusz today for genetic counseling through the Cancer Risk Management Program, in order discuss his family history of breast, pancreatic, and colon cancer.  He presents today to review this history, cancer screening recommendations, and available genetic testing options.  This consultation took place through a video visit because of current COVID-Between restrictions.    Personal History:  Marlo Tilley) is a 70 year old male.  He reports that he has had some skin lesions \"burned off\"; he does report having had a previous Mohs procedure for a basal cell skin cancer.  He reports that he thinks he has had a few of these basal cell skin cancers in areas of the skin that were sun exposed (eg. face/neck).  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  John reports that prior to his kidney transplant, he underwent placement of multiple cardiac stents because of heart disease.  He reports no other significant medical events besides his heart disease and kidney " disease.      With respect to cancer screening, it is noted that John's most recent PSA level was higher than his previous PSA level; therefore, he has been referred to urology for further prostate assessment.  There are reports of 2 colonoscopies in John's chart; it is noted that a colonoscopy in  found one polyp, but there was no pathology report associated with that polyp.  It is noted that his colonoscopy in  did not observe any colon polyps.  It is also noted in his colonoscopy report that they had difficulty routing the scope through the colon; John reports that these procedures were difficult, and he is concerned about undergoing additional colonoscopy because of his previous experiences.    John reports no personal history of smoking and no personal history of chronic, excessive alcohol use.  John states that he is not aware of any other personal history of any specific, potentially concerning environmental exposures with respect to cancer risk.  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    Family History: (Please see scanned pedigree for detailed family history information)    John reports that his brother was diagnosed with pancreatic cancer in his 60s and, unfortunately,  from complications of this cancer at age 65.  John states that he thinks that one of his brother's daughters may have had some type of cancer, but he did not have detailed information available to him about this.  He was not aware of his brother having had any cancer genetic testing before his death.    John reports that his sister was diagnosed with breast cancer at the age of 60; he reports that she has gone through several therapies for this breast cancer, as she has had metastases to the brain and lung.  John reports that he thinks his sister may have had genetic testing  previously that identified a hereditary risk factor for breast cancer related to her own cancer history.  However, he does not know the details of that previous testing.    John reports that his mother  of complications of breast cancer at age 48.  He reports that he has no medical information regarding her biological family.    John reports that his father was diagnosed with colon cancer at age 55, for which he was treated with surgery only.  John is not aware of any other cancers on his father's side of the family.    John reports that his biological family is of [place] ancestry; he reports that his sister took a DNA ancestry test that indicated that they have a high proportion of Ashkenazi Buddhism ancestry.    Discussion:    We reviewed the features of sporadic, familial, and hereditary cancers. In looking at John's family history, it is possible that a cancer susceptibility gene is present due to his mother's history of early onset breast cancer, in combination with another breast cancer in his sister.  In addition, we talked about that pancreatic cancer can be related to breast cancer in some hereditary cancer syndromes, and so it is possible that the family history of those cancers could be related to each other.  In addition, we talked about that there are specific gene mutations known to be associated with an increased risk for breast and pancreatic cancer (as well as other cancers) that occur with increased frequency in individuals of Ashkenazi Buddhism ancestry.  We also talked about that it has been estimated that 10 to 20% of pancreatic cancers are thought to be related to the inheritance of an identifiable, specific gene mutation in a hereditary cancer gene.  In addition, there are some hereditary cancer syndromes associated with pancreatic cancer that are also associated with colon cancer.      We discussed the natural history and genetics of hereditary cancer syndromes associated with breast, colon, and  pancreatic cancer. A detailed handout regarding some of these hereditary cancer syndromes and the information we discussed was mailed to John after our appointment today and can be found in the after visit summary. Topics included: inheritance pattern, cancer risks, cancer screening recommendations, and also risks, benefits and limitations of testing.      Based on his family history, John meets current National Comprehensive Cancer Network (NCCN) criteria for genetic testing of high-penetrance breast and/or ovarian cancer susceptibility genes (including BRCA1, BRCA2, CDH1, PALB2, PTEN, and TP53) and pancreatic cancer genes (such as MARCOS, BRCA1, BRCA2, CDKN2A, MLH1, MSH2, MSH6, EPCAM, PALB2, STK11, and TP53), because of his family history of breast cancer (in the context of at least one early onset breast cancer and Ashkenazi Baptism ancestry), as well as his family history of pancreatic cancer in a brother.      We discussed that there are additional genes that could cause increased risk for breast, pancreatic, and colon cancer. As many of these genes present with overlapping features in a family and accurate cancer risk cannot always be established based upon the pedigree analysis alone, it would be reasonable for John to consider panel genetic testing to analyze multiple genes at once.      John stated that he was interested in pursuing genetic testing through a panel of genes associated with hereditary cancer syndromes, and so we reviewed the various panel options and their potential benefits and limitations.  We talked about, however, that it would first be helpful to find out more information about the previous genetic testing that was done in John's sister.  We talked about that if a gene mutation associated with hereditary breast cancer risk has been noted in John's sister, it would be important to have documentation of the specific gene change, in order to provide the most accurate results for him.  John stated that  he thinks he can get this information from his sister.  Details of John's testing will be worked out after these records are obtained.      Medical Management: For John, we reviewed that the information from genetic testing may determine:    additional cancer screening for which John may qualify (e.g.pancreatic cancer screening, more frequent colonoscopies, more frequent dermatologic exams, etc.),    and targeted therapies for John , if he were to develop certain cancers in the future (i.e. immunotherapy for individuals with Denney syndrome, PARP inhibitors, etc.).       These recommendations and possible targeted therapies will be discussed in detail once genetic testing is completed.     Plan:  1) John was interested today in pursuing cancer-related genetic testing.  We talked about that the results of this testing could impact screening recommendations for John, including possible options for pancreatic cancer screening.  In addition, John was interested in being able to provide more genetic information for his children.    2) However, before John has this genetic testing completed, we talked about that it would first be helpful to obtain copies of his sister's previous genetic testing report, as he states that he thinks his sister has been identified to have a hereditary cancer gene mutation.  We talked about that it is generally preferred that genetic testing be done at the laboratory that has previously identified the familial gene mutation, in order to try to be as accurate as possible for John's genetic testing.  In addition, the documentation of the specific family gene mutation can be helpful in interpretation of an individual's test results.    3) John plans to try to collect this genetic testing information from his sister and then recontact me, after which time specific plans will be made for his genetic testing.    Jhoana Mortensen MS, [facility]  Genetic Counselor  Ph: [phone]    Face to face time in video: 43  minutes

## 2021-06-02 NOTE — PROGRESS NOTES
"Cancer Risk Management Program Genetic Counseling Note    6/3/2021    Referring Provider:  Dr. Leah Haynes    Presenting Information:   I had a video visit with John Vee today for genetic counseling through the Cancer Risk Management Program, in order discuss his family history of breast, pancreatic, and colon cancer.  He presents today to review this history, cancer screening recommendations, and available genetic testing options.  This consultation took place through a video visit because of current COVID-19 restrictions (see attestation below).    Personal History:  Marlo Tilley) is a 70 year old male.  He reports that he has had some skin lesions \"burned off\"; he does report having had a previous Mohs procedure for a basal cell skin cancer.  He reports that he thinks he has had a few of these basal cell skin cancers in areas of the skin that were sun exposed (eg. face/neck).  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  John reports that prior to his kidney transplant, he underwent placement of multiple cardiac stents because of heart disease.  He reports no other significant medical events besides his heart disease and kidney disease.      With respect to cancer screening, it is noted that John's most recent PSA level was higher than his previous PSA level; therefore, he has been referred to urology for further prostate assessment.  There are reports of 2 colonoscopies in John's chart; it is noted that a colonoscopy in 2007 found one polyp, but there was no pathology report associated with that polyp.  It is noted that his colonoscopy in 2011 did not observe any colon polyps.  It is also noted in his colonoscopy report that they had difficulty routing the scope through the colon; John reports that these procedures were difficult, and he is concerned about undergoing additional colonoscopy " because of his previous experiences.    John reports no personal history of smoking and no personal history of chronic, excessive alcohol use.  John states that he is not aware of any other personal history of any specific, potentially concerning environmental exposures with respect to cancer risk.  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    Family History: (Please see scanned pedigree for detailed family history information)    John reports that his brother was diagnosed with pancreatic cancer in his 60s and, unfortunately,  from complications of this cancer at age 65.  John states that he thinks that one of his brother's daughters may have had some type of cancer, but he did not have detailed information available to him about this.  He was not aware of his brother having had any cancer genetic testing before his death.    John reports that his sister was diagnosed with breast cancer at the age of 60; he reports that she has gone through several therapies for this breast cancer, as she has had metastases to the brain and lung.  John reports that he thinks his sister may have had genetic testing previously that identified a hereditary risk factor for breast cancer related to her own cancer history.  However, he does not know the details of that previous testing.    John reports that his mother  of complications of breast cancer at age 48.  He reports that he has no medical information regarding her biological family.    John reports that his father was diagnosed with colon cancer at age 55, for which he was treated with surgery only.  John is not aware of any other cancers on his father's side of the family.    John reports that his biological family is of [place] ancestry; he reports that his sister took a DNA ancestry test that indicated that they have a high  proportion of Ashkenazi Orthodox ancestry.    Discussion:    We reviewed the features of sporadic, familial, and hereditary cancers. In looking at John's family history, it is possible that a cancer susceptibility gene is present due to his mother's history of early onset breast cancer, in combination with another breast cancer in his sister.  In addition, we talked about that pancreatic cancer can be related to breast cancer in some hereditary cancer syndromes, and so it is possible that the family history of those cancers could be related to each other.  In addition, we talked about that there are specific gene mutations known to be associated with an increased risk for breast and pancreatic cancer (as well as other cancers) that occur with increased frequency in individuals of Ashkenazi Orthodox ancestry.  We also talked about that it has been estimated that 10 to 20% of pancreatic cancers are thought to be related to the inheritance of an identifiable, specific gene mutation in a hereditary cancer gene.  In addition, there are some hereditary cancer syndromes associated with pancreatic cancer that are also associated with colon cancer.      We discussed the natural history and genetics of hereditary cancer syndromes associated with breast, colon, and pancreatic cancer. A detailed handout regarding some of these hereditary cancer syndromes and the information we discussed was mailed to John after our appointment today and can be found in the after visit summary. Topics included: inheritance pattern, cancer risks, cancer screening recommendations, and also risks, benefits and limitations of testing.      Based on his family history, John meets current National Comprehensive Cancer Network (NCCN) criteria for genetic testing of high-penetrance breast and/or ovarian cancer susceptibility genes (including BRCA1, BRCA2, CDH1, PALB2, PTEN, and TP53) and pancreatic cancer genes (such as MARCOS, BRCA1, BRCA2, CDKN2A, MLH1, MSH2,  MSH6, EPCAM, PALB2, STK11, and TP53), because of his family history of breast cancer (in the context of at least one early onset breast cancer and Ashkenazi Jainism ancestry), as well as his family history of pancreatic cancer in a brother.      We discussed that there are additional genes that could cause increased risk for breast, pancreatic, and colon cancer. As many of these genes present with overlapping features in a family and accurate cancer risk cannot always be established based upon the pedigree analysis alone, it would be reasonable for John to consider panel genetic testing to analyze multiple genes at once.      John stated that he was interested in pursuing genetic testing through a panel of genes associated with hereditary cancer syndromes, and so we reviewed the various panel options and their potential benefits and limitations.  We talked about, however, that it would first be helpful to find out more information about the previous genetic testing that was done in John's sister.  We talked about that if a gene mutation associated with hereditary breast cancer risk has been noted in John's sister, it would be important to have documentation of the specific gene change, in order to provide the most accurate results for him.  John stated that he thinks he can get this information from his sister.  Details of John's testing will be worked out after these records are obtained.      Medical Management: For John, we reviewed that the information from genetic testing may determine:    additional cancer screening for which John may qualify (e.g.pancreatic cancer screening, more frequent colonoscopies, more frequent dermatologic exams, etc.),    and targeted therapies for John , if he were to develop certain cancers in the future (i.e. immunotherapy for individuals with Denney syndrome, PARP inhibitors, etc.).       These recommendations and possible targeted therapies will be discussed in detail once genetic testing  is completed.     Plan:  1) John was interested today in pursuing cancer-related genetic testing.  We talked about that the results of this testing could impact screening recommendations for John, including possible options for pancreatic cancer screening.  In addition, John was interested in being able to provide more genetic information for his children.    2) However, before John has this genetic testing completed, we talked about that it would first be helpful to obtain copies of his sister's previous genetic testing report, as he states that he thinks his sister has been identified to have a hereditary cancer gene mutation.  We talked about that it is generally preferred that genetic testing be done at the laboratory that has previously identified the familial gene mutation, in order to try to be as accurate as possible for John's genetic testing.  In addition, the documentation of the specific family gene mutation can be helpful in interpretation of an individual's test results.    3) John plans to try to collect this genetic testing information from his sister and then recontact me, after which time specific plans will be made for his genetic testing.    Jhoana Mortensen MS, [facility]  Genetic Counselor  Ph: [phone]    Face to face time in video: 43 minutes    John is a 70 year old who is being evaluated via a billable video visit.      How would you like to obtain your AVS? mail  Will anyone else be joining your video visit? no    Video Start Time: 9:13AM     Video-Visit Details    Type of service:  Video Visit    Video End Time: 9:56AM    Originating Location (pt. Location): Home    Distant Location (provider location):  [facility]     Platform used for Video Visit: [employer]

## 2021-06-02 NOTE — LETTER
"    Cancer Risk Management  Program [facility]  [facility] [place] [place] [facility]  Mailing Address  Cancer Risk Management Program  [address]  [place] [address]    New patient appointments  [phone]  Nitza 3, 2021    Marlo Vee  [address] [facility] [address]    Dear Marlo,    It was a pleasure speaking with you on Nitza 3, 2021. Here is a copy of the general \"after visit summary\" from our conversation.  If you have any additional questions, please feel free to call.  The first handout is about breast cancer genetics and the second handout is about colon cancer genetics.  You will also later receive in the mail a more detailed clinic note of our conversation as well.        Assessing Cancer Risk  Only about 5-10% of cancers are thought to be due to an inherited cancer susceptibility gene.    These families often have:    Several people with the same or related types of cancer    Cancers diagnosed at a young age (before age 50)    Individuals with more than one primary cancer    Multiple generations of the family affected with cancer    Some people may be candidates for genetic testing of more than one gene.  For these families, genetic testing using a cancer panel may be offered.  These panels will test different genes known to increase the risk for breast, ovarian, uterine, and/or other cancers. All of the genes discussed below have published clinical management guidelines for individuals who are found to carry a mutation. The purpose of this handout is to serve as a brief summary of the genes analyzed by the panels used to inquire about hereditary breast and gynecologic cancer:  MARCOS, BRCA1, BRCA2, BRIP1, CDH1, CHEK2, MLH1, MSH2, MSH6, PMS2, EPCAM, PTEN, PALB2, RAD51C, RAD51D, and " TP53.  ______________________________________________________________________________  Hereditary Breast and Ovarian Cancer Syndrome   (BRCA1 and BRCA2)  A single mutation in one of the copies of BRCA1 or BRCA2 increases the risk for breast and ovarian cancer, among others.  The risk for pancreatic cancer and melanoma may also be slightly increased in some families.  The chart below shows the chance that someone with a BRCA mutation would develop cancer in his or her lifetime1,2,3,4.        A person s ethnic background is also important to consider, as individuals of Ashkenazi Buddhist ancestry have a higher chance of having a BRCA gene mutation.  There are three BRCA mutations that occur more frequently in this population.    Denney Syndrome   (MLH1, MSH2, MSH6, PMS2, and EPCAM)  Currently five genes are known to cause Denney Syndrome: MLH1, MSH2, MSH6, PMS2, and EPCAM.  A single mutation in one of the Denney Syndrome genes increases the risk for colon, endometrial, ovarian, and stomach cancers.  Other cancers that occur less commonly in Denney Syndrome include urinary tract, skin, and brain cancers.  The chart below shows the chance that a person with Denney syndrome would develop cancer in his or her lifetime5.      *Cancer risk varies depending on Denney syndrome gene found    Cowden Syndrome   (PTEN)  Cowden syndrome is a hereditary condition that increases the risk for breast, thyroid, endometrial, colon, and kidney cancer.  Cowden syndrome is caused by a mutation in the PTEN gene.  A single mutation in one of the copies of PTEN causes Cowden syndrome and increases cancer risk.  The chart below shows the chance that someone with a PTEN mutation would develop cancer in their lifetime6,7.  Other benign features seen in some individuals with Cowden syndrome include benign skin lesions (facial papules, keratoses, lipomas), learning disability, autism, thyroid nodules, colon polyps, and larger head size.      *One recent  study found breast cancer risk to be increased to 85%    Li-Fraumeni Syndrome   (TP53)  Li-Fraumeni Syndrome (LFS) is a cancer predisposition syndrome caused by a mutation in the TP53 gene. A single mutation in one of the copies of TP53 increases the risk for multiple cancers. Individuals with LFS are at an increased risk for developing cancer at a young age. The lifetime risk for development of a LFS-associated cancer is 50% by age 30 and 90% by age 60.   Core Cancers: Sarcomas, Breast, Brain, Lung, Leukemias/Lymphomas, Adrenocortical carcinomas  Other Cancers: Gastrointestinal, Thyroid, Skin, Genitourinary    Hereditary Diffuse Gastric Cancer   (CDH1)  Currently, one gene is known to cause hereditary diffuse gastric cancer (HDGC): CDH1.  Individuals with HDGC are at increased risk for diffuse gastric cancer and lobular breast cancer. Of people diagnosed with HDGC, 30-50% have a mutation in the CDH1 gene.  This suggests there are likely other genes that may cause HDGC that have not been identified yet.      Lifetime Cancer Risks    General Population HDGC    Diffuse Gastric  <1% ~80%   Breast 12% 39-52%         Additional Genes  MARCOS  MARCOS is a moderate-risk breast cancer gene. Women who have a mutation in MARCOS can have between a 2-4 fold increased risk for breast cancer compared to the general population8. MARCOS mutations have also been associated with increased risk for pancreatic cancer, however an estimate of this cancer risk is not well understood9. Individuals who inherit two MARCOS mutations have a condition called ataxia-telangiectasia (AT).  This rare autosomal recessive condition affects the nervous system and immune system, and is associated with progressive cerebellar ataxia beginning in childhood.  Individuals with ataxia-telangiectasia often have a weakened immune system and have an increased risk for childhood cancers.    PALB2  Mutations in PALB2 have been shown to increase the risk of breast cancer up to  33-58% in some families; where individuals fall within this risk range is dependent upon family [id number]. PALB2 mutations have also been associated with increased risk for pancreatic cancer, although this risk has not been quantified yet.  Individuals who inherit two PALB2 mutations--one from their mother and one from their father--have a condition called Fanconi Anemia.  This rare autosomal recessive condition is associated with short stature, developmental delay, bone marrow failure, and increased risk for childhood cancers.    CHEK2   CHEK2 is a moderate-risk breast cancer gene.  Women who have a mutation in CHEK2 have around a 2-fold increased risk for breast cancer compared to the general population, and this risk may be higher depending upon family history.11,12,13 Mutations in CHEK2 have also been shown to increase the risk of a number of other cancers, including colon and prostate, however these cancer risks are currently not well understood.    BRIP1, RAD51C and RAD51D  Mutations in BRIP1, RAD51C, and RAD51D have been shown to increase the risk of ovarian cancer and possibly female breast cancer as well14,15 .       Lifetime Cancer Risk    General Population BRIP1 RAD51C RAD51D   Ovarian 1-2% ~5-8% ~5-9% ~7-15%           Inheritance  All of the cancer syndromes reviewed above are inherited in an autosomal dominant pattern.  This means that if a parent has a mutation, each of his or her children will have a 50% chance of inheriting that same mutation.  Therefore, each child--male or female--would have a 50% chance of being at increased risk for developing cancer.      Image obtained from Genetics Home Reference, 2013     Mutations in some genes can occur de doyle, which means that a person s mutation occurred for the first time in them and was not inherited from a parent.  Now that they have the mutation, however, it can be passed on to future generations.    Genetic Testing  Genetic testing involves a blood  test and will look at the genetic information in the MARCOS, BRCA1, BRCA2, BRIP1, CDH1, CHEK2, MLH1, MSH2, MSH6, PMS2, EPCAM, PTEN, PALB2, RAD51C, RAD51D, and TP53 genes for any harmful mutations that are associated with increased cancer risk.  If possible, it is recommended that the person(s) who has had cancer be tested before other family members.  That person will give us the most useful information about whether or not a specific gene is associated with the cancer in the family.    Results  There are three possible results of genetic testing:    Positive--a harmful mutation was identified in one or more of the genes    Negative--no mutation was identified in any of the genes on this panel    Variant of unknown significance--a variation in one of the genes was identified, but it is unclear how this impacts cancer risk in the family    Advantages and Disadvantages   There are advantages and disadvantages to genetic testing.    Advantages    May clarify your cancer risk    Can help you make medical decisions    May explain the cancers in your family    May give useful information to your family members (if you share your results)    Disadvantages    Possible negative emotional impact of learning about inherited cancer risk    Uncertainty in interpreting a negative test result in some situations    Possible genetic discrimination concerns (see below)    Genetic Information Nondiscrimination Act (DASH)  DASH is a federal law that protects individuals from health insurance or employment discrimination based on a genetic test result alone.  Although rare, there are currently no legal discrimination protections in terms of life insurance, long term care, or disability insurances.  Visit the National Human Genome Research [place]abella website to learn more.    Reducing Cancer Risk  All of the genes described above have nationally recognized cancer screening guidelines that would be recommended for individuals who test  positive.  In addition to increased cancer screening, surgeries may be offered or recommended to reduce cancer risk.  Recommendations are based upon an individual s genetic test result as well as their personal and family history of cancer.    Questions to Think About Regarding Genetic Testing:    What effect will the test result have on me and my relationship with my family members if I have an inherited gene mutation?  If I don t have a gene mutation?    Should I share my test results, and how will my family react to this news, which may also affect them?    Are my children ready to learn new information that may one day affect their own health?    Hereditary Cancer Resources    FORCE: Facing Our Risk of Cancer Empowered [email or web address]   Bright Pink bebrightpink.[email or web address]   Li-Fraumeni Syndrome Association lfsassociation.org   PTEN World PTENworld.[employer]   No stomach for cancer, Inc. nostomachforcancer.org   Stomach cancer relief network Scrnet.org   Collaborative Group of the Americas on Inherited Colorectal Cancer (CGA) cgaicc.com    Cancer Care cancercare.org   American Cancer Society (ACS) cancer.org   National Cancer [place] (NCI) cancer.gov     Please call us if you have any questions or concerns.   Cancer Risk Management Program [phone] ([phone])  ? Hugh Marie, MS, [facility] [phone]  X    Jhoana Mortensen, MS, [facility]  [phone]    [email or web address]  ? Afua Catherine, MS, [facility]  [phone]  ? Leanne Sierra, MS, [facility] [phone]  ? Kayley Dyer, MS, [facility] [phone]  ? Lorrie Nicolas, MS, [facility]  [phone]    References  1. Levon Oh PDP, Mitzi S, Yvonne SLATER, Mary JE, Easton JL, Barry N, Micheal H, Wolf O, Laureano A, Ovidio B, Chantell P, Elver S, Gerard DM, Ravi N, Car E, Nicole H, Terrance E, Devang J, Nidhi J, Scot B, Bruce H, Miguel S, Sukhi H, Za SINGH, Lazaurs MILNER, Marie OP. Average risks of breast and ovarian cancer associated with BRCA1 or BRCA2  mutations detected in case series unselected for family history: a combined analysis of 222 studies. Am J Hum Mitzy. 2003;72:1117-30.  2. Peace N, Karly M, Nadia G.  BRCA1 and BRCA2 Hereditary Breast and Ovarian Cancer. Gene Reviews online. 2013.  3. Gustabo YC, Urmila S, Rasheed G, Rachel S. Breast cancer risk among male BRCA1 and BRCA2 mutation carriers. J Natl Cancer Inst. 2007;99:1811-4.  4. Chito JACK, Ella I, Suhas J, Princess E, Lorna ER, Claudia F. Risk of breast cancer in male BRCA2 carriers. J Med Mitzy. 2010;47:710-1.  5. National Comprehensive Cancer Network. Clinical practice guidelines in oncology, colorectal cancer screening. Available online (registration required). 2015.  6. Delvis MH, Daryl J, Susan J, Paul WOOD, El MS, Roz C. Lifetime cancer risks in individuals with germline PTEN mutations. Clin Cancer Res. 2012;18:400-7.  7. Pilalfredoki R. Cowden Syndrome: A Critical Review of the Clinical Literature. J Mitzy . 2009:18:13-27.  8. Mallika A, Jeovany D, Rodrigue S, Moira P, Alexisi T, Fredis M, Vamsi B, Gale H, Yvette R, Brielle K, Jordy L, Chito JACK, Gerard D, Sekou DF, Mirza MR, The Breast Cancer Susceptibility Collaboration (UK) & Dawn SANTANA. MARCOS mutations that cause ataxia-telangiectasia are breast cancer susceptibility alleles. Nature Genetics. 2006;38:873-875  9. Humberto N , Shira Y, Anabell J, Azalea L, Jim GM , Angelika ML, Gallinger S, Mariano AG, Syngal S, Andrea ML, Wenceslao J , Sylvia R, Jenae SZ, Fidel JR, Bronwyn VE, Km M, Vogelstein B, Zabrina N, Tavo RH, Loreto KW, and Xochitl AP. MARCOS mutations in patients with hereditary pancreatic cancer. Cancer Discover. 2012;2:41-46  10. Jay CLARK, et al. Breast-Cancer Risk in Families with Mutations in PALB2. NEJM. 2014; 371(6):497-506.  11. CHEK2 Breast Cancer Case-Control Consortium. CHEK2*1100delC and susceptibility to breast cancer: A collaborative analysis involving 10,860 breast cancer cases and  9,065 controls from 10 studies. Am J Hum Mitzy, 74 (2004), pp. 8893-3086  12. Anderson T, Caroline S, Steven K, et al. Spectrum of Mutations in BRCA1, BRCA2, CHEK2, and TP53 in Families at High Risk of Breast Cancer. BRAYDON. 2006;295(12):7688-2731.   13. Chase C, Benjy D, Kasie ARNOLD, et al. Risk of breast cancer in women with a CHEK2 mutation with and without a family history of breast cancer. J Clin Oncol. 2011;29:6543-8464.  14. Chaparro H, Qian E, Marissa SJ, et al. Contribution of germline mutations in the RAD51B, RAD51C, and RAD51D genes to ovarian cancer in the population. J Clin Oncol. 2015;33(26):4815-5313. Doi:10.1200/JCO.2015.61.2408.  15. Bev T, Shaggy LANDRY, Savanna P, et al. Mutations in BRIP1 confer high risk of ovarian cancer. Patricia Mitzy. 2011;43(11):2288-0241. Doi:10.1038/ng.955.      Assessing Cancer Risk  Only about 5-10% of cancers are thought to be due to an inherited cancer susceptibility gene.    These families often have:  ? Several people with the same or related types of cancer  ? Cancers diagnosed at a young age (before age 50)  ? Individuals with more than one primary cancer  ? Multiple generations of the family affected with cancer    Comprehensive Colon Cancer Panel  We each inherit two copies of every gene in our bodies: one from our mother, and one from our father.  Each gene has a specific job to do.  When a gene has a mistake or  mutation  in it, it does not work like it should.      This handout will review common hereditary colon cancer syndromes, and other genes related to an increased risk for colon cancer.  The genes that will be discussed in this handout are: APC, BMPR1A, CDH1, CHEK2, EPCAM, GREM1, MLH1, MSH2, MSH6, MUTYH, PMS2, POLD1, POLE, PTEN, SMAD4, STK11, and TP53.  These genes are clinically actionable, meaning there are published guidelines for cancer screening and management for individuals who are found to carry mutations in these genes. Inheriting a mutation does  not mean a person will develop cancer, but it does significantly increase his or her risk above the general population risk.      Familial Adenomatous Polyposis (FAP)  FAP is a hereditary cancer syndrome caused by mutations in the APC gene. The condition is known to cause hundreds to thousands of adenomatous polyps in the colon, creating a  carpet  of polyps. Some individuals have what is called attenuated FAP (AFAP), a milder form of FAP with fewer polyps and typically later onset. Individuals with an APC gene mutation are at an increased risk for colon, thyroid, and duodenal cancers, as well as several other types of cancer1.  Other features of this condition may include: osteomas, dental anomalies, benign skin lesions, CHRPE ( freckle  on the inside of the eye), and desmoid tumors.      Lifetime Cancer Risks     Cancer Type General Population FAP   Colon  5% near 100%   Thyroid (papillary) 1% 1-12%   Duodenal <1% 5%   Liver  <1% 1-2% before age 5   Pancreas <1% 1%   Stomach <1% 1%       Juvenile Polyposis Syndrome (JPS)  JPS is characterized by hamartomatous polyps, called juvenile polyps, in the gastrointestinal tract.  Juvenile  refers to the type of polyps seen in this hereditary cancer condition, not the age of onset. Currently, mutations in two genes are known to cause JPS: BMPR1A and SMAD4. Of individuals clinically diagnosed with JPS, only 40% have an identifiable mutation in one of these genes, suggesting there are other genes that cause JPS that have not been discovered yet. Individuals with JPS are at an increased risk for colon cancer and stomach cancer 2,3,4. Pancreatic and small bowel cancers have also been reported in JPS, but the actual risks are unknown.         Lifetime Cancer Risks    Cancer Type General Population JPS   Colon 5% 40-50%   Gastric/Duodenal <1% 10-21%     Some individuals with SMAD4 mutations have a condition called JPS/HHT (Juvenile Polyposis/Hereditary Hemorrhagic  Telangiectasia) where in addition to JPS, individuals may have nose bleeds and clotting issues.     Hereditary Diffuse Gastric Cancer (HDGC)  Currently, mutations in one gene are known to cause Hereditary Diffuse Gastric Cancer: CDH1.  Individuals with HDGC are at increased risk for diffuse gastric cancer and lobular breast cancer. Of people diagnosed with HDGC, 30-50% have a mutation in the CDH1 gene.  This suggests there are likely mutations in other genes that may cause HDGC that have not been identified yet. Individuals with HDGC may also be at increased risk for colon cancer.      Lifetime Cancer Risks    Cancer Type General Population HDGC   Diffuse Gastric <1% 67-83%   Breast 12% 39-52%     Denney syndrome  Mutations in five different genes are known to cause Denney syndrome: MLH1, MSH2, MSH6, PMS2, and EPCAM. Individuals with Denney syndrome have an increased risk for colon, uterine, ovarian, small bowel, stomach, urinary tract, and brain cancer, as well as several other types of cancer. The exact lifetime cancer risks are dependent upon the gene in which the mutation was identified.      Lifetime Cancer Risks    Cancer Type General Population Denney syndrome   Colon 5% 12-52%   Uterine 2-3% Up to 57%   Stomach <1% Up to 16%   Ovarian 2% Up to 38%   Urinary tract <1% 1-7%   Hepatobiliary tract <1% 1-4%   Small bowel <1% Up to 11%   Brain/CNS <1% Not well established   Pancreas <1% Up to 6%       MUTYH-Associated Polyposis (MAP)  MAP is a hereditary cancer syndrome caused by mutations in the MUTYH gene. Unlike the other hereditary cancer genes discussed in this handout, two mutations in the MUTYH gene cause MAP and increase cancer risk. Those affected with MAP typically have between  adenomatous polyps. This syndrome also increases the risk for colon and duodenal cancer. Current research suggests that other cancers may be associated with MUTYH mutations, as well. The table below includes the risk that  someone with two MUTYH gene mutations would develop cancer in their lifetime; of note, there is also an increased colon cancer risk for individuals who carry only one MUTYH gene mutation5,6,7.       Lifetime Cancer Risks    Cancer Type General Population MAP   Colon 5% %   Duodenal  <1% 5%       Cowden syndrome  Cowden syndrome is a hereditary condition that increases the risk for breast, thyroid, endometrial, colon, and kidney cancer.  A single mutation in the PTEN gene causes Cowden syndrome and increases cancer risk.  The table below shows the chance that someone with a PTEN mutation would develop cancer in their lifetime8,9.  Other benign features seen in some individuals with Cowden syndrome include benign skin lesions (facial papules, keratoses, lipomas), learning disabilities, autism, thyroid nodules, hamartomatous colon polyps, and larger head size.      Lifetime Cancer Risks   Cancer Type General Population Cowden Syndrome   Breast 12% 25-50%*   Thyroid 1% 35%   Renal 1-2% 35%   Endometrial 2-3% 28%   Colon 5% 9%   Melanoma 2-3% >5%     *One recent study found breast cancer risk to be increased to 85%    Peutz-Jeghers syndrome (PJS)  PJS is a hereditary cancer syndrome caused by mutations in the STK11 gene. This condition can be distinguished from other hereditary syndromes by the presence of hamartomatous polyps in the gastrointestinal tract and freckles present in unusual places such as the hands, feet, neck, and lips. Individuals with Peutz-Jeghers syndrome have an increased risk for colon, breast, pancreatic, and other cancers3.  Men are at risk for testicular tumors which can affect hormones in the body. Women are at risk for sex cord tumors of the ovaries and a rare aggressive type of cervical cancer.     Lifetime Cancer Risks    Cancer Type General Population PJS   Breast 12% 45-50%   Colon 5% 39%   Stomach <1% 29%   Pancreas 1.5% 11-36%   Small Intestine <1% 13%     Ovarian  2%  18%   Lung  6-7% 15-17%     Additional Genes Associated with Increased Colon Cancer Risk  CHEK2  CHEK2 is a moderate-risk breast cancer gene.  Women who have a mutation in CHEK2 have around a 2-fold increased risk for breast cancer compared to the general population, and this risk may be higher depending upon family history.12,13,14.  Mutations in CHEK2 have also been shown to increase the risk of a number of other cancers, including colon and prostate, however these cancer risks are currently not well understood.     GREM1  GREM1 is a moderate-risk colon polyposis gene. Duplications of this gene are more commonly found in individuals with Ashkenazi Pentecostalism [id number]. Mutations in GREM1 are associated with colon polyps and therefore an increased risk of colon cancer; however the estimated cancer risk is not well [id number].     POLD1 and POLE  POLD1 and POLE are moderate-risk colon cancer genes. Carriers of a mutation in one of these genes increases the lifetime risk of colorectal [id number],18,19,20. Mutations in these genes may also be associated with increased risk for other cancers including: endometrial cancer, duodenal adenomas and carcinomas, and brain tumors.    TP53  Li Fraumeni syndrome (LFS) is a hereditary cancer predisposition syndrome. LFS is caused by a mutation in the TP53 gene. A single mutation in one of the copies of TP53 increases the risk for multiple cancers. Individuals with LFS are at an increased risk for developing cancer at a young age. The general lifetime risk for development of cancer is 50% by age 30 and 90% by age 60.      Core Cancers: Sarcomas, Breast, Brain, Lung, Leukemias/Lymphomas, Adrenocortical carcinomas  Other Cancers: Gastrointestinal, Thyroid, Skin, Genitourinary    Genetic Testing  Genetic testing involves a simple blood test and will look at the genetic information in genes associated with an increased risk of colon cancer. The tests look for any harmful mutations that are  associated with increased cancer risk.  If possible, it is recommended that the person(s) who has had cancer be tested before other family members.  That person will give us the most useful information about whether or not a specific gene mutation is associated with the cancer in the family.     Results  There are three possible results from genetic testing:  ? Positive--a harmful mutation was identified  ? Negative--no mutation was identified  ? Variant of unknown significance--a variation in one of the genes was identified, but it is unclear how this impacts cancer risk in the family  Advantages and Disadvantages  There are advantages and disadvantages to genetic testing of these genes.    Advantages  ? May clarify your cancer risk  ? Can help you make medical decisions  ? May explain the cancers in your family  ? May give useful information to your family members (if you share your results)    Disadvantages  ? Possible negative emotional impact of learning about inherited cancer risk  ? Uncertainty in interpreting a negative test result in some situations  ? Possible genetic discrimination concerns (see below)    Inheritance   Most mutations in the genes outlined above are inherited in an autosomal dominant pattern.  This means that if a parent has a mutation, each of his or her children will have a 50% chance of inheriting that same mutation.  Therefore, each child--male or female--would have a 50% chance of being at increased risk for developing cancer.                                            Image obtained from Genetics Home Reference, 2013     In the case of MUTYH-Associated Polyposis (MAP) this hereditary cancer syndrome is inherited in an autosomal recessive pattern. This means that each parent of an individual with MAP is a carrier of MAP, meaning that they have only one mutation in MUTYH. They still have one functioning copy of their gene.  Carriers are at a slightly higher risk for colon cancer than  the general population. If each parent is a carrier for MAP, they have a 25% of having a child who is affected with MAP, meaning the child inherited both gene mutations - one from each parent.       Image obtained from Genetics Home Reference, 2016    Genetic Information Nondiscrimination Act (DASH)  DASH is a federal law that protects individuals from health insurance or employment discrimination based on a genetic test result alone.  Although rare, there are currently no legal protections in terms of life insurance, long term care, or disability insurances.  Visit the National Human Genome Research [place] at Genome.gov/54550407 to learn more.    Reducing Cancer Risk  Each of the genes listed within this handout have nationally recognized cancer screening guidelines that would be recommended for individuals who test positive.  In addition to increased cancer screening, surgeries may be offered or recommended to reduce cancer risk in certain cases.  Recommendations are based upon an individual s genetic test result as well as their personal and family history of cancer.    Questions to Think About Regarding Genetic Testing  ? What effect will the test result have on me and my relationship with my family members if I have an inherited gene mutation?  If I don t have a gene mutation?  ? Should I share my test results, and how will my family react to this news, which may also affect them?  ? Are my children ready to learn new information that may one day affect their own health?    Resources    PTEN World PTENworld.Ininal   No Stomach for Cancer, Inc. nostomachforcancer.org   Stomach Cancer Relief Network scrnet.org   Collaborative Group of the Americas on Inherited Colorectal Cancer (CGA) cgaicc.com   Cancer Care cancercare.org   American Cancer Society (ACS) cancer.org   National Cancer [place] (NCI) cancer.org   Denney Syndrome International [email or web address].[email or web address]       Please call us if you have any questions or  concerns.     Cancer Risk Management Program [phone] ([phone])  ? Hugh Marie, MS, [facility] [phone]  X   Jhoana Mortensen, MS, [facility]  [phone]    [email or web address]  ? Afua Catherine, MS, [facility]  [phone]  ? Leanne Sierra, MS, [facility] [phone]  ? Kayley Dyer, MS, [facility] [phone]  ? Lorrie Nicolas, MS, [facility]  [phone]    References    16. Sapphire E, Supa J, Raffy G, Ngoc E, Angi J, et al. The Prevalence of thyroid cancer and benign thyroid disease in patients with familial adenomatous polyposis may be higher than previously recognized. Clin Colorectal Cancer. 2012;11:304-308.  17. Lucille L, Van Jose A, Evaristo L, Lily C, Vani K, et al. Risk of colorectal cancer in juvenile polyposis. Gut. 2007;56:965-967.  18. Moreau FG, Kendall MN, Frankie CA. Colorectal cancer risk in hamartomatous polyposis syndromes. World Journal of Gastrointestinal Surgery. 2015;27:25-32  19. [place] MG. Guidance on gastrointestinal surveillance for hereditary non-polyposis colorectal cancer, familial adenomatous polypolis, juvenile polyposis, and Peutz-Jeghers syndrome. Gut. 2002;51:21-27.  20. Zack AK, Chi TIFFANY, Andres JG et al. Risk of extracolonic cancers for people with biallelic and monoallelic mutations in MUTYH. Int J of Cancer. 2016;139:3536-5658.  21. Bruna S, Yancylpiper S, Gorufina H, Don K, Aguilar M, et al. MUTYH-associated polyposis: 70 of 71 patients with biallelic mutations present with an attenuated or atypical phenotype. Int J of Cancer. 2006;119:807-814.  22. Almaz MORRIS, Peggy F, Kevin I, Jeane M, Almaz H, et al. MUTYH mutation carriers have increased breast cancer risk. Cancer. 2012;0334-9900.  23. Delvis MH, Daryl J, Susan J, Paul LA, El MS, Eng C. Lifetime cancer risks in individuals with germline PTEN mutations. Clin Cancer Res. 2012;18:400-7.  24. Alta MUKHERJEE. Cowden Syndrome: A Critical Review of the Clinical Literature. J Mitzy .  2009:18:13-27.  25. National Comprehensive Cancer Network. Clinical practice guidelines in oncology, colorectal cancer screening. Available online (registration required). 2013.  26. National Cancer [place]. SEER Cancer Stat Fact Sheets.  December 2013.  27. CHEK2 Breast Cancer Case-Control Consortium. CHEK2*1100delC and susceptibility to breast cancer: A collaborative analysis involving 10,860 breast cancer cases and 9,065 controls from 10 studies. Am J Hum Mitzy, 74 (2004), pp. 1093-6000  28. Anderson T, Caroline S, Steven K, et al. Spectrum of Mutations in BRCA1, BRCA2, CHEK2, and TP53 in Families at High Risk of Breast Cancer. BRAYDON. 2006;295(12):4424-3213.  29. Chase RAMIREZ, Benjy D, Kasie ARNOLD, et al. Risk of breast cancer in women with a CHEK2 mutation with and without a family history of breast cancer. J Clin Oncol. 2011;29:5745-7862.  30. Dayne THACKER, Catrina E, Arsenio J, Junito N, Steph M et al. Defining the polyposis/colorectal cancer phenotype associated with the GREM1 duplication: counseling and management guidelines. Mitzy .Res. 2016;98:1-5.  31. Manisha BRANDON, José Manuel S, Santos A, Mary Schwab, et al. Hereditary mixed polyposis syndrome is caused by a 40kb upstream duplication that leads to increased and ectopic expression of the BMP antagonist GREM1. Patricia Mitzy. 2015;44:699-703.  32. JAMES Palumbo. et al. Germline mutations affecting the proofreading domains of POLE and POLD1 predispose to colorectal adenomas and carcinomas. Patricia. Mitzy. 45, 136-44 (2013).  33. RISSA Mackenzie. et al. POLE and POLD1 mutations in 529 nel with familial colorectal cancer and/or polyposis: review of reported cases and recommendations for genetic testing and surveillance. Mitzy. Med. (2015). doi:10.1038/gim.2015.75  34. MARITA Serrano et al. New insights into POLE and POLD1 germline mutations in familial colorectal cancer and polyposis. Hum. Mol. Mitzy. 23, 1036-12 (2014).  35. YAMILETH Ferguson et al. Frequency and phenotypic spectrum of  germline mutations in POLE and seven other polymerase genes in 266 patients with colorectal adenomas and carcinomas. Int. J. Cancer 137, 320-31 (2015).

## 2021-06-03 NOTE — PATIENT INSTRUCTIONS
Patient instructions sent through mail, since this was a virtual visit.  See letters tab.    Jhoana Mortensen MS, [facility]  Genetic Counselor  Ph: [phone]  Pager: [phone]

## 2021-07-13 ENCOUNTER — PRE VISIT (OUTPATIENT)
Dept: UROLOGY | Facility: CLINIC | Age: 71
End: 2021-07-13

## 2021-07-13 NOTE — TELEPHONE ENCOUNTER
Reason for visit: Consult for prostate cancer     Relevant information:   - Referred by Dr. 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

## 2021-07-15 ENCOUNTER — LAB (OUTPATIENT)
Dept: LAB | Facility: CLINIC | Age: 71
End: 2021-07-15
Payer: MEDICARE

## 2021-07-15 ENCOUNTER — RESULTS ONLY (OUTPATIENT)
Dept: LAB | Facility: CLINIC | Age: 71
End: 2021-07-15

## 2021-07-15 DIAGNOSIS — Z94.0 KIDNEY TRANSPLANTED: ICD-10-CM

## 2021-07-15 DIAGNOSIS — Z48.298 AFTERCARE FOLLOWING ORGAN TRANSPLANT: ICD-10-CM

## 2021-07-15 LAB
ANION GAP SERPL CALCULATED.3IONS-SCNC: 2 MMOL/L (ref 3–14)
BUN SERPL-MCNC: 22 MG/DL (ref 7–30)
CALCIUM SERPL-MCNC: 9.2 MG/DL (ref 8.5–10.1)
CHLORIDE BLD-SCNC: 107 MMOL/L (ref 94–109)
CO2 SERPL-SCNC: 29 MMOL/L (ref 20–32)
CREAT SERPL-MCNC: 1.19 MG/DL (ref 0.66–1.25)
ERYTHROCYTE [DISTWIDTH] IN BLOOD BY AUTOMATED COUNT: 13.8 % (ref 10–15)
GFR SERPL CREATININE-BSD FRML MDRD: 62 ML/MIN/1.73M2
GLUCOSE BLD-MCNC: 102 MG/DL (ref 70–99)
HCT VFR BLD AUTO: 40.5 % (ref 40–53)
HGB BLD-MCNC: 12.1 G/DL (ref 13.3–17.7)
MCH RBC QN AUTO: 26.7 PG (ref 26.5–33)
MCHC RBC AUTO-ENTMCNC: 29.9 G/DL (ref 31.5–36.5)
MCV RBC AUTO: 89 FL (ref 78–100)
PLATELET # BLD AUTO: 204 10E3/UL (ref 150–450)
POTASSIUM BLD-SCNC: 3.6 MMOL/L (ref 3.4–5.3)
RBC # BLD AUTO: 4.54 10E6/UL (ref 4.4–5.9)
SODIUM SERPL-SCNC: 138 MMOL/L (ref 133–144)
WBC # BLD AUTO: 5.5 10E3/UL (ref 4–11)

## 2021-07-15 PROCEDURE — 85027 COMPLETE CBC AUTOMATED: CPT

## 2021-07-15 PROCEDURE — 86832 HLA CLASS I HIGH DEFIN QUAL: CPT

## 2021-07-15 PROCEDURE — 80197 ASSAY OF TACROLIMUS: CPT

## 2021-07-15 PROCEDURE — 80048 BASIC METABOLIC PNL TOTAL CA: CPT

## 2021-07-15 PROCEDURE — 86833 HLA CLASS II HIGH DEFIN QUAL: CPT

## 2021-07-15 PROCEDURE — 36415 COLL VENOUS BLD VENIPUNCTURE: CPT

## 2021-07-16 LAB
DONOR IDENTIFICATION: NORMAL
DSA COMMENTS: NORMAL
DSA PRESENT: NO
DSA TEST METHOD: NORMAL
ORGAN: NORMAL
SA 1 CELL: NORMAL
SA 1 TEST METHOD: NORMAL
SA 2 CELL: NORMAL
SA 2 TEST METHOD: NORMAL
SA1 HI RISK ABY: NORMAL
SA1 MOD RISK ABY: NORMAL
SA2 HI RISK ABY: NORMAL
SA2 MOD RISK ABY: NORMAL
TACROLIMUS BLD-MCNC: 6.2 UG/L (ref 5–15)
TME LAST DOSE: NORMAL H
TME LAST DOSE: NORMAL H
UNACCEPTABLE ANTIGENS: NORMAL
UNOS CPRA: 70
ZZZSA 1  COMMENTS: NORMAL
ZZZSA 2 COMMENTS: NORMAL

## 2021-07-17 LAB — HISTOTRAC: YES

## 2021-07-22 ENCOUNTER — PRE VISIT (OUTPATIENT)
Dept: UROLOGY | Facility: CLINIC | Age: 71
End: 2021-07-22

## 2021-08-04 ENCOUNTER — LAB (OUTPATIENT)
Dept: LAB | Facility: CLINIC | Age: 71
End: 2021-08-04
Payer: MEDICARE

## 2021-08-04 ENCOUNTER — TELEPHONE (OUTPATIENT)
Dept: TRANSPLANT | Facility: CLINIC | Age: 71
End: 2021-08-04

## 2021-08-04 DIAGNOSIS — R19.7 DIARRHEA: ICD-10-CM

## 2021-08-04 DIAGNOSIS — Z48.298 AFTERCARE FOLLOWING ORGAN TRANSPLANT: ICD-10-CM

## 2021-08-04 DIAGNOSIS — Z94.0 KIDNEY TRANSPLANTED: Primary | ICD-10-CM

## 2021-08-04 DIAGNOSIS — Z94.0 KIDNEY TRANSPLANTED: ICD-10-CM

## 2021-08-04 LAB — C DIFF TOX B STL QL: NEGATIVE

## 2021-08-04 PROCEDURE — 87493 C DIFF AMPLIFIED PROBE: CPT

## 2021-08-04 PROCEDURE — 87506 IADNA-DNA/RNA PROBE TQ 6-11: CPT

## 2021-08-04 NOTE — TELEPHONE ENCOUNTER
Patient Call: General  Route to LPN    Reason for call: connect with pt regarding having diarrhea last week     Call back needed? Yes    Return Call Needed  Same as documented in contacts section  When to return call?: Greater than one day: Route standard priority

## 2021-08-04 NOTE — TELEPHONE ENCOUNTER
Returned a call to John. He states he has been having diarrhea for about 3 days. Does not seem to be getting better. No vomiting, no fever, no URI symptoms. He is doing his best to stay hydrated.  Stool studies ordered. He will go to lab today or tomorrow to submit samples. If symptoms get worse or he begins to vomit and cannot keep meds down he will go to ED.   He will not take imodium until stool samples are back.

## 2021-08-05 ENCOUNTER — TELEPHONE (OUTPATIENT)
Dept: TRANSPLANT | Facility: CLINIC | Age: 71
End: 2021-08-05

## 2021-08-05 NOTE — TELEPHONE ENCOUNTER
Let John know C-diff and enteric panel all came back negative. John continues to have watery diarrhea multiple times overnight and this morning.  I advised him to take imodium 1 dose this afternoon and 1 dose this evening. If diarrhea continues into tomorrow he should follow up with PCP or urgent care.   He should continue to push oral hydration.

## 2021-08-06 ENCOUNTER — TELEPHONE (OUTPATIENT)
Dept: TRANSPLANT | Facility: CLINIC | Age: 71
End: 2021-08-06

## 2021-08-06 DIAGNOSIS — Z94.0 STATUS POST KIDNEY TRANSPLANT: ICD-10-CM

## 2021-08-06 RX ORDER — MYCOPHENOLATE MOFETIL 250 MG/1
500 CAPSULE ORAL 2 TIMES DAILY
Qty: 120 CAPSULE | Refills: 11 | Status: SHIPPED | OUTPATIENT
Start: 2021-08-06 | End: 2021-08-09

## 2021-08-06 NOTE — TELEPHONE ENCOUNTER
Spoke with the infusion center at [facility].  They are full this weekend and have no openings for IV fluids.   Spoke with John.  Advised he come into the ER for evaluation of dehydration/IV fluids.  He is agreeable and will head into [facility] ED.  [facility] to see if there are any appointments for IV fluids on Monday.

## 2021-08-06 NOTE — TELEPHONE ENCOUNTER
Called John.  Instructed him to decrease mmf to 500 mg BID. Told him I tried to get him into [facility] for IV fluids, but they are full this weekend.   Discussed Dr. Irwin's recommendation to come to the ED for IV fluids and to assess hydration status.   John denies any light headedness/dizziness.  He has not checked his BP.  He just feels as though he is not able to keep up with his hydration orally.   He is requesting I see if he would be able to get into [facility].   Will check and let him know.      Mason Irwin MD Hasebroock, Rebecca, RN  Decrease MMF to 500mg PO BID for now, try to get IV fluids and labs at [facility]. If it gets worse or you can't get IVF at [facility] he needs to come to ED           Previous Messages       ----- Message -----   From: Christal Porter, RN   Sent: 8/6/2021   3:35 PM CDT   To: Mason Irwin MD   Subject: ongoing diarrhea/dehydration                     Hi Dr. Irwin,   John is a patient of Onelia's who's been having diarrhea for several days.  Looks like he got a c. Diff/enteric panel done on 8/4 and it was negative.     He has been using imodium but continues to have frequent watery diarrhea.  He feels like he is getting dehydrated.  Ok give him some IV fluids if I can get him into [facility] over the weekend?  Otherwise, should he come into the ED?     Jimena

## 2021-08-06 NOTE — TELEPHONE ENCOUNTER
Pt continues to have watery stools Imodium in not helping  He is starting to feel dehydrated  Wonders if he can get IV fluids

## 2021-08-07 ENCOUNTER — HOSPITAL ENCOUNTER (EMERGENCY)
Facility: CLINIC | Age: 71
Discharge: HOME | End: 2021-08-07
Attending: INTERNAL MEDICINE | Admitting: INTERNAL MEDICINE
Payer: MEDICARE

## 2021-08-07 VITALS
DIASTOLIC BLOOD PRESSURE: 89 MMHG | RESPIRATION RATE: 16 BRPM | TEMPERATURE: 97.5 F | SYSTOLIC BLOOD PRESSURE: 144 MMHG | OXYGEN SATURATION: 98 % | HEART RATE: 60 BPM

## 2021-08-07 DIAGNOSIS — R19.7 DIARRHEA, UNSPECIFIED TYPE: ICD-10-CM

## 2021-08-07 DIAGNOSIS — Z94.0 KIDNEY REPLACED BY TRANSPLANT: ICD-10-CM

## 2021-08-07 LAB
ALBUMIN SERPL-MCNC: 3.7 G/DL (ref 3.4–5)
ALP SERPL-CCNC: 83 U/L (ref 40–150)
ALT SERPL W P-5'-P-CCNC: 17 U/L (ref 0–70)
ANION GAP SERPL CALCULATED.3IONS-SCNC: 8 MMOL/L (ref 3–14)
ANION GAP SERPL CALCULATED.3IONS-SCNC: 8 MMOL/L (ref 3–14)
AST SERPL W P-5'-P-CCNC: 10 U/L (ref 0–45)
BASOPHILS # BLD AUTO: 0 10E3/UL (ref 0–0.2)
BASOPHILS NFR BLD AUTO: 0 %
BILIRUB SERPL-MCNC: 0.8 MG/DL (ref 0.2–1.3)
BUN SERPL-MCNC: 31 MG/DL (ref 7–30)
BUN SERPL-MCNC: 31 MG/DL (ref 7–30)
CALCIUM SERPL-MCNC: 9.1 MG/DL (ref 8.5–10.1)
CALCIUM SERPL-MCNC: 9.1 MG/DL (ref 8.5–10.1)
CHLORIDE BLD-SCNC: 105 MMOL/L (ref 94–109)
CHLORIDE BLD-SCNC: 105 MMOL/L (ref 94–109)
CO2 SERPL-SCNC: 19 MMOL/L (ref 20–32)
CO2 SERPL-SCNC: 19 MMOL/L (ref 20–32)
CREAT SERPL-MCNC: 1.79 MG/DL (ref 0.66–1.25)
CREAT SERPL-MCNC: 1.79 MG/DL (ref 0.66–1.25)
EOSINOPHIL # BLD AUTO: 0.1 10E3/UL (ref 0–0.7)
EOSINOPHIL NFR BLD AUTO: 3 %
ERYTHROCYTE [DISTWIDTH] IN BLOOD BY AUTOMATED COUNT: 13.6 % (ref 10–15)
GFR SERPL CREATININE-BSD FRML MDRD: 38 ML/MIN/1.73M2
GFR SERPL CREATININE-BSD FRML MDRD: 38 ML/MIN/1.73M2
GLUCOSE BLD-MCNC: 108 MG/DL (ref 70–99)
GLUCOSE BLD-MCNC: 108 MG/DL (ref 70–99)
HCT VFR BLD AUTO: 40.5 % (ref 40–53)
HGB BLD-MCNC: 12.6 G/DL (ref 13.3–17.7)
HOLD SPECIMEN: NORMAL
IMM GRANULOCYTES # BLD: 0 10E3/UL
IMM GRANULOCYTES NFR BLD: 0 %
LYMPHOCYTES # BLD AUTO: 1.3 10E3/UL (ref 0.8–5.3)
LYMPHOCYTES NFR BLD AUTO: 23 %
MAGNESIUM SERPL-MCNC: 1.7 MG/DL (ref 1.6–2.3)
MCH RBC QN AUTO: 27.2 PG (ref 26.5–33)
MCHC RBC AUTO-ENTMCNC: 31.1 G/DL (ref 31.5–36.5)
MCV RBC AUTO: 87 FL (ref 78–100)
MONOCYTES # BLD AUTO: 0.7 10E3/UL (ref 0–1.3)
MONOCYTES NFR BLD AUTO: 13 %
NEUTROPHILS # BLD AUTO: 3.5 10E3/UL (ref 1.6–8.3)
NEUTROPHILS NFR BLD AUTO: 61 %
NRBC # BLD AUTO: 0 10E3/UL
NRBC BLD AUTO-RTO: 0 /100
PHOSPHATE SERPL-MCNC: 2.7 MG/DL (ref 2.5–4.5)
PLATELET # BLD AUTO: 221 10E3/UL (ref 150–450)
POTASSIUM BLD-SCNC: 3.7 MMOL/L (ref 3.4–5.3)
POTASSIUM BLD-SCNC: 3.7 MMOL/L (ref 3.4–5.3)
PROT SERPL-MCNC: 7.5 G/DL (ref 6.8–8.8)
RBC # BLD AUTO: 4.64 10E6/UL (ref 4.4–5.9)
SODIUM SERPL-SCNC: 132 MMOL/L (ref 133–144)
SODIUM SERPL-SCNC: 132 MMOL/L (ref 133–144)
WBC # BLD AUTO: 5.7 10E3/UL (ref 4–11)

## 2021-08-07 PROCEDURE — 96360 HYDRATION IV INFUSION INIT: CPT | Performed by: INTERNAL MEDICINE

## 2021-08-07 PROCEDURE — 84100 ASSAY OF PHOSPHORUS: CPT

## 2021-08-07 PROCEDURE — 82374 ASSAY BLOOD CARBON DIOXIDE: CPT

## 2021-08-07 PROCEDURE — 96361 HYDRATE IV INFUSION ADD-ON: CPT | Performed by: INTERNAL MEDICINE

## 2021-08-07 PROCEDURE — 99284 EMERGENCY DEPT VISIT MOD MDM: CPT | Mod: GC | Performed by: INTERNAL MEDICINE

## 2021-08-07 PROCEDURE — 85025 COMPLETE CBC W/AUTO DIFF WBC: CPT

## 2021-08-07 PROCEDURE — 36415 COLL VENOUS BLD VENIPUNCTURE: CPT | Performed by: INTERNAL MEDICINE

## 2021-08-07 PROCEDURE — 258N000003 HC RX IP 258 OP 636: Performed by: EMERGENCY MEDICINE

## 2021-08-07 PROCEDURE — 258N000003 HC RX IP 258 OP 636

## 2021-08-07 PROCEDURE — 99283 EMERGENCY DEPT VISIT LOW MDM: CPT | Mod: 25 | Performed by: INTERNAL MEDICINE

## 2021-08-07 PROCEDURE — 36415 COLL VENOUS BLD VENIPUNCTURE: CPT

## 2021-08-07 PROCEDURE — 87329 GIARDIA AG IA: CPT

## 2021-08-07 PROCEDURE — 258N000003 HC RX IP 258 OP 636: Performed by: INTERNAL MEDICINE

## 2021-08-07 PROCEDURE — 87177 OVA AND PARASITES SMEARS: CPT

## 2021-08-07 PROCEDURE — 83735 ASSAY OF MAGNESIUM: CPT

## 2021-08-07 RX ORDER — SODIUM CHLORIDE 9 MG/ML
INJECTION, SOLUTION INTRAVENOUS CONTINUOUS
Status: DISCONTINUED | OUTPATIENT
Start: 2021-08-07 | End: 2021-08-08 | Stop reason: HOSPADM

## 2021-08-07 RX ORDER — LIDOCAINE 40 MG/G
CREAM TOPICAL
Status: DISCONTINUED | OUTPATIENT
Start: 2021-08-07 | End: 2021-08-08 | Stop reason: HOSPADM

## 2021-08-07 RX ADMIN — SODIUM CHLORIDE 1000 ML: 9 INJECTION, SOLUTION INTRAVENOUS at 19:16

## 2021-08-07 RX ADMIN — SODIUM CHLORIDE 500 ML: 9 INJECTION, SOLUTION INTRAVENOUS at 17:41

## 2021-08-07 RX ADMIN — SODIUM CHLORIDE 500 ML: 9 INJECTION, SOLUTION INTRAVENOUS at 18:20

## 2021-08-07 RX ADMIN — SODIUM CHLORIDE 1000 ML: 9 INJECTION, SOLUTION INTRAVENOUS at 21:27

## 2021-08-07 ASSESSMENT — ENCOUNTER SYMPTOMS
HEADACHES: 0
ABDOMINAL PAIN: 1
BLOOD IN STOOL: 0
VOMITING: 0
CHILLS: 0
FATIGUE: 1
PALPITATIONS: 0
ARTHRALGIAS: 0
DIARRHEA: 1
LIGHT-HEADEDNESS: 0
TROUBLE SWALLOWING: 0
APPETITE CHANGE: 1
COUGH: 0
ABDOMINAL DISTENTION: 0
FEVER: 0
DIZZINESS: 0
SORE THROAT: 0
DYSURIA: 0
NAUSEA: 1
MYALGIAS: 0
CONFUSION: 0
SHORTNESS OF BREATH: 0
WEAKNESS: 0

## 2021-08-07 NOTE — ED PROVIDER NOTES
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 Mr. Vee reports that he has had severe watery non-bloody diarrhea for the past week. He endorses 5 or more episodes of diarrhea daily. He has tried Imodium and Pepto-Bismol without relief.  He noted an episode of black stools after taking Pepto-Bismol, but none after he stopped taking the medication.  He endorses mild left lower quadrant abdominal pain, abdominal cramping, nausea, poor appetite.  He denies any body aches, fever, chills, recent sick contacts, other household members with diarrhea, lightheadedness, weakness, prior episodes of diarrhea.  He is on prophylactic sulfamethoxazole trimethoprim. [place].  He went to an urgent care clinic earlier this week where they recommended cutting down on his CellCept dose and completed a stool enteric panel and C. difficile which were both negative.      Ref Range & Units 3 d ago     C Difficile Toxin B by PCR Negative Negative       Ref Range & Units 3 d ago     Campylobacter group Not Detected Not Detected     Salmonella species Not Detected Not Detected     Shigella species Not Detected Not Detected     Vibrio group Not Detected Not Detected     Rotavirus Not Detected Not Detected     Shiga toxin 1 gene Not Detected Not Detected     Shiga toxin 2 gene Not Detected Not Detected     Norovirus I and II Not Detected Not Detected     Yersinia enterocolitica Not Detected Not Detected        Past  Medical History  Past Medical History:   Diagnosis Date     Anemia in ESRD (end-stage renal disease) (H)      Antiplatelet or antithrombotic long-term use      Anxiety      Dyslipidemia      End stage kidney disease (H)      Heart attack (H)     not sure     Hypertension      Membranous glomerulonephritis 2002     PONV (postoperative nausea and vomiting)      PUD (peptic ulcer disease)      Secondary hyperparathyroidism (of renal origin)      Skin abnormalities      Stented coronary artery      Vitamin D deficiency      Past Surgical History:   Procedure Laterality Date     CYSTOSCOPY, REMOVE STENT(S), COMBINED Right 2/23/2016    Procedure: COMBINED CYSTOSCOPY, REMOVE STENT(S);  Surgeon: Cody Temple MD;  Location: [facility] OR     OPEN REDUCTION INTERNAL FIXATION HIP NAILING Right 12/6/2020    Procedure: OPEN REDUCTION INTERNAL FIXATION, FRACTURE, FEMUR, USING INTRAMEDULLARY SHU.;  Surgeon: Jimmy Stoner MD;  Location: [facility] OR     s/p right upper extremity AV fistula       TRANSPLANT      kidney transplant      VASCULAR SURGERY       aspirin 81 MG EC tablet  atorvastatin (LIPITOR) 10 MG tablet  carvedilol (COREG) 25 MG tablet  carvedilol (COREG) 25 MG tablet  CELLCEPT (BRAND) 250 MG capsule  losartan (COZAAR) 25 MG tablet  ondansetron (ZOFRAN-ODT) 4 MG ODT tab  PROGRAF (BRAND) 0.5 MG capsule  sertraline (ZOLOFT) 25 MG tablet  sildenafil (VIAGRA) 100 MG tablet  sulfamethoxazole-trimethoprim (BACTRIM) 400-80 MG tablet  vitamin D3 (CHOLECALCIFEROL) 50 mcg (2000 units) tablet      No Known Allergies  Family History  Family History   Problem Relation Age of Onset     Breast Cancer Mother      Cancer - colorectal Father      Coronary Artery Disease Father      Hypertension Father      Breast Cancer Sister      Cancer Brother         Pancreatic CA     Social History   Social History     Tobacco Use     Smoking status: Never Smoker     Smokeless tobacco: Never Used   Substance Use Topics     Alcohol use: No     Alcohol/week: 0.0  standard drinks     Comment: Patient reports drinking beer on a rare ocassion.     Drug use: No      Past medical history, past surgical history, medications, allergies, family history, and social history were reviewed with the patient. No additional pertinent items.       Review of Systems   Constitutional: Positive for appetite change and fatigue. Negative for chills and fever.   HENT: Negative for congestion, mouth sores, sore throat and trouble swallowing.    Eyes: Negative for visual disturbance.   Respiratory: Negative for cough and shortness of breath.    Cardiovascular: Negative for chest pain, palpitations and leg swelling.   Gastrointestinal: Positive for abdominal pain, diarrhea and nausea. Negative for abdominal distention, blood in stool and vomiting.   Genitourinary: Negative for dysuria.   Musculoskeletal: Negative for arthralgias and myalgias.   Skin: Negative for rash.   Neurological: Negative for dizziness, weakness, light-headedness and headaches.   Psychiatric/Behavioral: Negative for confusion.     Physical Exam   BP: 120/86  Pulse: 63  Temp: 97.5  F (36.4  C)  Resp: 16  SpO2: 100 %  Lying Orthostatic BP: 138/86  Lying Orthostatic Pulse: 58 bpm  Sitting Orthostatic BP: 124/79  Sitting Orthostatic Pulse: 63 bpm  Standing Orthostatic BP: 109/81  Standing Orthostatic Pulse: 64 bpm  Physical Exam  Vitals and nursing note reviewed.   Constitutional:       General: He is not in acute distress.     Appearance: Normal appearance. He is not ill-appearing.   HENT:      Head: Normocephalic and atraumatic.      Mouth/Throat:      Mouth: Mucous membranes are moist. No oral lesions.      Tongue: No lesions.      Pharynx: No posterior oropharyngeal erythema.   Eyes:      General: No scleral icterus.     Conjunctiva/sclera: Conjunctivae normal.      Pupils: Pupils are equal, round, and reactive to light.   Cardiovascular:      Rate and Rhythm: Normal rate and regular rhythm.      Heart sounds: No murmur heard.      Pulmonary:      Effort: Pulmonary effort is normal.      Breath sounds: Normal breath sounds. No wheezing or rhonchi.   Abdominal:      General: Bowel sounds are increased. There is no distension.      Palpations: Abdomen is soft.      Tenderness: There is abdominal tenderness. There is no guarding.      Comments: Mild LLQ tenderness to palpation   Musculoskeletal:      Right lower leg: No edema.      Left lower leg: No edema.   Skin:     General: Skin is warm and dry.      Coloration: Skin is not jaundiced.      Findings: No rash.   Neurological:      General: No focal deficit present.      Mental Status: He is alert and oriented to person, place, and time.      Motor: No weakness.   Psychiatric:         Mood and Affect: Mood normal.       ED Course           The medical record was reviewed and interpreted.  Current labs reviewed and interpreted.  Previous labs reviewed and interpreted.     Results for orders placed or performed during the hospital encounter of 08/07/21   Basic metabolic panel     Status: Abnormal   Result Value Ref Range    Sodium 132 (L) 133 - 144 mmol/L    Potassium 3.7 3.4 - 5.3 mmol/L    Chloride 105 94 - 109 mmol/L    Carbon Dioxide (CO2) 19 (L) 20 - 32 mmol/L    Anion Gap 8 3 - 14 mmol/L    Urea Nitrogen 31 (H) 7 - 30 mg/dL    Creatinine 1.79 (H) 0.66 - 1.25 mg/dL    Calcium 9.1 8.5 - 10.1 mg/dL    Glucose 108 (H) 70 - 99 mg/dL    GFR Estimate 38 (L) >60 mL/min/1.73m2   Extra Red Top Tube     Status: None   Result Value Ref Range    Hold Specimen JIC    Extra Green Top (Lithium Heparin) Tube     Status: None   Result Value Ref Range    Hold Specimen JIC    Extra Purple Top Tube     Status: None   Result Value Ref Range    Hold Specimen JIC    CBC with platelets differential     Status: Abnormal    Narrative    The following orders were created for panel order CBC with platelets differential.  Procedure                               Abnormality         Status                     ---------                                -----------         ------                     CBC with platelets and d...[[id number]]  Abnormal            Final result                 Please view results for these tests on the individual orders.   Comprehensive metabolic panel     Status: Abnormal   Result Value Ref Range    Sodium 132 (L) 133 - 144 mmol/L    Potassium 3.7 3.4 - 5.3 mmol/L    Chloride 105 94 - 109 mmol/L    Carbon Dioxide (CO2) 19 (L) 20 - 32 mmol/L    Anion Gap 8 3 - 14 mmol/L    Urea Nitrogen 31 (H) 7 - 30 mg/dL    Creatinine 1.79 (H) 0.66 - 1.25 mg/dL    Calcium 9.1 8.5 - 10.1 mg/dL    Glucose 108 (H) 70 - 99 mg/dL    Alkaline Phosphatase 83 40 - 150 U/L    AST 10 0 - 45 U/L    ALT 17 0 - 70 U/L    Protein Total 7.5 6.8 - 8.8 g/dL    Albumin 3.7 3.4 - 5.0 g/dL    Bilirubin Total 0.8 0.2 - 1.3 mg/dL    GFR Estimate 38 (L) >60 mL/min/1.73m2   Magnesium     Status: Normal   Result Value Ref Range    Magnesium 1.7 1.6 - 2.3 mg/dL   Phosphorus     Status: Normal   Result Value Ref Range    Phosphorus 2.7 2.5 - 4.5 mg/dL   CBC with platelets and differential     Status: Abnormal   Result Value Ref Range    WBC Count 5.7 4.0 - 11.0 10e3/uL    RBC Count 4.64 4.40 - 5.90 10e6/uL    Hemoglobin 12.6 (L) 13.3 - 17.7 g/dL    Hematocrit 40.5 40.0 - 53.0 %    MCV 87 78 - 100 fL    MCH 27.2 26.5 - 33.0 pg    MCHC 31.1 (L) 31.5 - 36.5 g/dL    RDW 13.6 10.0 - 15.0 %    Platelet Count 221 150 - 450 10e3/uL    % Neutrophils 61 %    % Lymphocytes 23 %    % Monocytes 13 %    % Eosinophils 3 %    % Basophils 0 %    % Immature Granulocytes 0 %    NRBCs per 100 WBC 0 <1 /100    Absolute Neutrophils 3.5 1.6 - 8.3 10e3/uL    Absolute Lymphocytes 1.3 0.8 - 5.3 10e3/uL    Absolute Monocytes 0.7 0.0 - 1.3 10e3/uL    Absolute Eosinophils 0.1 0.0 - 0.7 10e3/uL    Absolute Basophils 0.0 0.0 - 0.2 10e3/uL    Absolute Immature Granulocytes 0.0 <=0.0 10e3/uL    Absolute NRBCs 0.0 10e3/uL   [place] Draw     Status: None (In process)    Narrative     The following orders were created for panel order [place] Draw.  Procedure                               Abnormality         Status                     ---------                               -----------         ------                     Extra Blue Top Tube[[id number]]                                                         Extra Red Top Tube[[id number]]                               Final result               Extra Green Top (Lithium...[[id number]]                      Final result               Extra Purple Top Tube[[id number]]                            Final result                 Please view results for these tests on the individual orders.     Medications   lidocaine 1 % 0.1-1 mL (has no administration in time range)   lidocaine (LMX4) cream (has no administration in time range)   sodium chloride (PF) 0.9% PF flush 3 mL (3 mLs Intracatheter Not Given 8/7/21 1917)   sodium chloride (PF) 0.9% PF flush 3 mL (has no administration in time range)   0.9% sodium chloride BOLUS (0 mLs Intravenous Stopped 8/7/21 2203)     Followed by   0.9% sodium chloride BOLUS (0 mLs Intravenous Stopped 8/7/21 2203)     Followed by   sodium chloride 0.9% infusion (has no administration in time range)   0.9% sodium chloride BOLUS (0 mLs Intravenous Stopped 8/7/21 2007)   0.9% sodium chloride BOLUS (0 mLs Intravenous Stopped 8/7/21 2204)        Assessments & Plan (with Medical Decision Making)   Marlo Vee is a 71 year old male with PMH HTN, h/o kidney transplant on mycophenolate and tacrolimus, CAD who presented to the ED for diarrhea ongoing for 1 week. Stool O&P, Giardia, Cryptosporidium, and CMV studies are pending. Given his recent lake exposure, Giardia is a possibility, as well as Cryptosporidium and CMV given his immunosuppression. Labs were significant for a non-anion gap metabolic acidosis consistent with diarrhea, as well as an increase in creatinine. His creatinine level bumped to 1.79 from his baseline of 1.1.  We  gave 2L NS. We discussed that he might have an infectious source for his diarrhea, but do not have the results yet. We recommended good oral hydration, Immodium (1 tablet) at bedtime if needed for overnight relief, avoidance of dairy products, and repeat BMP and drug levels on Monday due to change in his dose and ongoing diarrhea. We also recommended follow up with his transplant team. Follow up is needed for his stool studies. He was discharged home in stable condition.       I have reviewed the nursing notes. I have reviewed the findings, diagnosis, plan and need for follow up with the patient.    Patient Instructions  You were seen in the Emergency Department for diarrhea.    Please follow up on Monday August 9th for a lab draw to check electrolytes, blood count, and drug levels.     Your infectious stool studies are being processed and the results will be communicated with you.     Follow up with your transplant care team in the next week.     Continue good oral hydration. You may take 1 tablet Immodium at bedtime if needed for diarrhea.     Return to the Emergency Department if worsening symptoms or worsening abdominal pain.     New Prescriptions    No medications on file       Final diagnoses:   Diarrhea, unspecified type   Kidney replaced by transplant     Melinda Faye MD   Internal Medicine PGY-1     I have seen and discussed the patient with my attending, Dr. Thompson, who is in agreement with the above assessment and plan.      --    ED Attending Physician Attestation    I ROSE THOMPSON MD, cared for this patient with the Resident. I have performed a history and physical examination of the patient and discussed management with the resident. I reviewed the resident's documentation above and agree with the documented findings and plan of care.    Summary of HPI, PE, ED Course   Patient is a 71 year old male evaluated in the emergency department for diarrhea for the past week. He is having watery  stools 5-7 times/ day. He had negative PCR at [facility] 2 days ago for enteric pathogen PCR panel, c diff toxin norovirus and rotavirus. He has [place] near [place] this summer and was bathing in the lake on a daily basis. He has had no other travel, ill exposures, or unusual foods. He has not been febrile with the current illness. He has had no blodd in the stool or bloody diarrhea. He has mild abdominal cramping with the diarrhea. . Exam notable for Stable vital signs and minimal diffuse abdominal tenderness. Labs were notable for increase of creatine to 1.79 from baseline 1.1 range.  He has mild hyponatremia and non anion gap metabolic acidosis. CBC is normal. Stool was sent for ova, parasites , giardia and cryptosporidia antigen and blood sent for CMV titer. He was hydrated with 2000 ml NS and voided twice. He had 1 watery stool while in the ED. He does not appear ill, and is tolerating oral intake. The differential for his diarrhea includes CMV, giardia, other viral, cellcept toxicity, inflammatory bowel disease and potentially other causes including malignancies and lymphomas.  After the completion of care in the emergency department, the patient was discharged. He will have follow up in 2 days to recheck the creatinine, electrolytes, CBC and drug levels. He should contact his transplant coordinator to review the results of the stool tests and CMV titer. If the diarrhea persists, he will need GI referral for further diagnostic work up. We have deferred any empiric treatment. He appears to be doing fairly well keeping up with oral fluids. Antibiotic, parasitic or antiviral therapy should be directed by results of the labs.    Critical Care & Procedures  Not applicable.    Medical Decision Making  The medical record was reviewed and interpreted.  Current labs reviewed and interpreted.  Previous labs reviewed and interpreted.  Current images reviewed and interpreted:   Previous images reviewed and  interpreted:       ROSE MCLEAN MD  Emergency Medicine       Rose Piedra MD   [facility] EMERGENCY DEPARTMENT  8/7/2021     Melinda Faye MD  Resident  08/07/21 2201       Rose Mclean MD  08/07/21 7710

## 2021-08-07 NOTE — ED TRIAGE NOTES
"Pt comes in for severe diarrhea and weakness \"all week\". Pt had stool sample done a couple days ago at [facility], no infection. Hx Kidney transplant  "

## 2021-08-08 LAB — CMV DNA SPEC NAA+PROBE-ACNC: NOT DETECTED IU/ML

## 2021-08-09 ENCOUNTER — LAB (OUTPATIENT)
Dept: LAB | Facility: CLINIC | Age: 71
End: 2021-08-09
Payer: MEDICARE

## 2021-08-09 ENCOUNTER — TELEPHONE (OUTPATIENT)
Dept: EMERGENCY MEDICINE | Facility: CLINIC | Age: 71
End: 2021-08-09

## 2021-08-09 ENCOUNTER — TELEPHONE (OUTPATIENT)
Dept: TRANSPLANT | Facility: CLINIC | Age: 71
End: 2021-08-09

## 2021-08-09 DIAGNOSIS — R19.7 DIARRHEA, UNSPECIFIED TYPE: ICD-10-CM

## 2021-08-09 DIAGNOSIS — Z94.0 STATUS POST KIDNEY TRANSPLANT: ICD-10-CM

## 2021-08-09 DIAGNOSIS — Z94.0 KIDNEY TRANSPLANTED: Primary | ICD-10-CM

## 2021-08-09 DIAGNOSIS — A09 DIARRHEA OF INFECTIOUS ORIGIN: ICD-10-CM

## 2021-08-09 DIAGNOSIS — Z94.0 KIDNEY REPLACED BY TRANSPLANT: ICD-10-CM

## 2021-08-09 LAB
ANION GAP SERPL CALCULATED.3IONS-SCNC: 6 MMOL/L (ref 3–14)
BUN SERPL-MCNC: 12 MG/DL (ref 7–30)
C PARVUM AG STL QL IA: POSITIVE
CALCIUM SERPL-MCNC: 8.7 MG/DL (ref 8.5–10.1)
CHLORIDE BLD-SCNC: 112 MMOL/L (ref 94–109)
CO2 SERPL-SCNC: 21 MMOL/L (ref 20–32)
CREAT SERPL-MCNC: 1.31 MG/DL (ref 0.66–1.25)
ERYTHROCYTE [DISTWIDTH] IN BLOOD BY AUTOMATED COUNT: 13.7 % (ref 10–15)
G LAMBLIA AG STL QL IA: NEGATIVE
GFR SERPL CREATININE-BSD FRML MDRD: 55 ML/MIN/1.73M2
GLUCOSE BLD-MCNC: 110 MG/DL (ref 70–99)
HCT VFR BLD AUTO: 37.9 % (ref 40–53)
HGB BLD-MCNC: 11.6 G/DL (ref 13.3–17.7)
MCH RBC QN AUTO: 27 PG (ref 26.5–33)
MCHC RBC AUTO-ENTMCNC: 30.6 G/DL (ref 31.5–36.5)
MCV RBC AUTO: 88 FL (ref 78–100)
O+P STL MICRO: NEGATIVE
PLATELET # BLD AUTO: 212 10E3/UL (ref 150–450)
POTASSIUM BLD-SCNC: 3.3 MMOL/L (ref 3.4–5.3)
RBC # BLD AUTO: 4.29 10E6/UL (ref 4.4–5.9)
SODIUM SERPL-SCNC: 139 MMOL/L (ref 133–144)
TACROLIMUS BLD-MCNC: 13.2 UG/L (ref 5–15)
TME LAST DOSE: NORMAL H
TME LAST DOSE: NORMAL H
WBC # BLD AUTO: 5.5 10E3/UL (ref 4–11)

## 2021-08-09 PROCEDURE — 80197 ASSAY OF TACROLIMUS: CPT

## 2021-08-09 PROCEDURE — 36415 COLL VENOUS BLD VENIPUNCTURE: CPT

## 2021-08-09 PROCEDURE — 80048 BASIC METABOLIC PNL TOTAL CA: CPT

## 2021-08-09 PROCEDURE — 85014 HEMATOCRIT: CPT

## 2021-08-09 PROCEDURE — 80180 DRUG SCRN QUAN MYCOPHENOLATE: CPT

## 2021-08-09 RX ORDER — MYCOPHENOLATE MOFETIL 250 MG/1
750 CAPSULE ORAL 2 TIMES DAILY
Qty: 180 CAPSULE | Refills: 11 | Status: SHIPPED | OUTPATIENT
Start: 2021-08-09 | End: 2021-09-08 | Stop reason: ALTCHOICE

## 2021-08-09 NOTE — RESULT ENCOUNTER NOTE
Final Ova and Parasite Exam Routine is NEGATIVE.  No treatment or change in treatment per [facility] ED Lab Result Ova and Parasite protocol.

## 2021-08-09 NOTE — TELEPHONE ENCOUNTER
RN Recommendations/Instructions per [place] ED provider  12:23PM: Patient notified of ED provider's recommendation as noted below.   Patient's SOT nurse was notified of the result and she will discuss the result with the patient's provider.   Result routed to the patient's SOT nurse Adilia Richardson RN/  [facility], she will consult with the patient's provider regarding treatment.    The patient is comfortable with the information given and he will be in contact with his care team today.      Please Contact your PCP clinic or return to the Emergency department if your:    Symptoms worsen or other concerning symptom's.    PCP follow-up Questions asked: YES       Kathryn Ram RN  [facility] [email or web address] [place]  Emergency Dept Lab Result RN  Ph# [phone]

## 2021-08-09 NOTE — TELEPHONE ENCOUNTER
ISSUE  Recent history of severe diarrhea.  Today creatinine down to 1.31 from 1.79 after IV fluids.  Potassium 3.3  Stool positive for cryptosporidium  MMF dose decreased from 750 mg BID -->500 mg BID on Friday.    PLAN  Mason Irwin MD Harris, Kathleen, RN  Ok to go back to 750mg bid if diarrhea is improved. Also, needs to increase K intake.   Mason Irwin MD Harris, Kathleen, RN  No treatment, self limited     OUTCOME  Called John to discuss above. Left a detailed voice message. Will try back later.      ADDENDUM:  Discussed above with John. He reports feeling stronger and his appetite is returning. He continues to have watery BM's, but they frequency and urgency is decreasing.  He will continue to push oral hydration and include some Gatorade in his intake.  He will increase his intake of high potassium foods.  He will repeat labs next Monday or Tuesday.   He has already increased his MMF dose back up to 750 mg BID, prescription updated.  We discussed that there is no medical treatment for the cryptosporidium, only supportive therapies. We discussed taking 1 imodium at bedtime if needed, but he should otherwise let the diarrhea run its course.

## 2021-08-09 NOTE — TELEPHONE ENCOUNTER
[facility] Emergency Department Lab result notification [Adult-Male]    [place] ED lab result protocol used  Giardia protocol    Reason for call  Notify of lab results, assess symptoms,  review ED providers recommendations/discharge instructions (if necessary) and advise per ED lab result f/u protocol    Lab Result (including Rx patient on, if applicable)  Cryptosporidium parvum antigen result is POSITIVE.  [facility] Emergency Dept discharge antibiotic prescribed [if applicable]: None  Recommendations in treatment per [facility] ED Lab Result Giardia protocol.    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 Mr. Vee reports that he has had severe watery non-bloody diarrhea for the past week. He endorses 5 or more episodes of diarrhea daily. He has tried Imodium and Pepto-Bismol without relief.  He noted an episode of black stools after taking Pepto-Bismol, but none after he stopped taking the medication.  He endorses mild left lower quadrant abdominal pain, abdominal cramping, nausea, poor appetite.  He denies any body aches, fever, chills, recent sick contacts, other household members with diarrhea, lightheadedness, weakness, prior episodes of diarrhea.  He is on prophylactic sulfamethoxazole trimethoprim. [place]  He went to an urgent care clinic earlier this week where they recommended cutting down on his CellCept dose and completed a stool enteric panel  and C. difficile which were both negative.      Significant Medical hx, if applicable (i.e. CKD, diabetes) Reviewed   Allergies No Known Allergies   Weight, if applicable Wt Readings from Last 2 Encounters:   04/29/21 75.3 kg (166 lb)   12/09/20 81.6 kg (179 lb 14 oz)      Coumadin/Warfarin [Yes /No] No   Creatinine Level (mg/dl) Creatinine   Date Value Ref Range Status   08/09/2021 1.31 (H) 0.66 - 1.25 mg/dL Final   05/06/2021 1.04 0.66 - 1.25 mg/dL Final      Creatinine clearance (ml/min), if applicable Serum creatinine: 1.31 mg/dL (H) 08/09/21 1043  Estimated creatinine clearance: 56.7 mL/min (A)   ED providers Impression and Plan (applicable information) Marlo Vee is a 71 year old male with PMH HTN, h/o kidney transplant on mycophenolate and tacrolimus, CAD who presented to the ED for diarrhea ongoing for 1 week. Stool O&P, Giardia, Cryptosporidium, and CMV studies are pending. Given his recent lake exposure, Giardia is a possibility, as well as Cryptosporidium and CMV given his immunosuppression. Labs were significant for a non-anion gap metabolic acidosis consistent with diarrhea, as well as an increase in creatinine. His creatinine level bumped to 1.79 from his baseline of 1.1.  We gave 2L NS. We discussed that he might have an infectious source for his diarrhea, but do not have the results yet. We recommended good oral hydration, Immodium (1 tablet) at bedtime if needed for overnight relief, avoidance of dairy products, and repeat BMP and drug levels on Monday due to change in his dose and ongoing diarrhea. We also recommended follow up with his transplant team. Follow up is needed for his stool studies. He was discharged home in stable condition.         I have reviewed the nursing notes. I have reviewed the findings, diagnosis, plan and need for follow up with the patient.     Patient Instructions  You were seen in the Emergency Department for diarrhea.     Please follow up on Monday August 9th  for a lab draw to check electrolytes, blood count, and drug levels.      Your infectious stool studies are being processed and the results will be communicated with you.      Follow up with your transplant care team in the next week.      Continue good oral hydration. You may take 1 tablet Immodium at bedtime if needed for diarrhea.      Return to the Emergency Department if worsening symptoms or worsening abdominal pain.       ED diagnosis Diarrhea, unspecified type   Kidney replaced by transplant      ED provider ROSE THOMPSON MD RN Assessment (Patient s current Symptoms), include time called.  [Insert Left message here if message left]  11:57AM: Spoke with patient. He states he is still having explosive watery diarrhea, no blood noted, has had 3 stools since midnight today. Took an Imodium before bed. Taking in fluids well and urinating in good amounts. No fever or chills. No abdominal pain. Does not feel weak.     RN Recommendations/Instructions per [place] ED lab result protocol  Patient notified of lab result and treatment recommendations.    RN reviewed information about cryptosporidium. Also reviewed the patient's BMP and CBC with him.   Advised that I would consult with the ED provider and call him back.     [place] Emergency Department Provider Name & Recommendations (included time consulted)  12:14PM:  Consulted with [facility] ED provider Teresa QUEEN. Reviewed patient's history, current symptoms and culture results. The provider advises that the transplant team be notified of his result and that they should determine his treatment.     Kathryn Ram RN  [facility] [employer] [place]  Emergency Dept Lab Result RN  # [phone]     Copy of Lab result  Cryptosporidium and Giardia antigens  Order: [id number]  Status:  Final result   Visible to patient:  No (scheduled for 8/9/2021 12:22 PM)  Specimen Information: Per Rectum; Stool         0 Result Notes     Ref Range & Units 2 d ago    Cryptosporidium parvum antigen Negative PositiveAbnormal      Giardia lamblia antigen Negative Negative    Resulting Agency  [facility]         Specimen Collected: 08/07/21  6:44 PM Last Resulted: 08/09/21 11:22 AM

## 2021-08-10 LAB
MYCOPHENOLATE SERPL LC/MS/MS-MCNC: 0.81 MG/L (ref 1–3.5)
MYCOPHENOLATE-G SERPL LC/MS/MS-MCNC: 48 MG/L (ref 30–95)
TME LAST DOSE: ABNORMAL H
TME LAST DOSE: ABNORMAL H

## 2021-08-11 ENCOUNTER — TELEPHONE (OUTPATIENT)
Dept: TRANSPLANT | Facility: CLINIC | Age: 71
End: 2021-08-11

## 2021-08-11 NOTE — TELEPHONE ENCOUNTER
ISSUE  Tac level 13.2, goal 4-6, current dose 1.0 mg BID.  Dealing with diarrhea d/t cryptosporidium at time of lab draw.      PLAN  Call John:  -Confirm current Tac dose of 1.0 mg BID.  -Assess if meds may have been taken the morning of labs.  -Assess how diarrhea is doing?  Previous levels at goal. Continue current dose of Tac and take care to get a good 12 hour trough with labs next week.    LPN Task  Please call with above plan. Lab orders are already in  Thanks!

## 2021-08-12 NOTE — TELEPHONE ENCOUNTER
Call placed to patient. Patient confirms current dose and accurate trough level. Note that today his diarrhea is better. Patient v\u to continue current dose and repeat level in one week.

## 2021-08-16 ENCOUNTER — TELEPHONE (OUTPATIENT)
Dept: TRANSPLANT | Facility: CLINIC | Age: 71
End: 2021-08-16

## 2021-08-16 DIAGNOSIS — Z94.0 STATUS POST KIDNEY TRANSPLANT: Primary | ICD-10-CM

## 2021-08-16 DIAGNOSIS — R19.7 DIARRHEA: ICD-10-CM

## 2021-08-16 DIAGNOSIS — Z48.298 AFTERCARE FOLLOWING ORGAN TRANSPLANT: ICD-10-CM

## 2021-08-16 RX ORDER — EPINEPHRINE 1 MG/ML
0.3 INJECTION, SOLUTION, CONCENTRATE INTRAVENOUS EVERY 5 MIN PRN
Status: CANCELLED | OUTPATIENT
Start: 2021-08-16

## 2021-08-16 RX ORDER — DIPHENHYDRAMINE HYDROCHLORIDE 50 MG/ML
50 INJECTION INTRAMUSCULAR; INTRAVENOUS
Status: CANCELLED
Start: 2021-08-16

## 2021-08-16 RX ORDER — ALBUTEROL SULFATE 0.83 MG/ML
2.5 SOLUTION RESPIRATORY (INHALATION)
Status: CANCELLED | OUTPATIENT
Start: 2021-08-16

## 2021-08-16 RX ORDER — ALBUTEROL SULFATE 90 UG/1
1-2 AEROSOL, METERED RESPIRATORY (INHALATION)
Status: CANCELLED
Start: 2021-08-16

## 2021-08-16 RX ORDER — METHYLPREDNISOLONE SODIUM SUCCINATE 125 MG/2ML
125 INJECTION, POWDER, LYOPHILIZED, FOR SOLUTION INTRAMUSCULAR; INTRAVENOUS
Status: CANCELLED
Start: 2021-08-16

## 2021-08-16 RX ORDER — MEPERIDINE HYDROCHLORIDE 25 MG/ML
25 INJECTION INTRAMUSCULAR; INTRAVENOUS; SUBCUTANEOUS EVERY 30 MIN PRN
Status: CANCELLED | OUTPATIENT
Start: 2021-08-16

## 2021-08-16 RX ORDER — NALOXONE HYDROCHLORIDE 0.4 MG/ML
0.2 INJECTION, SOLUTION INTRAMUSCULAR; INTRAVENOUS; SUBCUTANEOUS
Status: CANCELLED | OUTPATIENT
Start: 2021-08-16

## 2021-08-16 RX ORDER — HEPARIN SODIUM (PORCINE) LOCK FLUSH IV SOLN 100 UNIT/ML 100 UNIT/ML
5 SOLUTION INTRAVENOUS
Status: CANCELLED | OUTPATIENT
Start: 2021-08-16

## 2021-08-16 RX ORDER — HEPARIN SODIUM,PORCINE 10 UNIT/ML
5 VIAL (ML) INTRAVENOUS
Status: CANCELLED | OUTPATIENT
Start: 2021-08-16

## 2021-08-16 NOTE — TELEPHONE ENCOUNTER
Returned a call to John to discuss questions he has.  Tested positive for cryptosporidium on 8/6.  John states that his diarrhea has continued and is now getting worse again. Reports he has tried imodium, but it does not help. He is going 10 or more times a day and it is straight liquid. He has decreased appetite, but is hydrating well.    PLAN(Per Dr. Varela)  OK to get IVF's.  Retest Stool. If still positive for cryptosporidium may consider nitazoxanide.   Consider changed MMF to MPA if diarrhea persists.     OUTCOME  Left a message with John that [facility] will call to scheduled fluids either Tuesday afternoon or Wednesday ([place] He should  stool sample containers to resubmit stool samples. He should plan on labs this week either Wednesday or Thursday when he can get a good 12 hour drug level.

## 2021-08-16 NOTE — TELEPHONE ENCOUNTER
Patient Call: Stated he had a few more questions to ask       Call back needed? Yes    Return Call Needed  Same as documented in contacts section  When to return call?: Greater than one day: Route standard priority

## 2021-08-17 ENCOUNTER — TELEPHONE (OUTPATIENT)
Dept: TRANSPLANT | Facility: CLINIC | Age: 71
End: 2021-08-17

## 2021-08-17 DIAGNOSIS — Z48.298 AFTERCARE FOLLOWING ORGAN TRANSPLANT: ICD-10-CM

## 2021-08-17 DIAGNOSIS — R19.7 DIARRHEA: ICD-10-CM

## 2021-08-17 DIAGNOSIS — Z94.0 KIDNEY TRANSPLANTED: ICD-10-CM

## 2021-08-17 DIAGNOSIS — A49.8 CLOSTRIDIUM DIFFICILE INFECTION: Primary | ICD-10-CM

## 2021-08-17 DIAGNOSIS — Z94.0 STATUS POST KIDNEY TRANSPLANT: ICD-10-CM

## 2021-08-17 DIAGNOSIS — A09 DIARRHEA OF INFECTIOUS ORIGIN: ICD-10-CM

## 2021-08-17 NOTE — TELEPHONE ENCOUNTER
Patient Call: Transplant Illness  Diarrhea  Was wondering what the plan is (Transplanted organ? kidney  Illness: Diarrhea    (see previous message 08/16/2021)

## 2021-08-18 ENCOUNTER — INFUSION THERAPY VISIT (OUTPATIENT)
Dept: INFUSION THERAPY | Facility: CLINIC | Age: 71
End: 2021-08-18
Attending: INTERNAL MEDICINE
Payer: MEDICARE

## 2021-08-18 ENCOUNTER — LAB (OUTPATIENT)
Dept: LAB | Facility: CLINIC | Age: 71
End: 2021-08-18
Payer: MEDICARE

## 2021-08-18 VITALS
SYSTOLIC BLOOD PRESSURE: 110 MMHG | TEMPERATURE: 97.9 F | DIASTOLIC BLOOD PRESSURE: 77 MMHG | OXYGEN SATURATION: 97 % | HEART RATE: 72 BPM | RESPIRATION RATE: 16 BRPM

## 2021-08-18 DIAGNOSIS — A09 DIARRHEA OF INFECTIOUS ORIGIN: ICD-10-CM

## 2021-08-18 DIAGNOSIS — Z94.0 KIDNEY REPLACED BY TRANSPLANT: ICD-10-CM

## 2021-08-18 DIAGNOSIS — Z94.0 KIDNEY TRANSPLANTED: ICD-10-CM

## 2021-08-18 DIAGNOSIS — R19.7 DIARRHEA: Primary | ICD-10-CM

## 2021-08-18 DIAGNOSIS — Z94.0 STATUS POST KIDNEY TRANSPLANT: ICD-10-CM

## 2021-08-18 LAB
ANION GAP SERPL CALCULATED.3IONS-SCNC: 7 MMOL/L (ref 3–14)
ANION GAP SERPL CALCULATED.3IONS-SCNC: 7 MMOL/L (ref 3–14)
BUN SERPL-MCNC: 57 MG/DL (ref 7–30)
BUN SERPL-MCNC: 59 MG/DL (ref 7–30)
C COLI+JEJUNI+LARI FUSA STL QL NAA+PROBE: NOT DETECTED
C DIFF TOX B STL QL: POSITIVE
CALCIUM SERPL-MCNC: 10.2 MG/DL (ref 8.5–10.1)
CALCIUM SERPL-MCNC: 8.6 MG/DL (ref 8.5–10.1)
CHLORIDE BLD-SCNC: 109 MMOL/L (ref 94–109)
CHLORIDE BLD-SCNC: 116 MMOL/L (ref 94–109)
CO2 SERPL-SCNC: 19 MMOL/L (ref 20–32)
CO2 SERPL-SCNC: 19 MMOL/L (ref 20–32)
CREAT SERPL-MCNC: 2.23 MG/DL (ref 0.66–1.25)
CREAT SERPL-MCNC: 2.68 MG/DL (ref 0.66–1.25)
EC STX1 GENE STL QL NAA+PROBE: NOT DETECTED
EC STX2 GENE STL QL NAA+PROBE: NOT DETECTED
ERYTHROCYTE [DISTWIDTH] IN BLOOD BY AUTOMATED COUNT: 13.8 % (ref 10–15)
GFR SERPL CREATININE-BSD FRML MDRD: 23 ML/MIN/1.73M2
GFR SERPL CREATININE-BSD FRML MDRD: 29 ML/MIN/1.73M2
GLUCOSE BLD-MCNC: 110 MG/DL (ref 70–99)
GLUCOSE BLD-MCNC: 91 MG/DL (ref 70–99)
HCT VFR BLD AUTO: 38.2 % (ref 40–53)
HGB BLD-MCNC: 11.8 G/DL (ref 13.3–17.7)
MCH RBC QN AUTO: 27.1 PG (ref 26.5–33)
MCHC RBC AUTO-ENTMCNC: 30.9 G/DL (ref 31.5–36.5)
MCV RBC AUTO: 88 FL (ref 78–100)
NOROV GI+II ORF1-ORF2 JNC STL QL NAA+PR: NOT DETECTED
PLATELET # BLD AUTO: 235 10E3/UL (ref 150–450)
POTASSIUM BLD-SCNC: 3.1 MMOL/L (ref 3.4–5.3)
POTASSIUM BLD-SCNC: 3.6 MMOL/L (ref 3.4–5.3)
RBC # BLD AUTO: 4.36 10E6/UL (ref 4.4–5.9)
RVA NSP5 STL QL NAA+PROBE: NOT DETECTED
SALMONELLA SP RPOD STL QL NAA+PROBE: NOT DETECTED
SHIGELLA SP+EIEC IPAH STL QL NAA+PROBE: NOT DETECTED
SODIUM SERPL-SCNC: 135 MMOL/L (ref 133–144)
SODIUM SERPL-SCNC: 142 MMOL/L (ref 133–144)
TACROLIMUS BLD-MCNC: 19.5 UG/L (ref 5–15)
TME LAST DOSE: ABNORMAL H
TME LAST DOSE: ABNORMAL H
V CHOL+PARA RFBL+TRKH+TNAA STL QL NAA+PR: NOT DETECTED
WBC # BLD AUTO: 6.8 10E3/UL (ref 4–11)
Y ENTERO RECN STL QL NAA+PROBE: NOT DETECTED

## 2021-08-18 PROCEDURE — 80048 BASIC METABOLIC PNL TOTAL CA: CPT

## 2021-08-18 PROCEDURE — 36415 COLL VENOUS BLD VENIPUNCTURE: CPT

## 2021-08-18 PROCEDURE — 87506 IADNA-DNA/RNA PROBE TQ 6-11: CPT | Performed by: INTERNAL MEDICINE

## 2021-08-18 PROCEDURE — 96360 HYDRATION IV INFUSION INIT: CPT

## 2021-08-18 PROCEDURE — 87329 GIARDIA AG IA: CPT | Performed by: INTERNAL MEDICINE

## 2021-08-18 PROCEDURE — 85027 COMPLETE CBC AUTOMATED: CPT

## 2021-08-18 PROCEDURE — 80197 ASSAY OF TACROLIMUS: CPT

## 2021-08-18 PROCEDURE — 87493 C DIFF AMPLIFIED PROBE: CPT | Performed by: INTERNAL MEDICINE

## 2021-08-18 PROCEDURE — 258N000003 HC RX IP 258 OP 636: Performed by: INTERNAL MEDICINE

## 2021-08-18 RX ORDER — DIPHENHYDRAMINE HYDROCHLORIDE 50 MG/ML
50 INJECTION INTRAMUSCULAR; INTRAVENOUS
Status: CANCELLED
Start: 2021-08-18

## 2021-08-18 RX ORDER — HEPARIN SODIUM (PORCINE) LOCK FLUSH IV SOLN 100 UNIT/ML 100 UNIT/ML
5 SOLUTION INTRAVENOUS
Status: DISCONTINUED | OUTPATIENT
Start: 2021-08-18 | End: 2021-08-18 | Stop reason: HOSPADM

## 2021-08-18 RX ORDER — MEPERIDINE HYDROCHLORIDE 25 MG/ML
25 INJECTION INTRAMUSCULAR; INTRAVENOUS; SUBCUTANEOUS EVERY 30 MIN PRN
Status: CANCELLED | OUTPATIENT
Start: 2021-08-18

## 2021-08-18 RX ORDER — NALOXONE HYDROCHLORIDE 0.4 MG/ML
0.2 INJECTION, SOLUTION INTRAMUSCULAR; INTRAVENOUS; SUBCUTANEOUS
Status: CANCELLED | OUTPATIENT
Start: 2021-08-18

## 2021-08-18 RX ORDER — ALBUTEROL SULFATE 90 UG/1
1-2 AEROSOL, METERED RESPIRATORY (INHALATION)
Status: CANCELLED
Start: 2021-08-18

## 2021-08-18 RX ORDER — HEPARIN SODIUM,PORCINE 10 UNIT/ML
5 VIAL (ML) INTRAVENOUS
Status: CANCELLED | OUTPATIENT
Start: 2021-08-18

## 2021-08-18 RX ORDER — HEPARIN SODIUM (PORCINE) LOCK FLUSH IV SOLN 100 UNIT/ML 100 UNIT/ML
5 SOLUTION INTRAVENOUS
Status: CANCELLED | OUTPATIENT
Start: 2021-08-18

## 2021-08-18 RX ORDER — VANCOMYCIN HYDROCHLORIDE 125 MG/1
125 CAPSULE ORAL 4 TIMES DAILY
Qty: 56 CAPSULE | Refills: 0 | Status: SHIPPED | OUTPATIENT
Start: 2021-08-18 | End: 2021-09-01

## 2021-08-18 RX ORDER — HEPARIN SODIUM,PORCINE 10 UNIT/ML
5 VIAL (ML) INTRAVENOUS
Status: DISCONTINUED | OUTPATIENT
Start: 2021-08-18 | End: 2021-08-18 | Stop reason: HOSPADM

## 2021-08-18 RX ORDER — ALBUTEROL SULFATE 0.83 MG/ML
2.5 SOLUTION RESPIRATORY (INHALATION)
Status: CANCELLED | OUTPATIENT
Start: 2021-08-18

## 2021-08-18 RX ORDER — EPINEPHRINE 1 MG/ML
0.3 INJECTION, SOLUTION INTRAMUSCULAR; SUBCUTANEOUS EVERY 5 MIN PRN
Status: CANCELLED | OUTPATIENT
Start: 2021-08-18

## 2021-08-18 RX ORDER — METHYLPREDNISOLONE SODIUM SUCCINATE 125 MG/2ML
125 INJECTION, POWDER, LYOPHILIZED, FOR SOLUTION INTRAMUSCULAR; INTRAVENOUS
Status: CANCELLED
Start: 2021-08-18

## 2021-08-18 RX ADMIN — SODIUM CHLORIDE 1000 ML: 9 INJECTION, SOLUTION INTRAVENOUS at 16:19

## 2021-08-18 NOTE — LETTER
8/18/2021         RE: Marlo Vee  [address] [facility] [place] [address]        Dear Colleague,    Thank you for referring your patient, Marlo Vee, to the [facility] TREATMENT [facility]. Please see a copy of my visit note below.    Nursing Note  Marlo Vee presents today to [facility] for:   Chief Complaint   Patient presents with     Infusion     IVF     During [facility] appointment, orders from Dr. Varela   were completed.  Frequency: as needed    Progress note:  Patient identification verified by name and date of birth.  Assessment completed.  Vitals recorded in Doc Flowsheets.  Patient was provided with education regarding medication/procedure and possible side effects.  Patient verbalized understanding.     present during visit today: Not Applicable.    Treatment Conditions: Non-applicable.    Premedications: were not ordered.    Drug Waste Record: No    Infusion length and rate:  infusion given over approximately 60 minutes    Labs: were not ordered for this appointment.    Vascular access: peripheral IV placed today.    Is the next appt scheduled? yes  Asymptomatic COVID test completed? no    Post Infusion Assessment:  Patient tolerated infusion without incident.     Discharge Plan:   Follow up plan of care with: ongoing infusions at [facility]. and ordering provider as scheduled.  Discharge instructions were reviewed with patient.  Patient/representative verbalized understanding of discharge instructions and all questions answered.  Patient discharged from [facility] in stable condition.    Yuki Diehl RN       Administrations This Visit     0.9% sodium chloride BOLUS     Admin Date  08/18/2021 Action  New Bag Dose  1,000 mL Route  Intravenous Administered By  Yuki Diehl RN                /77 (BP  Location: Left arm)   Pulse 72   Temp 97.9  F (36.6  C) (Oral)   Resp 16   SpO2 97%         Again, thank you for allowing me to participate in the care of your patient.        Sincerely,        [facility]

## 2021-08-18 NOTE — PROGRESS NOTES
Nursing Note  Marlo Vee [facility] for:   Chief Complaint   Patient presents with     Infusion     IVF     During [facility]'s [facility] appointment, orders from Dr. Varela   were completed.  Frequency: as needed    Progress note:  Patient identification verified by name and date of birth.  Assessment completed.  Vitals recorded in Doc Flowsheets.  Patient was provided with education regarding medication/procedure and possible side effects.  Patient verbalized understanding.     present during visit today: Not Applicable.    Treatment Conditions: Non-applicable.    Premedications: were not ordered.    Drug Waste Record: No    Infusion length and rate:  infusion given over approximately 60 minutes    Labs: were not ordered for this appointment.    Vascular access: peripheral IV placed today.    Is the next appt scheduled? yes  Asymptomatic COVID test completed? no    Post Infusion Assessment:  Patient tolerated infusion without incident.     Discharge Plan:   Follow up plan of care with: ongoing [facility]. and ordering provider as scheduled.  Discharge instructions were reviewed with patient.  Patient/representative verbalized understanding of discharge instructions and all questions answered.  [facility] in stable condition.    Yuki Diehl RN       Administrations This Visit     0.9% sodium chloride BOLUS     Admin Date  08/18/2021 Action  New Bag Dose  1,000 mL Route  Intravenous Administered By  Yuki Diehl RN                /77 (BP Location: Left arm)   Pulse 72   Temp 97.9  F (36.6  C) (Oral)   Resp 16   SpO2 97%

## 2021-08-18 NOTE — TELEPHONE ENCOUNTER
"ISSUE  Creatinine 2.68, baseline 1.0-1.3.  Recent diagnosis of cryptosporidium with significant diarrhea.  Plan to receive IVF's today with a repeat creatinine afterwards.    OUTCOME  John reports continued diarrhea. States stools are straight liquid and he is going >15 times/day. He is trying to stay hydrated, but having difficulty keeping up.  He states Imodium is not helping at all, so has not been taking it.  He submitted stool studies again this morning.  He will be at [facility] at 4:00 for IVF\"s and will schedule again for this Friday.  He will continue to push hydration as much as he is able.  Lab's forwarded to provider for review.      ADDENDUM:  Samuel Varela MD Harris, HEATHER Pat  Clostridium difficile positive and recommend starting oral vancomycin 125 mg 4x daily for 2 weeks.       OUTCOME  Called John to discuss above. Discussed C-diff precautions. Encouraged continued hydration. He will get IVF's x 2 this week and will schedule 2 appointments for next week.  Discussed vancomycin treatment. Let him know it will be ready at the [facility] pharmacy at 5:00 today for pick-up after his infusion.    ISSUE  Tacrolimus level 19.5, goal 4-6, dose 1.0 mg BID. Currently with C-diff and significant diarrhea.    PLAN  Confirm current dose 1.0 mg BID  Confirm good 12 hour trough.  Decrease dose to 0.5 mg BID and repeat a level on Monday or Tuesday next week.    OUTCOME  John confirmed dose and good 12 hour trough. Denies any tremor, headache. He does have stomach cramping/diarrhea.   Will DECREASE dose to 0.5 mg BID and repeat labs on Monday or Tuesday.  Getting IVF's now, will repeat BMP afterwards.  Will  Vancomycin after infusion and begin tonight.     "

## 2021-08-19 ENCOUNTER — MYC MEDICAL ADVICE (OUTPATIENT)
Dept: TRANSPLANT | Facility: CLINIC | Age: 71
End: 2021-08-19

## 2021-08-19 ENCOUNTER — TELEPHONE (OUTPATIENT)
Dept: TRANSPLANT | Facility: CLINIC | Age: 71
End: 2021-08-19

## 2021-08-19 ENCOUNTER — TELEPHONE (OUTPATIENT)
Dept: NEPHROLOGY | Facility: CLINIC | Age: 71
End: 2021-08-19

## 2021-08-19 DIAGNOSIS — A09 DIARRHEA OF INFECTIOUS ORIGIN: Primary | ICD-10-CM

## 2021-08-19 DIAGNOSIS — Z48.298 AFTERCARE FOLLOWING ORGAN TRANSPLANT: ICD-10-CM

## 2021-08-19 DIAGNOSIS — A07.2 DIARRHEA DUE TO CRYPTOSPORIDIUM (H): ICD-10-CM

## 2021-08-19 LAB
C PARVUM AG STL QL IA: POSITIVE
G LAMBLIA AG STL QL IA: NEGATIVE

## 2021-08-19 RX ORDER — NITAZOXANIDE 500 MG/1
500 TABLET ORAL 2 TIMES DAILY WITH MEALS
Qty: 28 TABLET | Refills: 0 | Status: SHIPPED | OUTPATIENT
Start: 2021-08-19 | End: 2021-09-02

## 2021-08-19 NOTE — TELEPHONE ENCOUNTER
[facility] Prior Authorization Team Request    Medication: Nitazoxanide 500mg tablet  Dosing: one tablet by mouth twice daily with meals  NDC (required for Medicaid members): [address]-54    Insurance   BIN: [id number]  PCN: [id number]  Grp: [id number]  ID: [id number]    CoverMyMeds Key (if applicable):     Additional documentation:     Filling Pharmacy: [facility]  Phone Number: [phone]  Contact:  [employer] ([id number]) please send all responses to this pool.  Pharmacy NPI (required for Medicaid members): [id number]

## 2021-08-19 NOTE — TELEPHONE ENCOUNTER
ISSUE  Repeat stool sample positive for cryptosporidium.    PLAN  Samuel Varela MD Harris, Kathleen, RN  Yes on the nitazoxanide 500 mg bid x 14 days.             OUTCOME  Nitazoxanide prescription sent.  My chart message sent to John.

## 2021-08-20 ENCOUNTER — INFUSION THERAPY VISIT (OUTPATIENT)
Dept: INFUSION THERAPY | Facility: CLINIC | Age: 71
End: 2021-08-20
Attending: INTERNAL MEDICINE
Payer: MEDICARE

## 2021-08-20 VITALS
OXYGEN SATURATION: 98 % | SYSTOLIC BLOOD PRESSURE: 116 MMHG | RESPIRATION RATE: 16 BRPM | TEMPERATURE: 97.5 F | HEART RATE: 64 BPM | DIASTOLIC BLOOD PRESSURE: 81 MMHG

## 2021-08-20 DIAGNOSIS — Z94.0 KIDNEY REPLACED BY TRANSPLANT: ICD-10-CM

## 2021-08-20 DIAGNOSIS — R19.7 DIARRHEA: Primary | ICD-10-CM

## 2021-08-20 LAB
ANION GAP SERPL CALCULATED.3IONS-SCNC: 3 MMOL/L (ref 3–14)
BUN SERPL-MCNC: 32 MG/DL (ref 7–30)
CALCIUM SERPL-MCNC: 8.5 MG/DL (ref 8.5–10.1)
CHLORIDE BLD-SCNC: 120 MMOL/L (ref 94–109)
CO2 SERPL-SCNC: 20 MMOL/L (ref 20–32)
CREAT SERPL-MCNC: 1.48 MG/DL (ref 0.66–1.25)
GFR SERPL CREATININE-BSD FRML MDRD: 48 ML/MIN/1.73M2
GLUCOSE BLD-MCNC: 103 MG/DL (ref 70–99)
POTASSIUM BLD-SCNC: 3.8 MMOL/L (ref 3.4–5.3)
SODIUM SERPL-SCNC: 143 MMOL/L (ref 133–144)

## 2021-08-20 PROCEDURE — 258N000003 HC RX IP 258 OP 636: Performed by: INTERNAL MEDICINE

## 2021-08-20 PROCEDURE — 36415 COLL VENOUS BLD VENIPUNCTURE: CPT

## 2021-08-20 PROCEDURE — 80048 BASIC METABOLIC PNL TOTAL CA: CPT

## 2021-08-20 PROCEDURE — 96360 HYDRATION IV INFUSION INIT: CPT

## 2021-08-20 RX ORDER — EPINEPHRINE 1 MG/ML
0.3 INJECTION, SOLUTION INTRAMUSCULAR; SUBCUTANEOUS EVERY 5 MIN PRN
Status: CANCELLED | OUTPATIENT
Start: 2021-08-20

## 2021-08-20 RX ORDER — ALBUTEROL SULFATE 0.83 MG/ML
2.5 SOLUTION RESPIRATORY (INHALATION)
Status: CANCELLED | OUTPATIENT
Start: 2021-08-20

## 2021-08-20 RX ORDER — MEPERIDINE HYDROCHLORIDE 25 MG/ML
25 INJECTION INTRAMUSCULAR; INTRAVENOUS; SUBCUTANEOUS EVERY 30 MIN PRN
Status: CANCELLED | OUTPATIENT
Start: 2021-08-20

## 2021-08-20 RX ORDER — NALOXONE HYDROCHLORIDE 0.4 MG/ML
0.2 INJECTION, SOLUTION INTRAMUSCULAR; INTRAVENOUS; SUBCUTANEOUS
Status: CANCELLED | OUTPATIENT
Start: 2021-08-20

## 2021-08-20 RX ORDER — HEPARIN SODIUM,PORCINE 10 UNIT/ML
5 VIAL (ML) INTRAVENOUS
Status: CANCELLED | OUTPATIENT
Start: 2021-08-20

## 2021-08-20 RX ORDER — HEPARIN SODIUM (PORCINE) LOCK FLUSH IV SOLN 100 UNIT/ML 100 UNIT/ML
5 SOLUTION INTRAVENOUS
Status: DISCONTINUED | OUTPATIENT
Start: 2021-08-20 | End: 2021-08-20 | Stop reason: HOSPADM

## 2021-08-20 RX ORDER — DIPHENHYDRAMINE HYDROCHLORIDE 50 MG/ML
50 INJECTION INTRAMUSCULAR; INTRAVENOUS
Status: CANCELLED
Start: 2021-08-20

## 2021-08-20 RX ORDER — METHYLPREDNISOLONE SODIUM SUCCINATE 125 MG/2ML
125 INJECTION, POWDER, LYOPHILIZED, FOR SOLUTION INTRAMUSCULAR; INTRAVENOUS
Status: CANCELLED
Start: 2021-08-20

## 2021-08-20 RX ORDER — HEPARIN SODIUM (PORCINE) LOCK FLUSH IV SOLN 100 UNIT/ML 100 UNIT/ML
5 SOLUTION INTRAVENOUS
Status: CANCELLED | OUTPATIENT
Start: 2021-08-20

## 2021-08-20 RX ORDER — HEPARIN SODIUM,PORCINE 10 UNIT/ML
5 VIAL (ML) INTRAVENOUS
Status: DISCONTINUED | OUTPATIENT
Start: 2021-08-20 | End: 2021-08-20 | Stop reason: HOSPADM

## 2021-08-20 RX ORDER — ALBUTEROL SULFATE 90 UG/1
1-2 AEROSOL, METERED RESPIRATORY (INHALATION)
Status: CANCELLED
Start: 2021-08-20

## 2021-08-20 RX ADMIN — SODIUM CHLORIDE 1000 ML: 9 INJECTION, SOLUTION INTRAVENOUS at 15:45

## 2021-08-20 NOTE — LETTER
8/20/2021         RE: Marlo Vee  [address] [facility] [address]        Dear Colleague,    Thank you for referring your patient, Marlo Vee, to the [facility] TREATMENT [facility]. Please see a copy of my visit note below.    Nursing Note  Marlo Vee [facility]:   Chief Complaint   Patient presents with     Infusion     IVF     During [facility] appointment, orders from Dr. Varela were completed.  Frequency: as needed twice weekly    Progress note:  Patient identification verified by name and date of birth.  Assessment completed.  Vitals recorded in Doc Flowsheets.  Patient was provided with education regarding medication/procedure and possible side effects.  Patient verbalized understanding.     present during visit today: Not Applicable.    Treatment Conditions: Non-applicable.    Premedications: were not ordered.    Drug Waste Record: No    Infusion length and rate:  infusion given over approximately 60 minutes    Labs: were drawn per orders.     Vascular access: peripheral IV placed today.    Is the next appt scheduled? No, message sent to scheduling team  Asymptomatic COVID test completed? no    Post Infusion Assessment:  Patient tolerated infusion without incident.     Discharge Plan:   Follow up plan of care with: ongoing infusions at [facility]. and ordering provider as scheduled.  Discharge instructions were reviewed with patient.  Patient/representative verbalized understanding of discharge instructions and all questions answered.  Patient discharged from [facility] in stable condition.    Yuki Diehl RN    Administrations This Visit     0.9% sodium chloride BOLUS     Admin Date  08/20/2021 Action  New Bag Dose  1,000 mL Route  Intravenous Administered By  Yuki Diehl RN               /81 (BP Location: Right arm)   Pulse 64   Temp 97.5  F (36.4  C) (Oral)   Resp 16   SpO2 98%           Again, thank you for allowing me to participate in the care of your patient.        Sincerely,        [facility]

## 2021-08-20 NOTE — PROGRESS NOTES
Nursing Note  Marlo Vee [facility]:   Chief Complaint   Patient presents with     Infusion     IVF     During [facility]'s [facility] appointment, orders from Dr. Varela were completed.  Frequency: as needed twice weekly    Progress note:  Patient identification verified by name and date of birth.  Assessment completed.  Vitals recorded in Doc Flowsheets.  Patient was provided with education regarding medication/procedure and possible side effects.  Patient verbalized understanding.     present during visit today: Not Applicable.    Treatment Conditions: Non-applicable.    Premedications: were not ordered.    Drug Waste Record: No    Infusion length and rate:  infusion given over approximately 60 minutes    Labs: were drawn per orders.     Vascular access: peripheral IV placed today.    Is the next appt scheduled? No, message sent to scheduling team  Asymptomatic COVID test completed? no    Post Infusion Assessment:  Patient tolerated infusion without incident.     Discharge Plan:   Follow up plan of care with: [facility]. and ordering provider as scheduled.  Discharge instructions were reviewed with patient.  Patient/representative verbalized understanding of discharge instructions and all questions answered.  Patient discharged from [facility] in stable condition.    Yuki Diehl RN    Administrations This Visit     0.9% sodium chloride BOLUS     Admin Date  08/20/2021 Action  New Bag Dose  1,000 mL Route  Intravenous Administered By  Yuki Diehl RN              /81 (BP Location: Right arm)   Pulse 64   Temp 97.5  F (36.4  C) (Oral)   Resp 16   SpO2 98%

## 2021-08-20 NOTE — TELEPHONE ENCOUNTER
ISSUE  Repeat stool sample positive for cryptosporidium.     PLAN  Samuel Varela MD Harris, Kathleen, RN  Yes on the nitazoxanide 500 mg bid x 14 days.              OUTCOME  John has not read My Chart message from yesterday. Called him to discuss Nitazoxanide. He verbalized understanding and will  the prescription today if it is affordable for him.

## 2021-08-20 NOTE — TELEPHONE ENCOUNTER
PA Initiation    Medication: nitazoxanide (ALINIA) 500 MG tablet   Insurance Company: [employer] Part D - Phone [phone] Fax [phone]  Pharmacy Filling the Rx: [facility] [place], [place] - [address] [place] [address] [phone]  Filling Pharmacy Phone: [phone]  Filling Pharmacy Fax: [phone]  Start Date: 8/20/2021

## 2021-08-20 NOTE — TELEPHONE ENCOUNTER
Prior Authorization Approval    Authorization Effective Date: 5/22/2021  Authorization Expiration Date: 8/20/2022  Medication: nitazoxanide (ALINIA) 500 MG tablet--APPROVED  Approved Dose/Quantity:   Reference #:     Insurance Company: [employer] Part D - Phone [phone] Fax [phone]  Expected CoPay:       CoPay Card Available:      Foundation Assistance Needed:    Which Pharmacy is filling the prescription (Not needed for infusion/clinic administered): [facility] [address] 1-747  Pharmacy Notified: Yes  Patient Notified: Yes **Instructed pharmacy to notify patient when script is ready to /ship.**

## 2021-08-23 ENCOUNTER — INFUSION THERAPY VISIT (OUTPATIENT)
Dept: INFUSION THERAPY | Facility: CLINIC | Age: 71
End: 2021-08-23
Attending: INTERNAL MEDICINE
Payer: MEDICARE

## 2021-08-23 VITALS
DIASTOLIC BLOOD PRESSURE: 72 MMHG | HEART RATE: 59 BPM | TEMPERATURE: 97.4 F | SYSTOLIC BLOOD PRESSURE: 111 MMHG | OXYGEN SATURATION: 98 %

## 2021-08-23 DIAGNOSIS — Z94.0 KIDNEY REPLACED BY TRANSPLANT: ICD-10-CM

## 2021-08-23 DIAGNOSIS — R19.7 DIARRHEA: Primary | ICD-10-CM

## 2021-08-23 LAB
ANION GAP SERPL CALCULATED.3IONS-SCNC: 7 MMOL/L (ref 3–14)
BUN SERPL-MCNC: 28 MG/DL (ref 7–30)
CALCIUM SERPL-MCNC: 8.4 MG/DL (ref 8.5–10.1)
CHLORIDE BLD-SCNC: 117 MMOL/L (ref 94–109)
CO2 SERPL-SCNC: 22 MMOL/L (ref 20–32)
CREAT SERPL-MCNC: 1.4 MG/DL (ref 0.66–1.25)
GFR SERPL CREATININE-BSD FRML MDRD: 51 ML/MIN/1.73M2
GLUCOSE BLD-MCNC: 107 MG/DL (ref 70–99)
POTASSIUM BLD-SCNC: 3.2 MMOL/L (ref 3.4–5.3)
SODIUM SERPL-SCNC: 146 MMOL/L (ref 133–144)

## 2021-08-23 PROCEDURE — 36415 COLL VENOUS BLD VENIPUNCTURE: CPT

## 2021-08-23 PROCEDURE — 80048 BASIC METABOLIC PNL TOTAL CA: CPT

## 2021-08-23 PROCEDURE — 96360 HYDRATION IV INFUSION INIT: CPT

## 2021-08-23 PROCEDURE — 258N000003 HC RX IP 258 OP 636: Performed by: INTERNAL MEDICINE

## 2021-08-23 RX ORDER — METHYLPREDNISOLONE SODIUM SUCCINATE 125 MG/2ML
125 INJECTION, POWDER, LYOPHILIZED, FOR SOLUTION INTRAMUSCULAR; INTRAVENOUS
Status: CANCELLED
Start: 2021-08-23

## 2021-08-23 RX ORDER — NALOXONE HYDROCHLORIDE 0.4 MG/ML
0.2 INJECTION, SOLUTION INTRAMUSCULAR; INTRAVENOUS; SUBCUTANEOUS
Status: CANCELLED | OUTPATIENT
Start: 2021-08-23

## 2021-08-23 RX ORDER — ALBUTEROL SULFATE 0.83 MG/ML
2.5 SOLUTION RESPIRATORY (INHALATION)
Status: CANCELLED | OUTPATIENT
Start: 2021-08-23

## 2021-08-23 RX ORDER — HEPARIN SODIUM (PORCINE) LOCK FLUSH IV SOLN 100 UNIT/ML 100 UNIT/ML
5 SOLUTION INTRAVENOUS
Status: CANCELLED | OUTPATIENT
Start: 2021-08-23

## 2021-08-23 RX ORDER — HEPARIN SODIUM,PORCINE 10 UNIT/ML
5 VIAL (ML) INTRAVENOUS
Status: CANCELLED | OUTPATIENT
Start: 2021-08-23

## 2021-08-23 RX ORDER — EPINEPHRINE 1 MG/ML
0.3 INJECTION, SOLUTION INTRAMUSCULAR; SUBCUTANEOUS EVERY 5 MIN PRN
Status: CANCELLED | OUTPATIENT
Start: 2021-08-23

## 2021-08-23 RX ORDER — DIPHENHYDRAMINE HYDROCHLORIDE 50 MG/ML
50 INJECTION INTRAMUSCULAR; INTRAVENOUS
Status: CANCELLED
Start: 2021-08-23

## 2021-08-23 RX ORDER — MEPERIDINE HYDROCHLORIDE 25 MG/ML
25 INJECTION INTRAMUSCULAR; INTRAVENOUS; SUBCUTANEOUS EVERY 30 MIN PRN
Status: CANCELLED | OUTPATIENT
Start: 2021-08-23

## 2021-08-23 RX ORDER — ALBUTEROL SULFATE 90 UG/1
1-2 AEROSOL, METERED RESPIRATORY (INHALATION)
Status: CANCELLED
Start: 2021-08-23

## 2021-08-23 RX ADMIN — SODIUM CHLORIDE 1000 ML: 9 INJECTION, SOLUTION INTRAVENOUS at 16:05

## 2021-08-23 NOTE — LETTER
8/23/2021         RE: Marlo Vee  [address] [facility] [address]        Dear Colleague,    Thank you for referring your patient, Marlo Vee, to the [facility] TREATMENT [facility]. Please see a copy of my visit note below.    Nursing Note  Marlo Vee [facility]:   Chief Complaint   Patient presents with     Infusion     fluids     During [facility]'s [facility] appointment, orders from Dr. Varela were completed.  Frequency: as needed twice weekly    Progress note:  Patient identification verified by name and date of birth.  Assessment completed.  Vitals recorded in Doc Flowsheets.  Patient was provided with education regarding medication/procedure and possible side effects.  Patient verbalized understanding.     present during visit today: Not Applicable.    Treatment Conditions: Non-applicable.    Premedications: were not ordered.    Drug Waste Record: No    Infusion length and rate:  infusion given over approximately 60 minutes    Labs: were drawn per orders, BMP post infusion.     Vascular access: peripheral IV placed today.    Is the next appt scheduled? No, message sent to scheduling team  Asymptomatic COVID test completed? no    Post Infusion Assessment:  Patient tolerated infusion without incident.     Discharge Plan:   Follow up plan of care with: ongoing infusions at [facility]. and ordering provider as scheduled.  Discharge instructions were reviewed with patient.  Patient/representative verbalized understanding of discharge instructions and all questions answered.  Patient discharged from [facility] in stable condition.    Martha Falcon RN    Administrations This Visit     0.9% sodium chloride BOLUS     Admin Date  08/23/2021 Action  New Bag Dose  1,000 mL Route  Intravenous Administered By  Terrie  HEATHER Thomas              /76   Pulse 68   Temp 97.4  F (36.3  C) (Oral)   SpO2 98%         Again, thank you for allowing me to participate in the care of your patient.        Sincerely,        [facility]

## 2021-08-26 ENCOUNTER — MYC MEDICAL ADVICE (OUTPATIENT)
Dept: TRANSPLANT | Facility: CLINIC | Age: 71
End: 2021-08-26

## 2021-08-26 DIAGNOSIS — Z94.0 KIDNEY TRANSPLANTED: ICD-10-CM

## 2021-08-26 RX ORDER — CARVEDILOL 25 MG/1
12.5 TABLET ORAL EVERY MORNING
Qty: 45 TABLET | Refills: 3 | Status: SHIPPED | OUTPATIENT
Start: 2021-08-26 | End: 2021-12-06

## 2021-08-26 NOTE — TELEPHONE ENCOUNTER
ISSUE  Due for a Tac level recheck after high levels and dose decrease. Patient w/recent cryptosporidium and C-diff with significant diarrhea.    OUTCOME  My chart message sent confirming decreased dose of 0.5 mg BID, inquiring about symptoms and requesting a Tac level in the next 1-2 days.

## 2021-08-27 ENCOUNTER — INFUSION THERAPY VISIT (OUTPATIENT)
Dept: INFUSION THERAPY | Facility: CLINIC | Age: 71
End: 2021-08-27
Attending: INTERNAL MEDICINE
Payer: MEDICARE

## 2021-08-27 VITALS
HEART RATE: 67 BPM | SYSTOLIC BLOOD PRESSURE: 118 MMHG | TEMPERATURE: 97.8 F | RESPIRATION RATE: 16 BRPM | DIASTOLIC BLOOD PRESSURE: 74 MMHG | OXYGEN SATURATION: 100 %

## 2021-08-27 DIAGNOSIS — Z94.0 KIDNEY REPLACED BY TRANSPLANT: ICD-10-CM

## 2021-08-27 DIAGNOSIS — R19.7 DIARRHEA: Primary | ICD-10-CM

## 2021-08-27 LAB
ANION GAP SERPL CALCULATED.3IONS-SCNC: 6 MMOL/L (ref 3–14)
BUN SERPL-MCNC: 19 MG/DL (ref 7–30)
CALCIUM SERPL-MCNC: 8.2 MG/DL (ref 8.5–10.1)
CHLORIDE BLD-SCNC: 115 MMOL/L (ref 94–109)
CO2 SERPL-SCNC: 25 MMOL/L (ref 20–32)
CREAT SERPL-MCNC: 1.5 MG/DL (ref 0.66–1.25)
GFR SERPL CREATININE-BSD FRML MDRD: 47 ML/MIN/1.73M2
GLUCOSE BLD-MCNC: 100 MG/DL (ref 70–99)
POTASSIUM BLD-SCNC: 3.5 MMOL/L (ref 3.4–5.3)
SODIUM SERPL-SCNC: 146 MMOL/L (ref 133–144)

## 2021-08-27 PROCEDURE — 36415 COLL VENOUS BLD VENIPUNCTURE: CPT

## 2021-08-27 PROCEDURE — 96360 HYDRATION IV INFUSION INIT: CPT

## 2021-08-27 PROCEDURE — 258N000003 HC RX IP 258 OP 636: Performed by: INTERNAL MEDICINE

## 2021-08-27 PROCEDURE — 80048 BASIC METABOLIC PNL TOTAL CA: CPT

## 2021-08-27 RX ORDER — ALBUTEROL SULFATE 90 UG/1
1-2 AEROSOL, METERED RESPIRATORY (INHALATION)
Status: CANCELLED
Start: 2021-08-27

## 2021-08-27 RX ORDER — ALBUTEROL SULFATE 0.83 MG/ML
2.5 SOLUTION RESPIRATORY (INHALATION)
Status: CANCELLED | OUTPATIENT
Start: 2021-08-27

## 2021-08-27 RX ORDER — METHYLPREDNISOLONE SODIUM SUCCINATE 125 MG/2ML
125 INJECTION, POWDER, LYOPHILIZED, FOR SOLUTION INTRAMUSCULAR; INTRAVENOUS
Status: CANCELLED
Start: 2021-08-27

## 2021-08-27 RX ORDER — HEPARIN SODIUM,PORCINE 10 UNIT/ML
5 VIAL (ML) INTRAVENOUS
Status: CANCELLED | OUTPATIENT
Start: 2021-08-27

## 2021-08-27 RX ORDER — HEPARIN SODIUM (PORCINE) LOCK FLUSH IV SOLN 100 UNIT/ML 100 UNIT/ML
5 SOLUTION INTRAVENOUS
Status: CANCELLED | OUTPATIENT
Start: 2021-08-27

## 2021-08-27 RX ORDER — EPINEPHRINE 1 MG/ML
0.3 INJECTION, SOLUTION INTRAMUSCULAR; SUBCUTANEOUS EVERY 5 MIN PRN
Status: CANCELLED | OUTPATIENT
Start: 2021-08-27

## 2021-08-27 RX ORDER — MEPERIDINE HYDROCHLORIDE 25 MG/ML
25 INJECTION INTRAMUSCULAR; INTRAVENOUS; SUBCUTANEOUS EVERY 30 MIN PRN
Status: CANCELLED | OUTPATIENT
Start: 2021-08-27

## 2021-08-27 RX ORDER — DIPHENHYDRAMINE HYDROCHLORIDE 50 MG/ML
50 INJECTION INTRAMUSCULAR; INTRAVENOUS
Status: CANCELLED
Start: 2021-08-27

## 2021-08-27 RX ORDER — NALOXONE HYDROCHLORIDE 0.4 MG/ML
0.2 INJECTION, SOLUTION INTRAMUSCULAR; INTRAVENOUS; SUBCUTANEOUS
Status: CANCELLED | OUTPATIENT
Start: 2021-08-27

## 2021-08-27 RX ADMIN — SODIUM CHLORIDE 1000 ML: 9 INJECTION, SOLUTION INTRAVENOUS at 14:22

## 2021-08-27 NOTE — PROGRESS NOTES
Nursing Note  Marlo Vee [facility] for:   Chief Complaint   Patient presents with     Infusion     IVF     During [facility]'s [facility] appointment, orders from Dr. Varela were completed.  Frequency: twice weekly    Progress note:  Patient identification verified by name and date of birth.  Assessment completed.  Vitals recorded in Doc Flowsheets.  Patient was provided with education regarding medication/procedure and possible side effects.  Patient verbalized understanding.     present during visit today: Not Applicable.    Treatment Conditions: Non-applicable.    Premedications: were not ordered.    Drug Waste Record: No    Infusion length and rate:  infusion given over approximately 60 minutes    Labs: BMP drawn post infusion completion, per orders    Vascular access: peripheral IV placed today.    Is the next appt scheduled? 9/1  Asymptomatic COVID test completed? NA    Post Infusion Assessment:  Patient tolerated infusion without incident.     Discharge Plan:   Follow up plan of care with: ongoing infusions at [facility]., transplant coordinator., ordering provider as scheduled. and after visit summary declined by patient  Discharge instructions were reviewed with patient.  Patient/representative verbalized understanding of discharge instructions and all questions answered.  Patient discharged from [facility] in stable condition.    Sylvie Carrillo RN       Administrations This Visit     0.9% sodium chloride BOLUS     Admin Date  08/27/2021 Action  New Bag Dose  1,000 mL Route  Intravenous Administered By  Sylvie Carrillo RN                /74   Pulse 67   Temp 97.8  F (36.6  C) (Oral)   Resp 16   SpO2 100%

## 2021-08-27 NOTE — PATIENT INSTRUCTIONS
Dear Marlo Vee    Thank you for choosing [facility] ([facility]) for your IV fluid infusion.  The following information is a summary of our appointment as well as important reminders.      We look forward in seeing you on your next appointment here at [facility] ([facility]).  Please don t hesitate to call us at [phone] to reschedule any of your appointments or to speak with one of the [facility] registered nurses.  It was a pleasure taking care of you today.    Sincerely,    [facility]  [facility]  [address] [place] [address]  [address]  Phone:  [phone]

## 2021-08-27 NOTE — LETTER
8/27/2021         RE: Marlo Vee  [address] [place] [facility] [place] [address]        Dear Colleague,    Thank you for referring your patient, Marlo Vee, to the [facility] T[facility]AT[facility]. Please see a copy of my visit note below.    Nursing Note  Marlo Vee presents today to [facility] for:   Chief Complaint   Patient presents with     Infusion     IVF     During today's [facility] appointment, orders from Dr. Varela were completed.  Frequency: twice weekly    Progress note:  Patient identification verified by name and date of birth.  Assessment completed.  Vitals recorded in Doc Flowsheets.  Patient was provided with education regarding medication/procedure and possible side effects.  Patient verbalized understanding.     present during visit today: Not Applicable.    Treatment Conditions: Non-applicable.    Premedications: were not ordered.    Drug Waste Record: No    Infusion length and rate:  infusion given over approximately 60 minutes    Labs: BMP drawn post infusion completion, per orders    Vascular access: peripheral IV placed today.    Is the next appt scheduled? 9/1  Asymptomatic COVID test completed? NA    Post Infusion Assessment:  Patient tolerated infusion without incident.     Discharge Plan:   Follow up plan of care with: ongoing infusions at [facility]., transplant coordinator., ordering provider as scheduled. and after visit summary declined by patient  Discharge instructions were reviewed with patient.  Patient/representative verbalized understanding of discharge instructions and all questions answered.  Patient discharged from [facility] in stable condition.    Sylvie Vlea RN       Administrations This Visit     0.9% sodium chloride BOLUS     Admin Date  08/27/2021 Action  New Bag Dose  1,000 mL  Route  Intravenous Administered By  Sylvie Carrillo, RN                /74   Pulse 67   Temp 97.8  F (36.6  C) (Oral)   Resp 16   SpO2 100%           Again, thank you for allowing me to participate in the care of your patient.        Sincerely,        [facility]

## 2021-09-01 ENCOUNTER — INFUSION THERAPY VISIT (OUTPATIENT)
Dept: INFUSION THERAPY | Facility: CLINIC | Age: 71
End: 2021-09-01
Attending: INTERNAL MEDICINE
Payer: MEDICARE

## 2021-09-01 VITALS
RESPIRATION RATE: 18 BRPM | TEMPERATURE: 98 F | OXYGEN SATURATION: 99 % | DIASTOLIC BLOOD PRESSURE: 67 MMHG | SYSTOLIC BLOOD PRESSURE: 101 MMHG | HEART RATE: 60 BPM

## 2021-09-01 DIAGNOSIS — Z94.0 KIDNEY REPLACED BY TRANSPLANT: ICD-10-CM

## 2021-09-01 DIAGNOSIS — R19.7 DIARRHEA: Primary | ICD-10-CM

## 2021-09-01 LAB
ANION GAP SERPL CALCULATED.3IONS-SCNC: 10 MMOL/L (ref 3–14)
BUN SERPL-MCNC: 20 MG/DL (ref 7–30)
CALCIUM SERPL-MCNC: 8.4 MG/DL (ref 8.5–10.1)
CHLORIDE BLD-SCNC: 108 MMOL/L (ref 94–109)
CO2 SERPL-SCNC: 24 MMOL/L (ref 20–32)
CREAT SERPL-MCNC: 1.75 MG/DL (ref 0.66–1.25)
GFR SERPL CREATININE-BSD FRML MDRD: 39 ML/MIN/1.73M2
GLUCOSE BLD-MCNC: 98 MG/DL (ref 70–99)
POTASSIUM BLD-SCNC: 3.4 MMOL/L (ref 3.4–5.3)
SODIUM SERPL-SCNC: 142 MMOL/L (ref 133–144)

## 2021-09-01 PROCEDURE — 80048 BASIC METABOLIC PNL TOTAL CA: CPT

## 2021-09-01 PROCEDURE — 258N000003 HC RX IP 258 OP 636: Performed by: INTERNAL MEDICINE

## 2021-09-01 PROCEDURE — 96360 HYDRATION IV INFUSION INIT: CPT

## 2021-09-01 RX ORDER — HEPARIN SODIUM,PORCINE 10 UNIT/ML
5 VIAL (ML) INTRAVENOUS
Status: CANCELLED | OUTPATIENT
Start: 2021-09-01

## 2021-09-01 RX ORDER — DIPHENHYDRAMINE HYDROCHLORIDE 50 MG/ML
50 INJECTION INTRAMUSCULAR; INTRAVENOUS
Status: CANCELLED
Start: 2021-09-01

## 2021-09-01 RX ORDER — HEPARIN SODIUM (PORCINE) LOCK FLUSH IV SOLN 100 UNIT/ML 100 UNIT/ML
5 SOLUTION INTRAVENOUS
Status: CANCELLED | OUTPATIENT
Start: 2021-09-01

## 2021-09-01 RX ORDER — NALOXONE HYDROCHLORIDE 0.4 MG/ML
0.2 INJECTION, SOLUTION INTRAMUSCULAR; INTRAVENOUS; SUBCUTANEOUS
Status: CANCELLED | OUTPATIENT
Start: 2021-09-01

## 2021-09-01 RX ORDER — ALBUTEROL SULFATE 0.83 MG/ML
2.5 SOLUTION RESPIRATORY (INHALATION)
Status: CANCELLED | OUTPATIENT
Start: 2021-09-01

## 2021-09-01 RX ORDER — ALBUTEROL SULFATE 90 UG/1
1-2 AEROSOL, METERED RESPIRATORY (INHALATION)
Status: CANCELLED
Start: 2021-09-01

## 2021-09-01 RX ORDER — EPINEPHRINE 1 MG/ML
0.3 INJECTION, SOLUTION INTRAMUSCULAR; SUBCUTANEOUS EVERY 5 MIN PRN
Status: CANCELLED | OUTPATIENT
Start: 2021-09-01

## 2021-09-01 RX ORDER — MEPERIDINE HYDROCHLORIDE 25 MG/ML
25 INJECTION INTRAMUSCULAR; INTRAVENOUS; SUBCUTANEOUS EVERY 30 MIN PRN
Status: CANCELLED | OUTPATIENT
Start: 2021-09-01

## 2021-09-01 RX ORDER — METHYLPREDNISOLONE SODIUM SUCCINATE 125 MG/2ML
125 INJECTION, POWDER, LYOPHILIZED, FOR SOLUTION INTRAMUSCULAR; INTRAVENOUS
Status: CANCELLED
Start: 2021-09-01

## 2021-09-01 RX ADMIN — SODIUM CHLORIDE 1000 ML: 9 INJECTION, SOLUTION INTRAVENOUS at 15:52

## 2021-09-01 NOTE — LETTER
9/1/2021         RE: Marlo Vee  [address] [facility] [place] [address]        Dear Colleague,    Thank you for referring your patient, Marlo Vee, to the [facility]. Please see a copy of my visit note below.    Nursing Note  Marlo Vee presents today to [facility] for:   Chief Complaint   Patient presents with     Infusion     IVF     During today's [facility] appointment, orders from Dr. Varela were completed.  Frequency: twice weekly    Progress note:  Patient identification verified by name and date of birth.  Assessment completed.  Vitals recorded in Doc Flowsheets.  Patient was provided with education regarding medication/procedure and possible side effects.  Patient verbalized understanding.     present during visit today: Not Applicable.    Treatment Conditions: Non-applicable.    Premedications: were not ordered.    Drug Waste Record: No    Infusion length and rate:  infusion given over approximately 60 minutes    Labs: BMP drawn post infusion completion, per orders    Vascular access: peripheral IV placed today.    Is the next appt scheduled? yes  Asymptomatic COVID test completed? NA    Post Infusion Assessment:  Patient tolerated infusion without incident.     Discharge Plan:   Follow up plan of care with: ongoing infusions at [facility]., transplant coordinator., ordering provider as scheduled. and after visit summary declined by patient  Discharge instructions were reviewed with patient.  Patient/representative verbalized understanding of discharge instructions and all questions answered.  Patient discharged from [facility] in stable condition.    Ladan Julian RN    Administrations This Visit     0.9% sodium chloride BOLUS     Admin Date  09/01/2021 Action  New Bag Dose  1,000 mL  Route  Intravenous Administered By  Ladan Montenegro, RN                   /67   Pulse 60   Temp 98  F (36.7  C) (Oral)   Resp 18   SpO2 99%         Again, thank you for allowing me to participate in the care of your patient.        Sincerely,        [facility]

## 2021-09-01 NOTE — TELEPHONE ENCOUNTER
ISSUE  Due for Tacrolimus level after increased levels and dose decrease w/C-diff and cryptosporidium.  Due for symptom check in.      OUTCOME  Left a detailed voice message inquiring about symptoms and advising John to have a Tac level drawn in the next 1-2 days.

## 2021-09-01 NOTE — PROGRESS NOTES
Nursing Note  Marlo Vee [facility]:   Chief Complaint   Patient presents with     Infusion     IVF     During today's  [facility] appointment, orders from Dr. Varela were completed.  Frequency: twice weekly    Progress note:  Patient identification verified by name and date of birth.  Assessment completed.  Vitals recorded in Doc Flowsheets.  Patient was provided with education regarding medication/procedure and possible side effects.  Patient verbalized understanding.     present during visit today: Not Applicable.    Treatment Conditions: Non-applicable.    Premedications: were not ordered.    Drug Waste Record: No    Infusion length and rate:  infusion given over approximately 60 minutes    Labs: BMP drawn post infusion completion, per orders    Vascular access: peripheral IV placed today.    Is the next appt scheduled? yes  Asymptomatic COVID test completed? NA    Post Infusion Assessment:  Patient tolerated infusion without incident.     Discharge Plan:   Follow up plan of care with: ongoing infusions at  [facility]., transplant coordinator., ordering provider as scheduled. and after visit summary declined by patient  Discharge instructions were reviewed with patient.  Patient/representative verbalized understanding of discharge instructions and all questions answered.  Patient discharged from  [facility] in stable condition.    Ladan Montenegro RN    Administrations This Visit     0.9% sodium chloride BOLUS     Admin Date  09/01/2021 Action  New Bag Dose  1,000 mL Route  Intravenous Administered By  Ladan Montenegro RN                   /67   Pulse 60   Temp 98  F (36.7  C) (Oral)   Resp 18   SpO2 99%

## 2021-09-02 ENCOUNTER — LAB (OUTPATIENT)
Dept: LAB | Facility: CLINIC | Age: 71
End: 2021-09-02
Payer: MEDICARE

## 2021-09-02 DIAGNOSIS — I15.1 HYPERTENSION SECONDARY TO OTHER RENAL DISORDERS (CODE): ICD-10-CM

## 2021-09-02 DIAGNOSIS — Z94.0 KIDNEY TRANSPLANTED: ICD-10-CM

## 2021-09-02 DIAGNOSIS — Z48.298 AFTERCARE FOLLOWING ORGAN TRANSPLANT: ICD-10-CM

## 2021-09-02 DIAGNOSIS — A09 DIARRHEA OF INFECTIOUS ORIGIN: ICD-10-CM

## 2021-09-02 DIAGNOSIS — A49.8 CLOSTRIDIUM DIFFICILE INFECTION: ICD-10-CM

## 2021-09-02 LAB
ANION GAP SERPL CALCULATED.3IONS-SCNC: 4 MMOL/L (ref 3–14)
BUN SERPL-MCNC: 19 MG/DL (ref 7–30)
CALCIUM SERPL-MCNC: 8.9 MG/DL (ref 8.5–10.1)
CHLORIDE BLD-SCNC: 113 MMOL/L (ref 94–109)
CO2 SERPL-SCNC: 25 MMOL/L (ref 20–32)
CREAT SERPL-MCNC: 1.78 MG/DL (ref 0.66–1.25)
GFR SERPL CREATININE-BSD FRML MDRD: 38 ML/MIN/1.73M2
GLUCOSE BLD-MCNC: 104 MG/DL (ref 70–99)
POTASSIUM BLD-SCNC: 3.9 MMOL/L (ref 3.4–5.3)
SODIUM SERPL-SCNC: 142 MMOL/L (ref 133–144)
TACROLIMUS BLD-MCNC: 6.2 UG/L (ref 5–15)
TME LAST DOSE: NORMAL H
TME LAST DOSE: NORMAL H

## 2021-09-02 PROCEDURE — 80197 ASSAY OF TACROLIMUS: CPT

## 2021-09-02 PROCEDURE — 36415 COLL VENOUS BLD VENIPUNCTURE: CPT

## 2021-09-02 PROCEDURE — 80048 BASIC METABOLIC PNL TOTAL CA: CPT

## 2021-09-02 RX ORDER — LOSARTAN POTASSIUM 25 MG/1
TABLET ORAL
Qty: 30 TABLET | Refills: 11 | Status: SHIPPED | OUTPATIENT
Start: 2021-09-02 | End: 2021-09-02

## 2021-09-02 NOTE — TELEPHONE ENCOUNTER
ISSUE  Creatinine 1.75 after IVF's (baseline 1.1-1.3)  Recently with C-diff and cryptosporidium.      OUTCOME  John reports feeling that diarrhea is improving. He is going around 5 times a day (down from >30 times), but stool remains straight liquid. He reports a decent appetite and good energy. Has not weighted himself, but feels weight is down. He has been getting IVF's 2 x weekly for a few weeks now.  He finished vanco yesterday. He did not  nitazoxanide because it was too expensive. He will recheck with pharmacy today after PA was done.  Will review with provider.    PLAN (per Dr. Moya)  -Hold losartan  -Hold Bactrim  -Continue IV fluids for now  -Await Tacrolimus level  -OK to try fiber supplement.  -If creatinine not improved next week after holding losartan will readress.    OUTCOME  John verbalized understanding of plan. My chart message sent with instructions.

## 2021-09-03 ENCOUNTER — INFUSION THERAPY VISIT (OUTPATIENT)
Dept: INFUSION THERAPY | Facility: CLINIC | Age: 71
End: 2021-09-03
Attending: INTERNAL MEDICINE
Payer: MEDICARE

## 2021-09-03 VITALS
HEART RATE: 69 BPM | OXYGEN SATURATION: 100 % | TEMPERATURE: 97.3 F | SYSTOLIC BLOOD PRESSURE: 118 MMHG | DIASTOLIC BLOOD PRESSURE: 72 MMHG | RESPIRATION RATE: 16 BRPM

## 2021-09-03 DIAGNOSIS — Z94.0 KIDNEY REPLACED BY TRANSPLANT: ICD-10-CM

## 2021-09-03 DIAGNOSIS — R19.7 DIARRHEA: Primary | ICD-10-CM

## 2021-09-03 LAB
ANION GAP SERPL CALCULATED.3IONS-SCNC: 6 MMOL/L (ref 3–14)
BUN SERPL-MCNC: 21 MG/DL (ref 7–30)
CALCIUM SERPL-MCNC: 7.9 MG/DL (ref 8.5–10.1)
CHLORIDE BLD-SCNC: 116 MMOL/L (ref 94–109)
CO2 SERPL-SCNC: 23 MMOL/L (ref 20–32)
CREAT SERPL-MCNC: 1.56 MG/DL (ref 0.66–1.25)
GFR SERPL CREATININE-BSD FRML MDRD: 45 ML/MIN/1.73M2
GLUCOSE BLD-MCNC: 93 MG/DL (ref 70–99)
POTASSIUM BLD-SCNC: 3.5 MMOL/L (ref 3.4–5.3)
SODIUM SERPL-SCNC: 145 MMOL/L (ref 133–144)

## 2021-09-03 PROCEDURE — 82374 ASSAY BLOOD CARBON DIOXIDE: CPT

## 2021-09-03 PROCEDURE — 96360 HYDRATION IV INFUSION INIT: CPT

## 2021-09-03 PROCEDURE — 258N000003 HC RX IP 258 OP 636: Performed by: INTERNAL MEDICINE

## 2021-09-03 PROCEDURE — 36415 COLL VENOUS BLD VENIPUNCTURE: CPT

## 2021-09-03 RX ORDER — METHYLPREDNISOLONE SODIUM SUCCINATE 125 MG/2ML
125 INJECTION, POWDER, LYOPHILIZED, FOR SOLUTION INTRAMUSCULAR; INTRAVENOUS
Status: CANCELLED
Start: 2021-09-03

## 2021-09-03 RX ORDER — ALBUTEROL SULFATE 90 UG/1
1-2 AEROSOL, METERED RESPIRATORY (INHALATION)
Status: CANCELLED
Start: 2021-09-03

## 2021-09-03 RX ORDER — MEPERIDINE HYDROCHLORIDE 25 MG/ML
25 INJECTION INTRAMUSCULAR; INTRAVENOUS; SUBCUTANEOUS EVERY 30 MIN PRN
Status: CANCELLED | OUTPATIENT
Start: 2021-09-03

## 2021-09-03 RX ORDER — ALBUTEROL SULFATE 0.83 MG/ML
2.5 SOLUTION RESPIRATORY (INHALATION)
Status: CANCELLED | OUTPATIENT
Start: 2021-09-03

## 2021-09-03 RX ORDER — HEPARIN SODIUM,PORCINE 10 UNIT/ML
5 VIAL (ML) INTRAVENOUS
Status: CANCELLED | OUTPATIENT
Start: 2021-09-03

## 2021-09-03 RX ORDER — EPINEPHRINE 1 MG/ML
0.3 INJECTION, SOLUTION INTRAMUSCULAR; SUBCUTANEOUS EVERY 5 MIN PRN
Status: CANCELLED | OUTPATIENT
Start: 2021-09-03

## 2021-09-03 RX ORDER — NALOXONE HYDROCHLORIDE 0.4 MG/ML
0.2 INJECTION, SOLUTION INTRAMUSCULAR; INTRAVENOUS; SUBCUTANEOUS
Status: CANCELLED | OUTPATIENT
Start: 2021-09-03

## 2021-09-03 RX ORDER — HEPARIN SODIUM (PORCINE) LOCK FLUSH IV SOLN 100 UNIT/ML 100 UNIT/ML
5 SOLUTION INTRAVENOUS
Status: CANCELLED | OUTPATIENT
Start: 2021-09-03

## 2021-09-03 RX ORDER — DIPHENHYDRAMINE HYDROCHLORIDE 50 MG/ML
50 INJECTION INTRAMUSCULAR; INTRAVENOUS
Status: CANCELLED
Start: 2021-09-03

## 2021-09-03 RX ADMIN — SODIUM CHLORIDE 1000 ML: 9 INJECTION, SOLUTION INTRAVENOUS at 14:49

## 2021-09-03 NOTE — LETTER
"    9/3/2021         RE: Marlo Vee  [address] [facility] [place] [address]        Dear Colleague,    Thank you for referring your patient, Marlo Vee, to the [facility]. Please see a copy of my visit note below.    Chief Complaint   Patient presents with     Infusion     IV Fluids, labs     Infusion Nursing Note:  Marlo Vee presents today for IV Fluids.    Patient seen by provider today: No   present during visit today: Not Applicable.    Note: 1 L NS given over 1 hour.    Labs: BMP drawn at end of infusion per orders.     Intravenous Access:  Peripheral IV placed.    Treatment Conditions:  Not Applicable.      Post Infusion Assessment:  Patient tolerated infusion without incident.  Site patent and intact, free from redness, edema or discomfort.  No evidence of extravasations.  Access discontinued per protocol.       Discharge Plan:   AVS to patient via MYCHART.  Patient will return next week for next appointment.   Patient discharged in stable condition accompanied by: self.  Departure Mode: Ambulatory.    Administrations This Visit     0.9% sodium chloride BOLUS     Admin Date  09/03/2021 Action  New Bag Dose  1,000 mL Route  Intravenous Administered By  Zhane Chavira RN                Vital signs:  Temp: 97.3  F (36.3  C) Temp src: Oral BP: 116/76 Pulse: 60   Resp: 16 SpO2: 100 % O2 Device: None (Room air)        Estimated body mass index is 23.82 kg/m  as calculated from the following:    Height as of 12/5/20: 1.778 m (5' 10\").    Weight as of 4/29/21: 75.3 kg (166 lb).                              Again, thank you for allowing me to participate in the care of your patient.        Sincerely,        [facility]    "

## 2021-09-03 NOTE — PROGRESS NOTES
"Chief Complaint   Patient presents with     Infusion     IV Fluids, labs     Infusion Nursing Note:  Marlo Vee presents today for IV Fluids.    Patient seen by provider today: No   present during visit today: Not Applicable.    Note: 1 L NS given over 1 hour.    Labs: BMP drawn at end of infusion per orders.     Intravenous Access:  Peripheral IV placed.    Treatment Conditions:  Not Applicable.      Post Infusion Assessment:  Patient tolerated infusion without incident.  Site patent and intact, free from redness, edema or discomfort.  No evidence of extravasations.  Access discontinued per protocol.       Discharge Plan:   AVS to patient via MYCHART.  Patient will return next week for next appointment.   Patient discharged in stable condition accompanied by: self.  Departure Mode: Ambulatory.    Administrations This Visit     0.9% sodium chloride BOLUS     Admin Date  09/03/2021 Action  New Bag Dose  1,000 mL Route  Intravenous Administered By  Zhane Chavira RN                Vital signs:  Temp: 97.3  F (36.3  C) Temp src: Oral BP: 116/76 Pulse: 60   Resp: 16 SpO2: 100 % O2 Device: None (Room air)        Estimated body mass index is 23.82 kg/m  as calculated from the following:    Height as of 12/5/20: 1.778 m (5' 10\").    Weight as of 4/29/21: 75.3 kg (166 lb).                          "

## 2021-09-05 ENCOUNTER — HEALTH MAINTENANCE LETTER (OUTPATIENT)
Age: 71
End: 2021-09-05

## 2021-09-07 ENCOUNTER — TELEPHONE (OUTPATIENT)
Dept: TRANSPLANT | Facility: CLINIC | Age: 71
End: 2021-09-07

## 2021-09-07 NOTE — TELEPHONE ENCOUNTER
Pt is to [facility]dul[facility] into [facility]fusion [facility] Wed and Fri  No times scheduled yet  He thought it was made after his last infusion Fri

## 2021-09-08 ENCOUNTER — INFUSION THERAPY VISIT (OUTPATIENT)
Dept: INFUSION THERAPY | Facility: CLINIC | Age: 71
End: 2021-09-08
Attending: INTERNAL MEDICINE
Payer: MEDICARE

## 2021-09-08 VITALS
OXYGEN SATURATION: 100 % | DIASTOLIC BLOOD PRESSURE: 71 MMHG | RESPIRATION RATE: 16 BRPM | HEART RATE: 68 BPM | SYSTOLIC BLOOD PRESSURE: 115 MMHG

## 2021-09-08 DIAGNOSIS — R19.7 DIARRHEA: Primary | ICD-10-CM

## 2021-09-08 DIAGNOSIS — Z94.0 KIDNEY REPLACED BY TRANSPLANT: ICD-10-CM

## 2021-09-08 LAB
ANION GAP SERPL CALCULATED.3IONS-SCNC: 6 MMOL/L (ref 3–14)
BUN SERPL-MCNC: 31 MG/DL (ref 7–30)
CALCIUM SERPL-MCNC: 8.8 MG/DL (ref 8.5–10.1)
CHLORIDE BLD-SCNC: 112 MMOL/L (ref 94–109)
CO2 SERPL-SCNC: 22 MMOL/L (ref 20–32)
CREAT SERPL-MCNC: 2.06 MG/DL (ref 0.66–1.25)
GFR SERPL CREATININE-BSD FRML MDRD: 32 ML/MIN/1.73M2
GLUCOSE BLD-MCNC: 99 MG/DL (ref 70–99)
POTASSIUM BLD-SCNC: 3.8 MMOL/L (ref 3.4–5.3)
SODIUM SERPL-SCNC: 140 MMOL/L (ref 133–144)

## 2021-09-08 PROCEDURE — 258N000003 HC RX IP 258 OP 636: Performed by: INTERNAL MEDICINE

## 2021-09-08 PROCEDURE — 96360 HYDRATION IV INFUSION INIT: CPT

## 2021-09-08 PROCEDURE — 80048 BASIC METABOLIC PNL TOTAL CA: CPT

## 2021-09-08 PROCEDURE — 36415 COLL VENOUS BLD VENIPUNCTURE: CPT

## 2021-09-08 RX ORDER — HEPARIN SODIUM,PORCINE 10 UNIT/ML
5 VIAL (ML) INTRAVENOUS
Status: CANCELLED | OUTPATIENT
Start: 2021-09-08

## 2021-09-08 RX ORDER — METHYLPREDNISOLONE SODIUM SUCCINATE 125 MG/2ML
125 INJECTION, POWDER, LYOPHILIZED, FOR SOLUTION INTRAMUSCULAR; INTRAVENOUS
Status: CANCELLED
Start: 2021-09-08

## 2021-09-08 RX ORDER — ALBUTEROL SULFATE 0.83 MG/ML
2.5 SOLUTION RESPIRATORY (INHALATION)
Status: CANCELLED | OUTPATIENT
Start: 2021-09-08

## 2021-09-08 RX ORDER — ALBUTEROL SULFATE 90 UG/1
1-2 AEROSOL, METERED RESPIRATORY (INHALATION)
Status: CANCELLED
Start: 2021-09-08

## 2021-09-08 RX ORDER — NALOXONE HYDROCHLORIDE 0.4 MG/ML
0.2 INJECTION, SOLUTION INTRAMUSCULAR; INTRAVENOUS; SUBCUTANEOUS
Status: CANCELLED | OUTPATIENT
Start: 2021-09-08

## 2021-09-08 RX ORDER — MEPERIDINE HYDROCHLORIDE 25 MG/ML
25 INJECTION INTRAMUSCULAR; INTRAVENOUS; SUBCUTANEOUS EVERY 30 MIN PRN
Status: CANCELLED | OUTPATIENT
Start: 2021-09-08

## 2021-09-08 RX ORDER — HEPARIN SODIUM (PORCINE) LOCK FLUSH IV SOLN 100 UNIT/ML 100 UNIT/ML
5 SOLUTION INTRAVENOUS
Status: CANCELLED | OUTPATIENT
Start: 2021-09-08

## 2021-09-08 RX ORDER — DIPHENHYDRAMINE HYDROCHLORIDE 50 MG/ML
50 INJECTION INTRAMUSCULAR; INTRAVENOUS
Status: CANCELLED
Start: 2021-09-08

## 2021-09-08 RX ORDER — EPINEPHRINE 1 MG/ML
0.3 INJECTION, SOLUTION, CONCENTRATE INTRAVENOUS EVERY 5 MIN PRN
Status: CANCELLED | OUTPATIENT
Start: 2021-09-08

## 2021-09-08 RX ADMIN — SODIUM CHLORIDE 1000 ML: 9 INJECTION, SOLUTION INTRAVENOUS at 08:36

## 2021-09-08 NOTE — PROGRESS NOTES
Infusion Nursing Note:  Marlo Vee presents today for fluids.    Patient seen by provider today: No   present during visit today: Not Applicable.    Note: Fluid infused over 1 hour.      Intravenous Access:  Peripheral IV placed.    Treatment Conditions:  Not Applicable.      Post Infusion Assessment:  Patient tolerated infusion without incident.  No evidence of extravasations.  Access discontinued per protocol.       Discharge Plan:   Patient discharged in stable condition accompanied by: self.      JAMIE MEJIAS RN

## 2021-09-08 NOTE — TELEPHONE ENCOUNTER
ISSUE  Creatinine 2.06 after IVF's  Recent cryptosporidium and C-diff.      OUTCOME  John reports continued liquid stools 5-6 times daily.  Finished Vanco last week. Never picked up Nitazoxanide.  Eating and drinking normally at home.  Tried metamucil x 3 days, no help.  Holding Losartan and Bactrim.  BP's 108-116/70's.  Has an appointment with PCP tomorrow.  Will review with provider.

## 2021-09-09 ENCOUNTER — OFFICE VISIT (OUTPATIENT)
Dept: INTERNAL MEDICINE | Facility: CLINIC | Age: 71
End: 2021-09-09
Payer: MEDICARE

## 2021-09-09 VITALS
OXYGEN SATURATION: 99 % | SYSTOLIC BLOOD PRESSURE: 107 MMHG | HEART RATE: 69 BPM | DIASTOLIC BLOOD PRESSURE: 67 MMHG | BODY MASS INDEX: 20.66 KG/M2 | WEIGHT: 144 LBS

## 2021-09-09 DIAGNOSIS — N05.2 MEMBRANOUS GLOMERULONEPHRITIS: Primary | ICD-10-CM

## 2021-09-09 PROCEDURE — 99214 OFFICE O/P EST MOD 30 MIN: CPT | Mod: GC | Performed by: STUDENT IN AN ORGANIZED HEALTH CARE EDUCATION/TRAINING PROGRAM

## 2021-09-09 ASSESSMENT — PAIN SCALES - GENERAL: PAINLEVEL: NO PAIN (0)

## 2021-09-09 NOTE — NURSING NOTE
Chief Complaint   Patient presents with     Recheck Medication     DIARRHEA FOR PAST 6 WEEKS (YELLOWISH BROWN), GURGULING AND CRAMPS COME WITH THE LOOSE STOOL       Kaitlin Mata EMT at 2:10 PM on 9/9/2021

## 2021-09-09 NOTE — PROGRESS NOTES
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    Pt has had diarrhea for 6 weeks - he tested positive for c.diff and cryptosporidium 4 weeks ago.  [place].  He has been following with nephrology and has been getting IVF weekly due to rising creatinine.     Diarrhea is thickening up today - going 5 times a day  No abdominal pain, can hear gurgling   No nausea, vomited 1x last weekend   Trying to drink enough, but its somewhat challenging.  Took 2 week course of vancomycin finished.     He is starting nitazoxanide for crypto today for 2 weeks.   Currently taking probiotics.   Reviewed ED and nephrology notes.    Spoke with transplant ID on call regarding patient's case.    Past Medical History:   Diagnosis Date     Anemia in ESRD (end-stage renal disease) (H)      Antiplatelet or antithrombotic long-term use      Anxiety      Dyslipidemia      End stage kidney disease (H)      Heart attack (H)     not sure     Hypertension      Membranous glomerulonephritis 2002     PONV (postoperative nausea and vomiting)      PUD (peptic ulcer disease)      Secondary hyperparathyroidism (of renal origin)      Skin abnormalities      Stented coronary artery      Vitamin D deficiency      Current Outpatient Medications   Medication     aspirin 81 MG EC tablet     atorvastatin (LIPITOR) 10 MG tablet     carvedilol (COREG) 25 MG tablet     carvedilol (COREG) 25 MG tablet     mycophenolic acid (GENERIC EQUIVALENT) 180 MG EC tablet     nitazoxanide (ALINIA) 500 MG tablet     ondansetron (ZOFRAN-ODT) 4 MG ODT tab     PROGRAF (BRAND) 0.5 MG capsule     sertraline (ZOLOFT) 25 MG tablet     sildenafil (VIAGRA) 100 MG tablet     vitamin D3 (CHOLECALCIFEROL) 50 mcg (2000 units) tablet     losartan  (COZAAR) 25 MG tablet     sulfamethoxazole-trimethoprim (BACTRIM) 400-80 MG tablet     vancomycin (VANCOCIN) 125 MG capsule     No current facility-administered medications for this visit.     Past Surgical History:   Procedure Laterality Date     CYSTOSCOPY, REMOVE STENT(S), COMBINED Right 2/23/2016    Procedure: COMBINED CYSTOSCOPY, REMOVE STENT(S);  Surgeon: Cody Temple MD;  Location: [facility] OR     OPEN REDUCTION INTERNAL FIXATION HIP NAILING Right 12/6/2020    Procedure: OPEN REDUCTION INTERNAL FIXATION, FRACTURE, FEMUR, USING INTRAMEDULLARY SHU.;  Surgeon: Jimmy Stoner MD;  Location: [facility] OR     s/p right upper extremity AV fistula       TRANSPLANT      kidney transplant      VASCULAR SURGERY        No Known Allergies    OBJECTIVE:  /67 (BP Location: Right arm, Patient Position: Sitting, Cuff Size: Adult Regular)   Pulse 69   Wt 65.3 kg (144 lb)   SpO2 99%   BMI 20.66 kg/m     Wt Readings from Last 1 Encounters:   09/09/21 65.3 kg (144 lb)     Physical Examination:  Gen: well appearing, in NAD  HEENT: Oral mucosa moist  Pulm: normal work of breathing on ambient air  Abd: soft, nontender  Ext: no peripheral edema, cap refill 3 seconds  Neuro: alert, conversant, walking to exam table and back without instability    ASSESSMENT/PLAN:  Marlo Vee is a 71 year old man with history of living donor kidney transplant (2016) on MMF and tacrolimus here for diarrhea for the last 6 weeks.     C. Difficile and Cryptosporidium infection   Suspect related to immunosuppression, well water patient was using to shower.  No history of antibiotic exposure other than chronic Bactrim ppx.  Discussed with transplant ID physician on call - although diarrhea has slowed it is unclear if C.difficile infection is still persistent, given concurrent cryptosporidium infection. Recommended that treatment for Cryptosporidium is started and if not improved, may retest and refer to infectious disease clinic.  - Start nitazoxamide  for 2-week course   - Nutrition referral, continue hydration  - At this point retesting may yield false positives, plan to refer to transplant ID if diarrhea is persistent after Cryptosporidium is treated  - Reduction of immunosuppression per transplant nephrology if persistent infection  - Discussed precautions to contact clinic - if worsening dizziness, blurred vision, cessation of stools, blood in stools  - Advised against using Imodium, okay with continuing Metamucil to bulk up stools    Acute kidney injury   Cr 2.06 up from baseline 1 to 1.1.  Likely pre-renal related to dehydration.  This point patient is hemodynamically stable, without significant signs of dehydration on exam.  - Continue following with Dr. Varela, IVF  - Continue holding losartan and bactrim     Pt should return to clinic for f/u in 2 weeks - will refer to ID +/- GI if not improved.     Pt was seen and plan of care discussed with Akanksha Harding MD  Sep 9, 2021  PGY-2, Internal Medicine

## 2021-09-10 DIAGNOSIS — Z94.0 KIDNEY TRANSPLANTED: ICD-10-CM

## 2021-09-10 DIAGNOSIS — Z48.298 AFTERCARE FOLLOWING ORGAN TRANSPLANT: ICD-10-CM

## 2021-09-10 RX ORDER — MYCOPHENOLIC ACID 180 MG/1
540 TABLET, DELAYED RELEASE ORAL 2 TIMES DAILY
Qty: 180 TABLET | Refills: 11 | Status: SHIPPED | OUTPATIENT
Start: 2021-09-10 | End: 2021-10-20

## 2021-09-11 ENCOUNTER — INFUSION THERAPY VISIT (OUTPATIENT)
Dept: INFUSION THERAPY | Facility: CLINIC | Age: 71
End: 2021-09-11
Attending: INTERNAL MEDICINE
Payer: MEDICARE

## 2021-09-11 VITALS
RESPIRATION RATE: 16 BRPM | OXYGEN SATURATION: 100 % | SYSTOLIC BLOOD PRESSURE: 121 MMHG | HEART RATE: 64 BPM | TEMPERATURE: 97.4 F | DIASTOLIC BLOOD PRESSURE: 64 MMHG

## 2021-09-11 DIAGNOSIS — Z94.0 KIDNEY REPLACED BY TRANSPLANT: ICD-10-CM

## 2021-09-11 DIAGNOSIS — R19.7 DIARRHEA: Primary | ICD-10-CM

## 2021-09-11 LAB
ANION GAP SERPL CALCULATED.3IONS-SCNC: 8 MMOL/L (ref 3–14)
BUN SERPL-MCNC: 24 MG/DL (ref 7–30)
CALCIUM SERPL-MCNC: 8.4 MG/DL (ref 8.5–10.1)
CHLORIDE BLD-SCNC: 116 MMOL/L (ref 94–109)
CO2 SERPL-SCNC: 21 MMOL/L (ref 20–32)
CREAT SERPL-MCNC: 1.93 MG/DL (ref 0.66–1.25)
GFR SERPL CREATININE-BSD FRML MDRD: 35 ML/MIN/1.73M2
GLUCOSE BLD-MCNC: 100 MG/DL (ref 70–99)
POTASSIUM BLD-SCNC: 3.9 MMOL/L (ref 3.4–5.3)
SODIUM SERPL-SCNC: 145 MMOL/L (ref 133–144)

## 2021-09-11 PROCEDURE — 36415 COLL VENOUS BLD VENIPUNCTURE: CPT

## 2021-09-11 PROCEDURE — 96360 HYDRATION IV INFUSION INIT: CPT

## 2021-09-11 PROCEDURE — 258N000003 HC RX IP 258 OP 636: Performed by: INTERNAL MEDICINE

## 2021-09-11 PROCEDURE — 80048 BASIC METABOLIC PNL TOTAL CA: CPT

## 2021-09-11 RX ORDER — HEPARIN SODIUM (PORCINE) LOCK FLUSH IV SOLN 100 UNIT/ML 100 UNIT/ML
5 SOLUTION INTRAVENOUS
Status: CANCELLED | OUTPATIENT
Start: 2021-09-11

## 2021-09-11 RX ORDER — EPINEPHRINE 1 MG/ML
0.3 INJECTION, SOLUTION INTRAMUSCULAR; SUBCUTANEOUS EVERY 5 MIN PRN
Status: CANCELLED | OUTPATIENT
Start: 2021-09-11

## 2021-09-11 RX ORDER — DIPHENHYDRAMINE HYDROCHLORIDE 50 MG/ML
50 INJECTION INTRAMUSCULAR; INTRAVENOUS
Status: CANCELLED
Start: 2021-09-11

## 2021-09-11 RX ORDER — MEPERIDINE HYDROCHLORIDE 25 MG/ML
25 INJECTION INTRAMUSCULAR; INTRAVENOUS; SUBCUTANEOUS EVERY 30 MIN PRN
Status: CANCELLED | OUTPATIENT
Start: 2021-09-11

## 2021-09-11 RX ORDER — ALBUTEROL SULFATE 0.83 MG/ML
2.5 SOLUTION RESPIRATORY (INHALATION)
Status: CANCELLED | OUTPATIENT
Start: 2021-09-11

## 2021-09-11 RX ORDER — METHYLPREDNISOLONE SODIUM SUCCINATE 125 MG/2ML
125 INJECTION, POWDER, LYOPHILIZED, FOR SOLUTION INTRAMUSCULAR; INTRAVENOUS
Status: CANCELLED
Start: 2021-09-11

## 2021-09-11 RX ORDER — HEPARIN SODIUM,PORCINE 10 UNIT/ML
5 VIAL (ML) INTRAVENOUS
Status: CANCELLED | OUTPATIENT
Start: 2021-09-11

## 2021-09-11 RX ORDER — NALOXONE HYDROCHLORIDE 0.4 MG/ML
0.2 INJECTION, SOLUTION INTRAMUSCULAR; INTRAVENOUS; SUBCUTANEOUS
Status: CANCELLED | OUTPATIENT
Start: 2021-09-11

## 2021-09-11 RX ORDER — ALBUTEROL SULFATE 90 UG/1
1-2 AEROSOL, METERED RESPIRATORY (INHALATION)
Status: CANCELLED
Start: 2021-09-11

## 2021-09-11 RX ADMIN — SODIUM CHLORIDE 1000 ML: 9 INJECTION, SOLUTION INTRAVENOUS at 09:16

## 2021-09-11 NOTE — PATIENT INSTRUCTIONS
Dear Marlo Vee    Thank you for choosing [facility] ([facility]) for your IV fluid infusion.  The following information is a summary of our appointment as well as important reminders.      We look forward in seeing you on your next appointment here at [facility] ([facility]).  Please don t hesitate to call us at [phone] to reschedule any of your appointments or to speak with one of the [facility] registered nurses.  It was a pleasure taking care of you today.    Sincerely,    [facility]  [facility]  [address] [place] [address]  [address]  Phone:  [phone]

## 2021-09-11 NOTE — LETTER
9/11/2021         RE: Marlo Vee  [address] [facility] [place] [address]        Dear Colleague,    Thank you for referring your patient, Marlo Vee, to the [facility]. Please see a copy of my visit note below.    Nursing Note  Marlo Vee presents today to [facility] for:   Chief Complaint   Patient presents with     Infusion     IVF     During [facility]'s [facility] appointment, orders from Dr. Varela were completed.  Frequency: twice/week    Progress note:  Patient identification verified by name and date of birth.  Assessment completed.  Vitals recorded in Doc Flowsheets.  Patient was provided with education regarding medication/procedure and possible side effects.  Patient verbalized understanding.     present during visit today: Not Applicable.    Treatment Conditions: Non-applicable.    Premedications: were not ordered.    Drug Waste Record: No    Infusion length and rate:  infusion given over approximately 60 minutes    Labs: BMP drawn post infusion    Vascular access: peripheral IV placed today.    Is the next appt scheduled? Scheduled next 9/18. No openings in [facility] or [place] in the interim. Patient given scheduling number, he will call later this week to see if openings have become available.  Asymptomatic COVID test completed? NA    Post Infusion Assessment:  Patient tolerated infusion without incident.     Discharge Plan:   Follow up plan of care with: ongoing infusions at [facility]., ordering provider as scheduled. and after visit summary declined by patient  Discharge instructions were reviewed with patient.  Patient/representative verbalized understanding of discharge instructions and all questions answered.  Patient discharged from [facility] in stable condition.    Sylvie Vela RN        Administrations This Visit     0.9% sodium chloride BOLUS     Admin Date  09/11/2021 Action  Started Dose  1,000 mL Route  Intravenous Administered By  Sylvie Carrillo, RN                /64 (BP Location: Left arm)   Pulse 64   Temp 97.4  F (36.3  C) (Oral)   Resp 16   SpO2 100%         Again, thank you for allowing me to participate in the care of your patient.        Sincerely,        [facility]

## 2021-09-11 NOTE — PROGRESS NOTES
Nursing Note  Marlo Vee [facility] for:   Chief Complaint   Patient presents with     Infusion     IVF     During [facility]'s [facility] appointment, orders from Dr. Varela were completed.  Frequency: twice/week    Progress note:  Patient identification verified by name and date of birth.  Assessment completed.  Vitals recorded in Doc Flowsheets.  Patient was provided with education regarding medication/procedure and possible side effects.  Patient verbalized understanding.     present during visit today: Not Applicable.    Treatment Conditions: Non-applicable.    Premedications: were not ordered.    Drug Waste Record: No    Infusion length and rate:  infusion given over approximately 60 minutes    Labs: BMP drawn post infusion    Vascular access: peripheral IV placed today.    Is the next appt scheduled? Scheduled next 9/18. No openings in [facility] or [facility] in the interim. Patient given scheduling number, he will call later this week to see if openings have become available.  Asymptomatic COVID test completed? NA    Post Infusion Assessment:  Patient tolerated infusion without incident.     Discharge Plan:   Follow up plan of care with: ongoing infusions at [facility] ordering provider as scheduled. and after visit summary declined by patient  Discharge instructions were reviewed with patient.  Patient/representative verbalized understanding of discharge instructions and all questions answered.  Patient discharged [facility] in stable condition.    Sylvie Carrillo RN       Administrations This Visit     0.9% sodium chloride BOLUS     Admin Date  09/11/2021 Action  Started Dose  1,000 mL Route  Intravenous Administered By  Sylvie Carrillo RN                /64 (BP Location: Left arm)   Pulse 64   Temp 97.4  F (36.3  C) (Oral)   Resp 16   SpO2 100%

## 2021-09-15 ENCOUNTER — INFUSION THERAPY VISIT (OUTPATIENT)
Dept: INFUSION THERAPY | Facility: CLINIC | Age: 71
End: 2021-09-15
Attending: INTERNAL MEDICINE
Payer: MEDICARE

## 2021-09-15 VITALS
SYSTOLIC BLOOD PRESSURE: 122 MMHG | DIASTOLIC BLOOD PRESSURE: 59 MMHG | RESPIRATION RATE: 16 BRPM | HEART RATE: 52 BPM | TEMPERATURE: 97.5 F | OXYGEN SATURATION: 100 %

## 2021-09-15 DIAGNOSIS — R19.7 DIARRHEA: Primary | ICD-10-CM

## 2021-09-15 DIAGNOSIS — Z94.0 KIDNEY REPLACED BY TRANSPLANT: ICD-10-CM

## 2021-09-15 LAB
ANION GAP SERPL CALCULATED.3IONS-SCNC: 7 MMOL/L (ref 3–14)
BUN SERPL-MCNC: 22 MG/DL (ref 7–30)
CALCIUM SERPL-MCNC: 8.8 MG/DL (ref 8.5–10.1)
CHLORIDE BLD-SCNC: 118 MMOL/L (ref 94–109)
CO2 SERPL-SCNC: 19 MMOL/L (ref 20–32)
CREAT SERPL-MCNC: 1.78 MG/DL (ref 0.66–1.25)
GFR SERPL CREATININE-BSD FRML MDRD: 38 ML/MIN/1.73M2
GLUCOSE BLD-MCNC: 109 MG/DL (ref 70–99)
POTASSIUM BLD-SCNC: 3.6 MMOL/L (ref 3.4–5.3)
SODIUM SERPL-SCNC: 144 MMOL/L (ref 133–144)

## 2021-09-15 PROCEDURE — 80048 BASIC METABOLIC PNL TOTAL CA: CPT

## 2021-09-15 PROCEDURE — 36415 COLL VENOUS BLD VENIPUNCTURE: CPT

## 2021-09-15 PROCEDURE — 96360 HYDRATION IV INFUSION INIT: CPT

## 2021-09-15 PROCEDURE — 258N000003 HC RX IP 258 OP 636: Performed by: INTERNAL MEDICINE

## 2021-09-15 RX ORDER — DIPHENHYDRAMINE HYDROCHLORIDE 50 MG/ML
50 INJECTION INTRAMUSCULAR; INTRAVENOUS
Status: CANCELLED
Start: 2021-09-15

## 2021-09-15 RX ORDER — MEPERIDINE HYDROCHLORIDE 25 MG/ML
25 INJECTION INTRAMUSCULAR; INTRAVENOUS; SUBCUTANEOUS EVERY 30 MIN PRN
Status: CANCELLED | OUTPATIENT
Start: 2021-09-15

## 2021-09-15 RX ORDER — ALBUTEROL SULFATE 90 UG/1
1-2 AEROSOL, METERED RESPIRATORY (INHALATION)
Status: CANCELLED
Start: 2021-09-15

## 2021-09-15 RX ORDER — NALOXONE HYDROCHLORIDE 0.4 MG/ML
0.2 INJECTION, SOLUTION INTRAMUSCULAR; INTRAVENOUS; SUBCUTANEOUS
Status: CANCELLED | OUTPATIENT
Start: 2021-09-15

## 2021-09-15 RX ORDER — EPINEPHRINE 1 MG/ML
0.3 INJECTION, SOLUTION INTRAMUSCULAR; SUBCUTANEOUS EVERY 5 MIN PRN
Status: CANCELLED | OUTPATIENT
Start: 2021-09-15

## 2021-09-15 RX ORDER — ALBUTEROL SULFATE 0.83 MG/ML
2.5 SOLUTION RESPIRATORY (INHALATION)
Status: CANCELLED | OUTPATIENT
Start: 2021-09-15

## 2021-09-15 RX ORDER — METHYLPREDNISOLONE SODIUM SUCCINATE 125 MG/2ML
125 INJECTION, POWDER, LYOPHILIZED, FOR SOLUTION INTRAMUSCULAR; INTRAVENOUS
Status: CANCELLED
Start: 2021-09-15

## 2021-09-15 RX ORDER — HEPARIN SODIUM,PORCINE 10 UNIT/ML
5 VIAL (ML) INTRAVENOUS
Status: CANCELLED | OUTPATIENT
Start: 2021-09-15

## 2021-09-15 RX ORDER — HEPARIN SODIUM (PORCINE) LOCK FLUSH IV SOLN 100 UNIT/ML 100 UNIT/ML
5 SOLUTION INTRAVENOUS
Status: CANCELLED | OUTPATIENT
Start: 2021-09-15

## 2021-09-15 RX ADMIN — SODIUM CHLORIDE 1000 ML: 9 INJECTION, SOLUTION INTRAVENOUS at 09:21

## 2021-09-15 NOTE — PROGRESS NOTES
Nursing Note  Marlo Vee presents [facility] for:   Chief Complaint   Patient presents with     Infusion     IVF     During [facility]'s [facility] appointment, orders from Dr. Varela were completed.  Frequency: twice weekly     Progress note:  Patient identification verified by name and date of birth.  Assessment completed.  Vitals recorded in Doc Flowsheets.  Patient was provided with education regarding medication/procedure and possible side effects.  Patient verbalized understanding.     present during visit today: Not Applicable.    Treatment Conditions: Non-applicable.    Premedications: were not ordered.    Drug Waste Record: No    Infusion length and rate:  infusion given over approximately 60 minutes    Labs: BMP drawn post IVF    Vascular access: peripheral IV placed today.    Is the next appt scheduled? 9/18  Asymptomatic COVID test completed? NA    Post Infusion Assessment:  Patient tolerated infusion without incident. Reports feeling better following IVF.     Discharge Plan:   Follow up plan of care with: ongoing infusions [facility] ordering provider as scheduled. and after visit summary declined by patient  Discharge instructions were reviewed with patient.  Patient/representative verbalized understanding of discharge instructions and all questions answered.  [facility] discharged [facility] in stable condition.    Sylvie Carrillo RN       Administrations This Visit     0.9% sodium chloride BOLUS     Admin Date  09/15/2021 Action  Started Dose  1,000 mL Route  Intravenous Administered By  Sylvie Carrillo RN                /59 (BP Location: Left arm)   Pulse 52   Temp 97.5  F (36.4  C) (Oral)   Resp 16   SpO2 100%

## 2021-09-15 NOTE — LETTER
9/15/2021         RE: Marlo Vee  [address] [facility] [place] [address]        Dear Colleague,    Thank you for referring your patient, Marlo Vee, to the [facility]AT[facility]. Please see a copy of my visit note below.    Nursing Note  Marlo Vee [facility] for:   Chief Complaint   Patient presents with     Infusion     IVF     During today's [facility] appointment, orders from Dr. Varela were completed.  Frequency: twice weekly     Progress note:  Patient identification verified by name and date of birth.  Assessment completed.  Vitals recorded in Doc Flowsheets.  Patient was provided with education regarding medication/procedure and possible side effects.  Patient verbalized understanding.     present during visit today: Not Applicable.    Treatment Conditions: Non-applicable.    Premedications: were not ordered.    Drug Waste Record: No    Infusion length and rate:  infusion given over approximately 60 minutes    Labs: BMP drawn post IVF    Vascular access: peripheral IV placed today.    Is the next appt scheduled? 9/18  Asymptomatic COVID test completed? NA    Post Infusion Assessment:  Patient tolerated infusion without incident. Reports feeling better following IVF.     Discharge Plan:   Follow up plan of care with: ongoing infusions at [facility]., ordering provider as scheduled. and after visit summary declined by patient  Discharge instructions were reviewed with patient.  Patient/representative verbalized understanding of discharge instructions and all questions answered.  Patient discharged from [facility] in stable condition.    Sylvie Vela RN       Administrations This Visit     0.9% sodium chloride BOLUS     Admin Date  09/15/2021 Action  Started Dose  1,000 mL Route  Intravenous  Administered By  Sylvie Carrillo, RN                /59 (BP Location: Left arm)   Pulse 52   Temp 97.5  F (36.4  C) (Oral)   Resp 16   SpO2 100%         Again, thank you for allowing me to participate in the care of your patient.        Sincerely,        [facility]

## 2021-09-15 NOTE — PATIENT INSTRUCTIONS
Dear Marlo Vee    Thank you for choosing [facility] ([facility]) for your IV fluid infusion.  The following information is a summary of our appointment as well as important reminders.      We look forward in seeing you on your next appointment here at [facility] ([facility]).  Please don t hesitate to call us at [phone] to reschedule any of your appointments or to speak with one of the [facility] registered nurses.  It was a pleasure taking care of you today.    Sincerely,    [facility]  [facility]  [address] [place] [address]  [id number]  Phone:  [phone]

## 2021-09-18 ENCOUNTER — INFUSION THERAPY VISIT (OUTPATIENT)
Dept: INFUSION THERAPY | Facility: CLINIC | Age: 71
End: 2021-09-18
Attending: INTERNAL MEDICINE
Payer: MEDICARE

## 2021-09-18 VITALS
HEART RATE: 58 BPM | RESPIRATION RATE: 16 BRPM | OXYGEN SATURATION: 100 % | TEMPERATURE: 97.5 F | SYSTOLIC BLOOD PRESSURE: 127 MMHG | DIASTOLIC BLOOD PRESSURE: 76 MMHG

## 2021-09-18 DIAGNOSIS — Z94.0 KIDNEY REPLACED BY TRANSPLANT: ICD-10-CM

## 2021-09-18 DIAGNOSIS — R19.7 DIARRHEA: Primary | ICD-10-CM

## 2021-09-18 LAB
ANION GAP SERPL CALCULATED.3IONS-SCNC: 6 MMOL/L (ref 3–14)
BUN SERPL-MCNC: 22 MG/DL (ref 7–30)
CALCIUM SERPL-MCNC: 7.9 MG/DL (ref 8.5–10.1)
CHLORIDE BLD-SCNC: 121 MMOL/L (ref 94–109)
CO2 SERPL-SCNC: 20 MMOL/L (ref 20–32)
CREAT SERPL-MCNC: 1.55 MG/DL (ref 0.66–1.25)
GFR SERPL CREATININE-BSD FRML MDRD: 45 ML/MIN/1.73M2
GLUCOSE BLD-MCNC: 114 MG/DL (ref 70–99)
POTASSIUM BLD-SCNC: 3.4 MMOL/L (ref 3.4–5.3)
SODIUM SERPL-SCNC: 147 MMOL/L (ref 133–144)

## 2021-09-18 PROCEDURE — 36415 COLL VENOUS BLD VENIPUNCTURE: CPT

## 2021-09-18 PROCEDURE — 96361 HYDRATE IV INFUSION ADD-ON: CPT

## 2021-09-18 PROCEDURE — 258N000003 HC RX IP 258 OP 636: Performed by: INTERNAL MEDICINE

## 2021-09-18 PROCEDURE — 96360 HYDRATION IV INFUSION INIT: CPT

## 2021-09-18 PROCEDURE — 80048 BASIC METABOLIC PNL TOTAL CA: CPT

## 2021-09-18 RX ORDER — DIPHENHYDRAMINE HYDROCHLORIDE 50 MG/ML
50 INJECTION INTRAMUSCULAR; INTRAVENOUS
Status: CANCELLED
Start: 2021-09-18

## 2021-09-18 RX ORDER — ALBUTEROL SULFATE 90 UG/1
1-2 AEROSOL, METERED RESPIRATORY (INHALATION)
Status: CANCELLED
Start: 2021-09-18

## 2021-09-18 RX ORDER — HEPARIN SODIUM,PORCINE 10 UNIT/ML
5 VIAL (ML) INTRAVENOUS
Status: CANCELLED | OUTPATIENT
Start: 2021-09-18

## 2021-09-18 RX ORDER — ALBUTEROL SULFATE 0.83 MG/ML
2.5 SOLUTION RESPIRATORY (INHALATION)
Status: CANCELLED | OUTPATIENT
Start: 2021-09-18

## 2021-09-18 RX ORDER — NALOXONE HYDROCHLORIDE 0.4 MG/ML
0.2 INJECTION, SOLUTION INTRAMUSCULAR; INTRAVENOUS; SUBCUTANEOUS
Status: CANCELLED | OUTPATIENT
Start: 2021-09-18

## 2021-09-18 RX ORDER — EPINEPHRINE 1 MG/ML
0.3 INJECTION, SOLUTION INTRAMUSCULAR; SUBCUTANEOUS EVERY 5 MIN PRN
Status: CANCELLED | OUTPATIENT
Start: 2021-09-18

## 2021-09-18 RX ORDER — METHYLPREDNISOLONE SODIUM SUCCINATE 125 MG/2ML
125 INJECTION, POWDER, LYOPHILIZED, FOR SOLUTION INTRAMUSCULAR; INTRAVENOUS
Status: CANCELLED
Start: 2021-09-18

## 2021-09-18 RX ORDER — MEPERIDINE HYDROCHLORIDE 25 MG/ML
25 INJECTION INTRAMUSCULAR; INTRAVENOUS; SUBCUTANEOUS EVERY 30 MIN PRN
Status: CANCELLED | OUTPATIENT
Start: 2021-09-18

## 2021-09-18 RX ORDER — HEPARIN SODIUM (PORCINE) LOCK FLUSH IV SOLN 100 UNIT/ML 100 UNIT/ML
5 SOLUTION INTRAVENOUS
Status: CANCELLED | OUTPATIENT
Start: 2021-09-18

## 2021-09-18 RX ADMIN — SODIUM CHLORIDE 1000 ML: 9 INJECTION, SOLUTION INTRAVENOUS at 09:25

## 2021-09-18 NOTE — LETTER
9/18/2021         RE: Marlo Vee  [address] [facility] [address]        Dear Colleague,    Thank you for referring your patient, Marlo Vee, to the [facility]. Please see a copy of my visit note below.    Nursing Note  Marlo Vee presents [facility] for:   Chief Complaint   Patient presents with     Infusion     0.9% sodium chloride BOLUS       During [facility]'s [facility] appointment, orders from Dr. Varela were completed.  Frequency: twice weekly     Progress note:  Patient identification verified by name and date of birth.  Assessment completed.  Vitals recorded in Doc Flowsheets.  Patient was provided with education regarding medication/procedure and possible side effects.  Patient verbalized understanding.     present during visit today: Not Applicable.    Treatment Conditions: Non-applicable.    Premedications: were not ordered.    Drug Waste Record: No    Infusion length and rate:  infusion given over approximately 60 minutes    Labs: BMP drawn post IVF    Vascular access: peripheral IV placed today.    Is the next appt scheduled? 9/18  Asymptomatic COVID test completed? NA    Post Infusion Assessment:  Patient tolerated infusion without incident. Reports feeling better following IVF.     Discharge Plan:   Follow up plan of care with: ongoing infusions at [facility]., ordering provider as scheduled. and after visit summary declined by patient  Discharge instructions were reviewed with patient.  Patient/representative verbalized understanding of discharge instructions and all questions answered.  Patient discharged from [facility] in stable condition.      Elisabeth High RN    Administrations This Visit     0.9% sodium chloride BOLUS     Admin Date  09/18/2021 Action  New Bag Dose  1,000  mL Route  Intravenous Administered By  Elisabeth High RN                /76 (BP Location: Left arm)   Pulse 58   Temp 97.5  F (36.4  C) (Oral)   Resp 16   SpO2 100%           Again, thank you for allowing me to participate in the care of your patient.        Sincerely,        [facility]

## 2021-09-18 NOTE — PROGRESS NOTES
Nursing Note  Marlo FLORES Chelicaden presents [facility] for:   Chief Complaint   Patient presents with     Infusion     0.9% sodium chloride BOLUS       During [facility]'s [facility] appointment, orders from Dr. Varela were completed.  Frequency: twice weekly     Progress note:  Patient identification verified by name and date of birth.  Assessment completed.  Vitals recorded in Doc Flowsheets.  Patient was provided with education regarding medication/procedure and possible side effects.  Patient verbalized understanding.     present during visit today: Not Applicable.    Treatment Conditions: Non-applicable.    Premedications: were not ordered.    Drug Waste Record: No    Infusion length and rate:  infusion given over approximately 60 minutes    Labs: BMP drawn post IVF    Vascular access: peripheral IV placed today.    Is the next appt scheduled? 9/18  Asymptomatic COVID test completed? NA    Post Infusion Assessment:  Patient tolerated infusion without incident. Reports feeling better following IVF.     Discharge Plan:   Follow up plan of care with: ongoing infusions [facility] ordering provider as scheduled. and after visit summary declined by patient  Discharge instructions were reviewed with patient.  Patient/representative verbalized understanding of discharge instructions and all questions answered.  [facility] discharged [facility] in stable condition.      Elisabeth High RN    Administrations This Visit     0.9% sodium chloride BOLUS     Admin Date  09/18/2021 Action  New Bag Dose  1,000 mL Route  Intravenous Administered By  Elisabeth High RN                /76 (BP Location: Left arm)   Pulse 58   Temp 97.5  F (36.4  C) (Oral)   Resp 16   SpO2 100%

## 2021-09-20 ENCOUNTER — TELEPHONE (OUTPATIENT)
Dept: TRANSPLANT | Facility: CLINIC | Age: 71
End: 2021-09-20

## 2021-09-20 NOTE — TELEPHONE ENCOUNTER
ISSUE  Patient with recent issues with cryptosporidium, c-diff and significant diarrhea.  Creatinine down to 1.56, baseline 1.1-1.3      OUTCOME  John reports diarrhea has continued. Stool is mainly liquid and urgent when he has to go. He reports going 4-6 times a day and sometimes overnight. He has lost ~25 lbs.  He is currently:  -Getting IVF's twice weekly  -Has switched mycophenolate to mycophenolic acid last week  -Is currently taking Nitazoxanide prescription.  -Taking Fiber supplement daily  -Eating Activia yogurt daily and will start culturelle.  -Has an appointment with internal medicine on Friday. He will request a transplant ID referral and GI referral at that time.

## 2021-09-22 ENCOUNTER — TELEPHONE (OUTPATIENT)
Dept: TRANSPLANT | Facility: CLINIC | Age: 71
End: 2021-09-22

## 2021-09-22 ENCOUNTER — INFUSION THERAPY VISIT (OUTPATIENT)
Dept: INFUSION THERAPY | Facility: CLINIC | Age: 71
End: 2021-09-22
Attending: INTERNAL MEDICINE
Payer: MEDICARE

## 2021-09-22 VITALS
TEMPERATURE: 97.6 F | OXYGEN SATURATION: 100 % | SYSTOLIC BLOOD PRESSURE: 118 MMHG | DIASTOLIC BLOOD PRESSURE: 60 MMHG | HEART RATE: 53 BPM | RESPIRATION RATE: 17 BRPM

## 2021-09-22 DIAGNOSIS — Z94.0 KIDNEY REPLACED BY TRANSPLANT: Primary | ICD-10-CM

## 2021-09-22 DIAGNOSIS — Z94.0 KIDNEY REPLACED BY TRANSPLANT: ICD-10-CM

## 2021-09-22 DIAGNOSIS — R19.7 DIARRHEA: Primary | ICD-10-CM

## 2021-09-22 DIAGNOSIS — Z48.298 AFTERCARE FOLLOWING ORGAN TRANSPLANT: ICD-10-CM

## 2021-09-22 LAB
ANION GAP SERPL CALCULATED.3IONS-SCNC: 9 MMOL/L (ref 3–14)
BUN SERPL-MCNC: 32 MG/DL (ref 7–30)
CALCIUM SERPL-MCNC: 8.6 MG/DL (ref 8.5–10.1)
CHLORIDE BLD-SCNC: 122 MMOL/L (ref 94–109)
CO2 SERPL-SCNC: 17 MMOL/L (ref 20–32)
CREAT SERPL-MCNC: 1.5 MG/DL (ref 0.66–1.25)
GFR SERPL CREATININE-BSD FRML MDRD: 47 ML/MIN/1.73M2
GLUCOSE BLD-MCNC: 103 MG/DL (ref 70–99)
POTASSIUM BLD-SCNC: 3.6 MMOL/L (ref 3.4–5.3)
SODIUM SERPL-SCNC: 148 MMOL/L (ref 133–144)

## 2021-09-22 PROCEDURE — 96360 HYDRATION IV INFUSION INIT: CPT

## 2021-09-22 PROCEDURE — 80048 BASIC METABOLIC PNL TOTAL CA: CPT

## 2021-09-22 PROCEDURE — 36415 COLL VENOUS BLD VENIPUNCTURE: CPT

## 2021-09-22 PROCEDURE — 258N000003 HC RX IP 258 OP 636: Performed by: INTERNAL MEDICINE

## 2021-09-22 RX ORDER — HEPARIN SODIUM,PORCINE 10 UNIT/ML
5 VIAL (ML) INTRAVENOUS
Status: CANCELLED | OUTPATIENT
Start: 2021-09-22

## 2021-09-22 RX ORDER — HEPARIN SODIUM (PORCINE) LOCK FLUSH IV SOLN 100 UNIT/ML 100 UNIT/ML
5 SOLUTION INTRAVENOUS
Status: CANCELLED | OUTPATIENT
Start: 2021-09-22

## 2021-09-22 RX ORDER — DIPHENHYDRAMINE HYDROCHLORIDE 50 MG/ML
50 INJECTION INTRAMUSCULAR; INTRAVENOUS
Status: CANCELLED
Start: 2021-09-22

## 2021-09-22 RX ORDER — SODIUM BICARBONATE 650 MG/1
650 TABLET ORAL 2 TIMES DAILY
Qty: 60 TABLET | Refills: 11 | Status: SHIPPED | OUTPATIENT
Start: 2021-09-22 | End: 2021-10-08

## 2021-09-22 RX ORDER — ALBUTEROL SULFATE 90 UG/1
1-2 AEROSOL, METERED RESPIRATORY (INHALATION)
Status: CANCELLED
Start: 2021-09-22

## 2021-09-22 RX ORDER — NALOXONE HYDROCHLORIDE 0.4 MG/ML
0.2 INJECTION, SOLUTION INTRAMUSCULAR; INTRAVENOUS; SUBCUTANEOUS
Status: CANCELLED | OUTPATIENT
Start: 2021-09-22

## 2021-09-22 RX ORDER — MEPERIDINE HYDROCHLORIDE 25 MG/ML
25 INJECTION INTRAMUSCULAR; INTRAVENOUS; SUBCUTANEOUS EVERY 30 MIN PRN
Status: CANCELLED | OUTPATIENT
Start: 2021-09-22

## 2021-09-22 RX ORDER — METHYLPREDNISOLONE SODIUM SUCCINATE 125 MG/2ML
125 INJECTION, POWDER, LYOPHILIZED, FOR SOLUTION INTRAMUSCULAR; INTRAVENOUS
Status: CANCELLED
Start: 2021-09-22

## 2021-09-22 RX ORDER — EPINEPHRINE 1 MG/ML
0.3 INJECTION, SOLUTION INTRAMUSCULAR; SUBCUTANEOUS EVERY 5 MIN PRN
Status: CANCELLED | OUTPATIENT
Start: 2021-09-22

## 2021-09-22 RX ORDER — ALBUTEROL SULFATE 0.83 MG/ML
2.5 SOLUTION RESPIRATORY (INHALATION)
Status: CANCELLED | OUTPATIENT
Start: 2021-09-22

## 2021-09-22 RX ADMIN — SODIUM CHLORIDE 1000 ML: 9 INJECTION, SOLUTION INTRAVENOUS at 08:42

## 2021-09-22 ASSESSMENT — PAIN SCALES - GENERAL: PAINLEVEL: NO PAIN (0)

## 2021-09-22 NOTE — TELEPHONE ENCOUNTER
ISSUE  Bicarb level 17. Currently with significant diarrhea.  Creatinine down to 1.5, improved, but still up from baseline of 1.1-1.3.      PLAN  Samuel Varela MD Harris, Kathleen, RN  Stable to slightly improved kidney function, closer to baseline.  Low bicarbonate level and recommend starting sodium bicarbonate 650 mg twice daily.       LPN Task:  Please call with above plan. Prescription sent to [facility], he can call for a delivery set-up.  Thanks!

## 2021-09-22 NOTE — PROGRESS NOTES
Nursing Note  Marlo Vee [facility] for:   Chief Complaint   Patient presents with     Infusion     1L NS     During [facility]'s [facility] appointment, orders from Dr. Varela were completed.  Frequency: twice weekly    Progress note:  Patient identification verified by name and date of birth.  Assessment completed.  Vitals recorded in Doc Flowsheets.  Patient was provided with education regarding medication/procedure and possible side effects.  Patient verbalized understanding.     present during visit today: Not Applicable.    Treatment Conditions: Non-applicable.    Premedications: were not ordered.    Drug Waste Record: No    Infusion length and rate:  infusion given over approximately 60 minutes    Labs: BMP drawn post infusion completion, per orders    Vascular access: peripheral IV placed today.    Is the next appt scheduled? yes  Asymptomatic COVID test completed? NA    Post Infusion Assessment:  Patient tolerated infusion without incident.     Discharge Plan:   Follow up plan of care with: ongoing infusions at [facility]., transplant coordinator., ordering provider as scheduled. and after visit summary declined by patient  Discharge instructions were reviewed with patient.  Patient/representative verbalized understanding of discharge instructions and all questions answered.  Patient discharged from [facility] in stable condition.    Ladan Montenegro RN    Administrations This Visit     0.9% sodium chloride BOLUS     Admin Date  09/22/2021 Action  New Bag Dose  1,000 mL Route  Intravenous Administered By  Ladan Montenegro RN                   /60   Pulse 53   Temp 97.6  F (36.4  C) (Oral)   Resp 17   SpO2 100%

## 2021-09-22 NOTE — LETTER
9/22/2021         RE: Marlo Vee  [address] [facility] [place] [address]        Dear Colleague,    Thank you for referring your patient, Marlo Vee, to the [facility]. Please see a copy of my visit note below.    Nursing Note  Marlo Vee presents today to [facility] for:   Chief Complaint   Patient presents with     Infusion     1L NS     During [facility]'s [facility] appointment, orders from Dr. Varela were completed.  Frequency: twice weekly    Progress note:  Patient identification verified by name and date of birth.  Assessment completed.  Vitals recorded in Doc Flowsheets.  Patient was provided with education regarding medication/procedure and possible side effects.  Patient verbalized understanding.     present during visit today: Not Applicable.    Treatment Conditions: Non-applicable.    Premedications: were not ordered.    Drug Waste Record: No    Infusion length and rate:  infusion given over approximately 60 minutes    Labs: BMP drawn post infusion completion, per orders    Vascular access: peripheral IV placed today.    Is the next appt scheduled? yes  Asymptomatic COVID test completed? NA    Post Infusion Assessment:  Patient tolerated infusion without incident.     Discharge Plan:   Follow up plan of care with: ongoing infusions at [facility]., transplant coordinator., ordering provider as scheduled. and after visit summary declined by patient  Discharge instructions were reviewed with patient.  Patient/representative verbalized understanding of discharge instructions and all questions answered.  Patient discharged from [facility] in stable condition.    aLdan Julian RN    Administrations This Visit     0.9% sodium chloride BOLUS     Admin Date  09/22/2021 Action  New Bag Dose  1,000 mL  Route  Intravenous Administered By  Ladan Montenegro RN                   /60   Pulse 53   Temp 97.6  F (36.4  C) (Oral)   Resp 17   SpO2 100%       Again, thank you for allowing me to participate in the care of your patient.        Sincerely,        [facility]

## 2021-09-24 ENCOUNTER — LAB (OUTPATIENT)
Dept: LAB | Facility: CLINIC | Age: 71
End: 2021-09-24

## 2021-09-24 ENCOUNTER — TELEPHONE (OUTPATIENT)
Dept: TRANSPLANT | Facility: CLINIC | Age: 71
End: 2021-09-24

## 2021-09-24 ENCOUNTER — OFFICE VISIT (OUTPATIENT)
Dept: INTERNAL MEDICINE | Facility: CLINIC | Age: 71
End: 2021-09-24
Payer: MEDICARE

## 2021-09-24 ENCOUNTER — INFUSION THERAPY VISIT (OUTPATIENT)
Dept: INFUSION THERAPY | Facility: CLINIC | Age: 71
End: 2021-09-24
Attending: INTERNAL MEDICINE
Payer: MEDICARE

## 2021-09-24 VITALS
HEIGHT: 70 IN | SYSTOLIC BLOOD PRESSURE: 114 MMHG | BODY MASS INDEX: 20.54 KG/M2 | RESPIRATION RATE: 16 BRPM | WEIGHT: 143.5 LBS | OXYGEN SATURATION: 100 % | DIASTOLIC BLOOD PRESSURE: 72 MMHG | HEART RATE: 65 BPM

## 2021-09-24 VITALS
RESPIRATION RATE: 16 BRPM | DIASTOLIC BLOOD PRESSURE: 78 MMHG | OXYGEN SATURATION: 100 % | TEMPERATURE: 98.4 F | HEART RATE: 63 BPM | SYSTOLIC BLOOD PRESSURE: 122 MMHG

## 2021-09-24 DIAGNOSIS — A07.2 CRYPTOSPORIDIAL GASTROENTERITIS (H): ICD-10-CM

## 2021-09-24 DIAGNOSIS — R19.7 DIARRHEA: Primary | ICD-10-CM

## 2021-09-24 DIAGNOSIS — Z94.0 KIDNEY REPLACED BY TRANSPLANT: ICD-10-CM

## 2021-09-24 DIAGNOSIS — R19.5 LOOSE STOOLS: ICD-10-CM

## 2021-09-24 DIAGNOSIS — A07.2 CRYPTOSPORIDIAL GASTROENTERITIS (H): Primary | ICD-10-CM

## 2021-09-24 LAB
ANION GAP SERPL CALCULATED.3IONS-SCNC: 7 MMOL/L (ref 3–14)
BUN SERPL-MCNC: 25 MG/DL (ref 7–30)
CALCIUM SERPL-MCNC: 7.9 MG/DL (ref 8.5–10.1)
CD3 CELLS # BLD: 615 CELLS/UL (ref 603–2990)
CD3 CELLS NFR BLD: 56 % (ref 49–84)
CD3+CD4+ CELLS # BLD: 165 CELLS/UL (ref 441–2156)
CD3+CD4+ CELLS NFR BLD: 15 % (ref 28–63)
CD3+CD4+ CELLS/CD3+CD8+ CLL BLD: 0.38 % (ref 1.4–2.6)
CD3+CD8+ CELLS # BLD: 434 CELLS/UL (ref 125–1312)
CD3+CD8+ CELLS NFR BLD: 40 % (ref 10–40)
CHLORIDE BLD-SCNC: 119 MMOL/L (ref 94–109)
CO2 SERPL-SCNC: 17 MMOL/L (ref 20–32)
CREAT SERPL-MCNC: 1.35 MG/DL (ref 0.66–1.25)
GFR SERPL CREATININE-BSD FRML MDRD: 53 ML/MIN/1.73M2
GLUCOSE BLD-MCNC: 95 MG/DL (ref 70–99)
POTASSIUM BLD-SCNC: 3.5 MMOL/L (ref 3.4–5.3)
SODIUM SERPL-SCNC: 143 MMOL/L (ref 133–144)
T CELL COMMENT: ABNORMAL

## 2021-09-24 PROCEDURE — 36415 COLL VENOUS BLD VENIPUNCTURE: CPT

## 2021-09-24 PROCEDURE — 96360 HYDRATION IV INFUSION INIT: CPT

## 2021-09-24 PROCEDURE — 99213 OFFICE O/P EST LOW 20 MIN: CPT | Performed by: NURSE PRACTITIONER

## 2021-09-24 PROCEDURE — 80048 BASIC METABOLIC PNL TOTAL CA: CPT

## 2021-09-24 PROCEDURE — 86359 T CELLS TOTAL COUNT: CPT

## 2021-09-24 PROCEDURE — 258N000003 HC RX IP 258 OP 636: Performed by: INTERNAL MEDICINE

## 2021-09-24 RX ORDER — NALOXONE HYDROCHLORIDE 0.4 MG/ML
0.2 INJECTION, SOLUTION INTRAMUSCULAR; INTRAVENOUS; SUBCUTANEOUS
Status: CANCELLED | OUTPATIENT
Start: 2021-09-24

## 2021-09-24 RX ORDER — METHYLPREDNISOLONE SODIUM SUCCINATE 125 MG/2ML
125 INJECTION, POWDER, LYOPHILIZED, FOR SOLUTION INTRAMUSCULAR; INTRAVENOUS
Status: CANCELLED
Start: 2021-09-24

## 2021-09-24 RX ORDER — HEPARIN SODIUM,PORCINE 10 UNIT/ML
5 VIAL (ML) INTRAVENOUS
Status: CANCELLED | OUTPATIENT
Start: 2021-09-24

## 2021-09-24 RX ORDER — EPINEPHRINE 1 MG/ML
0.3 INJECTION, SOLUTION INTRAMUSCULAR; SUBCUTANEOUS EVERY 5 MIN PRN
Status: CANCELLED | OUTPATIENT
Start: 2021-09-24

## 2021-09-24 RX ORDER — MEPERIDINE HYDROCHLORIDE 25 MG/ML
25 INJECTION INTRAMUSCULAR; INTRAVENOUS; SUBCUTANEOUS EVERY 30 MIN PRN
Status: CANCELLED | OUTPATIENT
Start: 2021-09-24

## 2021-09-24 RX ORDER — HEPARIN SODIUM (PORCINE) LOCK FLUSH IV SOLN 100 UNIT/ML 100 UNIT/ML
5 SOLUTION INTRAVENOUS
Status: CANCELLED | OUTPATIENT
Start: 2021-09-24

## 2021-09-24 RX ORDER — ALBUTEROL SULFATE 0.83 MG/ML
2.5 SOLUTION RESPIRATORY (INHALATION)
Status: CANCELLED | OUTPATIENT
Start: 2021-09-24

## 2021-09-24 RX ORDER — ALBUTEROL SULFATE 90 UG/1
1-2 AEROSOL, METERED RESPIRATORY (INHALATION)
Status: CANCELLED
Start: 2021-09-24

## 2021-09-24 RX ORDER — DIPHENHYDRAMINE HYDROCHLORIDE 50 MG/ML
50 INJECTION INTRAMUSCULAR; INTRAVENOUS
Status: CANCELLED
Start: 2021-09-24

## 2021-09-24 RX ADMIN — SODIUM CHLORIDE 1000 ML: 9 INJECTION, SOLUTION INTRAVENOUS at 08:42

## 2021-09-24 ASSESSMENT — MIFFLIN-ST. JEOR: SCORE: 1422.16

## 2021-09-24 ASSESSMENT — PAIN SCALES - GENERAL: PAINLEVEL: NO PAIN (0)

## 2021-09-24 NOTE — PROGRESS NOTES
"Chief Complaint   Patient presents with     Infusion     IV fluids     Infusion Nursing Note:  Marlo Vee presents today for IV Fluids.    Patient seen by provider today: No   present during visit today: Not Applicable.    Note: IV fluids given over 1 hour.      Intravenous Access:  Peripheral IV placed.    Treatment Conditions:  Not Applicable.      Post Infusion Assessment:  Patient tolerated infusion without incident.  Site patent and intact, free from redness, edema or discomfort.  No evidence of extravasations.  Access discontinued per protocol.       Discharge Plan:   AVS to patient via MYCHART.  Patient will return 9/28 for next appointment.   Patient discharged in stable condition accompanied by: self.  Departure Mode: Ambulatory.      Administrations This Visit     0.9% sodium chloride BOLUS     Admin Date  09/24/2021 Action  New Bag Dose  1,000 mL Route  Intravenous Administered By  Zhane Chavira RN                Vital signs:  Temp: 98.4  F (36.9  C) Temp src: Oral BP: 129/79 Pulse: 58   Resp: 18 SpO2: 100 % O2 Device: None (Room air)        Estimated body mass index is 20.66 kg/m  as calculated from the following:    Height as of 12/5/20: 1.778 m (5' 10\").    Weight as of 9/9/21: 65.3 kg (144 lb).                        "

## 2021-09-24 NOTE — NURSING NOTE
Marlo Vee is a 71 year old male patient that presents today in clinic for the following:    Chief Complaint   Patient presents with     RECHECK     Two week follow-up     Diarrhea     The patient reports diarrhea in clinic today. He notes that it has been going on for two months now.      Weight Loss     Results     The patient's allergies and medications were reviewed as noted. A set of vitals were recorded as noted without incident. The patient does not have any other questions for the provider.    Leonard Cortez, EMT at 3:57 PM on 9/24/2021

## 2021-09-24 NOTE — LETTER
"    9/24/2021         RE: Marlo Vee  [address] [facility] [place] [address]        Dear Colleague,    Thank you for referring your patient, Marlo Vee, to the [facility]. Please see a copy of my visit note below.    Chief Complaint   Patient presents with     Infusion     IV fluids     Infusion Nursing Note:  Marlo Vee presents today for IV Fluids.    Patient seen by provider today: No   present during visit today: Not Applicable.    Note: IV fluids given over 1 hour.      Intravenous Access:  Peripheral IV placed.    Treatment Conditions:  Not Applicable.      Post Infusion Assessment:  Patient tolerated infusion without incident.  Site patent and intact, free from redness, edema or discomfort.  No evidence of extravasations.  Access discontinued per protocol.       Discharge Plan:   AVS to patient via MYCHART.  Patient will return 9/28 for next appointment.   Patient discharged in stable condition accompanied by: self.  Departure Mode: Ambulatory.      Administrations This Visit     0.9% sodium chloride BOLUS     Admin Date  09/24/2021 Action  New Bag Dose  1,000 mL Route  Intravenous Administered By  Zhane Chavira RN                Vital signs:  Temp: 98.4  F (36.9  C) Temp src: Oral BP: 129/79 Pulse: 58   Resp: 18 SpO2: 100 % O2 Device: None (Room air)        Estimated body mass index is 20.66 kg/m  as calculated from the following:    Height as of 12/5/20: 1.778 m (5' 10\").    Weight as of 9/9/21: 65.3 kg (144 lb).                            Again, thank you for allowing me to participate in the care of your patient.        Sincerely,        [facility]"

## 2021-09-24 NOTE — PROGRESS NOTES
"  Assessment & Plan     Cryptosporidial gastroenteritis (H)  Loose stools have reduced in frequency, but continues to have approximately 10 diarrheal episodes per day.  He will complete the full course of nitazoxanide this weekend and recheck for Cryptosporidium immunoassay next week.  Given persistence of loose stools will refer to ID presuming positive infection persists.  If negative, will recheck for C. Difficile and refer to GI.  - Cryptosporidium/Giardia Immunoassay; Future  - Infectious Disease Referral      29 minutes spent on the date of the encounter doing chart review, history and exam, documentation and further activities per the note       Follow-up pending above studies.    KIERSTEN Almazan CNP  [facility] INTER[facility]IC[facility] [place]    Subjective   John is a 71 year old who presents for the following health issues     HPI     John is here today to follow-up on persistent loose stool over the last 2 months.  In August he tested positive for both C difficile as well as Cryptosporidium.  He completed a course of vancomycin, and has approximately 2 days left of his Nitazoxamide (14 day course).  Overall he notes that his frequency of loose stools have reduced from approximately 40 episodes per day to 10 loose stools per day.  He describes stools as mostly liquid/watery, but does have some stools that are more loose/formed.  He denies blood in the stool.  His weight is down over 30 pounds since this all began.  He has been using Metamucil to help bulk the stools as well as Activia yogurt for probiotics.  He notes that his appetite is good, but \"nothing stays in\".  He denies abdominal pain, nausea, lightheadedness/dizziness, chest pain or shortness of breath.  He has been coming in twice weekly for IV fluids to help maintain his hydration, and is pleased to see that his creatinine has slowly been improving.  He did notice that Dr. Varela had ordered some additional lab work and plans to " "follow-up with his transplant team on this early next week.      Review of Systems   Constitutional, HEENT, cardiovascular, pulmonary, gi and gu systems are negative, except as otherwise noted.      Objective    /72 (BP Location: Right arm, Patient Position: Sitting, Cuff Size: Adult Regular)   Pulse 65   Resp 16   Ht 1.778 m (5' 10\")   Wt 65.1 kg (143 lb 8 oz)   SpO2 100%   BMI 20.59 kg/m    Body mass index is 20.59 kg/m .  Physical Exam   GENERAL: healthy, alert and no distress  RESP: lungs clear to auscultation - no rales, rhonchi or wheezes  CV: regular rate and rhythm, normal S1 S2, no S3 or S4, no murmur, click or rub, no peripheral edema and peripheral pulses strong  ABDOMEN: soft, nontender, no hepatosplenomegaly, no masses and bowel sounds hyperactive  MS: no gross musculoskeletal defects noted, no edema  PSYCH: mentation appears normal, affect normal/bright                "

## 2021-09-27 NOTE — TELEPHONE ENCOUNTER
Spoke with John regarding CD4 count and PJP prophylaxis.   John states he had an appointment with his PCP on Friday. He finished his nitazoxanide and will submit a stool sample on Wednesday. He got an infectious disease referral placed.  He continues to take his pro-biotic and fiber supplement.

## 2021-09-29 ENCOUNTER — INFUSION THERAPY VISIT (OUTPATIENT)
Dept: INFUSION THERAPY | Facility: CLINIC | Age: 71
End: 2021-09-29
Attending: INTERNAL MEDICINE
Payer: MEDICARE

## 2021-09-29 VITALS
SYSTOLIC BLOOD PRESSURE: 129 MMHG | HEART RATE: 66 BPM | RESPIRATION RATE: 16 BRPM | OXYGEN SATURATION: 98 % | DIASTOLIC BLOOD PRESSURE: 76 MMHG

## 2021-09-29 DIAGNOSIS — R19.7 DIARRHEA: ICD-10-CM

## 2021-09-29 DIAGNOSIS — Z94.0 KIDNEY REPLACED BY TRANSPLANT: Primary | ICD-10-CM

## 2021-09-29 LAB
ANION GAP SERPL CALCULATED.3IONS-SCNC: 8 MMOL/L (ref 3–14)
BUN SERPL-MCNC: 29 MG/DL (ref 7–30)
CALCIUM SERPL-MCNC: 8.3 MG/DL (ref 8.5–10.1)
CHLORIDE BLD-SCNC: 117 MMOL/L (ref 94–109)
CO2 SERPL-SCNC: 19 MMOL/L (ref 20–32)
CREAT SERPL-MCNC: 1.52 MG/DL (ref 0.66–1.25)
GFR SERPL CREATININE-BSD FRML MDRD: 46 ML/MIN/1.73M2
GLUCOSE BLD-MCNC: 98 MG/DL (ref 70–99)
POTASSIUM BLD-SCNC: 3.4 MMOL/L (ref 3.4–5.3)
SODIUM SERPL-SCNC: 144 MMOL/L (ref 133–144)

## 2021-09-29 PROCEDURE — 96360 HYDRATION IV INFUSION INIT: CPT

## 2021-09-29 PROCEDURE — 36415 COLL VENOUS BLD VENIPUNCTURE: CPT

## 2021-09-29 PROCEDURE — 258N000003 HC RX IP 258 OP 636: Performed by: INTERNAL MEDICINE

## 2021-09-29 PROCEDURE — 80048 BASIC METABOLIC PNL TOTAL CA: CPT

## 2021-09-29 PROCEDURE — 87329 GIARDIA AG IA: CPT | Performed by: NURSE PRACTITIONER

## 2021-09-29 RX ORDER — HEPARIN SODIUM (PORCINE) LOCK FLUSH IV SOLN 100 UNIT/ML 100 UNIT/ML
5 SOLUTION INTRAVENOUS
Status: DISCONTINUED | OUTPATIENT
Start: 2021-09-29 | End: 2021-09-29 | Stop reason: HOSPADM

## 2021-09-29 RX ORDER — EPINEPHRINE 1 MG/ML
0.3 INJECTION, SOLUTION INTRAMUSCULAR; SUBCUTANEOUS EVERY 5 MIN PRN
Status: CANCELLED | OUTPATIENT
Start: 2021-09-29

## 2021-09-29 RX ORDER — METHYLPREDNISOLONE SODIUM SUCCINATE 125 MG/2ML
125 INJECTION, POWDER, LYOPHILIZED, FOR SOLUTION INTRAMUSCULAR; INTRAVENOUS
Status: CANCELLED
Start: 2021-09-29

## 2021-09-29 RX ORDER — HEPARIN SODIUM,PORCINE 10 UNIT/ML
5 VIAL (ML) INTRAVENOUS
Status: DISCONTINUED | OUTPATIENT
Start: 2021-09-29 | End: 2021-09-29 | Stop reason: HOSPADM

## 2021-09-29 RX ORDER — MEPERIDINE HYDROCHLORIDE 25 MG/ML
25 INJECTION INTRAMUSCULAR; INTRAVENOUS; SUBCUTANEOUS EVERY 30 MIN PRN
Status: CANCELLED | OUTPATIENT
Start: 2021-09-29

## 2021-09-29 RX ORDER — HEPARIN SODIUM,PORCINE 10 UNIT/ML
5 VIAL (ML) INTRAVENOUS
Status: CANCELLED | OUTPATIENT
Start: 2021-09-29

## 2021-09-29 RX ORDER — ALBUTEROL SULFATE 90 UG/1
1-2 AEROSOL, METERED RESPIRATORY (INHALATION)
Status: CANCELLED
Start: 2021-09-29

## 2021-09-29 RX ORDER — NALOXONE HYDROCHLORIDE 0.4 MG/ML
0.2 INJECTION, SOLUTION INTRAMUSCULAR; INTRAVENOUS; SUBCUTANEOUS
Status: CANCELLED | OUTPATIENT
Start: 2021-09-29

## 2021-09-29 RX ORDER — ALBUTEROL SULFATE 0.83 MG/ML
2.5 SOLUTION RESPIRATORY (INHALATION)
Status: CANCELLED | OUTPATIENT
Start: 2021-09-29

## 2021-09-29 RX ORDER — HEPARIN SODIUM (PORCINE) LOCK FLUSH IV SOLN 100 UNIT/ML 100 UNIT/ML
5 SOLUTION INTRAVENOUS
Status: CANCELLED | OUTPATIENT
Start: 2021-09-29

## 2021-09-29 RX ORDER — DIPHENHYDRAMINE HYDROCHLORIDE 50 MG/ML
50 INJECTION INTRAMUSCULAR; INTRAVENOUS
Status: CANCELLED
Start: 2021-09-29

## 2021-09-29 RX ADMIN — SODIUM CHLORIDE 1000 ML: 9 INJECTION, SOLUTION INTRAVENOUS at 08:18

## 2021-09-29 ASSESSMENT — PAIN SCALES - GENERAL: PAINLEVEL: NO PAIN (0)

## 2021-09-29 NOTE — PATIENT INSTRUCTIONS
Dear Marlo Vee    Thank you for choosing [facility] ([facility]) for your infusion.  The following information is a summary of our appointment as well as important reminders.      We look forward in seeing you on your next appointment here at [facility] ([facility]).  Please don t hesitate to call us at [phone] to reschedule any of your appointments or to speak with one of the [facility] registered nurses.  It was a pleasure taking care of you today.    Sincerely,    [facility]  [address] [place] [address]  [address]  Phone:  [phone]

## 2021-09-29 NOTE — PROGRESS NOTES
Infusion Nursing Note:  Marlo FLORES Mariusz presents today for IVF.    Patient seen by provider today: No   present during visit today: Not Applicable.    Note:   -1 L of NS infused over 1 hr  -Labs drawn post infusion    Intravenous Access:  Labs drawn without difficulty.  Peripheral IV placed.    Treatment Conditions:  Not Applicable.    Post Infusion Assessment:  Patient tolerated infusion without incident.  Blood return noted pre and post infusion.  Site patent and intact, free from redness, edema or discomfort.  No evidence of extravasations.  Access discontinued per protocol.     Discharge Plan:   AVS to patient via MYCHART.  Patient will return 0/1/21 for next appointment.   Patient discharged in stable condition accompanied by: self.  Departure Mode: Ambulatory.    Dahiana Fam RN    /76   Pulse 66   Resp 16   SpO2 98%      Administrations This Visit     0.9% sodium chloride BOLUS     Admin Date  09/29/2021 Action  New Bag Dose  1,000 mL Rate  999 mL/hr Route  Intravenous Administered By  Dahiana Fam, RN

## 2021-09-29 NOTE — LETTER
9/29/2021         RE: Marlo Vee  [address] [facility] [address]        Dear Colleague,    Thank you for referring your patient, Marlo Vee, to the [facility]. Please see a copy of my visit note below.    Infusion Nursing Note:  Marlo Vee presents today for IVF.    Patient seen by provider today: No   present during visit today: Not Applicable.    Note:   -1 L of NS infused over 1 hr  -Labs drawn post infusion    Intravenous Access:  Labs drawn without difficulty.  Peripheral IV placed.    Treatment Conditions:  Not Applicable.    Post Infusion Assessment:  Patient tolerated infusion without incident.  Blood return noted pre and post infusion.  Site patent and intact, free from redness, edema or discomfort.  No evidence of extravasations.  Access discontinued per protocol.     Discharge Plan:   AVS to patient via MYCHART.  Patient will return 0/1/21 for next appointment.   Patient discharged in stable condition accompanied by: self.  Departure Mode: Ambulatory.    Dahiana Fam, RN    /76   Pulse 66   Resp 16   SpO2 98%      Administrations This Visit     0.9% sodium chloride BOLUS     Admin Date  09/29/2021 Action  New Bag Dose  1,000 mL Rate  999 mL/hr Route  Intravenous Administered By  Dahiana Fam, RN                      Again, thank you for allowing me to participate in the care of your patient.        Sincerely,        [facility]

## 2021-09-30 LAB
C PARVUM AG STL QL IA: NEGATIVE
G LAMBLIA AG STL QL IA: NEGATIVE

## 2021-10-01 ENCOUNTER — INFUSION THERAPY VISIT (OUTPATIENT)
Dept: ONCOLOGY | Facility: CLINIC | Age: 71
End: 2021-10-01
Attending: INTERNAL MEDICINE
Payer: MEDICARE

## 2021-10-01 VITALS
TEMPERATURE: 97.7 F | DIASTOLIC BLOOD PRESSURE: 78 MMHG | RESPIRATION RATE: 16 BRPM | HEART RATE: 62 BPM | OXYGEN SATURATION: 99 % | SYSTOLIC BLOOD PRESSURE: 127 MMHG

## 2021-10-01 DIAGNOSIS — R19.7 DIARRHEA: Primary | ICD-10-CM

## 2021-10-01 DIAGNOSIS — Z94.0 KIDNEY REPLACED BY TRANSPLANT: ICD-10-CM

## 2021-10-01 LAB
ANION GAP SERPL CALCULATED.3IONS-SCNC: 8 MMOL/L (ref 3–14)
BUN SERPL-MCNC: 27 MG/DL (ref 7–30)
CALCIUM SERPL-MCNC: 8 MG/DL (ref 8.5–10.1)
CHLORIDE BLD-SCNC: 117 MMOL/L (ref 94–109)
CO2 SERPL-SCNC: 20 MMOL/L (ref 20–32)
CREAT SERPL-MCNC: 1.43 MG/DL (ref 0.66–1.25)
GFR SERPL CREATININE-BSD FRML MDRD: 50 ML/MIN/1.73M2
GLUCOSE BLD-MCNC: 91 MG/DL (ref 70–99)
POTASSIUM BLD-SCNC: 2.9 MMOL/L (ref 3.4–5.3)
SODIUM SERPL-SCNC: 145 MMOL/L (ref 133–144)

## 2021-10-01 PROCEDURE — 258N000003 HC RX IP 258 OP 636: Performed by: INTERNAL MEDICINE

## 2021-10-01 PROCEDURE — 96360 HYDRATION IV INFUSION INIT: CPT

## 2021-10-01 PROCEDURE — 36415 COLL VENOUS BLD VENIPUNCTURE: CPT

## 2021-10-01 PROCEDURE — 80048 BASIC METABOLIC PNL TOTAL CA: CPT

## 2021-10-01 RX ORDER — METHYLPREDNISOLONE SODIUM SUCCINATE 125 MG/2ML
125 INJECTION, POWDER, LYOPHILIZED, FOR SOLUTION INTRAMUSCULAR; INTRAVENOUS
Status: CANCELLED
Start: 2021-10-01

## 2021-10-01 RX ORDER — DIPHENHYDRAMINE HYDROCHLORIDE 50 MG/ML
50 INJECTION INTRAMUSCULAR; INTRAVENOUS
Status: CANCELLED
Start: 2021-10-01

## 2021-10-01 RX ORDER — EPINEPHRINE 1 MG/ML
0.3 INJECTION, SOLUTION INTRAMUSCULAR; SUBCUTANEOUS EVERY 5 MIN PRN
Status: CANCELLED | OUTPATIENT
Start: 2021-10-01

## 2021-10-01 RX ORDER — ALBUTEROL SULFATE 90 UG/1
1-2 AEROSOL, METERED RESPIRATORY (INHALATION)
Status: CANCELLED
Start: 2021-10-01

## 2021-10-01 RX ORDER — NALOXONE HYDROCHLORIDE 0.4 MG/ML
0.2 INJECTION, SOLUTION INTRAMUSCULAR; INTRAVENOUS; SUBCUTANEOUS
Status: CANCELLED | OUTPATIENT
Start: 2021-10-01

## 2021-10-01 RX ORDER — HEPARIN SODIUM,PORCINE 10 UNIT/ML
5 VIAL (ML) INTRAVENOUS
Status: CANCELLED | OUTPATIENT
Start: 2021-10-01

## 2021-10-01 RX ORDER — MEPERIDINE HYDROCHLORIDE 25 MG/ML
25 INJECTION INTRAMUSCULAR; INTRAVENOUS; SUBCUTANEOUS EVERY 30 MIN PRN
Status: CANCELLED | OUTPATIENT
Start: 2021-10-01

## 2021-10-01 RX ORDER — HEPARIN SODIUM (PORCINE) LOCK FLUSH IV SOLN 100 UNIT/ML 100 UNIT/ML
5 SOLUTION INTRAVENOUS
Status: CANCELLED | OUTPATIENT
Start: 2021-10-01

## 2021-10-01 RX ORDER — ALBUTEROL SULFATE 0.83 MG/ML
2.5 SOLUTION RESPIRATORY (INHALATION)
Status: CANCELLED | OUTPATIENT
Start: 2021-10-01

## 2021-10-01 RX ADMIN — SODIUM CHLORIDE 1000 ML: 9 INJECTION, SOLUTION INTRAVENOUS at 09:16

## 2021-10-01 ASSESSMENT — PAIN SCALES - GENERAL: PAINLEVEL: NO PAIN (0)

## 2021-10-01 NOTE — PATIENT INSTRUCTIONS
[facility] Triage and after hours / weekends / holidays:  [phone]    Please call the triage or after hours line if you experience a temperature greater than or equal to 100.5, shaking chills, have uncontrolled nausea, vomiting and/or diarrhea, dizziness, shortness of breath, chest pain, bleeding, unexplained bruising, or if you have any other new/concerning symptoms, questions or concerns.      If you are having any concerning symptoms or wish to speak to a provider before your next infusion visit, please call your care coordinator or triage to notify them so we can adequately serve you.     If you need a refill on a narcotic prescription or other medication, please call before your infusion appointment.

## 2021-10-01 NOTE — PROGRESS NOTES
Infusion Nursing Note:  Marlo FLORES Mariusz presents today for IVF.    Patient seen by provider today: No   present during visit today: Not Applicable.    Note:   Patient reports diarrhea is improving. It is less watery, and he has fewer stools per day. He was able to sleep through the night last night without needing to get up to the bathroom. He denies lightheadedness, dizziness, and pain.    Intravenous Access:  Labs drawn without difficulty after infusion.  Peripheral IV placed.    Treatment Conditions:  Not Applicable.    Potassium 2.9. Sally Dumont CNP paged.    Post Infusion Assessment:  Patient tolerated infusion without incident.  Blood return noted pre and post infusion.  Site patent and intact, free from redness, edema or discomfort.  No evidence of extravasations.  Access discontinued per protocol.       Discharge Plan:   Patient declined prescription refills.  Discharge instructions reviewed with: Patient.  Patient and/or family verbalized understanding of discharge instructions and all questions answered.  AVS to patient via [employer].  Patient due to return 10/6 for next infusion appointment, scheduling working on, patient aware.   Patient discharged in stable condition accompanied by: self.  Departure Mode: Ambulatory.      Dixie Hein RN

## 2021-10-02 ENCOUNTER — TELEPHONE (OUTPATIENT)
Dept: TRANSPLANT | Facility: CLINIC | Age: 71
End: 2021-10-02

## 2021-10-02 DIAGNOSIS — Z94.0 KIDNEY TRANSPLANTED: ICD-10-CM

## 2021-10-02 DIAGNOSIS — Z48.298 AFTERCARE FOLLOWING ORGAN TRANSPLANT: ICD-10-CM

## 2021-10-02 DIAGNOSIS — E87.6 HYPOKALEMIA: Primary | ICD-10-CM

## 2021-10-02 RX ORDER — POTASSIUM CHLORIDE 1500 MG/1
20 TABLET, EXTENDED RELEASE ORAL DAILY
Qty: 30 TABLET | Refills: 11 | Status: SHIPPED | OUTPATIENT
Start: 2021-10-02 | End: 2021-10-27

## 2021-10-02 RX ORDER — POTASSIUM CHLORIDE 1500 MG/1
20 TABLET, EXTENDED RELEASE ORAL DAILY
Qty: 30 TABLET | Refills: 11 | Status: SHIPPED | OUTPATIENT
Start: 2021-10-02 | End: 2021-10-02

## 2021-10-02 NOTE — TELEPHONE ENCOUNTER
Potassium 2.9  (10/1/21Not on supplement.  No notes stating potassium was given.    PLAN:  Call Marlo Vee and confirm was NOT given potassium or started on supplement.  If confirmed, start potassium Chloride 20 mEq BID x 2 days then daily.  Complete labs on Monday 10/4/21.    OUTCOME:  Left VM on mobile and home phone.  Left VM with wife's phone.      ADDENDUM:  Paged to call back patient. Called John 4x but did not . Left VM.  [employer] message sent as well.    ADDENDUM #2:  Paged to call back with new number [phone]  Called back JOHN on work phone.  States his phone is not working he is mot getting calls but his wife's phone is ok.  Discussed plan, verbalized understanding.  States he is leaving this afternoon after work to go to his cabin in [place].  He will be completing albs at [facility], address provided.  He will be picking up his prescription at  Pharmacy in [place] this afternoon, address provided.    [facility]   Phone:  [phone]   Fax:  [phone]

## 2021-10-02 NOTE — LETTER
PHYSICIAN ORDERS    DATE & TIME ISSUED: 2021 3:05 PM  PATIENT NAME: Marlo Vee   : 1950     [facility] MR# [if applicable]: [id number]     DIAGNOSIS / ICD - 10 CODES    Kidney Transplanted (Z94.0)    After Care Following Organ Transplant (Z48.298)    Long Term Use of Medication (Z79.899)    Immunosuppressed Status (Z92.25)    Complications Kidney Transplant (T86.10)    Hypokalemia (E87.6)      Please check on Monday 10/4/21:    Basic metabolic panel      Patient should release information to the [facility].   Please fax results to the Transplant Center at [phone].  Any questions please call [phone].        .

## 2021-10-06 ENCOUNTER — INFUSION THERAPY VISIT (OUTPATIENT)
Dept: INFUSION THERAPY | Facility: CLINIC | Age: 71
End: 2021-10-06
Attending: INTERNAL MEDICINE
Payer: MEDICARE

## 2021-10-06 VITALS
OXYGEN SATURATION: 97 % | SYSTOLIC BLOOD PRESSURE: 99 MMHG | TEMPERATURE: 97.6 F | RESPIRATION RATE: 16 BRPM | DIASTOLIC BLOOD PRESSURE: 64 MMHG | HEART RATE: 68 BPM

## 2021-10-06 DIAGNOSIS — R19.7 DIARRHEA: Primary | ICD-10-CM

## 2021-10-06 DIAGNOSIS — Z94.0 KIDNEY REPLACED BY TRANSPLANT: ICD-10-CM

## 2021-10-06 LAB
ANION GAP SERPL CALCULATED.3IONS-SCNC: 6 MMOL/L (ref 3–14)
BUN SERPL-MCNC: 33 MG/DL (ref 7–30)
CALCIUM SERPL-MCNC: 8.5 MG/DL (ref 8.5–10.1)
CHLORIDE BLD-SCNC: 117 MMOL/L (ref 94–109)
CO2 SERPL-SCNC: 18 MMOL/L (ref 20–32)
CREAT SERPL-MCNC: 1.73 MG/DL (ref 0.66–1.25)
GFR SERPL CREATININE-BSD FRML MDRD: 39 ML/MIN/1.73M2
GLUCOSE BLD-MCNC: 89 MG/DL (ref 70–99)
POTASSIUM BLD-SCNC: 3.8 MMOL/L (ref 3.4–5.3)
SODIUM SERPL-SCNC: 141 MMOL/L (ref 133–144)

## 2021-10-06 PROCEDURE — 258N000003 HC RX IP 258 OP 636: Performed by: INTERNAL MEDICINE

## 2021-10-06 PROCEDURE — 36415 COLL VENOUS BLD VENIPUNCTURE: CPT

## 2021-10-06 PROCEDURE — 96360 HYDRATION IV INFUSION INIT: CPT

## 2021-10-06 PROCEDURE — 80048 BASIC METABOLIC PNL TOTAL CA: CPT

## 2021-10-06 RX ORDER — HEPARIN SODIUM (PORCINE) LOCK FLUSH IV SOLN 100 UNIT/ML 100 UNIT/ML
5 SOLUTION INTRAVENOUS
Status: CANCELLED | OUTPATIENT
Start: 2021-10-06

## 2021-10-06 RX ORDER — MEPERIDINE HYDROCHLORIDE 25 MG/ML
25 INJECTION INTRAMUSCULAR; INTRAVENOUS; SUBCUTANEOUS EVERY 30 MIN PRN
Status: CANCELLED | OUTPATIENT
Start: 2021-10-06

## 2021-10-06 RX ORDER — HEPARIN SODIUM,PORCINE 10 UNIT/ML
5 VIAL (ML) INTRAVENOUS
Status: CANCELLED | OUTPATIENT
Start: 2021-10-06

## 2021-10-06 RX ORDER — DIPHENHYDRAMINE HYDROCHLORIDE 50 MG/ML
50 INJECTION INTRAMUSCULAR; INTRAVENOUS
Status: CANCELLED
Start: 2021-10-06

## 2021-10-06 RX ORDER — ALBUTEROL SULFATE 0.83 MG/ML
2.5 SOLUTION RESPIRATORY (INHALATION)
Status: CANCELLED | OUTPATIENT
Start: 2021-10-06

## 2021-10-06 RX ORDER — NALOXONE HYDROCHLORIDE 0.4 MG/ML
0.2 INJECTION, SOLUTION INTRAMUSCULAR; INTRAVENOUS; SUBCUTANEOUS
Status: CANCELLED | OUTPATIENT
Start: 2021-10-06

## 2021-10-06 RX ORDER — ALBUTEROL SULFATE 90 UG/1
1-2 AEROSOL, METERED RESPIRATORY (INHALATION)
Status: CANCELLED
Start: 2021-10-06

## 2021-10-06 RX ORDER — METHYLPREDNISOLONE SODIUM SUCCINATE 125 MG/2ML
125 INJECTION, POWDER, LYOPHILIZED, FOR SOLUTION INTRAMUSCULAR; INTRAVENOUS
Status: CANCELLED
Start: 2021-10-06

## 2021-10-06 RX ORDER — EPINEPHRINE 1 MG/ML
0.3 INJECTION, SOLUTION INTRAMUSCULAR; SUBCUTANEOUS EVERY 5 MIN PRN
Status: CANCELLED | OUTPATIENT
Start: 2021-10-06

## 2021-10-06 RX ADMIN — SODIUM CHLORIDE 1000 ML: 9 INJECTION, SOLUTION INTRAVENOUS at 16:17

## 2021-10-06 NOTE — LETTER
10/6/2021         RE: Marlo Vee  [address] [facility] [place] [address]        Dear Colleague,    Thank you for referring your patient, Marlo Vee, to the [facility]. Please see a copy of my visit note below.    Infusion Nursing Note:  Marlo Vee presents today for IVF.    Patient seen by provider today: No   present during visit today: Not Applicable.    Note: infused over approximately 1 hour at 999ml/hr.      Intravenous Access:  Peripheral IV placed.    Treatment Conditions:  Labs drawn post infusion per orders.      Post Infusion Assessment:  Patient tolerated infusion without incident.  Blood return noted pre and post infusion.  Site patent and intact, free from redness, edema or discomfort.  No evidence of extravasations.  Access discontinued per protocol.       Discharge Plan:   Discharge instructions reviewed with: Patient.  Patient and/or family verbalized understanding of discharge instructions and all questions answered.  AVS to patient via [employer].  Patient will return 10/8/21 for next appointment.   Patient discharged in stable condition accompanied by: self.  Departure Mode: Ambulatory.    Administrations This Visit     0.9% sodium chloride BOLUS     Admin Date  10/06/2021 Action  New Bag Dose  1,000 mL Route  Intravenous Administered By  Yolanda Connell, RN                Yolanda Connell, HEATHER                        Again, thank you for allowing me to participate in the care of your patient.        Sincerely,        [facility]

## 2021-10-06 NOTE — PATIENT INSTRUCTIONS
Dear Marlo Vee    Thank you for choosing [facility] ([facility]) for your infusion.  The following information is a summary of our appointment as well as important reminders.      We look forward in seeing you on your next appointment here at [facility] ([facility]).  Please don t hesitate to call us at [phone] to reschedule any of your appointments or to speak with one of the [facility] registered nurses.  It was a pleasure taking care of you today.    Sincerely,    [facility]  [address] [place] [address]  [address]  Phone:  [phone]

## 2021-10-06 NOTE — PROGRESS NOTES
Infusion Nursing Note:  Marlo FLORES Mariusz presents today for IVF.    Patient seen by provider today: No   present during visit today: Not Applicable.    Note: infused over approximately 1 hour at 999ml/hr.      Intravenous Access:  Peripheral IV placed.    Treatment Conditions:  Labs drawn post infusion per orders.      Post Infusion Assessment:  Patient tolerated infusion without incident.  Blood return noted pre and post infusion.  Site patent and intact, free from redness, edema or discomfort.  No evidence of extravasations.  Access discontinued per protocol.       Discharge Plan:   Discharge instructions reviewed with: Patient.  Patient and/or family verbalized understanding of discharge instructions and all questions answered.  AVS to patient via [employer].  Patient will return 10/8/21 for next appointment.   Patient discharged in stable condition accompanied by: self.  Departure Mode: Ambulatory.    Administrations This Visit     0.9% sodium chloride BOLUS     Admin Date  10/06/2021 Action  New Bag Dose  1,000 mL Route  Intravenous Administered By  Yolanda Connell, RN                Yolanda Connell, HEATHER

## 2021-10-07 ENCOUNTER — TELEPHONE (OUTPATIENT)
Dept: TRANSPLANT | Facility: CLINIC | Age: 71
End: 2021-10-07

## 2021-10-07 DIAGNOSIS — Z48.298 AFTERCARE FOLLOWING ORGAN TRANSPLANT: ICD-10-CM

## 2021-10-07 DIAGNOSIS — T86.10 COMPLICATION OF KIDNEY TRANSPLANT: ICD-10-CM

## 2021-10-07 DIAGNOSIS — R19.7 DIARRHEA: ICD-10-CM

## 2021-10-07 DIAGNOSIS — R79.89 ELEVATED SERUM CREATININE: Primary | ICD-10-CM

## 2021-10-07 DIAGNOSIS — Z94.0 KIDNEY REPLACED BY TRANSPLANT: ICD-10-CM

## 2021-10-07 NOTE — TELEPHONE ENCOUNTER
Patient Call: General  Route to LPN    Reason for call: John called to see if Onelia was in office, writer informed patient that Onelia is out until 10/11    Call back needed? Yes    Return Call Needed  Same as documented in contacts section  When to return call?: Greater than one day: Route standard priority. Patient will wait for Onelia to come back

## 2021-10-07 NOTE — TELEPHONE ENCOUNTER
Stable creatinine at ~ 1.4-1.7 lately after previous acute kidney injury episode, but this is above previous baseline closer to 1.1-1.3.  Low risk of acute rejection, but due to persistently elevated creatinine, would consider a kidney transplant biopsy to rule out acute rejection.  Also, continued low serum bicarbonate level and recommend increasing sodium bicarbonate supplement to 1300 mg twice daily.   Written by Samuel Varela MD on 10/7/2021 10:21 AM CDT  Seen by patient John FLORES Mariusz on 10/7/2021  2:06 PM CDT    Spoke with John regarding recommendations. He states his diarrhea is still present, although he's now able to sleep through the night. He knows he is dehydrated but receiving twice weekly IVF has helped. Weight is down. No edema or shortness of breath.     Agreeable to the following plan:   -increase IVF to 3x/week x1-2 weeks  -repeat labs  -if creatinine remains elevated, proceed with kidney transplant biopsy on 10/19 or 10/20     Also wants to know if he should continue to hold bactrim.   Left VM for IR to schedule.

## 2021-10-08 ENCOUNTER — INFUSION THERAPY VISIT (OUTPATIENT)
Dept: INFUSION THERAPY | Facility: CLINIC | Age: 71
End: 2021-10-08
Attending: INTERNAL MEDICINE
Payer: MEDICARE

## 2021-10-08 VITALS
RESPIRATION RATE: 16 BRPM | SYSTOLIC BLOOD PRESSURE: 133 MMHG | OXYGEN SATURATION: 100 % | DIASTOLIC BLOOD PRESSURE: 77 MMHG | HEART RATE: 72 BPM | TEMPERATURE: 97.8 F

## 2021-10-08 DIAGNOSIS — Z11.59 ENCOUNTER FOR SCREENING FOR OTHER VIRAL DISEASES: ICD-10-CM

## 2021-10-08 DIAGNOSIS — R19.7 DIARRHEA: Primary | ICD-10-CM

## 2021-10-08 DIAGNOSIS — Z94.0 KIDNEY REPLACED BY TRANSPLANT: ICD-10-CM

## 2021-10-08 LAB
ANION GAP SERPL CALCULATED.3IONS-SCNC: 3 MMOL/L (ref 3–14)
BUN SERPL-MCNC: 26 MG/DL (ref 7–30)
CALCIUM SERPL-MCNC: 8.2 MG/DL (ref 8.5–10.1)
CHLORIDE BLD-SCNC: 118 MMOL/L (ref 94–109)
CO2 SERPL-SCNC: 21 MMOL/L (ref 20–32)
CREAT SERPL-MCNC: 1.48 MG/DL (ref 0.66–1.25)
GFR SERPL CREATININE-BSD FRML MDRD: 48 ML/MIN/1.73M2
GLUCOSE BLD-MCNC: 87 MG/DL (ref 70–99)
POTASSIUM BLD-SCNC: 4.1 MMOL/L (ref 3.4–5.3)
SODIUM SERPL-SCNC: 142 MMOL/L (ref 133–144)

## 2021-10-08 PROCEDURE — 36415 COLL VENOUS BLD VENIPUNCTURE: CPT

## 2021-10-08 PROCEDURE — 96360 HYDRATION IV INFUSION INIT: CPT

## 2021-10-08 PROCEDURE — 80048 BASIC METABOLIC PNL TOTAL CA: CPT

## 2021-10-08 PROCEDURE — 258N000003 HC RX IP 258 OP 636: Performed by: INTERNAL MEDICINE

## 2021-10-08 RX ORDER — MEPERIDINE HYDROCHLORIDE 25 MG/ML
25 INJECTION INTRAMUSCULAR; INTRAVENOUS; SUBCUTANEOUS EVERY 30 MIN PRN
Status: CANCELLED | OUTPATIENT
Start: 2021-10-08

## 2021-10-08 RX ORDER — ALBUTEROL SULFATE 0.83 MG/ML
2.5 SOLUTION RESPIRATORY (INHALATION)
Status: CANCELLED | OUTPATIENT
Start: 2021-10-08

## 2021-10-08 RX ORDER — HEPARIN SODIUM,PORCINE 10 UNIT/ML
5 VIAL (ML) INTRAVENOUS
Status: CANCELLED | OUTPATIENT
Start: 2021-10-08

## 2021-10-08 RX ORDER — ALBUTEROL SULFATE 90 UG/1
1-2 AEROSOL, METERED RESPIRATORY (INHALATION)
Status: CANCELLED
Start: 2021-10-08

## 2021-10-08 RX ORDER — DIPHENHYDRAMINE HYDROCHLORIDE 50 MG/ML
50 INJECTION INTRAMUSCULAR; INTRAVENOUS
Status: CANCELLED
Start: 2021-10-08

## 2021-10-08 RX ORDER — METHYLPREDNISOLONE SODIUM SUCCINATE 125 MG/2ML
125 INJECTION, POWDER, LYOPHILIZED, FOR SOLUTION INTRAMUSCULAR; INTRAVENOUS
Status: CANCELLED
Start: 2021-10-08

## 2021-10-08 RX ORDER — EPINEPHRINE 1 MG/ML
0.3 INJECTION, SOLUTION INTRAMUSCULAR; SUBCUTANEOUS EVERY 5 MIN PRN
Status: CANCELLED | OUTPATIENT
Start: 2021-10-08

## 2021-10-08 RX ORDER — SODIUM BICARBONATE 650 MG/1
1300 TABLET ORAL 2 TIMES DAILY
Qty: 120 TABLET | Refills: 11 | Status: SHIPPED | OUTPATIENT
Start: 2021-10-08 | End: 2021-10-15

## 2021-10-08 RX ORDER — NALOXONE HYDROCHLORIDE 0.4 MG/ML
0.2 INJECTION, SOLUTION INTRAMUSCULAR; INTRAVENOUS; SUBCUTANEOUS
Status: CANCELLED | OUTPATIENT
Start: 2021-10-08

## 2021-10-08 RX ORDER — HEPARIN SODIUM (PORCINE) LOCK FLUSH IV SOLN 100 UNIT/ML 100 UNIT/ML
5 SOLUTION INTRAVENOUS
Status: CANCELLED | OUTPATIENT
Start: 2021-10-08

## 2021-10-08 RX ADMIN — SODIUM CHLORIDE 1000 ML: 9 INJECTION, SOLUTION INTRAVENOUS at 15:17

## 2021-10-08 NOTE — PROGRESS NOTES
Nursing Note  Marlo Vee [facility] for:   Chief Complaint   Patient presents with     Infusion     IV Fluids     During [facility]'s [facility] appointment, orders from Dr LONNY Varela were completed.  Frequency: 3 times weekly    Progress note:  Patient identification verified by name and date of birth.  Assessment completed.  Vitals recorded in Doc Flowsheets.  Patient was provided with education regarding medication/procedure and possible side effects.  Patient verbalized understanding.     present during visit today: Not Applicable.    Treatment Conditions: Non-applicable.    Premedications: were not ordered.    Drug Waste Record: No    Infusion length and rate:  infusion given over approximately 1 hours    Labs: were drawn per orders post infusion.    Vascular access: peripheral IV placed today.    Is the next appt scheduled? Message sent to .  Asymptomatic COVID test completed? Patient declined.     Post Infusion Assessment:  Patient tolerated infusion without incident.  Site patent and intact, free from redness, edema or discomfort.  No evidence of extravasations.   Access discontinued per protocol.    Discharge Plan:   Follow up plan of care with: ongoing infusions at [facility].  Discharge instructions were reviewed with patient.  Patient/representative verbalized understanding of discharge instructions and all questions answered.  Patient discharged from [facility] in stable condition.  Patient declined AVS.    Stephanie Hull RN    Administrations This Visit     0.9% sodium chloride BOLUS     Admin Date  10/08/2021 Action  New Bag Dose  1,000 mL Route  Intravenous Administered By  Stephanie Hull RN                /85 (BP Location: Left arm)   Pulse 74   Temp 97.8  F (36.6  C) (Oral)   Resp 16   SpO2 100%

## 2021-10-08 NOTE — TELEPHONE ENCOUNTER
Transplant Coordinator Renal Biopsy Communication    Call placed to Marlo Vee to discuss indication for kidney transplant biopsy per Dr. Varela.     Indication for transplant renal biopsy: elevated creatinine   Laterality: right  Date and time of biopsy: 10/19/2021, check in at 8am    Date and time of covid swab: to be scheduled by IR.     Marlo Vee's medication list was reviewed.   Anticoagulant: aspirin   Ibuprofen: No.  Fish Oil:  No.  Medications held: ASA held morning of biopsy.     Recent blood pressure readings are WNL or not applicable. Instructed to take medication, especially blood pressure medications, before arriving.    Procedure expectations and duration of stay discussed. Expressed pt can expect a phone call from hospital IR team to confirm biopsy date/time/location/directions/review of medications.   Patient verbalized understanding they will need to arrive by 8am on 10/19/21 and plan to stay 4-5 hours post biopsy for monitoring. Discussed need for  if patient chooses to receive conscious sedation. Pt has no additional questions at this time. Transplant Office phone number given to pt for future questions.

## 2021-10-08 NOTE — LETTER
10/8/2021         RE: Marlo Vee  [address] [place] [facility] [place] [address]        Dear Colleague,    Thank you for referring your patient, Marlo Vee, to the [facility]. Please see a copy of my visit note below.    Nursing Note  Marlo Vee [facility] for:   Chief Complaint   Patient presents with     Infusion     IV Fluids     During today's [facility] appointment, orders from Dr LONNY Varela were completed.  Frequency: 3 times weekly    Progress note:  Patient identification verified by name and date of birth.  Assessment completed.  Vitals recorded in Doc Flowsheets.  Patient was provided with education regarding medication/procedure and possible side effects.  Patient verbalized understanding.     present during visit today: Not Applicable.    Treatment Conditions: Non-applicable.    Premedications: were not ordered.    Drug Waste Record: No    Infusion length and rate:  infusion given over approximately 1 hours    Labs: were drawn per orders post infusion.    Vascular access: peripheral IV placed today.    Is the next appt scheduled? Message sent to .  Asymptomatic COVID test completed? Patient declined.     Post Infusion Assessment:  Patient tolerated infusion without incident.  Site patent and intact, free from redness, edema or discomfort.  No evidence of extravasations.   Access discontinued per protocol.    Discharge Plan:   Follow up plan of care with: ongoing infusions at [facility].  Discharge instructions were reviewed with patient.  Patient/representative verbalized understanding of discharge instructions and all questions answered.  Patient discharged from [facility] in stable condition.  Patient declined AVS.    Stephanie Hull RN    Administrations This Visit     0.9% sodium  chloride BOLUS     Admin Date  10/08/2021 Action  New Bag Dose  1,000 mL Route  Intravenous Administered By  Stephanie Hull RN                /85 (BP Location: Left arm)   Pulse 74   Temp 97.8  F (36.6  C) (Oral)   Resp 16   SpO2 100%           Again, thank you for allowing me to participate in the care of your patient.        Sincerely,        [facility]

## 2021-10-11 ENCOUNTER — LAB (OUTPATIENT)
Dept: LAB | Facility: CLINIC | Age: 71
End: 2021-10-11

## 2021-10-11 ENCOUNTER — TELEPHONE (OUTPATIENT)
Dept: TRANSPLANT | Facility: CLINIC | Age: 71
End: 2021-10-11

## 2021-10-11 ENCOUNTER — INFUSION THERAPY VISIT (OUTPATIENT)
Dept: TRANSPLANT | Facility: CLINIC | Age: 71
End: 2021-10-11
Attending: INTERNAL MEDICINE
Payer: MEDICARE

## 2021-10-11 VITALS
TEMPERATURE: 97.6 F | DIASTOLIC BLOOD PRESSURE: 76 MMHG | HEART RATE: 55 BPM | OXYGEN SATURATION: 95 % | RESPIRATION RATE: 16 BRPM | SYSTOLIC BLOOD PRESSURE: 110 MMHG

## 2021-10-11 DIAGNOSIS — R19.7 DIARRHEA: Primary | ICD-10-CM

## 2021-10-11 DIAGNOSIS — Z94.0 KIDNEY REPLACED BY TRANSPLANT: ICD-10-CM

## 2021-10-11 DIAGNOSIS — D84.9 IMMUNOSUPPRESSION (H): ICD-10-CM

## 2021-10-11 DIAGNOSIS — Z48.298 AFTERCARE FOLLOWING ORGAN TRANSPLANT: ICD-10-CM

## 2021-10-11 DIAGNOSIS — R19.7 DIARRHEA: ICD-10-CM

## 2021-10-11 LAB
ANION GAP SERPL CALCULATED.3IONS-SCNC: 7 MMOL/L (ref 3–14)
BUN SERPL-MCNC: 27 MG/DL (ref 7–30)
CALCIUM SERPL-MCNC: 8.7 MG/DL (ref 8.5–10.1)
CHLORIDE BLD-SCNC: 115 MMOL/L (ref 94–109)
CO2 SERPL-SCNC: 21 MMOL/L (ref 20–32)
CREAT SERPL-MCNC: 1.68 MG/DL (ref 0.66–1.25)
GFR SERPL CREATININE-BSD FRML MDRD: 41 ML/MIN/1.73M2
GLUCOSE BLD-MCNC: 93 MG/DL (ref 70–99)
POTASSIUM BLD-SCNC: 4 MMOL/L (ref 3.4–5.3)
SODIUM SERPL-SCNC: 143 MMOL/L (ref 133–144)

## 2021-10-11 PROCEDURE — 80048 BASIC METABOLIC PNL TOTAL CA: CPT

## 2021-10-11 PROCEDURE — 36415 COLL VENOUS BLD VENIPUNCTURE: CPT

## 2021-10-11 PROCEDURE — 258N000003 HC RX IP 258 OP 636: Performed by: INTERNAL MEDICINE

## 2021-10-11 PROCEDURE — 96360 HYDRATION IV INFUSION INIT: CPT

## 2021-10-11 RX ORDER — EPINEPHRINE 1 MG/ML
0.3 INJECTION, SOLUTION INTRAMUSCULAR; SUBCUTANEOUS EVERY 5 MIN PRN
Status: CANCELLED | OUTPATIENT
Start: 2021-10-11

## 2021-10-11 RX ORDER — METHYLPREDNISOLONE SODIUM SUCCINATE 125 MG/2ML
125 INJECTION, POWDER, LYOPHILIZED, FOR SOLUTION INTRAMUSCULAR; INTRAVENOUS
Status: CANCELLED
Start: 2021-10-11

## 2021-10-11 RX ORDER — ALBUTEROL SULFATE 90 UG/1
1-2 AEROSOL, METERED RESPIRATORY (INHALATION)
Status: CANCELLED
Start: 2021-10-11

## 2021-10-11 RX ORDER — DIPHENHYDRAMINE HYDROCHLORIDE 50 MG/ML
50 INJECTION INTRAMUSCULAR; INTRAVENOUS
Status: CANCELLED
Start: 2021-10-11

## 2021-10-11 RX ORDER — HEPARIN SODIUM (PORCINE) LOCK FLUSH IV SOLN 100 UNIT/ML 100 UNIT/ML
5 SOLUTION INTRAVENOUS
Status: CANCELLED | OUTPATIENT
Start: 2021-10-11

## 2021-10-11 RX ORDER — ALBUTEROL SULFATE 0.83 MG/ML
2.5 SOLUTION RESPIRATORY (INHALATION)
Status: CANCELLED | OUTPATIENT
Start: 2021-10-11

## 2021-10-11 RX ORDER — HEPARIN SODIUM,PORCINE 10 UNIT/ML
5 VIAL (ML) INTRAVENOUS
Status: CANCELLED | OUTPATIENT
Start: 2021-10-11

## 2021-10-11 RX ORDER — MEPERIDINE HYDROCHLORIDE 25 MG/ML
25 INJECTION INTRAMUSCULAR; INTRAVENOUS; SUBCUTANEOUS EVERY 30 MIN PRN
Status: CANCELLED | OUTPATIENT
Start: 2021-10-11

## 2021-10-11 RX ORDER — NALOXONE HYDROCHLORIDE 0.4 MG/ML
0.2 INJECTION, SOLUTION INTRAMUSCULAR; INTRAVENOUS; SUBCUTANEOUS
Status: CANCELLED | OUTPATIENT
Start: 2021-10-11

## 2021-10-11 RX ADMIN — SODIUM CHLORIDE 1000 ML: 9 INJECTION, SOLUTION INTRAVENOUS at 14:34

## 2021-10-11 ASSESSMENT — PAIN SCALES - GENERAL: PAINLEVEL: NO PAIN (0)

## 2021-10-11 NOTE — TELEPHONE ENCOUNTER
"Returned a call to John:  -He confirmed he is aware of 3 times weekly IVF's.  -Discussed that Bactrim is on hold d/t MARCOS. CD4<200, will discuss with provider if Bactrim should be restarted.  -Discussed Losartan is still on hold. BP's running lower than previous, ~105/60. No dizziness/no light headedness.  -Discussed he is aware of scheduled biopsy on 10/19.  John states he continues to have diarrhea, but frequency has decreased, seems to have good days and bad days with amount. Stools are still still liquid. He continues to take metamucil daily and is taking culturelle probiotic.     PLAN (per Dr. Varela)  -Retest for C-diff  -Place GI referral  -Restart Bactrim MWF  -Continue to monitor creatinine with increased frequency of IVF\"s, may not need biopsy if creatinine remains stable.    OUTCOME  GI referral placed.  C-diff ordered.  Called John and explained plan. He verbalized understanding. My chart message sent so he would have the plan in writing.      "

## 2021-10-11 NOTE — PROGRESS NOTES
Infusion Nursing Note:  Marlo Vee presents today for add on IV fluids.    Patient seen by provider today: No   present during visit today: Not Applicable.    Note:   Pt received a liter of 0.9NS over an hour as ordered in treatment plan.    Intravenous Access:  Peripheral IV placed.    Treatment Conditions:  Basic metabolic panel drawn after IV fluids as ordered.      Post Infusion Assessment:  Patient tolerated infusion without incident.  Blood return noted pre and post infusion.  No evidence of extravasations.  Access discontinued per protocol.       Discharge Plan:   Patient discharged in stable condition accompanied by: self.  Departure Mode: Ambulatory.      Lorenza Nevarez RN

## 2021-10-11 NOTE — LETTER
10/11/2021         RE: Marlo Vee  [address] [facility] [place] [address]        Dear Colleague,    Thank you for referring your patient, Marlo Vee, to the [facility] [place]. Please see a copy of my visit note below.    Infusion Nursing Note:  Marlo Vee presents today for add on IV fluids.    Patient seen by provider today: No   present during visit today: Not Applicable.    Note:   Pt received a liter of 0.9NS over an hour as ordered in treatment plan.    Intravenous Access:  Peripheral IV placed.    Treatment Conditions:  Basic metabolic panel drawn after IV fluids as ordered.      Post Infusion Assessment:  Patient tolerated infusion without incident.  Blood return noted pre and post infusion.  No evidence of extravasations.  Access discontinued per protocol.       Discharge Plan:   Patient discharged in stable condition accompanied by: self.  Departure Mode: Ambulatory.      Lorenza Nevarez RN                          Again, thank you for allowing me to participate in the care of your patient.        Sincerely,        [facility]

## 2021-10-11 NOTE — NURSING NOTE
"Oncology Rooming Note    October 11, 2021 3:11 PM   Marlo Vee is a 71 year old male who presents for:    Chief Complaint   Patient presents with     Infusion     pt add on for IV fluids today.  hiotory of diarrhea, s/p kidney transplant     Initial Vitals: /76 (BP Location: Right arm, Patient Position: Sitting, Cuff Size: Adult Regular)   Pulse 55   Temp 97.6  F (36.4  C) (Oral)   Resp 16   SpO2 95%  Estimated body mass index is 20.59 kg/m  as calculated from the following:    Height as of 9/24/21: 1.778 m (5' 10\").    Weight as of 9/24/21: 65.1 kg (143 lb 8 oz). There is no height or weight on file to calculate BSA.  No Pain (0) Comment: Data Unavailable   No LMP for male patient.  Allergies reviewed: Yes  Medications reviewed: Yes    Medications: Medication refills not needed today.  Pharmacy name entered into [employer]:    [facility] [place], [place] - [address] [place] [address] [id number]  [facility] [place] - [address] [facility] [place], [place] - [address] [facility]  [id number] - [place], [place] - [address]    Clinical concerns: none       Lorenza Nevarez RN              "

## 2021-10-12 ENCOUNTER — TELEPHONE (OUTPATIENT)
Dept: GASTROENTEROLOGY | Facility: CLINIC | Age: 71
End: 2021-10-12

## 2021-10-12 NOTE — TELEPHONE ENCOUNTER
[facility] Call Center    Phone Message    May a detailed message be left on voicemail: yes     Reason for Call: Other: Patient being referred for diarrhea and is experiencing weight loss, 25-30lbs within 3 months. Patient is scheduled tomorrow at 10:00am with Melinda Rudolph. Please review per scheduling guidelines. Thanks!      Action Taken: Message routed to:  Adult Clinics: Gastroenterology (GI) p [id number]    Travel Screening: Not Applicable

## 2021-10-12 NOTE — TELEPHONE ENCOUNTER
Scheduled tomorrow. Cannot be scheduled any sooner. Does not need further review.    Barbara Evans RN, BSN  GI/Pulmonary Nurse Care Coordinator  Phone: [phone]  Fax: [phone]

## 2021-10-13 ENCOUNTER — VIRTUAL VISIT (OUTPATIENT)
Dept: GASTROENTEROLOGY | Facility: CLINIC | Age: 71
End: 2021-10-13
Attending: INTERNAL MEDICINE
Payer: MEDICARE

## 2021-10-13 ENCOUNTER — TELEPHONE (OUTPATIENT)
Dept: GASTROENTEROLOGY | Facility: CLINIC | Age: 71
End: 2021-10-13

## 2021-10-13 ENCOUNTER — HOSPITAL ENCOUNTER (OUTPATIENT)
Facility: AMBULATORY SURGERY CENTER | Age: 71
End: 2021-10-13
Attending: INTERNAL MEDICINE
Payer: MEDICARE

## 2021-10-13 ENCOUNTER — INFUSION THERAPY VISIT (OUTPATIENT)
Dept: TRANSPLANT | Facility: CLINIC | Age: 71
End: 2021-10-13
Attending: INTERNAL MEDICINE
Payer: MEDICARE

## 2021-10-13 VITALS
RESPIRATION RATE: 18 BRPM | DIASTOLIC BLOOD PRESSURE: 74 MMHG | TEMPERATURE: 97.6 F | SYSTOLIC BLOOD PRESSURE: 116 MMHG | OXYGEN SATURATION: 100 % | HEART RATE: 70 BPM

## 2021-10-13 DIAGNOSIS — Z48.298 AFTERCARE FOLLOWING ORGAN TRANSPLANT: ICD-10-CM

## 2021-10-13 DIAGNOSIS — Z94.0 KIDNEY REPLACED BY TRANSPLANT: ICD-10-CM

## 2021-10-13 DIAGNOSIS — R19.7 DIARRHEA OF PRESUMED INFECTIOUS ORIGIN: Primary | ICD-10-CM

## 2021-10-13 DIAGNOSIS — D84.9 IMMUNOSUPPRESSION (H): ICD-10-CM

## 2021-10-13 DIAGNOSIS — R19.7 DIARRHEA: Primary | ICD-10-CM

## 2021-10-13 DIAGNOSIS — R19.7 DIARRHEA OF PRESUMED INFECTIOUS ORIGIN: ICD-10-CM

## 2021-10-13 LAB
ANION GAP SERPL CALCULATED.3IONS-SCNC: 4 MMOL/L (ref 3–14)
BUN SERPL-MCNC: 26 MG/DL (ref 7–30)
C DIFF TOX B STL QL: POSITIVE
CALCIUM SERPL-MCNC: 8.8 MG/DL (ref 8.5–10.1)
CHLORIDE BLD-SCNC: 118 MMOL/L (ref 94–109)
CO2 SERPL-SCNC: 21 MMOL/L (ref 20–32)
CREAT SERPL-MCNC: 1.77 MG/DL (ref 0.66–1.25)
GFR SERPL CREATININE-BSD FRML MDRD: 38 ML/MIN/1.73M2
GLUCOSE BLD-MCNC: 103 MG/DL (ref 70–99)
POTASSIUM BLD-SCNC: 3.9 MMOL/L (ref 3.4–5.3)
SODIUM SERPL-SCNC: 143 MMOL/L (ref 133–144)

## 2021-10-13 PROCEDURE — 80048 BASIC METABOLIC PNL TOTAL CA: CPT

## 2021-10-13 PROCEDURE — 96360 HYDRATION IV INFUSION INIT: CPT

## 2021-10-13 PROCEDURE — 99204 OFFICE O/P NEW MOD 45 MIN: CPT | Mod: 95 | Performed by: INTERNAL MEDICINE

## 2021-10-13 PROCEDURE — 87493 C DIFF AMPLIFIED PROBE: CPT | Performed by: INTERNAL MEDICINE

## 2021-10-13 PROCEDURE — 36415 COLL VENOUS BLD VENIPUNCTURE: CPT

## 2021-10-13 PROCEDURE — 258N000003 HC RX IP 258 OP 636: Performed by: INTERNAL MEDICINE

## 2021-10-13 RX ORDER — ALBUTEROL SULFATE 0.83 MG/ML
2.5 SOLUTION RESPIRATORY (INHALATION)
Status: CANCELLED | OUTPATIENT
Start: 2021-10-14

## 2021-10-13 RX ORDER — NALOXONE HYDROCHLORIDE 0.4 MG/ML
0.2 INJECTION, SOLUTION INTRAMUSCULAR; INTRAVENOUS; SUBCUTANEOUS
Status: CANCELLED | OUTPATIENT
Start: 2021-10-14

## 2021-10-13 RX ORDER — MEPERIDINE HYDROCHLORIDE 25 MG/ML
25 INJECTION INTRAMUSCULAR; INTRAVENOUS; SUBCUTANEOUS EVERY 30 MIN PRN
Status: CANCELLED | OUTPATIENT
Start: 2021-10-14

## 2021-10-13 RX ORDER — EPINEPHRINE 1 MG/ML
0.3 INJECTION, SOLUTION INTRAMUSCULAR; SUBCUTANEOUS EVERY 5 MIN PRN
Status: CANCELLED | OUTPATIENT
Start: 2021-10-14

## 2021-10-13 RX ORDER — HEPARIN SODIUM,PORCINE 10 UNIT/ML
5 VIAL (ML) INTRAVENOUS
Status: CANCELLED | OUTPATIENT
Start: 2021-10-14

## 2021-10-13 RX ORDER — DIPHENHYDRAMINE HYDROCHLORIDE 50 MG/ML
50 INJECTION INTRAMUSCULAR; INTRAVENOUS
Status: CANCELLED
Start: 2021-10-14

## 2021-10-13 RX ORDER — METHYLPREDNISOLONE SODIUM SUCCINATE 125 MG/2ML
125 INJECTION, POWDER, LYOPHILIZED, FOR SOLUTION INTRAMUSCULAR; INTRAVENOUS
Status: CANCELLED
Start: 2021-10-14

## 2021-10-13 RX ORDER — ALBUTEROL SULFATE 90 UG/1
1-2 AEROSOL, METERED RESPIRATORY (INHALATION)
Status: CANCELLED
Start: 2021-10-14

## 2021-10-13 RX ORDER — HEPARIN SODIUM (PORCINE) LOCK FLUSH IV SOLN 100 UNIT/ML 100 UNIT/ML
5 SOLUTION INTRAVENOUS
Status: CANCELLED | OUTPATIENT
Start: 2021-10-14

## 2021-10-13 RX ADMIN — SODIUM CHLORIDE 1000 ML: 9 INJECTION, SOLUTION INTRAVENOUS at 12:35

## 2021-10-13 ASSESSMENT — PAIN SCALES - GENERAL: PAINLEVEL: NO PAIN (0)

## 2021-10-13 NOTE — PROGRESS NOTES
"       HPI:    John  presents today for a video visit to discuss diarrhea which began suddenly on August first.  Was at one point going to the bathroom up to 40 times a day.  Initially had stool studies done which were positive for cryptosporidium and c-difficile - was treated for both - c-diff was repeated and negative but crypto remained positive - completed another course of treatment recently and most recent stool studies negative.  Is planning on turning in another stool sample for c-difficile today.  Symptoms have improved somewhat - no longer waking up at night to go to the bathroom.  Generally goes to the bathroom about 10 times a day.  Stools are non-bloody and watery.  No abdominal pain but does have some gurgling.  Has been losing weight since symptoms began.    Does have a history of renal transplant - currently on MMF and prograf which he has been on for years.  Also on bactrim for PJP prophylaxis.  Lately has been having worsening renal function - requring IVF 3 times a week.  Is also scheduled for a renal biopsy next week.    Of note - patient has a history of difficult colonoscopies in the past - first in 2004 was incomplete due to \"narrow and redundant left colon\".  Had a follow-up in 2011 - cecum was reached but patient reports it was incredibly difficult and the doctor at one point had to stand on his stretcher to complete it.  Patient is understandably concerned about repeating this given his complex medical history.  Is also concerned about hydration status with the prep.      Past Medical History:   Diagnosis Date     Anemia in ESRD (end-stage renal disease) (H)      Antiplatelet or antithrombotic long-term use      Anxiety      Dyslipidemia      End stage kidney disease (H)      Heart attack (H)     not sure     Hypertension      Membranous glomerulonephritis 2002     PONV (postoperative nausea and vomiting)      PUD (peptic ulcer disease)      Secondary hyperparathyroidism (of renal origin)  "     Skin abnormalities      Stented coronary artery      Vitamin D deficiency        Past Surgical History:   Procedure Laterality Date     CYSTOSCOPY, REMOVE STENT(S), COMBINED Right 2/23/2016    Procedure: COMBINED CYSTOSCOPY, REMOVE STENT(S);  Surgeon: Cody Temple MD;  Location: [facility] OR     OPEN REDUCTION INTERNAL FIXATION HIP NAILING Right 12/6/2020    Procedure: OPEN REDUCTION INTERNAL FIXATION, FRACTURE, FEMUR, USING INTRAMEDULLARY SHU.;  Surgeon: Jimmy Stoner MD;  Location: [facility] OR     s/p right upper extremity AV fistula       TRANSPLANT      kidney transplant      VASCULAR SURGERY         Family History   Problem Relation Age of Onset     Breast Cancer Mother      Cancer - colorectal Father      Coronary Artery Disease Father      Hypertension Father      Breast Cancer Sister      Cancer Brother         Pancreatic CA       Social History     Tobacco Use     Smoking status: Never Smoker     Smokeless tobacco: Never Used   Substance Use Topics     Alcohol use: No     Alcohol/week: 0.0 standard drinks     Comment: Patient reports drinking beer on a rare ocassion.        O:    Gen: no acute distress  HEENT: NCAT  Neck: normal ROM  Resp: nonlabored breathing  Neuro: no gross deficits  Psych: appropriate mood and affect    Assessment and Plan:    # diarrhea persistent despite treatment for crypto and c-difficile - ddx includes infectious, medication toxicity, microscopic colitis, IBD, etc.  Will repeat stool studies including an ova and parasite.  Will also check a fecal calprotectin.  I do think endoscopic evaluation is warranted given symptom severity and weight loss, especially if repeat stool studies are negative for infectious etiologies. Discussed at length with patient given his concerns re:colonoscopy.  Would recommend that procedure be done with MAC given his difficulty in the past.  When scheduled, would also recommend discussing with his nephrologist to see if he will need increased fluids (currently  receiving IVF 3x a week) in the days leading up to the procedure.  If concerns, could consider doing a flexible sigmoidoscopy instead, although patient is overdue for CRC screening which will need to be addressed at some point. For now, encouraged patient to increase fiber to 3 times a day.  Can also start imodium as needed    RTC 3 months    Melinda Rudolph, DO     Video-Visit Details     Type of service:  Video Visit     Video Start Time: 10:01 AM  Video End Time (time video stopped): 10:19 AM    Originating Location (pt. Location): home     Distant Location (provider location):  [facility]      Mode of Communication:  Video Conference via [employer]

## 2021-10-13 NOTE — TELEPHONE ENCOUNTER
Screening Questions  1. Are you active on mychart? YES    2. What insurance is in the chart? MEDICARE/BCBS    2.  Ordering/Referring Provider: JASWANT    3. BMI 20.8    4. Do you have any Lung issues?  N If yes continue:   Do you use daily home oxygen?    Do you have Pulmonary Hypertension?    Do you have SEVERE asthma? **    5. Have you had a heart, lung, or liver transplant? KIDNEY    6. Are you currently on dialysis or have chronic kidney disease? N    7. Have you had a stroke or Transient ischemic attack (TIA) within 6 months? N    8. In the past 6 months, have you had any heart related issues including cardiomyopathy or heart attack? N      If yes, did it require cardiac stenting or other implantable device?N      9. Do you have any implantable devices in your body (pacemaker, defib, LVAD)? N    10. Do you take nitroglycerin? If yes, how often? N    11. Are you currently taking any blood thinners?ASPRIN    12. Are you a diabetic? N    13. (Females) Are you currently pregnant? N  If yes, how many weeks?      15. Are you taking any prescription pain medications on a routine schedule? N If yes, MAC sedation.    16. Do you have any chemical dependencies such as alcohol, street drugs, or methadone? N If yes, MAC sedation.    17. Do you have any history of post-traumatic stress syndrome, severe anxiety or history of psychosis? N    18. Do you transfer independently? Y    19.  Do you have any issues with constipation? N    20. Preferred Pharmacy for Pre Prescription [place]    Scheduling Details    Which Colonoscopy Prep was Sent?: MPREP  Procedure Scheduled: COLON  Provider/Surgeon: JASWANT  Date of Procedure: 11/16 1230pm  Location:   Caller (Please ask for phone number if not scheduled by patient): AMINAH      Sedation Type: MAC  Conscious Sedation- Needs  for 6 hours after the procedure  MAC/General-Needs  for 24 hours after procedure    Pre-op Required at [facility], [place], [place] and OR for  MAC sedation: YES  (if yes advise patient they will need a pre-op prior to procedure)      Is patient on blood thinners? -ASPRIN (If yes- inform patient to follow up with PCP or provider for follow up instructions)     Informed patient they will need an adult  Y  Cannot take any type of public or medical transportation alone    Pre-Procedure Covid test to be completed at [facility] or Externally: [facility] 11/12 0930AM    Confirmed Nurse will call to complete assessment Y    Additional comments: PT WANTS TO LET RN KNOW HE DOES NOT WAS TO DRY OUT KIDNEY

## 2021-10-13 NOTE — TELEPHONE ENCOUNTER
Writer called patient to schedule a 3 month follow up  and lower endoscopy with MAC at Dr Rudolph's request.  LVM to call us back to schedule.  Ozzy Melchor, KAREN

## 2021-10-13 NOTE — PROGRESS NOTES
Infusion Nursing Note:  Marlo Vee presents today for IVF due to diarrhea.    Patient seen by provider today: No   present during visit today: Not Applicable.    Note: Vss, PIV placed. 1L NS bolus administered. Bmp collected post infusion. Pt tolerated infusion.       Intravenous Access:  Peripheral IV placed.    Treatment Conditions:  Results reviewed, labs MET treatment parameters, ok to proceed with treatment.      Post Infusion Assessment:  Patient tolerated infusion without incident.  Access discontinued per protocol.       Discharge Plan:   Patient discharged in stable condition accompanied by: self.  Departure Mode: Ambulatory.      Casey Bolton RN

## 2021-10-13 NOTE — PROGRESS NOTES
John is a 71 year old who is being evaluated via a billable video visit.      How would you like to obtain your AVS? MyChart  If the video visit is dropped, the invitation should be resent by: Text to cell phone: [phone]  Will anyone else be joining your video visit? No      Video Start Time:   Video-Visit Details    Type of service:  Video Visit    Video End Time:    Originating Location (pt. Location):     Distant Location (provider location):  [facility]     Platform used for Video Visit:     Ozzy Melchor CMA

## 2021-10-13 NOTE — PATIENT INSTRUCTIONS
Please turn in stool studies as you have planned.    Please schedule a colonoscopy - once it is scheduled please discuss with your nephrologist - you may need extra IV fluids as you go through the prep for it.  We will do the procedure with a deeper level of sedation than you have had before.

## 2021-10-13 NOTE — LETTER
10/13/2021         RE: Marlo Vee  [address] [facility] [place] [address]        Dear Colleague,    Thank you for referring your patient, Marlo Vee, to the [facility] BLOOD AND MARROW TRANSPLANT PROGRAM [place]. Please see a copy of my visit note below.    Infusion Nursing Note:  Marlo Vee presents today for IVF due to diarrhea.    Patient seen by provider today: No   present during visit today: Not Applicable.    Note: Vss, PIV placed. 1L NS bolus administered. Bmp collected post infusion. Pt tolerated infusion.       Intravenous Access:  Peripheral IV placed.    Treatment Conditions:  Results reviewed, labs MET treatment parameters, ok to proceed with treatment.      Post Infusion Assessment:  Patient tolerated infusion without incident.  Access discontinued per protocol.       Discharge Plan:   Patient discharged in stable condition accompanied by: self.  Departure Mode: Ambulatory.      Casey Bolton RN                          Again, thank you for allowing me to participate in the care of your patient.        Sincerely,        [facility]

## 2021-10-14 ENCOUNTER — TELEPHONE (OUTPATIENT)
Dept: TRANSPLANT | Facility: CLINIC | Age: 71
End: 2021-10-14

## 2021-10-14 ENCOUNTER — MYC MEDICAL ADVICE (OUTPATIENT)
Dept: INTERVENTIONAL RADIOLOGY/VASCULAR | Facility: CLINIC | Age: 71
End: 2021-10-14

## 2021-10-14 ENCOUNTER — INFUSION THERAPY VISIT (OUTPATIENT)
Dept: INFUSION THERAPY | Facility: CLINIC | Age: 71
End: 2021-10-14
Attending: INTERNAL MEDICINE
Payer: MEDICARE

## 2021-10-14 VITALS
HEART RATE: 60 BPM | OXYGEN SATURATION: 97 % | SYSTOLIC BLOOD PRESSURE: 138 MMHG | TEMPERATURE: 97.4 F | RESPIRATION RATE: 16 BRPM | DIASTOLIC BLOOD PRESSURE: 80 MMHG

## 2021-10-14 DIAGNOSIS — A09 DIARRHEA OF INFECTIOUS ORIGIN: Primary | ICD-10-CM

## 2021-10-14 DIAGNOSIS — Z48.298 AFTERCARE FOLLOWING ORGAN TRANSPLANT: ICD-10-CM

## 2021-10-14 DIAGNOSIS — A49.8 CLOSTRIDIUM DIFFICILE INFECTION: ICD-10-CM

## 2021-10-14 DIAGNOSIS — R19.7 DIARRHEA OF PRESUMED INFECTIOUS ORIGIN: ICD-10-CM

## 2021-10-14 DIAGNOSIS — Z94.0 KIDNEY REPLACED BY TRANSPLANT: ICD-10-CM

## 2021-10-14 DIAGNOSIS — R19.7 DIARRHEA: Primary | ICD-10-CM

## 2021-10-14 DIAGNOSIS — D84.9 IMMUNOSUPPRESSION (H): ICD-10-CM

## 2021-10-14 DIAGNOSIS — Z11.59 ENCOUNTER FOR SCREENING FOR OTHER VIRAL DISEASES: ICD-10-CM

## 2021-10-14 LAB
ANION GAP SERPL CALCULATED.3IONS-SCNC: 6 MMOL/L (ref 3–14)
BUN SERPL-MCNC: 19 MG/DL (ref 7–30)
CALCIUM SERPL-MCNC: 8.7 MG/DL (ref 8.5–10.1)
CHLORIDE BLD-SCNC: 116 MMOL/L (ref 94–109)
CO2 SERPL-SCNC: 21 MMOL/L (ref 20–32)
CREAT SERPL-MCNC: 1.68 MG/DL (ref 0.66–1.25)
GFR SERPL CREATININE-BSD FRML MDRD: 41 ML/MIN/1.73M2
GLUCOSE BLD-MCNC: 98 MG/DL (ref 70–99)
POTASSIUM BLD-SCNC: 3.9 MMOL/L (ref 3.4–5.3)
SODIUM SERPL-SCNC: 143 MMOL/L (ref 133–144)
TACROLIMUS BLD-MCNC: 4.6 UG/L (ref 5–15)
TME LAST DOSE: ABNORMAL H
TME LAST DOSE: ABNORMAL H

## 2021-10-14 PROCEDURE — 80197 ASSAY OF TACROLIMUS: CPT

## 2021-10-14 PROCEDURE — 96360 HYDRATION IV INFUSION INIT: CPT

## 2021-10-14 PROCEDURE — 36415 COLL VENOUS BLD VENIPUNCTURE: CPT

## 2021-10-14 PROCEDURE — 258N000003 HC RX IP 258 OP 636: Performed by: INTERNAL MEDICINE

## 2021-10-14 PROCEDURE — 80048 BASIC METABOLIC PNL TOTAL CA: CPT

## 2021-10-14 RX ORDER — HEPARIN SODIUM,PORCINE 10 UNIT/ML
5 VIAL (ML) INTRAVENOUS
Status: CANCELLED | OUTPATIENT
Start: 2021-10-14

## 2021-10-14 RX ORDER — EPINEPHRINE 1 MG/ML
0.3 INJECTION, SOLUTION INTRAMUSCULAR; SUBCUTANEOUS EVERY 5 MIN PRN
Status: CANCELLED | OUTPATIENT
Start: 2021-10-14

## 2021-10-14 RX ORDER — MEPERIDINE HYDROCHLORIDE 25 MG/ML
25 INJECTION INTRAMUSCULAR; INTRAVENOUS; SUBCUTANEOUS EVERY 30 MIN PRN
Status: CANCELLED | OUTPATIENT
Start: 2021-10-14

## 2021-10-14 RX ORDER — DIPHENHYDRAMINE HYDROCHLORIDE 50 MG/ML
50 INJECTION INTRAMUSCULAR; INTRAVENOUS
Status: CANCELLED
Start: 2021-10-14

## 2021-10-14 RX ORDER — HEPARIN SODIUM (PORCINE) LOCK FLUSH IV SOLN 100 UNIT/ML 100 UNIT/ML
5 SOLUTION INTRAVENOUS
Status: CANCELLED | OUTPATIENT
Start: 2021-10-14

## 2021-10-14 RX ORDER — NALOXONE HYDROCHLORIDE 0.4 MG/ML
0.2 INJECTION, SOLUTION INTRAMUSCULAR; INTRAVENOUS; SUBCUTANEOUS
Status: CANCELLED | OUTPATIENT
Start: 2021-10-14

## 2021-10-14 RX ORDER — METHYLPREDNISOLONE SODIUM SUCCINATE 125 MG/2ML
125 INJECTION, POWDER, LYOPHILIZED, FOR SOLUTION INTRAMUSCULAR; INTRAVENOUS
Status: CANCELLED
Start: 2021-10-14

## 2021-10-14 RX ORDER — VANCOMYCIN HYDROCHLORIDE 125 MG/1
CAPSULE ORAL
Qty: 84 CAPSULE | Refills: 0 | Status: SHIPPED | OUTPATIENT
Start: 2021-10-14 | End: 2021-11-11

## 2021-10-14 RX ORDER — ALBUTEROL SULFATE 90 UG/1
1-2 AEROSOL, METERED RESPIRATORY (INHALATION)
Status: CANCELLED
Start: 2021-10-14

## 2021-10-14 RX ORDER — ALBUTEROL SULFATE 0.83 MG/ML
2.5 SOLUTION RESPIRATORY (INHALATION)
Status: CANCELLED | OUTPATIENT
Start: 2021-10-14

## 2021-10-14 RX ADMIN — SODIUM CHLORIDE 1000 ML: 9 INJECTION, SOLUTION INTRAVENOUS at 11:17

## 2021-10-14 NOTE — LETTER
10/14/2021         RE: Marlo Vee  [address] [facility] [place] [address]        Dear Colleague,    Thank you for referring your patient, Marlo Vee, to the [facility] TREATMENT [facility]. Please see a copy of my visit note below.    Nursing Note  [facility]lo Vee [facility]:   Chief Complaint   Patient presents with     Infusion     IVF     During today's [facility] appointment, orders from Dr. Varela were completed.  Frequency: three times weekly    Progress note:  Patient identification verified by name and date of birth.  Assessment completed.  Vitals recorded in Doc Flowsheets.  Patient was provided with education regarding medication/procedure and possible side effects.  Patient verbalized understanding.     present during visit today: Not Applicable.    Treatment Conditions: Non-applicable.    Premedications: were not ordered.    Drug Waste Record: No    Infusion length and rate:  infusion given over approximately 60 minutes    Labs: were drawn per orders. Tac drawn prior to IVF, and BMP drawn after IVF.    Vascular access: peripheral IV placed today.    Is the next appt scheduled? 10/20  Asymptomatic COVID test completed? NA    Post Infusion Assessment:  Patient tolerated infusion without incident.     Discharge Plan:   Follow up plan of care with: ongoing infusions at [facility] Infusion [facility] Procedure [facility]., ordering provider as scheduled. and after visit summary declined by patient  Discharge instructions were reviewed with patient.  Patient/representative verbalized understanding of discharge instructions and all questions answered.  Patient discharged from [facility] Procedure [facility] in stable condition.    Sylvie Vela RN    Administrations This Visit     0.9% sodium chloride BOLUS     Admin Date  10/14/2021 Action  New Bag Dose  1,000 mL  Route  Intravenous Administered By  Sylvie Carrillo, RN                /80   Pulse 60   Temp 97.4  F (36.3  C) (Oral)   Resp 16   SpO2 97%         Again, thank you for allowing me to participate in the care of your patient.        Sincerely,        [facility]

## 2021-10-14 NOTE — PROGRESS NOTES
Nursing Note  Marlo Vee presents [facility] for:   Chief Complaint   Patient presents with     Infusion     IVF     During today's [facility] appointment, orders from Dr. Varela were completed.  Frequency: three times weekly    Progress note:  Patient identification verified by name and date of birth.  Assessment completed.  Vitals recorded in Doc Flowsheets.  Patient was provided with education regarding medication/procedure and possible side effects.  Patient verbalized understanding.     present during visit today: Not Applicable.    Treatment Conditions: Non-applicable.    Premedications: were not ordered.    Drug Waste Record: No    Infusion length and rate:  infusion given over approximately 60 minutes    Labs: were drawn per orders. Tac drawn prior to IVF, and BMP drawn after IVF.    Vascular access: peripheral IV placed today.    Is the next appt scheduled? 10/20  Asymptomatic COVID test completed? NA    Post Infusion Assessment:  Patient tolerated infusion without incident.     Discharge Plan:   Follow up plan of care with: ongoing infusions at [facility]., ordering provider as scheduled. and after visit summary declined by patient  Discharge instructions were reviewed with patient.  Patient/representative verbalized understanding of discharge instructions and all questions answered.  Patient discharged [facility] in stable condition.    Sylvie Carrillo RN    Administrations This Visit     0.9% sodium chloride BOLUS     Admin Date  10/14/2021 Action  New Bag Dose  1,000 mL Route  Intravenous Administered By  Sylvie Carrillo RN                /80   Pulse 60   Temp 97.4  F (36.3  C) (Oral)   Resp 16   SpO2 97%

## 2021-10-14 NOTE — PATIENT INSTRUCTIONS
Dear Marlo Vee    Thank you for choosing [facility] ([facility]) for your IV fluid infusion.  The following information is a summary of our appointment as well as important reminders.      We look forward in seeing you on your next appointment here at [facility] ([facility]).  Please don t hesitate to call us at [phone] to reschedule any of your appointments or to speak with one of the [facility] registered nurses.  It was a pleasure taking care of you today.    Sincerely,    [facility]  [facility]  [address] [place] [address]  [id number]  Phone:  [phone]

## 2021-10-14 NOTE — TELEPHONE ENCOUNTER
ISSUE  C-diff positive again. Last treated with Vanco 8/18.  Continues to have diarrhea.  Creatinine's remain elevated despite IVF's.   Will need Covid swab prior to Biopsy on Oct 19.    PLAN  From: Samuel Varela MD   Sent: 10/13/2021   8:16 PM CDT   To: Adilia Richardson RN     Should be seen by Transplant ID and GI here.  Yes on continue oral vancomycin 125 mg qid x 14 days, then 125 mg bid x 14 days.  I think it is unlikely he has acute rejection, but I would still be in favor of a biopsy unless he is really against it.  We may need to decrease immunosuppression some more and knowing that he doesn't have acute  rejection will be helpful.  Okay for IV fluids.      Needs Coivd scheduled on Friday or Saturday.    OUTCOME  Discussed above plan with John.  He is currently getting IVF's, will  Vanco after fluids. Called [facility] pharmacy-they have it in stock and will rush it.  He is agreeable to biopsy. He is going out of town this evening, but will have his Covid swab done tomorrow or Saturday at [place] and have results faxed here.  He will continue with IVF's 3 x's weekly for now.

## 2021-10-14 NOTE — LETTER
PHYSICIAN ORDERS    DATE & TIME ISSUED: 2021 11:44 PM  PATIENT NAME: Marlo Vee   : 1950     [facility] MR# [if applicable]: [id number]     DIAGNOSIS / ICD - 10 CODES    Kidney Transplanted (Z94.0)    After Care Following Organ Transplant (Z48.298)    Long Term Use of Medication (Z79.899)      Please collect a pre-procedure Covid PCR on either Friday, October 15 or  for a  procedure.    Please fax results to [phone]    Patient should release information to the [facility].     Please fax results to the Transplant Center at [phone].    Any questions please call [phone].      .

## 2021-10-15 ENCOUNTER — TELEPHONE (OUTPATIENT)
Dept: TRANSPLANT | Facility: CLINIC | Age: 71
End: 2021-10-15

## 2021-10-15 NOTE — LETTER
PHYSICIAN ORDERS      DATE & TIME ISSUED: October 15, 2021 2:36 PM  PATIENT NAME: Marlo Vee   : 1950     [facility] MR# [if applicable]: [id number]     DIAGNOSIS:  kidney transplant   ICD-10 CODE: Z94.0      Please obtain a pre procedure COVID-19 PCR prior to procedure on 10/19/21    Any questions please call: [phone]  Please fax results to [phone].    .

## 2021-10-18 ENCOUNTER — TELEPHONE (OUTPATIENT)
Dept: TRANSPLANT | Facility: CLINIC | Age: 71
End: 2021-10-18

## 2021-10-18 NOTE — PROGRESS NOTES
John returned my call this morning regarding his covid test.  He completed the test on 10/15 at [facility] in [place]. I called medical records to have the fax the results to our department. John has no more questions regarding his procedure, MyChart prep reviewed.

## 2021-10-18 NOTE — TELEPHONE ENCOUNTER
ISSUE  John called to report his Covid swab done at [facility] on Friday and was called with negative results. He does not have access to his My Chart through [employer], so does not have proof of results..       OUTCOME  Called [facility]  states they cannot release results because they were not ordered by a local provider.  Called release of information department-left a voice message.  Called release of information customer service. Representative states she was already contacted by Gee from [facility] and she will be faxing results.

## 2021-10-19 ENCOUNTER — HOSPITAL ENCOUNTER (OUTPATIENT)
Facility: CLINIC | Age: 71
Discharge: HOME | End: 2021-10-19
Attending: INTERNAL MEDICINE | Admitting: PHYSICIAN ASSISTANT
Payer: MEDICARE

## 2021-10-19 ENCOUNTER — APPOINTMENT (OUTPATIENT)
Dept: INTERVENTIONAL RADIOLOGY/VASCULAR | Facility: CLINIC | Age: 71
End: 2021-10-19
Attending: INTERNAL MEDICINE
Payer: MEDICARE

## 2021-10-19 ENCOUNTER — APPOINTMENT (OUTPATIENT)
Dept: MEDSURG UNIT | Facility: CLINIC | Age: 71
End: 2021-10-19
Attending: INTERNAL MEDICINE
Payer: MEDICARE

## 2021-10-19 VITALS
TEMPERATURE: 98.1 F | OXYGEN SATURATION: 100 % | RESPIRATION RATE: 14 BRPM | HEART RATE: 69 BPM | SYSTOLIC BLOOD PRESSURE: 119 MMHG | DIASTOLIC BLOOD PRESSURE: 76 MMHG

## 2021-10-19 DIAGNOSIS — Z48.298 AFTERCARE FOLLOWING ORGAN TRANSPLANT: ICD-10-CM

## 2021-10-19 DIAGNOSIS — Z94.0 KIDNEY REPLACED BY TRANSPLANT: ICD-10-CM

## 2021-10-19 DIAGNOSIS — T86.10 COMPLICATION OF KIDNEY TRANSPLANT: ICD-10-CM

## 2021-10-19 DIAGNOSIS — R79.89 ELEVATED SERUM CREATININE: ICD-10-CM

## 2021-10-19 LAB
ALBUMIN UR-MCNC: 50 MG/DL
ANION GAP SERPL CALCULATED.3IONS-SCNC: 4 MMOL/L (ref 3–14)
APPEARANCE UR: ABNORMAL
APTT PPP: 50 SECONDS (ref 22–38)
BILIRUB UR QL STRIP: NEGATIVE
BUN SERPL-MCNC: 32 MG/DL (ref 7–30)
CALCIUM SERPL-MCNC: 8.6 MG/DL (ref 8.5–10.1)
CAOX CRY #/AREA URNS HPF: ABNORMAL /HPF
CHLORIDE BLD-SCNC: 112 MMOL/L (ref 94–109)
CO2 SERPL-SCNC: 23 MMOL/L (ref 20–32)
COLOR UR AUTO: YELLOW
CREAT SERPL-MCNC: 1.7 MG/DL (ref 0.66–1.25)
ERYTHROCYTE [DISTWIDTH] IN BLOOD BY AUTOMATED COUNT: 15.7 % (ref 10–15)
GFR SERPL CREATININE-BSD FRML MDRD: 40 ML/MIN/1.73M2
GLUCOSE BLD-MCNC: 105 MG/DL (ref 70–99)
GLUCOSE UR STRIP-MCNC: NEGATIVE MG/DL
HCT VFR BLD AUTO: 34.8 % (ref 40–53)
HGB BLD-MCNC: 10.1 G/DL (ref 13.3–17.7)
HGB UR QL STRIP: ABNORMAL
INR PPP: 1.18 (ref 0.85–1.15)
KETONES UR STRIP-MCNC: NEGATIVE MG/DL
LEUKOCYTE ESTERASE UR QL STRIP: NEGATIVE
MCH RBC QN AUTO: 27.5 PG (ref 26.5–33)
MCHC RBC AUTO-ENTMCNC: 29 G/DL (ref 31.5–36.5)
MCV RBC AUTO: 95 FL (ref 78–100)
MUCOUS THREADS #/AREA URNS LPF: PRESENT /LPF
NITRATE UR QL: NEGATIVE
PH UR STRIP: 5.5 [PH] (ref 5–7)
PLATELET # BLD AUTO: 217 10E3/UL (ref 150–450)
POTASSIUM BLD-SCNC: 3 MMOL/L (ref 3.4–5.3)
RBC # BLD AUTO: 3.67 10E6/UL (ref 4.4–5.9)
RBC URINE: >182 /HPF
SODIUM SERPL-SCNC: 139 MMOL/L (ref 133–144)
SP GR UR STRIP: 1.02 (ref 1–1.03)
TRANSITIONAL EPI: <1 /HPF
UROBILINOGEN UR STRIP-MCNC: NORMAL MG/DL
WBC # BLD AUTO: 6.6 10E3/UL (ref 4–11)
WBC URINE: 1 /HPF

## 2021-10-19 PROCEDURE — 36415 COLL VENOUS BLD VENIPUNCTURE: CPT | Performed by: NURSE PRACTITIONER

## 2021-10-19 PROCEDURE — 250N000009 HC RX 250: Performed by: PHYSICIAN ASSISTANT

## 2021-10-19 PROCEDURE — 999N000142 HC STATISTIC PROCEDURE PREP ONLY

## 2021-10-19 PROCEDURE — 99152 MOD SED SAME PHYS/QHP 5/>YRS: CPT | Performed by: PHYSICIAN ASSISTANT

## 2021-10-19 PROCEDURE — 86832 HLA CLASS I HIGH DEFIN QUAL: CPT | Performed by: INTERNAL MEDICINE

## 2021-10-19 PROCEDURE — 85610 PROTHROMBIN TIME: CPT | Performed by: NURSE PRACTITIONER

## 2021-10-19 PROCEDURE — 50200 RENAL BIOPSY PERQ: CPT | Mod: RT | Performed by: PHYSICIAN ASSISTANT

## 2021-10-19 PROCEDURE — 999N000134 HC STATISTIC PP CARE STAGE 3

## 2021-10-19 PROCEDURE — 258N000003 HC RX IP 258 OP 636: Performed by: NURSE PRACTITIONER

## 2021-10-19 PROCEDURE — 250N000011 HC RX IP 250 OP 636: Performed by: PHYSICIAN ASSISTANT

## 2021-10-19 PROCEDURE — 76942 ECHO GUIDE FOR BIOPSY: CPT | Mod: 26 | Performed by: PHYSICIAN ASSISTANT

## 2021-10-19 PROCEDURE — 76942 ECHO GUIDE FOR BIOPSY: CPT

## 2021-10-19 PROCEDURE — 80048 BASIC METABOLIC PNL TOTAL CA: CPT | Performed by: NURSE PRACTITIONER

## 2021-10-19 PROCEDURE — 88348 ELECTRON MICROSCOPY DX: CPT | Mod: TC | Performed by: INTERNAL MEDICINE

## 2021-10-19 PROCEDURE — 81001 URINALYSIS AUTO W/SCOPE: CPT | Performed by: INTERNAL MEDICINE

## 2021-10-19 PROCEDURE — 87799 DETECT AGENT NOS DNA QUANT: CPT | Performed by: INTERNAL MEDICINE

## 2021-10-19 PROCEDURE — 85730 THROMBOPLASTIN TIME PARTIAL: CPT | Performed by: NURSE PRACTITIONER

## 2021-10-19 PROCEDURE — 85027 COMPLETE CBC AUTOMATED: CPT | Performed by: NURSE PRACTITIONER

## 2021-10-19 PROCEDURE — 99152 MOD SED SAME PHYS/QHP 5/>YRS: CPT

## 2021-10-19 PROCEDURE — 86833 HLA CLASS II HIGH DEFIN QUAL: CPT | Performed by: INTERNAL MEDICINE

## 2021-10-19 RX ORDER — NALOXONE HYDROCHLORIDE 0.4 MG/ML
0.4 INJECTION, SOLUTION INTRAMUSCULAR; INTRAVENOUS; SUBCUTANEOUS
Status: DISCONTINUED | OUTPATIENT
Start: 2021-10-19 | End: 2021-10-19 | Stop reason: HOSPADM

## 2021-10-19 RX ORDER — SODIUM CHLORIDE 9 MG/ML
INJECTION, SOLUTION INTRAVENOUS CONTINUOUS
Status: DISCONTINUED | OUTPATIENT
Start: 2021-10-19 | End: 2021-10-19 | Stop reason: HOSPADM

## 2021-10-19 RX ORDER — NALOXONE HYDROCHLORIDE 0.4 MG/ML
0.2 INJECTION, SOLUTION INTRAMUSCULAR; INTRAVENOUS; SUBCUTANEOUS
Status: DISCONTINUED | OUTPATIENT
Start: 2021-10-19 | End: 2021-10-19 | Stop reason: HOSPADM

## 2021-10-19 RX ORDER — FENTANYL CITRATE 50 UG/ML
25-50 INJECTION, SOLUTION INTRAMUSCULAR; INTRAVENOUS EVERY 5 MIN PRN
Status: DISCONTINUED | OUTPATIENT
Start: 2021-10-19 | End: 2021-10-19 | Stop reason: HOSPADM

## 2021-10-19 RX ORDER — LIDOCAINE 40 MG/G
CREAM TOPICAL
Status: DISCONTINUED | OUTPATIENT
Start: 2021-10-19 | End: 2021-10-19 | Stop reason: HOSPADM

## 2021-10-19 RX ORDER — FLUMAZENIL 0.1 MG/ML
0.2 INJECTION, SOLUTION INTRAVENOUS
Status: DISCONTINUED | OUTPATIENT
Start: 2021-10-19 | End: 2021-10-19 | Stop reason: HOSPADM

## 2021-10-19 RX ORDER — ONDANSETRON 2 MG/ML
4 INJECTION INTRAMUSCULAR; INTRAVENOUS
Status: COMPLETED | OUTPATIENT
Start: 2021-10-19 | End: 2021-10-19

## 2021-10-19 RX ADMIN — LIDOCAINE HYDROCHLORIDE 5 ML: 10 INJECTION, SOLUTION EPIDURAL; INFILTRATION; INTRACAUDAL; PERINEURAL at 10:04

## 2021-10-19 RX ADMIN — FENTANYL CITRATE 25 MCG: 50 INJECTION INTRAMUSCULAR; INTRAVENOUS at 09:54

## 2021-10-19 RX ADMIN — ONDANSETRON 4 MG: 2 INJECTION INTRAMUSCULAR; INTRAVENOUS at 10:01

## 2021-10-19 RX ADMIN — MIDAZOLAM 1 MG: 1 INJECTION INTRAMUSCULAR; INTRAVENOUS at 09:54

## 2021-10-19 RX ADMIN — SODIUM CHLORIDE: 9 INJECTION, SOLUTION INTRAVENOUS at 08:57

## 2021-10-19 NOTE — PROGRESS NOTES
discharge criteria met  Right LQ site intact. Denies pain. Tolerating food. Voiding. Ambulating without difficulty. Reviewed discharge instructions and signed paperwork. PIV removed. Ambulated to front door with 2A staff.

## 2021-10-19 NOTE — IR NOTE
Patient Name: Marlo Vee  Medical Record Number: [id number]  Today's Date: 10/19/2021    Procedure: Right Renal Biopsy  Proceduralist: PERNELL Zavala    Sedation start time: 0954  Sedation end time: 1007  Sedation medications administered: 1 mg versed, 25 mcg fentanyl  Total sedation time: 13 min    Procedure start time: 0954  Procedure end time: 1007    Report given to:  RN  : n/a    Other Notes: Pt arrived to IR room 6 from . Consent reviewed, pt confirmed. Pt denies any questions or concerns regarding procedure. Pt positioned supine and monitored per protocol. Site cleansed and dressed per protocol. Pt tolerated procedure without any noted complications. Pt transferred back to .     Renal Pathology terrell called at 0950 and will not be available to come to procedure.

## 2021-10-19 NOTE — PROGRESS NOTES
S/p right kidney biopsy. VSS. Denies pain. RLQ abdomin site intact. Denies nausea, eating and drinking. Bedrest x 3 hours.

## 2021-10-19 NOTE — DISCHARGE INSTRUCTIONS
[facility]    Interventional Radiology  Patient Instructions Following Kidney Biopsy    AFTER YOU GO HOME  ? If you were given sedation DO NOT drive or operate machinery at home or at work for at least 24 hours  ? DO relax and take it easy for 48 hours, no strenuous activity for 24 hours  ? DO drink plenty of fluids  ? DO resume your regular diet, unless otherwise instructed by your Primary Physician  ? Keep the dressing dry and in place for 24 hours.  ? DO NOT SMOKE FOR AT LEAST 24 HOURS, if you have been given any medications that were to help you relax or sedate you during your procedure  ? DO NOT drink alcoholic beverages the day of your procedure  ? DO NOT do any strenuous exercise or lifting (> 10 lbs) for at least 7 days following your procedure  ? DO NOT take a bath or shower for at least 12 hours following your procedure  ? Remove dressing after shower the next day. Replace with Band aid for 2 days.  Never leave a wet dressing in place.  ? DO NOT make any important or legal decisions for 24 hours following your procedure  ? There should be minimum drainage from the biopsy site    CALL THE PHYSICIAN IF:  ? You start bleeding from the procedure site.  If you do start to bleed from that site, lie down flat and hold pressure on the site for a minimum of 10 minutes.  Your physician will tell you if you need to return to the hospital  ? You develop nausea or vomiting  ? You have excessive swelling, redness, or tenderness at the site  ? You have drainage that looks like it is infected.  ? You experience severe pain  ? You develop hives or a rash or unexplained itching  ? You develop shortness of breath  ? You develop a temperature of 101 degrees F or greater  ? You develop bloody clots or red urine after you are discharged      ADDITIONAL INSTRUCTIONS  If you are taking Coumadin, restart tonight.  Follow up with your Coumadin Clinic or Primary Care MD to have your INR rechecked.    [facility]  INTERVENTIONAL RADIOLOGY DEPARTMENT  Procedure Physician:   Sigrid Zavala PA-C                                    Date of procedure: October 19, 2021  Telephone Numbers: [phone] Monday-Friday 8:00 am to 4:30 pm  [phone] After 4:30 pm Monday-Friday, Weekends & Holidays.   Ask for the Interventional Radiologist on call.  Someone is on call 24 hrs/day  [facility] toll free number: [phone] Monday-Friday 8:00 am to 4:30 pm  [facility] Emergency Dept: [phone]

## 2021-10-19 NOTE — PROCEDURES
[facility]    Procedure: IR Procedure Note    Date/Time: 10/19/2021 10:10 AM  Performed by: Sigrid Zavala PA-C  Authorized by: Sigrid Zavala PA-C     UNIVERSAL PROTOCOL   Site Marked: NA  Prior Images Obtained and Reviewed:  Yes  Required items: Required blood products, implants, devices and special equipment available    Patient identity confirmed:  Verbally with patient, arm band, provided demographic data and hospital-assigned identification number  Patient was reevaluated immediately before administering moderate or deep sedation or anesthesia  Confirmation Checklist:  Patient's identity using two indicators, relevant allergies, procedure was appropriate and matched the consent or emergent situation and correct equipment/implants were available  Time out: Immediately prior to the procedure a time out was called    Universal Protocol: the Joint Commission Universal Protocol was followed    Preparation: Patient was prepped and draped in usual sterile fashion           ANESTHESIA    Anesthesia: Local infiltration  Local Anesthetic:  Lidocaine 1% without epinephrine      SEDATION    Patient Sedated: Yes    Sedation:  Fentanyl and midazolam  Vital signs: Vital signs monitored during sedation    See dictated procedure note for full details.  Findings: 1 mg versed 25 mcg fentanyl    Specimens: core needle biopsy specimens sent for pathological analysis    Complications: None    Condition: Stable    PROCEDURE   Patient Tolerance:  Patient tolerated the procedure well with no immediate complications  Describe Procedure: Completed ultrasound-guided right lower quadrant kidney biopsy. 3 passes yielded 3 cores sent to pathology in preservative. Gelfoam in tract.    Length of time physician/provider present for 1:1 monitoring during sedation: 15

## 2021-10-19 NOTE — PRE-PROCEDURE
GENERAL PRE-PROCEDURE:     Written consent obtained?: Yes    Risks and benefits: Risks, benefits and alternatives were discussed    Consent given by:  Patient  Patient states understanding of procedure being performed: Yes    Patient's understanding of procedure matches consent: Yes    Procedure consent matches procedure scheduled: Yes    Expected level of sedation:  Moderate  Appropriately NPO:  Yes  Mallampati  :  Grade 3- soft palate visible, posterior pharyngeal wall not visible  Lungs:  Lungs clear with good breath sounds bilaterally  Heart:  Normal heart sounds and rate  History & Physical reviewed:  History and physical reviewed and no updates needed  Statement of review:  I have reviewed the lab findings, diagnostic data, medications, and the plan for sedation

## 2021-10-19 NOTE — PROGRESS NOTES
2A prep for Transplanted  Kidney Biopsy. Pt alert, oriented and  Aware of planned procedure. VSS. Denies pain. Appropriately NPO. Pt has  Active C-Diff on Enteric Isolation. Left 22G PIV placed and infusing. Labs pending. Wife, Salo will  pt when procedure complete.

## 2021-10-20 ENCOUNTER — TELEPHONE (OUTPATIENT)
Dept: TRANSPLANT | Facility: CLINIC | Age: 71
End: 2021-10-20

## 2021-10-20 ENCOUNTER — INFUSION THERAPY VISIT (OUTPATIENT)
Dept: INFUSION THERAPY | Facility: CLINIC | Age: 71
End: 2021-10-20
Attending: INTERNAL MEDICINE
Payer: MEDICARE

## 2021-10-20 DIAGNOSIS — Z94.0 KIDNEY REPLACED BY TRANSPLANT: ICD-10-CM

## 2021-10-20 DIAGNOSIS — Z91.199 FAILURE TO ATTEND APPOINTMENT: Primary | ICD-10-CM

## 2021-10-20 DIAGNOSIS — R19.7 DIARRHEA: ICD-10-CM

## 2021-10-20 DIAGNOSIS — Z48.298 AFTERCARE FOLLOWING ORGAN TRANSPLANT: Primary | ICD-10-CM

## 2021-10-20 DIAGNOSIS — Z48.298 AFTERCARE FOLLOWING ORGAN TRANSPLANT: ICD-10-CM

## 2021-10-20 DIAGNOSIS — Z94.0 KIDNEY TRANSPLANTED: ICD-10-CM

## 2021-10-20 LAB
BKV DNA # SPEC NAA+PROBE: NOT DETECTED COPIES/ML
DONOR IDENTIFICATION: NORMAL
DSA COMMENTS: NORMAL
DSA PRESENT: NO
DSA TEST METHOD: NORMAL
ORGAN: NORMAL
PATH REPORT.COMMENTS IMP SPEC: NORMAL
PATH REPORT.COMMENTS IMP SPEC: NORMAL
PATH REPORT.FINAL DX SPEC: NORMAL
PATH REPORT.GROSS SPEC: NORMAL
PATH REPORT.MICROSCOPIC SPEC OTHER STN: NORMAL
PATH REPORT.RELEVANT HX SPEC: NORMAL
PHOTO IMAGE: NORMAL
SA 1 CELL: NORMAL
SA 1 TEST METHOD: NORMAL
SA 2 CELL: NORMAL
SA 2 TEST METHOD: NORMAL
SA1 HI RISK ABY: NORMAL
SA1 MOD RISK ABY: NORMAL
SA2 HI RISK ABY: NORMAL
SA2 MOD RISK ABY: NORMAL
UNACCEPTABLE ANTIGENS: NORMAL
UNOS CPRA: 70
ZZZSA 1  COMMENTS: NORMAL
ZZZSA 2 COMMENTS: NORMAL

## 2021-10-20 PROCEDURE — 88350 IMFLUOR EA ADDL 1ANTB STN PX: CPT | Mod: 26 | Performed by: PATHOLOGY

## 2021-10-20 PROCEDURE — 88348 ELECTRON MICROSCOPY DX: CPT | Mod: 26 | Performed by: PATHOLOGY

## 2021-10-20 PROCEDURE — 88346 IMFLUOR 1ST 1ANTB STAIN PX: CPT | Mod: 26 | Performed by: PATHOLOGY

## 2021-10-20 PROCEDURE — 88313 SPECIAL STAINS GROUP 2: CPT | Mod: 26 | Performed by: PATHOLOGY

## 2021-10-20 PROCEDURE — 88305 TISSUE EXAM BY PATHOLOGIST: CPT | Mod: 26 | Performed by: PATHOLOGY

## 2021-10-20 RX ORDER — MYCOPHENOLIC ACID 180 MG/1
360 TABLET, DELAYED RELEASE ORAL 2 TIMES DAILY
Qty: 120 TABLET | Refills: 11 | Status: SHIPPED | OUTPATIENT
Start: 2021-10-20 | End: 2021-12-29 | Stop reason: ALTCHOICE

## 2021-10-20 NOTE — LETTER
10/20/2021         RE: Marlo Vee  [address] [facility] [place] [address]        Dear Colleague,    Thank you for referring your patient, Marlo Vee, to the [facility]. Please see a copy of my visit note below.      This encounter was opened in error. Please disregard.      Again, thank you for allowing me to participate in the care of your patient.        Sincerely,        [facility]

## 2021-10-20 NOTE — TELEPHONE ENCOUNTER
From: Samuel Varela MD   Sent: 10/20/2021   1:53 PM CDT   To: Adilia Richardson RN   Subject: FW: biopsy                                       Onelia,     Can you let the patient know.  This is good.  Let's decrease his mycophenolic acid to 360 mg bid, down from 540 mg bid.  I thought he might have been on lower already, but you can let me know.     Ramon     History of low level DSA    Samuel Varela MD Harris, Kathleen, RN  Yes, but with his ongoing viral infections, let's make the change.  Would repeat DSA in a couple of months and we can see where we are.  His UA from yesterday, I wondered if that was after the biopsy.  If prior, we may need to recheck it.         PLAN:  Call John:  Advise he decrease MPA to 360 mg BID. Will recheck DSA in 3 months.  Inquire if recent UA was done before biopsy or after.  Advise he continue to schedule 2-3 times weekly IVF's.    OUTCOME  Called John to discuss above plan.  He verbalized understanding to decrease Myfortic to 360 mg BID.  He reports UA was done AFTER biopsy-notified MD.  He will schedule IVF's for the next couple weeks.

## 2021-10-21 ENCOUNTER — INFUSION THERAPY VISIT (OUTPATIENT)
Dept: INFUSION THERAPY | Facility: CLINIC | Age: 71
End: 2021-10-21
Attending: INTERNAL MEDICINE
Payer: MEDICARE

## 2021-10-21 VITALS
RESPIRATION RATE: 16 BRPM | TEMPERATURE: 97.7 F | SYSTOLIC BLOOD PRESSURE: 126 MMHG | DIASTOLIC BLOOD PRESSURE: 86 MMHG | HEART RATE: 68 BPM

## 2021-10-21 DIAGNOSIS — Z94.0 KIDNEY REPLACED BY TRANSPLANT: ICD-10-CM

## 2021-10-21 DIAGNOSIS — R19.7 DIARRHEA OF PRESUMED INFECTIOUS ORIGIN: Primary | ICD-10-CM

## 2021-10-21 LAB
ANION GAP SERPL CALCULATED.3IONS-SCNC: 3 MMOL/L (ref 3–14)
BUN SERPL-MCNC: 26 MG/DL (ref 7–30)
CALCIUM SERPL-MCNC: 8.1 MG/DL (ref 8.5–10.1)
CHLORIDE BLD-SCNC: 118 MMOL/L (ref 94–109)
CO2 SERPL-SCNC: 23 MMOL/L (ref 20–32)
CREAT SERPL-MCNC: 1.49 MG/DL (ref 0.66–1.25)
GFR SERPL CREATININE-BSD FRML MDRD: 47 ML/MIN/1.73M2
GLUCOSE BLD-MCNC: 97 MG/DL (ref 70–99)
POTASSIUM BLD-SCNC: 3.6 MMOL/L (ref 3.4–5.3)
SODIUM SERPL-SCNC: 144 MMOL/L (ref 133–144)

## 2021-10-21 PROCEDURE — 36415 COLL VENOUS BLD VENIPUNCTURE: CPT

## 2021-10-21 PROCEDURE — 258N000003 HC RX IP 258 OP 636: Performed by: INTERNAL MEDICINE

## 2021-10-21 PROCEDURE — 80048 BASIC METABOLIC PNL TOTAL CA: CPT

## 2021-10-21 PROCEDURE — 96360 HYDRATION IV INFUSION INIT: CPT

## 2021-10-21 RX ORDER — DIPHENHYDRAMINE HYDROCHLORIDE 50 MG/ML
50 INJECTION INTRAMUSCULAR; INTRAVENOUS
Status: CANCELLED
Start: 2021-10-21

## 2021-10-21 RX ORDER — METHYLPREDNISOLONE SODIUM SUCCINATE 125 MG/2ML
125 INJECTION, POWDER, LYOPHILIZED, FOR SOLUTION INTRAMUSCULAR; INTRAVENOUS
Status: CANCELLED
Start: 2021-10-21

## 2021-10-21 RX ORDER — HEPARIN SODIUM,PORCINE 10 UNIT/ML
5 VIAL (ML) INTRAVENOUS
Status: CANCELLED | OUTPATIENT
Start: 2021-10-21

## 2021-10-21 RX ORDER — HEPARIN SODIUM (PORCINE) LOCK FLUSH IV SOLN 100 UNIT/ML 100 UNIT/ML
5 SOLUTION INTRAVENOUS
Status: CANCELLED | OUTPATIENT
Start: 2021-10-21

## 2021-10-21 RX ORDER — MEPERIDINE HYDROCHLORIDE 25 MG/ML
25 INJECTION INTRAMUSCULAR; INTRAVENOUS; SUBCUTANEOUS EVERY 30 MIN PRN
Status: CANCELLED | OUTPATIENT
Start: 2021-10-21

## 2021-10-21 RX ORDER — EPINEPHRINE 1 MG/ML
0.3 INJECTION, SOLUTION INTRAMUSCULAR; SUBCUTANEOUS EVERY 5 MIN PRN
Status: CANCELLED | OUTPATIENT
Start: 2021-10-21

## 2021-10-21 RX ORDER — NALOXONE HYDROCHLORIDE 0.4 MG/ML
0.2 INJECTION, SOLUTION INTRAMUSCULAR; INTRAVENOUS; SUBCUTANEOUS
Status: CANCELLED | OUTPATIENT
Start: 2021-10-21

## 2021-10-21 RX ORDER — ALBUTEROL SULFATE 90 UG/1
1-2 AEROSOL, METERED RESPIRATORY (INHALATION)
Status: CANCELLED
Start: 2021-10-21

## 2021-10-21 RX ORDER — ALBUTEROL SULFATE 0.83 MG/ML
2.5 SOLUTION RESPIRATORY (INHALATION)
Status: CANCELLED | OUTPATIENT
Start: 2021-10-21

## 2021-10-21 RX ADMIN — SODIUM CHLORIDE 1000 ML: 9 INJECTION, SOLUTION INTRAVENOUS at 11:15

## 2021-10-21 ASSESSMENT — PAIN SCALES - GENERAL: PAINLEVEL: NO PAIN (0)

## 2021-10-21 NOTE — PROGRESS NOTES
Infusion Nursing Note:   Marlo FLORES Mariusz presents today for IV hydration for ongoing dehydration r/t c.diff as ordered by Dr. Varela.    Patient seen by provider today: No   present during visit today: Not Applicable.    Note: N/A.      Intravenous Access:  Peripheral IV placed.        Post Infusion Assessment:  Patient tolerated infusion without incident.       Discharge Plan:   AVS to patient via MYCHART.  Patient will return tomorrow 10/22/2021 for next appointment.   Patient discharged in stable condition accompanied by: self.  Departure Mode: Ambulatory.    Administrations This Visit     0.9% sodium chloride BOLUS     Admin Date  10/21/2021 Action  New Bag Dose  1,000 mL Route  Intravenous Administered By  Iman Jones RN              Vitals:    10/21/21 1108 10/21/21 1238   BP: 130/83 126/86   Pulse: 71 68   Resp: 16    Temp: 97.7  F (36.5  C)    TempSrc: Oral        Iman Jones RN

## 2021-10-22 ENCOUNTER — INFUSION THERAPY VISIT (OUTPATIENT)
Dept: INFUSION THERAPY | Facility: CLINIC | Age: 71
End: 2021-10-22
Attending: INTERNAL MEDICINE
Payer: MEDICARE

## 2021-10-22 VITALS
TEMPERATURE: 97.5 F | DIASTOLIC BLOOD PRESSURE: 77 MMHG | HEART RATE: 56 BPM | SYSTOLIC BLOOD PRESSURE: 116 MMHG | RESPIRATION RATE: 16 BRPM

## 2021-10-22 DIAGNOSIS — Z94.0 KIDNEY REPLACED BY TRANSPLANT: ICD-10-CM

## 2021-10-22 DIAGNOSIS — R19.7 DIARRHEA OF PRESUMED INFECTIOUS ORIGIN: Primary | ICD-10-CM

## 2021-10-22 LAB
ANION GAP SERPL CALCULATED.3IONS-SCNC: 3 MMOL/L (ref 3–14)
BUN SERPL-MCNC: 24 MG/DL (ref 7–30)
CALCIUM SERPL-MCNC: 7.8 MG/DL (ref 8.5–10.1)
CHLORIDE BLD-SCNC: 118 MMOL/L (ref 94–109)
CO2 SERPL-SCNC: 22 MMOL/L (ref 20–32)
CREAT SERPL-MCNC: 1.34 MG/DL (ref 0.66–1.25)
GFR SERPL CREATININE-BSD FRML MDRD: 54 ML/MIN/1.73M2
GLUCOSE BLD-MCNC: 101 MG/DL (ref 70–99)
POTASSIUM BLD-SCNC: 3.6 MMOL/L (ref 3.4–5.3)
SODIUM SERPL-SCNC: 143 MMOL/L (ref 133–144)

## 2021-10-22 PROCEDURE — 96360 HYDRATION IV INFUSION INIT: CPT

## 2021-10-22 PROCEDURE — 36415 COLL VENOUS BLD VENIPUNCTURE: CPT

## 2021-10-22 PROCEDURE — 258N000003 HC RX IP 258 OP 636: Performed by: INTERNAL MEDICINE

## 2021-10-22 PROCEDURE — 80048 BASIC METABOLIC PNL TOTAL CA: CPT

## 2021-10-22 RX ORDER — DIPHENHYDRAMINE HYDROCHLORIDE 50 MG/ML
50 INJECTION INTRAMUSCULAR; INTRAVENOUS
Status: CANCELLED
Start: 2021-10-22

## 2021-10-22 RX ORDER — MEPERIDINE HYDROCHLORIDE 25 MG/ML
25 INJECTION INTRAMUSCULAR; INTRAVENOUS; SUBCUTANEOUS EVERY 30 MIN PRN
Status: CANCELLED | OUTPATIENT
Start: 2021-10-22

## 2021-10-22 RX ORDER — HEPARIN SODIUM (PORCINE) LOCK FLUSH IV SOLN 100 UNIT/ML 100 UNIT/ML
5 SOLUTION INTRAVENOUS
Status: CANCELLED | OUTPATIENT
Start: 2021-10-22

## 2021-10-22 RX ORDER — ALBUTEROL SULFATE 90 UG/1
1-2 AEROSOL, METERED RESPIRATORY (INHALATION)
Status: CANCELLED
Start: 2021-10-22

## 2021-10-22 RX ORDER — NALOXONE HYDROCHLORIDE 0.4 MG/ML
0.2 INJECTION, SOLUTION INTRAMUSCULAR; INTRAVENOUS; SUBCUTANEOUS
Status: CANCELLED | OUTPATIENT
Start: 2021-10-22

## 2021-10-22 RX ORDER — ALBUTEROL SULFATE 0.83 MG/ML
2.5 SOLUTION RESPIRATORY (INHALATION)
Status: CANCELLED | OUTPATIENT
Start: 2021-10-22

## 2021-10-22 RX ORDER — HEPARIN SODIUM,PORCINE 10 UNIT/ML
5 VIAL (ML) INTRAVENOUS
Status: CANCELLED | OUTPATIENT
Start: 2021-10-22

## 2021-10-22 RX ORDER — METHYLPREDNISOLONE SODIUM SUCCINATE 125 MG/2ML
125 INJECTION, POWDER, LYOPHILIZED, FOR SOLUTION INTRAMUSCULAR; INTRAVENOUS
Status: CANCELLED
Start: 2021-10-22

## 2021-10-22 RX ORDER — EPINEPHRINE 1 MG/ML
0.3 INJECTION, SOLUTION INTRAMUSCULAR; SUBCUTANEOUS EVERY 5 MIN PRN
Status: CANCELLED | OUTPATIENT
Start: 2021-10-22

## 2021-10-22 RX ADMIN — SODIUM CHLORIDE 1000 ML: 9 INJECTION, SOLUTION INTRAVENOUS at 11:11

## 2021-10-22 NOTE — LETTER
10/22/2021         RE: Marlo Vee  [address] [facility] [address]        Dear Colleague,    Thank you for referring your patient, Marlo Vee, to the [facility]. Please see a copy of my visit note below.    Chief Complaint   Patient presents with     Infusion     IV Hydration     Infusion Nursing Note:  Marlo Vee presents today for IV hydration r/t c.diff diarrhea.    Patient seen by provider today: No   present during visit today: Not Applicable.    Note: N/A.      Intravenous Access:  Peripheral IV placed.        Post Infusion Assessment:  Patient tolerated infusion without incident.       Discharge Plan:   AVS to patient via MYCHART.    Patient discharged in stable condition accompanied by: self.  Departure Mode: Ambulatory.  Administrations This Visit     0.9% sodium chloride BOLUS     Admin Date  10/22/2021 Action  New Bag Dose  1,000 mL Route  Intravenous Administered By  Iman Jones, RN            /77 (BP Location: Right arm)   Pulse 56   Temp 97.5  F (36.4  C) (Oral)   Resp 16     Iman Jones RN                        Again, thank you for allowing me to participate in the care of your patient.        Sincerely,        [facility]

## 2021-10-22 NOTE — TELEPHONE ENCOUNTER
RECORDS RECEIVED FROM: Internal   DATE RECEIVED: 11.05.2021    NOTES (Gather within 2 years) STATUS DETAILS   OFFICE NOTE from referring provider   Internal 09.24.2021 Sally Dumont APRN CNP   OFFICE NOTE from other specialist N/A    DISCHARGE SUMMARY from hospital N/A    DISCHARGE REPORT from the ER N/A    LABS (any labs) Internal    MEDICATION LIST Internal    IMAGING  (NEED IMAGES AND REPORTS)     Osteomyelitis: Foot imaging  N/A    Liver Abscess: Abdominal imaging N/A    Other (anything related to diagnoses N/A

## 2021-10-22 NOTE — PROGRESS NOTES
Chief Complaint   Patient presents with     Infusion     IV Hydration     Infusion Nursing Note:  Marlo Boweryaneliannalise presents today for IV hydration r/t c.diff diarrhea.    Patient seen by provider today: No   present during visit today: Not Applicable.    Note: N/A.      Intravenous Access:  Peripheral IV placed.        Post Infusion Assessment:  Patient tolerated infusion without incident.       Discharge Plan:   AVS to patient via MYCHART.    Patient discharged in stable condition accompanied by: self.  Departure Mode: Ambulatory.  Administrations This Visit     0.9% sodium chloride BOLUS     Admin Date  10/22/2021 Action  New Bag Dose  1,000 mL Route  Intravenous Administered By  Iman Jones, RN            /77 (BP Location: Right arm)   Pulse 56   Temp 97.5  F (36.4  C) (Oral)   Resp 16     Iman Jones RN

## 2021-10-22 NOTE — PATIENT INSTRUCTIONS
Patient Education   About C. diff Infection  For Patients, Family and Visitors  What is C. diff (clostridium difficile)?  C. diff are germs (bacteria). These germs can live in the guts of healthy people. Antibiotic medicine can change the balance of germs in the gut, causing C. diff infection and loose, watery stools (diarrhea).  You can also get C. diff infection during a hospital stay, after surgery, or if you have a weak immune system or IBD (inflammatory bowel disease).  For a video, visit https://youtu.be/[email or web address].  What are the symptoms?  If you have these symptoms, your doctor will ask for a stool (poop) sample for testing:    Diarrhea (loose, watery stools)    Belly pain, tenderness and cramping    Fever  How does it spread?  C. diff safely leaves your body as part of your stool. However, it can make you ill if:  1. You touch a surface that has C. diff germs, then  2. You touch food or objects that go in your mouth.  How can you prevent C. diff infections?     Wash hands often, especially in the hospital, after using the bathroom and before you eat.    Use antibiotics only when you need them. Don't ask for them if your doctor says you have a virus.    If you take antibiotics, follow the directions. Finish all the pills, even if you feel better.    If you have C. diff infection, try to use a separate restroom until you are well.    At home, clean countertops, sinks, faucets, bathroom doorknobs and toilets often. Use warm water with soap or cleaning products with bleach. (Don't use pure bleach. It's too strong.)    In the hospital, your care team should wear gloves and gowns. They should clean their hands before touching you and before leaving the room. If they don't, please remind them.  Hand washing  For this illness, soap and water works better than hand .    Wash hands with warm water and plenty of soap. Wash for 15 to 20 seconds.    Clean under nails, between fingers and up the  wrists.    Rinse hands, letting water run down your fingers.    Dry hands well. Use a paper towel to turn off the faucet and open the door.   How is it treated?  Your doctor may change your antibiotics and give you medicine for diarrhea. Don't take other medicine for diarrhea. They will make things worse.  You may get extra fluids through an IV (small tube in the arm or hand).   Sometimes, the infection comes back. If symptoms return, please call your doctor.   For informational purposes only. Not to replace the advice of your health care provider. Copyright   2014 [employer]. All rights reserved. [employer] 485812 - REV 09/17.

## 2021-10-24 DIAGNOSIS — E87.6 HYPOKALEMIA: ICD-10-CM

## 2021-10-24 DIAGNOSIS — Z48.298 AFTERCARE FOLLOWING ORGAN TRANSPLANT: ICD-10-CM

## 2021-10-24 DIAGNOSIS — Z94.0 KIDNEY TRANSPLANTED: ICD-10-CM

## 2021-10-27 ENCOUNTER — INFUSION THERAPY VISIT (OUTPATIENT)
Dept: INFUSION THERAPY | Facility: CLINIC | Age: 71
End: 2021-10-27
Attending: INTERNAL MEDICINE
Payer: MEDICARE

## 2021-10-27 VITALS
SYSTOLIC BLOOD PRESSURE: 130 MMHG | TEMPERATURE: 97.7 F | DIASTOLIC BLOOD PRESSURE: 76 MMHG | RESPIRATION RATE: 16 BRPM | OXYGEN SATURATION: 100 % | HEART RATE: 70 BPM

## 2021-10-27 DIAGNOSIS — Z94.0 KIDNEY REPLACED BY TRANSPLANT: ICD-10-CM

## 2021-10-27 DIAGNOSIS — R19.7 DIARRHEA OF PRESUMED INFECTIOUS ORIGIN: Primary | ICD-10-CM

## 2021-10-27 LAB
ANION GAP SERPL CALCULATED.3IONS-SCNC: 6 MMOL/L (ref 3–14)
BUN SERPL-MCNC: 30 MG/DL (ref 7–30)
CALCIUM SERPL-MCNC: 8.3 MG/DL (ref 8.5–10.1)
CHLORIDE BLD-SCNC: 115 MMOL/L (ref 94–109)
CO2 SERPL-SCNC: 22 MMOL/L (ref 20–32)
CREAT SERPL-MCNC: 1.68 MG/DL (ref 0.66–1.25)
GFR SERPL CREATININE-BSD FRML MDRD: 41 ML/MIN/1.73M2
GLUCOSE BLD-MCNC: 94 MG/DL (ref 70–99)
POTASSIUM BLD-SCNC: 3.7 MMOL/L (ref 3.4–5.3)
SODIUM SERPL-SCNC: 143 MMOL/L (ref 133–144)

## 2021-10-27 PROCEDURE — 258N000003 HC RX IP 258 OP 636: Performed by: INTERNAL MEDICINE

## 2021-10-27 PROCEDURE — 80048 BASIC METABOLIC PNL TOTAL CA: CPT

## 2021-10-27 PROCEDURE — 36415 COLL VENOUS BLD VENIPUNCTURE: CPT

## 2021-10-27 PROCEDURE — 96360 HYDRATION IV INFUSION INIT: CPT

## 2021-10-27 RX ORDER — METHYLPREDNISOLONE SODIUM SUCCINATE 125 MG/2ML
125 INJECTION, POWDER, LYOPHILIZED, FOR SOLUTION INTRAMUSCULAR; INTRAVENOUS
Status: CANCELLED
Start: 2021-10-27

## 2021-10-27 RX ORDER — POTASSIUM CHLORIDE 1500 MG/1
TABLET, EXTENDED RELEASE ORAL
Qty: 30 TABLET | Refills: 11 | Status: SHIPPED | OUTPATIENT
Start: 2021-10-27 | End: 2021-12-13

## 2021-10-27 RX ORDER — HEPARIN SODIUM (PORCINE) LOCK FLUSH IV SOLN 100 UNIT/ML 100 UNIT/ML
5 SOLUTION INTRAVENOUS
Status: CANCELLED | OUTPATIENT
Start: 2021-10-27

## 2021-10-27 RX ORDER — HEPARIN SODIUM,PORCINE 10 UNIT/ML
5 VIAL (ML) INTRAVENOUS
Status: CANCELLED | OUTPATIENT
Start: 2021-10-27

## 2021-10-27 RX ORDER — EPINEPHRINE 1 MG/ML
0.3 INJECTION, SOLUTION INTRAMUSCULAR; SUBCUTANEOUS EVERY 5 MIN PRN
Status: CANCELLED | OUTPATIENT
Start: 2021-10-27

## 2021-10-27 RX ORDER — MEPERIDINE HYDROCHLORIDE 25 MG/ML
25 INJECTION INTRAMUSCULAR; INTRAVENOUS; SUBCUTANEOUS EVERY 30 MIN PRN
Status: CANCELLED | OUTPATIENT
Start: 2021-10-27

## 2021-10-27 RX ORDER — ALBUTEROL SULFATE 90 UG/1
1-2 AEROSOL, METERED RESPIRATORY (INHALATION)
Status: CANCELLED
Start: 2021-10-27

## 2021-10-27 RX ORDER — DIPHENHYDRAMINE HYDROCHLORIDE 50 MG/ML
50 INJECTION INTRAMUSCULAR; INTRAVENOUS
Status: CANCELLED
Start: 2021-10-27

## 2021-10-27 RX ORDER — NALOXONE HYDROCHLORIDE 0.4 MG/ML
0.2 INJECTION, SOLUTION INTRAMUSCULAR; INTRAVENOUS; SUBCUTANEOUS
Status: CANCELLED | OUTPATIENT
Start: 2021-10-27

## 2021-10-27 RX ORDER — ALBUTEROL SULFATE 0.83 MG/ML
2.5 SOLUTION RESPIRATORY (INHALATION)
Status: CANCELLED | OUTPATIENT
Start: 2021-10-27

## 2021-10-27 RX ADMIN — SODIUM CHLORIDE 1000 ML: 9 INJECTION, SOLUTION INTRAVENOUS at 09:13

## 2021-10-27 NOTE — PROGRESS NOTES
Nursing Note  Marlo Vee [facility]:   Chief Complaint   Patient presents with     Infusion     IVF     During [facility]'s [facility] appointment, orders from Dr. Varela were completed.  Frequency: three times weekly    Progress note:  Patient identification verified by name and date of birth.  Assessment completed.  Vitals recorded in Doc Flowsheets.  Patient was provided with education regarding medication/procedure and possible side effects.  Patient verbalized understanding.     present during visit today: Not Applicable.    Treatment Conditions: Non-applicable.    Premedications: were not ordered.    Drug Waste Record: No    Infusion length and rate:  infusion given over approximately 60 minutes    Labs: were drawn per orders.     Vascular access: peripheral IV placed today.    Is the next appt scheduled? yes  Asymptomatic COVID test completed? no    Post Infusion Assessment:  Patient tolerated infusion without incident.     Discharge Plan:   Follow up plan of care with: ongoing infusions at [facility]. and ordering provider as scheduled.  Discharge instructions were reviewed with patient.  Patient/representative verbalized understanding of discharge instructions and all questions answered.  Patient discharged from [facility] in stable condition.    Yuki Diehl RN    Administrations This Visit     0.9% sodium chloride BOLUS     Admin Date  10/27/2021 Action  New Bag Dose  1,000 mL Route  Intravenous Administered By  Yuki Diehl RN                /76 (BP Location: Right arm)   Pulse 70   Temp 97.7  F (36.5  C) (Oral)   Resp 16   SpO2 100%

## 2021-10-27 NOTE — LETTER
10/27/2021         RE: Marlo Vee  [address] [facility] [address]        Dear Colleague,    Thank you for referring your patient, Marlo Vee, to the [facility]. Please see a copy of my visit note below.    Nursing Note  Marlo Vee presents today to [facility] for:   Chief Complaint   Patient presents with     Infusion     IVF     During [facility]'s [facility] appointment, orders from Dr. Varela were completed.  Frequency: three times weekly    Progress note:  Patient identification verified by name and date of birth.  Assessment completed.  Vitals recorded in Doc Flowsheets.  Patient was provided with education regarding medication/procedure and possible side effects.  Patient verbalized understanding.     present during visit today: Not Applicable.    Treatment Conditions: Non-applicable.    Premedications: were not ordered.    Drug Waste Record: No    Infusion length and rate:  infusion given over approximately 60 minutes    Labs: were drawn per orders.     Vascular access: peripheral IV placed today.    Is the next appt scheduled? yes  Asymptomatic COVID test completed? no    Post Infusion Assessment:  Patient tolerated infusion without incident.     Discharge Plan:   Follow up plan of care with: ongoing infusions at [facility]. and ordering provider as scheduled.  Discharge instructions were reviewed with patient.  Patient/representative verbalized understanding of discharge instructions and all questions answered.  Patient discharged from [facility] in stable condition.    Yuki Diehl RN    Administrations This Visit     0.9% sodium chloride BOLUS     Admin Date  10/27/2021 Action  New Bag Dose  1,000 mL Route  Intravenous Administered By  Yuki Diehl RN                /76 (BP Location: Right  arm)   Pulse 70   Temp 97.7  F (36.5  C) (Oral)   Resp 16   SpO2 100%           Again, thank you for allowing me to participate in the care of your patient.        Sincerely,        [facility]

## 2021-10-28 ENCOUNTER — INFUSION THERAPY VISIT (OUTPATIENT)
Dept: INFUSION THERAPY | Facility: CLINIC | Age: 71
End: 2021-10-28
Attending: INTERNAL MEDICINE
Payer: MEDICARE

## 2021-10-28 VITALS
HEART RATE: 59 BPM | SYSTOLIC BLOOD PRESSURE: 124 MMHG | DIASTOLIC BLOOD PRESSURE: 67 MMHG | TEMPERATURE: 97.3 F | RESPIRATION RATE: 16 BRPM

## 2021-10-28 DIAGNOSIS — R19.7 DIARRHEA OF PRESUMED INFECTIOUS ORIGIN: Primary | ICD-10-CM

## 2021-10-28 DIAGNOSIS — Z94.0 KIDNEY REPLACED BY TRANSPLANT: ICD-10-CM

## 2021-10-28 LAB
ANION GAP SERPL CALCULATED.3IONS-SCNC: 6 MMOL/L (ref 3–14)
BUN SERPL-MCNC: 24 MG/DL (ref 7–30)
CALCIUM SERPL-MCNC: 8.1 MG/DL (ref 8.5–10.1)
CHLORIDE BLD-SCNC: 117 MMOL/L (ref 94–109)
CO2 SERPL-SCNC: 19 MMOL/L (ref 20–32)
CREAT SERPL-MCNC: 1.66 MG/DL (ref 0.66–1.25)
GFR SERPL CREATININE-BSD FRML MDRD: 41 ML/MIN/1.73M2
GLUCOSE BLD-MCNC: 107 MG/DL (ref 70–99)
POTASSIUM BLD-SCNC: 3.7 MMOL/L (ref 3.4–5.3)
SODIUM SERPL-SCNC: 142 MMOL/L (ref 133–144)

## 2021-10-28 PROCEDURE — 258N000003 HC RX IP 258 OP 636: Performed by: INTERNAL MEDICINE

## 2021-10-28 PROCEDURE — 96360 HYDRATION IV INFUSION INIT: CPT

## 2021-10-28 PROCEDURE — 36415 COLL VENOUS BLD VENIPUNCTURE: CPT

## 2021-10-28 PROCEDURE — 80048 BASIC METABOLIC PNL TOTAL CA: CPT

## 2021-10-28 RX ORDER — ALBUTEROL SULFATE 90 UG/1
1-2 AEROSOL, METERED RESPIRATORY (INHALATION)
Status: CANCELLED
Start: 2021-10-28

## 2021-10-28 RX ORDER — EPINEPHRINE 1 MG/ML
0.3 INJECTION, SOLUTION INTRAMUSCULAR; SUBCUTANEOUS EVERY 5 MIN PRN
Status: CANCELLED | OUTPATIENT
Start: 2021-10-28

## 2021-10-28 RX ORDER — NALOXONE HYDROCHLORIDE 0.4 MG/ML
0.2 INJECTION, SOLUTION INTRAMUSCULAR; INTRAVENOUS; SUBCUTANEOUS
Status: CANCELLED | OUTPATIENT
Start: 2021-10-28

## 2021-10-28 RX ORDER — METHYLPREDNISOLONE SODIUM SUCCINATE 125 MG/2ML
125 INJECTION, POWDER, LYOPHILIZED, FOR SOLUTION INTRAMUSCULAR; INTRAVENOUS
Status: CANCELLED
Start: 2021-10-28

## 2021-10-28 RX ORDER — HEPARIN SODIUM (PORCINE) LOCK FLUSH IV SOLN 100 UNIT/ML 100 UNIT/ML
5 SOLUTION INTRAVENOUS
Status: CANCELLED | OUTPATIENT
Start: 2021-10-28

## 2021-10-28 RX ORDER — DIPHENHYDRAMINE HYDROCHLORIDE 50 MG/ML
50 INJECTION INTRAMUSCULAR; INTRAVENOUS
Status: CANCELLED
Start: 2021-10-28

## 2021-10-28 RX ORDER — HEPARIN SODIUM,PORCINE 10 UNIT/ML
5 VIAL (ML) INTRAVENOUS
Status: CANCELLED | OUTPATIENT
Start: 2021-10-28

## 2021-10-28 RX ORDER — MEPERIDINE HYDROCHLORIDE 25 MG/ML
25 INJECTION INTRAMUSCULAR; INTRAVENOUS; SUBCUTANEOUS EVERY 30 MIN PRN
Status: CANCELLED | OUTPATIENT
Start: 2021-10-28

## 2021-10-28 RX ORDER — ALBUTEROL SULFATE 0.83 MG/ML
2.5 SOLUTION RESPIRATORY (INHALATION)
Status: CANCELLED | OUTPATIENT
Start: 2021-10-28

## 2021-10-28 RX ADMIN — SODIUM CHLORIDE 1000 ML: 9 INJECTION, SOLUTION INTRAVENOUS at 11:24

## 2021-10-28 NOTE — PATIENT INSTRUCTIONS
Dear Marlo Vee    Thank you for choosing [facility] ([facility]) for your IV fluids infusion.  The following information is a summary of our appointment as well as important reminders.      We look forward in seeing you on your next appointment here at [facility] ([facility]).  Please don t hesitate to call us at [phone] to reschedule any of your appointments or to speak with one of the [facility] registered nurses.  It was a pleasure taking care of you today.    Sincerely,    [facility]  [facility]  [address] [place] [address]  [address]  Phone:  [phone]

## 2021-10-28 NOTE — PROGRESS NOTES
Chief Complaint   Patient presents with     Infusion     IV Hydration       Infusion Nursing Note:  Marlo FLORES Angelaernst presents today for IV hydration.    Patient seen by provider today: No   present during visit today: Not Applicable.      Intravenous Access:  Peripheral IV placed.    Treatment Conditions:  Not Applicable.      Post Infusion Assessment:  Patient tolerated infusion without incident.       Discharge Plan:   AVS to patient via MYCHART.  Patient will return tomorrow 10/28/2021 for next appointment.   Patient discharged in stable condition accompanied by: self.  Departure Mode: Ambulatory.      Iman Jones RN

## 2021-10-28 NOTE — LETTER
10/28/2021         RE: Marlo Vee  [address] [facility] [address]        Dear Colleague,    Thank you for referring your patient, Marlo Vee, to the [facility]. Please see a copy of my visit note below.    Chief Complaint   Patient presents with     Infusion     IV Hydration       Infusion Nursing Note:  Marlo Vee presents today for IV hydration.    Patient seen by provider today: No   present during visit today: Not Applicable.      Intravenous Access:  Peripheral IV placed.    Treatment Conditions:  Not Applicable.      Post Infusion Assessment:  Patient tolerated infusion without incident.       Discharge Plan:   AVS to patient via [employer].  Patient will return tomorrow 10/28/2021 for next appointment.   Patient discharged in stable condition accompanied by: self.  Departure Mode: Ambulatory.      Iman Jones RN                          Again, thank you for allowing me to participate in the care of your patient.        Sincerely,        [facility]

## 2021-10-29 ENCOUNTER — INFUSION THERAPY VISIT (OUTPATIENT)
Dept: INFUSION THERAPY | Facility: CLINIC | Age: 71
End: 2021-10-29
Attending: INTERNAL MEDICINE
Payer: MEDICARE

## 2021-10-29 VITALS
HEART RATE: 70 BPM | OXYGEN SATURATION: 100 % | TEMPERATURE: 97.8 F | RESPIRATION RATE: 16 BRPM | DIASTOLIC BLOOD PRESSURE: 80 MMHG | SYSTOLIC BLOOD PRESSURE: 124 MMHG

## 2021-10-29 DIAGNOSIS — R19.7 DIARRHEA OF PRESUMED INFECTIOUS ORIGIN: Primary | ICD-10-CM

## 2021-10-29 DIAGNOSIS — Z94.0 KIDNEY REPLACED BY TRANSPLANT: ICD-10-CM

## 2021-10-29 LAB
ANION GAP SERPL CALCULATED.3IONS-SCNC: 5 MMOL/L (ref 3–14)
BUN SERPL-MCNC: 23 MG/DL (ref 7–30)
CALCIUM SERPL-MCNC: 7.9 MG/DL (ref 8.5–10.1)
CHLORIDE BLD-SCNC: 116 MMOL/L (ref 94–109)
CO2 SERPL-SCNC: 20 MMOL/L (ref 20–32)
CREAT SERPL-MCNC: 1.4 MG/DL (ref 0.66–1.25)
GFR SERPL CREATININE-BSD FRML MDRD: 51 ML/MIN/1.73M2
GLUCOSE BLD-MCNC: 102 MG/DL (ref 70–99)
POTASSIUM BLD-SCNC: 3.4 MMOL/L (ref 3.4–5.3)
SODIUM SERPL-SCNC: 141 MMOL/L (ref 133–144)

## 2021-10-29 PROCEDURE — 36415 COLL VENOUS BLD VENIPUNCTURE: CPT

## 2021-10-29 PROCEDURE — 80048 BASIC METABOLIC PNL TOTAL CA: CPT

## 2021-10-29 PROCEDURE — 258N000003 HC RX IP 258 OP 636: Performed by: INTERNAL MEDICINE

## 2021-10-29 PROCEDURE — 96360 HYDRATION IV INFUSION INIT: CPT

## 2021-10-29 RX ORDER — EPINEPHRINE 1 MG/ML
0.3 INJECTION, SOLUTION INTRAMUSCULAR; SUBCUTANEOUS EVERY 5 MIN PRN
Status: CANCELLED | OUTPATIENT
Start: 2021-10-29

## 2021-10-29 RX ORDER — NALOXONE HYDROCHLORIDE 0.4 MG/ML
0.2 INJECTION, SOLUTION INTRAMUSCULAR; INTRAVENOUS; SUBCUTANEOUS
Status: CANCELLED | OUTPATIENT
Start: 2021-10-29

## 2021-10-29 RX ORDER — DIPHENHYDRAMINE HYDROCHLORIDE 50 MG/ML
50 INJECTION INTRAMUSCULAR; INTRAVENOUS
Status: CANCELLED
Start: 2021-10-29

## 2021-10-29 RX ORDER — HEPARIN SODIUM (PORCINE) LOCK FLUSH IV SOLN 100 UNIT/ML 100 UNIT/ML
5 SOLUTION INTRAVENOUS
Status: CANCELLED | OUTPATIENT
Start: 2021-10-29

## 2021-10-29 RX ORDER — ALBUTEROL SULFATE 0.83 MG/ML
2.5 SOLUTION RESPIRATORY (INHALATION)
Status: CANCELLED | OUTPATIENT
Start: 2021-10-29

## 2021-10-29 RX ORDER — METHYLPREDNISOLONE SODIUM SUCCINATE 125 MG/2ML
125 INJECTION, POWDER, LYOPHILIZED, FOR SOLUTION INTRAMUSCULAR; INTRAVENOUS
Status: CANCELLED
Start: 2021-10-29

## 2021-10-29 RX ORDER — HEPARIN SODIUM,PORCINE 10 UNIT/ML
5 VIAL (ML) INTRAVENOUS
Status: CANCELLED | OUTPATIENT
Start: 2021-10-29

## 2021-10-29 RX ORDER — ALBUTEROL SULFATE 90 UG/1
1-2 AEROSOL, METERED RESPIRATORY (INHALATION)
Status: CANCELLED
Start: 2021-10-29

## 2021-10-29 RX ORDER — MEPERIDINE HYDROCHLORIDE 25 MG/ML
25 INJECTION INTRAMUSCULAR; INTRAVENOUS; SUBCUTANEOUS EVERY 30 MIN PRN
Status: CANCELLED | OUTPATIENT
Start: 2021-10-29

## 2021-10-29 RX ADMIN — SODIUM CHLORIDE 1000 ML: 9 INJECTION, SOLUTION INTRAVENOUS at 08:49

## 2021-10-29 NOTE — LETTER
10/29/2021         RE: Marlo Vee  [address] [facility] [place] [address]        Dear Colleague,    Thank you for referring your patient, Marlo Vee, to the [facility]. Please see a copy of my visit note below.    Nursing Note  Marlo Vee [facility]:   Chief Complaint   Patient presents with     Infusion     IVF     During [facility]'s [facility] appointment, orders from Dr. Varela were completed.  Frequency: three times weekly    Progress note:  Patient identification verified by name and date of birth.  Assessment completed.  Vitals recorded in Doc Flowsheets.  Patient was provided with education regarding medication/procedure and possible side effects.  Patient verbalized understanding.     present during visit today: Not Applicable.    Treatment Conditions: Non-applicable.    Premedications: were not ordered.    Drug Waste Record: No    Infusion length and rate:  infusion given over approximately 60 minutes    Labs: BMP drawn post infusion per orders.    Vascular access: peripheral IV placed today.    Is the next appt scheduled? 11/3  Asymptomatic COVID test completed? NA    Post Infusion Assessment:  Patient tolerated infusion without incident.     Discharge Plan:   Follow up plan of care with: ongoing infusions at [facility]., ordering provider as scheduled. and after visit summary declined by patient  Discharge instructions were reviewed with patient.  Patient/representative verbalized understanding of discharge instructions and all questions answered.  Patient discharged from [facility] in stable condition.    Sylvie Vela RN    Administrations This Visit     0.9% sodium chloride BOLUS     Admin Date  10/29/2021 Action  New Bag Dose  1,000 mL Route  Intravenous Administered By  Gerardo  HEATHER Mercer                /80   Pulse 70   Temp 97.8  F (36.6  C) (Oral)   Resp 16   SpO2 100%         Again, thank you for allowing me to participate in the care of your patient.        Sincerely,        [facility]

## 2021-10-29 NOTE — PATIENT INSTRUCTIONS
Dear Marlo Vee    Thank you for choosing [facility] ([facility]) for your IV fluid infusion.  The following information is a summary of our appointment as well as important reminders.      We look forward in seeing you on your next appointment here at [facility] ([facility]).  Please don t hesitate to call us at [phone] to reschedule any of your appointments or to speak with one of the [facility] registered nurses.  It was a pleasure taking care of you today.    Sincerely,    [facility]  [address] [place] [address]  [address]  Phone:  [phone]

## 2021-10-29 NOTE — PROGRESS NOTES
Nursing Note  Mralo Vee presents [facility] for:   Chief Complaint   Patient presents with     Infusion     IVF     During [facility]'s [facility] appointment, orders from Dr. Varela were completed.  Frequency: three times weekly    Progress note:  Patient identification verified by name and date of birth.  Assessment completed.  Vitals recorded in Doc Flowsheets.  Patient was provided with education regarding medication/procedure and possible side effects.  Patient verbalized understanding.     present during visit today: Not Applicable.    Treatment Conditions: Non-applicable.    Premedications: were not ordered.    Drug Waste Record: No    Infusion length and rate:  infusion given over approximately 60 minutes    Labs: BMP drawn post infusion per orders.    Vascular access: peripheral IV placed today.    Is the next appt scheduled? 11/3  Asymptomatic COVID test completed? NA    Post Infusion Assessment:  Patient tolerated infusion without incident.     Discharge Plan:   Follow up plan of care with: ongoing infusions at [facility]., ordering provider as scheduled. and after visit summary declined by patient  Discharge instructions were reviewed with patient.  Patient/representative verbalized understanding of discharge instructions and all questions answered.  Patient discharged from [facility] in stable condition.    Sylvie Carrillo RN    Administrations This Visit     0.9% sodium chloride BOLUS     Admin Date  10/29/2021 Action  New Bag Dose  1,000 mL Route  Intravenous Administered By  Sylvie Carrillo RN                /80   Pulse 70   Temp 97.8  F (36.6  C) (Oral)   Resp 16   SpO2 100%

## 2021-11-03 ENCOUNTER — INFUSION THERAPY VISIT (OUTPATIENT)
Dept: INFUSION THERAPY | Facility: CLINIC | Age: 71
End: 2021-11-03
Attending: INTERNAL MEDICINE
Payer: MEDICARE

## 2021-11-03 VITALS
TEMPERATURE: 97.3 F | DIASTOLIC BLOOD PRESSURE: 82 MMHG | OXYGEN SATURATION: 100 % | RESPIRATION RATE: 16 BRPM | HEART RATE: 67 BPM | SYSTOLIC BLOOD PRESSURE: 146 MMHG

## 2021-11-03 DIAGNOSIS — Z94.0 KIDNEY REPLACED BY TRANSPLANT: ICD-10-CM

## 2021-11-03 DIAGNOSIS — R19.7 DIARRHEA OF PRESUMED INFECTIOUS ORIGIN: Primary | ICD-10-CM

## 2021-11-03 LAB
ANION GAP SERPL CALCULATED.3IONS-SCNC: 5 MMOL/L (ref 3–14)
BUN SERPL-MCNC: 24 MG/DL (ref 7–30)
CALCIUM SERPL-MCNC: 8.5 MG/DL (ref 8.5–10.1)
CHLORIDE BLD-SCNC: 117 MMOL/L (ref 94–109)
CO2 SERPL-SCNC: 19 MMOL/L (ref 20–32)
CREAT SERPL-MCNC: 1.6 MG/DL (ref 0.66–1.25)
GFR SERPL CREATININE-BSD FRML MDRD: 43 ML/MIN/1.73M2
GLUCOSE BLD-MCNC: 93 MG/DL (ref 70–99)
POTASSIUM BLD-SCNC: 3.7 MMOL/L (ref 3.4–5.3)
SODIUM SERPL-SCNC: 141 MMOL/L (ref 133–144)

## 2021-11-03 PROCEDURE — 80048 BASIC METABOLIC PNL TOTAL CA: CPT

## 2021-11-03 PROCEDURE — 36415 COLL VENOUS BLD VENIPUNCTURE: CPT

## 2021-11-03 PROCEDURE — 96360 HYDRATION IV INFUSION INIT: CPT

## 2021-11-03 PROCEDURE — 258N000003 HC RX IP 258 OP 636: Performed by: INTERNAL MEDICINE

## 2021-11-03 RX ORDER — DIPHENHYDRAMINE HYDROCHLORIDE 50 MG/ML
50 INJECTION INTRAMUSCULAR; INTRAVENOUS
Status: CANCELLED
Start: 2021-11-03

## 2021-11-03 RX ORDER — ALBUTEROL SULFATE 90 UG/1
1-2 AEROSOL, METERED RESPIRATORY (INHALATION)
Status: CANCELLED
Start: 2021-11-03

## 2021-11-03 RX ORDER — ALBUTEROL SULFATE 0.83 MG/ML
2.5 SOLUTION RESPIRATORY (INHALATION)
Status: CANCELLED | OUTPATIENT
Start: 2021-11-03

## 2021-11-03 RX ORDER — NALOXONE HYDROCHLORIDE 0.4 MG/ML
0.2 INJECTION, SOLUTION INTRAMUSCULAR; INTRAVENOUS; SUBCUTANEOUS
Status: CANCELLED | OUTPATIENT
Start: 2021-11-03

## 2021-11-03 RX ORDER — EPINEPHRINE 1 MG/ML
0.3 INJECTION, SOLUTION INTRAMUSCULAR; SUBCUTANEOUS EVERY 5 MIN PRN
Status: CANCELLED | OUTPATIENT
Start: 2021-11-03

## 2021-11-03 RX ORDER — MEPERIDINE HYDROCHLORIDE 25 MG/ML
25 INJECTION INTRAMUSCULAR; INTRAVENOUS; SUBCUTANEOUS EVERY 30 MIN PRN
Status: CANCELLED | OUTPATIENT
Start: 2021-11-03

## 2021-11-03 RX ORDER — METHYLPREDNISOLONE SODIUM SUCCINATE 125 MG/2ML
125 INJECTION, POWDER, LYOPHILIZED, FOR SOLUTION INTRAMUSCULAR; INTRAVENOUS
Status: CANCELLED
Start: 2021-11-03

## 2021-11-03 RX ORDER — HEPARIN SODIUM,PORCINE 10 UNIT/ML
5 VIAL (ML) INTRAVENOUS
Status: CANCELLED | OUTPATIENT
Start: 2021-11-03

## 2021-11-03 RX ORDER — HEPARIN SODIUM (PORCINE) LOCK FLUSH IV SOLN 100 UNIT/ML 100 UNIT/ML
5 SOLUTION INTRAVENOUS
Status: CANCELLED | OUTPATIENT
Start: 2021-11-03

## 2021-11-03 RX ADMIN — SODIUM CHLORIDE 1000 ML: 9 INJECTION, SOLUTION INTRAVENOUS at 11:11

## 2021-11-03 ASSESSMENT — PAIN SCALES - GENERAL: PAINLEVEL: NO PAIN (0)

## 2021-11-03 NOTE — PROGRESS NOTES
Infusion Nursing Note:  Marlo SANDRA Vee presents today for IVF.    Patient seen by provider today: No   present during visit today: Not Applicable.    Note: IVF infused at 999ml/hr. BMP drawn after IVF per orders.    Intravenous Access:  Peripheral IV placed.    Treatment Conditions:  Not Applicable.    Post Infusion Assessment:  Patient tolerated infusion without incident.  Blood return noted pre and post infusion.  Site patent and intact, free from redness, edema or discomfort.  No evidence of extravasations.  Access discontinued per protocol.     Discharge Plan:   Discharge instructions reviewed with: Patient.  Patient and/or family verbalized understanding of discharge instructions and all questions answered.  AVS to patient via [employer].  Patient will return 11/4/21 for next appointment.   Patient discharged in stable condition accompanied by: self.  Departure Mode: Ambulatory.    Administrations This Visit     0.9% sodium chloride BOLUS     Admin Date  11/03/2021 Action  New Bag Dose  1,000 mL Route  Intravenous Administered By  Yolanda Connell, RN                Yolanda Connell, HEATHER

## 2021-11-03 NOTE — PATIENT INSTRUCTIONS
Dear Marlo Vee    Thank you for choosing [facility] ([facility]) for your infusion.  The following information is a summary of our appointment as well as important reminders.      We look forward in seeing you on your next appointment here at [facility] ([facility]).  Please don t hesitate to call us at [phone] to reschedule any of your appointments or to speak with one of the [facility] registered nurses.  It was a pleasure taking care of you today.    Sincerely,    [facility]  [address] [place] [address]  [address]  Phone:  [phone]

## 2021-11-03 NOTE — LETTER
11/3/2021         RE: Marlo Vee  [address] [facility] [address]        Dear Colleague,    Thank you for referring your patient, Marlo Vee, to the [facility]. Please see a copy of my visit note below.    Infusion Nursing Note:  Marlo Vee presents today for IVF.    Patient seen by provider today: No   present during visit today: Not Applicable.    Note: IVF infused at 999ml/hr. BMP drawn after IVF per orders.    Intravenous Access:  Peripheral IV placed.    Treatment Conditions:  Not Applicable.    Post Infusion Assessment:  Patient tolerated infusion without incident.  Blood return noted pre and post infusion.  Site patent and intact, free from redness, edema or discomfort.  No evidence of extravasations.  Access discontinued per protocol.     Discharge Plan:   Discharge instructions reviewed with: Patient.  Patient and/or family verbalized understanding of discharge instructions and all questions answered.  AVS to patient via [employer].  Patient will return 11/4/21 for next appointment.   Patient discharged in stable condition accompanied by: self.  Departure Mode: Ambulatory.    Administrations This Visit     0.9% sodium chloride BOLUS     Admin Date  11/03/2021 Action  New Bag Dose  1,000 mL Route  Intravenous Administered By  Yolanda Connell, RN                Yolanda Connell, HEATHER                        Again, thank you for allowing me to participate in the care of your patient.        Sincerely,        [facility]

## 2021-11-04 ENCOUNTER — INFUSION THERAPY VISIT (OUTPATIENT)
Dept: INFUSION THERAPY | Facility: CLINIC | Age: 71
End: 2021-11-04
Attending: INTERNAL MEDICINE
Payer: MEDICARE

## 2021-11-04 VITALS
RESPIRATION RATE: 16 BRPM | DIASTOLIC BLOOD PRESSURE: 82 MMHG | TEMPERATURE: 97.6 F | SYSTOLIC BLOOD PRESSURE: 143 MMHG | HEART RATE: 62 BPM

## 2021-11-04 DIAGNOSIS — Z94.0 KIDNEY REPLACED BY TRANSPLANT: ICD-10-CM

## 2021-11-04 DIAGNOSIS — R19.7 DIARRHEA OF PRESUMED INFECTIOUS ORIGIN: Primary | ICD-10-CM

## 2021-11-04 LAB
ANION GAP SERPL CALCULATED.3IONS-SCNC: 3 MMOL/L (ref 3–14)
BUN SERPL-MCNC: 23 MG/DL (ref 7–30)
CALCIUM SERPL-MCNC: 8.2 MG/DL (ref 8.5–10.1)
CHLORIDE BLD-SCNC: 116 MMOL/L (ref 94–109)
CO2 SERPL-SCNC: 22 MMOL/L (ref 20–32)
CREAT SERPL-MCNC: 1.52 MG/DL (ref 0.66–1.25)
GFR SERPL CREATININE-BSD FRML MDRD: 46 ML/MIN/1.73M2
GLUCOSE BLD-MCNC: 96 MG/DL (ref 70–99)
POTASSIUM BLD-SCNC: 3.4 MMOL/L (ref 3.4–5.3)
SODIUM SERPL-SCNC: 141 MMOL/L (ref 133–144)

## 2021-11-04 PROCEDURE — 80048 BASIC METABOLIC PNL TOTAL CA: CPT

## 2021-11-04 PROCEDURE — 36415 COLL VENOUS BLD VENIPUNCTURE: CPT

## 2021-11-04 PROCEDURE — 96360 HYDRATION IV INFUSION INIT: CPT

## 2021-11-04 PROCEDURE — 258N000003 HC RX IP 258 OP 636: Performed by: INTERNAL MEDICINE

## 2021-11-04 RX ORDER — METHYLPREDNISOLONE SODIUM SUCCINATE 125 MG/2ML
125 INJECTION, POWDER, LYOPHILIZED, FOR SOLUTION INTRAMUSCULAR; INTRAVENOUS
Status: CANCELLED
Start: 2021-11-04

## 2021-11-04 RX ORDER — MEPERIDINE HYDROCHLORIDE 25 MG/ML
25 INJECTION INTRAMUSCULAR; INTRAVENOUS; SUBCUTANEOUS EVERY 30 MIN PRN
Status: CANCELLED | OUTPATIENT
Start: 2021-11-04

## 2021-11-04 RX ORDER — NALOXONE HYDROCHLORIDE 0.4 MG/ML
0.2 INJECTION, SOLUTION INTRAMUSCULAR; INTRAVENOUS; SUBCUTANEOUS
Status: CANCELLED | OUTPATIENT
Start: 2021-11-04

## 2021-11-04 RX ORDER — HEPARIN SODIUM (PORCINE) LOCK FLUSH IV SOLN 100 UNIT/ML 100 UNIT/ML
5 SOLUTION INTRAVENOUS
Status: CANCELLED | OUTPATIENT
Start: 2021-11-04

## 2021-11-04 RX ORDER — DIPHENHYDRAMINE HYDROCHLORIDE 50 MG/ML
50 INJECTION INTRAMUSCULAR; INTRAVENOUS
Status: CANCELLED
Start: 2021-11-04

## 2021-11-04 RX ORDER — EPINEPHRINE 1 MG/ML
0.3 INJECTION, SOLUTION INTRAMUSCULAR; SUBCUTANEOUS EVERY 5 MIN PRN
Status: CANCELLED | OUTPATIENT
Start: 2021-11-04

## 2021-11-04 RX ORDER — HEPARIN SODIUM,PORCINE 10 UNIT/ML
5 VIAL (ML) INTRAVENOUS
Status: CANCELLED | OUTPATIENT
Start: 2021-11-04

## 2021-11-04 RX ORDER — ALBUTEROL SULFATE 90 UG/1
1-2 AEROSOL, METERED RESPIRATORY (INHALATION)
Status: CANCELLED
Start: 2021-11-04

## 2021-11-04 RX ORDER — ALBUTEROL SULFATE 0.83 MG/ML
2.5 SOLUTION RESPIRATORY (INHALATION)
Status: CANCELLED | OUTPATIENT
Start: 2021-11-04

## 2021-11-04 RX ADMIN — SODIUM CHLORIDE 1000 ML: 9 INJECTION, SOLUTION INTRAVENOUS at 16:19

## 2021-11-04 NOTE — PROGRESS NOTES
Infusion Nursing Note:  Marlo FLORES Mariusz presents today for IVF.    Patient seen by provider today: No   present during visit today: Not Applicable.    Note:   -1 L of NS infused over 1 hr    Intravenous Access:  Labs drawn without difficulty.  Peripheral IV placed.    Treatment Conditions:  Not Applicable.      Post Infusion Assessment:  Patient tolerated infusion without incident.  Site patent and intact, free from redness, edema or discomfort.  No evidence of extravasations.  Access discontinued per protocol.       Discharge Plan:   AVS to patient via MYCHART.  Patient will return 11/6/21 for next appointment.   Patient discharged in stable condition accompanied by: self.  Departure Mode: Ambulatory.      Dahiana Fam, RN  BP (!) 143/82   Pulse 62   Temp 97.6  F (36.4  C)   Resp 16      Administrations This Visit     0.9% sodium chloride BOLUS     Admin Date  11/04/2021 Action  New Bag Dose  1,000 mL Rate  999 mL/hr Route  Intravenous Administered By  Dahiana Fam, RN

## 2021-11-04 NOTE — LETTER
11/4/2021         RE: Marlo Vee  [address] [facility] [address]        Dear Colleague,    Thank you for referring your patient, Marlo Vee, to the [facility]. Please see a copy of my visit note below.    Infusion Nursing Note:  Marlo Vee presents today for IVF.    Patient seen by provider today: No   present during visit today: Not Applicable.    Note:   -1 L of NS infused over 1 hr    Intravenous Access:  Labs drawn without difficulty.  Peripheral IV placed.    Treatment Conditions:  Not Applicable.      Post Infusion Assessment:  Patient tolerated infusion without incident.  Site patent and intact, free from redness, edema or discomfort.  No evidence of extravasations.  Access discontinued per protocol.       Discharge Plan:   AVS to patient via MYCHART.  Patient will return 11/6/21 for next appointment.   Patient discharged in stable condition accompanied by: self.  Departure Mode: Ambulatory.      Dahiana Fam, RN  BP (!) 143/82   Pulse 62   Temp 97.6  F (36.4  C)   Resp 16      Administrations This Visit     0.9% sodium chloride BOLUS     Admin Date  11/04/2021 Action  New Bag Dose  1,000 mL Rate  999 mL/hr Route  Intravenous Administered By  Dahiana Fam, RN                                  Again, thank you for allowing me to participate in the care of your patient.        Sincerely,        [facility]

## 2021-11-05 ENCOUNTER — PRE VISIT (OUTPATIENT)
Dept: INFECTIOUS DISEASES | Facility: CLINIC | Age: 71
End: 2021-11-05

## 2021-11-05 ENCOUNTER — VIRTUAL VISIT (OUTPATIENT)
Dept: INFECTIOUS DISEASES | Facility: CLINIC | Age: 71
End: 2021-11-05
Attending: NURSE PRACTITIONER
Payer: MEDICARE

## 2021-11-05 ENCOUNTER — TELEPHONE (OUTPATIENT)
Dept: GASTROENTEROLOGY | Facility: CLINIC | Age: 71
End: 2021-11-05

## 2021-11-05 DIAGNOSIS — A07.2 DIARRHEA DUE TO CRYPTOSPORIDIUM (H): Primary | ICD-10-CM

## 2021-11-05 DIAGNOSIS — Z94.0 STATUS POST KIDNEY TRANSPLANT: ICD-10-CM

## 2021-11-05 DIAGNOSIS — Z79.2 PROPHYLACTIC ANTIBIOTIC: ICD-10-CM

## 2021-11-05 DIAGNOSIS — A04.72 CLOSTRIDIOIDES DIFFICILE DIARRHEA: ICD-10-CM

## 2021-11-05 DIAGNOSIS — R19.7 DIARRHEA OF PRESUMED INFECTIOUS ORIGIN: ICD-10-CM

## 2021-11-05 PROCEDURE — G0463 HOSPITAL OUTPT CLINIC VISIT: HCPCS | Mod: PN,RTG | Performed by: INTERNAL MEDICINE

## 2021-11-05 PROCEDURE — 99205 OFFICE O/P NEW HI 60 MIN: CPT | Mod: 95 | Performed by: INTERNAL MEDICINE

## 2021-11-05 RX ORDER — SULFAMETHOXAZOLE AND TRIMETHOPRIM 400; 80 MG/1; MG/1
1 TABLET ORAL
Qty: 13 TABLET | Refills: 11 | Status: SHIPPED | OUTPATIENT
Start: 2021-10-27 | End: 2022-12-14

## 2021-11-05 NOTE — TELEPHONE ENCOUNTER
Caller: John Vee    Procedure: Colonoscopy     Date, Location, and Surgeon of Procedure Cancelled: 11/16/2021, Ronni DOLL    Ordering Provider:Ronni    Reason for cancel (please be detailed, any staff messages or encounters to note?):  pt cx procedure would like to do it in Feb 2022        Rescheduled: not at this time. We did not have the MAC schedule for Feb 2022. We will defer order until middle of Jan to get this rescheduled.      If rescheduled:    Date:    Location:    Note any change or update to original order/sedation:

## 2021-11-05 NOTE — LETTER
11/5/2021       RE: Marlo Vee  [address] [facility] [address]     Dear Colleague,    Thank you for referring your patient, Marlo Vee, to the [facility] INFECTIOUS DISEASE CLINIC [place] [facility]. Please see a copy of my visit note below.    [facility]  Transplant Infectious Disease Clinic Note:  New Patient, via video during the covid-19 pandemic     Patient:  Marlo Vee, Date of birth 1950, Medical record number [id number]  Date of Visit:  11/05/2021  Consult requested by Dr. Samuel Varela for evaluation of cryptosporidium.         Assessment and Recommendations:   Recommendations:  - If diarrhea starts to recur a little bit, then he can restart oral vancomycin BID for a couple of weeks. He would need to call in for that rx.   - Open orders in place to recheck C diff and cryptosporidium should diarrhea recur.   - Proceed with colonoscopy planned for 11/16/2021; if pushed out by a month (which he desires), would reschedule this sooner rather than later.   - Due for seasonal influenza vaccination; he is aware.  - Due for Covid-19 booster vaccination; he is aware.   - Continue bactrim for Pneumocystis prophylaxis.   - No specific return to the ID clinic is scheduled at this point in time. However, we are happy to see him back should any new questions or issues arise.     Assessment:  Marlo Vee is a 71 year old man who underwent kidney transplantation 1/21/2016. Requires IVF 3x/wk for hydration. Had a renal transplant biopsy 10/19/2021, & there was no rejection.   Infectious Disease issues include:  - Cryptosporidium. Sent as part of a larger diarrhea workup that included + crypto testing on 8/7/2021, and 8/18/2021. Took nitazoxanide from 8/19/2021 through 9/2/2021. Negative by 9/29/2021. If diarrhea starts to recur a little bit, then he can be retested to determine if he needs  "retreatment.   - C diff. He stopped bactrim during the C diff course. Treated with oral vanco 8/18/2021 to 9/1/2021, and then he started a second treatment course on 10/14/2021, due to run until ~ 11/12/2021. If diarrhea starts to recur a little bit, then he can be retested +/- oral vanco BID-QID.   - Hx of 8//2021 - 10/25/2021 diarrhea, multifactorial: MMF, bactrim, C diff, cryptosporidium.   - QTc interval: 443 msec on 12/6/2021 EKG.   - Pneumocystis prophylaxis: 9/24/2021 CD4 count 165; on bactrim (was held 9/2/2021 through 10/27/2021 due to diarrhea).   - Serostatus: VZV+, EBV+, CMV seroconversion  - Immunization status: he has had covid-19 vaccination  - Gamma globulin status: unknown  - Isolation status: Good hand hygiene.    Daxa Verma MD. Pager [phone]         History of the Infectious Disease lllness:   Marlo Vee is a 71 year old man who underwent kidney transplantation 1/21/2016. Hx of difficult colonoscopies (2004 was incomplete due to narrow and redundant left colon) (2011 difficult to reach cecum). Diarrhea began on 8/1/2021. At one point had 40 trips to the bathroom per day. Stools were non-bloody and watery. Tested + for both C diff and cryptosporidium, and was treated for both. Just in the last 2 weeks he is feeling much better. Stools are more solid, and he is sleeping through the night. Appetite is now voracious, he is always hungry. He is taking metamucil BID, and that seems to help. He asks if it is ok [place] in [place], and yes that is ok.       Transplants:  1/21/2016 (Kidney); Postoperative day:  2115.  Coordinator Adilia Richardson    Review of Systems:  CONSTITUTIONAL:  No fevers or chills. No night sweats. BP. Most recent measured weight was 144 or so, and he would like to be about 180. 5'10\". He has not weighed himself in a few weeks at least, and he feels as though he has not lost any more weight.   EYES: negative for icterus or acute vision changes. "   ENT:  negative for hearing loss, tinnitus or sore throat  RESPIRATORY:  negative for cough, sputum, dyspnea  CARDIOVASCULAR:  negative for chest pain, heart palpitations  GASTROINTESTINAL:  negative for nausea, last emesis was more than a month ago. Stools are 3x/day and solid.  GENITOURINARY:  negative for dysuria or hematuria. Urine is clear.   HEME:  He does have some easy bruising from taking an aspirin each day. No easy bleeding  INTEGUMENT:  negative for rash or pruritus  NEURO:  Negative for headache or tremor.    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/2021     Dyslipidemia      End stage kidney disease (H)      Heart attack (H)     not sure     Hypertension      Membranous glomerulonephritis 2002     PONV (postoperative nausea and vomiting)      PUD (peptic ulcer disease)      Secondary hyperparathyroidism (of renal origin)      Skin abnormalities      Stented coronary artery      Vitamin D deficiency        Past Surgical History:   Procedure Laterality Date     CYSTOSCOPY, REMOVE STENT(S), COMBINED Right 2/23/2016    Procedure: COMBINED CYSTOSCOPY, REMOVE STENT(S);  Surgeon: Cody Temple MD;  Location: [facility] OR     IR RENAL BIOPSY RIGHT  10/19/2021     OPEN REDUCTION INTERNAL FIXATION HIP NAILING Right 12/6/2020    Procedure: OPEN REDUCTION INTERNAL FIXATION, FRACTURE, FEMUR, USING INTRAMEDULLARY SHU.;  Surgeon: Jimmy Stoner MD;  Location: [facility] OR     s/p right upper extremity AV fistula       TRANSPLANT      kidney transplant      VASCULAR SURGERY         Family History   Problem Relation Age of Onset     Breast Cancer Mother      Cancer - colorectal Father      Coronary Artery Disease Father      Hypertension Father      Breast Cancer Sister      Cancer Brother         Pancreatic CA       Social History      Social History Narrative    Lives in [place] with wife and Erick dog     Owns [employer]     Has 4 kids - one donated kidney    Walks everyday     Doesn't eat a lot of meat     Social History     Tobacco Use     Smoking status: Never Smoker     Smokeless tobacco: Never Used   Substance Use Topics     Alcohol use: No     Alcohol/week: 0.0 standard drinks     Comment: Patient reports drinking beer on a rare ocassion.     Drug use: No       Immunization History   Administered Date(s) Administered     COVID-19,PF,Moderna 02/16/2021, 03/18/2021     FLUAD -HD 65+ QUAD ([facility] ONLY) 11/02/2020     HepB 03/04/2015     HepB, Unspecified 03/04/2015     Influenza (High Dose) 3 valent vaccine 12/05/2017, 01/28/2019     Pneumo Conj 13-V (2010&after) 05/23/2016     Pneumococcal 23 valent 01/28/2015     Tdap (Adacel,Boostrix) 03/09/2015       Patient Active Problem List   Diagnosis     HTN, kidney transplant related     Membranous glomerulonephritis     Anemia in chronic renal disease     Secondary renal hyperparathyroidism (H)     Vitamin D deficiency     Dyslipidemia     Anxiety     Status post coronary angiogram     CAD, multiple vessel     Multiple vessel coronary artery disease     Kidney replaced by transplant     Immunosuppression (H)     Aftercare following organ transplant     Closed fracture of trochanter of right femur, initial encounter (H)     Impaired fasting glucose     Erectile dysfunction     Essential hypertension     Generalized ischemic myocardial dysfunction     Old myocardial infarction     Diarrhea     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)     Clostridioides difficile diarrhea     Prophylactic antibiotic       Outpatient Medications Marked as Taking for the 11/5/21 encounter (Virtual Visit) with Daxa Verma MD   Medication Sig     aspirin 81 MG EC tablet Take 81 mg by mouth every morning     carvedilol (COREG) 25 MG tablet Take 25 mg by mouth every evening In addition to 12.5 mg in  the morning.     KLOR-CON 20 MEQ CR tablet TAKE 1 TABLET BY MOUTH DAILY     mycophenolic acid (GENERIC EQUIVALENT) 180 MG EC tablet Take 2 tablets (360 mg) by mouth 2 times daily     ondansetron (ZOFRAN-ODT) 4 MG ODT tab Take 1 tablet (4 mg) by mouth every 6 hours as needed for nausea or vomiting     PROGRAF (BRAND) 0.5 MG capsule Take 2 capsules (1 mg) by mouth 2 times daily     sertraline (ZOLOFT) 25 MG tablet Take 1 tablet (25 mg) by mouth every morning     sildenafil (VIAGRA) 100 MG tablet Take 1 tablet (100 mg) by mouth daily as needed (1/2 as needed 1 hour prior to sexual activity)     sulfamethoxazole-trimethoprim (BACTRIM) 400-80 MG tablet Take 1 tablet by mouth Every Mon, Wed, Fri Morning     vancomycin (VANCOCIN) 125 MG capsule Take 1 capsule (125 mg) by mouth 4 times daily for 14 days, THEN 1 capsule (125 mg) 2 times daily for 14 days.     vitamin D3 (CHOLECALCIFEROL) 50 mcg (2000 units) tablet Take 1 tablet by mouth every morning       No Known Allergies           Physical Exam:   There were no vitals taken for this visit., since this was a video visit during the covid-19 pandemic.   Wt Readings from Last 4 Encounters:   09/24/21 65.1 kg (143 lb 8 oz)   09/09/21 65.3 kg (144 lb)   04/29/21 75.3 kg (166 lb)   12/09/20 81.6 kg (179 lb 14 oz)       Exam:  GENERAL: well-developed, well-nourished appearing man, alert, oriented, in no acute distress.  HEAD: Head is normocephalic, atraumatic   EYES: Eyes have anicteric sclerae.    NECK: Supple.         Laboratory Data:     Absolute CD4, [place] T Cells   Date Value Ref Range Status   09/24/2021 165 (L) 441-2,156 cells/uL Final       Metabolic Studies    Recent Labs   Lab Test 11/04/21  1735 11/03/21  1220 11/03/21  1220 10/29/21  1003 10/29/21  1003 10/06/21  1722 10/04/21  1332 08/09/21  1043 08/07/21  1741     --  141  --  141   < >  --    < > 132*  132*   POTASSIUM 3.4   < > 3.7   < > 3.4   < >  --    < > 3.7  3.7   CHLORIDE 116*   < > 117*   < > 116*    < >  --    < > 105  105   CO2 22   < > 19*   < > 20   < >  --    < > 19*  19*   ANIONGAP 3   < > 5   < > 5   < >  --    < > 8  8   BUN 23   < > 24   < > 23   < >  --    < > 31*  31*   CR 1.52*  --  1.60*  --  1.40*   < >  --    < > 1.79*  1.79*   29941  --   --   --   --   --   --  1.39*  --   --    GFRESTIMATED 46*   < > 43*   < > 51*   < >  --    < > 38*  38*   GLC 96   < > 93   < > 102*   < >  --    < > 108*  108*   JUAN 8.2*   < > 8.5   < > 7.9*   < >  --    < > 9.1  9.1   PHOS  --   --   --   --   --   --   --   --  2.7   MAG  --   --   --   --   --   --   --   --  1.7    < > = values in this interval not displayed.       Hepatic Studies    Recent Labs   Lab Test 08/07/21  1741 12/09/20  0716 12/06/16  1115 11/16/16  0934 11/16/16  0934 07/19/16  0720 07/19/16 0720   BILITOTAL 0.8  --   --   --  0.7  --  0.4   DBIL  --   --   --   --  0.1   < > 0.1   ALKPHOS 83  --   --   --  116  --  142   PROTTOTAL 7.5  --   --   --  7.5   < > 7.1   ALBUMIN 3.7 2.8* 3.7   < > 3.7   < > 3.5   AST 10  --   --   --  15  --  20   ALT 17  --   --   --  25  --  21    < > = values in this interval not displayed.       Pancreatitis testing    Recent Labs   Lab Test 05/06/21 0928 07/18/18  1011   TRIG 188* 296*       Lipid testing    Recent Labs   Lab Test 05/06/21 0928 07/18/18  1011 11/16/16  0934 11/16/16  0934 07/19/16  0720 04/24/15  0710 04/09/15  0913 04/09/15  0913   CHOL 138 172  --  189   < > 76   < > 70   HDL 33* 30*   < > 36*   < > 27*   < > 41   LDL 67 82   < > 92   < > 29   < > 17   TRIG 188* 296*   < > 306*   < > 99   < > 64   CHOLHDLRATIO  --   --   --   --   --  2.8  --  1.7    < > = values in this interval not displayed.       Hematology Studies   Recent Labs   Lab Test 10/19/21  0848 08/18/21  0932 08/09/21  1043 08/09/21  1043 08/07/21  1741 08/07/21  1741 12/07/20  0645 12/05/20  1428 06/12/18  0815 05/08/18  0854 05/10/16  0846 05/03/16  0853   WBC 6.6 6.8  --  5.5  --  5.7   < > 6.1   < > 4.0   < >  3.7*   93166  --   --   --   --   --   --   --   --   --  4.0  --   --    ANEU  --   --   --   --   --   --   --  4.7  --   --   --  2.7   ANEUTAUTO  --   --   --   --   --  3.5  --   --   --   --   --   --    ALYM  --   --   --   --   --   --   --  0.8  --   --   --  0.7*   ALYMPAUTO  --   --   --   --   --  1.3  --   --   --   --   --   --    LEON  --   --   --   --   --   --   --  0.5  --   --   --  0.3   AMONOAUTO  --   --   --   --   --  0.7  --   --   --   --   --   --    AEOS  --   --   --   --   --   --   --  0.1  --   --   --  0.1   AEOSAUTO  --   --   --   --   --  0.1  --   --   --   --   --   --    ABSBASO  --   --   --   --   --  0.0  --   --   --   --   --   --    HGB 10.1* 11.8*   < > 11.6*   < > 12.6*   < > 13.5   < > 12.5*   < > 11.2*   22551  --   --   --   --   --   --   --   --   --  12.5*  --   --    HCT 34.8* 38.2*   < > 37.9*   < > 40.5   < > 43.1   < > 40.1   < > 35.2*    235   < > 212   < > 221   < > 166   < > 176   < > 147*   94279  --   --   --   --   --   --   --   --   --  176  --   --     < > = values in this interval not displayed.       Clotting Studies    Recent Labs   Lab Test 10/19/21  0848 12/05/20  1428 01/19/16  0809 01/19/16  0809 04/09/15  0913 04/09/15  0913   INR 1.18* 1.08  --  1.12  --  1.24*   PTT 50* 26   < > 29   < > 29    < > = values in this interval not displayed.       Iron Testing    Recent Labs   Lab Test 10/19/21  0848 01/29/16  0805 01/28/16  0900   IRON  --   --  62   FEB  --   --  184*   IRONSAT  --   --  34   BLAYNE  --   --  787*   MCV 95   < >  --     < > = values in this interval not displayed.       Urine Studies     Recent Labs   Lab Test 10/19/21  1250 06/23/20  1616 03/02/16  0815 02/23/16  1345 01/19/16  0809   URINEPH 5.5 5.0 5.0 5.5 7.5*   NITRITE Negative Negative Negative Negative Negative   LEUKEST Negative Negative Negative Trace* Negative   WBCU 1 <1 <1 3* 0       Medication levels    Recent Labs   Lab Test 10/14/21  1125 08/18/21  0932  08/09/21  1043   TACROL 4.6*   < > 13.2   MPACID  --   --  0.81*   MPAG  --   --  48.0    < > = values in this interval not displayed.       Microbiology:  Last Culture results with specimen source  Culture Micro   Date Value Ref Range Status   01/19/2016 No growth  Final    Specimen Description   Date Value Ref Range Status   01/19/2016 Unspecified Urine  Final        Last check of C difficile  C Difficile Toxin B by PCR   Date Value Ref Range Status   10/13/2021 Positive (A) Negative Final     Comment:     Detection of C. difficile nucleic acid in stools confirms the presence of these organisms in diarrheal patients but may not indicate that C. difficile are the etiologic agents of the diarrhea. Results from the Xpert C. difficile assay should be interpreted in conjunction with other laboratory and clinical data available to the clinician.       Quantiferon testing   Recent Labs   Lab Test 08/07/21  1741 12/05/20  1428 01/22/16  0551 04/09/15  0914   TBRSLT  --   --   --  Negative   TBAGN  --   --   --  0.00   LYMPH 23 13.7   < >  --     < > = values in this interval not displayed.       Infection Studies to assess Diarrhea Recent Labs   Lab Test 09/29/21  0657 08/18/21  0730 08/07/21  1844 08/04/21  1650 08/04/21  1650   EPSTX1  --  Not Detected  --   --  Not Detected   EPSTX2  --  Not Detected  --   --  Not Detected   CRYPTR Negative Positive* Positive*  --   --    POPRT  --   --  Negative  --   --    EPCAMP  --  Not Detected  --   --  Not Detected   EPSALM  --  Not Detected  --    < > Not Detected   EPSHGL  --  Not Detected  --    < > Not Detected   EPVIB  --  Not Detected  --    < > Not Detected   EPROTA  --  Not Detected  --    < > Not Detected   EPNORO  --  Not Detected  --    < > Not Detected   EPYER  --  Not Detected  --    < > Not Detected    < > = values in this interval not displayed.       Virology:  Coronavirus-19 testing    Recent Labs   Lab Test 12/05/20  1451 07/01/20  0921   [id number]  Nasopharyngeal  --    [id number] Nasopharyngeal Nasopharyngeal       CMV viral loads    Recent Labs   Lab Test 06/12/18  0815 05/08/18  0854 04/11/18  0936 03/08/18  0818 02/12/18  1117   CSPEC Plasma, EDTA anticoagulant EDTA PLASMA Plasma Plasma, EDTA anticoagulant EDTA PLASMA   CMVLOG Not Calculated Not Calculated <2.1 Not Calculated Not Calculated       CMV viral loads    Log IU/mL of CMVQNT   Date Value Ref Range Status   06/12/2018 Not Calculated <2.1 [Log_IU]/mL Final   05/08/2018 Not Calculated <2.1 [Log_IU]/mL Final   04/11/2018 <2.1 <2.1 [Log_IU]/mL Final   03/08/2018 Not Calculated <2.1 [Log_IU]/mL Final   02/12/2018 Not Calculated <2.1 [Log_IU]/mL Final   01/09/2018 <2.1 <2.1 [Log_IU]/mL Final   12/11/2017 Not Calculated <2.1 [Log_IU]/mL Final   11/08/2017 <2.1 <2.1 [Log_IU]/mL Final   10/09/2017 Not Calculated <2.1 [Log_IU]/mL Final   09/08/2017 Not Calculated <2.1 [Log_IU]/mL Final   08/11/2017 Not Calculated <2.1 [Log_IU]/mL Final   07/14/2017 Not Calculated <2.1 [Log_IU]/mL Final   06/09/2017 Not Calculated <2.1 [Log_IU]/mL Final   05/11/2017 <2.1 <2.1 [Log_IU]/mL Final   04/11/2017 <2.1 <2.1 [Log_IU]/mL Final   03/09/2017 Not Calculated <2.1 [Log_IU]/mL Final   02/07/2017 Not Calculated <2.1 [Log_IU]/mL Final   01/04/2017 Not Calculated <2.1 [Log_IU]/mL Final   12/06/2016 <2.1 <2.1 [Log_IU]/mL Final   11/16/2016 Not Calculated <2.1 [Log_IU]/mL Final   08/30/2016 <2.1 <2.1 [Log_IU]/mL Final   08/16/2016 <2.1 <2.1 [Log_IU]/mL Final   08/02/2016 <2.1 <2.1 [Log_IU]/mL Final   07/27/2016 <2.1 <2.1 [Log_IU]/mL Final   07/12/2016 Not Calculated <2.1 [Log_IU]/mL Final   06/24/2016 <2.1 <2.1 [Log_IU]/mL Final       BK Virus Result   Date Value Ref Range Status   12/03/2018 BK Virus DNA Not Detected BKNEG^BK Virus DNA Not Detected copies/mL Final   05/08/2018 BK Virus DNA Not Detected BKNEG^BK Virus DNA Not Detected copies/mL Final   02/12/2018 BK Virus DNA Not Detected BKNEG^BK Virus DNA Not Detected  copies/mL Final   11/08/2017 BK Virus DNA Not Detected BKNEG^BK Virus DNA Not Detected copies/mL Final   08/11/2017 BK Virus DNA Not Detected BKNEG copies/mL Final   05/11/2017 BK Virus DNA Not Detected BKNEG copies/mL Final   02/07/2017 BK Virus DNA Not Detected BKNEG copies/mL Final   11/16/2016 BK Virus DNA Not Detected BKNEG copies/mL Final   07/19/2016 BK Virus DNA Not Detected BKNEG copies/mL Final   05/24/2016 BK Virus DNA Not Detected BKNEG copies/mL Final   03/02/2016 BK Virus DNA Not Detected BKNEG copies/mL Final   02/24/2016 BK Virus DNA Not Detected BKNEG copies/mL Final   02/22/2016 (A) BKNEG copies/mL Final    Canceled, Test credited  INCORRECT COLLECT TIME OF MEDS       BK Virus DNA copies/mL   Date Value Ref Range Status   10/19/2021 Not Detected Not Detected copies/mL Final        Hepatitis C Antibody   Date Value Ref Range Status   08/02/2016  NR Final    Nonreactive   Assay performance characteristics have not been established for newborns,   infants, and children     07/19/2016  NR Final    Nonreactive   Assay performance characteristics have not been established for newborns,   infants, and children         CMV Antibody IgG   Date Value Ref Range Status   07/12/2016 2.0 (H) 0.0 - 0.8 AI Final     Comment:     Positive   Antibody index (AI) values reflect qualitative changes in antibody   concentration that cannot be directly associated with clinical condition or   disease state.     01/19/2016  0.0 - 0.8 AI Final    <0.2  Negative   Antibody index (AI) values reflect qualitative changes in antibody   concentration that cannot be directly associated with clinical condition or   disease state.     04/09/2015  0.0 - 0.8 AI Final    <0.2  Negative   Antibody index (AI) values reflect qualitative changes in antibody   concentration that cannot be directly associated with clinical condition or   disease state.       CMV Antibody IgM   Date Value Ref Range Status   07/12/2016 2.0 (H) 0.0 - 0.8 AI  Final     Comment:     Positive   Antibody index (AI) values reflect qualitative changes in antibody   concentration that cannot be directly associated with clinical condition or   disease state.     01/19/2016 0.4 0.0 - 0.8 AI Final     Comment:     Negative   Antibody index (AI) values reflect qualitative changes in antibody   concentration that cannot be directly associated with clinical condition or   disease state.       Varicella Zoster Virus Antibody IgG   Date Value Ref Range Status   04/09/2015 3.6 (H) 0.0 - 0.8 AI Final     Comment:     Positive, suggests prev. exposure and probable immunity   Antibody index (AI) values reflect qualitative changes in antibody   concentration that cannot be directly associated with clinical condition or   disease state.       EBV Capsid Antibody IgG   Date Value Ref Range Status   01/19/2016 (H) 0.0 - 0.8 AI Final    >8.0  Positive, suggests recent or past exposure   Antibody index (AI) values reflect qualitative changes in antibody   concentration that cannot be directly associated with clinical condition or   disease state.     04/09/2015 (H) 0.0 - 0.8 AI Final    >8.0  Positive, suggests recent or past exposure   Antibody index (AI) values reflect qualitative changes in antibody   concentration that cannot be directly associated with clinical condition or   disease state.       EBV Capsid Antibody IgM   Date Value Ref Range Status   01/19/2016  0.0 - 0.8 AI Final    <0.2  No detectable antibody.   Antibody index (AI) values reflect qualitative changes in antibody   concentration that cannot be directly associated with clinical condition or   disease state.         Pathology:  10/19/2021 transplant kidney biopsy:  Suboptimal kidney allograft biopsy with acute tubular injury and no evidence of acute cellular or humoral rejection       Imaging:  Results for orders placed or performed during the hospital encounter of 10/19/21   IR Renal Biopsy Right    Narrative     PRE-PROCEDURE DIAGNOSIS:  Elevated creatinine  POST-PROCEDURE DIAGNOSIS:  Same  PROCEDURE:  Ultrasound-guided right lower quadrant transplant kidney biopsy.       John is a 71 year old who is being evaluated via a billable video visit.    How would you like to obtain your AVS? MyChart  If the video visit is dropped, the invitation should be resent by: text  Will anyone else be joining your video visit? No  Video-Visit Details  Type of service:  Video Visit  Video Start Time: 7:30 AM  Video End Time:7:59 AM  Originating Location (pt. Location): Home  Distant Location (provider location):  [facility] [place]   Platform used for Video Visit: [facility]    Time spent in this encounter, on the day of service, reviewing chart, having visit, placing orders: 66 minutes    .  Daxa Verma MD

## 2021-11-05 NOTE — PROGRESS NOTES
[facility]  Transplant Infectious Disease Clinic Note:  New Patient, via video during the covid-19 pandemic     Patient:  Marlo Vee, Date of birth 1950, Medical record number [id number]  Date of Visit:  11/05/2021  Consult requested by Dr. Samuel Varela for evaluation of cryptosporidium.         Assessment and Recommendations:   Recommendations:  - If diarrhea starts to recur a little bit, then he can restart oral vancomycin BID for a couple of weeks. He would need to call in for that rx.   - Open orders in place to recheck C diff and cryptosporidium should diarrhea recur.   - Proceed with colonoscopy planned for 11/16/2021; if pushed out by a month (which he desires), would reschedule this sooner rather than later.   - Due for seasonal influenza vaccination; he is aware.  - Due for Covid-19 booster vaccination; he is aware.   - Continue bactrim for Pneumocystis prophylaxis.   - No specific return to the ID clinic is scheduled at this point in time. However, we are happy to see him back should any new questions or issues arise.     Assessment:  Marlo Vee is a 71 year old man who underwent kidney transplantation 1/21/2016. Requires IVF 3x/wk for hydration. Had a renal transplant biopsy 10/19/2021, & there was no rejection.   Infectious Disease issues include:  - Cryptosporidium. Sent as part of a larger diarrhea workup that included + crypto testing on 8/7/2021, and 8/18/2021. Took nitazoxanide from 8/19/2021 through 9/2/2021. Negative by 9/29/2021. If diarrhea starts to recur a little bit, then he can be retested to determine if he needs retreatment.   - C diff. He stopped bactrim during the C diff course. Treated with oral vanco 8/18/2021 to 9/1/2021, and then he started a second treatment course on 10/14/2021, due to run until ~ 11/12/2021. If diarrhea starts to recur a little bit, then he can be retested +/- oral vanco BID-QID.   - Hx of 8//2021 - 10/25/2021  "diarrhea, multifactorial: MMF, bactrim, C diff, cryptosporidium.   - QTc interval: 443 msec on 12/6/2021 EKG.   - Pneumocystis prophylaxis: 9/24/2021 CD4 count 165; on bactrim (was held 9/2/2021 through 10/27/2021 due to diarrhea).   - Serostatus: VZV+, EBV+, CMV seroconversion  - Immunization status: he has had covid-19 vaccination  - Gamma globulin status: unknown  - Isolation status: Good hand hygiene.    Daxa Verma MD. Pager [phone]         History of the Infectious Disease lllness:   Marlo Vee is a 71 year old man who underwent kidney transplantation 1/21/2016. Hx of difficult colonoscopies (2004 was incomplete due to narrow and redundant left colon) (2011 difficult to reach cecum). Diarrhea began on 8/1/2021. At one point had 40 trips to the bathroom per day. Stools were non-bloody and watery. Tested + for both C diff and cryptosporidium, and was treated for both. Just in the last 2 weeks he is feeling much better. Stools are more solid, and he is sleeping through the night. Appetite is now voracious, he is always hungry. He is taking metamucil BID, and that seems to help. He asks if [place] is ok [place], and yes that is ok.       Transplants:  1/21/2016 (Kidney); Postoperative day:  2115.  Coordinator Adilia Richardson    Review of Systems:  CONSTITUTIONAL:  No fevers or chills. No night sweats. BP. Most recent measured weight was 144 or so, and he would like to be about 180. 5'10\". He has not weighed himself in a few weeks at least, and he feels as though he has not lost any more weight.   EYES: negative for icterus or acute vision changes.   ENT:  negative for hearing loss, tinnitus or sore throat  RESPIRATORY:  negative for cough, sputum, dyspnea  CARDIOVASCULAR:  negative for chest pain, heart palpitations  GASTROINTESTINAL:  negative for nausea, last emesis was more than a month ago. Stools are 3x/day and solid.  GENITOURINARY:  negative for dysuria or hematuria. " Urine is clear.   HEME:  He does have some easy bruising from taking an aspirin each day. No easy bleeding  INTEGUMENT:  negative for rash or pruritus  NEURO:  Negative for headache or tremor.    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/2021     Dyslipidemia      End stage kidney disease (H)      Heart attack (H)     not sure     Hypertension      Membranous glomerulonephritis 2002     PONV (postoperative nausea and vomiting)      PUD (peptic ulcer disease)      Secondary hyperparathyroidism (of renal origin)      Skin abnormalities      Stented coronary artery      Vitamin D deficiency        Past Surgical History:   Procedure Laterality Date     CYSTOSCOPY, REMOVE STENT(S), COMBINED Right 2/23/2016    Procedure: COMBINED CYSTOSCOPY, REMOVE STENT(S);  Surgeon: Cody Temple MD;  Location: [facility] OR     IR RENAL BIOPSY RIGHT  10/19/2021     OPEN REDUCTION INTERNAL FIXATION HIP NAILING Right 12/6/2020    Procedure: OPEN REDUCTION INTERNAL FIXATION, FRACTURE, FEMUR, USING INTRAMEDULLARY SHU.;  Surgeon: Jimmy Stoner MD;  Location: [facility] OR     s/p right upper extremity AV fistula       TRANSPLANT      kidney transplant      VASCULAR SURGERY         Family History   Problem Relation Age of Onset     Breast Cancer Mother      Cancer - colorectal Father      Coronary Artery Disease Father      Hypertension Father      Breast Cancer Sister      Cancer Brother         Pancreatic CA       Social History     Social History Narrative    Lives in [place] with wife and Vizla dog     Owns [employer]     Has 4 kids - one donated kidney    Walks everyday     Doesn't eat a lot of meat     Social History     Tobacco Use     Smoking status: Never Smoker     Smokeless tobacco: Never Used   Substance Use Topics     Alcohol use: No      Alcohol/week: 0.0 standard drinks     Comment: Patient reports drinking beer on a rare ocassion.     Drug use: No       Immunization History   Administered Date(s) Administered     COVID-19,PF,Moderna 02/16/2021, 03/18/2021     FLUAD -HD 65+ QUAD ([facility] ONLY) 11/02/2020     HepB 03/04/2015     HepB, Unspecified 03/04/2015     Influenza (High Dose) 3 valent vaccine 12/05/2017, 01/28/2019     Pneumo Conj 13-V (2010&after) 05/23/2016     Pneumococcal 23 valent 01/28/2015     Tdap (Adacel,Boostrix) 03/09/2015       Patient Active Problem List   Diagnosis     HTN, kidney transplant related     Membranous glomerulonephritis     Anemia in chronic renal disease     Secondary renal hyperparathyroidism (H)     Vitamin D deficiency     Dyslipidemia     Anxiety     Status post coronary angiogram     CAD, multiple vessel     Multiple vessel coronary artery disease     Kidney replaced by transplant     Immunosuppression (H)     Aftercare following organ transplant     Closed fracture of trochanter of right femur, initial encounter (H)     Impaired fasting glucose     Erectile dysfunction     Essential hypertension     Generalized ischemic myocardial dysfunction     Old myocardial infarction     Diarrhea     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)     Clostridioides difficile diarrhea     Prophylactic antibiotic       Outpatient Medications Marked as Taking for the 11/5/21 encounter (Virtual Visit) with Daxa Verma MD   Medication Sig     aspirin 81 MG EC tablet Take 81 mg by mouth every morning     carvedilol (COREG) 25 MG tablet Take 25 mg by mouth every evening In addition to 12.5 mg in the morning.     KLOR-CON 20 MEQ CR tablet TAKE 1 TABLET BY MOUTH DAILY     mycophenolic acid (GENERIC EQUIVALENT) 180 MG EC tablet Take 2 tablets (360 mg) by mouth 2 times daily     ondansetron (ZOFRAN-ODT) 4 MG ODT tab Take 1 tablet (4 mg) by mouth every 6 hours as needed for nausea or vomiting     PROGRAF (BRAND) 0.5 MG capsule  Take 2 capsules (1 mg) by mouth 2 times daily     sertraline (ZOLOFT) 25 MG tablet Take 1 tablet (25 mg) by mouth every morning     sildenafil (VIAGRA) 100 MG tablet Take 1 tablet (100 mg) by mouth daily as needed (1/2 as needed 1 hour prior to sexual activity)     sulfamethoxazole-trimethoprim (BACTRIM) 400-80 MG tablet Take 1 tablet by mouth Every Mon, Wed, Fri Morning     vancomycin (VANCOCIN) 125 MG capsule Take 1 capsule (125 mg) by mouth 4 times daily for 14 days, THEN 1 capsule (125 mg) 2 times daily for 14 days.     vitamin D3 (CHOLECALCIFEROL) 50 mcg (2000 units) tablet Take 1 tablet by mouth every morning       No Known Allergies           Physical Exam:   There were no vitals taken for this visit., since this was a video visit during the covid-19 pandemic.   Wt Readings from Last 4 Encounters:   09/24/21 65.1 kg (143 lb 8 oz)   09/09/21 65.3 kg (144 lb)   04/29/21 75.3 kg (166 lb)   12/09/20 81.6 kg (179 lb 14 oz)       Exam:  GENERAL: well-developed, well-nourished appearing man, alert, oriented, in no acute distress.  HEAD: Head is normocephalic, atraumatic   EYES: Eyes have anicteric sclerae.    NECK: Supple.         Laboratory Data:     Absolute CD4, Amasa T Cells   Date Value Ref Range Status   09/24/2021 165 (L) 441-2,156 cells/uL Final       Metabolic Studies    Recent Labs   Lab Test 11/04/21  1735 11/03/21  1220 11/03/21  1220 10/29/21  1003 10/29/21  1003 10/06/21  1722 10/04/21  1332 08/09/21  1043 08/07/21  1741     --  141  --  141   < >  --    < > 132*  132*   POTASSIUM 3.4   < > 3.7   < > 3.4   < >  --    < > 3.7  3.7   CHLORIDE 116*   < > 117*   < > 116*   < >  --    < > 105  105   CO2 22   < > 19*   < > 20   < >  --    < > 19*  19*   ANIONGAP 3   < > 5   < > 5   < >  --    < > 8  8   BUN 23   < > 24   < > 23   < >  --    < > 31*  31*   CR 1.52*  --  1.60*  --  1.40*   < >  --    < > 1.79*  1.79*   18525  --   --   --   --   --   --  1.39*  --   --    GFRESTIMATED 46*   < >  43*   < > 51*   < >  --    < > 38*  38*   GLC 96   < > 93   < > 102*   < >  --    < > 108*  108*   JUAN 8.2*   < > 8.5   < > 7.9*   < >  --    < > 9.1  9.1   PHOS  --   --   --   --   --   --   --   --  2.7   MAG  --   --   --   --   --   --   --   --  1.7    < > = values in this interval not displayed.       Hepatic Studies    Recent Labs   Lab Test 08/07/21  1741 12/09/20  0716 12/06/16  1115 11/16/16  0934 11/16/16  0934 07/19/16  0720 07/19/16  0720   BILITOTAL 0.8  --   --   --  0.7  --  0.4   DBIL  --   --   --   --  0.1   < > 0.1   ALKPHOS 83  --   --   --  116  --  142   PROTTOTAL 7.5  --   --   --  7.5   < > 7.1   ALBUMIN 3.7 2.8* 3.7   < > 3.7   < > 3.5   AST 10  --   --   --  15  --  20   ALT 17  --   --   --  25  --  21    < > = values in this interval not displayed.       Pancreatitis testing    Recent Labs   Lab Test 05/06/21 0928 07/18/18  1011   TRIG 188* 296*       Lipid testing    Recent Labs   Lab Test 05/06/21 0928 07/18/18  1011 11/16/16  0934 11/16/16  0934 07/19/16  0720 04/24/15  0710 04/09/15  0913 04/09/15  0913   CHOL 138 172  --  189   < > 76   < > 70   HDL 33* 30*   < > 36*   < > 27*   < > 41   LDL 67 82   < > 92   < > 29   < > 17   TRIG 188* 296*   < > 306*   < > 99   < > 64   CHOLHDLRATIO  --   --   --   --   --  2.8  --  1.7    < > = values in this interval not displayed.       Hematology Studies   Recent Labs   Lab Test 10/19/21  0848 08/18/21  0932 08/09/21  1043 08/09/21  1043 08/07/21  1741 08/07/21  1741 12/07/20  0645 12/05/20  1428 06/12/18  0815 05/08/18  0854 05/10/16  0846 05/03/16  0853   WBC 6.6 6.8  --  5.5  --  5.7   < > 6.1   < > 4.0   < > 3.7*   62994  --   --   --   --   --   --   --   --   --  4.0  --   --    ANEU  --   --   --   --   --   --   --  4.7  --   --   --  2.7   ANEUTAUTO  --   --   --   --   --  3.5  --   --   --   --   --   --    ALYM  --   --   --   --   --   --   --  0.8  --   --   --  0.7*   ALYMPAUTO  --   --   --   --   --  1.3  --   --   --    --   --   --    LEON  --   --   --   --   --   --   --  0.5  --   --   --  0.3   AMONOAUTO  --   --   --   --   --  0.7  --   --   --   --   --   --    AEOS  --   --   --   --   --   --   --  0.1  --   --   --  0.1   AEOSAUTO  --   --   --   --   --  0.1  --   --   --   --   --   --    ABSBASO  --   --   --   --   --  0.0  --   --   --   --   --   --    HGB 10.1* 11.8*   < > 11.6*   < > 12.6*   < > 13.5   < > 12.5*   < > 11.2*   [id number]  --   --   --   --   --   --   --   --   --  12.5*  --   --    HCT 34.8* 38.2*   < > 37.9*   < > 40.5   < > 43.1   < > 40.1   < > 35.2*    235   < > 212   < > 221   < > 166   < > 176   < > 147*   04401  --   --   --   --   --   --   --   --   --  176  --   --     < > = values in this interval not displayed.       Clotting Studies    Recent Labs   Lab Test 10/19/21  0848 12/05/20  1428 01/19/16  0809 01/19/16  0809 04/09/15  0913 04/09/15  0913   INR 1.18* 1.08  --  1.12  --  1.24*   PTT 50* 26   < > 29   < > 29    < > = values in this interval not displayed.       Iron Testing    Recent Labs   Lab Test 10/19/21  0848 01/29/16  0805 01/28/16  0900   IRON  --   --  62   FEB  --   --  184*   IRONSAT  --   --  34   BLAYNE  --   --  787*   MCV 95   < >  --     < > = values in this interval not displayed.       Urine Studies     Recent Labs   Lab Test 10/19/21  1250 06/23/20  1616 03/02/16  0815 02/23/16  1345 01/19/16  0809   URINEPH 5.5 5.0 5.0 5.5 7.5*   NITRITE Negative Negative Negative Negative Negative   LEUKEST Negative Negative Negative Trace* Negative   WBCU 1 <1 <1 3* 0       Medication levels    Recent Labs   Lab Test 10/14/21  1125 08/18/21  0932 08/09/21  1043   TACROL 4.6*   < > 13.2   MPACID  --   --  0.81*   MPAG  --   --  48.0    < > = values in this interval not displayed.       Microbiology:  Last Culture results with specimen source  Culture Micro   Date Value Ref Range Status   01/19/2016 No growth  Final    Specimen Description   Date Value Ref Range Status    01/19/2016 Unspecified Urine  Final        Last check of C difficile  C Difficile Toxin B by PCR   Date Value Ref Range Status   10/13/2021 Positive (A) Negative Final     Comment:     Detection of C. difficile nucleic acid in stools confirms the presence of these organisms in diarrheal patients but may not indicate that C. difficile are the etiologic agents of the diarrhea. Results from the Xpert C. difficile assay should be interpreted in conjunction with other laboratory and clinical data available to the clinician.       Quantiferon testing   Recent Labs   Lab Test 08/07/21  1741 12/05/20  1428 01/22/16  0551 04/09/15  0914   TBRSLT  --   --   --  Negative   TBAGN  --   --   --  0.00   LYMPH 23 13.7   < >  --     < > = values in this interval not displayed.       Infection Studies to assess Diarrhea Recent Labs   Lab Test 09/29/21  0657 08/18/21  0730 08/07/21  1844 08/04/21  1650 08/04/21  1650   EPSTX1  --  Not Detected  --   --  Not Detected   EPSTX2  --  Not Detected  --   --  Not Detected   CRYPTR Negative Positive* Positive*  --   --    POPRT  --   --  Negative  --   --    EPCAMP  --  Not Detected  --   --  Not Detected   EPSALM  --  Not Detected  --    < > Not Detected   EPSHGL  --  Not Detected  --    < > Not Detected   EPVIB  --  Not Detected  --    < > Not Detected   EPROTA  --  Not Detected  --    < > Not Detected   EPNORO  --  Not Detected  --    < > Not Detected   EPYER  --  Not Detected  --    < > Not Detected    < > = values in this interval not displayed.       Virology:  Coronavirus-19 testing    Recent Labs   Lab Test 12/05/20  1451 07/01/20  0921   [id number] Nasopharyngeal  --    [id number] Nasopharyngeal Nasopharyngeal       CMV viral loads    Recent Labs   Lab Test 06/12/18  0815 05/08/18  0854 04/11/18  0936 03/08/18  0818 02/12/18  1117   CSPEC Plasma, EDTA anticoagulant EDTA PLASMA Plasma Plasma, EDTA anticoagulant EDTA PLASMA   CMVLOG Not Calculated Not Calculated <2.1 Not  Calculated Not Calculated       CMV viral loads    Log IU/mL of CMVQNT   Date Value Ref Range Status   06/12/2018 Not Calculated <2.1 [Log_IU]/mL Final   05/08/2018 Not Calculated <2.1 [Log_IU]/mL Final   04/11/2018 <2.1 <2.1 [Log_IU]/mL Final   03/08/2018 Not Calculated <2.1 [Log_IU]/mL Final   02/12/2018 Not Calculated <2.1 [Log_IU]/mL Final   01/09/2018 <2.1 <2.1 [Log_IU]/mL Final   12/11/2017 Not Calculated <2.1 [Log_IU]/mL Final   11/08/2017 <2.1 <2.1 [Log_IU]/mL Final   10/09/2017 Not Calculated <2.1 [Log_IU]/mL Final   09/08/2017 Not Calculated <2.1 [Log_IU]/mL Final   08/11/2017 Not Calculated <2.1 [Log_IU]/mL Final   07/14/2017 Not Calculated <2.1 [Log_IU]/mL Final   06/09/2017 Not Calculated <2.1 [Log_IU]/mL Final   05/11/2017 <2.1 <2.1 [Log_IU]/mL Final   04/11/2017 <2.1 <2.1 [Log_IU]/mL Final   03/09/2017 Not Calculated <2.1 [Log_IU]/mL Final   02/07/2017 Not Calculated <2.1 [Log_IU]/mL Final   01/04/2017 Not Calculated <2.1 [Log_IU]/mL Final   12/06/2016 <2.1 <2.1 [Log_IU]/mL Final   11/16/2016 Not Calculated <2.1 [Log_IU]/mL Final   08/30/2016 <2.1 <2.1 [Log_IU]/mL Final   08/16/2016 <2.1 <2.1 [Log_IU]/mL Final   08/02/2016 <2.1 <2.1 [Log_IU]/mL Final   07/27/2016 <2.1 <2.1 [Log_IU]/mL Final   07/12/2016 Not Calculated <2.1 [Log_IU]/mL Final   06/24/2016 <2.1 <2.1 [Log_IU]/mL Final       BK Virus Result   Date Value Ref Range Status   12/03/2018 BK Virus DNA Not Detected BKNEG^BK Virus DNA Not Detected copies/mL Final   05/08/2018 BK Virus DNA Not Detected BKNEG^BK Virus DNA Not Detected copies/mL Final   02/12/2018 BK Virus DNA Not Detected BKNEG^BK Virus DNA Not Detected copies/mL Final   11/08/2017 BK Virus DNA Not Detected BKNEG^BK Virus DNA Not Detected copies/mL Final   08/11/2017 BK Virus DNA Not Detected BKNEG copies/mL Final   05/11/2017 BK Virus DNA Not Detected BKNEG copies/mL Final   02/07/2017 BK Virus DNA Not Detected BKNEG copies/mL Final   11/16/2016 BK Virus DNA Not Detected BKNEG  copies/mL Final   07/19/2016 BK Virus DNA Not Detected BKNEG copies/mL Final   05/24/2016 BK Virus DNA Not Detected BKNEG copies/mL Final   03/02/2016 BK Virus DNA Not Detected BKNEG copies/mL Final   02/24/2016 BK Virus DNA Not Detected BKNEG copies/mL Final   02/22/2016 (A) BKNEG copies/mL Final    Canceled, Test credited  INCORRECT COLLECT TIME OF MEDS       BK Virus DNA copies/mL   Date Value Ref Range Status   10/19/2021 Not Detected Not Detected copies/mL Final        Hepatitis C Antibody   Date Value Ref Range Status   08/02/2016  NR Final    Nonreactive   Assay performance characteristics have not been established for newborns,   infants, and children     07/19/2016  NR Final    Nonreactive   Assay performance characteristics have not been established for newborns,   infants, and children         CMV Antibody IgG   Date Value Ref Range Status   07/12/2016 2.0 (H) 0.0 - 0.8 AI Final     Comment:     Positive   Antibody index (AI) values reflect qualitative changes in antibody   concentration that cannot be directly associated with clinical condition or   disease state.     01/19/2016  0.0 - 0.8 AI Final    <0.2  Negative   Antibody index (AI) values reflect qualitative changes in antibody   concentration that cannot be directly associated with clinical condition or   disease state.     04/09/2015  0.0 - 0.8 AI Final    <0.2  Negative   Antibody index (AI) values reflect qualitative changes in antibody   concentration that cannot be directly associated with clinical condition or   disease state.       CMV Antibody IgM   Date Value Ref Range Status   07/12/2016 2.0 (H) 0.0 - 0.8 AI Final     Comment:     Positive   Antibody index (AI) values reflect qualitative changes in antibody   concentration that cannot be directly associated with clinical condition or   disease state.     01/19/2016 0.4 0.0 - 0.8 AI Final     Comment:     Negative   Antibody index (AI) values reflect qualitative changes in antibody    concentration that cannot be directly associated with clinical condition or   disease state.       Varicella Zoster Virus Antibody IgG   Date Value Ref Range Status   04/09/2015 3.6 (H) 0.0 - 0.8 AI Final     Comment:     Positive, suggests prev. exposure and probable immunity   Antibody index (AI) values reflect qualitative changes in antibody   concentration that cannot be directly associated with clinical condition or   disease state.       EBV Capsid Antibody IgG   Date Value Ref Range Status   01/19/2016 (H) 0.0 - 0.8 AI Final    >8.0  Positive, suggests recent or past exposure   Antibody index (AI) values reflect qualitative changes in antibody   concentration that cannot be directly associated with clinical condition or   disease state.     04/09/2015 (H) 0.0 - 0.8 AI Final    >8.0  Positive, suggests recent or past exposure   Antibody index (AI) values reflect qualitative changes in antibody   concentration that cannot be directly associated with clinical condition or   disease state.       EBV Capsid Antibody IgM   Date Value Ref Range Status   01/19/2016  0.0 - 0.8 AI Final    <0.2  No detectable antibody.   Antibody index (AI) values reflect qualitative changes in antibody   concentration that cannot be directly associated with clinical condition or   disease state.         Pathology:  10/19/2021 transplant kidney biopsy:  Suboptimal kidney allograft biopsy with acute tubular injury and no evidence of acute cellular or humoral rejection       Imaging:  Results for orders placed or performed during the hospital encounter of 10/19/21   IR Renal Biopsy Right    Narrative    PRE-PROCEDURE DIAGNOSIS:  Elevated creatinine  POST-PROCEDURE DIAGNOSIS:  Same  PROCEDURE:  Ultrasound-guided right lower quadrant transplant kidney biopsy.       John is a 71 year old who is being evaluated via a billable video visit.    How would you like to obtain your AVS? MyChart  If the video visit is dropped, the invitation should  be resent by: text  Will anyone else be joining your video visit? No  Video-Visit Details  Type of service:  Video Visit  Video Start Time: 7:30 AM  Video End Time:7:59 AM  Originating Location (pt. Location): Home  Distant Location (provider location):  [facility] [place]   Platform used for Video Visit: [facility]    Time spent in this encounter, on the day of service, reviewing chart, having visit, placing orders: 66 minutes

## 2021-11-06 ENCOUNTER — INFUSION THERAPY VISIT (OUTPATIENT)
Dept: INFUSION THERAPY | Facility: CLINIC | Age: 71
End: 2021-11-06
Attending: INTERNAL MEDICINE
Payer: MEDICARE

## 2021-11-06 VITALS
TEMPERATURE: 97.4 F | SYSTOLIC BLOOD PRESSURE: 118 MMHG | RESPIRATION RATE: 16 BRPM | HEART RATE: 65 BPM | OXYGEN SATURATION: 98 % | DIASTOLIC BLOOD PRESSURE: 74 MMHG

## 2021-11-06 DIAGNOSIS — R19.7 DIARRHEA OF PRESUMED INFECTIOUS ORIGIN: Primary | ICD-10-CM

## 2021-11-06 DIAGNOSIS — Z94.0 KIDNEY REPLACED BY TRANSPLANT: ICD-10-CM

## 2021-11-06 LAB
ANION GAP SERPL CALCULATED.3IONS-SCNC: 5 MMOL/L (ref 3–14)
BUN SERPL-MCNC: 24 MG/DL (ref 7–30)
CALCIUM SERPL-MCNC: 8 MG/DL (ref 8.5–10.1)
CHLORIDE BLD-SCNC: 115 MMOL/L (ref 94–109)
CO2 SERPL-SCNC: 22 MMOL/L (ref 20–32)
CREAT SERPL-MCNC: 1.39 MG/DL (ref 0.66–1.25)
GFR SERPL CREATININE-BSD FRML MDRD: 51 ML/MIN/1.73M2
GLUCOSE BLD-MCNC: 91 MG/DL (ref 70–99)
POTASSIUM BLD-SCNC: 4.6 MMOL/L (ref 3.4–5.3)
SODIUM SERPL-SCNC: 142 MMOL/L (ref 133–144)

## 2021-11-06 PROCEDURE — 96360 HYDRATION IV INFUSION INIT: CPT

## 2021-11-06 PROCEDURE — 258N000003 HC RX IP 258 OP 636: Performed by: INTERNAL MEDICINE

## 2021-11-06 PROCEDURE — 80048 BASIC METABOLIC PNL TOTAL CA: CPT

## 2021-11-06 PROCEDURE — 36415 COLL VENOUS BLD VENIPUNCTURE: CPT

## 2021-11-06 RX ORDER — HEPARIN SODIUM (PORCINE) LOCK FLUSH IV SOLN 100 UNIT/ML 100 UNIT/ML
5 SOLUTION INTRAVENOUS
Status: CANCELLED | OUTPATIENT
Start: 2021-11-06

## 2021-11-06 RX ORDER — NALOXONE HYDROCHLORIDE 0.4 MG/ML
0.2 INJECTION, SOLUTION INTRAMUSCULAR; INTRAVENOUS; SUBCUTANEOUS
Status: CANCELLED | OUTPATIENT
Start: 2021-11-06

## 2021-11-06 RX ORDER — ALBUTEROL SULFATE 90 UG/1
1-2 AEROSOL, METERED RESPIRATORY (INHALATION)
Status: CANCELLED
Start: 2021-11-06

## 2021-11-06 RX ORDER — HEPARIN SODIUM,PORCINE 10 UNIT/ML
5 VIAL (ML) INTRAVENOUS
Status: CANCELLED | OUTPATIENT
Start: 2021-11-06

## 2021-11-06 RX ORDER — METHYLPREDNISOLONE SODIUM SUCCINATE 125 MG/2ML
125 INJECTION, POWDER, LYOPHILIZED, FOR SOLUTION INTRAMUSCULAR; INTRAVENOUS
Status: CANCELLED
Start: 2021-11-06

## 2021-11-06 RX ORDER — ALBUTEROL SULFATE 0.83 MG/ML
2.5 SOLUTION RESPIRATORY (INHALATION)
Status: CANCELLED | OUTPATIENT
Start: 2021-11-06

## 2021-11-06 RX ORDER — EPINEPHRINE 1 MG/ML
0.3 INJECTION, SOLUTION INTRAMUSCULAR; SUBCUTANEOUS EVERY 5 MIN PRN
Status: CANCELLED | OUTPATIENT
Start: 2021-11-06

## 2021-11-06 RX ORDER — MEPERIDINE HYDROCHLORIDE 25 MG/ML
25 INJECTION INTRAMUSCULAR; INTRAVENOUS; SUBCUTANEOUS EVERY 30 MIN PRN
Status: CANCELLED | OUTPATIENT
Start: 2021-11-06

## 2021-11-06 RX ORDER — DIPHENHYDRAMINE HYDROCHLORIDE 50 MG/ML
50 INJECTION INTRAMUSCULAR; INTRAVENOUS
Status: CANCELLED
Start: 2021-11-06

## 2021-11-06 RX ADMIN — SODIUM CHLORIDE 1000 ML: 9 INJECTION, SOLUTION INTRAVENOUS at 13:10

## 2021-11-06 NOTE — LETTER
11/6/2021         RE: Marlo Vee  [address] [facility] [place] [address]        Dear Colleague,    Thank you for referring your patient, Marlo Vee, to the [facility] TREATMENT [facility]. Please see a copy of my visit note below.    Nursing Note  Marlo Vee presents today to [facility] for:   Chief Complaint   Patient presents with     Infusion     IVF     During today's [facility] appointment, orders from Dr. Varela were completed.  Frequency: 3 times weekly    Progress note:  Patient identification verified by name and date of birth.  Assessment completed.  Vitals recorded in Doc Flowsheets.  Patient was provided with education regarding medication/procedure and possible side effects.  Patient verbalized understanding.     present during visit today: Not Applicable.    Treatment Conditions: Non-applicable.    Premedications: were not ordered.    Drug Waste Record: No    Infusion length and rate:  infusion given over approximately 1 hours    Labs: were drawn per orders. BMP post fluids drawn    Vascular access: peripheral IV placed today.    Is the next appt scheduled? yes  Asymptomatic COVID test completed? no    Post Infusion Assessment:  Patient tolerated infusion without incident.     Discharge Plan:   Follow up plan of care with: ongoing infusions at [facility].  Discharge instructions were reviewed with patient.  Patient/representative verbalized understanding of discharge instructions and all questions answered.  Patient discharged from [facility] in stable condition.    Deborah Thorne RN    Administrations This Visit     0.9% sodium chloride BOLUS     Admin Date  11/06/2021 Action  New Bag Dose  1,000 mL Route  Intravenous Administered By  Deborah Thorne RN                      Again, thank you for allowing me to  participate in the care of your patient.        Sincerely,        [facility]

## 2021-11-06 NOTE — PROGRESS NOTES
Nursing Note  Marlo Vee [facility] for:   Chief Complaint   Patient presents with     Infusion     IVF     During [facility]'s [facility] appointment, orders from Dr. Varela were completed.  Frequency: 3 times weekly    Progress note:  Patient identification verified by name and date of birth.  Assessment completed.  Vitals recorded in Doc Flowsheets.  Patient was provided with education regarding medication/procedure and possible side effects.  Patient verbalized understanding.     present during visit today: Not Applicable.    Treatment Conditions: Non-applicable.    Premedications: were not ordered.    Drug Waste Record: No    Infusion length and rate:  infusion given over approximately 1 hours    Labs: were drawn per orders. BMP post fluids drawn    Vascular access: peripheral IV placed today.    Is the next appt scheduled? yes  Asymptomatic COVID test completed? no    Post Infusion Assessment:  Patient tolerated infusion without incident.     Discharge Plan:   Follow up plan of care with: ongoing inf[facility].  Discharge instructions were reviewed with patient.  Patient/representative verbalized understanding of discharge instructions and all questions answered.  Patient discharged [facility] in stable condition.    Deborah Thorne RN    Administrations This Visit     0.9% sodium chloride BOLUS     Admin Date  11/06/2021 Action  New Bag Dose  1,000 mL Route  Intravenous Administered By  Deborah Thorne RN

## 2021-11-08 ENCOUNTER — TELEPHONE (OUTPATIENT)
Dept: TRANSPLANT | Facility: CLINIC | Age: 71
End: 2021-11-08

## 2021-11-08 DIAGNOSIS — R19.7 DIARRHEA OF PRESUMED INFECTIOUS ORIGIN: Primary | ICD-10-CM

## 2021-11-08 DIAGNOSIS — Z94.0 KIDNEY REPLACED BY TRANSPLANT: ICD-10-CM

## 2021-11-08 NOTE — TELEPHONE ENCOUNTER
Returned a call to John.  He reports he is feeling well. He reports diarrhea has stopped and he is now have 2-3 formed BM's per day. He continues to get IVF's 2-3 days/week. Creatinine continues to fluctuate 1.4-1.6. Previous baseline 1.1-1.3.  He was to have a colonoscopy next week, but he cancelled it because he is worried about doing the prep and having diarrhea/dehydration occur again.   He reports he has 1 week left of oral vancomycin for his C-diff. Is wondering if he should get his Covid booster now, or wait until he is done with vanco. I advised he wait until he has finished his antibiotics.  Message sent to provider to see if 1.4-1.6 will be new baseline creatinine and to see if it is OK to delay colonoscopy by 1 month.      PLAN  Samuel Varela MD Harris, Kathleen, RN  Yes, probably new baseline, but may improve back down over a few months.  Okay to taper down on IV fluids, maybe once a week for two weeks, then hold, but give around time of colonoscopy so he doesn't get dry.  Okay to delay the colonoscopy, but should get it done.       OUTCOME  My chart message sent to John.

## 2021-11-08 NOTE — TELEPHONE ENCOUNTER
Patient Call: General  Route to LPN    Reason for call: Patient wanted to check-in with Onelia and also discuss COVID booster.     Call back needed? Yes    Return Call Needed  Same as documented in contacts section  When to return call?: Same day: Route High Priority

## 2021-11-12 ENCOUNTER — INFUSION THERAPY VISIT (OUTPATIENT)
Dept: INFUSION THERAPY | Facility: CLINIC | Age: 71
End: 2021-11-12
Attending: INTERNAL MEDICINE
Payer: MEDICARE

## 2021-11-12 VITALS
OXYGEN SATURATION: 100 % | HEART RATE: 75 BPM | DIASTOLIC BLOOD PRESSURE: 80 MMHG | RESPIRATION RATE: 18 BRPM | SYSTOLIC BLOOD PRESSURE: 137 MMHG

## 2021-11-12 DIAGNOSIS — Z94.0 KIDNEY REPLACED BY TRANSPLANT: ICD-10-CM

## 2021-11-12 DIAGNOSIS — Z48.298 AFTERCARE FOLLOWING ORGAN TRANSPLANT: ICD-10-CM

## 2021-11-12 DIAGNOSIS — A49.8 CLOSTRIDIUM DIFFICILE INFECTION: ICD-10-CM

## 2021-11-12 DIAGNOSIS — A09 DIARRHEA OF INFECTIOUS ORIGIN: ICD-10-CM

## 2021-11-12 DIAGNOSIS — R19.7 DIARRHEA OF PRESUMED INFECTIOUS ORIGIN: Primary | ICD-10-CM

## 2021-11-12 LAB
ANION GAP SERPL CALCULATED.3IONS-SCNC: 7 MMOL/L (ref 3–14)
BUN SERPL-MCNC: 20 MG/DL (ref 7–30)
CALCIUM SERPL-MCNC: 8.4 MG/DL (ref 8.5–10.1)
CHLORIDE BLD-SCNC: 115 MMOL/L (ref 94–109)
CO2 SERPL-SCNC: 23 MMOL/L (ref 20–32)
CREAT SERPL-MCNC: 1.61 MG/DL (ref 0.66–1.25)
GFR SERPL CREATININE-BSD FRML MDRD: 43 ML/MIN/1.73M2
GLUCOSE BLD-MCNC: 96 MG/DL (ref 70–99)
POTASSIUM BLD-SCNC: 4.4 MMOL/L (ref 3.4–5.3)
SODIUM SERPL-SCNC: 145 MMOL/L (ref 133–144)

## 2021-11-12 PROCEDURE — 258N000003 HC RX IP 258 OP 636: Performed by: INTERNAL MEDICINE

## 2021-11-12 PROCEDURE — 80048 BASIC METABOLIC PNL TOTAL CA: CPT

## 2021-11-12 PROCEDURE — 36415 COLL VENOUS BLD VENIPUNCTURE: CPT

## 2021-11-12 PROCEDURE — 96360 HYDRATION IV INFUSION INIT: CPT

## 2021-11-12 RX ORDER — ALBUTEROL SULFATE 90 UG/1
1-2 AEROSOL, METERED RESPIRATORY (INHALATION)
Status: CANCELLED
Start: 2021-11-17

## 2021-11-12 RX ORDER — METHYLPREDNISOLONE SODIUM SUCCINATE 125 MG/2ML
125 INJECTION, POWDER, LYOPHILIZED, FOR SOLUTION INTRAMUSCULAR; INTRAVENOUS
Status: CANCELLED
Start: 2021-11-17

## 2021-11-12 RX ORDER — ALBUTEROL SULFATE 0.83 MG/ML
2.5 SOLUTION RESPIRATORY (INHALATION)
Status: CANCELLED | OUTPATIENT
Start: 2021-11-17

## 2021-11-12 RX ORDER — DIPHENHYDRAMINE HYDROCHLORIDE 50 MG/ML
50 INJECTION INTRAMUSCULAR; INTRAVENOUS
Status: CANCELLED
Start: 2021-11-17

## 2021-11-12 RX ORDER — HEPARIN SODIUM,PORCINE 10 UNIT/ML
5 VIAL (ML) INTRAVENOUS
Status: CANCELLED | OUTPATIENT
Start: 2021-11-17

## 2021-11-12 RX ORDER — MEPERIDINE HYDROCHLORIDE 25 MG/ML
25 INJECTION INTRAMUSCULAR; INTRAVENOUS; SUBCUTANEOUS EVERY 30 MIN PRN
Status: CANCELLED | OUTPATIENT
Start: 2021-11-17

## 2021-11-12 RX ORDER — VANCOMYCIN HYDROCHLORIDE 125 MG/1
CAPSULE ORAL
Qty: 84 CAPSULE | Refills: 0 | OUTPATIENT
Start: 2021-11-12 | End: 2021-12-10

## 2021-11-12 RX ORDER — HEPARIN SODIUM (PORCINE) LOCK FLUSH IV SOLN 100 UNIT/ML 100 UNIT/ML
5 SOLUTION INTRAVENOUS
Status: CANCELLED | OUTPATIENT
Start: 2021-11-17

## 2021-11-12 RX ORDER — EPINEPHRINE 1 MG/ML
0.3 INJECTION, SOLUTION INTRAMUSCULAR; SUBCUTANEOUS EVERY 5 MIN PRN
Status: CANCELLED | OUTPATIENT
Start: 2021-11-17

## 2021-11-12 RX ORDER — NALOXONE HYDROCHLORIDE 0.4 MG/ML
0.2 INJECTION, SOLUTION INTRAMUSCULAR; INTRAVENOUS; SUBCUTANEOUS
Status: CANCELLED | OUTPATIENT
Start: 2021-11-17

## 2021-11-12 RX ADMIN — SODIUM CHLORIDE 1000 ML: 9 INJECTION, SOLUTION INTRAVENOUS at 14:44

## 2021-11-12 NOTE — TELEPHONE ENCOUNTER
Pt requesting vancomycin refill, however original order only for total of 28 weeks, no documentation by MD or RNCC about extending treatment, message left for patient to clarify, awaiting return call or MyChart message.

## 2021-11-12 NOTE — PROGRESS NOTES
"Chief Complaint   Patient presents with     Infusion     IV Fluids     Infusion Nursing Note:  Marlo Boweryaneliannalise presents today for IV Fluids.    Patient seen by provider today: No   present during visit today: Not Applicable.    Note: Fluids given over 1 hour.    Labs: BMP drawn post infusion.    Intravenous Access:  Peripheral IV placed.    Treatment Conditions:  Not Applicable.      Post Infusion Assessment:  Blood return noted pre and post infusion.  Site patent and intact, free from redness, edema or discomfort.  No evidence of extravasations.  Access discontinued per protocol.       Discharge Plan:   AVS to patient via MYCHART.  Patient will return 11/12 for next appointment.   Patient discharged in stable condition accompanied by: self.  Departure Mode: Ambulatory.    Administrations This Visit     0.9% sodium chloride BOLUS     Admin Date  11/12/2021 Action  New Bag Dose  1,000 mL Route  Intravenous Administered By  Zhane Chavira RN              Vital signs:    Temp src: Oral BP: 117/71 Pulse: 72   Resp: 18 SpO2: 100 % O2 Device: None (Room air)        Estimated body mass index is 20.59 kg/m  as calculated from the following:    Height as of 9/24/21: 1.778 m (5' 10\").    Weight as of 9/24/21: 65.1 kg (143 lb 8 oz).                        "

## 2021-11-12 NOTE — LETTER
"    11/12/2021         RE: Marlo Vee  [address] [facility] [place] [address]        Dear Colleague,    Thank you for referring your patient, Marlo Vee, to the [facility]. Please see a copy of my visit note below.    Chief Complaint   Patient presents with     Infusion     IV Fluids     Infusion Nursing Note:  Marlo Vee presents today for IV Fluids.    Patient seen by provider today: No   present during visit today: Not Applicable.    Note: Fluids given over 1 hour.    Labs: BMP drawn post infusion.    Intravenous Access:  Peripheral IV placed.    Treatment Conditions:  Not Applicable.      Post Infusion Assessment:  Blood return noted pre and post infusion.  Site patent and intact, free from redness, edema or discomfort.  No evidence of extravasations.  Access discontinued per protocol.       Discharge Plan:   AVS to patient via MYCHART.  Patient will return 11/12 for next appointment.   Patient discharged in stable condition accompanied by: self.  Departure Mode: Ambulatory.    Administrations This Visit     0.9% sodium chloride BOLUS     Admin Date  11/12/2021 Action  New Bag Dose  1,000 mL Route  Intravenous Administered By  Zhane Chavira RN              Vital signs:    Temp src: Oral BP: 117/71 Pulse: 72   Resp: 18 SpO2: 100 % O2 Device: None (Room air)        Estimated body mass index is 20.59 kg/m  as calculated from the following:    Height as of 9/24/21: 1.778 m (5' 10\").    Weight as of 9/24/21: 65.1 kg (143 lb 8 oz).                            Again, thank you for allowing me to participate in the care of your patient.        Sincerely,        [facility]    "

## 2021-11-13 ENCOUNTER — INFUSION THERAPY VISIT (OUTPATIENT)
Dept: INFUSION THERAPY | Facility: CLINIC | Age: 71
End: 2021-11-13
Attending: INTERNAL MEDICINE
Payer: MEDICARE

## 2021-11-13 VITALS
SYSTOLIC BLOOD PRESSURE: 122 MMHG | OXYGEN SATURATION: 100 % | HEART RATE: 71 BPM | TEMPERATURE: 97.4 F | DIASTOLIC BLOOD PRESSURE: 78 MMHG | RESPIRATION RATE: 16 BRPM

## 2021-11-13 DIAGNOSIS — Z94.0 KIDNEY REPLACED BY TRANSPLANT: ICD-10-CM

## 2021-11-13 DIAGNOSIS — R19.7 DIARRHEA OF PRESUMED INFECTIOUS ORIGIN: Primary | ICD-10-CM

## 2021-11-13 LAB
ANION GAP SERPL CALCULATED.3IONS-SCNC: 5 MMOL/L (ref 3–14)
BUN SERPL-MCNC: 20 MG/DL (ref 7–30)
CALCIUM SERPL-MCNC: 7.9 MG/DL (ref 8.5–10.1)
CHLORIDE BLD-SCNC: 118 MMOL/L (ref 94–109)
CO2 SERPL-SCNC: 21 MMOL/L (ref 20–32)
CREAT SERPL-MCNC: 1.59 MG/DL (ref 0.66–1.25)
GFR SERPL CREATININE-BSD FRML MDRD: 44 ML/MIN/1.73M2
GLUCOSE BLD-MCNC: 110 MG/DL (ref 70–99)
POTASSIUM BLD-SCNC: 4.6 MMOL/L (ref 3.4–5.3)
SODIUM SERPL-SCNC: 144 MMOL/L (ref 133–144)

## 2021-11-13 PROCEDURE — 36415 COLL VENOUS BLD VENIPUNCTURE: CPT

## 2021-11-13 PROCEDURE — 258N000003 HC RX IP 258 OP 636: Performed by: INTERNAL MEDICINE

## 2021-11-13 PROCEDURE — 80048 BASIC METABOLIC PNL TOTAL CA: CPT

## 2021-11-13 PROCEDURE — 96360 HYDRATION IV INFUSION INIT: CPT

## 2021-11-13 RX ORDER — EPINEPHRINE 1 MG/ML
0.3 INJECTION, SOLUTION INTRAMUSCULAR; SUBCUTANEOUS EVERY 5 MIN PRN
Status: CANCELLED | OUTPATIENT
Start: 2021-11-17

## 2021-11-13 RX ORDER — NALOXONE HYDROCHLORIDE 0.4 MG/ML
0.2 INJECTION, SOLUTION INTRAMUSCULAR; INTRAVENOUS; SUBCUTANEOUS
Status: CANCELLED | OUTPATIENT
Start: 2021-11-17

## 2021-11-13 RX ORDER — METHYLPREDNISOLONE SODIUM SUCCINATE 125 MG/2ML
125 INJECTION, POWDER, LYOPHILIZED, FOR SOLUTION INTRAMUSCULAR; INTRAVENOUS
Status: CANCELLED
Start: 2021-11-17

## 2021-11-13 RX ORDER — DIPHENHYDRAMINE HYDROCHLORIDE 50 MG/ML
50 INJECTION INTRAMUSCULAR; INTRAVENOUS
Status: CANCELLED
Start: 2021-11-17

## 2021-11-13 RX ORDER — MEPERIDINE HYDROCHLORIDE 25 MG/ML
25 INJECTION INTRAMUSCULAR; INTRAVENOUS; SUBCUTANEOUS EVERY 30 MIN PRN
Status: CANCELLED | OUTPATIENT
Start: 2021-11-17

## 2021-11-13 RX ORDER — ALBUTEROL SULFATE 0.83 MG/ML
2.5 SOLUTION RESPIRATORY (INHALATION)
Status: CANCELLED | OUTPATIENT
Start: 2021-11-17

## 2021-11-13 RX ORDER — HEPARIN SODIUM (PORCINE) LOCK FLUSH IV SOLN 100 UNIT/ML 100 UNIT/ML
5 SOLUTION INTRAVENOUS
Status: CANCELLED | OUTPATIENT
Start: 2021-11-17

## 2021-11-13 RX ORDER — ALBUTEROL SULFATE 90 UG/1
1-2 AEROSOL, METERED RESPIRATORY (INHALATION)
Status: CANCELLED
Start: 2021-11-17

## 2021-11-13 RX ORDER — HEPARIN SODIUM,PORCINE 10 UNIT/ML
5 VIAL (ML) INTRAVENOUS
Status: CANCELLED | OUTPATIENT
Start: 2021-11-17

## 2021-11-13 RX ADMIN — SODIUM CHLORIDE 1000 ML: 9 INJECTION, SOLUTION INTRAVENOUS at 13:15

## 2021-11-13 NOTE — PROGRESS NOTES
Nursing Note  Marlo Vee [facility] for:   Chief Complaint   Patient presents with     Infusion     IV Fluids     During [facility]'s [facility] appointment, orders from Dr LONNY Varela were completed.  Frequency: 3 times weekly    Progress note:  Patient identification verified by name and date of birth.  Assessment completed.  Vitals recorded in Doc Flowsheets.  Patient was provided with education regarding medication/procedure and possible side effects.  Patient verbalized understanding.     present during visit today: Not Applicable.    Treatment Conditions: Non-applicable.    Premedications: were not ordered.    Drug Waste Record: No    Infusion length and rate:  infusion given over approximately 1 hours    Labs: were drawn per orders.     Vascular access: peripheral IV placed today.    Is the next appt scheduled? Yes  Asymptomatic COVID test completed? No    Post Infusion Assessment:  Patient tolerated infusion without incident.  Blood return noted pre and post infusion.  Site patent and intact, free from redness, edema or discomfort.  No evidence of extravasations.  Access discontinued per protocol.   Patient declined AVS.    Discharge Plan:   Follow up plan of care with: ongoing infusions at [facility].  Discharge instructions were reviewed with patient.  Patient/representative verbalized understanding of discharge instructions and all questions answered.  Patient discharged from [facility] in stable condition.    Stephanie Hull RN    Administrations This Visit     0.9% sodium chloride BOLUS     Admin Date  11/13/2021 Action  Started Dose  1,000 mL Route  Intravenous Administered By  Stephanie Hull RN                /67   Pulse 71   Temp 97.4  F (36.3  C) (Oral)   Resp 16   SpO2 100%     '

## 2021-11-13 NOTE — LETTER
11/13/2021         RE: Marlo Vee  [address] [facility] [place] [address]        Dear Colleague,    Thank you for referring your patient, Marlo Vee, to the [facility] TREATMENT [facility]. Please see a copy of my visit note below.    Nursing Note  Marlo Vee [facility] for:   Chief Complaint   Patient presents with     Infusion     IV Fluids     During [facility]'s [facility] appointment, orders from Dr LONNY Varela were completed.  Frequency: 3 times weekly    Progress note:  Patient identification verified by name and date of birth.  Assessment completed.  Vitals recorded in Doc Flowsheets.  Patient was provided with education regarding medication/procedure and possible side effects.  Patient verbalized understanding.     present during visit today: Not Applicable.    Treatment Conditions: Non-applicable.    Premedications: were not ordered.    Drug Waste Record: No    Infusion length and rate:  infusion given over approximately 1 hours    Labs: were drawn per orders.     Vascular access: peripheral IV placed today.    Is the next appt scheduled? Yes  Asymptomatic COVID test completed? No    Post Infusion Assessment:  Patient tolerated infusion without incident.  Blood return noted pre and post infusion.  Site patent and intact, free from redness, edema or discomfort.  No evidence of extravasations.  Access discontinued per protocol.   Patient declined AVS.    Discharge Plan:   Follow up plan of care with: ongoing infusions at [facility].  Discharge instructions were reviewed with patient.  Patient/representative verbalized understanding of discharge instructions and all questions answered.  Patient discharged from [facility] in stable condition.    Stephanie Hull RN    Administrations This Visit     0.9% sodium  chloride BOLUS     Admin Date  11/13/2021 Action  Started Dose  1,000 mL Route  Intravenous Administered By  Stephanie Hull RN                /67   Pulse 71   Temp 97.4  F (36.3  C) (Oral)   Resp 16   SpO2 100%     '      Again, thank you for allowing me to participate in the care of your patient.        Sincerely,        [facility]

## 2021-11-17 ENCOUNTER — INFUSION THERAPY VISIT (OUTPATIENT)
Dept: INFUSION THERAPY | Facility: CLINIC | Age: 71
End: 2021-11-17
Attending: INTERNAL MEDICINE
Payer: MEDICARE

## 2021-11-17 ENCOUNTER — TRANSFERRED RECORDS (OUTPATIENT)
Dept: HEALTH INFORMATION MANAGEMENT | Facility: CLINIC | Age: 71
End: 2021-11-17

## 2021-11-17 VITALS
SYSTOLIC BLOOD PRESSURE: 140 MMHG | HEART RATE: 61 BPM | TEMPERATURE: 97.3 F | DIASTOLIC BLOOD PRESSURE: 88 MMHG | RESPIRATION RATE: 16 BRPM | OXYGEN SATURATION: 100 %

## 2021-11-17 DIAGNOSIS — R19.7 DIARRHEA OF PRESUMED INFECTIOUS ORIGIN: Primary | ICD-10-CM

## 2021-11-17 DIAGNOSIS — Z94.0 KIDNEY REPLACED BY TRANSPLANT: ICD-10-CM

## 2021-11-17 LAB
ANION GAP SERPL CALCULATED.3IONS-SCNC: <1 MMOL/L (ref 3–14)
BUN SERPL-MCNC: 26 MG/DL (ref 7–30)
CALCIUM SERPL-MCNC: 8.7 MG/DL (ref 8.5–10.1)
CHLORIDE BLD-SCNC: 117 MMOL/L (ref 94–109)
CO2 SERPL-SCNC: 22 MMOL/L (ref 20–32)
CREAT SERPL-MCNC: 1.92 MG/DL (ref 0.66–1.25)
GFR SERPL CREATININE-BSD FRML MDRD: 35 ML/MIN/1.73M2
GLUCOSE BLD-MCNC: 90 MG/DL (ref 70–99)
POTASSIUM BLD-SCNC: 4.3 MMOL/L (ref 3.4–5.3)
SODIUM SERPL-SCNC: 138 MMOL/L (ref 133–144)
TACROLIMUS BLD-MCNC: 6.9 UG/L (ref 5–15)
TME LAST DOSE: NORMAL H
TME LAST DOSE: NORMAL H

## 2021-11-17 PROCEDURE — 258N000003 HC RX IP 258 OP 636: Performed by: INTERNAL MEDICINE

## 2021-11-17 PROCEDURE — 96360 HYDRATION IV INFUSION INIT: CPT

## 2021-11-17 PROCEDURE — 80048 BASIC METABOLIC PNL TOTAL CA: CPT

## 2021-11-17 PROCEDURE — 36415 COLL VENOUS BLD VENIPUNCTURE: CPT

## 2021-11-17 PROCEDURE — 80197 ASSAY OF TACROLIMUS: CPT

## 2021-11-17 RX ORDER — METHYLPREDNISOLONE SODIUM SUCCINATE 125 MG/2ML
125 INJECTION, POWDER, LYOPHILIZED, FOR SOLUTION INTRAMUSCULAR; INTRAVENOUS
Status: CANCELLED
Start: 2021-11-17

## 2021-11-17 RX ORDER — ALBUTEROL SULFATE 90 UG/1
1-2 AEROSOL, METERED RESPIRATORY (INHALATION)
Status: CANCELLED
Start: 2021-11-17

## 2021-11-17 RX ORDER — MEPERIDINE HYDROCHLORIDE 25 MG/ML
25 INJECTION INTRAMUSCULAR; INTRAVENOUS; SUBCUTANEOUS EVERY 30 MIN PRN
Status: CANCELLED | OUTPATIENT
Start: 2021-11-17

## 2021-11-17 RX ORDER — HEPARIN SODIUM (PORCINE) LOCK FLUSH IV SOLN 100 UNIT/ML 100 UNIT/ML
5 SOLUTION INTRAVENOUS
Status: CANCELLED | OUTPATIENT
Start: 2021-11-17

## 2021-11-17 RX ORDER — EPINEPHRINE 1 MG/ML
0.3 INJECTION, SOLUTION INTRAMUSCULAR; SUBCUTANEOUS EVERY 5 MIN PRN
Status: CANCELLED | OUTPATIENT
Start: 2021-11-17

## 2021-11-17 RX ORDER — NALOXONE HYDROCHLORIDE 0.4 MG/ML
0.2 INJECTION, SOLUTION INTRAMUSCULAR; INTRAVENOUS; SUBCUTANEOUS
Status: CANCELLED | OUTPATIENT
Start: 2021-11-17

## 2021-11-17 RX ORDER — HEPARIN SODIUM,PORCINE 10 UNIT/ML
5 VIAL (ML) INTRAVENOUS
Status: CANCELLED | OUTPATIENT
Start: 2021-11-17

## 2021-11-17 RX ORDER — ALBUTEROL SULFATE 0.83 MG/ML
2.5 SOLUTION RESPIRATORY (INHALATION)
Status: CANCELLED | OUTPATIENT
Start: 2021-11-17

## 2021-11-17 RX ORDER — DIPHENHYDRAMINE HYDROCHLORIDE 50 MG/ML
50 INJECTION INTRAMUSCULAR; INTRAVENOUS
Status: CANCELLED
Start: 2021-11-17

## 2021-11-17 RX ADMIN — SODIUM CHLORIDE 1000 ML: 9 INJECTION, SOLUTION INTRAVENOUS at 11:21

## 2021-11-17 NOTE — PATIENT INSTRUCTIONS
Dear Marlo Vee    Thank you for choosing [facility] ([facility]) for your infusion.  The following information is a summary of our appointment as well as important reminders.      We look forward in seeing you on your next appointment here at [facility] ([facility]).  Please don t hesitate to call us at [phone] to reschedule any of your appointments or to speak with one of the [facility] registered nurses.  It was a pleasure taking care of you today.    Sincerely,    [facility]  [facility]  [address] [place] [address]  [address]  Phone:  [phone]

## 2021-11-17 NOTE — PROGRESS NOTES
Nursing Note  Marlo Vee presents [facility] for:   Chief Complaint   Patient presents with     Infusion     fluids, transplant labs     During today's [facility] appointment, orders from Dr Varela were completed.  Frequency: 3 times weekly    Progress note:  Patient identification verified by name and date of birth.  Assessment completed.  Vitals recorded in Doc Flowsheets.  Patient was provided with education regarding medication/procedure and possible side effects.  Patient verbalized understanding.     present during visit today: Not Applicable.    Treatment Conditions: Non-applicable.    Premedications: were not ordered.    Drug Waste Record: No    Infusion length and rate:  infusion given over approximately 1 hours    Labs: were drawn per orders.     Vascular access: peripheral IV placed today.    Is the next appt scheduled? yes  Asymptomatic COVID test completed? no    Post Infusion Assessment:  Patient tolerated infusion without incident.  Site patent and intact, free from redness, edema or discomfort.  Access discontinued per protocol.     Discharge Plan:   Follow up plan of care with: ongoing infusions at [facility]., ordering provider as scheduled. and after visit summary declined by patient  Discharge instructions were reviewed with patient.  Patient/representative verbalized understanding of discharge instructions and all questions answered.  Patient discharged from [facility] in stable condition.    Afua Dixon RN    Administrations This Visit     0.9% sodium chloride BOLUS     Admin Date  11/17/2021 Action  New Bag Dose  1,000 mL Route  Intravenous Administered By  Afua Dixon RN                BP (!) 140/88 (BP Location: Right arm)   Pulse 61   Temp 97.3  F (36.3  C) (Oral)   Resp 16   SpO2 100%

## 2021-11-17 NOTE — LETTER
11/17/2021         RE: Marlo Vee  [address] [facility] [address]        Dear Colleague,    Thank you for referring your patient, Marlo Vee, to the [facility] TREATMENT [facility]. Please see a copy of my visit note below.    Nursing Note  Marlo Vee presents [facility] for:   Chief Complaint   Patient presents with     Infusion     fluids, transplant labs     During today's [facility] appointment, orders from Dr Varela were completed.  Frequency: 3 times weekly    Progress note:  Patient identification verified by name and date of birth.  Assessment completed.  Vitals recorded in Doc Flowsheets.  Patient was provided with education regarding medication/procedure and possible side effects.  Patient verbalized understanding.     present during visit today: Not Applicable.    Treatment Conditions: Non-applicable.    Premedications: were not ordered.    Drug Waste Record: No    Infusion length and rate:  infusion given over approximately 1 hours    Labs: were drawn per orders.     Vascular access: peripheral IV placed today.    Is the next appt scheduled? yes  Asymptomatic COVID test completed? no    Post Infusion Assessment:  Patient tolerated infusion without incident.  Site patent and intact, free from redness, edema or discomfort.  Access discontinued per protocol.     Discharge Plan:   Follow up plan of care with: ongoing infusions at [facility]., ordering provider as scheduled. and after visit summary declined by patient  Discharge instructions were reviewed with patient.  Patient/representative verbalized understanding of discharge instructions and all questions answered.  Patient discharged from [facility] in stable condition.    Afua Dixon RN    Administrations This Visit     0.9% sodium  chloride BOLUS     Admin Date  11/17/2021 Action  New Bag Dose  1,000 mL Route  Intravenous Administered By  Afua Dixon RN                BP (!) 140/88 (BP Location: Right arm)   Pulse 61   Temp 97.3  F (36.3  C) (Oral)   Resp 16   SpO2 100%         Again, thank you for allowing me to participate in the care of your patient.        Sincerely,        [facility]

## 2021-11-18 ENCOUNTER — TELEPHONE (OUTPATIENT)
Dept: TRANSPLANT | Facility: CLINIC | Age: 71
End: 2021-11-18
Payer: MEDICARE

## 2021-11-18 DIAGNOSIS — Z94.0 KIDNEY REPLACED BY TRANSPLANT: ICD-10-CM

## 2021-11-18 DIAGNOSIS — R19.7 DIARRHEA OF PRESUMED INFECTIOUS ORIGIN: Primary | ICD-10-CM

## 2021-11-18 NOTE — TELEPHONE ENCOUNTER
ISSUE  Creatinine up to 1.92. Recent baseline 1.4-1.6.   Still getting IVF's 2-3 x's/week.  Tac level     PLAN  Samuel Varela MD Harris, Kathleen, RN  Elevated serum creatinine and recommend usual assessment for fever, recent illness, pain over kidney allograft, blood pressure and volume status, as well as would consider hydrate with IV fluids and repeat labs.       OUTCOME  Call placed to John. No answer. My chart message sent.  Pt. Scheduled for IVF's with labs tomorrow.

## 2021-11-19 ENCOUNTER — INFUSION THERAPY VISIT (OUTPATIENT)
Dept: INFUSION THERAPY | Facility: CLINIC | Age: 71
End: 2021-11-19
Attending: INTERNAL MEDICINE
Payer: MEDICARE

## 2021-11-19 VITALS
SYSTOLIC BLOOD PRESSURE: 128 MMHG | RESPIRATION RATE: 16 BRPM | OXYGEN SATURATION: 100 % | TEMPERATURE: 97.5 F | HEART RATE: 68 BPM | DIASTOLIC BLOOD PRESSURE: 79 MMHG

## 2021-11-19 DIAGNOSIS — R19.7 DIARRHEA OF PRESUMED INFECTIOUS ORIGIN: Primary | ICD-10-CM

## 2021-11-19 DIAGNOSIS — Z94.0 KIDNEY REPLACED BY TRANSPLANT: ICD-10-CM

## 2021-11-19 LAB
ANION GAP SERPL CALCULATED.3IONS-SCNC: 5 MMOL/L (ref 3–14)
BUN SERPL-MCNC: 30 MG/DL (ref 7–30)
CALCIUM SERPL-MCNC: 8.3 MG/DL (ref 8.5–10.1)
CHLORIDE BLD-SCNC: 115 MMOL/L (ref 94–109)
CO2 SERPL-SCNC: 22 MMOL/L (ref 20–32)
CREAT SERPL-MCNC: 1.98 MG/DL (ref 0.66–1.25)
GFR SERPL CREATININE-BSD FRML MDRD: 33 ML/MIN/1.73M2
GLUCOSE BLD-MCNC: 92 MG/DL (ref 70–99)
POTASSIUM BLD-SCNC: 4.1 MMOL/L (ref 3.4–5.3)
SODIUM SERPL-SCNC: 142 MMOL/L (ref 133–144)

## 2021-11-19 PROCEDURE — 36415 COLL VENOUS BLD VENIPUNCTURE: CPT

## 2021-11-19 PROCEDURE — 80048 BASIC METABOLIC PNL TOTAL CA: CPT

## 2021-11-19 PROCEDURE — 96360 HYDRATION IV INFUSION INIT: CPT

## 2021-11-19 PROCEDURE — 258N000003 HC RX IP 258 OP 636: Performed by: INTERNAL MEDICINE

## 2021-11-19 RX ORDER — EPINEPHRINE 1 MG/ML
0.3 INJECTION, SOLUTION INTRAMUSCULAR; SUBCUTANEOUS EVERY 5 MIN PRN
Status: CANCELLED | OUTPATIENT
Start: 2021-11-24

## 2021-11-19 RX ORDER — HEPARIN SODIUM,PORCINE 10 UNIT/ML
5 VIAL (ML) INTRAVENOUS
Status: CANCELLED | OUTPATIENT
Start: 2021-11-24

## 2021-11-19 RX ORDER — MEPERIDINE HYDROCHLORIDE 25 MG/ML
25 INJECTION INTRAMUSCULAR; INTRAVENOUS; SUBCUTANEOUS EVERY 30 MIN PRN
Status: CANCELLED | OUTPATIENT
Start: 2021-11-24

## 2021-11-19 RX ORDER — DIPHENHYDRAMINE HYDROCHLORIDE 50 MG/ML
50 INJECTION INTRAMUSCULAR; INTRAVENOUS
Status: CANCELLED
Start: 2021-11-24

## 2021-11-19 RX ORDER — NALOXONE HYDROCHLORIDE 0.4 MG/ML
0.2 INJECTION, SOLUTION INTRAMUSCULAR; INTRAVENOUS; SUBCUTANEOUS
Status: CANCELLED | OUTPATIENT
Start: 2021-11-24

## 2021-11-19 RX ORDER — METHYLPREDNISOLONE SODIUM SUCCINATE 125 MG/2ML
125 INJECTION, POWDER, LYOPHILIZED, FOR SOLUTION INTRAMUSCULAR; INTRAVENOUS
Status: CANCELLED
Start: 2021-11-24

## 2021-11-19 RX ORDER — ALBUTEROL SULFATE 0.83 MG/ML
2.5 SOLUTION RESPIRATORY (INHALATION)
Status: CANCELLED | OUTPATIENT
Start: 2021-11-24

## 2021-11-19 RX ORDER — ALBUTEROL SULFATE 90 UG/1
1-2 AEROSOL, METERED RESPIRATORY (INHALATION)
Status: CANCELLED
Start: 2021-11-24

## 2021-11-19 RX ORDER — HEPARIN SODIUM (PORCINE) LOCK FLUSH IV SOLN 100 UNIT/ML 100 UNIT/ML
5 SOLUTION INTRAVENOUS
Status: CANCELLED | OUTPATIENT
Start: 2021-11-24

## 2021-11-19 RX ADMIN — SODIUM CHLORIDE 1000 ML: 9 INJECTION, SOLUTION INTRAVENOUS at 15:20

## 2021-11-19 NOTE — PROGRESS NOTES
Nursing Note  Marlo Vee presents [facility] for:   Chief Complaint   Patient presents with     Infusion     fluids     During [facility]'s [facility] appointment, orders from Dr Varela were completed.  Frequency: 3 times weekly    Progress note:  Patient identification verified by name and date of birth.  Assessment completed.  Vitals recorded in Doc Flowsheets.  Patient was provided with education regarding medication/procedure and possible side effects.  Patient verbalized understanding.     present during visit today: Not Applicable.    Treatment Conditions: Non-applicable.    Premedications: were not ordered.    Drug Waste Record: No    Infusion length and rate:  infusion given over approximately 1 hours    Labs: were drawn per orders.     Vascular access: peripheral IV placed today.    Is the next appt scheduled? yes  Asymptomatic COVID test completed? no    Post Infusion Assessment:  Patient tolerated infusion without incident.     Discharge Plan:   Follow up plan of care with: ongoing infusions at [facility]. and ordering provider as scheduled.  Discharge instructions were reviewed with patient.  Patient/representative verbalized understanding of discharge instructions and all questions answered.  Patient discharged from [facility] in stable condition.    Afua Dixon RN    Administrations This Visit     0.9% sodium chloride BOLUS     Admin Date  11/19/2021 Action  New Bag Dose  1,000 mL Route  Intravenous Administered By  Afua Dixon RN                /79 (BP Location: Left arm)   Pulse 68   Temp 97.5  F (36.4  C) (Oral)   Resp 16   SpO2 100%

## 2021-11-19 NOTE — PATIENT INSTRUCTIONS
Dear Marlo Vee    Thank you for choosing [facility] ([facility]) for your infusion.  The following information is a summary of our appointment as well as important reminders.      We look forward in seeing you on your next appointment here at [facility] ([facility]).  Please don t hesitate to call us at [phone] to reschedule any of your appointments or to speak with one of the [facility] registered nurses.  It was a pleasure taking care of you today.    Sincerely,    [facility]  [facility]  [address] [place] [address]  [address]  Phone:  [phone]

## 2021-11-19 NOTE — LETTER
11/19/2021         RE: Marlo Vee  [address] [facility] [address]        Dear Colleague,    Thank you for referring your patient, Marlo Vee, to the [facility] TREAT[facility]. Please see a copy of my visit note below.    Nursing Note  Marlo Vee presents today [facility] for:   Chief Complaint   Patient presents with     Infusion     fluids     During today's [facility] appointment, orders from Dr Varela were completed.  Frequency: 3 times weekly    Progress note:  Patient identification verified by name and date of birth.  Assessment completed.  Vitals recorded in Doc Flowsheets.  Patient was provided with education regarding medication/procedure and possible side effects.  Patient verbalized understanding.     present during visit today: Not Applicable.    Treatment Conditions: Non-applicable.    Premedications: were not ordered.    Drug Waste Record: No    Infusion length and rate:  infusion given over approximately 1 hours    Labs: were drawn per orders.     Vascular access: peripheral IV placed today.    Is the next appt scheduled? yes  Asymptomatic COVID test completed? no    Post Infusion Assessment:  Patient tolerated infusion without incident.     Discharge Plan:   Follow up plan of care with: ongoing infusions at [facility]. and ordering provider as scheduled.  Discharge instructions were reviewed with patient.  Patient/representative verbalized understanding of discharge instructions and all questions answered.  Patient discharged from [facility] in stable condition.    Afua Dixon RN    Administrations This Visit     0.9% sodium chloride BOLUS     Admin Date  11/19/2021 Action  New Bag Dose  1,000 mL Route  Intravenous Administered By  Afua Dixon RN                BP  128/79 (BP Location: Left arm)   Pulse 68   Temp 97.5  F (36.4  C) (Oral)   Resp 16   SpO2 100%         Again, thank you for allowing me to participate in the care of your patient.        Sincerely,        [facility]

## 2021-11-20 ENCOUNTER — INFUSION THERAPY VISIT (OUTPATIENT)
Dept: INFUSION THERAPY | Facility: CLINIC | Age: 71
End: 2021-11-20
Attending: INTERNAL MEDICINE
Payer: MEDICARE

## 2021-11-20 VITALS
SYSTOLIC BLOOD PRESSURE: 155 MMHG | TEMPERATURE: 97.5 F | HEART RATE: 65 BPM | OXYGEN SATURATION: 100 % | RESPIRATION RATE: 16 BRPM | DIASTOLIC BLOOD PRESSURE: 93 MMHG

## 2021-11-20 DIAGNOSIS — R19.7 DIARRHEA OF PRESUMED INFECTIOUS ORIGIN: Primary | ICD-10-CM

## 2021-11-20 DIAGNOSIS — Z94.0 KIDNEY REPLACED BY TRANSPLANT: ICD-10-CM

## 2021-11-20 LAB
ANION GAP SERPL CALCULATED.3IONS-SCNC: 8 MMOL/L (ref 3–14)
BUN SERPL-MCNC: 27 MG/DL (ref 7–30)
CALCIUM SERPL-MCNC: 8 MG/DL (ref 8.5–10.1)
CHLORIDE BLD-SCNC: 117 MMOL/L (ref 94–109)
CO2 SERPL-SCNC: 19 MMOL/L (ref 20–32)
CREAT SERPL-MCNC: 1.93 MG/DL (ref 0.66–1.25)
GFR SERPL CREATININE-BSD FRML MDRD: 35 ML/MIN/1.73M2
GLUCOSE BLD-MCNC: 90 MG/DL (ref 70–99)
POTASSIUM BLD-SCNC: 4 MMOL/L (ref 3.4–5.3)
SODIUM SERPL-SCNC: 144 MMOL/L (ref 133–144)

## 2021-11-20 PROCEDURE — 258N000003 HC RX IP 258 OP 636: Performed by: INTERNAL MEDICINE

## 2021-11-20 PROCEDURE — 36415 COLL VENOUS BLD VENIPUNCTURE: CPT

## 2021-11-20 PROCEDURE — 80048 BASIC METABOLIC PNL TOTAL CA: CPT

## 2021-11-20 PROCEDURE — 96360 HYDRATION IV INFUSION INIT: CPT

## 2021-11-20 RX ORDER — EPINEPHRINE 1 MG/ML
0.3 INJECTION, SOLUTION INTRAMUSCULAR; SUBCUTANEOUS EVERY 5 MIN PRN
Status: CANCELLED | OUTPATIENT
Start: 2021-11-24

## 2021-11-20 RX ORDER — METHYLPREDNISOLONE SODIUM SUCCINATE 125 MG/2ML
125 INJECTION, POWDER, LYOPHILIZED, FOR SOLUTION INTRAMUSCULAR; INTRAVENOUS
Status: CANCELLED
Start: 2021-11-24

## 2021-11-20 RX ORDER — MEPERIDINE HYDROCHLORIDE 25 MG/ML
25 INJECTION INTRAMUSCULAR; INTRAVENOUS; SUBCUTANEOUS EVERY 30 MIN PRN
Status: CANCELLED | OUTPATIENT
Start: 2021-11-24

## 2021-11-20 RX ORDER — HEPARIN SODIUM (PORCINE) LOCK FLUSH IV SOLN 100 UNIT/ML 100 UNIT/ML
5 SOLUTION INTRAVENOUS
Status: CANCELLED | OUTPATIENT
Start: 2021-11-24

## 2021-11-20 RX ORDER — HEPARIN SODIUM,PORCINE 10 UNIT/ML
5 VIAL (ML) INTRAVENOUS
Status: CANCELLED | OUTPATIENT
Start: 2021-11-24

## 2021-11-20 RX ORDER — ALBUTEROL SULFATE 0.83 MG/ML
2.5 SOLUTION RESPIRATORY (INHALATION)
Status: CANCELLED | OUTPATIENT
Start: 2021-11-24

## 2021-11-20 RX ORDER — DIPHENHYDRAMINE HYDROCHLORIDE 50 MG/ML
50 INJECTION INTRAMUSCULAR; INTRAVENOUS
Status: CANCELLED
Start: 2021-11-24

## 2021-11-20 RX ORDER — ALBUTEROL SULFATE 90 UG/1
1-2 AEROSOL, METERED RESPIRATORY (INHALATION)
Status: CANCELLED
Start: 2021-11-24

## 2021-11-20 RX ORDER — NALOXONE HYDROCHLORIDE 0.4 MG/ML
0.2 INJECTION, SOLUTION INTRAMUSCULAR; INTRAVENOUS; SUBCUTANEOUS
Status: CANCELLED | OUTPATIENT
Start: 2021-11-24

## 2021-11-20 RX ADMIN — SODIUM CHLORIDE 1000 ML: 9 INJECTION, SOLUTION INTRAVENOUS at 10:50

## 2021-11-20 NOTE — PROGRESS NOTES
Nursing Note  Marlo Vee presents [facility] for:   Chief Complaint   Patient presents with     Infusion     IV fluids     During [facility]'s [facility] appointment, orders from Dr. Varela were completed.  Frequency: 2-3 times weekly    Progress note:  Patient identification verified by name and date of birth.  Assessment completed.  Vitals recorded in Doc Flowsheets.  Patient was provided with education regarding medication/procedure and possible side effects.  Patient verbalized understanding.     present during visit today: Not Applicable.    Treatment Conditions: Non-applicable.  Premedications: were not ordered.  Drug Waste Record: No  Infusion length and rate:  999 ml/hr., over one hour  Labs: were drawn per orders.   Vascular access: peripheral IV placed today.    Is the next appt scheduled? yes  Asymptomatic COVID test completed? no    Post Infusion Assessment:  Patient tolerated infusion without incident.  Site patent and intact, free from redness, edema or discomfort.  No evidence of extravasations.  Access discontinued per protocol.     Discharge Plan:   Follow up plan of care with: ongoing infusions at [facility].  Discharge instructions were reviewed with patient.  Patient/representative verbalized understanding of discharge instructions and all questions answered.  Patient discharged from [facility] in stable condition.    Suzie Mendoza RN    Administrations This Visit     0.9% sodium chloride BOLUS     Admin Date  11/20/2021 Action  New Bag Dose  1,000 mL Route  Intravenous Administered By  Suzie Mendoza RN                BP (!) 155/93   Pulse 65   Temp 97.5  F (36.4  C) (Oral)   Resp 16   SpO2 100%

## 2021-11-20 NOTE — LETTER
11/20/2021         RE: Marlo Vee  [address] [facility] [place] [address]        Dear Colleague,    Thank you for referring your patient, Marlo Vee, to the [facility] TREATMENT [facility]. Please see a copy of my visit note below.    Nursing Note  Marlo Vee presents today to [facility] for:   Chief Complaint   Patient presents with     Infusion     IV fluids     During [facility]'s [facility] appointment, orders from Dr. Varela were completed.  Frequency: 2-3 times weekly    Progress note:  Patient identification verified by name and date of birth.  Assessment completed.  Vitals recorded in Doc Flowsheets.  Patient was provided with education regarding medication/procedure and possible side effects.  Patient verbalized understanding.     present during visit today: Not Applicable.    Treatment Conditions: Non-applicable.  Premedications: were not ordered.  Drug Waste Record: No  Infusion length and rate:  999 ml/hr., over one hour  Labs: were drawn per orders.   Vascular access: peripheral IV placed today.    Is the next appt scheduled? yes  Asymptomatic COVID test completed? no    Post Infusion Assessment:  Patient tolerated infusion without incident.  Site patent and intact, free from redness, edema or discomfort.  No evidence of extravasations.  Access discontinued per protocol.     Discharge Plan:   Follow up plan of care with: ongoing infusions at [facility].  Discharge instructions were reviewed with patient.  Patient/representative verbalized understanding of discharge instructions and all questions answered.  Patient discharged from [facility] in stable condition.    Suzie Mendoza RN    Administrations This Visit     0.9% sodium chloride BOLUS     Admin Date  11/20/2021 Action  New Bag Dose  1,000 mL Route  Intravenous  Administered By  Suzie Mendoza RN                BP (!) 155/93   Pulse 65   Temp 97.5  F (36.4  C) (Oral)   Resp 16   SpO2 100%         Again, thank you for allowing me to participate in the care of your patient.        Sincerely,        [facility]

## 2021-11-22 NOTE — DISCHARGE INSTRUCTIONS
You were seen in the Emergency Department for diarrhea.    Please follow up on Monday August 9th for a lab draw to check electrolytes, blood count, and drug levels.     Your infectious stool studies are being processed and the results will be communicated with you.     Follow up with your transplant care team in the next week.     Continue good oral hydration. You may take 1 tablet Immodium at bedtime if needed for diarrhea.     Return to the Emergency Department if worsening symptoms or worsening abdominal pain.    oriented to person, place and time

## 2021-11-23 ENCOUNTER — TELEPHONE (OUTPATIENT)
Dept: TRANSPLANT | Facility: CLINIC | Age: 71
End: 2021-11-23
Payer: MEDICARE

## 2021-11-23 ENCOUNTER — TELEPHONE (OUTPATIENT)
Dept: GASTROENTEROLOGY | Facility: CLINIC | Age: 71
End: 2021-11-23
Payer: MEDICARE

## 2021-11-23 NOTE — TELEPHONE ENCOUNTER
"ISSUE:  Return call to patient, he states he is \"on tail end of this C. Diff\", states he is going to be visiting his son and daughter-in-law over [place] and wants to know if that is safe.     Labs on 11/20:  Bicarb 19  Creatinine 1.93  Calcium 8.0    PLAN:  Pt reports he is having 2-3 BM daily and that they are soft but formed, denies abdominal pain.   Educated patient on transmission of C. Diff including proper hand hygiene and frequent disinfections of high contact surfaces. Additionally notified patient of the below recommendations regarding holiday gatherings:   1. Not gathering in person and seeing people virtually remains the most safe  2.   If patient plans to gather for the holidays:  a. Recommended to limit the number of people attending  b. Recommended that anyone who is sick should not attend  c. Recommended that all who attend that can be vaccinated, are vaccinated  d. Recommended that if the patient is planning to gather with people who are unable to be vaccinated (i.e. <5 years old), to wear a mask  e. Recommend to test prior to gathering with at home antigen testing, do not gather if results are positive.  This is especially recommended for those who are unvaccinated, but since virus can still be transmitted by patients who have been vaccinated, recommendation for all who plan to attend is to take an at home antigen test.     Regarding labs, discussed increase in calcium intake, encouraged hydration and will monitor results following tomorrow's IV hydration appointment.     OUTCOME:  The patient verbalized understanding of these recommendations.  "

## 2021-11-23 NOTE — TELEPHONE ENCOUNTER
"Per review of chart, last tested positive for C.diff on 10/13/2021 and treated with a 14 day course of Vancomycin, followed by 1 cap twice daily for 2 weeks.      Patient contacted, states that he plans to visit his son and family for [place] and his daughter in law is very concerned that he may still have C.diff and worries about contagion.  Per patient, daughter-in-law is \"compromised\" and is worried about getting C.diff.  Patient is wondering if there is confirmatory testing that could be completed?  Patient currently has formed stool.   Advised that formed stool samples are refused by the laboratory due to the high improbability of a positive result.  Patient informed that formed stool is considered to be a resolution of the infection.      Patient requests if a letter could be written stating this, or if Dr. Rudolph could recommend any other testing that would ease his families' minds?      Forwarding to provider for review/recommendation.    Yolanda Rangel RN    "

## 2021-11-23 NOTE — LETTER
November 29, 2021      Marlo Vee  [address] [facility] [address]        Dear Marlo Vee:    There is no reason for additional testing following infection with C.difficile in the absence of diarrhea, resolution is determined if stool is formed.  We do not require isolation from family members and recommend good hand hygiene even if acutely ill with a C.difficile infection.      Please contact our office if you have recurrent diarrhea.   Dr. Rudolph also recommends that you consider scheduling a colonoscopy as discussed at your appointment.        Sincerely,    HEATHER Guerrero  [facility]  Gastroenterology

## 2021-11-23 NOTE — TELEPHONE ENCOUNTER
[facility] Call Center    Phone Message    May a detailed message be left on voicemail: yes     Reason for Call: Other:     Marlo called to ask Dr. Rudolph about C Diff and how he will know it has passed.  Is there testing that needs to be done?  Please call back to discuss.    Action Taken: Message routed to:  Adult Clinics: Gastroenterology (GI) p [id number]    Travel Screening: Not Applicable

## 2021-11-24 ENCOUNTER — INFUSION THERAPY VISIT (OUTPATIENT)
Dept: INFUSION THERAPY | Facility: CLINIC | Age: 71
End: 2021-11-24
Attending: INTERNAL MEDICINE
Payer: MEDICARE

## 2021-11-24 VITALS
DIASTOLIC BLOOD PRESSURE: 71 MMHG | SYSTOLIC BLOOD PRESSURE: 154 MMHG | OXYGEN SATURATION: 100 % | HEART RATE: 57 BPM | TEMPERATURE: 97.7 F | RESPIRATION RATE: 16 BRPM

## 2021-11-24 DIAGNOSIS — Z94.0 KIDNEY REPLACED BY TRANSPLANT: ICD-10-CM

## 2021-11-24 DIAGNOSIS — R19.7 DIARRHEA OF PRESUMED INFECTIOUS ORIGIN: Primary | ICD-10-CM

## 2021-11-24 LAB
ANION GAP SERPL CALCULATED.3IONS-SCNC: 7 MMOL/L (ref 3–14)
BUN SERPL-MCNC: 28 MG/DL (ref 7–30)
CALCIUM SERPL-MCNC: 8.6 MG/DL (ref 8.5–10.1)
CHLORIDE BLD-SCNC: 115 MMOL/L (ref 94–109)
CO2 SERPL-SCNC: 21 MMOL/L (ref 20–32)
CREAT SERPL-MCNC: 1.96 MG/DL (ref 0.66–1.25)
GFR SERPL CREATININE-BSD FRML MDRD: 34 ML/MIN/1.73M2
GLUCOSE BLD-MCNC: 95 MG/DL (ref 70–99)
POTASSIUM BLD-SCNC: 4.1 MMOL/L (ref 3.4–5.3)
SODIUM SERPL-SCNC: 143 MMOL/L (ref 133–144)
TACROLIMUS BLD-MCNC: 5.6 UG/L (ref 5–15)
TME LAST DOSE: NORMAL H
TME LAST DOSE: NORMAL H

## 2021-11-24 PROCEDURE — 258N000003 HC RX IP 258 OP 636: Performed by: INTERNAL MEDICINE

## 2021-11-24 PROCEDURE — 96360 HYDRATION IV INFUSION INIT: CPT

## 2021-11-24 PROCEDURE — 80197 ASSAY OF TACROLIMUS: CPT

## 2021-11-24 PROCEDURE — 80048 BASIC METABOLIC PNL TOTAL CA: CPT

## 2021-11-24 PROCEDURE — 36415 COLL VENOUS BLD VENIPUNCTURE: CPT

## 2021-11-24 RX ORDER — HEPARIN SODIUM,PORCINE 10 UNIT/ML
5 VIAL (ML) INTRAVENOUS
Status: CANCELLED | OUTPATIENT
Start: 2021-11-24

## 2021-11-24 RX ORDER — DIPHENHYDRAMINE HYDROCHLORIDE 50 MG/ML
50 INJECTION INTRAMUSCULAR; INTRAVENOUS
Status: CANCELLED
Start: 2021-11-24

## 2021-11-24 RX ORDER — METHYLPREDNISOLONE SODIUM SUCCINATE 125 MG/2ML
125 INJECTION, POWDER, LYOPHILIZED, FOR SOLUTION INTRAMUSCULAR; INTRAVENOUS
Status: CANCELLED
Start: 2021-11-24

## 2021-11-24 RX ORDER — ALBUTEROL SULFATE 90 UG/1
1-2 AEROSOL, METERED RESPIRATORY (INHALATION)
Status: CANCELLED
Start: 2021-11-24

## 2021-11-24 RX ORDER — HEPARIN SODIUM (PORCINE) LOCK FLUSH IV SOLN 100 UNIT/ML 100 UNIT/ML
5 SOLUTION INTRAVENOUS
Status: CANCELLED | OUTPATIENT
Start: 2021-11-24

## 2021-11-24 RX ORDER — EPINEPHRINE 1 MG/ML
0.3 INJECTION, SOLUTION INTRAMUSCULAR; SUBCUTANEOUS EVERY 5 MIN PRN
Status: CANCELLED | OUTPATIENT
Start: 2021-11-24

## 2021-11-24 RX ORDER — MEPERIDINE HYDROCHLORIDE 25 MG/ML
25 INJECTION INTRAMUSCULAR; INTRAVENOUS; SUBCUTANEOUS EVERY 30 MIN PRN
Status: CANCELLED | OUTPATIENT
Start: 2021-11-24

## 2021-11-24 RX ORDER — ALBUTEROL SULFATE 0.83 MG/ML
2.5 SOLUTION RESPIRATORY (INHALATION)
Status: CANCELLED | OUTPATIENT
Start: 2021-11-24

## 2021-11-24 RX ORDER — NALOXONE HYDROCHLORIDE 0.4 MG/ML
0.2 INJECTION, SOLUTION INTRAMUSCULAR; INTRAVENOUS; SUBCUTANEOUS
Status: CANCELLED | OUTPATIENT
Start: 2021-11-24

## 2021-11-24 RX ADMIN — SODIUM CHLORIDE 1000 ML: 9 INJECTION, SOLUTION INTRAVENOUS at 11:20

## 2021-11-24 NOTE — PROGRESS NOTES
Nursing Note  Marlo Vee presents [facility] for:   Chief Complaint   Patient presents with     Infusion     IVF     During [facility]'s [facility] appointment, orders from Dr. Varela were completed.  Frequency: three times per day    Progress note:  Patient identification verified by name and date of birth.  Assessment completed.  Vitals recorded in Doc Flowsheets.  Patient was provided with education regarding medication/procedure and possible side effects.  Patient verbalized understanding.     present during visit today: Not Applicable.    Treatment Conditions: Non-applicable.    Premedications: were not ordered.    Drug Waste Record: No    Infusion length and rate:  infusion given over approximately 60 minutes    Labs: were drawn per orders.     Vascular access: peripheral IV placed today.    Is the next appt scheduled? yes  Asymptomatic COVID test completed? no    Post Infusion Assessment:  Patient tolerated infusion without incident.     Discharge Plan:   Follow up plan of care with: ongoing infusions at [facility]. and ordering provider as scheduled.  Discharge instructions were reviewed with patient.  Patient/representative verbalized understanding of discharge instructions and all questions answered.  Patient discharged [facility] in stable condition.    Yuki Diehl RN    Administrations This Visit     0.9% sodium chloride BOLUS     Admin Date  11/24/2021 Action  New Bag Dose  1,000 mL Route  Intravenous Administered By  Yuki Diehl, RN                BP (!) 154/71   Pulse 57   Temp 97.7  F (36.5  C) (Oral)   Resp 16   SpO2 100%

## 2021-11-24 NOTE — LETTER
11/24/2021         RE: Marlo Vee  [address] [facility] [place] [address]        Dear Colleague,    Thank you for referring your patient, Marlo Vee, to the [facility]. Please see a copy of my visit note below.    Nursing Note  Marlo Vee presents today to [facility] for:   Chief Complaint   Patient presents with     Infusion     IVF     During [facility]'s [facility] appointment, orders from Dr. Varela were completed.  Frequency: three times per day    Progress note:  Patient identification verified by name and date of birth.  Assessment completed.  Vitals recorded in Doc Flowsheets.  Patient was provided with education regarding medication/procedure and possible side effects.  Patient verbalized understanding.     present during visit today: Not Applicable.    Treatment Conditions: Non-applicable.    Premedications: were not ordered.    Drug Waste Record: No    Infusion length and rate:  infusion given over approximately 60 minutes    Labs: were drawn per orders.     Vascular access: peripheral IV placed today.    Is the next appt scheduled? yes  Asymptomatic COVID test completed? no    Post Infusion Assessment:  Patient tolerated infusion without incident.     Discharge Plan:   Follow up plan of care with: ongoing infusions at [facility]. and ordering provider as scheduled.  Discharge instructions were reviewed with patient.  Patient/representative verbalized understanding of discharge instructions and all questions answered.  Patient discharged from [facility] in stable condition.    Yuki Diehl RN    Administrations This Visit     0.9% sodium chloride BOLUS     Admin Date  11/24/2021 Action  New Bag Dose  1,000 mL Route  Intravenous Administered By  Yuki Diehl RN                BP (!) 154/71   Pulse 57    Temp 97.7  F (36.5  C) (Oral)   Resp 16   SpO2 100%           Again, thank you for allowing me to participate in the care of your patient.        Sincerely,        [facility]

## 2021-11-26 ENCOUNTER — INFUSION THERAPY VISIT (OUTPATIENT)
Dept: INFUSION THERAPY | Facility: CLINIC | Age: 71
End: 2021-11-26
Attending: INTERNAL MEDICINE
Payer: MEDICARE

## 2021-11-26 VITALS
SYSTOLIC BLOOD PRESSURE: 130 MMHG | OXYGEN SATURATION: 100 % | HEART RATE: 65 BPM | TEMPERATURE: 98 F | RESPIRATION RATE: 16 BRPM | DIASTOLIC BLOOD PRESSURE: 89 MMHG

## 2021-11-26 DIAGNOSIS — Z94.0 KIDNEY REPLACED BY TRANSPLANT: ICD-10-CM

## 2021-11-26 DIAGNOSIS — R19.7 DIARRHEA OF PRESUMED INFECTIOUS ORIGIN: Primary | ICD-10-CM

## 2021-11-26 LAB
ANION GAP SERPL CALCULATED.3IONS-SCNC: 8 MMOL/L (ref 3–14)
BUN SERPL-MCNC: 28 MG/DL (ref 7–30)
CALCIUM SERPL-MCNC: 8.8 MG/DL (ref 8.5–10.1)
CHLORIDE BLD-SCNC: 114 MMOL/L (ref 94–109)
CO2 SERPL-SCNC: 22 MMOL/L (ref 20–32)
CREAT SERPL-MCNC: 1.96 MG/DL (ref 0.66–1.25)
GFR SERPL CREATININE-BSD FRML MDRD: 34 ML/MIN/1.73M2
GLUCOSE BLD-MCNC: 94 MG/DL (ref 70–99)
POTASSIUM BLD-SCNC: 4.3 MMOL/L (ref 3.4–5.3)
SODIUM SERPL-SCNC: 144 MMOL/L (ref 133–144)

## 2021-11-26 PROCEDURE — 36415 COLL VENOUS BLD VENIPUNCTURE: CPT

## 2021-11-26 PROCEDURE — 82565 ASSAY OF CREATININE: CPT

## 2021-11-26 PROCEDURE — 96360 HYDRATION IV INFUSION INIT: CPT

## 2021-11-26 PROCEDURE — 258N000003 HC RX IP 258 OP 636: Performed by: INTERNAL MEDICINE

## 2021-11-26 RX ORDER — ALBUTEROL SULFATE 0.83 MG/ML
2.5 SOLUTION RESPIRATORY (INHALATION)
Status: CANCELLED | OUTPATIENT
Start: 2021-11-26

## 2021-11-26 RX ORDER — METHYLPREDNISOLONE SODIUM SUCCINATE 125 MG/2ML
125 INJECTION, POWDER, LYOPHILIZED, FOR SOLUTION INTRAMUSCULAR; INTRAVENOUS
Status: CANCELLED
Start: 2021-11-26

## 2021-11-26 RX ORDER — NALOXONE HYDROCHLORIDE 0.4 MG/ML
0.2 INJECTION, SOLUTION INTRAMUSCULAR; INTRAVENOUS; SUBCUTANEOUS
Status: CANCELLED | OUTPATIENT
Start: 2021-11-26

## 2021-11-26 RX ORDER — HEPARIN SODIUM (PORCINE) LOCK FLUSH IV SOLN 100 UNIT/ML 100 UNIT/ML
5 SOLUTION INTRAVENOUS
Status: CANCELLED | OUTPATIENT
Start: 2021-11-26

## 2021-11-26 RX ORDER — HEPARIN SODIUM,PORCINE 10 UNIT/ML
5 VIAL (ML) INTRAVENOUS
Status: CANCELLED | OUTPATIENT
Start: 2021-11-26

## 2021-11-26 RX ORDER — DIPHENHYDRAMINE HYDROCHLORIDE 50 MG/ML
50 INJECTION INTRAMUSCULAR; INTRAVENOUS
Status: CANCELLED
Start: 2021-11-26

## 2021-11-26 RX ORDER — MEPERIDINE HYDROCHLORIDE 25 MG/ML
25 INJECTION INTRAMUSCULAR; INTRAVENOUS; SUBCUTANEOUS EVERY 30 MIN PRN
Status: CANCELLED | OUTPATIENT
Start: 2021-11-26

## 2021-11-26 RX ORDER — ALBUTEROL SULFATE 90 UG/1
1-2 AEROSOL, METERED RESPIRATORY (INHALATION)
Status: CANCELLED
Start: 2021-11-26

## 2021-11-26 RX ORDER — EPINEPHRINE 1 MG/ML
0.3 INJECTION, SOLUTION INTRAMUSCULAR; SUBCUTANEOUS EVERY 5 MIN PRN
Status: CANCELLED | OUTPATIENT
Start: 2021-11-26

## 2021-11-26 RX ADMIN — SODIUM CHLORIDE 1000 ML: 9 INJECTION, SOLUTION INTRAVENOUS at 11:17

## 2021-11-26 NOTE — PROGRESS NOTES
Nursing Note  Marlo Vee presents [facility] for:   Chief Complaint   Patient presents with     Infusion     sodium chloride BOLUS     During [facility]'s [facility] appointment, orders from Dr. Varela were completed.  Frequency: three times weekly    Progress note:  Patient identification verified by name and date of birth.  Assessment completed.  Vitals recorded in Doc Flowsheets.  Patient was provided with education regarding medication/procedure and possible side effects.  Patient verbalized understanding.     present during visit today: Not Applicable.    Treatment Conditions: Non-applicable.    Premedications: were not ordered.    Drug Waste Record: No    Infusion length and rate:  infusion given over approximately 60 minutes    Labs: BMP drawn post infusion per orders.    Vascular access: peripheral IV placed today.    Is the next appt scheduled? 11/3  Asymptomatic COVID test completed? NA    Post Infusion Assessment:  Patient tolerated infusion without incident.     Discharge Plan:   Follow up plan of care with: ongoing infusions at [facility]., ordering provider as scheduled. and after visit summary declined by patient  Discharge instructions were reviewed with patient.  Patient/representative verbalized understanding of discharge instructions and all questions answered.  Patient discharged from [facility] in stable condition.    Elisabeth High RN    Administrations This Visit     0.9% sodium chloride BOLUS     Admin Date  11/26/2021 Action  New Bag Dose  1,000 mL Route  Intravenous Administered By  Elisabeth High RN                /89   Pulse 65   Temp 98  F (36.7  C) (Oral)   Resp 16   SpO2 100%

## 2021-11-26 NOTE — LETTER
11/26/2021         RE: Marlo Vee  [address] [facility] [place] [address]        Dear Colleague,    Thank you for referring your patient, Marlo Vee, to the [facility]REAT[facility]. Please see a copy of my visit note below.    Nursing Note  Marlo Vee presents [facility] for:   Chief Complaint   Patient presents with     Infusion     sodium chloride BOLUS     During [facility]'s [facility] appointment, orders from Dr. Varela were completed.  Frequency: three times weekly    Progress note:  Patient identification verified by name and date of birth.  Assessment completed.  Vitals recorded in Doc Flowsheets.  Patient was provided with education regarding medication/procedure and possible side effects.  Patient verbalized understanding.     present during visit today: Not Applicable.    Treatment Conditions: Non-applicable.    Premedications: were not ordered.    Drug Waste Record: No    Infusion length and rate:  infusion given over approximately 60 minutes    Labs: BMP drawn post infusion per orders.    Vascular access: peripheral IV placed today.    Is the next appt scheduled? 11/3  Asymptomatic COVID test completed? NA    Post Infusion Assessment:  Patient tolerated infusion without incident.     Discharge Plan:   Follow up plan of care with: ongoing infusions at [facility]., ordering provider as scheduled. and after visit summary declined by patient  Discharge instructions were reviewed with patient.  Patient/representative verbalized understanding of discharge instructions and all questions answered.  Patient discharged from [facility] in stable condition.    Elisabeth High RN    Administrations This Visit     0.9% sodium chloride BOLUS     Admin Date  11/26/2021 Action  New Bag Dose  1,000 mL Route  Intravenous  Administered By  Elisabeth High RN                /89   Pulse 65   Temp 98  F (36.7  C) (Oral)   Resp 16   SpO2 100%           Again, thank you for allowing me to participate in the care of your patient.        Sincerely,        [facility]

## 2021-11-29 NOTE — TELEPHONE ENCOUNTER
Melinda Rudolph, DO  You 5 days ago     It's safe to be with his family as long as everyone practices good hand hygiene - even when people are acutely ill with c-difficile we don't isolate them from their family members.  If he's continuing to have formed stools (and as you already told him the lab won't be able to run it any ways) - if he has recurrent diarrhea he should let me know.  He should also think about scheduling the colonoscopy we discussed at his appointment      Letter sent with provider recommendations as noted above.     Yolanda Rangel RN

## 2021-12-01 ENCOUNTER — INFUSION THERAPY VISIT (OUTPATIENT)
Dept: INFUSION THERAPY | Facility: CLINIC | Age: 71
End: 2021-12-01
Attending: INTERNAL MEDICINE
Payer: MEDICARE

## 2021-12-01 VITALS
RESPIRATION RATE: 16 BRPM | SYSTOLIC BLOOD PRESSURE: 119 MMHG | OXYGEN SATURATION: 100 % | TEMPERATURE: 97.5 F | HEART RATE: 67 BPM | DIASTOLIC BLOOD PRESSURE: 81 MMHG

## 2021-12-01 DIAGNOSIS — R19.7 DIARRHEA OF PRESUMED INFECTIOUS ORIGIN: Primary | ICD-10-CM

## 2021-12-01 DIAGNOSIS — Z94.0 KIDNEY REPLACED BY TRANSPLANT: ICD-10-CM

## 2021-12-01 LAB
ANION GAP SERPL CALCULATED.3IONS-SCNC: 7 MMOL/L (ref 3–14)
BUN SERPL-MCNC: 30 MG/DL (ref 7–30)
CALCIUM SERPL-MCNC: 8.5 MG/DL (ref 8.5–10.1)
CHLORIDE BLD-SCNC: 118 MMOL/L (ref 94–109)
CO2 SERPL-SCNC: 19 MMOL/L (ref 20–32)
CREAT SERPL-MCNC: 1.87 MG/DL (ref 0.66–1.25)
GFR SERPL CREATININE-BSD FRML MDRD: 36 ML/MIN/1.73M2
GLUCOSE BLD-MCNC: 110 MG/DL (ref 70–99)
POTASSIUM BLD-SCNC: 4.9 MMOL/L (ref 3.4–5.3)
SODIUM SERPL-SCNC: 144 MMOL/L (ref 133–144)

## 2021-12-01 PROCEDURE — 36415 COLL VENOUS BLD VENIPUNCTURE: CPT

## 2021-12-01 PROCEDURE — 258N000003 HC RX IP 258 OP 636: Performed by: INTERNAL MEDICINE

## 2021-12-01 PROCEDURE — 96360 HYDRATION IV INFUSION INIT: CPT

## 2021-12-01 PROCEDURE — 80048 BASIC METABOLIC PNL TOTAL CA: CPT

## 2021-12-01 RX ORDER — HEPARIN SODIUM (PORCINE) LOCK FLUSH IV SOLN 100 UNIT/ML 100 UNIT/ML
5 SOLUTION INTRAVENOUS
Status: DISCONTINUED | OUTPATIENT
Start: 2021-12-01 | End: 2021-12-01 | Stop reason: HOSPADM

## 2021-12-01 RX ORDER — DIPHENHYDRAMINE HYDROCHLORIDE 50 MG/ML
50 INJECTION INTRAMUSCULAR; INTRAVENOUS
Status: CANCELLED
Start: 2021-12-01

## 2021-12-01 RX ORDER — NALOXONE HYDROCHLORIDE 0.4 MG/ML
0.2 INJECTION, SOLUTION INTRAMUSCULAR; INTRAVENOUS; SUBCUTANEOUS
Status: CANCELLED | OUTPATIENT
Start: 2021-12-01

## 2021-12-01 RX ORDER — ALBUTEROL SULFATE 0.83 MG/ML
2.5 SOLUTION RESPIRATORY (INHALATION)
Status: CANCELLED | OUTPATIENT
Start: 2021-12-01

## 2021-12-01 RX ORDER — METHYLPREDNISOLONE SODIUM SUCCINATE 125 MG/2ML
125 INJECTION, POWDER, LYOPHILIZED, FOR SOLUTION INTRAMUSCULAR; INTRAVENOUS
Status: CANCELLED
Start: 2021-12-01

## 2021-12-01 RX ORDER — HEPARIN SODIUM,PORCINE 10 UNIT/ML
5 VIAL (ML) INTRAVENOUS
Status: DISCONTINUED | OUTPATIENT
Start: 2021-12-01 | End: 2021-12-01 | Stop reason: HOSPADM

## 2021-12-01 RX ORDER — HEPARIN SODIUM (PORCINE) LOCK FLUSH IV SOLN 100 UNIT/ML 100 UNIT/ML
5 SOLUTION INTRAVENOUS
Status: CANCELLED | OUTPATIENT
Start: 2021-12-01

## 2021-12-01 RX ORDER — HEPARIN SODIUM,PORCINE 10 UNIT/ML
5 VIAL (ML) INTRAVENOUS
Status: CANCELLED | OUTPATIENT
Start: 2021-12-01

## 2021-12-01 RX ORDER — ALBUTEROL SULFATE 90 UG/1
1-2 AEROSOL, METERED RESPIRATORY (INHALATION)
Status: CANCELLED
Start: 2021-12-01

## 2021-12-01 RX ORDER — MEPERIDINE HYDROCHLORIDE 25 MG/ML
25 INJECTION INTRAMUSCULAR; INTRAVENOUS; SUBCUTANEOUS EVERY 30 MIN PRN
Status: CANCELLED | OUTPATIENT
Start: 2021-12-01

## 2021-12-01 RX ORDER — EPINEPHRINE 1 MG/ML
0.3 INJECTION, SOLUTION INTRAMUSCULAR; SUBCUTANEOUS EVERY 5 MIN PRN
Status: CANCELLED | OUTPATIENT
Start: 2021-12-01

## 2021-12-01 RX ADMIN — SODIUM CHLORIDE 1000 ML: 9 INJECTION, SOLUTION INTRAVENOUS at 09:19

## 2021-12-01 ASSESSMENT — PAIN SCALES - GENERAL: PAINLEVEL: NO PAIN (0)

## 2021-12-01 NOTE — PATIENT INSTRUCTIONS
Dear Marlo Vee    Thank you for choosing [facility] ([facility]) for your infusion.  The following information is a summary of our appointment as well as important reminders.      We look forward in seeing you on your next appointment here at [facility] ([facility]).  Please don t hesitate to call us at [phone] to reschedule any of your appointments or to speak with one of the [facility] registered nurses.  It was a pleasure taking care of you today.    Sincerely,    [facility]  [address] [place] [address]  [address]  Phone:  [phone]

## 2021-12-01 NOTE — PROGRESS NOTES
Infusion Nursing Note:  Marlo FLORES Mariusz presents today for IVF.    Patient seen by provider today: No   present during visit today: Not Applicable.    Note:   -1L of NS infused over 1hr    Intravenous Access:  Labs drawn without difficulty post infusion.  Peripheral IV placed.    Treatment Conditions:  Not Applicable.    Post Infusion Assessment:  Patient tolerated infusion without incident.  Site patent and intact, free from redness, edema or discomfort.  No evidence of extravasations.  Access discontinued per protocol.     Discharge Plan:   AVS to patient via MYCHART.  Patient will return 12/2/21 for next appointment.   Patient discharged in stable condition accompanied by: self.  Departure Mode: Ambulatory.    Dahiana Fam, RN    /81   Pulse 67   Temp 97.5  F (36.4  C)   Resp 16   SpO2 100%     Administrations This Visit     0.9% sodium chloride BOLUS     Admin Date  12/01/2021 Action  New Bag Dose  1,000 mL Rate  999 mL/hr Route  Intravenous Administered By  Dahiana Fam, RN

## 2021-12-01 NOTE — LETTER
12/1/2021         RE: Marlo Vee  [address] [place] [facility] [address]        Dear Colleague,    Thank you for referring your patient, Marlo Vee, to the [facility]. Please see a copy of my visit note below.    Infusion Nursing Note:  Marlo Vee presents today for IVF.    Patient seen by provider today: No   present during visit today: Not Applicable.    Note:   -1L of NS infused over 1hr    Intravenous Access:  Labs drawn without difficulty post infusion.  Peripheral IV placed.    Treatment Conditions:  Not Applicable.    Post Infusion Assessment:  Patient tolerated infusion without incident.  Site patent and intact, free from redness, edema or discomfort.  No evidence of extravasations.  Access discontinued per protocol.     Discharge Plan:   [facility]  Patient will return 12/2/21 for next appointment.   Patient discharged in stable condition accompanied by: self.  Departure Mode: Ambulatory.    Dahiana Fam RN    /81   Pulse 67   Temp 97.5  F (36.4  C)   Resp 16   SpO2 100%     Administrations This Visit     0.9% sodium chloride BOLUS     Admin Date  12/01/2021 Action  New Bag Dose  1,000 mL Rate  999 mL/hr Route  Intravenous Administered By  Dahiana Fam, RN                            Again, thank you for allowing me to participate in the care of your patient.        Sincerely,        [facility]

## 2021-12-02 ENCOUNTER — INFUSION THERAPY VISIT (OUTPATIENT)
Dept: INFUSION THERAPY | Facility: CLINIC | Age: 71
End: 2021-12-02
Attending: INTERNAL MEDICINE
Payer: MEDICARE

## 2021-12-02 VITALS
SYSTOLIC BLOOD PRESSURE: 125 MMHG | RESPIRATION RATE: 16 BRPM | DIASTOLIC BLOOD PRESSURE: 81 MMHG | TEMPERATURE: 97.8 F | HEART RATE: 68 BPM | OXYGEN SATURATION: 100 %

## 2021-12-02 DIAGNOSIS — R19.7 DIARRHEA OF PRESUMED INFECTIOUS ORIGIN: Primary | ICD-10-CM

## 2021-12-02 DIAGNOSIS — Z94.0 KIDNEY REPLACED BY TRANSPLANT: ICD-10-CM

## 2021-12-02 LAB
ANION GAP SERPL CALCULATED.3IONS-SCNC: 5 MMOL/L (ref 3–14)
BUN SERPL-MCNC: 30 MG/DL (ref 7–30)
CALCIUM SERPL-MCNC: 8.3 MG/DL (ref 8.5–10.1)
CHLORIDE BLD-SCNC: 118 MMOL/L (ref 94–109)
CO2 SERPL-SCNC: 21 MMOL/L (ref 20–32)
CREAT SERPL-MCNC: 1.9 MG/DL (ref 0.66–1.25)
GFR SERPL CREATININE-BSD FRML MDRD: 35 ML/MIN/1.73M2
GLUCOSE BLD-MCNC: 94 MG/DL (ref 70–99)
POTASSIUM BLD-SCNC: 4.3 MMOL/L (ref 3.4–5.3)
SODIUM SERPL-SCNC: 144 MMOL/L (ref 133–144)

## 2021-12-02 PROCEDURE — 96360 HYDRATION IV INFUSION INIT: CPT

## 2021-12-02 PROCEDURE — 258N000003 HC RX IP 258 OP 636: Performed by: INTERNAL MEDICINE

## 2021-12-02 PROCEDURE — 80048 BASIC METABOLIC PNL TOTAL CA: CPT

## 2021-12-02 PROCEDURE — 36415 COLL VENOUS BLD VENIPUNCTURE: CPT

## 2021-12-02 RX ORDER — HEPARIN SODIUM (PORCINE) LOCK FLUSH IV SOLN 100 UNIT/ML 100 UNIT/ML
5 SOLUTION INTRAVENOUS
Status: CANCELLED | OUTPATIENT
Start: 2021-12-02

## 2021-12-02 RX ORDER — EPINEPHRINE 1 MG/ML
0.3 INJECTION, SOLUTION INTRAMUSCULAR; SUBCUTANEOUS EVERY 5 MIN PRN
Status: CANCELLED | OUTPATIENT
Start: 2021-12-02

## 2021-12-02 RX ORDER — ALBUTEROL SULFATE 90 UG/1
1-2 AEROSOL, METERED RESPIRATORY (INHALATION)
Status: CANCELLED
Start: 2021-12-02

## 2021-12-02 RX ORDER — ALBUTEROL SULFATE 0.83 MG/ML
2.5 SOLUTION RESPIRATORY (INHALATION)
Status: CANCELLED | OUTPATIENT
Start: 2021-12-02

## 2021-12-02 RX ORDER — METHYLPREDNISOLONE SODIUM SUCCINATE 125 MG/2ML
125 INJECTION, POWDER, LYOPHILIZED, FOR SOLUTION INTRAMUSCULAR; INTRAVENOUS
Status: CANCELLED
Start: 2021-12-02

## 2021-12-02 RX ORDER — DIPHENHYDRAMINE HYDROCHLORIDE 50 MG/ML
50 INJECTION INTRAMUSCULAR; INTRAVENOUS
Status: CANCELLED
Start: 2021-12-02

## 2021-12-02 RX ORDER — NALOXONE HYDROCHLORIDE 0.4 MG/ML
0.2 INJECTION, SOLUTION INTRAMUSCULAR; INTRAVENOUS; SUBCUTANEOUS
Status: CANCELLED | OUTPATIENT
Start: 2021-12-02

## 2021-12-02 RX ORDER — HEPARIN SODIUM,PORCINE 10 UNIT/ML
5 VIAL (ML) INTRAVENOUS
Status: CANCELLED | OUTPATIENT
Start: 2021-12-02

## 2021-12-02 RX ORDER — MEPERIDINE HYDROCHLORIDE 25 MG/ML
25 INJECTION INTRAMUSCULAR; INTRAVENOUS; SUBCUTANEOUS EVERY 30 MIN PRN
Status: CANCELLED | OUTPATIENT
Start: 2021-12-02

## 2021-12-02 RX ADMIN — SODIUM CHLORIDE 1000 ML: 9 INJECTION, SOLUTION INTRAVENOUS at 15:20

## 2021-12-02 NOTE — LETTER
"    12/2/2021         RE: Marlo Vee  [address] [facility] [address]        Dear Colleague,    Thank you for referring your patient, Marlo Vee, to the [facility]. Please see a copy of my visit note below.    Chief Complaint   Patient presents with     Infusion     IV fluids     Infusion Nursing Note:  Marlo Vee presents today for IV fluids.    Patient seen by provider today: No   present during visit today: Not Applicable.    Note: fluids given over 1 hour at 999 ml/hr.    Labs:  BMP drawn post infusion.    Intravenous Access:  Peripheral IV placed.    Treatment Conditions:  Not Applicable.      Post Infusion Assessment:  Patient tolerated infusion without incident.  Site patent and intact, free from redness, edema or discomfort.  No evidence of extravasations.  Access discontinued per protocol.       Discharge Plan:   AVS to patient via MYCHART.  Patient will return 12/3 for next appointment.   Patient discharged in stable condition accompanied by: self.  Departure Mode: Ambulatory.      Administrations This Visit     0.9% sodium chloride BOLUS     Admin Date  12/02/2021 Action  New Bag Dose  1,000 mL Rate  999 mL/hr Route  Intravenous Administered By  Dahiana Fam RN                Vital signs:  Temp: 97.8  F (36.6  C) Temp src: Oral BP: 125/81 Pulse: 68   Resp: 16 SpO2: 100 %          Estimated body mass index is 20.59 kg/m  as calculated from the following:    Height as of 9/24/21: 1.778 m (5' 10\").    Weight as of 9/24/21: 65.1 kg (143 lb 8 oz).                          Again, thank you for allowing me to participate in the care of your patient.        Sincerely,        [facility]    "

## 2021-12-02 NOTE — PROGRESS NOTES
"Chief Complaint   Patient presents with     Infusion     IV fluids     Infusion Nursing Note:  Marlo Vee presents today for IV fluids.    Patient seen by provider today: No   present during visit today: Not Applicable.    Note: fluids given over 1 hour at 999 ml/hr.    Labs:  BMP drawn post infusion.    Intravenous Access:  Peripheral IV placed.    Treatment Conditions:  Not Applicable.      Post Infusion Assessment:  Patient tolerated infusion without incident.  Site patent and intact, free from redness, edema or discomfort.  No evidence of extravasations.  Access discontinued per protocol.       Discharge Plan:   AVS to patient via MYCHART.  Patient will return 12/3 for next appointment.   Patient discharged in stable condition accompanied by: self.  Departure Mode: Ambulatory.      Administrations This Visit     0.9% sodium chloride BOLUS     Admin Date  12/02/2021 Action  New Bag Dose  1,000 mL Rate  999 mL/hr Route  Intravenous Administered By  Dahiana Fam RN                Vital signs:  Temp: 97.8  F (36.6  C) Temp src: Oral BP: 125/81 Pulse: 68   Resp: 16 SpO2: 100 %          Estimated body mass index is 20.59 kg/m  as calculated from the following:    Height as of 9/24/21: 1.778 m (5' 10\").    Weight as of 9/24/21: 65.1 kg (143 lb 8 oz).                      "

## 2021-12-03 ENCOUNTER — INFUSION THERAPY VISIT (OUTPATIENT)
Dept: INFUSION THERAPY | Facility: CLINIC | Age: 71
End: 2021-12-03
Attending: INTERNAL MEDICINE
Payer: MEDICARE

## 2021-12-03 VITALS
TEMPERATURE: 97.4 F | OXYGEN SATURATION: 95 % | HEART RATE: 69 BPM | SYSTOLIC BLOOD PRESSURE: 122 MMHG | DIASTOLIC BLOOD PRESSURE: 82 MMHG | RESPIRATION RATE: 16 BRPM

## 2021-12-03 DIAGNOSIS — Z94.0 KIDNEY REPLACED BY TRANSPLANT: ICD-10-CM

## 2021-12-03 DIAGNOSIS — R19.7 DIARRHEA OF PRESUMED INFECTIOUS ORIGIN: Primary | ICD-10-CM

## 2021-12-03 LAB
ANION GAP SERPL CALCULATED.3IONS-SCNC: 5 MMOL/L (ref 3–14)
BUN SERPL-MCNC: 26 MG/DL (ref 7–30)
CALCIUM SERPL-MCNC: 8.3 MG/DL (ref 8.5–10.1)
CHLORIDE BLD-SCNC: 116 MMOL/L (ref 94–109)
CO2 SERPL-SCNC: 23 MMOL/L (ref 20–32)
CREAT SERPL-MCNC: 1.8 MG/DL (ref 0.66–1.25)
GFR SERPL CREATININE-BSD FRML MDRD: 38 ML/MIN/1.73M2
GLUCOSE BLD-MCNC: 89 MG/DL (ref 70–99)
POTASSIUM BLD-SCNC: 4.2 MMOL/L (ref 3.4–5.3)
SODIUM SERPL-SCNC: 144 MMOL/L (ref 133–144)

## 2021-12-03 PROCEDURE — 80048 BASIC METABOLIC PNL TOTAL CA: CPT

## 2021-12-03 PROCEDURE — 96360 HYDRATION IV INFUSION INIT: CPT

## 2021-12-03 PROCEDURE — 258N000003 HC RX IP 258 OP 636: Performed by: INTERNAL MEDICINE

## 2021-12-03 PROCEDURE — 36415 COLL VENOUS BLD VENIPUNCTURE: CPT

## 2021-12-03 RX ORDER — HEPARIN SODIUM (PORCINE) LOCK FLUSH IV SOLN 100 UNIT/ML 100 UNIT/ML
5 SOLUTION INTRAVENOUS
Status: CANCELLED | OUTPATIENT
Start: 2021-12-03

## 2021-12-03 RX ORDER — DIPHENHYDRAMINE HYDROCHLORIDE 50 MG/ML
50 INJECTION INTRAMUSCULAR; INTRAVENOUS
Status: CANCELLED
Start: 2021-12-03

## 2021-12-03 RX ORDER — ALBUTEROL SULFATE 90 UG/1
1-2 AEROSOL, METERED RESPIRATORY (INHALATION)
Status: CANCELLED
Start: 2021-12-03

## 2021-12-03 RX ORDER — METHYLPREDNISOLONE SODIUM SUCCINATE 125 MG/2ML
125 INJECTION, POWDER, LYOPHILIZED, FOR SOLUTION INTRAMUSCULAR; INTRAVENOUS
Status: CANCELLED
Start: 2021-12-03

## 2021-12-03 RX ORDER — NALOXONE HYDROCHLORIDE 0.4 MG/ML
0.2 INJECTION, SOLUTION INTRAMUSCULAR; INTRAVENOUS; SUBCUTANEOUS
Status: CANCELLED | OUTPATIENT
Start: 2021-12-03

## 2021-12-03 RX ORDER — HEPARIN SODIUM,PORCINE 10 UNIT/ML
5 VIAL (ML) INTRAVENOUS
Status: CANCELLED | OUTPATIENT
Start: 2021-12-03

## 2021-12-03 RX ORDER — ALBUTEROL SULFATE 0.83 MG/ML
2.5 SOLUTION RESPIRATORY (INHALATION)
Status: CANCELLED | OUTPATIENT
Start: 2021-12-03

## 2021-12-03 RX ORDER — MEPERIDINE HYDROCHLORIDE 25 MG/ML
25 INJECTION INTRAMUSCULAR; INTRAVENOUS; SUBCUTANEOUS EVERY 30 MIN PRN
Status: CANCELLED | OUTPATIENT
Start: 2021-12-03

## 2021-12-03 RX ORDER — EPINEPHRINE 1 MG/ML
0.3 INJECTION, SOLUTION INTRAMUSCULAR; SUBCUTANEOUS EVERY 5 MIN PRN
Status: CANCELLED | OUTPATIENT
Start: 2021-12-03

## 2021-12-03 RX ADMIN — SODIUM CHLORIDE 1000 ML: 9 INJECTION, SOLUTION INTRAVENOUS at 15:23

## 2021-12-03 ASSESSMENT — PAIN SCALES - GENERAL: PAINLEVEL: NO PAIN (0)

## 2021-12-03 NOTE — PROGRESS NOTES
Chief Complaint   Patient presents with     Infusion     IV hydration       Infusion Nursing Note:  Marlo Vee presents today for IV fluids.    Patient seen by provider today: No   present during visit today: Not Applicable.    Note: fluids given over 1 hour at 999 ml/hr.    Labs:  BMP drawn post infusion.    Intravenous Access:  Peripheral IV placed.    Treatment Conditions:  Not Applicable.      Post Infusion Assessment:  Patient tolerated infusion without incident.  Site patent and intact, free from redness, edema or discomfort.  No evidence of extravasations.  Access discontinued per protocol.       Discharge Plan:   [facility]  Contacted scheduling for future appointments.  Patient discharged in stable condition accompanied by: self.  Departure Mode: Ambulatory.      Administrations This Visit     0.9% sodium chloride BOLUS     Admin Date  12/03/2021 Action  New Bag Dose  1,000 mL Rate  999 mL/hr Route  Intravenous Administered By  Dahiana Fam, RN              /82 (BP Location: Left arm, Patient Position: Sitting)   Pulse 69   Temp 97.4  F (36.3  C) (Oral)   Resp 16   SpO2 95%       Iman Jones RN

## 2021-12-03 NOTE — PATIENT INSTRUCTIONS
Dear Marlo Vee    Thank you for choosing [facility] ([facility]) for your infusion.  The following information is a summary of our appointment as well as important reminders.      We look forward in seeing you on your next appointment here at [facility] ([facility]).  Please don t hesitate to call us at [phone] to reschedule any of your appointments or to speak with one of the [facility] registered nurses.  It was a pleasure taking care of you today.    Sincerely,      Iman LOBATO, RN  [facility]  [address] [place] [address]  [address]  Phone:  [phone]

## 2021-12-06 ENCOUNTER — TELEPHONE (OUTPATIENT)
Dept: TRANSPLANT | Facility: CLINIC | Age: 71
End: 2021-12-06
Payer: MEDICARE

## 2021-12-06 DIAGNOSIS — Z94.0 KIDNEY TRANSPLANTED: ICD-10-CM

## 2021-12-06 RX ORDER — CARVEDILOL 25 MG/1
25 TABLET ORAL EVERY EVENING
Qty: 90 TABLET | Refills: 3 | Status: SHIPPED | OUTPATIENT
Start: 2021-12-06 | End: 2022-04-18

## 2021-12-06 RX ORDER — CARVEDILOL 25 MG/1
12.5 TABLET ORAL EVERY MORNING
Qty: 45 TABLET | Refills: 3 | Status: SHIPPED | OUTPATIENT
Start: 2021-12-06 | End: 2022-02-21

## 2021-12-06 NOTE — TELEPHONE ENCOUNTER
Please send rx for carvedilol 25mg tabs with sig 'take one-half tab in the morning and take 1 tab every evening'    Thank you!  [facility]

## 2021-12-09 ENCOUNTER — INFUSION THERAPY VISIT (OUTPATIENT)
Dept: INFUSION THERAPY | Facility: CLINIC | Age: 71
End: 2021-12-09
Attending: INTERNAL MEDICINE
Payer: MEDICARE

## 2021-12-09 VITALS
OXYGEN SATURATION: 100 % | SYSTOLIC BLOOD PRESSURE: 111 MMHG | HEART RATE: 56 BPM | RESPIRATION RATE: 16 BRPM | DIASTOLIC BLOOD PRESSURE: 71 MMHG

## 2021-12-09 DIAGNOSIS — Z94.0 KIDNEY REPLACED BY TRANSPLANT: ICD-10-CM

## 2021-12-09 DIAGNOSIS — R19.7 DIARRHEA OF PRESUMED INFECTIOUS ORIGIN: Primary | ICD-10-CM

## 2021-12-09 LAB
ANION GAP SERPL CALCULATED.3IONS-SCNC: 8 MMOL/L (ref 3–14)
BUN SERPL-MCNC: 38 MG/DL (ref 7–30)
CALCIUM SERPL-MCNC: 8.6 MG/DL (ref 8.5–10.1)
CHLORIDE BLD-SCNC: 115 MMOL/L (ref 94–109)
CO2 SERPL-SCNC: 19 MMOL/L (ref 20–32)
CREAT SERPL-MCNC: 2.56 MG/DL (ref 0.66–1.25)
GFR SERPL CREATININE-BSD FRML MDRD: 25 ML/MIN/1.73M2
GLUCOSE BLD-MCNC: 86 MG/DL (ref 70–99)
POTASSIUM BLD-SCNC: 4 MMOL/L (ref 3.4–5.3)
SODIUM SERPL-SCNC: 142 MMOL/L (ref 133–144)

## 2021-12-09 PROCEDURE — 80048 BASIC METABOLIC PNL TOTAL CA: CPT

## 2021-12-09 PROCEDURE — 96360 HYDRATION IV INFUSION INIT: CPT

## 2021-12-09 PROCEDURE — 258N000003 HC RX IP 258 OP 636: Performed by: INTERNAL MEDICINE

## 2021-12-09 PROCEDURE — 36415 COLL VENOUS BLD VENIPUNCTURE: CPT

## 2021-12-09 RX ORDER — MEPERIDINE HYDROCHLORIDE 25 MG/ML
25 INJECTION INTRAMUSCULAR; INTRAVENOUS; SUBCUTANEOUS EVERY 30 MIN PRN
Status: CANCELLED | OUTPATIENT
Start: 2021-12-09

## 2021-12-09 RX ORDER — DIPHENHYDRAMINE HYDROCHLORIDE 50 MG/ML
50 INJECTION INTRAMUSCULAR; INTRAVENOUS
Status: CANCELLED
Start: 2021-12-09

## 2021-12-09 RX ORDER — HEPARIN SODIUM (PORCINE) LOCK FLUSH IV SOLN 100 UNIT/ML 100 UNIT/ML
5 SOLUTION INTRAVENOUS
Status: CANCELLED | OUTPATIENT
Start: 2021-12-09

## 2021-12-09 RX ORDER — EPINEPHRINE 1 MG/ML
0.3 INJECTION, SOLUTION INTRAMUSCULAR; SUBCUTANEOUS EVERY 5 MIN PRN
Status: CANCELLED | OUTPATIENT
Start: 2021-12-09

## 2021-12-09 RX ORDER — NALOXONE HYDROCHLORIDE 0.4 MG/ML
0.2 INJECTION, SOLUTION INTRAMUSCULAR; INTRAVENOUS; SUBCUTANEOUS
Status: CANCELLED | OUTPATIENT
Start: 2021-12-09

## 2021-12-09 RX ORDER — METHYLPREDNISOLONE SODIUM SUCCINATE 125 MG/2ML
125 INJECTION, POWDER, LYOPHILIZED, FOR SOLUTION INTRAMUSCULAR; INTRAVENOUS
Status: CANCELLED
Start: 2021-12-09

## 2021-12-09 RX ORDER — HEPARIN SODIUM,PORCINE 10 UNIT/ML
5 VIAL (ML) INTRAVENOUS
Status: CANCELLED | OUTPATIENT
Start: 2021-12-09

## 2021-12-09 RX ORDER — ALBUTEROL SULFATE 90 UG/1
1-2 AEROSOL, METERED RESPIRATORY (INHALATION)
Status: CANCELLED
Start: 2021-12-09

## 2021-12-09 RX ORDER — ALBUTEROL SULFATE 0.83 MG/ML
2.5 SOLUTION RESPIRATORY (INHALATION)
Status: CANCELLED | OUTPATIENT
Start: 2021-12-09

## 2021-12-09 RX ADMIN — SODIUM CHLORIDE 1000 ML: 9 INJECTION, SOLUTION INTRAVENOUS at 14:11

## 2021-12-09 ASSESSMENT — PAIN SCALES - GENERAL: PAINLEVEL: NO PAIN (0)

## 2021-12-09 NOTE — LETTER
12/9/2021         RE: Marlo Vee  [address] [facility] [address]        Dear Colleague,    Thank you for referring your patient, Marlo Vee, to the [facility]. Please see a copy of my visit note below.    Infusion Nursing Note:  Marlo Vee presents today for IVF.    Patient seen by provider today: No   present during visit today: Not Applicable.    Note: NS 1000 ml given over 1 hour.      Intravenous Access:  Labs drawn without difficulty.  Peripheral IV placed.    Treatment Conditions:  Not Applicable.      Post Infusion Assessment:  Patient tolerated infusion without incident.  Site patent and intact, free from redness, edema or discomfort.  No evidence of extravasations.  Access discontinued per protocol.       Discharge Plan:   Discharge instructions reviewed with: Patient.  Patient and/or family verbalized understanding of discharge instructions and all questions answered.  Patient discharged in stable condition accompanied by: self.  Departure Mode: Ambulatory.    Administrations This Visit     0.9% sodium chloride BOLUS     Admin Date  12/09/2021 Action  Started Dose  1,000 mL Route  Intravenous Administered By  Rachel Bateman, RN                    Rachel Bateman RN                          Again, thank you for allowing me to participate in the care of your patient.        Sincerely,        [facility]

## 2021-12-09 NOTE — PROGRESS NOTES
Infusion Nursing Note:  Marlo FLORES Mariusz presents today for IVF.    Patient seen by provider today: No   present during visit today: Not Applicable.    Note: NS 1000 ml given over 1 hour.      Intravenous Access:  Labs drawn without difficulty.  Peripheral IV placed.    Treatment Conditions:  Not Applicable.      Post Infusion Assessment:  Patient tolerated infusion without incident.  Site patent and intact, free from redness, edema or discomfort.  No evidence of extravasations.  Access discontinued per protocol.       Discharge Plan:   Discharge instructions reviewed with: Patient.  Patient and/or family verbalized understanding of discharge instructions and all questions answered.  Patient discharged in stable condition accompanied by: self.  Departure Mode: Ambulatory.    Administrations This Visit     0.9% sodium chloride BOLUS     Admin Date  12/09/2021 Action  Started Dose  1,000 mL Route  Intravenous Administered By  Rachel Bateman, RN                    Rachel Bateman RN

## 2021-12-10 ENCOUNTER — TELEPHONE (OUTPATIENT)
Dept: TRANSPLANT | Facility: CLINIC | Age: 71
End: 2021-12-10

## 2021-12-10 ENCOUNTER — INFUSION THERAPY VISIT (OUTPATIENT)
Dept: INFUSION THERAPY | Facility: CLINIC | Age: 71
End: 2021-12-10
Attending: INTERNAL MEDICINE
Payer: MEDICARE

## 2021-12-10 VITALS
HEART RATE: 71 BPM | TEMPERATURE: 97.3 F | SYSTOLIC BLOOD PRESSURE: 112 MMHG | DIASTOLIC BLOOD PRESSURE: 70 MMHG | RESPIRATION RATE: 16 BRPM | OXYGEN SATURATION: 100 %

## 2021-12-10 DIAGNOSIS — Z94.0 KIDNEY REPLACED BY TRANSPLANT: ICD-10-CM

## 2021-12-10 DIAGNOSIS — A07.2 DIARRHEA DUE TO CRYPTOSPORIDIUM (H): ICD-10-CM

## 2021-12-10 DIAGNOSIS — Z94.0 STATUS POST KIDNEY TRANSPLANT: ICD-10-CM

## 2021-12-10 DIAGNOSIS — R19.7 DIARRHEA OF PRESUMED INFECTIOUS ORIGIN: Primary | ICD-10-CM

## 2021-12-10 LAB
ANION GAP SERPL CALCULATED.3IONS-SCNC: 8 MMOL/L (ref 3–14)
BUN SERPL-MCNC: 37 MG/DL (ref 7–30)
CALCIUM SERPL-MCNC: 7.8 MG/DL (ref 8.5–10.1)
CHLORIDE BLD-SCNC: 120 MMOL/L (ref 94–109)
CO2 SERPL-SCNC: 13 MMOL/L (ref 20–32)
CREAT SERPL-MCNC: 2.09 MG/DL (ref 0.66–1.25)
GFR SERPL CREATININE-BSD FRML MDRD: 31 ML/MIN/1.73M2
GLUCOSE BLD-MCNC: 89 MG/DL (ref 70–99)
POTASSIUM BLD-SCNC: 3.9 MMOL/L (ref 3.4–5.3)
SODIUM SERPL-SCNC: 141 MMOL/L (ref 133–144)

## 2021-12-10 PROCEDURE — 36415 COLL VENOUS BLD VENIPUNCTURE: CPT

## 2021-12-10 PROCEDURE — 80048 BASIC METABOLIC PNL TOTAL CA: CPT

## 2021-12-10 PROCEDURE — 258N000003 HC RX IP 258 OP 636: Performed by: INTERNAL MEDICINE

## 2021-12-10 PROCEDURE — 96360 HYDRATION IV INFUSION INIT: CPT

## 2021-12-10 RX ORDER — EPINEPHRINE 1 MG/ML
0.3 INJECTION, SOLUTION INTRAMUSCULAR; SUBCUTANEOUS EVERY 5 MIN PRN
Status: CANCELLED | OUTPATIENT
Start: 2021-12-10

## 2021-12-10 RX ORDER — NALOXONE HYDROCHLORIDE 0.4 MG/ML
0.2 INJECTION, SOLUTION INTRAMUSCULAR; INTRAVENOUS; SUBCUTANEOUS
Status: CANCELLED | OUTPATIENT
Start: 2021-12-10

## 2021-12-10 RX ORDER — METHYLPREDNISOLONE SODIUM SUCCINATE 125 MG/2ML
125 INJECTION, POWDER, LYOPHILIZED, FOR SOLUTION INTRAMUSCULAR; INTRAVENOUS
Status: CANCELLED
Start: 2021-12-10

## 2021-12-10 RX ORDER — ALBUTEROL SULFATE 90 UG/1
1-2 AEROSOL, METERED RESPIRATORY (INHALATION)
Status: CANCELLED
Start: 2021-12-10

## 2021-12-10 RX ORDER — MEPERIDINE HYDROCHLORIDE 25 MG/ML
25 INJECTION INTRAMUSCULAR; INTRAVENOUS; SUBCUTANEOUS EVERY 30 MIN PRN
Status: CANCELLED | OUTPATIENT
Start: 2021-12-10

## 2021-12-10 RX ORDER — DIPHENHYDRAMINE HYDROCHLORIDE 50 MG/ML
50 INJECTION INTRAMUSCULAR; INTRAVENOUS
Status: CANCELLED
Start: 2021-12-10

## 2021-12-10 RX ORDER — ALBUTEROL SULFATE 0.83 MG/ML
2.5 SOLUTION RESPIRATORY (INHALATION)
Status: CANCELLED | OUTPATIENT
Start: 2021-12-10

## 2021-12-10 RX ORDER — HEPARIN SODIUM (PORCINE) LOCK FLUSH IV SOLN 100 UNIT/ML 100 UNIT/ML
5 SOLUTION INTRAVENOUS
Status: CANCELLED | OUTPATIENT
Start: 2021-12-10

## 2021-12-10 RX ORDER — HEPARIN SODIUM,PORCINE 10 UNIT/ML
5 VIAL (ML) INTRAVENOUS
Status: CANCELLED | OUTPATIENT
Start: 2021-12-10

## 2021-12-10 RX ADMIN — SODIUM CHLORIDE 1000 ML: 9 INJECTION, SOLUTION INTRAVENOUS at 12:27

## 2021-12-10 NOTE — LETTER
12/10/2021         RE: Marlo Vee  [address] [facility] [address]        Dear Colleague,    Thank you for referring your patient, Marlo Vee, to the [facility] T[facility]. Please see a copy of my visit note below.    Nursing Note  Marlo Vee [facility] for:   Chief Complaint   Patient presents with     Infusion     IVF     During today's [facility] appointment, orders from Dr. Varela were completed.  Frequency: three times weekly    Progress note:  Patient identification verified by name and date of birth.  Assessment completed.  Vitals recorded in Doc Flowsheets.  Patient was provided with education regarding medication/procedure and possible side effects.  Patient verbalized understanding.     present during visit today: Not Applicable.    Treatment Conditions: Non-applicable.    Premedications: were not ordered.    Drug Waste Record: No    Infusion length and rate:  infusion given over approximately 60 minutes    Labs: BMP drawn post IVF    Vascular access: peripheral IV placed today.    Is the next appt scheduled? tomorrow  Asymptomatic COVID test completed? NA    Post Infusion Assessment:  Patient tolerated infusion without incident.     Discharge Plan:   Follow up plan of care with: ongoing infusions at [facility]., transplant coordinator., ordering provider as scheduled. and after visit summary declined by patient  Discharge instructions were reviewed with patient.  Patient/representative verbalized understanding of discharge instructions and all questions answered.  Patient discharged from [facility] in stable condition.    Sylvie Vela RN    Administrations This Visit     0.9% sodium chloride BOLUS     Admin Date  12/10/2021 Action  New Bag Dose  1,000 mL Route  Intravenous Administered  By  Sylvie Carrillo, RN                /65   Pulse 62   Temp 97.3  F (36.3  C) (Oral)   Resp 16   SpO2 100%         Again, thank you for allowing me to participate in the care of your patient.        Sincerely,        [facility]

## 2021-12-10 NOTE — PROGRESS NOTES
Nursing Note  Marlo Vee presents [facility] for:   Chief Complaint   Patient presents with     Infusion     IVF     During [facility]'s [facility] appointment, orders from Dr. Vraela were completed.  Frequency: three times weekly    Progress note:  Patient identification verified by name and date of birth.  Assessment completed.  Vitals recorded in Doc Flowsheets.  Patient was provided with education regarding medication/procedure and possible side effects.  Patient verbalized understanding.     present during visit today: Not Applicable.    Treatment Conditions: Non-applicable.    Premedications: were not ordered.    Drug Waste Record: No    Infusion length and rate:  infusion given over approximately 60 minutes    Labs: BMP drawn post IVF    Vascular access: peripheral IV placed today.    Is the next appt scheduled? tomorrow  Asymptomatic COVID test completed? NA    Post Infusion Assessment:  Patient tolerated infusion without incident.     Discharge Plan:   Follow up plan of care with: ongoing infusions at [facility]., transplant coordinator., ordering provider as scheduled. and after visit summary declined by patient  Discharge instructions were reviewed with patient.  Patient/representative verbalized understanding of discharge instructions and all questions answered.  Patient discharged from [facility] in stable condition.    Sylvie Carrillo RN    Administrations This Visit     0.9% sodium chloride BOLUS     Admin Date  12/10/2021 Action  New Bag Dose  1,000 mL Route  Intravenous Administered By  Sylvie Carrillo RN                /65   Pulse 62   Temp 97.3  F (36.3  C) (Oral)   Resp 16   SpO2 100%

## 2021-12-10 NOTE — PATIENT INSTRUCTIONS
Dear Marlo Vee    Thank you for choosing [facility] ([facility]) for your IV fluid infusion.  The following information is a summary of our appointment as well as important reminders.        We look forward in seeing you on your next appointment here at [facility] ([facility]).  Please don t hesitate to call us at [phone] to reschedule any of your appointments or to speak with one of the [facility] registered nurses.  It was a pleasure taking care of you today.    Sincerely,    [facility]  [facility]  [address] [place] [address]  [address]  Phone:  [phone]

## 2021-12-10 NOTE — TELEPHONE ENCOUNTER
ISSUE  Creatinine elevated at 2.56  Samuel Varela MD Harris, Kathleen, RN  Elevated serum creatinine and recommend usual assessment for fever, recent illness, pain over kidney allograft, blood pressure and volume status, as well as would hydrate with IV fluids if needed and repeat labs.       PLAN  Call and assess hydration status.  How much water are they drinking per day?  How have blood pressures been?  Any recent illness, diarrhea, s/s of a UTI, or medication changes?  Fever? Pain over graft site?  Any increased intake of alcohol or caffeine?  Recommend increasing hydration and repeat labs in 1 week.    OUTCOME  Call to John to assess above. No answer on cell or home phone, left message requesting return call to report any changes in health, ongoing diarrhea and to confirm he has appointment scheduled for IVF and labs next week.

## 2021-12-11 ENCOUNTER — LAB (OUTPATIENT)
Dept: LAB | Facility: CLINIC | Age: 71
End: 2021-12-11

## 2021-12-11 ENCOUNTER — INFUSION THERAPY VISIT (OUTPATIENT)
Dept: INFUSION THERAPY | Facility: CLINIC | Age: 71
End: 2021-12-11
Attending: INTERNAL MEDICINE
Payer: MEDICARE

## 2021-12-11 VITALS
TEMPERATURE: 97.7 F | HEART RATE: 64 BPM | OXYGEN SATURATION: 100 % | SYSTOLIC BLOOD PRESSURE: 132 MMHG | RESPIRATION RATE: 16 BRPM | DIASTOLIC BLOOD PRESSURE: 84 MMHG

## 2021-12-11 DIAGNOSIS — R19.7 DIARRHEA OF PRESUMED INFECTIOUS ORIGIN: ICD-10-CM

## 2021-12-11 DIAGNOSIS — Z94.0 KIDNEY REPLACED BY TRANSPLANT: ICD-10-CM

## 2021-12-11 DIAGNOSIS — R19.7 DIARRHEA OF PRESUMED INFECTIOUS ORIGIN: Primary | ICD-10-CM

## 2021-12-11 DIAGNOSIS — A04.72 CLOSTRIDIOIDES DIFFICILE DIARRHEA: ICD-10-CM

## 2021-12-11 DIAGNOSIS — Z94.0 STATUS POST KIDNEY TRANSPLANT: ICD-10-CM

## 2021-12-11 LAB
ANION GAP SERPL CALCULATED.3IONS-SCNC: 7 MMOL/L (ref 3–14)
BUN SERPL-MCNC: 37 MG/DL (ref 7–30)
CALCIUM SERPL-MCNC: 8 MG/DL (ref 8.5–10.1)
CHLORIDE BLD-SCNC: 119 MMOL/L (ref 94–109)
CO2 SERPL-SCNC: 17 MMOL/L (ref 20–32)
CREAT SERPL-MCNC: 2.09 MG/DL (ref 0.66–1.25)
GFR SERPL CREATININE-BSD FRML MDRD: 31 ML/MIN/1.73M2
GLUCOSE BLD-MCNC: 93 MG/DL (ref 70–99)
POTASSIUM BLD-SCNC: 4 MMOL/L (ref 3.4–5.3)
SODIUM SERPL-SCNC: 143 MMOL/L (ref 133–144)

## 2021-12-11 PROCEDURE — 96360 HYDRATION IV INFUSION INIT: CPT

## 2021-12-11 PROCEDURE — 258N000003 HC RX IP 258 OP 636: Performed by: INTERNAL MEDICINE

## 2021-12-11 PROCEDURE — 80048 BASIC METABOLIC PNL TOTAL CA: CPT

## 2021-12-11 PROCEDURE — 36415 COLL VENOUS BLD VENIPUNCTURE: CPT

## 2021-12-11 RX ORDER — NALOXONE HYDROCHLORIDE 0.4 MG/ML
0.2 INJECTION, SOLUTION INTRAMUSCULAR; INTRAVENOUS; SUBCUTANEOUS
Status: CANCELLED | OUTPATIENT
Start: 2021-12-11

## 2021-12-11 RX ORDER — EPINEPHRINE 1 MG/ML
0.3 INJECTION, SOLUTION INTRAMUSCULAR; SUBCUTANEOUS EVERY 5 MIN PRN
Status: CANCELLED | OUTPATIENT
Start: 2021-12-11

## 2021-12-11 RX ORDER — DIPHENHYDRAMINE HYDROCHLORIDE 50 MG/ML
50 INJECTION INTRAMUSCULAR; INTRAVENOUS
Status: CANCELLED
Start: 2021-12-11

## 2021-12-11 RX ORDER — HEPARIN SODIUM (PORCINE) LOCK FLUSH IV SOLN 100 UNIT/ML 100 UNIT/ML
5 SOLUTION INTRAVENOUS
Status: CANCELLED | OUTPATIENT
Start: 2021-12-11

## 2021-12-11 RX ORDER — ALBUTEROL SULFATE 90 UG/1
1-2 AEROSOL, METERED RESPIRATORY (INHALATION)
Status: CANCELLED
Start: 2021-12-11

## 2021-12-11 RX ORDER — ALBUTEROL SULFATE 0.83 MG/ML
2.5 SOLUTION RESPIRATORY (INHALATION)
Status: CANCELLED | OUTPATIENT
Start: 2021-12-11

## 2021-12-11 RX ORDER — MEPERIDINE HYDROCHLORIDE 25 MG/ML
25 INJECTION INTRAMUSCULAR; INTRAVENOUS; SUBCUTANEOUS EVERY 30 MIN PRN
Status: CANCELLED | OUTPATIENT
Start: 2021-12-11

## 2021-12-11 RX ORDER — HEPARIN SODIUM,PORCINE 10 UNIT/ML
5 VIAL (ML) INTRAVENOUS
Status: CANCELLED | OUTPATIENT
Start: 2021-12-11

## 2021-12-11 RX ORDER — METHYLPREDNISOLONE SODIUM SUCCINATE 125 MG/2ML
125 INJECTION, POWDER, LYOPHILIZED, FOR SOLUTION INTRAMUSCULAR; INTRAVENOUS
Status: CANCELLED
Start: 2021-12-11

## 2021-12-11 RX ADMIN — SODIUM CHLORIDE 1000 ML: 9 INJECTION, SOLUTION INTRAVENOUS at 11:46

## 2021-12-11 NOTE — LETTER
12/11/2021         RE: Marlo Vee  [address] [facility] [address]        Dear Colleague,    Thank you for referring your patient, Marlo Vee, to the [facility]. Please see a copy of my visit note below.    Nursing Note  Marlo Vee presents [facility] for:   Chief Complaint   Patient presents with     Infusion     IV Fluids     [facility] appointment, orders from Dr LONNY Varela were completed.  Frequency: 2-3 times weekly    Progress note:  Patient identification verified by name and date of birth.  Assessment completed.  Vitals recorded in Doc Flowsheets.  Patient was provided with education regarding medication/procedure and possible side effects.  Patient verbalized understanding.     present during visit today: Not Applicable.    Treatment Conditions: Non-applicable.    Premedications: were not ordered.    Drug Waste Record: No    Infusion length and rate:  infusion given over approximately 1 hours    Labs: were drawn per orders following IV Fluids.     Vascular access: peripheral IV placed today.    Is the next appt scheduled? Yes  Asymptomatic COVID test completed? No    Post Infusion Assessment:  Patient tolerated infusion without incident.  Blood return noted pre and post infusion.  Site patent and intact, free from redness, edema or discomfort.  No evidence of extravasations.  Access discontinued per protocol.     Discharge Plan:   Follow up plan of care with: ongoing infusions at [facility].  Discharge instructions were reviewed with patient.  Patient/representative verbalized understanding of discharge instructions and all questions answered.  Patient discharged from [facility] in stable condition.  Patient declined AVS.     Stephanie Hull RN    Administrations This Visit      0.9% sodium chloride BOLUS     Admin Date  12/11/2021 Action  Started Dose  1,000 mL Route  Intravenous Administered By  Stephanie Hull RN                /84 (BP Location: Left arm, Patient Position: Supine)   Pulse 70   Temp 97.7  F (36.5  C) (Oral)   Resp 16   SpO2 100%           Again, thank you for allowing me to participate in the care of your patient.        Sincerely,        [facility]

## 2021-12-11 NOTE — PROGRESS NOTES
Nursing Note  Marlo Vee presents [facility] for:   Chief Complaint   Patient presents with     Infusion     IV Fluids     [facility] appointment, orders from Dr LONNY Varela were completed.  Frequency: 2-3 times weekly    Progress note:  Patient identification verified by name and date of birth.  Assessment completed.  Vitals recorded in Doc Flowsheets.  Patient was provided with education regarding medication/procedure and possible side effects.  Patient verbalized understanding.     present during visit today: Not Applicable.    Treatment Conditions: Non-applicable.    Premedications: were not ordered.    Drug Waste Record: No    Infusion length and rate:  infusion given over approximately 1 hours    Labs: were drawn per orders following IV Fluids.     Vascular access: peripheral IV placed today.    Is the next appt scheduled? Yes  Asymptomatic COVID test completed? No    Post Infusion Assessment:  Patient tolerated infusion without incident.  Blood return noted pre and post infusion.  Site patent and intact, free from redness, edema or discomfort.  No evidence of extravasations.  Access discontinued per protocol.     Discharge Plan:   Follow up plan of care with: ongoing infusions [facility].  Discharge instructions were reviewed with patient.  Patient/representative verbalized understanding of discharge instructions and all questions answered.  Patient discharged [facility] in stable condition.  Patient declined AVS.     Stephanie Hull RN    Administrations This Visit     0.9% sodium chloride BOLUS     Admin Date  12/11/2021 Action  Started Dose  1,000 mL Route  Intravenous Administered By  Stephanie Hull RN                /84 (BP Location: Left arm, Patient Position: Supine)   Pulse 70   Temp 97.7  F (36.5  C) (Oral)   Resp 16   SpO2  100%

## 2021-12-13 ENCOUNTER — TELEPHONE (OUTPATIENT)
Dept: TRANSPLANT | Facility: CLINIC | Age: 71
End: 2021-12-13

## 2021-12-13 DIAGNOSIS — Z94.0 KIDNEY TRANSPLANTED: Primary | ICD-10-CM

## 2021-12-13 DIAGNOSIS — R19.7 DIARRHEA OF PRESUMED INFECTIOUS ORIGIN: ICD-10-CM

## 2021-12-13 DIAGNOSIS — Z94.0 KIDNEY REPLACED BY TRANSPLANT: ICD-10-CM

## 2021-12-13 RX ORDER — SODIUM BICARBONATE 650 MG/1
650 TABLET ORAL 2 TIMES DAILY
Qty: 60 TABLET | Refills: 11 | Status: SHIPPED | OUTPATIENT
Start: 2021-12-13 | End: 2021-12-23

## 2021-12-13 NOTE — TELEPHONE ENCOUNTER
Patient Call returning phone call from Friday  Regarding Cr.,        Call back needed? Yes    Return Call Needed  Same as documented in contacts section  When to return call?: Same day: Route [facility]

## 2021-12-13 NOTE — TELEPHONE ENCOUNTER
Issue  Creatinine 2.09, baseline 1.4-1.6.  Bicarb 17  Current issue with diarrhea, getting IVF's 3 x's weekly.      OUTCOME  Returned a call to John to discuss labs.  John states diarrhea, which had stopped temporarily, has started again. All stool is liquid. He is going 5-6 times a day. He continues to take metamucil 3 times daily, he is no longer taking a a probiotic.   He has NOT rescheduled his colonoscopy.   Advised John to continue with IVF's 3 times weekly and continue with good hydration at home. To continue metamucil and restart probiotic/eating. Restarted his sodium bicarb (which he stopped on his own) Advised he contact the GI team and scheduled his colonoscopy ASA. He will submit stool samples on Thursday.  Message sent to provider.      ADDENDUM:  Samuel Varela MD Harris, Kathleen, RN  Would make sure he doesn't have a gout history and isn't on allopurinol or febuxostat.     If not, then we can change him from mycophenolic acid to azathioprine 150 mg daily.  No overlap necessary.     Okay to continue frequent IV fluids for now.     I could do a virtual visit with him this Wednesday morning.     Ramon       OUTCOME  John is agreeable to a virtual visit on Wednesday morning.  Message sent to scheduling for 12/15 appt.

## 2021-12-15 ENCOUNTER — TELEPHONE (OUTPATIENT)
Dept: TRANSPLANT | Facility: CLINIC | Age: 71
End: 2021-12-15

## 2021-12-15 ENCOUNTER — VIRTUAL VISIT (OUTPATIENT)
Dept: NEPHROLOGY | Facility: CLINIC | Age: 71
End: 2021-12-15
Attending: INTERNAL MEDICINE
Payer: MEDICARE

## 2021-12-15 DIAGNOSIS — R19.7 DIARRHEA: ICD-10-CM

## 2021-12-15 DIAGNOSIS — Z94.0 KIDNEY TRANSPLANTED: ICD-10-CM

## 2021-12-15 DIAGNOSIS — Z48.298 AFTERCARE FOLLOWING ORGAN TRANSPLANT: ICD-10-CM

## 2021-12-15 DIAGNOSIS — I15.1 HTN, KIDNEY TRANSPLANT RELATED: ICD-10-CM

## 2021-12-15 DIAGNOSIS — Z94.0 KIDNEY REPLACED BY TRANSPLANT: Primary | ICD-10-CM

## 2021-12-15 DIAGNOSIS — N18.32 CHRONIC KIDNEY DISEASE, STAGE 3B (H): ICD-10-CM

## 2021-12-15 DIAGNOSIS — N18.32 ANEMIA IN STAGE 3B CHRONIC KIDNEY DISEASE (H): ICD-10-CM

## 2021-12-15 DIAGNOSIS — Z29.89 NEED FOR PNEUMOCYSTIS PROPHYLAXIS: ICD-10-CM

## 2021-12-15 DIAGNOSIS — K52.9 CHRONIC DIARRHEA: ICD-10-CM

## 2021-12-15 DIAGNOSIS — R19.7 DIARRHEA OF PRESUMED INFECTIOUS ORIGIN: ICD-10-CM

## 2021-12-15 DIAGNOSIS — D84.9 IMMUNOSUPPRESSION (H): ICD-10-CM

## 2021-12-15 DIAGNOSIS — E55.9 VITAMIN D DEFICIENCY, UNSPECIFIED: ICD-10-CM

## 2021-12-15 DIAGNOSIS — Z94.0 HTN, KIDNEY TRANSPLANT RELATED: ICD-10-CM

## 2021-12-15 DIAGNOSIS — E55.9 VITAMIN D DEFICIENCY: ICD-10-CM

## 2021-12-15 DIAGNOSIS — N18.9 CHRONIC KIDNEY DISEASE: ICD-10-CM

## 2021-12-15 DIAGNOSIS — D63.1 ANEMIA IN STAGE 3B CHRONIC KIDNEY DISEASE (H): ICD-10-CM

## 2021-12-15 DIAGNOSIS — R63.4 WEIGHT LOSS, UNINTENTIONAL: ICD-10-CM

## 2021-12-15 PROCEDURE — G0463 HOSPITAL OUTPT CLINIC VISIT: HCPCS | Mod: PN,RTG | Performed by: INTERNAL MEDICINE

## 2021-12-15 PROCEDURE — 99214 OFFICE O/P EST MOD 30 MIN: CPT | Mod: 95 | Performed by: INTERNAL MEDICINE

## 2021-12-15 ASSESSMENT — PAIN SCALES - GENERAL: PAINLEVEL: NO PAIN (0)

## 2021-12-15 NOTE — LETTER
12/15/2021      RE: Marlo Vee  [address] [facility] [address]       John is a 71 year old who is being evaluated via a billable video visit.      How would you like to obtain your AVS? MyChart  If the video visit is dropped, the invitation should be resent by: Text to cell phone: [phone]  Will anyone else be joining your video visit? No    Video Start Time: 0927  Video-Visit Details    Type of service:  Video Visit    Video End Time:0943    Originating Location (pt. Location): Home    Distant Location (provider location):  [facility] [place]     Platform used for Video Visit: [facility]      TRANSPLANT NEPHROLOGY CHRONIC POST TRANSPLANT VISIT    Assessment & Plan   # LDKT: Increased creatinine with multiple MARCOS episodes due to recent diarrheal illnesses.  Kidney transplant biopsy 10/2021 showed ATI and mild chronic changes, but no acute rejection.  Creatinine has been running ~ 1.4-1.7 at the best since start of the MARCOS episodes from diarrhea and frequently in the 2s when diarrhea has been worse.  Unclear of new baseline now after diarrhea issues are resolved.   - Baseline Creatinine:  ~ 1.1-1.3   - Proteinuria: Minimal (0.2-0.5 grams)   - Date DSA Last Checked: Oct/2021      Latest DSA: No   - BK Viremia: No   - Kidney Tx Biopsy: Oct 19, 2021; Result: No diagnostic evidence of acute rejection.  Acute tubular injury with mild interstitial fibrosis and tubular atrophy.    # Immunosuppression: Tacrolimus immediate release (goal 4-6) and Mycophenolic acid (dose 360 mg every 12 hours)   - Continue with intensive monitoring of immunosuppression for efficacy and toxicity.   - Changes: Yes - With ongoing diarrhea, will change mycophenolic acid to azathioprine 100 mg daily.    # Infection Prophylaxis:   Last CD4 Level: 165 (Sep/2021)  - PJP: Sulfa/TMP (Bactrim)    # Hypertension: Controlled;  Goal BP: < 130/80   - Changes: No    # Anemia in Chronic Renal Disease: Hgb:  Stable      LULY: No   - Iron studies: Not checked recently    # Mineral Bone Disorder:   - Vitamin D; level: Not checked recently        On supplement: Yes  - Calcium; level: Low        On supplement: No    # Electrolytes:   - Potassium; level: Normal        On supplement: No  - Magnesium; level: Not checked recently        On supplement: No  - Bicarbonate; level: Low        On supplement: Yes, just started    # CAD, s/p PCI: Asymptomatic.    # Chronic Diarrhea: Patient was previously diagnosed with Clostridium difficile and also cryptosporidium.  He was treated for both, but no improvement in his ongoing diarrhea symptoms.  He has been seen by Transplant ID and GI.  Presently doing conservative management.  Medications can still be the present etiology after resolution of previous symptoms.   - Will change mycophenolic acid to azathioprine.    # Skin Cancer: New lesions: a few   - Discussed sun protection and recommend regular follow up with Dermatology.    # Medical Compliance: Yes    # COVID-19 Virus Review: Discussed COVID-19 virus and the potential medical risks.  Reviewed preventative health recommendations, including wearing a mask where appropriate.  Recommended COVID vaccination should be up to date with either an initial vaccination or booster shot when appropriate.  Asked the patient to inform the transplant center if they are exposed or diagnosed with this virus.    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  Significant transplant-related complications: None    Transplant Office Phone Number: [phone]    Assessment and plan was discussed with the patient and he voiced his understanding and agreement.    Return visit: Return in about 2 months (around 2/15/2022).    Samuel Epperson  MD [place]    Chief Complaint   Mr. Vee is a 71 year old here for kidney transplant and immunosuppression management.    History of Present Illness    Mr. Vee reports feeling fair overall with ongoing medical complaints.  Since last clinic visit, patient reports no hospitalizations, but has had ongoing diarrhea.  Patient reports diarrhea symptoms began in August.  Prior to the start of having diarrhea, he would normally have 1-2 formed bowel movements daily.  Then in August, he started to have watery stools multiple times a day and frequently over 10x per day.  Infectious work up was positive cryptosporidium and otherwise negative for C. Diff and other enteric pathogens.  He was treated with nitazoxanide.  His diarrhea persisted and repeat infectious check was then positive for Clostridium difficile.  Patient was then treated with a course of oral vancomycin.  His diarrhea didn't significantly improve and repeat testing in October was again positive for C. diff.  Patient was again treated with oral vancomycin and repeat testing was negative for C. diff.  Unfortunately, his diarrhea has persisted.  Patient has also been seen by Transplant ID and GI.  He plans to have a colonoscopy.  He reports over the last couple of weeks a little improvement in his symptoms with 2-3 watery stools a day.  He does have some fecal urgency at times.  No bloody or black, tarry stools.  No fever, sweats or chills.  He gets rare nausea but no vomiting.  Also some heartburn symptoms at times.  His appetite has improve, although he has lost ~ 20-25 lbs since the diarrhea started.  He does take Metamucil 3x daily and is also eating yogurt and Kimchi.    His energy level is a bit better, now pretty good, but not quite back to normal yet.  He is active and does get some exercise.  Denies any chest pain or shortness of breath with exertion.  No leg swelling.    Home BP: 110-120/60-70s    Problem List   Patient Active Problem List    Diagnosis     HTN, kidney transplant related     Membranous glomerulonephritis     Anemia in chronic renal disease     Secondary renal hyperparathyroidism (H)     Vitamin D deficiency     Dyslipidemia     Anxiety     Status post coronary angiogram     CAD, multiple vessel     Multiple vessel coronary artery disease     Kidney replaced by transplant     Immunosuppression (H)     Aftercare following organ transplant     Closed fracture of trochanter of right femur, initial encounter (H)     Impaired fasting glucose     Erectile dysfunction     Old myocardial infarction     Diarrhea     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     carvedilol (COREG) 25 MG tablet Take 0.5 tablets (12.5 mg) by mouth every morning In addition to 25 mg every evening.     ondansetron (ZOFRAN-ODT) 4 MG ODT tab Take 1 tablet (4 mg) by mouth every 6 hours as needed for nausea or vomiting (Patient not taking: Reported on 1/8/2022)     PROGRAF (BRAND) 0.5 MG capsule Take 2 capsules (1 mg) by mouth 2 times daily     sertraline (ZOLOFT) 25 MG tablet Take 1 tablet (25 mg) by mouth every morning     sildenafil (VIAGRA) 100 MG tablet Take 1 tablet (100 mg) by mouth daily as needed (1/2 as needed 1 hour prior to sexual activity)     sulfamethoxazole-trimethoprim (BACTRIM) 400-80 MG tablet Take 1 tablet by mouth Every Mon, Wed, Fri Morning     vitamin D3 (CHOLECALCIFEROL) 50 mcg (2000 units) tablet Take 1 tablet by mouth every morning     azaTHIOprine (IMURAN) 50 MG tablet Take 2 tablets (100 mg) by mouth daily     ferrous sulfate  (FEROSUL) 325 (65 Fe) MG tablet Take 1 tablet (325 mg) by mouth daily (with lunch) (Patient not taking: Reported on 1/8/2022)     potassium chloride ER (KLOR-CON M) 20 MEQ CR tablet Take 1 tablet (20 mEq) by mouth 2 times daily     sodium bicarbonate 650 MG tablet Take 2 tablets (1,300 mg) by mouth 2 times daily     No current facility-administered medications for this visit.     Facility-Administered Medications Ordered in Other Visits   Medication     [START ON 1/10/2022] 0.9% sodium chloride BOLUS     There are no discontinued medications.    Physical Exam   Vital Signs: Deferred for this telemedicine visit.    GENERAL APPEARANCE: alert and no distress  HENT: no obvious abnormalities on appearance  RESP: breathing appears unremarkable with normal rate, no audible wheezing or cough and no apparent shortness of breath with conversation  MS: extremities normal - no gross deformities noted  SKIN: no apparent rash and normal skin tone  NEURO: speech is clear with no obvious neurological deficits  PSYCH: mentation appears normal and affect normal    Data     Renal Latest Ref Rng & Units 1/8/2022 1/7/2022 1/7/2022   Na 133 - 144 mmol/L 145(H) 144 142   Na (external) 136 - 145 meq/L - - -   K 3.4 - 5.3 mmol/L 3.7 3.7 3.8   K (external) 3.5 - 5.1 meq/L - - -   Cl 94 - 109 mmol/L 116(H) 115(H) 113(H)   Cl (external) 98 - 109 meq/L - - -   CO2 20 - 32 mmol/L 21 23 22   CO2 (external) 22 - 31 meq/L - - -   BUN 7 - 30 mg/dL 19 18 20   BUN (external) 6 - 22 mg/dL - - -   Cr 0.66 - 1.25 mg/dL 1.77(H) 1.56(H) 1.64(H)   Cr (external) 0.70 - 1.30 mg/dL - - -   Glucose 70 - 99 mg/dL 84 87 91   Glucose (external) 70 - 99 mg/dL - - -   Ca  8.5 - 10.1 mg/dL 7.7(L) 8.0(L) 8.7   Ca (external) 8.5 - 10.5 mg/dL - - -   Mg 1.6 - 2.3 mg/dL - - 2.0     Bone Health Latest Ref Rng & Units 12/24/2021 12/16/2021 8/7/2021   Phos 2.5 - 4.5 mg/dL - 2.8 2.7   PTHi 12 - 72 pg/mL - - -   Vit D Def 20 - 75 ug/L 26 - -     Heme Latest Ref Rng & Units  1/6/2022 12/16/2021 10/19/2021   WBC 4.0 - 11.0 10e3/uL 3.9(L) 6.0 6.6   WBC (external) 4.0 - 11.0 10*9/L - - -   Hgb 13.3 - 17.7 g/dL 7.8(L) 9.7(L) 10.1(L)   Hgb (external) 13.3 - 17.7 g/dL - - -   Plt 150 - 450 10e3/uL 159 251 217   Plt (external) 150 - 450 10*9/L - - -   ABSOLUTE NEUTROPHIL 1.6 - 8.3 10e9/L - - -   ABSOLUTE LYMPHOCYTES 0.8 - 5.3 10e9/L - - -   ABSOLUTE MONOCYTES 0.0 - 1.3 10e9/L - - -   ABSOLUTE EOSINOPHILS 0.0 - 0.7 10e9/L - - -   ABSOLUTE BASOPHILS 0.0 - 0.2 10e9/L - - -   ABS IMMATURE GRANULOCYTES 0 - 0.4 10e9/L - - -   ABSOLUTE NUCLEATED RBC - - - -     Liver Latest Ref Rng & Units 12/16/2021 8/7/2021 12/9/2020   AP 40 - 150 U/L - 83 -   TBili 0.2 - 1.3 mg/dL - 0.8 -   DBili 0.0 - 0.2 mg/dL - - -   ALT 0 - 70 U/L - 17 -   AST 0 - 45 U/L - 10 -   Tot Protein 6.8 - 8.8 g/dL - 7.5 -   Albumin 3.4 - 5.0 g/dL 3.6 3.7 2.8(L)     Pancreas Latest Ref Rng & Units 5/6/2021   A1C 0 - 5.6 % 5.7(H)     Iron studies Latest Ref Rng & Units 12/24/2021 12/16/2021 1/28/2016   Iron 35 - 180 ug/dL 44 54 62   Iron sat 15 - 46 % 24 - 34   Ferritin 26 - 388 ng/mL 255 - 787(H)     [facility] Txp Virology Latest Ref Rng & Units 12/16/2021 8/7/2021 12/3/2018   CVM DNA Quant - - - -   CMV QUANT IU/ML Not Detected IU/mL Not Detected Not Detected -   LOG IU/ML OF CMVQNT <2.1 [Log:IU]/mL - - -   BK Spec - - - Plasma   BK Res BKNEG:BK Virus DNA Not Detected copies/mL - - BK Virus DNA Not Detected   BK Log <2.7 Log copies/mL - - Not Calculated   EBV CAPSID ANTIBODY IGG 0.0 - 0.8 AI - - -   EBV DNA LOG OF COPIES - 4.4 - -   Hep B Core NR - - -        Recent Labs   Lab Test 10/14/21  1125 11/17/21  1119 11/24/21  1125   DOSTAC 10/13/2021 11/16/2021 11/23/2021   TACROL 4.6* 6.9 5.6     Recent Labs   Lab Test 04/25/16  0826 05/03/16  0853 08/09/21  1043   DOSMPA 4.24.2016 2030 2,030 8/8/2021   8:30 PM   MPACID 4.01* 3.81* 0.81*   MPAG 46.6 38.6 48.0       Samuel Varela MD

## 2021-12-15 NOTE — LETTER
12/15/2021     RE: Marlo Vee  [address] [facility] [place] [address]     Dear Colleague,    Thank you for referring your patient, Marlo Vee, to the [facility] [place] [facility]. Please see a copy of my visit note below.    John is a 71 year old who is being evaluated via a billable video visit.      How would you like to obtain your AVS? MyChart  If the video visit is dropped, the invitation should be resent by: Text to cell phone: [phone]  Will anyone else be joining your video visit? No    Video Start Time: 0927  Video-Visit Details    Type of service:  Video Visit    Video End Time:0943    Originating Location (pt. Location): Home    Distant Location (provider location):  [facility] [place]     Platform used for Video Visit: Well      TRANSPLANT NEPHROLOGY CHRONIC POST TRANSPLANT VISIT    Assessment & Plan   # LDKT: Increased creatinine with multiple MARCOS episodes due to recent diarrheal illnesses.  Kidney transplant biopsy 10/2021 showed ATI and mild chronic changes, but no acute rejection.  Creatinine has been running ~ 1.4-1.7 at the best since start of the MARCOS episodes from diarrhea and frequently in the 2s when diarrhea has been worse.  Unclear of new baseline now after diarrhea issues are resolved.   - Baseline Creatinine:  ~ 1.1-1.3   - Proteinuria: Minimal (0.2-0.5 grams)   - Date DSA Last Checked: Oct/2021      Latest DSA: No   - BK Viremia: No   - Kidney Tx Biopsy: Oct 19, 2021; Result: No diagnostic evidence of acute rejection.  Acute tubular injury with mild interstitial fibrosis and tubular atrophy.    # Immunosuppression: Tacrolimus immediate release (goal 4-6) and Mycophenolic acid (dose 360 mg every 12 hours)   - Continue with intensive monitoring of immunosuppression for efficacy and toxicity.   - Changes: Yes - With ongoing diarrhea, will change mycophenolic  acid to azathioprine 100 mg daily.    # Infection Prophylaxis:   Last CD4 Level: 165 (Sep/2021)  - PJP: Sulfa/TMP (Bactrim)    # Hypertension: Controlled;  Goal BP: < 130/80   - Changes: No    # Anemia in Chronic Renal Disease: Hgb: Stable      LULY: No   - Iron studies: Not checked recently    # Mineral Bone Disorder:   - Vitamin D; level: Not checked recently        On supplement: Yes  - Calcium; level: Low        On supplement: No    # Electrolytes:   - Potassium; level: Normal        On supplement: No  - Magnesium; level: Not checked recently        On supplement: No  - Bicarbonate; level: Low        On supplement: Yes, just started    # CAD, s/p PCI: Asymptomatic.    # Chronic Diarrhea: Patient was previously diagnosed with Clostridium difficile and also cryptosporidium.  He was treated for both, but no improvement in his ongoing diarrhea symptoms.  He has been seen by Transplant ID and GI.  Presently doing conservative management.  Medications can still be the present etiology after resolution of previous symptoms.   - Will change mycophenolic acid to azathioprine.    # Skin Cancer: New lesions: a few   - Discussed sun protection and recommend regular follow up with Dermatology.    # Medical Compliance: Yes    # COVID-19 Virus Review: Discussed COVID-19 virus and the potential medical risks.  Reviewed preventative health recommendations, including wearing a mask where appropriate.  Recommended COVID vaccination should be up to date with either an initial vaccination or booster shot when appropriate.  Asked the patient to inform the transplant center if they are exposed or diagnosed with this virus.    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  Significant transplant-related complications:  None    Transplant Office Phone Number: [phone]    Assessment and plan was discussed with the patient and he voiced his understanding and agreement.    Return visit: Return in about 2 months (around 2/15/2022).    Samuel Varela MD    Chief Complaint   Mr. Vee is a 71 year old here for kidney transplant and immunosuppression management.    History of Present Illness    Mr. Vee reports feeling fair overall with ongoing medical complaints.  Since last clinic visit, patient reports no hospitalizations, but has had ongoing diarrhea.  Patient reports diarrhea symptoms began in August.  Prior to the start of having diarrhea, he would normally have 1-2 formed bowel movements daily.  Then in August, he started to have watery stools multiple times a day and frequently over 10x per day.  Infectious work up was positive cryptosporidium and otherwise negative for C. Diff and other enteric pathogens.  He was treated with nitazoxanide.  His diarrhea persisted and repeat infectious check was then positive for Clostridium difficile.  Patient was then treated with a course of oral vancomycin.  His diarrhea didn't significantly improve and repeat testing in October was again positive for C. diff.  Patient was again treated with oral vancomycin and repeat testing was negative for C. diff.  Unfortunately, his diarrhea has persisted.  Patient has also been seen by Transplant ID and GI.  He plans to have a colonoscopy.  He reports over the last couple of weeks a little improvement in his symptoms with 2-3 watery stools a day.  He does have some fecal urgency at times.  No bloody or black, tarry stools.  No fever, sweats or chills.  He gets rare nausea but no vomiting.  Also some heartburn symptoms at times.  His appetite has improve, although he has lost ~ 20-25 lbs since the diarrhea started.  He does take Metamucil 3x daily and is also eating yogurt and Kimchi.    His energy level is a bit better, now pretty  good, but not quite back to normal yet.  He is active and does get some exercise.  Denies any chest pain or shortness of breath with exertion.  No leg swelling.    Home BP: 110-120/60-70s    Problem List   Patient Active Problem List   Diagnosis     HTN, kidney transplant related     Membranous glomerulonephritis     Anemia in chronic renal disease     Secondary renal hyperparathyroidism (H)     Vitamin D deficiency     Dyslipidemia     Anxiety     Status post coronary angiogram     CAD, multiple vessel     Multiple vessel coronary artery disease     Kidney replaced by transplant     Immunosuppression (H)     Aftercare following organ transplant     Closed fracture of trochanter of right femur, initial encounter (H)     Impaired fasting glucose     Erectile dysfunction     Old myocardial infarction     Diarrhea     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     carvedilol (COREG) 25 MG tablet Take 0.5 tablets (12.5 mg) by mouth every morning In addition to 25 mg every evening.     ondansetron (ZOFRAN-ODT) 4 MG ODT tab Take 1 tablet (4 mg) by mouth every 6 hours as needed for nausea or vomiting (Patient not taking: Reported on 1/8/2022)     PROGRAF (BRAND) 0.5 MG capsule Take 2 capsules (1 mg) by mouth 2 times daily     sertraline (ZOLOFT) 25 MG tablet Take 1 tablet (25 mg) by mouth every morning     sildenafil (VIAGRA) 100 MG tablet Take 1 tablet (100 mg) by mouth daily as needed (1/2 as needed 1 hour prior to sexual activity)     sulfamethoxazole-trimethoprim (BACTRIM) 400-80 MG tablet Take 1 tablet  by mouth Every Mon, Wed, Fri Morning     vitamin D3 (CHOLECALCIFEROL) 50 mcg (2000 units) tablet Take 1 tablet by mouth every morning     azaTHIOprine (IMURAN) 50 MG tablet Take 2 tablets (100 mg) by mouth daily     ferrous sulfate (FEROSUL) 325 (65 Fe) MG tablet Take 1 tablet (325 mg) by mouth daily (with lunch) (Patient not taking: Reported on 1/8/2022)     potassium chloride ER (KLOR-CON M) 20 MEQ CR tablet Take 1 tablet (20 mEq) by mouth 2 times daily     sodium bicarbonate 650 MG tablet Take 2 tablets (1,300 mg) by mouth 2 times daily     No current facility-administered medications for this visit.     Facility-Administered Medications Ordered in Other Visits   Medication     [START ON 1/10/2022] 0.9% sodium chloride BOLUS     There are no discontinued medications.    Physical Exam   Vital Signs: Deferred for this telemedicine visit.    GENERAL APPEARANCE: alert and no distress  HENT: no obvious abnormalities on appearance  RESP: breathing appears unremarkable with normal rate, no audible wheezing or cough and no apparent shortness of breath with conversation  MS: extremities normal - no gross deformities noted  SKIN: no apparent rash and normal skin tone  NEURO: speech is clear with no obvious neurological deficits  PSYCH: mentation appears normal and affect normal    Data     Renal Latest Ref Rng & Units 1/8/2022 1/7/2022 1/7/2022   Na 133 - 144 mmol/L 145(H) 144 142   Na (external) 136 - 145 meq/L - - -   K 3.4 - 5.3 mmol/L 3.7 3.7 3.8   K (external) 3.5 - 5.1 meq/L - - -   Cl 94 - 109 mmol/L 116(H) 115(H) 113(H)   Cl (external) 98 - 109 meq/L - - -   CO2 20 - 32 mmol/L 21 23 22   CO2 (external) 22 - 31 meq/L - - -   BUN 7 - 30 mg/dL 19 18 20   BUN (external) 6 - 22 mg/dL - - -   Cr 0.66 - 1.25 mg/dL 1.77(H) 1.56(H) 1.64(H)   Cr (external) 0.70 - 1.30 mg/dL - - -   Glucose 70 - 99 mg/dL 84 87 91   Glucose (external) 70 - 99 mg/dL - - -   Ca  8.5 - 10.1 mg/dL 7.7(L) 8.0(L) 8.7   Ca (external) 8.5 - 10.5 mg/dL  - - -   Mg 1.6 - 2.3 mg/dL - - 2.0     Bone Health Latest Ref Rng & Units 12/24/2021 12/16/2021 8/7/2021   Phos 2.5 - 4.5 mg/dL - 2.8 2.7   PTHi 12 - 72 pg/mL - - -   Vit D Def 20 - 75 ug/L 26 - -     Heme Latest Ref Rng & Units 1/6/2022 12/16/2021 10/19/2021   WBC 4.0 - 11.0 10e3/uL 3.9(L) 6.0 6.6   WBC (external) 4.0 - 11.0 10*9/L - - -   Hgb 13.3 - 17.7 g/dL 7.8(L) 9.7(L) 10.1(L)   Hgb (external) 13.3 - 17.7 g/dL - - -   Plt 150 - 450 10e3/uL 159 251 217   Plt (external) 150 - 450 10*9/L - - -   ABSOLUTE NEUTROPHIL 1.6 - 8.3 10e9/L - - -   ABSOLUTE LYMPHOCYTES 0.8 - 5.3 10e9/L - - -   ABSOLUTE MONOCYTES 0.0 - 1.3 10e9/L - - -   ABSOLUTE EOSINOPHILS 0.0 - 0.7 10e9/L - - -   ABSOLUTE BASOPHILS 0.0 - 0.2 10e9/L - - -   ABS IMMATURE GRANULOCYTES 0 - 0.4 10e9/L - - -   ABSOLUTE NUCLEATED RBC - - - -     Liver Latest Ref Rng & Units 12/16/2021 8/7/2021 12/9/2020   AP 40 - 150 U/L - 83 -   TBili 0.2 - 1.3 mg/dL - 0.8 -   DBili 0.0 - 0.2 mg/dL - - -   ALT 0 - 70 U/L - 17 -   AST 0 - 45 U/L - 10 -   Tot Protein 6.8 - 8.8 g/dL - 7.5 -   Albumin 3.4 - 5.0 g/dL 3.6 3.7 2.8(L)     Pancreas Latest Ref Rng & Units 5/6/2021   A1C 0 - 5.6 % 5.7(H)     Iron studies Latest Ref Rng & Units 12/24/2021/2021 12/16/2021 1/28/2016   Iron 35 - 180 ug/dL 44 54 62   Iron sat 15 - 46 % 24 - 34   Ferritin 26 - 388 ng/mL 255 - 787(H)     [facility] Txp Virology Latest Ref Rng & Units 12/16/2021 8/7/2021 12/3/2018   CVM DNA Quant - - - -   CMV QUANT IU/ML Not Detected IU/mL Not Detected Not Detected -   LOG IU/ML OF CMVQNT <2.1 [Log:IU]/mL - - -   BK Spec - - - Plasma   BK Res BKNEG:BK Virus DNA Not Detected copies/mL - - BK Virus DNA Not Detected   BK Log <2.7 Log copies/mL - - Not Calculated   EBV CAPSID ANTIBODY IGG 0.0 - 0.8 AI - - -   EBV DNA LOG OF COPIES - 4.4 - -   Hep B Core NR - - -        Recent Labs   Lab Test 10/14/21  1125 11/17/21  1119 11/24/21  1125   DOSTAC 10/13/2021 11/16/2021 11/23/2021   TACROL 4.6* 6.9 5.6     Recent Labs   Lab Test  04/25/16  0826 05/03/16  0853 08/09/21  1043   DOSMPA 4.24.2016 2030 2,030 8/8/2021   8:30 PM   MPACID 4.01* 3.81* 0.81*   MPAG 46.6 38.6 48.0     Again, thank you for allowing me to participate in the care of your patient.      Sincerely,    Samuel Varela MD

## 2021-12-15 NOTE — TELEPHONE ENCOUNTER
Returned a call to John. Advised that he should continue with Myfortic until we have seen the results of all of his stool studies he is turning in tomorrow. If stool studies are negative then we will switch him to Azathioprine. John verbalized understanding.

## 2021-12-15 NOTE — PROGRESS NOTES
John is a 71 year old who is being evaluated via a billable video visit.      How would you like to obtain your AVS? MyChart  If the video visit is dropped, the invitation should be resent by: Text to cell phone: [phone]  Will anyone else be joining your video visit? No    Video Start Time: 0927  Video-Visit Details    Type of service:  Video Visit    Video End Time:0943    Originating Location (pt. Location): Home    Distant Location (provider location):  [facility] [place]     Platform used for Video Visit: Well      TRANSPLANT NEPHROLOGY CHRONIC POST TRANSPLANT VISIT    Assessment & Plan   # LDKT: Increased creatinine with multiple MARCOS episodes due to recent diarrheal illnesses.  Kidney transplant biopsy 10/2021 showed ATI and mild chronic changes, but no acute rejection.  Creatinine has been running ~ 1.4-1.7 at the best since start of the MARCOS episodes from diarrhea and frequently in the 2s when diarrhea has been worse.  Unclear of new baseline now after diarrhea issues are resolved.   - Baseline Creatinine:  ~ 1.1-1.3   - Proteinuria: Minimal (0.2-0.5 grams)   - Date DSA Last Checked: Oct/2021      Latest DSA: No   - BK Viremia: No   - Kidney Tx Biopsy: Oct 19, 2021; Result: No diagnostic evidence of acute rejection.  Acute tubular injury with mild interstitial fibrosis and tubular atrophy.    # Immunosuppression: Tacrolimus immediate release (goal 4-6) and Mycophenolic acid (dose 360 mg every 12 hours)   - Continue with intensive monitoring of immunosuppression for efficacy and toxicity.   - Changes: Yes - With ongoing diarrhea, will change mycophenolic acid to azathioprine 100 mg daily.    # Infection Prophylaxis:   Last CD4 Level: 165 (Sep/2021)  - PJP: Sulfa/TMP (Bactrim)    # Hypertension: Controlled;  Goal BP: < 130/80   - Changes: No    # Anemia in Chronic Renal Disease: Hgb: Stable      LULY: No   - Iron studies: Not checked recently    # Mineral Bone Disorder:   -  Vitamin D; level: Not checked recently        On supplement: Yes  - Calcium; level: Low        On supplement: No    # Electrolytes:   - Potassium; level: Normal        On supplement: No  - Magnesium; level: Not checked recently        On supplement: No  - Bicarbonate; level: Low        On supplement: Yes, just started    # CAD, s/p PCI: Asymptomatic.    # Chronic Diarrhea: Patient was previously diagnosed with Clostridium difficile and also cryptosporidium.  He was treated for both, but no improvement in his ongoing diarrhea symptoms.  He has been seen by Transplant ID and GI.  Presently doing conservative management.  Medications can still be the present etiology after resolution of previous symptoms.   - Will change mycophenolic acid to azathioprine.    # Skin Cancer: New lesions: a few   - Discussed sun protection and recommend regular follow up with Dermatology.    # Medical Compliance: Yes    # COVID-19 Virus Review: Discussed COVID-19 virus and the potential medical risks.  Reviewed preventative health recommendations, including wearing a mask where appropriate.  Recommended COVID vaccination should be up to date with either an initial vaccination or booster shot when appropriate.  Asked the patient to inform the transplant center if they are exposed or diagnosed with this virus.    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  Significant transplant-related complications: None    Transplant Office Phone Number: [phone]    Assessment and plan was discussed with the patient and he voiced his understanding and agreement.    Return visit: Return in about 2 months (around 2/15/2022).    Samuel Varela MD    Chief Complaint   Mr. Vee is a 71 year old here for kidney transplant and  immunosuppression management.    History of Present Illness    Mr. Vee reports feeling fair overall with ongoing medical complaints.  Since last clinic visit, patient reports no hospitalizations, but has had ongoing diarrhea.  Patient reports diarrhea symptoms began in August.  Prior to the start of having diarrhea, he would normally have 1-2 formed bowel movements daily.  Then in August, he started to have watery stools multiple times a day and frequently over 10x per day.  Infectious work up was positive cryptosporidium and otherwise negative for C. Diff and other enteric pathogens.  He was treated with nitazoxanide.  His diarrhea persisted and repeat infectious check was then positive for Clostridium difficile.  Patient was then treated with a course of oral vancomycin.  His diarrhea didn't significantly improve and repeat testing in October was again positive for C. diff.  Patient was again treated with oral vancomycin and repeat testing was negative for C. diff.  Unfortunately, his diarrhea has persisted.  Patient has also been seen by Transplant ID and GI.  He plans to have a colonoscopy.  He reports over the last couple of weeks a little improvement in his symptoms with 2-3 watery stools a day.  He does have some fecal urgency at times.  No bloody or black, tarry stools.  No fever, sweats or chills.  He gets rare nausea but no vomiting.  Also some heartburn symptoms at times.  His appetite has improve, although he has lost ~ 20-25 lbs since the diarrhea started.  He does take Metamucil 3x daily and is also eating yogurt and Kimchi.    His energy level is a bit better, now pretty good, but not quite back to normal yet.  He is active and does get some exercise.  Denies any chest pain or shortness of breath with exertion.  No leg swelling.    Home BP: 110-120/60-70s    Problem List   Patient Active Problem List   Diagnosis     HTN, kidney transplant related     Membranous glomerulonephritis     Anemia in  chronic renal disease     Secondary renal hyperparathyroidism (H)     Vitamin D deficiency     Dyslipidemia     Anxiety     Status post coronary angiogram     CAD, multiple vessel     Multiple vessel coronary artery disease     Kidney replaced by transplant     Immunosuppression (H)     Aftercare following organ transplant     Closed fracture of trochanter of right femur, initial encounter (H)     Impaired fasting glucose     Erectile dysfunction     Old myocardial infarction     Diarrhea     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     carvedilol (COREG) 25 MG tablet Take 0.5 tablets (12.5 mg) by mouth every morning In addition to 25 mg every evening.     ondansetron (ZOFRAN-ODT) 4 MG ODT tab Take 1 tablet (4 mg) by mouth every 6 hours as needed for nausea or vomiting (Patient not taking: Reported on 1/8/2022)     PROGRAF (BRAND) 0.5 MG capsule Take 2 capsules (1 mg) by mouth 2 times daily     sertraline (ZOLOFT) 25 MG tablet Take 1 tablet (25 mg) by mouth every morning     sildenafil (VIAGRA) 100 MG tablet Take 1 tablet (100 mg) by mouth daily as needed (1/2 as needed 1 hour prior to sexual activity)     sulfamethoxazole-trimethoprim (BACTRIM) 400-80 MG tablet Take 1 tablet by mouth Every Mon, Wed, Fri Morning     vitamin D3 (CHOLECALCIFEROL) 50 mcg (2000 units) tablet Take 1 tablet by mouth every morning     azaTHIOprine (IMURAN) 50 MG tablet Take 2 tablets (100 mg) by mouth daily     ferrous sulfate (FEROSUL) 325 (65 Fe) MG tablet Take 1 tablet (325 mg) by mouth daily (with lunch) (Patient not  taking: Reported on 1/8/2022)     potassium chloride ER (KLOR-CON M) 20 MEQ CR tablet Take 1 tablet (20 mEq) by mouth 2 times daily     sodium bicarbonate 650 MG tablet Take 2 tablets (1,300 mg) by mouth 2 times daily     No current facility-administered medications for this visit.     Facility-Administered Medications Ordered in Other Visits   Medication     [START ON 1/10/2022] 0.9% sodium chloride BOLUS     There are no discontinued medications.    Physical Exam   Vital Signs: Deferred for this telemedicine visit.    GENERAL APPEARANCE: alert and no distress  HENT: no obvious abnormalities on appearance  RESP: breathing appears unremarkable with normal rate, no audible wheezing or cough and no apparent shortness of breath with conversation  MS: extremities normal - no gross deformities noted  SKIN: no apparent rash and normal skin tone  NEURO: speech is clear with no obvious neurological deficits  PSYCH: mentation appears normal and affect normal    Data     Renal Latest Ref Rng & Units 1/8/2022 1/7/2022 1/7/2022   Na 133 - 144 mmol/L 145(H) 144 142   Na (external) 136 - 145 meq/L - - -   K 3.4 - 5.3 mmol/L 3.7 3.7 3.8   K (external) 3.5 - 5.1 meq/L - - -   Cl 94 - 109 mmol/L 116(H) 115(H) 113(H)   Cl (external) 98 - 109 meq/L - - -   CO2 20 - 32 mmol/L 21 23 22   CO2 (external) 22 - 31 meq/L - - -   BUN 7 - 30 mg/dL 19 18 20   BUN (external) 6 - 22 mg/dL - - -   Cr 0.66 - 1.25 mg/dL 1.77(H) 1.56(H) 1.64(H)   Cr (external) 0.70 - 1.30 mg/dL - - -   Glucose 70 - 99 mg/dL 84 87 91   Glucose (external) 70 - 99 mg/dL - - -   Ca  8.5 - 10.1 mg/dL 7.7(L) 8.0(L) 8.7   Ca (external) 8.5 - 10.5 mg/dL - - -   Mg 1.6 - 2.3 mg/dL - - 2.0     Bone Health Latest Ref Rng & Units 12/24/2021 12/16/2021 8/7/2021   Phos 2.5 - 4.5 mg/dL - 2.8 2.7   PTHi 12 - 72 pg/mL - - -   Vit D Def 20 - 75 ug/L 26 - -     Heme Latest Ref Rng & Units 1/6/2022 12/16/2021 10/19/2021   WBC 4.0 - 11.0 10e3/uL 3.9(L) 6.0 6.6   WBC (external) 4.0 - 11.0  10*9/L - - -   Hgb 13.3 - 17.7 g/dL 7.8(L) 9.7(L) 10.1(L)   Hgb (external) 13.3 - 17.7 g/dL - - -   Plt 150 - 450 10e3/uL 159 251 217   Plt (external) 150 - 450 10*9/L - - -   ABSOLUTE NEUTROPHIL 1.6 - 8.3 10e9/L - - -   ABSOLUTE LYMPHOCYTES 0.8 - 5.3 10e9/L - - -   ABSOLUTE MONOCYTES 0.0 - 1.3 10e9/L - - -   ABSOLUTE EOSINOPHILS 0.0 - 0.7 10e9/L - - -   ABSOLUTE BASOPHILS 0.0 - 0.2 10e9/L - - -   ABS IMMATURE GRANULOCYTES 0 - 0.4 10e9/L - - -   ABSOLUTE NUCLEATED RBC - - - -     Liver Latest Ref Rng & Units 12/16/2021 8/7/2021 12/9/2020   AP 40 - 150 U/L - 83 -   TBili 0.2 - 1.3 mg/dL - 0.8 -   DBili 0.0 - 0.2 mg/dL - - -   ALT 0 - 70 U/L - 17 -   AST 0 - 45 U/L - 10 -   Tot Protein 6.8 - 8.8 g/dL - 7.5 -   Albumin 3.4 - 5.0 g/dL 3.6 3.7 2.8(L)     Pancreas Latest Ref Rng & Units 5/6/2021   A1C 0 - 5.6 % 5.7(H)     Iron studies Latest Ref Rng & Units 12/24/2021 12/16/2021 1/28/2016   Iron 35 - 180 ug/dL 44 54 62   Iron sat 15 - 46 % 24 - 34   Ferritin 26 - 388 ng/mL 255 - 787(H)     [facility] Txp Virology Latest Ref Rng & Units 12/16/2021 8/7/2021 12/3/2018   CVM DNA Quant - - - -   CMV QUANT IU/ML Not Detected IU/mL Not Detected Not Detected -   LOG IU/ML OF CMVQNT <2.1 [Log:IU]/mL - - -   BK Spec - - - Plasma   BK Res BKNEG:BK Virus DNA Not Detected copies/mL - - BK Virus DNA Not Detected   BK Log <2.7 Log copies/mL - - Not Calculated   EBV CAPSID ANTIBODY IGG 0.0 - 0.8 AI - - -   EBV DNA LOG OF COPIES - 4.4 - -   Hep B Core NR - - -        Recent Labs   Lab Test 10/14/21  1125 11/17/21  1119 11/24/21  1125   DOSTAC 10/13/2021 11/16/2021 11/23/2021   TACROL 4.6* 6.9 5.6     Recent Labs   Lab Test 04/25/16  0826 05/03/16  0853 08/09/21  1043   DOSMPA 4.24.2016 2030 2,030 8/8/2021   8:30 PM   MPACID 4.01* 3.81* 0.81*   MPAG 46.6 38.6 48.0

## 2021-12-15 NOTE — TELEPHONE ENCOUNTER
"PLAN  Avery, MD Dylan Ahn Kathleen, RN Katie,     Patient is coming for IV fluids tomorrow and I ordered a bunch of labs, as well as repeat stool cultures (C. Diff and THA panel).  As long as his C. Diff is negative, we will change him to azathioprine 150 mg daily, as previously discussed.  Patient is fine with this plan.     Please make sure the labs I ordered are completed tomorrow.     Thanks     Ramon     \"change him from mycophenolic acid to azathioprine 150 mg daily.  No overlap necessary.\"        OUTCOME  Lab orders placed.  Will await C-diff results from tomorrow.    "

## 2021-12-16 ENCOUNTER — INFUSION THERAPY VISIT (OUTPATIENT)
Dept: INFUSION THERAPY | Facility: CLINIC | Age: 71
End: 2021-12-16
Attending: INTERNAL MEDICINE
Payer: MEDICARE

## 2021-12-16 VITALS — DIASTOLIC BLOOD PRESSURE: 74 MMHG | HEART RATE: 72 BPM | SYSTOLIC BLOOD PRESSURE: 116 MMHG | RESPIRATION RATE: 16 BRPM

## 2021-12-16 DIAGNOSIS — Z94.0 KIDNEY REPLACED BY TRANSPLANT: ICD-10-CM

## 2021-12-16 DIAGNOSIS — D63.1 ANEMIA IN STAGE 3B CHRONIC KIDNEY DISEASE (H): ICD-10-CM

## 2021-12-16 DIAGNOSIS — N18.32 ANEMIA IN STAGE 3B CHRONIC KIDNEY DISEASE (H): ICD-10-CM

## 2021-12-16 DIAGNOSIS — K52.9 CHRONIC DIARRHEA: ICD-10-CM

## 2021-12-16 DIAGNOSIS — R19.7 DIARRHEA OF PRESUMED INFECTIOUS ORIGIN: Primary | ICD-10-CM

## 2021-12-16 LAB
ALBUMIN SERPL-MCNC: 3.6 G/DL (ref 3.4–5)
ANION GAP SERPL CALCULATED.3IONS-SCNC: 9 MMOL/L (ref 3–14)
BUN SERPL-MCNC: 38 MG/DL (ref 7–30)
CALCIUM SERPL-MCNC: 9 MG/DL (ref 8.5–10.1)
CHLORIDE BLD-SCNC: 114 MMOL/L (ref 94–109)
CO2 SERPL-SCNC: 17 MMOL/L (ref 20–32)
CREAT SERPL-MCNC: 2.55 MG/DL (ref 0.66–1.25)
ERYTHROCYTE [DISTWIDTH] IN BLOOD BY AUTOMATED COUNT: 13.8 % (ref 10–15)
GFR SERPL CREATININE-BSD FRML MDRD: 25 ML/MIN/1.73M2
GLUCOSE BLD-MCNC: 83 MG/DL (ref 70–99)
HCT VFR BLD AUTO: 32.5 % (ref 40–53)
HGB BLD-MCNC: 9.7 G/DL (ref 13.3–17.7)
IRON SERPL-MCNC: 54 UG/DL (ref 35–180)
LDH SERPL L TO P-CCNC: 200 U/L (ref 85–227)
MAGNESIUM SERPL-MCNC: 1.6 MG/DL (ref 1.6–2.3)
MCH RBC QN AUTO: 27.5 PG (ref 26.5–33)
MCHC RBC AUTO-ENTMCNC: 29.8 G/DL (ref 31.5–36.5)
MCV RBC AUTO: 92 FL (ref 78–100)
PHOSPHATE SERPL-MCNC: 2.8 MG/DL (ref 2.5–4.5)
PLATELET # BLD AUTO: 251 10E3/UL (ref 150–450)
POTASSIUM BLD-SCNC: 3.6 MMOL/L (ref 3.4–5.3)
RBC # BLD AUTO: 3.53 10E6/UL (ref 4.4–5.9)
SODIUM SERPL-SCNC: 140 MMOL/L (ref 133–144)
T4 FREE SERPL-MCNC: 0.98 NG/DL (ref 0.76–1.46)
TSH SERPL DL<=0.005 MIU/L-ACNC: 7.6 MU/L (ref 0.4–4)
WBC # BLD AUTO: 6 10E3/UL (ref 4–11)

## 2021-12-16 PROCEDURE — 87209 SMEAR COMPLEX STAIN: CPT | Performed by: INTERNAL MEDICINE

## 2021-12-16 PROCEDURE — 84443 ASSAY THYROID STIM HORMONE: CPT

## 2021-12-16 PROCEDURE — 87799 DETECT AGENT NOS DNA QUANT: CPT

## 2021-12-16 PROCEDURE — 83993 ASSAY FOR CALPROTECTIN FECAL: CPT | Performed by: INTERNAL MEDICINE

## 2021-12-16 PROCEDURE — 84439 ASSAY OF FREE THYROXINE: CPT

## 2021-12-16 PROCEDURE — 36415 COLL VENOUS BLD VENIPUNCTURE: CPT

## 2021-12-16 PROCEDURE — 83540 ASSAY OF IRON: CPT

## 2021-12-16 PROCEDURE — 83735 ASSAY OF MAGNESIUM: CPT

## 2021-12-16 PROCEDURE — 87329 GIARDIA AG IA: CPT | Performed by: INTERNAL MEDICINE

## 2021-12-16 PROCEDURE — 87328 CRYPTOSPORIDIUM AG IA: CPT | Performed by: INTERNAL MEDICINE

## 2021-12-16 PROCEDURE — 80069 RENAL FUNCTION PANEL: CPT

## 2021-12-16 PROCEDURE — 85027 COMPLETE CBC AUTOMATED: CPT

## 2021-12-16 PROCEDURE — 82784 ASSAY IGA/IGD/IGG/IGM EACH: CPT

## 2021-12-16 PROCEDURE — 87206 SMEAR FLUORESCENT/ACID STAI: CPT | Performed by: INTERNAL MEDICINE

## 2021-12-16 PROCEDURE — 96360 HYDRATION IV INFUSION INIT: CPT

## 2021-12-16 PROCEDURE — 83615 LACTATE (LD) (LDH) ENZYME: CPT

## 2021-12-16 PROCEDURE — 258N000003 HC RX IP 258 OP 636: Performed by: INTERNAL MEDICINE

## 2021-12-16 RX ORDER — METHYLPREDNISOLONE SODIUM SUCCINATE 125 MG/2ML
125 INJECTION, POWDER, LYOPHILIZED, FOR SOLUTION INTRAMUSCULAR; INTRAVENOUS
Status: CANCELLED
Start: 2021-12-16

## 2021-12-16 RX ORDER — ALBUTEROL SULFATE 0.83 MG/ML
2.5 SOLUTION RESPIRATORY (INHALATION)
Status: CANCELLED | OUTPATIENT
Start: 2021-12-16

## 2021-12-16 RX ORDER — EPINEPHRINE 1 MG/ML
0.3 INJECTION, SOLUTION INTRAMUSCULAR; SUBCUTANEOUS EVERY 5 MIN PRN
Status: CANCELLED | OUTPATIENT
Start: 2021-12-16

## 2021-12-16 RX ORDER — DIPHENHYDRAMINE HYDROCHLORIDE 50 MG/ML
50 INJECTION INTRAMUSCULAR; INTRAVENOUS
Status: CANCELLED
Start: 2021-12-16

## 2021-12-16 RX ORDER — HEPARIN SODIUM,PORCINE 10 UNIT/ML
5 VIAL (ML) INTRAVENOUS
Status: CANCELLED | OUTPATIENT
Start: 2021-12-16

## 2021-12-16 RX ORDER — HEPARIN SODIUM (PORCINE) LOCK FLUSH IV SOLN 100 UNIT/ML 100 UNIT/ML
5 SOLUTION INTRAVENOUS
Status: CANCELLED | OUTPATIENT
Start: 2021-12-16

## 2021-12-16 RX ORDER — ALBUTEROL SULFATE 90 UG/1
1-2 AEROSOL, METERED RESPIRATORY (INHALATION)
Status: CANCELLED
Start: 2021-12-16

## 2021-12-16 RX ORDER — MEPERIDINE HYDROCHLORIDE 25 MG/ML
25 INJECTION INTRAMUSCULAR; INTRAVENOUS; SUBCUTANEOUS EVERY 30 MIN PRN
Status: CANCELLED | OUTPATIENT
Start: 2021-12-16

## 2021-12-16 RX ORDER — NALOXONE HYDROCHLORIDE 0.4 MG/ML
0.2 INJECTION, SOLUTION INTRAMUSCULAR; INTRAVENOUS; SUBCUTANEOUS
Status: CANCELLED | OUTPATIENT
Start: 2021-12-16

## 2021-12-16 RX ADMIN — SODIUM CHLORIDE 1000 ML: 9 INJECTION, SOLUTION INTRAVENOUS at 15:59

## 2021-12-16 ASSESSMENT — PAIN SCALES - GENERAL: PAINLEVEL: NO PAIN (0)

## 2021-12-16 NOTE — LETTER
12/16/2021         RE: Marlo Vee  [address] [facility] [address]        Dear Colleague,    Thank you for referring your patient, Marlo Vee, to the [facility]. Please see a copy of my visit note below.    Infusion Nursing Note:  Marlo Vee presents today for fluids infusion 3 times weekly ordered by Dr. Varela.    Patient seen by provider today: No   present during visit today: Not Applicable.    Note: Pt declined 3 labs today as they were not covered by insurance. Other labs drawn.  Dr. Varela notified..      Intravenous Access:  Peripheral IV placed.    Post Infusion Assessment:  Patient tolerated infusion without incident.       Discharge Plan:   Patient and/or family verbalized understanding of discharge instructions and all questions answered.      Cindy Merchant RN      Administrations This Visit     0.9% sodium chloride BOLUS     Admin Date  12/16/2021 Action  New Bag Dose  1,000 mL Route  Intravenous Administered By  Cindy Merchant RN                                      Again, thank you for allowing me to participate in the care of your patient.        Sincerely,        [facility]

## 2021-12-16 NOTE — PROGRESS NOTES
Infusion Nursing Note:  Marlo Vee presents today for fluids infusion 3 times weekly ordered by Dr. Varela.    Patient seen by provider today: No   present during visit today: Not Applicable.    Note: Pt declined 3 labs today as they were not covered by insurance. Other labs drawn.  Dr. Varela notified..      Intravenous Access:  Peripheral IV placed.    Post Infusion Assessment:  Patient tolerated infusion without incident.       Discharge Plan:   Patient and/or family verbalized understanding of discharge instructions and all questions answered.      Cindy Merchant RN      Administrations This Visit     0.9% sodium chloride BOLUS     Admin Date  12/16/2021 Action  New Bag Dose  1,000 mL Route  Intravenous Administered By  Cindy Merchant RN

## 2021-12-17 ENCOUNTER — DOCUMENTATION ONLY (OUTPATIENT)
Dept: TRANSPLANT | Facility: CLINIC | Age: 71
End: 2021-12-17
Payer: MEDICARE

## 2021-12-17 ENCOUNTER — TELEPHONE (OUTPATIENT)
Dept: INFECTIOUS DISEASES | Facility: CLINIC | Age: 71
End: 2021-12-17
Payer: MEDICARE

## 2021-12-17 LAB
C PARVUM AG STL QL IA: NEGATIVE
CALPROTECTIN STL-MCNT: <5 MG/KG (ref 0–49.9)
CMV DNA SPEC NAA+PROBE-ACNC: NOT DETECTED IU/ML
G LAMBLIA AG STL QL IA: NEGATIVE
IGG SERPL-MCNC: 1445 MG/DL (ref 610–1616)
O+P STL MICRO: NEGATIVE

## 2021-12-17 NOTE — TELEPHONE ENCOUNTER
Received call from Dede with the infectious disease diagnostic lab regarding patients C-diff test. Test was cancelled due to specimen collection. C-Diff PCR cannot be completed on fecal matter that is formed, so specimen was discarded. Will notify provider.

## 2021-12-17 NOTE — PROGRESS NOTES
Elevated TSH, normal T4  Received: Today  Samuel Varela MD Harris, Kathleen, RN  Patient should just follow up with is PCP for elevated TSH, but normal T4.  Nothing to do for now.     Ramon     OUTCOME:  [facility] routed Result Note to patient via directworx.

## 2021-12-18 ENCOUNTER — INFUSION THERAPY VISIT (OUTPATIENT)
Dept: INFUSION THERAPY | Facility: CLINIC | Age: 71
End: 2021-12-18
Attending: INTERNAL MEDICINE
Payer: MEDICARE

## 2021-12-18 VITALS
RESPIRATION RATE: 16 BRPM | DIASTOLIC BLOOD PRESSURE: 77 MMHG | OXYGEN SATURATION: 100 % | SYSTOLIC BLOOD PRESSURE: 118 MMHG | HEART RATE: 63 BPM

## 2021-12-18 DIAGNOSIS — Z94.0 KIDNEY REPLACED BY TRANSPLANT: ICD-10-CM

## 2021-12-18 DIAGNOSIS — R19.7 DIARRHEA OF PRESUMED INFECTIOUS ORIGIN: Primary | ICD-10-CM

## 2021-12-18 LAB
C CAYETANENSIS SPEC QL: NORMAL
CRYPTOSP STL QL ACID FAST STN: NORMAL
CYSTOISOSPORA BELLI: NORMAL

## 2021-12-18 PROCEDURE — 36415 COLL VENOUS BLD VENIPUNCTURE: CPT

## 2021-12-18 PROCEDURE — 96360 HYDRATION IV INFUSION INIT: CPT

## 2021-12-18 PROCEDURE — 258N000003 HC RX IP 258 OP 636: Performed by: INTERNAL MEDICINE

## 2021-12-18 RX ORDER — ALBUTEROL SULFATE 90 UG/1
1-2 AEROSOL, METERED RESPIRATORY (INHALATION)
Status: CANCELLED
Start: 2021-12-18

## 2021-12-18 RX ORDER — HEPARIN SODIUM (PORCINE) LOCK FLUSH IV SOLN 100 UNIT/ML 100 UNIT/ML
5 SOLUTION INTRAVENOUS
Status: CANCELLED | OUTPATIENT
Start: 2021-12-18

## 2021-12-18 RX ORDER — MEPERIDINE HYDROCHLORIDE 25 MG/ML
25 INJECTION INTRAMUSCULAR; INTRAVENOUS; SUBCUTANEOUS EVERY 30 MIN PRN
Status: CANCELLED | OUTPATIENT
Start: 2021-12-18

## 2021-12-18 RX ORDER — EPINEPHRINE 1 MG/ML
0.3 INJECTION, SOLUTION INTRAMUSCULAR; SUBCUTANEOUS EVERY 5 MIN PRN
Status: CANCELLED | OUTPATIENT
Start: 2021-12-18

## 2021-12-18 RX ORDER — NALOXONE HYDROCHLORIDE 0.4 MG/ML
0.2 INJECTION, SOLUTION INTRAMUSCULAR; INTRAVENOUS; SUBCUTANEOUS
Status: CANCELLED | OUTPATIENT
Start: 2021-12-18

## 2021-12-18 RX ORDER — HEPARIN SODIUM,PORCINE 10 UNIT/ML
5 VIAL (ML) INTRAVENOUS
Status: CANCELLED | OUTPATIENT
Start: 2021-12-18

## 2021-12-18 RX ORDER — DIPHENHYDRAMINE HYDROCHLORIDE 50 MG/ML
50 INJECTION INTRAMUSCULAR; INTRAVENOUS
Status: CANCELLED
Start: 2021-12-18

## 2021-12-18 RX ORDER — METHYLPREDNISOLONE SODIUM SUCCINATE 125 MG/2ML
125 INJECTION, POWDER, LYOPHILIZED, FOR SOLUTION INTRAMUSCULAR; INTRAVENOUS
Status: CANCELLED
Start: 2021-12-18

## 2021-12-18 RX ORDER — ALBUTEROL SULFATE 0.83 MG/ML
2.5 SOLUTION RESPIRATORY (INHALATION)
Status: CANCELLED | OUTPATIENT
Start: 2021-12-18

## 2021-12-18 RX ADMIN — SODIUM CHLORIDE 1000 ML: 9 INJECTION, SOLUTION INTRAVENOUS at 13:18

## 2021-12-18 NOTE — PROGRESS NOTES
Nursing Note  Marlo FLORES Chelicaden [facility] for:   Chief Complaint   Patient presents with     Infusion     Fluids     During today's [facility] appointment, orders from Dr. Varela were completed.  Frequency: three times weekly    Progress note:  Patient identification verified by name and date of birth.  Assessment completed.  Vitals recorded in Doc Flowsheets.  Patient was provided with education regarding medication/procedure and possible side effects.  Patient verbalized understanding.     present during visit today: Not Applicable.    Treatment Conditions: Non-applicable.    Premedications: were not ordered.    Drug Waste Record: No    Infusion length and rate:  infusion given over approximately 60 minutes    Labs: BMP drawn post IVF    Vascular access: peripheral IV placed today.    Is the next appt scheduled? tomorrow  Asymptomatic COVID test completed? NA    Post Infusion Assessment:  Patient tolerated infusion without incident.     Discharge Plan:   Follow up plan of care with: ongoing infusions at [facility]., transplant coordinator., ordering provider as scheduled. and after visit summary declined by patient  Discharge instructions were reviewed with patient.  Patient/representative verbalized understanding of discharge instructions and all questions answered.  Patient discharged from [facility] in stable condition.  Elisabeth High RN    Administrations This Visit     0.9% sodium chloride BOLUS     Admin Date  12/18/2021 Action  New Bag Dose  1,000 mL Route  Intravenous Administered By  Elisabeth High RN                /77   Pulse 63   Resp 16   SpO2 100%

## 2021-12-18 NOTE — LETTER
12/18/2021         RE: Marlo Vee  [address] [facility] [address]        Dear Colleague,    Thank you for referring your patient, Marlo Vee, to the [facility]. Please see a copy of my visit note below.    Nursing Note  Marlo Vee presents [facility] for:   Chief Complaint   Patient presents with     Infusion     Fluids     During [facility]'s [facility] appointment, orders from Dr. Varela were completed.  Frequency: three times weekly    Progress note:  Patient identification verified by name and date of birth.  Assessment completed.  Vitals recorded in Doc Flowsheets.  Patient was provided with education regarding medication/procedure and possible side effects.  Patient verbalized understanding.     present during visit today: Not Applicable.    Treatment Conditions: Non-applicable.    Premedications: were not ordered.    Drug Waste Record: No    Infusion length and rate:  infusion given over approximately 60 minutes    Labs: BMP drawn post IVF    Vascular access: peripheral IV placed today.    Is the next appt scheduled? tomorrow  Asymptomatic COVID test completed? NA    Post Infusion Assessment:  Patient tolerated infusion without incident.     Discharge Plan:   Follow up plan of care with: ongoing infusions at [facility]., transplant coordinator., ordering provider as scheduled. and after visit summary declined by patient  Discharge instructions were reviewed with patient.  Patient/representative verbalized understanding of discharge instructions and all questions answered.  Patient discharged from [facility] in stable condition.  Elisabeth High RN    Administrations This Visit     0.9% sodium chloride BOLUS     Admin Date  12/18/2021 Action  New Bag Dose  1,000 mL Route  Intravenous  Administered By  Elisabeth High RN                /77   Pulse 63   Resp 16   SpO2 100%           Again, thank you for allowing me to participate in the care of your patient.        Sincerely,        [facility]

## 2021-12-19 ENCOUNTER — INFUSION THERAPY VISIT (OUTPATIENT)
Dept: INFUSION THERAPY | Facility: CLINIC | Age: 71
End: 2021-12-19
Attending: INTERNAL MEDICINE
Payer: MEDICARE

## 2021-12-19 VITALS
SYSTOLIC BLOOD PRESSURE: 138 MMHG | RESPIRATION RATE: 16 BRPM | DIASTOLIC BLOOD PRESSURE: 89 MMHG | OXYGEN SATURATION: 100 % | TEMPERATURE: 97.5 F | HEART RATE: 64 BPM

## 2021-12-19 DIAGNOSIS — R19.7 DIARRHEA OF PRESUMED INFECTIOUS ORIGIN: Primary | ICD-10-CM

## 2021-12-19 DIAGNOSIS — Z94.0 KIDNEY REPLACED BY TRANSPLANT: ICD-10-CM

## 2021-12-19 LAB
ANION GAP SERPL CALCULATED.3IONS-SCNC: 7 MMOL/L (ref 3–14)
BUN SERPL-MCNC: 30 MG/DL (ref 7–30)
CALCIUM SERPL-MCNC: 8.1 MG/DL (ref 8.5–10.1)
CHLORIDE BLD-SCNC: 118 MMOL/L (ref 94–109)
CO2 SERPL-SCNC: 19 MMOL/L (ref 20–32)
CREAT SERPL-MCNC: 2.04 MG/DL (ref 0.66–1.25)
GFR SERPL CREATININE-BSD FRML MDRD: 32 ML/MIN/1.73M2
GLUCOSE BLD-MCNC: 92 MG/DL (ref 70–99)
POTASSIUM BLD-SCNC: 3.5 MMOL/L (ref 3.4–5.3)
SODIUM SERPL-SCNC: 144 MMOL/L (ref 133–144)

## 2021-12-19 PROCEDURE — 96360 HYDRATION IV INFUSION INIT: CPT

## 2021-12-19 PROCEDURE — 80048 BASIC METABOLIC PNL TOTAL CA: CPT

## 2021-12-19 PROCEDURE — 36415 COLL VENOUS BLD VENIPUNCTURE: CPT

## 2021-12-19 PROCEDURE — 258N000003 HC RX IP 258 OP 636: Performed by: INTERNAL MEDICINE

## 2021-12-19 RX ORDER — METHYLPREDNISOLONE SODIUM SUCCINATE 125 MG/2ML
125 INJECTION, POWDER, LYOPHILIZED, FOR SOLUTION INTRAMUSCULAR; INTRAVENOUS
Status: CANCELLED
Start: 2021-12-19

## 2021-12-19 RX ORDER — DIPHENHYDRAMINE HYDROCHLORIDE 50 MG/ML
50 INJECTION INTRAMUSCULAR; INTRAVENOUS
Status: CANCELLED
Start: 2021-12-19

## 2021-12-19 RX ORDER — MEPERIDINE HYDROCHLORIDE 25 MG/ML
25 INJECTION INTRAMUSCULAR; INTRAVENOUS; SUBCUTANEOUS EVERY 30 MIN PRN
Status: CANCELLED | OUTPATIENT
Start: 2021-12-19

## 2021-12-19 RX ORDER — ALBUTEROL SULFATE 90 UG/1
1-2 AEROSOL, METERED RESPIRATORY (INHALATION)
Status: CANCELLED
Start: 2021-12-19

## 2021-12-19 RX ORDER — EPINEPHRINE 1 MG/ML
0.3 INJECTION, SOLUTION INTRAMUSCULAR; SUBCUTANEOUS EVERY 5 MIN PRN
Status: CANCELLED | OUTPATIENT
Start: 2021-12-19

## 2021-12-19 RX ORDER — HEPARIN SODIUM,PORCINE 10 UNIT/ML
5 VIAL (ML) INTRAVENOUS
Status: CANCELLED | OUTPATIENT
Start: 2021-12-19

## 2021-12-19 RX ORDER — HEPARIN SODIUM (PORCINE) LOCK FLUSH IV SOLN 100 UNIT/ML 100 UNIT/ML
5 SOLUTION INTRAVENOUS
Status: CANCELLED | OUTPATIENT
Start: 2021-12-19

## 2021-12-19 RX ORDER — ALBUTEROL SULFATE 0.83 MG/ML
2.5 SOLUTION RESPIRATORY (INHALATION)
Status: CANCELLED | OUTPATIENT
Start: 2021-12-19

## 2021-12-19 RX ORDER — NALOXONE HYDROCHLORIDE 0.4 MG/ML
0.2 INJECTION, SOLUTION INTRAMUSCULAR; INTRAVENOUS; SUBCUTANEOUS
Status: CANCELLED | OUTPATIENT
Start: 2021-12-19

## 2021-12-19 RX ADMIN — SODIUM CHLORIDE 1000 ML: 9 INJECTION, SOLUTION INTRAVENOUS at 11:14

## 2021-12-19 NOTE — LETTER
12/19/2021         RE: Marlo Vee  [address] [place] [facility] [address]        Dear Colleague,    Thank you for referring your patient, Marlo Vee, to the [facility]. Please see a copy of my visit note below.    Nursing Note  Marlo Vee presents [facility] for:   Chief Complaint   Patient presents with     Infusion     fluids       During [facility]'s [facility] appointment, orders from Dr. Varela were completed.  Frequency: three times weekly    Progress note:  Patient identification verified by name and date of birth.  Assessment completed.  Vitals recorded in Doc Flowsheets.  Patient was provided with education regarding medication/procedure and possible side effects.  Patient verbalized understanding.     present during visit today: Not Applicable.    Treatment Conditions: Non-applicable.    Premedications: were not ordered.    Drug Waste Record: No    Infusion length and rate:  infusion given over approximately 60 minutes    Labs: BMP drawn post IVF    Vascular access: peripheral IV placed today.    Post Infusion Assessment:  Patient tolerated infusion without incident.     Discharge Plan:   Follow up plan of care with: ongoing infusions at [facility]., transplant coordinator., ordering provider as scheduled. and after visit summary declined by patient  Discharge instructions were reviewed with patient.  Patient/representative verbalized understanding of discharge instructions and all questions answered.  Patient discharged from [facility] in stable condition.    Elisabeth High RN    Administrations This Visit     0.9% sodium chloride BOLUS     Admin Date  12/19/2021 Action  New Bag Dose  1,000 mL Route  Intravenous Administered By  Elisabeth High RN                /89   Pulse 64    Temp 97.5  F (36.4  C) (Oral)   Resp 16   SpO2 100%           Again, thank you for allowing me to participate in the care of your patient.        Sincerely,        [facility]

## 2021-12-19 NOTE — PROGRESS NOTES
Nursing Note  Marlo Vee presents [facility] for:   Chief Complaint   Patient presents with     Infusion     fluids       During [facility]'s [facility] appointment, orders from Dr. Varela were completed.  Frequency: three times weekly    Progress note:  Patient identification verified by name and date of birth.  Assessment completed.  Vitals recorded in Doc Flowsheets.  Patient was provided with education regarding medication/procedure and possible side effects.  Patient verbalized understanding.     present during visit today: Not Applicable.    Treatment Conditions: Non-applicable.    Premedications: were not ordered.    Drug Waste Record: No    Infusion length and rate:  infusion given over approximately 60 minutes    Labs: BMP drawn post IVF    Vascular access: peripheral IV placed today.    Post Infusion Assessment:  Patient tolerated infusion without incident.     Discharge Plan:   Follow up plan of care with: ongoing infusions [facility]., transplant coordinator., ordering provider as scheduled. and after visit summary declined by patient  Discharge instructions were reviewed with patient.  Patient/representative verbalized understanding of discharge instructions and all questions answered.  [facility] discharged [facility] in stable condition.    Elisabeth High RN    Administrations This Visit     0.9% sodium chloride BOLUS     Admin Date  12/19/2021 Action  New Bag Dose  1,000 mL Route  Intravenous Administered By  Elisabeth High RN                /89   Pulse 64   Temp 97.5  F (36.4  C) (Oral)   Resp 16   SpO2 100%

## 2021-12-20 LAB
EBV DNA COPIES/ML, INSTRUMENT: ABNORMAL COPIES/ML
EBV DNA SPEC NAA+PROBE-LOG#: 4.4 {LOG_COPIES}/ML

## 2021-12-22 ENCOUNTER — LAB (OUTPATIENT)
Dept: LAB | Facility: CLINIC | Age: 71
End: 2021-12-22

## 2021-12-22 ENCOUNTER — INFUSION THERAPY VISIT (OUTPATIENT)
Dept: INFUSION THERAPY | Facility: CLINIC | Age: 71
End: 2021-12-22
Attending: INTERNAL MEDICINE
Payer: MEDICARE

## 2021-12-22 VITALS
TEMPERATURE: 97.5 F | DIASTOLIC BLOOD PRESSURE: 74 MMHG | HEART RATE: 63 BPM | RESPIRATION RATE: 16 BRPM | OXYGEN SATURATION: 100 % | SYSTOLIC BLOOD PRESSURE: 108 MMHG

## 2021-12-22 DIAGNOSIS — Z48.298 AFTERCARE FOLLOWING ORGAN TRANSPLANT: ICD-10-CM

## 2021-12-22 DIAGNOSIS — Z94.0 KIDNEY REPLACED BY TRANSPLANT: ICD-10-CM

## 2021-12-22 DIAGNOSIS — R19.7 DIARRHEA: ICD-10-CM

## 2021-12-22 DIAGNOSIS — R19.7 DIARRHEA OF PRESUMED INFECTIOUS ORIGIN: Primary | ICD-10-CM

## 2021-12-22 LAB
ANION GAP SERPL CALCULATED.3IONS-SCNC: 12 MMOL/L (ref 3–14)
BUN SERPL-MCNC: 27 MG/DL (ref 7–30)
CALCIUM SERPL-MCNC: 8.2 MG/DL (ref 8.5–10.1)
CHLORIDE BLD-SCNC: 115 MMOL/L (ref 94–109)
CO2 SERPL-SCNC: 16 MMOL/L (ref 20–32)
CREAT SERPL-MCNC: 2.26 MG/DL (ref 0.66–1.25)
GFR SERPL CREATININE-BSD FRML MDRD: 31 ML/MIN/1.73M2
GLUCOSE BLD-MCNC: 94 MG/DL (ref 70–99)
POTASSIUM BLD-SCNC: 3.6 MMOL/L (ref 3.4–5.3)
SODIUM SERPL-SCNC: 143 MMOL/L (ref 133–144)

## 2021-12-22 PROCEDURE — 96360 HYDRATION IV INFUSION INIT: CPT

## 2021-12-22 PROCEDURE — 80048 BASIC METABOLIC PNL TOTAL CA: CPT

## 2021-12-22 PROCEDURE — 258N000003 HC RX IP 258 OP 636: Performed by: INTERNAL MEDICINE

## 2021-12-22 PROCEDURE — 36415 COLL VENOUS BLD VENIPUNCTURE: CPT

## 2021-12-22 RX ORDER — METHYLPREDNISOLONE SODIUM SUCCINATE 125 MG/2ML
125 INJECTION, POWDER, LYOPHILIZED, FOR SOLUTION INTRAMUSCULAR; INTRAVENOUS
Status: CANCELLED
Start: 2021-12-22

## 2021-12-22 RX ORDER — DIPHENHYDRAMINE HYDROCHLORIDE 50 MG/ML
50 INJECTION INTRAMUSCULAR; INTRAVENOUS
Status: CANCELLED
Start: 2021-12-22

## 2021-12-22 RX ORDER — NALOXONE HYDROCHLORIDE 0.4 MG/ML
0.2 INJECTION, SOLUTION INTRAMUSCULAR; INTRAVENOUS; SUBCUTANEOUS
Status: CANCELLED | OUTPATIENT
Start: 2021-12-22

## 2021-12-22 RX ORDER — ALBUTEROL SULFATE 0.83 MG/ML
2.5 SOLUTION RESPIRATORY (INHALATION)
Status: CANCELLED | OUTPATIENT
Start: 2021-12-22

## 2021-12-22 RX ORDER — HEPARIN SODIUM (PORCINE) LOCK FLUSH IV SOLN 100 UNIT/ML 100 UNIT/ML
5 SOLUTION INTRAVENOUS
Status: CANCELLED | OUTPATIENT
Start: 2021-12-22

## 2021-12-22 RX ORDER — ALBUTEROL SULFATE 90 UG/1
1-2 AEROSOL, METERED RESPIRATORY (INHALATION)
Status: CANCELLED
Start: 2021-12-22

## 2021-12-22 RX ORDER — HEPARIN SODIUM,PORCINE 10 UNIT/ML
5 VIAL (ML) INTRAVENOUS
Status: CANCELLED | OUTPATIENT
Start: 2021-12-22

## 2021-12-22 RX ORDER — EPINEPHRINE 1 MG/ML
0.3 INJECTION, SOLUTION INTRAMUSCULAR; SUBCUTANEOUS EVERY 5 MIN PRN
Status: CANCELLED | OUTPATIENT
Start: 2021-12-22

## 2021-12-22 RX ORDER — MEPERIDINE HYDROCHLORIDE 25 MG/ML
25 INJECTION INTRAMUSCULAR; INTRAVENOUS; SUBCUTANEOUS EVERY 30 MIN PRN
Status: CANCELLED | OUTPATIENT
Start: 2021-12-22

## 2021-12-22 RX ADMIN — SODIUM CHLORIDE 1000 ML: 9 INJECTION, SOLUTION INTRAVENOUS at 16:02

## 2021-12-22 NOTE — TELEPHONE ENCOUNTER
ISSUE   Ongoing diarrhea, getting IVF's three times weekly.  Plan to switch MPA to Azathioprine if stool samples negative.     PLAN  Stool samples negative,  C-diff not run as sample too formed.  EBV positive 4.4/26,142    Samuel Varela MD Harris, Kathleen, RN  Let's make the change, but lower his azathioprine to 100 mg daily.     Recheck EBV PCR in a month.     Ramon     GERMAINE Lopez states that diarrhea has become liquid consistently for the past 3 days. He would like to give a C-diff and enteric panel sample prior to switching immunosuppression.   He will  a stool kit from lab today and return it to lab on Friday.

## 2021-12-22 NOTE — PROGRESS NOTES
Infusion Nursing Note:  Marlo FLORES Mariusz presents today for IVF.    Patient seen by provider today: No   present during visit today: Not Applicable.    Note:   -1L of NS infused over 1hr    Intravenous Access:  Labs drawn without difficulty post infusion.  Peripheral IV placed.    Treatment Conditions:  Not Applicable.    Post Infusion Assessment:  Patient tolerated infusion without incident.  Site patent and intact, free from redness, edema or discomfort.  No evidence of extravasations.  Access discontinued per protocol.     Discharge Plan:   AVS to patient via MYCHART.  Patient will return 12/24/21 for next appointment.   Patient discharged in stable condition accompanied by: self.  Departure Mode: Ambulatory.    Dahiana Fam, RN    /74 (BP Location: Left arm)   Pulse 63   Temp 97.5  F (36.4  C) (Oral)   Resp 16      Administrations This Visit     0.9% sodium chloride BOLUS     Admin Date  12/22/2021 Action  New Bag Dose  1,000 mL Route  Intravenous Administered By  Dahiana Fam, RN

## 2021-12-22 NOTE — PATIENT INSTRUCTIONS
Dear Marlo Vee    Thank you for choosing [facility] ([facility]) for your infusion.  The following information is a summary of our appointment as well as important reminders.      We look forward in seeing you on your next appointment here at [facility] ([facility]).  Please don t hesitate to call us at [phone] to reschedule any of your appointments or to speak with one of the [facility] registered nurses.  It was a pleasure taking care of you today.    Sincerely,    [facility]  [address] [place] [address]  [address]  Phone:  [phone]

## 2021-12-22 NOTE — LETTER
12/22/2021         RE: Marlo Vee  [address] [facility] [address]        Dear Colleague,    Thank you for referring your patient, Marlo Vee, to the [facility]. Please see a copy of my visit note below.    Infusion Nursing Note:  Marlo Vee presents today for IVF.    Patient seen by provider today: No   present during visit today: Not Applicable.    Note:   -1L of NS infused over 1hr    Intravenous Access:  Labs drawn without difficulty post infusion.  Peripheral IV placed.    Treatment Conditions:  Not Applicable.    Post Infusion Assessment:  Patient tolerated infusion without incident.  Site patent and intact, free from redness, edema or discomfort.  No evidence of extravasations.  Access discontinued per protocol.     Discharge Plan:   [facility]  Patient will return 12/24/21 for next appointment.   Patient discharged in stable condition accompanied by: self.  Departure Mode: Ambulatory.    Dahiana Fam RN    /74 (BP Location: Left arm)   Pulse 63   Temp 97.5  F (36.4  C) (Oral)   Resp 16      Administrations This Visit     0.9% sodium chloride BOLUS     Admin Date  12/22/2021 Action  New Bag Dose  1,000 mL Route  Intravenous Administered By  Dahiana Fam, RN                          Again, thank you for allowing me to participate in the care of your patient.        Sincerely,        [facility]

## 2021-12-23 DIAGNOSIS — R19.7 DIARRHEA OF PRESUMED INFECTIOUS ORIGIN: ICD-10-CM

## 2021-12-23 DIAGNOSIS — Z94.0 STATUS POST KIDNEY TRANSPLANT: ICD-10-CM

## 2021-12-23 DIAGNOSIS — Z94.0 KIDNEY TRANSPLANTED: ICD-10-CM

## 2021-12-23 RX ORDER — SODIUM BICARBONATE 650 MG/1
1300 TABLET ORAL 2 TIMES DAILY
Qty: 120 TABLET | Refills: 11 | Status: SHIPPED | OUTPATIENT
Start: 2021-12-23 | End: 2022-04-18

## 2021-12-23 NOTE — TELEPHONE ENCOUNTER
Issue:  Low bicarb level.     Plan/LPN task:  Samuel Varela MD Harris, Kathleen, RN  Mostly stable, but elevated kidney function with lower bicarbonate level.  Recommend increasing sodium bicarbonate to 1300 mg twice daily.     Please call with plan above, update med list.

## 2021-12-24 ENCOUNTER — INFUSION THERAPY VISIT (OUTPATIENT)
Dept: INFUSION THERAPY | Facility: CLINIC | Age: 71
End: 2021-12-24
Attending: INTERNAL MEDICINE
Payer: MEDICARE

## 2021-12-24 ENCOUNTER — TELEPHONE (OUTPATIENT)
Dept: TRANSPLANT | Facility: CLINIC | Age: 71
End: 2021-12-24

## 2021-12-24 VITALS
HEART RATE: 65 BPM | RESPIRATION RATE: 16 BRPM | SYSTOLIC BLOOD PRESSURE: 114 MMHG | TEMPERATURE: 97.3 F | OXYGEN SATURATION: 99 % | DIASTOLIC BLOOD PRESSURE: 70 MMHG

## 2021-12-24 DIAGNOSIS — E83.51 HYPOCALCEMIA: ICD-10-CM

## 2021-12-24 DIAGNOSIS — Z94.0 KIDNEY REPLACED BY TRANSPLANT: ICD-10-CM

## 2021-12-24 DIAGNOSIS — Z48.298 AFTERCARE FOLLOWING ORGAN TRANSPLANT: ICD-10-CM

## 2021-12-24 DIAGNOSIS — R19.7 DIARRHEA OF PRESUMED INFECTIOUS ORIGIN: Primary | ICD-10-CM

## 2021-12-24 DIAGNOSIS — N18.9 CHRONIC KIDNEY DISEASE: ICD-10-CM

## 2021-12-24 DIAGNOSIS — Z94.0 KIDNEY TRANSPLANTED: ICD-10-CM

## 2021-12-24 DIAGNOSIS — D63.1 ANEMIA IN CHRONIC RENAL DISEASE: Primary | ICD-10-CM

## 2021-12-24 DIAGNOSIS — N18.9 ANEMIA IN CHRONIC RENAL DISEASE: ICD-10-CM

## 2021-12-24 DIAGNOSIS — Z94.0 KIDNEY TRANSPLANTED: Primary | ICD-10-CM

## 2021-12-24 DIAGNOSIS — N18.9 ANEMIA IN CHRONIC RENAL DISEASE: Primary | ICD-10-CM

## 2021-12-24 DIAGNOSIS — E55.9 VITAMIN D DEFICIENCY: ICD-10-CM

## 2021-12-24 DIAGNOSIS — D63.1 ANEMIA IN CHRONIC RENAL DISEASE: ICD-10-CM

## 2021-12-24 LAB
ANION GAP SERPL CALCULATED.3IONS-SCNC: 8 MMOL/L (ref 3–14)
BUN SERPL-MCNC: 30 MG/DL (ref 7–30)
CALCIUM SERPL-MCNC: 8.5 MG/DL (ref 8.5–10.1)
CHLORIDE BLD-SCNC: 117 MMOL/L (ref 94–109)
CO2 SERPL-SCNC: 17 MMOL/L (ref 20–32)
CREAT SERPL-MCNC: 2.45 MG/DL (ref 0.66–1.25)
DEPRECATED CALCIDIOL+CALCIFEROL SERPL-MC: 26 UG/L (ref 20–75)
FERRITIN SERPL-MCNC: 255 NG/ML (ref 26–388)
GFR SERPL CREATININE-BSD FRML MDRD: 28 ML/MIN/1.73M2
GLUCOSE BLD-MCNC: 93 MG/DL (ref 70–99)
IRON SATN MFR SERPL: 24 % (ref 15–46)
IRON SERPL-MCNC: 44 UG/DL (ref 35–180)
POTASSIUM BLD-SCNC: 3.9 MMOL/L (ref 3.4–5.3)
SODIUM SERPL-SCNC: 142 MMOL/L (ref 133–144)
TIBC SERPL-MCNC: 182 UG/DL (ref 240–430)

## 2021-12-24 PROCEDURE — 258N000003 HC RX IP 258 OP 636: Performed by: INTERNAL MEDICINE

## 2021-12-24 PROCEDURE — 82728 ASSAY OF FERRITIN: CPT

## 2021-12-24 PROCEDURE — 80048 BASIC METABOLIC PNL TOTAL CA: CPT

## 2021-12-24 PROCEDURE — 36415 COLL VENOUS BLD VENIPUNCTURE: CPT

## 2021-12-24 PROCEDURE — 83550 IRON BINDING TEST: CPT

## 2021-12-24 PROCEDURE — 96360 HYDRATION IV INFUSION INIT: CPT

## 2021-12-24 PROCEDURE — 82306 VITAMIN D 25 HYDROXY: CPT

## 2021-12-24 RX ORDER — HEPARIN SODIUM (PORCINE) LOCK FLUSH IV SOLN 100 UNIT/ML 100 UNIT/ML
5 SOLUTION INTRAVENOUS
Status: CANCELLED | OUTPATIENT
Start: 2021-12-24

## 2021-12-24 RX ORDER — DIPHENHYDRAMINE HYDROCHLORIDE 50 MG/ML
50 INJECTION INTRAMUSCULAR; INTRAVENOUS
Status: CANCELLED
Start: 2021-12-24

## 2021-12-24 RX ORDER — ALBUTEROL SULFATE 0.83 MG/ML
2.5 SOLUTION RESPIRATORY (INHALATION)
Status: CANCELLED | OUTPATIENT
Start: 2021-12-24

## 2021-12-24 RX ORDER — HEPARIN SODIUM,PORCINE 10 UNIT/ML
5 VIAL (ML) INTRAVENOUS
Status: CANCELLED | OUTPATIENT
Start: 2021-12-24

## 2021-12-24 RX ORDER — EPINEPHRINE 1 MG/ML
0.3 INJECTION, SOLUTION INTRAMUSCULAR; SUBCUTANEOUS EVERY 5 MIN PRN
Status: CANCELLED | OUTPATIENT
Start: 2021-12-24

## 2021-12-24 RX ORDER — MEPERIDINE HYDROCHLORIDE 25 MG/ML
25 INJECTION INTRAMUSCULAR; INTRAVENOUS; SUBCUTANEOUS EVERY 30 MIN PRN
Status: CANCELLED | OUTPATIENT
Start: 2021-12-24

## 2021-12-24 RX ORDER — ALBUTEROL SULFATE 90 UG/1
1-2 AEROSOL, METERED RESPIRATORY (INHALATION)
Status: CANCELLED
Start: 2021-12-24

## 2021-12-24 RX ORDER — METHYLPREDNISOLONE SODIUM SUCCINATE 125 MG/2ML
125 INJECTION, POWDER, LYOPHILIZED, FOR SOLUTION INTRAMUSCULAR; INTRAVENOUS
Status: CANCELLED
Start: 2021-12-24

## 2021-12-24 RX ORDER — NALOXONE HYDROCHLORIDE 0.4 MG/ML
0.2 INJECTION, SOLUTION INTRAMUSCULAR; INTRAVENOUS; SUBCUTANEOUS
Status: CANCELLED | OUTPATIENT
Start: 2021-12-24

## 2021-12-24 RX ADMIN — SODIUM CHLORIDE 1000 ML: 9 INJECTION, SOLUTION INTRAVENOUS at 09:16

## 2021-12-24 NOTE — TELEPHONE ENCOUNTER
ISSUE:  Creatinine elevated to 2.45, ongoing issues with diarrhea and pt is receiving IVF 3x per week  Low Vit D  Results routed to Dr. Varela    PLAN  Call and assess hydration status.  How much water are they drinking per day?  Any recent illness, diarrhea, s/s of a UTI, or medication changes?  Fever? Pain over graft site?  Any increased intake of alcohol or caffeine?  Recommend increasing hydration and repeat labs with next IV infusion.    OUTCOME:  John reports he is feeling well, no urinary changes, fever, nausea/vomiting, pain over graft site. Diarrhea remains the same, though more formed, pt unable to provide watery sample for C. Diff testing today, has collection container at home in case stool becomes more watery and he is able to provide usable sample.   Waiting to schedule colonoscopy, worried about increasing creatinine and dehydration caused by procedure.   He states [place] 1[place]21 and will resume IVF appointments when he returns home. Encouraged him to increase oral hydration during that time, and to call transplant office if any new symptoms develop.

## 2021-12-24 NOTE — LETTER
"    12/24/2021         RE: Marlo Vee  [address] [facility] [place] [address]        Dear Colleague,    Thank you for referring your patient, Marlo Vee, to the [facility]. Please see a copy of my visit note below.    Chief Complaint   Patient presents with     Infusion     IV Fluids, labs     Infusion Nursing Note:  Marlo Vee presents today for IV fluids.    Patient seen by provider today: No   present during visit today: Not Applicable.    Note: Fluids given over 1 hour.    Labs: BMP drawn post infusion per orders.     Intravenous Access:  Peripheral IV placed.    Treatment Conditions:  Not Applicable.      Post Infusion Assessment:  Patient tolerated infusion without incident.  Site patent and intact, free from redness, edema or discomfort.  No evidence of extravasations.  Access discontinued per protocol.       Discharge Plan:   Copy of AVS reviewed with patient and/or family.  Patient will return 1/6/22 for next appointment.  Patient discharged in stable condition accompanied by: self.  Departure Mode: Ambulatory.      Administrations This Visit     0.9% sodium chloride BOLUS     Admin Date  12/24/2021 Action  New Bag Dose  1,000 mL Route  Intravenous Administered By  Zhane Chavira RN                Vital signs:  Temp: 97.3  F (36.3  C) Temp src: Oral BP: 114/70 Pulse: 65   Resp: 16 SpO2: 99 % O2 Device: None (Room air)        Estimated body mass index is 20.59 kg/m  as calculated from the following:    Height as of 9/24/21: 1.778 m (5' 10\").    Weight as of 9/24/21: 65.1 kg (143 lb 8 oz).                                Again, thank you for allowing me to participate in the care of your patient.        Sincerely,        [facility]    "

## 2021-12-24 NOTE — PROGRESS NOTES
"Chief Complaint   Patient presents with     Infusion     IV Fluids, labs     Infusion Nursing Note:  Marlo Vee presents today for IV fluids.    Patient seen by provider today: No   present during visit today: Not Applicable.    Note: Fluids given over 1 hour.    Labs: BMP drawn post infusion per orders.     Intravenous Access:  Peripheral IV placed.    Treatment Conditions:  Not Applicable.      Post Infusion Assessment:  Patient tolerated infusion without incident.  Site patent and intact, free from redness, edema or discomfort.  No evidence of extravasations.  Access discontinued per protocol.       Discharge Plan:   Copy of AVS reviewed with patient and/or family.  Patient will return 1/6/22 for next appointment.  Patient discharged in stable condition accompanied by: self.  Departure Mode: Ambulatory.      Administrations This Visit     0.9% sodium chloride BOLUS     Admin Date  12/24/2021 Action  New Bag Dose  1,000 mL Route  Intravenous Administered By  Zhane Chavira RN                Vital signs:  Temp: 97.3  F (36.3  C) Temp src: Oral BP: 114/70 Pulse: 65   Resp: 16 SpO2: 99 % O2 Device: None (Room air)        Estimated body mass index is 20.59 kg/m  as calculated from the following:    Height as of 9/24/21: 1.778 m (5' 10\").    Weight as of 9/24/21: 65.1 kg (143 lb 8 oz).                            "

## 2021-12-28 RX ORDER — FERROUS SULFATE 325(65) MG
325 TABLET ORAL
Qty: 30 TABLET | Refills: 11 | Status: SHIPPED | OUTPATIENT
Start: 2021-12-28 | End: 2022-09-24

## 2021-12-28 NOTE — TELEPHONE ENCOUNTER
Samuel Varela MD Duncanson, Sarah, RN  Yes, cholecalciferol 25 mcg daily for vitamin D deficiency.  Also would recommend oral iron sulfate 325 mg daily with lunch for iron deficiency.     PLAN:  Call to John with above information, he states he has started taking OTC Vitamin D 2000 units. Iron sulfate rx sent.    Inquired about John's diarrhea and oral hydration. He states it is about the same, wondering about switching to new medication. Informed him that Onelia HARKINS, his RNCC will be back in the office tomorrow and would be able to better discuss that with him and answer his questions as she is more aware of the plan. He is agreeable to waiting to discuss with Onelia tomorrow.

## 2022-01-05 ENCOUNTER — TELEPHONE (OUTPATIENT)
Dept: TRANSPLANT | Facility: CLINIC | Age: 72
End: 2022-01-05
Payer: MEDICARE

## 2022-01-05 NOTE — TELEPHONE ENCOUNTER
Returned a call to John. He states that he stopped his Myfortic and started Azathioprine as directed today. He goes in for IV fluids tomorrow. He will continue to have frequent lab draws with IVF's.

## 2022-01-06 ENCOUNTER — HOSPITAL ENCOUNTER (EMERGENCY)
Facility: CLINIC | Age: 72
Discharge: HOME | End: 2022-01-07
Attending: EMERGENCY MEDICINE | Admitting: EMERGENCY MEDICINE
Payer: MEDICARE

## 2022-01-06 ENCOUNTER — TELEPHONE (OUTPATIENT)
Dept: TRANSPLANT | Facility: CLINIC | Age: 72
End: 2022-01-06

## 2022-01-06 ENCOUNTER — INFUSION THERAPY VISIT (OUTPATIENT)
Dept: INFUSION THERAPY | Facility: CLINIC | Age: 72
End: 2022-01-06
Attending: INTERNAL MEDICINE
Payer: MEDICARE

## 2022-01-06 VITALS
SYSTOLIC BLOOD PRESSURE: 118 MMHG | TEMPERATURE: 97.7 F | RESPIRATION RATE: 16 BRPM | HEART RATE: 64 BPM | OXYGEN SATURATION: 100 % | DIASTOLIC BLOOD PRESSURE: 76 MMHG

## 2022-01-06 DIAGNOSIS — E87.6 HYPOKALEMIA: ICD-10-CM

## 2022-01-06 DIAGNOSIS — Z94.0 KIDNEY REPLACED BY TRANSPLANT: ICD-10-CM

## 2022-01-06 DIAGNOSIS — R19.7 DIARRHEA OF PRESUMED INFECTIOUS ORIGIN: Primary | ICD-10-CM

## 2022-01-06 DIAGNOSIS — E83.42 HYPOMAGNESEMIA: ICD-10-CM

## 2022-01-06 LAB
ANION GAP SERPL CALCULATED.3IONS-SCNC: 4 MMOL/L (ref 3–14)
ANION GAP SERPL CALCULATED.3IONS-SCNC: 6 MMOL/L (ref 3–14)
ATRIAL RATE - MUSE: 64 BPM
BUN SERPL-MCNC: 20 MG/DL (ref 7–30)
BUN SERPL-MCNC: 21 MG/DL (ref 7–30)
CALCIUM SERPL-MCNC: 7.8 MG/DL (ref 8.5–10.1)
CALCIUM SERPL-MCNC: 8.6 MG/DL (ref 8.5–10.1)
CHLORIDE BLD-SCNC: 113 MMOL/L (ref 94–109)
CHLORIDE BLD-SCNC: 113 MMOL/L (ref 94–109)
CO2 SERPL-SCNC: 24 MMOL/L (ref 20–32)
CO2 SERPL-SCNC: 25 MMOL/L (ref 20–32)
CREAT SERPL-MCNC: 1.85 MG/DL (ref 0.66–1.25)
CREAT SERPL-MCNC: 1.94 MG/DL (ref 0.66–1.25)
DIASTOLIC BLOOD PRESSURE - MUSE: NORMAL MMHG
ERYTHROCYTE [DISTWIDTH] IN BLOOD BY AUTOMATED COUNT: 13.7 % (ref 10–15)
GFR SERPL CREATININE-BSD FRML MDRD: 37 ML/MIN/1.73M2
GFR SERPL CREATININE-BSD FRML MDRD: 39 ML/MIN/1.73M2
GLUCOSE BLD-MCNC: 104 MG/DL (ref 70–99)
GLUCOSE BLD-MCNC: 84 MG/DL (ref 70–99)
HCT VFR BLD AUTO: 25.8 % (ref 40–53)
HGB BLD-MCNC: 7.8 G/DL (ref 13.3–17.7)
HOLD SPECIMEN: NORMAL
INTERPRETATION ECG - MUSE: NORMAL
MAGNESIUM SERPL-MCNC: 1.6 MG/DL (ref 1.6–2.3)
MCH RBC QN AUTO: 27.7 PG (ref 26.5–33)
MCHC RBC AUTO-ENTMCNC: 30.2 G/DL (ref 31.5–36.5)
MCV RBC AUTO: 92 FL (ref 78–100)
P AXIS - MUSE: 32 DEGREES
PLATELET # BLD AUTO: 159 10E3/UL (ref 150–450)
POTASSIUM BLD-SCNC: 2.5 MMOL/L (ref 3.4–5.3)
POTASSIUM BLD-SCNC: 2.5 MMOL/L (ref 3.4–5.3)
PR INTERVAL - MUSE: 126 MS
QRS DURATION - MUSE: 106 MS
QT - MUSE: 388 MS
QTC - MUSE: 400 MS
R AXIS - MUSE: 10 DEGREES
RBC # BLD AUTO: 2.82 10E6/UL (ref 4.4–5.9)
SODIUM SERPL-SCNC: 142 MMOL/L (ref 133–144)
SODIUM SERPL-SCNC: 143 MMOL/L (ref 133–144)
SYSTOLIC BLOOD PRESSURE - MUSE: NORMAL MMHG
T AXIS - MUSE: -49 DEGREES
VENTRICULAR RATE- MUSE: 64 BPM
WBC # BLD AUTO: 3.9 10E3/UL (ref 4–11)

## 2022-01-06 PROCEDURE — 258N000003 HC RX IP 258 OP 636: Performed by: INTERNAL MEDICINE

## 2022-01-06 PROCEDURE — 85027 COMPLETE CBC AUTOMATED: CPT

## 2022-01-06 PROCEDURE — 250N000013 HC RX MED GY IP 250 OP 250 PS 637: Performed by: EMERGENCY MEDICINE

## 2022-01-06 PROCEDURE — 80048 BASIC METABOLIC PNL TOTAL CA: CPT

## 2022-01-06 PROCEDURE — 250N000011 HC RX IP 250 OP 636: Performed by: EMERGENCY MEDICINE

## 2022-01-06 PROCEDURE — 36415 COLL VENOUS BLD VENIPUNCTURE: CPT

## 2022-01-06 PROCEDURE — 96360 HYDRATION IV INFUSION INIT: CPT

## 2022-01-06 PROCEDURE — 83735 ASSAY OF MAGNESIUM: CPT | Performed by: EMERGENCY MEDICINE

## 2022-01-06 PROCEDURE — 80048 BASIC METABOLIC PNL TOTAL CA: CPT | Performed by: EMERGENCY MEDICINE

## 2022-01-06 PROCEDURE — 96368 THER/DIAG CONCURRENT INF: CPT

## 2022-01-06 PROCEDURE — 99284 EMERGENCY DEPT VISIT MOD MDM: CPT | Mod: 25

## 2022-01-06 PROCEDURE — 36415 COLL VENOUS BLD VENIPUNCTURE: CPT | Performed by: EMERGENCY MEDICINE

## 2022-01-06 PROCEDURE — 96365 THER/PROPH/DIAG IV INF INIT: CPT

## 2022-01-06 RX ORDER — EPINEPHRINE 1 MG/ML
0.3 INJECTION, SOLUTION INTRAMUSCULAR; SUBCUTANEOUS EVERY 5 MIN PRN
Status: CANCELLED | OUTPATIENT
Start: 2022-01-13

## 2022-01-06 RX ORDER — POTASSIUM CHLORIDE 7.45 MG/ML
10 INJECTION INTRAVENOUS
Status: COMPLETED | OUTPATIENT
Start: 2022-01-06 | End: 2022-01-07

## 2022-01-06 RX ORDER — ALBUTEROL SULFATE 90 UG/1
1-2 AEROSOL, METERED RESPIRATORY (INHALATION)
Status: CANCELLED
Start: 2022-01-13

## 2022-01-06 RX ORDER — POTASSIUM CHLORIDE 1500 MG/1
20 TABLET, EXTENDED RELEASE ORAL 2 TIMES DAILY
Qty: 30 TABLET | Refills: 1 | Status: SHIPPED | OUTPATIENT
Start: 2022-01-06 | End: 2022-01-31

## 2022-01-06 RX ORDER — HEPARIN SODIUM,PORCINE 10 UNIT/ML
5 VIAL (ML) INTRAVENOUS
Status: CANCELLED | OUTPATIENT
Start: 2022-01-13

## 2022-01-06 RX ORDER — POTASSIUM CHLORIDE 7.45 MG/ML
10 INJECTION INTRAVENOUS ONCE
Status: DISCONTINUED | OUTPATIENT
Start: 2022-01-06 | End: 2022-01-06

## 2022-01-06 RX ORDER — HEPARIN SODIUM (PORCINE) LOCK FLUSH IV SOLN 100 UNIT/ML 100 UNIT/ML
5 SOLUTION INTRAVENOUS
Status: CANCELLED | OUTPATIENT
Start: 2022-01-13

## 2022-01-06 RX ORDER — DIPHENHYDRAMINE HYDROCHLORIDE 50 MG/ML
50 INJECTION INTRAMUSCULAR; INTRAVENOUS
Status: CANCELLED
Start: 2022-01-13

## 2022-01-06 RX ORDER — POTASSIUM CHLORIDE 1500 MG/1
40 TABLET, EXTENDED RELEASE ORAL ONCE
Status: COMPLETED | OUTPATIENT
Start: 2022-01-06 | End: 2022-01-06

## 2022-01-06 RX ORDER — MEPERIDINE HYDROCHLORIDE 25 MG/ML
25 INJECTION INTRAMUSCULAR; INTRAVENOUS; SUBCUTANEOUS EVERY 30 MIN PRN
Status: CANCELLED | OUTPATIENT
Start: 2022-01-13

## 2022-01-06 RX ORDER — NALOXONE HYDROCHLORIDE 0.4 MG/ML
0.2 INJECTION, SOLUTION INTRAMUSCULAR; INTRAVENOUS; SUBCUTANEOUS
Status: CANCELLED | OUTPATIENT
Start: 2022-01-13

## 2022-01-06 RX ORDER — ALBUTEROL SULFATE 0.83 MG/ML
2.5 SOLUTION RESPIRATORY (INHALATION)
Status: CANCELLED | OUTPATIENT
Start: 2022-01-13

## 2022-01-06 RX ORDER — METHYLPREDNISOLONE SODIUM SUCCINATE 125 MG/2ML
125 INJECTION, POWDER, LYOPHILIZED, FOR SOLUTION INTRAMUSCULAR; INTRAVENOUS
Status: CANCELLED
Start: 2022-01-13

## 2022-01-06 RX ORDER — MAGNESIUM SULFATE HEPTAHYDRATE 40 MG/ML
2 INJECTION, SOLUTION INTRAVENOUS ONCE
Status: COMPLETED | OUTPATIENT
Start: 2022-01-06 | End: 2022-01-06

## 2022-01-06 RX ADMIN — MAGNESIUM SULFATE HEPTAHYDRATE 2 G: 40 INJECTION, SOLUTION INTRAVENOUS at 22:31

## 2022-01-06 RX ADMIN — SODIUM CHLORIDE 1000 ML: 9 INJECTION, SOLUTION INTRAVENOUS at 11:40

## 2022-01-06 RX ADMIN — POTASSIUM CHLORIDE 10 MEQ: 7.46 INJECTION, SOLUTION INTRAVENOUS at 22:31

## 2022-01-06 RX ADMIN — POTASSIUM CHLORIDE 40 MEQ: 1500 TABLET, EXTENDED RELEASE ORAL at 22:32

## 2022-01-06 ASSESSMENT — ENCOUNTER SYMPTOMS: DIARRHEA: 1

## 2022-01-06 NOTE — TELEPHONE ENCOUNTER
DATE:  1/6/2022     TIME OF RECEIPT FROM LAB: 5455    LAB TEST:  K+    LAB VALUE:  2.5    RESULTS GIVEN WITH READ-BACK TO:Adilia Richardson    TIME LAB VALUE REPORTED TO PROVIDER: 1337  Phone #[phone]

## 2022-01-06 NOTE — LETTER
1/6/2022         RE: Marlo Vee  [address] [facility] [address]        Dear Colleague,    Thank you for referring your patient, Marlo Vee, to the [facility]. Please see a copy of my visit note below.    Infusion Nursing Note:  Marlo Vee presents today for IV fluids.    Patient seen by provider today: No   present during visit today: Not Applicable.    Note: John reports that he switched from mycophenolate to azathioprine yesterday per the transplant team. Otherwise his health is stable.      Intravenous Access:  Peripheral IV placed.    Treatment Conditions:  Not Applicable. Labs drawn per transplant orders      Post Infusion Assessment:  Patient tolerated infusion without incident.  Site patent and intact, free from redness, edema or discomfort.  Access discontinued per protocol.   /76 (BP Location: Right arm)   Pulse 64   Temp 97.7  F (36.5  C) (Oral)   Resp 16   SpO2 100%       Discharge Plan:   AVS to patient via MYCHART.  Patient will return tomorrow for next appointment.   Departure Mode: Ambulatory.    Administrations This Visit     0.9% sodium chloride BOLUS     Admin Date  01/06/2022 Action  New Bag Dose  1,000 mL Route  Intravenous Administered By  Afua Dixon, HEATHER Dixon RN          Again, thank you for allowing me to participate in the care of your patient.      Sincerely,    [facility]

## 2022-01-06 NOTE — ED TRIAGE NOTES
Patient with history of kidney transplant, had blood drawn today. Was told to come to ED today with low potassium, patient also endorses 6 months of diarrhea.

## 2022-01-06 NOTE — PROGRESS NOTES
Infusion Nursing Note:  Marlo FLORES Mariusz presents today for IV fluids.    Patient seen by provider today: No   present during visit today: Not Applicable.    Note: John reports that he switched from mycophenolate to azathioprine yesterday per the transplant team. Otherwise his health is stable.      Intravenous Access:  Peripheral IV placed.    Treatment Conditions:  Not Applicable. Labs drawn per transplant orders      Post Infusion Assessment:  Patient tolerated infusion without incident.  Site patent and intact, free from redness, edema or discomfort.  Access discontinued per protocol.   /76 (BP Location: Right arm)   Pulse 64   Temp 97.7  F (36.5  C) (Oral)   Resp 16   SpO2 100%       Discharge Plan:   AVS to patient via MYCHART.  Patient will return tomorrow for next appointment.   Departure Mode: Ambulatory.    Administrations This Visit     0.9% sodium chloride BOLUS     Admin Date  01/06/2022 Action  New Bag Dose  1,000 mL Route  Intravenous Administered By  Afua Dixon RN Alyssa Marie Sakhitab-Kerestes, RN

## 2022-01-06 NOTE — TELEPHONE ENCOUNTER
ISSUE  Potassium 2.5  Calcium 7.8  Hbg 7.8  Labs drawn after IVF's today.    PLAN (per Dr. Varela)  Patient to report to ED for treatment of hypokalemia.       OUTCOME  Called John to discuss above plan. Detailed voice message left. Requested a return call to discuss.      ADDENDUM:  Received a return call from John. He states he will go to [facility] ED at 4:00 pm today. All questions regarding labs answered.

## 2022-01-07 ENCOUNTER — TELEPHONE (OUTPATIENT)
Dept: TRANSPLANT | Facility: CLINIC | Age: 72
End: 2022-01-07

## 2022-01-07 ENCOUNTER — TELEPHONE (OUTPATIENT)
Dept: INFUSION THERAPY | Facility: CLINIC | Age: 72
End: 2022-01-07
Payer: MEDICARE

## 2022-01-07 ENCOUNTER — INFUSION THERAPY VISIT (OUTPATIENT)
Dept: INFUSION THERAPY | Facility: CLINIC | Age: 72
End: 2022-01-07
Attending: INTERNAL MEDICINE
Payer: MEDICARE

## 2022-01-07 VITALS
DIASTOLIC BLOOD PRESSURE: 85 MMHG | RESPIRATION RATE: 18 BRPM | HEART RATE: 72 BPM | TEMPERATURE: 97.7 F | OXYGEN SATURATION: 100 % | SYSTOLIC BLOOD PRESSURE: 129 MMHG

## 2022-01-07 VITALS
SYSTOLIC BLOOD PRESSURE: 145 MMHG | DIASTOLIC BLOOD PRESSURE: 97 MMHG | HEART RATE: 61 BPM | TEMPERATURE: 97.5 F | OXYGEN SATURATION: 100 % | RESPIRATION RATE: 18 BRPM

## 2022-01-07 DIAGNOSIS — R19.7 DIARRHEA OF PRESUMED INFECTIOUS ORIGIN: Primary | ICD-10-CM

## 2022-01-07 DIAGNOSIS — Z94.0 KIDNEY REPLACED BY TRANSPLANT: Primary | ICD-10-CM

## 2022-01-07 DIAGNOSIS — Z94.0 KIDNEY REPLACED BY TRANSPLANT: ICD-10-CM

## 2022-01-07 LAB
ANION GAP SERPL CALCULATED.3IONS-SCNC: 6 MMOL/L (ref 3–14)
ANION GAP SERPL CALCULATED.3IONS-SCNC: 7 MMOL/L (ref 3–14)
BUN SERPL-MCNC: 18 MG/DL (ref 7–30)
BUN SERPL-MCNC: 20 MG/DL (ref 7–30)
CALCIUM SERPL-MCNC: 8 MG/DL (ref 8.5–10.1)
CALCIUM SERPL-MCNC: 8.7 MG/DL (ref 8.5–10.1)
CHLORIDE BLD-SCNC: 113 MMOL/L (ref 94–109)
CHLORIDE BLD-SCNC: 115 MMOL/L (ref 94–109)
CO2 SERPL-SCNC: 22 MMOL/L (ref 20–32)
CO2 SERPL-SCNC: 23 MMOL/L (ref 20–32)
CREAT SERPL-MCNC: 1.56 MG/DL (ref 0.66–1.25)
CREAT SERPL-MCNC: 1.64 MG/DL (ref 0.66–1.25)
GFR SERPL CREATININE-BSD FRML MDRD: 45 ML/MIN/1.73M2
GFR SERPL CREATININE-BSD FRML MDRD: 48 ML/MIN/1.73M2
GLUCOSE BLD-MCNC: 87 MG/DL (ref 70–99)
GLUCOSE BLD-MCNC: 91 MG/DL (ref 70–99)
MAGNESIUM SERPL-MCNC: 2 MG/DL (ref 1.6–2.3)
POTASSIUM BLD-SCNC: 3.7 MMOL/L (ref 3.4–5.3)
POTASSIUM BLD-SCNC: 3.8 MMOL/L (ref 3.4–5.3)
SODIUM SERPL-SCNC: 142 MMOL/L (ref 133–144)
SODIUM SERPL-SCNC: 144 MMOL/L (ref 133–144)

## 2022-01-07 PROCEDURE — 83735 ASSAY OF MAGNESIUM: CPT

## 2022-01-07 PROCEDURE — 258N000003 HC RX IP 258 OP 636: Performed by: INTERNAL MEDICINE

## 2022-01-07 PROCEDURE — 82310 ASSAY OF CALCIUM: CPT

## 2022-01-07 PROCEDURE — 36415 COLL VENOUS BLD VENIPUNCTURE: CPT

## 2022-01-07 PROCEDURE — 250N000011 HC RX IP 250 OP 636: Performed by: EMERGENCY MEDICINE

## 2022-01-07 PROCEDURE — 96360 HYDRATION IV INFUSION INIT: CPT

## 2022-01-07 RX ORDER — EPINEPHRINE 1 MG/ML
0.3 INJECTION, SOLUTION INTRAMUSCULAR; SUBCUTANEOUS EVERY 5 MIN PRN
Status: CANCELLED | OUTPATIENT
Start: 2022-01-13

## 2022-01-07 RX ORDER — ALBUTEROL SULFATE 0.83 MG/ML
2.5 SOLUTION RESPIRATORY (INHALATION)
Status: CANCELLED | OUTPATIENT
Start: 2022-01-13

## 2022-01-07 RX ORDER — POTASSIUM CHLORIDE 29.8 MG/ML
20 INJECTION INTRAVENOUS ONCE
Status: CANCELLED
Start: 2022-01-13 | End: 2022-01-13

## 2022-01-07 RX ORDER — METHYLPREDNISOLONE SODIUM SUCCINATE 125 MG/2ML
125 INJECTION, POWDER, LYOPHILIZED, FOR SOLUTION INTRAMUSCULAR; INTRAVENOUS
Status: CANCELLED
Start: 2022-01-13

## 2022-01-07 RX ORDER — ALBUTEROL SULFATE 90 UG/1
1-2 AEROSOL, METERED RESPIRATORY (INHALATION)
Status: CANCELLED
Start: 2022-01-13

## 2022-01-07 RX ORDER — HEPARIN SODIUM,PORCINE 10 UNIT/ML
5 VIAL (ML) INTRAVENOUS
Status: CANCELLED | OUTPATIENT
Start: 2022-01-13

## 2022-01-07 RX ORDER — HEPARIN SODIUM (PORCINE) LOCK FLUSH IV SOLN 100 UNIT/ML 100 UNIT/ML
5 SOLUTION INTRAVENOUS
Status: CANCELLED | OUTPATIENT
Start: 2022-01-13

## 2022-01-07 RX ORDER — MEPERIDINE HYDROCHLORIDE 25 MG/ML
25 INJECTION INTRAMUSCULAR; INTRAVENOUS; SUBCUTANEOUS EVERY 30 MIN PRN
Status: CANCELLED | OUTPATIENT
Start: 2022-01-13

## 2022-01-07 RX ORDER — POTASSIUM CHLORIDE 29.8 MG/ML
20 INJECTION INTRAVENOUS ONCE
Status: CANCELLED
Start: 2022-01-07 | End: 2022-01-07

## 2022-01-07 RX ORDER — NALOXONE HYDROCHLORIDE 0.4 MG/ML
0.2 INJECTION, SOLUTION INTRAMUSCULAR; INTRAVENOUS; SUBCUTANEOUS
Status: CANCELLED | OUTPATIENT
Start: 2022-01-13

## 2022-01-07 RX ORDER — DIPHENHYDRAMINE HYDROCHLORIDE 50 MG/ML
50 INJECTION INTRAMUSCULAR; INTRAVENOUS
Status: CANCELLED
Start: 2022-01-13

## 2022-01-07 RX ADMIN — POTASSIUM CHLORIDE 10 MEQ: 7.46 INJECTION, SOLUTION INTRAVENOUS at 00:02

## 2022-01-07 RX ADMIN — POTASSIUM CHLORIDE 10 MEQ: 7.46 INJECTION, SOLUTION INTRAVENOUS at 01:30

## 2022-01-07 RX ADMIN — POTASSIUM CHLORIDE 10 MEQ: 7.46 INJECTION, SOLUTION INTRAVENOUS at 03:07

## 2022-01-07 RX ADMIN — SODIUM CHLORIDE 1000 ML: 9 INJECTION, SOLUTION INTRAVENOUS at 13:52

## 2022-01-07 NOTE — ED PROVIDER NOTES
"  History   Chief Complaint:  Abnormal Labs and Diarrhea    The history is provided by the patient.     Marlo Vee is a 71 year old male with history of hypertension, hyperlipidemia, CAD, MI, and ESRD, who presents with concern over resulted laboratory work today showing hypokalemia. The patient was seen for a blood draw today, with results returning later this evening and showing low potassium levels. He was called by his primary care provider who updated him regarding results and advised him to be seen in the ED today, prompting his presentation at this time. The patient has had chronic diarrhea over the past 6 months, having been infected with C. Difficile. Due to this, he has \"lost a lot of weight\" and has been followed closely by his primary care provider. He is immunosuppressed and has history of kidney transplant. Due to his history of ESRD and other past kidney problems, the patient's creatinine levels are regularly assessed and have been overall \"good\" over the past year, notably averaging 1.1. He recently switched from Mycophenolic Acid to current Azathioprine and Prograf. He does not take prednisone.    Review of Systems   Gastrointestinal: Positive for diarrhea (chronic).   All other systems reviewed and are negative.    Allergies:  No known drug allergies    Medications:  Aspirin  Azathioprine  Atorvastatin  Carvedilol  Ferrous sulfate  Ondansetron  Prograf  Sertraline  Sildenafil  Sodium bicarbonate  Cholecalciferol  Tacrolimus  Mycophenolate    Past Medical History:     Anemia  ESRD  Anxiety  C. Difficile infection  Dyslipidemia  Myocardial infarction  Hypertension  Membranous glomerulonephritis  Peptic ulcer disease  Hyperparathyroidism  Stented coronary artery  Vitamin D deficiency  CAD  Kidney transplant recipient  Immunosuppression  Erectile dysfunction  Ischemic cardiomyopathy  Membranous nephrosis  Insomnia    Past Surgical History:    Cystoscopy, remove stent, combined, right  Renal " biopsy right  Open reduction internal fixation hip nailing, right  Right upper extremity AV fistula  Kidney transplant  Vascular surgery    Family History:    Breast cancer  Colorectal cancer  CAD  Hypertension  Pancreatic cancer  Myocardial infarction    Social History:  The patient presented alone.  The patient arrived in a private vehicle.    Physical Exam     Patient Vitals for the past 24 hrs:   BP Temp Temp src Pulse Resp SpO2   01/06/22 1659 134/78 97.5  F (36.4  C) Temporal 61 18 100 %     Physical Exam  General/Appearance: appears stated age, well-groomed, appears comfortable  Eyes: EOMI, no scleral injection, no icterus  ENT: MMM  Neck: supple, nl ROM, no stiffness  Cardiovascular: RRR, nl S1S2, no m/r/g, 2+ pulses in all 4 extremities, cap refill <2sec  Respiratory: CTAB, good air movement throughout, no wheezes/rhonchi/rales, no increased WOB, no retractions  MSK: MILLER, good tone, no bony abnormality  Skin: warm and well-perfused, no rash, no edema, no ecchymosis, nl turgor  Neuro: GCS 15, alert and oriented, no gross focal neuro deficits  Psych: interacts appropriately  Heme: no petechia, no purpura, no active bleeding    Emergency Department Course     Laboratory:  Labs Ordered and Resulted from Time of ED Arrival to Time of ED Departure   BASIC METABOLIC PANEL - Abnormal       Result Value    Sodium 142      Potassium 2.5 (*)     Chloride 113 (*)     Carbon Dioxide (CO2) 25      Anion Gap 4      Urea Nitrogen 21      Creatinine 1.85 (*)     Calcium 8.6      Glucose 84      GFR Estimate 39 (*)    MAGNESIUM - Normal    Magnesium 1.6       Emergency Department Course:    Reviewed:  I reviewed nursing notes, vitals, past medical history and Care Everywhere.    Assessments:  2247 I obtained history and examined the patient as noted above.     Interventions:  2232 Klor-Con M 40 mEq PO  2231 Magnesium sulfate 2 g IV  2231 Potassium chloride 10 mEq IV    Disposition:  Care of the patient was transferred to my  colleague Dr. Curtis pending infusion completion.     Impression & Plan     Medical Decision Making:  This patient is a 71-year-old male with history of kidney transplant who presents today with hypokalemia.  He has been having diarrhea over the past couple months which is how I suspect he got to the point where he is today with a potassium of 2.5.  Magnesium is also low.  My recommendation to him was to admit him either to the hospital or via Stock to the hospital to continue potassium replacement.  I explained the importance of potassium to cardiac stability.  He states he is got a full day tomorrow including an infusion at 3 where they repeat labs.  He does not wish to be admitted.  He is willing to stay for several bumps of KCl via IV, magnesium via IV, as well as K. Dur orally.  He does state that they check his labs tomorrow by her to the infusion so, even though he is unwilling to get admitted, at least he has close follow-up to make sure that his potassium is not dropping.  He understands that there is risk by not being admitted and is willing to accept the increased morbidity and mortality with this.  After the 4 bumps of KCl have been transfused he will be discharged at his request.    Diagnosis:    ICD-10-CM    1. Hypokalemia  E87.6    2. Hypomagnesemia  E83.42      Discharge Medications:  New Prescriptions    POTASSIUM CHLORIDE ER (KLOR-CON M) 20 MEQ CR TABLET    Take 1 tablet (20 mEq) by mouth 2 times daily     Scribe Disclosure:  I, Rossanadada Vazquez, am serving as a scribe at 10:47 PM on 1/6/2022 to document services personally performed by Sarah Singleton MD based on my observations and the provider's statements to me.       Sarah Singleton MD  01/07/22 0026

## 2022-01-07 NOTE — LETTER
"    1/7/2022         RE: Marlo Vee  [address] [facility] [address]        Dear Colleague,    Thank you for referring your patient, Marlo Vee, to the [facility]. Please see a copy of my visit note below.    Chief Complaint   Patient presents with     Infusion     IV fluids, labs     Infusion Nursing Note:  Marlo Vee presents today for IV fluids, labs.    Patient seen by provider today: No   present during visit today: Not Applicable.    Note: Labs drawn prior to infusion. Potassium 3.8 today.  BMP drawn post infusion.    Per Dr. Moya, give 1 L NS and recheck BMP after fluids. 1 L NS tomorrow per therapy plan.     Intravenous Access:  Peripheral IV placed.    Treatment Conditions:  Not Applicable.      Post Infusion Assessment:  Patient tolerated infusion without incident.  Site patent and intact, free from redness, edema or discomfort.  No evidence of extravasations.  Access discontinued per protocol.       Discharge Plan:   AVS to patient via MYCHART.  Patient will return tomorrow for next appointment.   Patient discharged in stable condition accompanied by: self.  Departure Mode: Ambulatory.    Administrations This Visit     0.9% sodium chloride BOLUS     Admin Date  01/07/2022 Action  New Bag Dose  1,000 mL Route  Intravenous Administered By  Zhane Chavira RN                Vital signs:  Temp: 97.7  F (36.5  C) Temp src: Oral BP: 129/81 Pulse: 68   Resp: 18 SpO2: 100 % O2 Device: None (Room air)        Estimated body mass index is 20.59 kg/m  as calculated from the following:    Height as of 9/24/21: 1.778 m (5' 10\").    Weight as of 9/24/21: 65.1 kg (143 lb 8 oz).                            Again, thank you for allowing me to participate in the care of your patient.        Sincerely,        [facility]    "

## 2022-01-07 NOTE — LETTER
Date:January 8, 2022      Provider requested that no letter be sent. Do not send.       [facility]

## 2022-01-07 NOTE — TELEPHONE ENCOUNTER
ISSUE:  Seen in ATC 1/7/2022 for LR and potassium replacement.    Serum calcium after infusion = 8.0. Level is not far off patient's baseline.    PLAN:  Confirm no s/s of low calcium levels -   Feeling of heart 'palpitations,' tingling in fingers, or abdominal cramps    OUTCOME:  RNCC LVM for John. Call SOT office back if able, otherwise follow up with ATC as planned on 1/8.    1/17/2022: serum calcium stable.

## 2022-01-07 NOTE — TELEPHONE ENCOUNTER
John paged triage nurse on-call. States he has been in the ER for over 4 hours, thinks it may even be longer until he is seen and is wondering if we could see him in the infusion center tomorrow for low potassium. Paged Dr Varela for recommendations and he felt due to very low level and cardiac history that John should be evaluated in ER. Updated John and he verbalized understanding.

## 2022-01-07 NOTE — PROGRESS NOTES
Patient seen at  [facility] ED for low K+ last night with no improvement after measures.   Scheduled for IVF today at 3 in [facility]. Discussed plan with Dr. Bong Yanes. Patient to come in at noon for labs (BMP and Mg). If potassium is under 3, patient to receive 1 L LR and 20 meq of potassium over 2 hours and recheck BMP after fluids. Charge contacted patient who stated he may or may not be able to make it. Orders placed.

## 2022-01-07 NOTE — ED NOTES
DATE:  1/6/2022   TIME OF RECEIPT FROM LAB:  1800  LAB TEST:  potassium  LAB VALUE:  2.5  RESULTS GIVEN WITH READ-BACK TO (PROVIDER):  renetta  TIME LAB VALUE REPORTED TO PROVIDER:   2190

## 2022-01-07 NOTE — PROGRESS NOTES
"Chief Complaint   Patient presents with     Infusion     IV fluids, labs     Infusion Nursing Note:  Marlo Vee presents today for IV fluids, labs.    Patient seen by provider today: No   present during visit today: Not Applicable.    Note: Labs drawn prior to infusion. Potassium 3.8 today.  BMP drawn post infusion.    Per Dr. Moya, give 1 L NS and recheck BMP after fluids. 1 L NS tomorrow per therapy plan.     Intravenous Access:  Peripheral IV placed.    Treatment Conditions:  Not Applicable.      Post Infusion Assessment:  Patient tolerated infusion without incident.  Site patent and intact, free from redness, edema or discomfort.  No evidence of extravasations.  Access discontinued per protocol.       Discharge Plan:   AVS to patient via MYCHART.  Patient will return tomorrow for next appointment.   Patient discharged in stable condition accompanied by: self.  Departure Mode: Ambulatory.    Administrations This Visit     0.9% sodium chloride BOLUS     Admin Date  01/07/2022 Action  New Bag Dose  1,000 mL Route  Intravenous Administered By  Zhane Chavira RN                Vital signs:  Temp: 97.7  F (36.5  C) Temp src: Oral BP: 129/81 Pulse: 68   Resp: 18 SpO2: 100 % O2 Device: None (Room air)        Estimated body mass index is 20.59 kg/m  as calculated from the following:    Height as of 9/24/21: 1.778 m (5' 10\").    Weight as of 9/24/21: 65.1 kg (143 lb 8 oz).                        "

## 2022-01-08 ENCOUNTER — INFUSION THERAPY VISIT (OUTPATIENT)
Dept: INFUSION THERAPY | Facility: CLINIC | Age: 72
End: 2022-01-08
Attending: INTERNAL MEDICINE
Payer: MEDICARE

## 2022-01-08 VITALS
RESPIRATION RATE: 16 BRPM | SYSTOLIC BLOOD PRESSURE: 129 MMHG | TEMPERATURE: 97.4 F | HEART RATE: 66 BPM | OXYGEN SATURATION: 100 % | DIASTOLIC BLOOD PRESSURE: 71 MMHG

## 2022-01-08 DIAGNOSIS — R19.7 DIARRHEA OF PRESUMED INFECTIOUS ORIGIN: Primary | ICD-10-CM

## 2022-01-08 DIAGNOSIS — Z94.0 KIDNEY REPLACED BY TRANSPLANT: ICD-10-CM

## 2022-01-08 PROBLEM — Z29.89 NEED FOR PNEUMOCYSTIS PROPHYLAXIS: Status: ACTIVE | Noted: 2022-01-08

## 2022-01-08 LAB
ANION GAP SERPL CALCULATED.3IONS-SCNC: 8 MMOL/L (ref 3–14)
BUN SERPL-MCNC: 19 MG/DL (ref 7–30)
CALCIUM SERPL-MCNC: 7.7 MG/DL (ref 8.5–10.1)
CHLORIDE BLD-SCNC: 116 MMOL/L (ref 94–109)
CO2 SERPL-SCNC: 21 MMOL/L (ref 20–32)
CREAT SERPL-MCNC: 1.77 MG/DL (ref 0.66–1.25)
GFR SERPL CREATININE-BSD FRML MDRD: 41 ML/MIN/1.73M2
GLUCOSE BLD-MCNC: 84 MG/DL (ref 70–99)
POTASSIUM BLD-SCNC: 3.7 MMOL/L (ref 3.4–5.3)
SODIUM SERPL-SCNC: 145 MMOL/L (ref 133–144)

## 2022-01-08 PROCEDURE — 258N000003 HC RX IP 258 OP 636: Performed by: INTERNAL MEDICINE

## 2022-01-08 PROCEDURE — 96360 HYDRATION IV INFUSION INIT: CPT

## 2022-01-08 PROCEDURE — 80048 BASIC METABOLIC PNL TOTAL CA: CPT

## 2022-01-08 PROCEDURE — 36415 COLL VENOUS BLD VENIPUNCTURE: CPT

## 2022-01-08 RX ORDER — NALOXONE HYDROCHLORIDE 0.4 MG/ML
0.2 INJECTION, SOLUTION INTRAMUSCULAR; INTRAVENOUS; SUBCUTANEOUS
Status: CANCELLED | OUTPATIENT
Start: 2022-01-13

## 2022-01-08 RX ORDER — ALBUTEROL SULFATE 0.83 MG/ML
2.5 SOLUTION RESPIRATORY (INHALATION)
Status: CANCELLED | OUTPATIENT
Start: 2022-01-13

## 2022-01-08 RX ORDER — HEPARIN SODIUM (PORCINE) LOCK FLUSH IV SOLN 100 UNIT/ML 100 UNIT/ML
5 SOLUTION INTRAVENOUS
Status: CANCELLED | OUTPATIENT
Start: 2022-01-13

## 2022-01-08 RX ORDER — HEPARIN SODIUM,PORCINE 10 UNIT/ML
5 VIAL (ML) INTRAVENOUS
Status: CANCELLED | OUTPATIENT
Start: 2022-01-13

## 2022-01-08 RX ORDER — MEPERIDINE HYDROCHLORIDE 25 MG/ML
25 INJECTION INTRAMUSCULAR; INTRAVENOUS; SUBCUTANEOUS EVERY 30 MIN PRN
Status: CANCELLED | OUTPATIENT
Start: 2022-01-13

## 2022-01-08 RX ORDER — DIPHENHYDRAMINE HYDROCHLORIDE 50 MG/ML
50 INJECTION INTRAMUSCULAR; INTRAVENOUS
Status: CANCELLED
Start: 2022-01-13

## 2022-01-08 RX ORDER — METHYLPREDNISOLONE SODIUM SUCCINATE 125 MG/2ML
125 INJECTION, POWDER, LYOPHILIZED, FOR SOLUTION INTRAMUSCULAR; INTRAVENOUS
Status: CANCELLED
Start: 2022-01-13

## 2022-01-08 RX ORDER — ALBUTEROL SULFATE 90 UG/1
1-2 AEROSOL, METERED RESPIRATORY (INHALATION)
Status: CANCELLED
Start: 2022-01-13

## 2022-01-08 RX ORDER — EPINEPHRINE 1 MG/ML
0.3 INJECTION, SOLUTION INTRAMUSCULAR; SUBCUTANEOUS EVERY 5 MIN PRN
Status: CANCELLED | OUTPATIENT
Start: 2022-01-13

## 2022-01-08 RX ADMIN — SODIUM CHLORIDE 1000 ML: 9 INJECTION, SOLUTION INTRAVENOUS at 11:25

## 2022-01-08 NOTE — PROGRESS NOTES
Nursing Note  Marlo Vee presents today [facility] for:   Chief Complaint   Patient presents with     Infusion     IV Fluids     During [facility]'s [facility] appointment, orders from Dr LONNY Varela were completed.  Frequency: 3 times weekly PRN    Progress note:  Patient identification verified by name and date of birth.  Assessment completed.  Vitals recorded in Doc Flowsheets.  Patient was provided with education regarding medication/procedure and possible side effects.  Patient verbalized understanding.     present during visit today: Not Applicable.    Treatment Conditions: Non-applicable.    Premedications: were not ordered.    Drug Waste Record: No    Infusion length and rate:  infusion given over approximately 1 hours    Labs: were drawn per orders following infusion.     Vascular access: peripheral IV placed today.    Is the next appt scheduled? Yes  Asymptomatic COVID test completed? No    Post Infusion Assessment:  Patient tolerated infusion without incident.  Blood return noted pre and post infusion.  Site patent and intact, free from redness, edema or discomfort.  No evidence of extravasations.  Access discontinued per protocol.     Discharge Plan:   Follow up plan of care with: ongoing infusions at [facility].  Discharge instructions were reviewed with patient.  Patient/representative verbalized understanding of discharge instructions and all questions answered.  Patient discharged from [facility] in stable condition.  Patient declined AVS.    Stephanie Hull, HEATHER    Administrations This Visit     0.9% sodium chloride BOLUS     Admin Date  01/08/2022 Action  New Bag Dose  1,000 mL Route  Intravenous Administered By  Myra Fernandes, RN                /71   Pulse 66   Temp 97.4  F (36.3  C) (Oral)   Resp 16   SpO2 100%

## 2022-01-08 NOTE — LETTER
1/8/2022         RE: Marlo Vee  [address] [facility] [place], [place] [address]        Dear Colleague,    Thank you for referring your patient, Marlo Vee, to the [facility]. Please see a copy of my visit note below.    Nursing Note  Marlo Vee presents [facility] for:   Chief Complaint   Patient presents with     Infusion     IV Fluids     During [facility]'s [facility] appointment, orders from Dr LONNY Vareal were completed.  Frequency: 3 times weekly PRN    Progress note:  Patient identification verified by name and date of birth.  Assessment completed.  Vitals recorded in Doc Flowsheets.  Patient was provided with education regarding medication/procedure and possible side effects.  Patient verbalized understanding.     present during visit today: Not Applicable.    Treatment Conditions: Non-applicable.    Premedications: were not ordered.    Drug Waste Record: No    Infusion length and rate:  infusion given over approximately 1 hours    Labs: were drawn per orders following infusion.     Vascular access: peripheral IV placed today.    Is the next appt scheduled? Yes  Asymptomatic COVID test completed? No    Post Infusion Assessment:  Patient tolerated infusion without incident.  Blood return noted pre and post infusion.  Site patent and intact, free from redness, edema or discomfort.  No evidence of extravasations.  Access discontinued per protocol.     Discharge Plan:   Follow up plan of care with: ongoing infusions at [facility].  Discharge instructions were reviewed with patient.  Patient/representative verbalized understanding of discharge instructions and all questions answered.  Patient discharged from [facility] in stable condition.  Patient declined AVS.    Stephanie Hull RN    Administrations This Visit      0.9% sodium chloride BOLUS     Admin Date  01/08/2022 Action  New Bag Dose  1,000 mL Route  Intravenous Administered By  Myra Fernandes, RN                /71   Pulse 66   Temp 97.4  F (36.3  C) (Oral)   Resp 16   SpO2 100%           Again, thank you for allowing me to participate in the care of your patient.      Sincerely,    [facility]

## 2022-01-13 ENCOUNTER — INFUSION THERAPY VISIT (OUTPATIENT)
Dept: INFUSION THERAPY | Facility: CLINIC | Age: 72
End: 2022-01-13
Attending: INTERNAL MEDICINE
Payer: MEDICARE

## 2022-01-13 VITALS
OXYGEN SATURATION: 100 % | TEMPERATURE: 97.8 F | SYSTOLIC BLOOD PRESSURE: 122 MMHG | HEART RATE: 66 BPM | RESPIRATION RATE: 16 BRPM | DIASTOLIC BLOOD PRESSURE: 67 MMHG

## 2022-01-13 DIAGNOSIS — R19.7 DIARRHEA OF PRESUMED INFECTIOUS ORIGIN: Primary | ICD-10-CM

## 2022-01-13 DIAGNOSIS — Z94.0 KIDNEY REPLACED BY TRANSPLANT: ICD-10-CM

## 2022-01-13 LAB
ANION GAP SERPL CALCULATED.3IONS-SCNC: 5 MMOL/L (ref 3–14)
BUN SERPL-MCNC: 37 MG/DL (ref 7–30)
CALCIUM SERPL-MCNC: 8.1 MG/DL (ref 8.5–10.1)
CHLORIDE BLD-SCNC: 113 MMOL/L (ref 94–109)
CO2 SERPL-SCNC: 24 MMOL/L (ref 20–32)
CREAT SERPL-MCNC: 2.63 MG/DL (ref 0.66–1.25)
ERYTHROCYTE [DISTWIDTH] IN BLOOD BY AUTOMATED COUNT: 13.9 % (ref 10–15)
GFR SERPL CREATININE-BSD FRML MDRD: 26 ML/MIN/1.73M2
GLUCOSE BLD-MCNC: 95 MG/DL (ref 70–99)
HCT VFR BLD AUTO: 27.6 % (ref 40–53)
HGB BLD-MCNC: 8 G/DL (ref 13.3–17.7)
MCH RBC QN AUTO: 27.7 PG (ref 26.5–33)
MCHC RBC AUTO-ENTMCNC: 29 G/DL (ref 31.5–36.5)
MCV RBC AUTO: 96 FL (ref 78–100)
PLATELET # BLD AUTO: 170 10E3/UL (ref 150–450)
POTASSIUM BLD-SCNC: 5.8 MMOL/L (ref 3.4–5.3)
RBC # BLD AUTO: 2.89 10E6/UL (ref 4.4–5.9)
SODIUM SERPL-SCNC: 142 MMOL/L (ref 133–144)
WBC # BLD AUTO: 4.2 10E3/UL (ref 4–11)

## 2022-01-13 PROCEDURE — 85027 COMPLETE CBC AUTOMATED: CPT

## 2022-01-13 PROCEDURE — 82310 ASSAY OF CALCIUM: CPT

## 2022-01-13 PROCEDURE — 258N000003 HC RX IP 258 OP 636: Performed by: INTERNAL MEDICINE

## 2022-01-13 PROCEDURE — 96360 HYDRATION IV INFUSION INIT: CPT

## 2022-01-13 PROCEDURE — 36415 COLL VENOUS BLD VENIPUNCTURE: CPT

## 2022-01-13 RX ORDER — METHYLPREDNISOLONE SODIUM SUCCINATE 125 MG/2ML
125 INJECTION, POWDER, LYOPHILIZED, FOR SOLUTION INTRAMUSCULAR; INTRAVENOUS
Status: CANCELLED
Start: 2022-01-20

## 2022-01-13 RX ORDER — DIPHENHYDRAMINE HYDROCHLORIDE 50 MG/ML
50 INJECTION INTRAMUSCULAR; INTRAVENOUS
Status: CANCELLED
Start: 2022-01-20

## 2022-01-13 RX ORDER — ALBUTEROL SULFATE 90 UG/1
1-2 AEROSOL, METERED RESPIRATORY (INHALATION)
Status: CANCELLED
Start: 2022-01-20

## 2022-01-13 RX ORDER — EPINEPHRINE 1 MG/ML
0.3 INJECTION, SOLUTION INTRAMUSCULAR; SUBCUTANEOUS EVERY 5 MIN PRN
Status: CANCELLED | OUTPATIENT
Start: 2022-01-20

## 2022-01-13 RX ORDER — MEPERIDINE HYDROCHLORIDE 25 MG/ML
25 INJECTION INTRAMUSCULAR; INTRAVENOUS; SUBCUTANEOUS EVERY 30 MIN PRN
Status: CANCELLED | OUTPATIENT
Start: 2022-01-20

## 2022-01-13 RX ORDER — NALOXONE HYDROCHLORIDE 0.4 MG/ML
0.2 INJECTION, SOLUTION INTRAMUSCULAR; INTRAVENOUS; SUBCUTANEOUS
Status: CANCELLED | OUTPATIENT
Start: 2022-01-20

## 2022-01-13 RX ORDER — ALBUTEROL SULFATE 0.83 MG/ML
2.5 SOLUTION RESPIRATORY (INHALATION)
Status: CANCELLED | OUTPATIENT
Start: 2022-01-20

## 2022-01-13 RX ORDER — HEPARIN SODIUM,PORCINE 10 UNIT/ML
5 VIAL (ML) INTRAVENOUS
Status: CANCELLED | OUTPATIENT
Start: 2022-01-20

## 2022-01-13 RX ORDER — HEPARIN SODIUM (PORCINE) LOCK FLUSH IV SOLN 100 UNIT/ML 100 UNIT/ML
5 SOLUTION INTRAVENOUS
Status: CANCELLED | OUTPATIENT
Start: 2022-01-20

## 2022-01-13 RX ADMIN — SODIUM CHLORIDE 1000 ML: 9 INJECTION, SOLUTION INTRAVENOUS at 16:20

## 2022-01-13 NOTE — LETTER
Date:January 17, 2022      Patient was self referred, no letter generated. Do not send.        [facility] Information

## 2022-01-13 NOTE — PROGRESS NOTES
Nursing Note  Marlo Vee presents today [facility] for:   Chief Complaint   Patient presents with     Infusion     fluids     During [facility]'s [facility] appointment, orders from Dr LONNY Varela were completed.  Frequency: 3 times weekly PRN    Progress note:  Patient identification verified by name and date of birth.  Assessment completed.  Vitals recorded in Doc Flowsheets.  Patient was provided with education regarding medication/procedure and possible side effects.  Patient verbalized understanding.     present during visit today: Not Applicable.    Treatment Conditions: Non-applicable.    Premedications: were not ordered.    Drug Waste Record: No    Infusion length and rate:  infusion given over approximately 1 hours    Labs: were drawn per orders following infusion.     Vascular access: peripheral IV placed today.    Is the next appt scheduled? Yes  Asymptomatic COVID test completed? No    Post Infusion Assessment:  Patient tolerated infusion without incident.  Blood return noted pre and post infusion.  Site patent and intact, free from redness, edema or discomfort.  No evidence of extravasations.  Access discontinued per protocol.     Discharge Plan:   Follow up plan of care with: ongoing infusions at [facility].  Discharge instructions were reviewed with patient.  Patient/representative verbalized understanding of discharge instructions and all questions answered.  Patient discharged from [facility] in stable condition.  Patient declined AVS.  Elisabeth High RN    Administrations This Visit     0.9% sodium chloride BOLUS     Admin Date  01/13/2022 Action  Started Dose  1,000 mL Route  Intravenous Administered By  Elisabeth High RN                /67   Pulse 66   Temp 97.8  F (36.6  C) (Oral)   Resp 16   SpO2 100%

## 2022-01-13 NOTE — LETTER
1/13/2022         RE: Marlo Vee  [address] [facility] [place] [address]        Dear Colleague,    Thank you for referring your patient, Marlo Vee, to the [facility]. Please see a copy of my visit note below.    Nursing Note  Marlo Vee presents [facility] for:   Chief Complaint   Patient presents with     Infusion     fluids     During [facility]'s [facility] appointment, orders from Dr LONNY Varela were completed.  Frequency: 3 times weekly PRN    Progress note:  Patient identification verified by name and date of birth.  Assessment completed.  Vitals recorded in Doc Flowsheets.  Patient was provided with education regarding medication/procedure and possible side effects.  Patient verbalized understanding.     present during visit today: Not Applicable.    Treatment Conditions: Non-applicable.    Premedications: were not ordered.    Drug Waste Record: No    Infusion length and rate:  infusion given over approximately 1 hours    Labs: were drawn per orders following infusion.     Vascular access: peripheral IV placed today.    Is the next appt scheduled? Yes  Asymptomatic COVID test completed? No    Post Infusion Assessment:  Patient tolerated infusion without incident.  Blood return noted pre and post infusion.  Site patent and intact, free from redness, edema or discomfort.  No evidence of extravasations.  Access discontinued per protocol.     Discharge Plan:   Follow up plan of care with: ongoing infusions at [facility].  Discharge instructions were reviewed with patient.  Patient/representative verbalized understanding of discharge instructions and all questions answered.  Patient discharged from [facility] in stable condition.  Patient declined AVS.  Elisabeth High RN    Administrations This Visit      0.9% sodium chloride BOLUS     Admin Date  01/13/2022 Action  Started Dose  1,000 mL Route  Intravenous Administered By  Elisabeth High RN                /67   Pulse 66   Temp 97.8  F (36.6  C) (Oral)   Resp 16   SpO2 100%           Again, thank you for allowing me to participate in the care of your patient.        Sincerely,        [facility]

## 2022-01-14 ENCOUNTER — TELEPHONE (OUTPATIENT)
Dept: TRANSPLANT | Facility: CLINIC | Age: 72
End: 2022-01-14
Payer: MEDICARE

## 2022-01-14 DIAGNOSIS — Z94.0 KIDNEY TRANSPLANTED: Primary | ICD-10-CM

## 2022-01-14 NOTE — TELEPHONE ENCOUNTER
ISSUE  Creatinine elevated at 2.63  K+ 5.8, received IV K+ and Mag replacement in ER on 1/6, was started on potassium chloride 20mEq PO BID at that time.    Pt called stating he cancelled today's scheduled IVF infusion d/t weather/poor driving conditions.       PLAN  Return call to John. He denies s/s hyperkalemia - chest pain, palpitations, n/v, cramping or muscle weakness. Instructed him to hold potassium chloride tabs for now pending txp nephrologist's recommendations.   Pt reports slight decrease in fluid intake, no new issues/concerns aside from ongoing diarrhea.     OUTCOME:  [facility]ched[facility] in [facility] at 0800 tomorrow and will repeat BMP after infusion. Message to Dr. Varela re: results.  Pt will go for infusion tomorrow and repeat BMP after. He will hold KCl for now and call office or seek medical attention for chest pain/palpitations.

## 2022-01-14 NOTE — TELEPHONE ENCOUNTER
Patient Call: General    Reason for call: Patient has infusion schedule at 3 pm today and is wondering can he cancel it due to weather.     Call back needed? Yes  Return Call Needed  Same as documented in contacts section  When to return call?: Now: [place] RN first, if no answer Page

## 2022-01-15 ENCOUNTER — INFUSION THERAPY VISIT (OUTPATIENT)
Dept: INFUSION THERAPY | Facility: CLINIC | Age: 72
End: 2022-01-15
Attending: INTERNAL MEDICINE
Payer: MEDICARE

## 2022-01-15 VITALS
HEART RATE: 64 BPM | OXYGEN SATURATION: 100 % | TEMPERATURE: 97.6 F | SYSTOLIC BLOOD PRESSURE: 138 MMHG | RESPIRATION RATE: 16 BRPM | DIASTOLIC BLOOD PRESSURE: 87 MMHG

## 2022-01-15 DIAGNOSIS — R19.7 DIARRHEA OF PRESUMED INFECTIOUS ORIGIN: Primary | ICD-10-CM

## 2022-01-15 DIAGNOSIS — Z94.0 KIDNEY REPLACED BY TRANSPLANT: ICD-10-CM

## 2022-01-15 LAB
ANION GAP SERPL CALCULATED.3IONS-SCNC: 5 MMOL/L (ref 3–14)
BUN SERPL-MCNC: 34 MG/DL (ref 7–30)
CALCIUM SERPL-MCNC: 8.2 MG/DL (ref 8.5–10.1)
CHLORIDE BLD-SCNC: 113 MMOL/L (ref 94–109)
CO2 SERPL-SCNC: 26 MMOL/L (ref 20–32)
CREAT SERPL-MCNC: 2.4 MG/DL (ref 0.66–1.25)
GFR SERPL CREATININE-BSD FRML MDRD: 28 ML/MIN/1.73M2
GLUCOSE BLD-MCNC: 94 MG/DL (ref 70–99)
POTASSIUM BLD-SCNC: 4.4 MMOL/L (ref 3.4–5.3)
SODIUM SERPL-SCNC: 144 MMOL/L (ref 133–144)

## 2022-01-15 PROCEDURE — 96360 HYDRATION IV INFUSION INIT: CPT

## 2022-01-15 PROCEDURE — 258N000003 HC RX IP 258 OP 636: Performed by: INTERNAL MEDICINE

## 2022-01-15 PROCEDURE — 36415 COLL VENOUS BLD VENIPUNCTURE: CPT

## 2022-01-15 PROCEDURE — 82310 ASSAY OF CALCIUM: CPT

## 2022-01-15 RX ORDER — DIPHENHYDRAMINE HYDROCHLORIDE 50 MG/ML
50 INJECTION INTRAMUSCULAR; INTRAVENOUS
Status: CANCELLED
Start: 2022-01-20

## 2022-01-15 RX ORDER — ALBUTEROL SULFATE 0.83 MG/ML
2.5 SOLUTION RESPIRATORY (INHALATION)
Status: CANCELLED | OUTPATIENT
Start: 2022-01-20

## 2022-01-15 RX ORDER — HEPARIN SODIUM,PORCINE 10 UNIT/ML
5 VIAL (ML) INTRAVENOUS
Status: CANCELLED | OUTPATIENT
Start: 2022-01-20

## 2022-01-15 RX ORDER — ALBUTEROL SULFATE 90 UG/1
1-2 AEROSOL, METERED RESPIRATORY (INHALATION)
Status: CANCELLED
Start: 2022-01-20

## 2022-01-15 RX ORDER — EPINEPHRINE 1 MG/ML
0.3 INJECTION, SOLUTION INTRAMUSCULAR; SUBCUTANEOUS EVERY 5 MIN PRN
Status: CANCELLED | OUTPATIENT
Start: 2022-01-20

## 2022-01-15 RX ORDER — METHYLPREDNISOLONE SODIUM SUCCINATE 125 MG/2ML
125 INJECTION, POWDER, LYOPHILIZED, FOR SOLUTION INTRAMUSCULAR; INTRAVENOUS
Status: CANCELLED
Start: 2022-01-20

## 2022-01-15 RX ORDER — HEPARIN SODIUM (PORCINE) LOCK FLUSH IV SOLN 100 UNIT/ML 100 UNIT/ML
5 SOLUTION INTRAVENOUS
Status: CANCELLED | OUTPATIENT
Start: 2022-01-20

## 2022-01-15 RX ORDER — NALOXONE HYDROCHLORIDE 0.4 MG/ML
0.2 INJECTION, SOLUTION INTRAMUSCULAR; INTRAVENOUS; SUBCUTANEOUS
Status: CANCELLED | OUTPATIENT
Start: 2022-01-20

## 2022-01-15 RX ORDER — MEPERIDINE HYDROCHLORIDE 25 MG/ML
25 INJECTION INTRAMUSCULAR; INTRAVENOUS; SUBCUTANEOUS EVERY 30 MIN PRN
Status: CANCELLED | OUTPATIENT
Start: 2022-01-20

## 2022-01-15 RX ADMIN — SODIUM CHLORIDE 1000 ML: 9 INJECTION, SOLUTION INTRAVENOUS at 08:17

## 2022-01-15 NOTE — PROGRESS NOTES
Nursing Note  Marlo Vee presents [facility] for:   Chief Complaint   Patient presents with     Infusion     During today's [facility] appointment, orders from Dr LONNY Varela were completed.  Frequency: 3 times weekly PRN    Progress note:  Patient identification verified by name and date of birth.  Assessment completed.  Vitals recorded in Doc Flowsheets.  Patient was provided with education regarding medication/procedure and possible side effects.  Patient verbalized understanding.     present during visit today: Not Applicable.    Treatment Conditions: Non-applicable.    Premedications: were not ordered.    Drug Waste Record: No    Infusion length and rate:  infusion given over approximately 1 hours    Labs: were drawn per orders following infusion.     Vascular access: peripheral IV placed today.    Is the next appt scheduled? Yes  Asymptomatic COVID test completed? No    Post Infusion Assessment:  Patient tolerated infusion without incident.  Blood return noted pre and post infusion.  Site patent and intact, free from redness, edema or discomfort.  No evidence of extravasations.  Access discontinued per protocol.     Discharge Plan:   Follow up plan of care with: ongoing infusions at [facility].  Discharge instructions were reviewed with patient.  Patient/representative verbalized understanding of discharge instructions and all questions answered.  Patient discharged [facility] in stable condition.  Patient declined AVS.  Myra Fernandes RN    Administrations This Visit     0.9% sodium chloride BOLUS     Admin Date  01/15/2022 Action  Started Dose  1,000 mL Route  Intravenous Administered By  Myra Fernandes RN                /87 (BP Location: Left arm, Patient Position: Sitting)   Pulse 64   Temp 97.6  F (36.4  C) (Oral)   Resp 16   SpO2 100%

## 2022-01-15 NOTE — PATIENT INSTRUCTIONS
Dear Marlo Vee    Thank you for choosing [facility] ([facility]) for your infusion.  The following information is a summary of our appointment as well as important reminders.      We look forward in seeing you on your next appointment here at [facility] ([facility]).  Please don t hesitate to call us at [phone] to reschedule any of your appointments or to speak with one of the [facility] registered nurses.  It was a pleasure taking care of you today.    Sincerely,    [facility]  [facility]  [address] [place] [address]  [id number]  Phone:  [phone]

## 2022-01-15 NOTE — LETTER
1/15/2022         RE: Marlo Vee  [address] [facility] [place] [address]        Dear Colleague,    Thank you for referring your patient, Marlo Vee, to the [facility]. Please see a copy of my visit note below.    Nursing Note  Marlo Vee presents [facility] for:   Chief Complaint   Patient presents with     Infusion     During [facility]'s [facility] appointment, orders from Dr LONNY Varela were completed.  Frequency: 3 times weekly PRN    Progress note:  Patient identification verified by name and date of birth.  Assessment completed.  Vitals recorded in Doc Flowsheets.  Patient was provided with education regarding medication/procedure and possible side effects.  Patient verbalized understanding.     present during visit today: Not Applicable.    Treatment Conditions: Non-applicable.    Premedications: were not ordered.    Drug Waste Record: No    Infusion length and rate:  infusion given over approximately 1 hours    Labs: were drawn per orders following infusion.     Vascular access: peripheral IV placed today.    Is the next appt scheduled? Yes  Asymptomatic COVID test completed? No    Post Infusion Assessment:  Patient tolerated infusion without incident.  Blood return noted pre and post infusion.  Site patent and intact, free from redness, edema or discomfort.  No evidence of extravasations.  Access discontinued per protocol.     Discharge Plan:   Follow up plan of care with: ongoing infusions at [facility].  Discharge instructions were reviewed with patient.  Patient/representative verbalized understanding of discharge instructions and all questions answered.  Patient discharged from [facility] in stable condition.  Patient declined AVS.  Myra Fernandes RN    Administrations This Visit     0.9% sodium  chloride BOLUS     Admin Date  01/15/2022 Action  Started Dose  1,000 mL Route  Intravenous Administered By  Myra Fernandes RN                /87 (BP Location: Left arm, Patient Position: Sitting)   Pulse 64   Temp 97.6  F (36.4  C) (Oral)   Resp 16   SpO2 100%           Again, thank you for allowing me to participate in the care of your patient.      Sincerely,    [facility]

## 2022-01-19 ENCOUNTER — VIRTUAL VISIT (OUTPATIENT)
Dept: GASTROENTEROLOGY | Facility: CLINIC | Age: 72
End: 2022-01-19
Payer: MEDICARE

## 2022-01-19 ENCOUNTER — INFUSION THERAPY VISIT (OUTPATIENT)
Dept: INFUSION THERAPY | Facility: CLINIC | Age: 72
End: 2022-01-19
Attending: INTERNAL MEDICINE
Payer: MEDICARE

## 2022-01-19 VITALS
TEMPERATURE: 98 F | SYSTOLIC BLOOD PRESSURE: 145 MMHG | RESPIRATION RATE: 16 BRPM | OXYGEN SATURATION: 100 % | HEART RATE: 74 BPM | DIASTOLIC BLOOD PRESSURE: 94 MMHG

## 2022-01-19 DIAGNOSIS — R19.7 DIARRHEA, UNSPECIFIED TYPE: Primary | ICD-10-CM

## 2022-01-19 DIAGNOSIS — Z94.0 KIDNEY REPLACED BY TRANSPLANT: ICD-10-CM

## 2022-01-19 DIAGNOSIS — R19.7 DIARRHEA OF PRESUMED INFECTIOUS ORIGIN: Primary | ICD-10-CM

## 2022-01-19 LAB
ANION GAP SERPL CALCULATED.3IONS-SCNC: 7 MMOL/L (ref 3–14)
BUN SERPL-MCNC: 32 MG/DL (ref 7–30)
CALCIUM SERPL-MCNC: 8.1 MG/DL (ref 8.5–10.1)
CHLORIDE BLD-SCNC: 113 MMOL/L (ref 94–109)
CO2 SERPL-SCNC: 27 MMOL/L (ref 20–32)
CREAT SERPL-MCNC: 2.38 MG/DL (ref 0.66–1.25)
ERYTHROCYTE [DISTWIDTH] IN BLOOD BY AUTOMATED COUNT: 14.3 % (ref 10–15)
GFR SERPL CREATININE-BSD FRML MDRD: 29 ML/MIN/1.73M2
GLUCOSE BLD-MCNC: 95 MG/DL (ref 70–99)
HCT VFR BLD AUTO: 32.3 % (ref 40–53)
HGB BLD-MCNC: 9.5 G/DL (ref 13.3–17.7)
MCH RBC QN AUTO: 27.7 PG (ref 26.5–33)
MCHC RBC AUTO-ENTMCNC: 29.4 G/DL (ref 31.5–36.5)
MCV RBC AUTO: 94 FL (ref 78–100)
PLATELET # BLD AUTO: 228 10E3/UL (ref 150–450)
POTASSIUM BLD-SCNC: 4.1 MMOL/L (ref 3.4–5.3)
RBC # BLD AUTO: 3.43 10E6/UL (ref 4.4–5.9)
SODIUM SERPL-SCNC: 147 MMOL/L (ref 133–144)
WBC # BLD AUTO: 4.4 10E3/UL (ref 4–11)

## 2022-01-19 PROCEDURE — 99213 OFFICE O/P EST LOW 20 MIN: CPT | Mod: 95 | Performed by: INTERNAL MEDICINE

## 2022-01-19 PROCEDURE — 258N000003 HC RX IP 258 OP 636: Performed by: INTERNAL MEDICINE

## 2022-01-19 PROCEDURE — 36415 COLL VENOUS BLD VENIPUNCTURE: CPT

## 2022-01-19 PROCEDURE — 85027 COMPLETE CBC AUTOMATED: CPT

## 2022-01-19 PROCEDURE — 87799 DETECT AGENT NOS DNA QUANT: CPT

## 2022-01-19 PROCEDURE — 80048 BASIC METABOLIC PNL TOTAL CA: CPT

## 2022-01-19 PROCEDURE — 96360 HYDRATION IV INFUSION INIT: CPT

## 2022-01-19 RX ORDER — ALBUTEROL SULFATE 0.83 MG/ML
2.5 SOLUTION RESPIRATORY (INHALATION)
Status: CANCELLED | OUTPATIENT
Start: 2022-01-27

## 2022-01-19 RX ORDER — ALBUTEROL SULFATE 90 UG/1
1-2 AEROSOL, METERED RESPIRATORY (INHALATION)
Status: CANCELLED
Start: 2022-01-27

## 2022-01-19 RX ORDER — METHYLPREDNISOLONE SODIUM SUCCINATE 125 MG/2ML
125 INJECTION, POWDER, LYOPHILIZED, FOR SOLUTION INTRAMUSCULAR; INTRAVENOUS
Status: CANCELLED
Start: 2022-01-27

## 2022-01-19 RX ORDER — MEPERIDINE HYDROCHLORIDE 25 MG/ML
25 INJECTION INTRAMUSCULAR; INTRAVENOUS; SUBCUTANEOUS EVERY 30 MIN PRN
Status: CANCELLED | OUTPATIENT
Start: 2022-01-27

## 2022-01-19 RX ORDER — HEPARIN SODIUM,PORCINE 10 UNIT/ML
5 VIAL (ML) INTRAVENOUS
Status: CANCELLED | OUTPATIENT
Start: 2022-01-27

## 2022-01-19 RX ORDER — EPINEPHRINE 1 MG/ML
0.3 INJECTION, SOLUTION INTRAMUSCULAR; SUBCUTANEOUS EVERY 5 MIN PRN
Status: CANCELLED | OUTPATIENT
Start: 2022-01-27

## 2022-01-19 RX ORDER — HEPARIN SODIUM (PORCINE) LOCK FLUSH IV SOLN 100 UNIT/ML 100 UNIT/ML
5 SOLUTION INTRAVENOUS
Status: CANCELLED | OUTPATIENT
Start: 2022-01-27

## 2022-01-19 RX ORDER — NALOXONE HYDROCHLORIDE 0.4 MG/ML
0.2 INJECTION, SOLUTION INTRAMUSCULAR; INTRAVENOUS; SUBCUTANEOUS
Status: CANCELLED | OUTPATIENT
Start: 2022-01-27

## 2022-01-19 RX ORDER — DIPHENHYDRAMINE HYDROCHLORIDE 50 MG/ML
50 INJECTION INTRAMUSCULAR; INTRAVENOUS
Status: CANCELLED
Start: 2022-01-27

## 2022-01-19 RX ADMIN — SODIUM CHLORIDE 1000 ML: 9 INJECTION, SOLUTION INTRAVENOUS at 16:15

## 2022-01-19 ASSESSMENT — ENCOUNTER SYMPTOMS
INCREASED ENERGY: 0
BLOOD IN STOOL: 0
POLYPHAGIA: 0
HEARTBURN: 0
CHILLS: 0
VOMITING: 0
WEIGHT LOSS: 0
FATIGUE: 0
BLOATING: 0
ABDOMINAL PAIN: 0
DECREASED APPETITE: 0
HALLUCINATIONS: 0
NIGHT SWEATS: 1
CONSTIPATION: 0
POLYDIPSIA: 0
ALTERED TEMPERATURE REGULATION: 0
JAUNDICE: 0
NAUSEA: 0
WEIGHT GAIN: 0
FEVER: 0
DIARRHEA: 1
SWOLLEN GLANDS: 0
BRUISES/BLEEDS EASILY: 0
RECTAL PAIN: 0
BOWEL INCONTINENCE: 0

## 2022-01-19 NOTE — PROGRESS NOTES
HPI:    John presents today for a video visit to discuss follow-up diarrhea.  Overall doing better - if he uses metamucil and doesn't eat too many sweets he won't have diarrhea - stools are more formed - generally once or twice a day.  If he eats a lot of sweets he will get diarrhea.  Has noted his appetite has started to improve again as well.  Was recently switched off MMF and placed on azathioprine which he thinks did help his diarrhea. Did have a low potassium prompting him to have to go to the ER - was started on supplements and follow-up K last week was elevated - most recent potassium wnl off supplements.  Has now been dealing with an elevated creatinine.    Hasn't scheduled a colonoscopy yet as he is concerned about his rising creatinine and potential renal issues going through a colon prep.  Is planning to discuss this with his nephrologist and will schedule at that time.    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/2021     Dyslipidemia      End stage kidney disease (H)      Heart attack (H)     not sure     Hypertension      Membranous glomerulonephritis 2002     PONV (postoperative nausea and vomiting)      PUD (peptic ulcer disease)      Secondary hyperparathyroidism (of renal origin)      Skin abnormalities      Stented coronary artery      Vitamin D deficiency        Past Surgical History:   Procedure Laterality Date     CYSTOSCOPY, REMOVE STENT(S), COMBINED Right 2/23/2016    Procedure: COMBINED CYSTOSCOPY, REMOVE STENT(S);  Surgeon: Cody Temple MD;  Location: [facility] OR     IR RENAL BIOPSY RIGHT  10/19/2021     OPEN REDUCTION INTERNAL FIXATION HIP NAILING Right 12/6/2020    Procedure: OPEN REDUCTION INTERNAL FIXATION, FRACTURE, FEMUR, USING INTRAMEDULLARY SHU.;  Surgeon: Jimmy Stoner MD;  Location:  [facility] OR     s/p right upper extremity AV fistula       TRANSPLANT      kidney transplant      VASCULAR SURGERY         Family History   Problem Relation Age of Onset     Breast Cancer Mother      Cancer - colorectal Father      Coronary Artery Disease Father      Hypertension Father      Breast Cancer Sister      Cancer Brother         Pancreatic CA       Social History     Tobacco Use     Smoking status: Never Smoker     Smokeless tobacco: Never Used   Substance Use Topics     Alcohol use: Yes     Comment: Patient reports drinking beer on a rare ocassion.        O:    Gen: no acute distress  HEENT: NCAT  Neck: normal ROM  Resp: nonlabored breathing  Neuro: no gross deficits  Psych: appropriate mood and affect    Assessment and Plan:    # diarrhea with history of c-difficile and cryptosporidium infection - recent stool studies including a fecal calprotectin were normal. Recently switched off MMF which may have helped as well.  Symptoms are improving but still with intermittent diarrhea and requiring IVF and recently ER visit for hypokalemia.  Would still like to plan for colonoscopy for further evaluation of this - discussed with patient today - would like to discuss with his nephrologist prior to scheduling.  Will need go-lytely prep and MAC sedation given history of difficult procedures in the past.    RTC after colonoscopy    Melinda Rudolph,      Video-Visit Details     Type of service:  Video Visit     Video Start Time: 9:32 AM  Video End Time (time video stopped): 9:45 AM (video cut out - finished visit over the phone)    Originating Location (pt. Location): home     Distant Location (provider location):  [facility]      Mode of Communication:  Video Conference via remocean    Answers for HPI/ROS submitted by the patient on 1/19/2022  General Symptoms: Yes  Skin Symptoms: No  HENT Symptoms: No  EYE SYMPTOMS: No  HEART SYMPTOMS: No  LUNG SYMPTOMS: No  INTESTINAL SYMPTOMS: Yes  URINARY  SYMPTOMS: No  REPRODUCTIVE SYMPTOMS: No  SKELETAL SYMPTOMS: No  BLOOD SYMPTOMS: Yes  NERVOUS SYSTEM SYMPTOMS: No  MENTAL HEALTH SYMPTOMS: No  Fever: No  Loss of appetite: No  Weight loss: No  Weight gain: No  Fatigue: No  Night sweats: Yes  Chills: No  Increased stress: No  Excessive hunger: No  Excessive thirst: No  Feeling hot or cold when others believe the temperature is normal: No  Loss of height: No  Post-operative complications: No  Surgical site pain: No  Hallucinations: No  Change in or Loss of Energy: No  Hyperactivity: No  Confusion: No  Heart burn or indigestion: No  Nausea: No  Vomiting: No  Abdominal pain: No  Bloating: No  Constipation: No  Diarrhea: Yes  Blood in stool: No  Black stools: No  Rectal or Anal pain: No  Fecal incontinence: No  Yellowing of skin or eyes: No  Vomit with blood: No  Change in stools: No  Anemia: Yes  Swollen glands: No  Easy bleeding or bruising: No  Edema or swelling: No

## 2022-01-19 NOTE — PATIENT INSTRUCTIONS
Continue your fiber supplements 3 times a day - I'm happy they are helping.  You can also use imodium as needed.    Please call [phone] to schedule a colonoscopy.

## 2022-01-19 NOTE — PROGRESS NOTES
Nursing Note  Marlo Vee [facility] for:   Chief Complaint   Patient presents with     Infusion     IVF     During [facility]'s [facility] appointment, orders from Dr. Varela were completed.  Frequency: three times weekly    Progress note:  Patient identification verified by name and date of birth.  Assessment completed.  Vitals recorded in Doc Flowsheets.  Patient was provided with education regarding medication/procedure and possible side effects.  Patient verbalized understanding.     present during visit today: Not Applicable.    Premedications: were not ordered.    Drug Waste Record: No    Infusion length and rate:  infusion given over approximately 60 minutes    Labs: were drawn per orders.     Vascular access: peripheral IV placed today.    Is the next appt scheduled? yes  Asymptomatic COVID test completed? no    Post Infusion Assessment:  Patient tolerated infusion without incident.     Discharge Plan:   Follow up plan of care with: ongoing infusions at [facility]. and ordering provider as scheduled.  Discharge instructions were reviewed with patient.  Patient/representative verbalized understanding of discharge instructions and all questions answered.  Patient discharged [facility] in stable condition.    Yuki Diehl RN    Administrations This Visit     0.9% sodium chloride BOLUS     Admin Date  01/19/2022 Action  New Bag Dose  1,000 mL Route  Intravenous Administered By  Yuki Diehl RN                BP (!) 143/82 (BP Location: Right arm, Patient Position: Semi-Zuinga's)   Pulse 70   Temp 98  F (36.7  C) (Oral)   Resp 16   SpO2 100%

## 2022-01-19 NOTE — PROGRESS NOTES
John is a 71 year old who is being evaluated via a billable video visit.      How would you like to obtain your AVS? Filmakahart  If the video visit is dropped, the invitation should be resent by: Text to cell phone: [phone]  Will anyone else be joining your video visit? No

## 2022-01-20 ENCOUNTER — INFUSION THERAPY VISIT (OUTPATIENT)
Dept: INFUSION THERAPY | Facility: CLINIC | Age: 72
End: 2022-01-20
Attending: INTERNAL MEDICINE
Payer: MEDICARE

## 2022-01-20 VITALS — HEART RATE: 68 BPM | OXYGEN SATURATION: 100 % | SYSTOLIC BLOOD PRESSURE: 154 MMHG | DIASTOLIC BLOOD PRESSURE: 94 MMHG

## 2022-01-20 DIAGNOSIS — Z94.0 KIDNEY REPLACED BY TRANSPLANT: ICD-10-CM

## 2022-01-20 DIAGNOSIS — R19.7 DIARRHEA OF PRESUMED INFECTIOUS ORIGIN: Primary | ICD-10-CM

## 2022-01-20 LAB
ANION GAP SERPL CALCULATED.3IONS-SCNC: 5 MMOL/L (ref 3–14)
BUN SERPL-MCNC: 32 MG/DL (ref 7–30)
CALCIUM SERPL-MCNC: 8.2 MG/DL (ref 8.5–10.1)
CHLORIDE BLD-SCNC: 114 MMOL/L (ref 94–109)
CO2 SERPL-SCNC: 25 MMOL/L (ref 20–32)
CREAT SERPL-MCNC: 2.16 MG/DL (ref 0.66–1.25)
EBV DNA COPIES/ML, INSTRUMENT: 8627 COPIES/ML
EBV DNA SPEC NAA+PROBE-LOG#: 3.9 {LOG_COPIES}/ML
GFR SERPL CREATININE-BSD FRML MDRD: 32 ML/MIN/1.73M2
GLUCOSE BLD-MCNC: 89 MG/DL (ref 70–99)
POTASSIUM BLD-SCNC: 4.2 MMOL/L (ref 3.4–5.3)
SODIUM SERPL-SCNC: 144 MMOL/L (ref 133–144)

## 2022-01-20 PROCEDURE — 258N000003 HC RX IP 258 OP 636: Performed by: INTERNAL MEDICINE

## 2022-01-20 PROCEDURE — 96360 HYDRATION IV INFUSION INIT: CPT

## 2022-01-20 PROCEDURE — 36415 COLL VENOUS BLD VENIPUNCTURE: CPT

## 2022-01-20 PROCEDURE — 80048 BASIC METABOLIC PNL TOTAL CA: CPT

## 2022-01-20 RX ORDER — HEPARIN SODIUM (PORCINE) LOCK FLUSH IV SOLN 100 UNIT/ML 100 UNIT/ML
5 SOLUTION INTRAVENOUS
Status: CANCELLED | OUTPATIENT
Start: 2022-01-27

## 2022-01-20 RX ORDER — ALBUTEROL SULFATE 90 UG/1
1-2 AEROSOL, METERED RESPIRATORY (INHALATION)
Status: CANCELLED
Start: 2022-01-27

## 2022-01-20 RX ORDER — MEPERIDINE HYDROCHLORIDE 25 MG/ML
25 INJECTION INTRAMUSCULAR; INTRAVENOUS; SUBCUTANEOUS EVERY 30 MIN PRN
Status: CANCELLED | OUTPATIENT
Start: 2022-01-27

## 2022-01-20 RX ORDER — ALBUTEROL SULFATE 0.83 MG/ML
2.5 SOLUTION RESPIRATORY (INHALATION)
Status: CANCELLED | OUTPATIENT
Start: 2022-01-27

## 2022-01-20 RX ORDER — METHYLPREDNISOLONE SODIUM SUCCINATE 125 MG/2ML
125 INJECTION, POWDER, LYOPHILIZED, FOR SOLUTION INTRAMUSCULAR; INTRAVENOUS
Status: CANCELLED
Start: 2022-01-27

## 2022-01-20 RX ORDER — DIPHENHYDRAMINE HYDROCHLORIDE 50 MG/ML
50 INJECTION INTRAMUSCULAR; INTRAVENOUS
Status: CANCELLED
Start: 2022-01-27

## 2022-01-20 RX ORDER — EPINEPHRINE 1 MG/ML
0.3 INJECTION, SOLUTION INTRAMUSCULAR; SUBCUTANEOUS EVERY 5 MIN PRN
Status: CANCELLED | OUTPATIENT
Start: 2022-01-27

## 2022-01-20 RX ORDER — HEPARIN SODIUM,PORCINE 10 UNIT/ML
5 VIAL (ML) INTRAVENOUS
Status: CANCELLED | OUTPATIENT
Start: 2022-01-27

## 2022-01-20 RX ORDER — NALOXONE HYDROCHLORIDE 0.4 MG/ML
0.2 INJECTION, SOLUTION INTRAMUSCULAR; INTRAVENOUS; SUBCUTANEOUS
Status: CANCELLED | OUTPATIENT
Start: 2022-01-27

## 2022-01-20 RX ADMIN — SODIUM CHLORIDE 1000 ML: 9 INJECTION, SOLUTION INTRAVENOUS at 16:06

## 2022-01-20 NOTE — LETTER
Date:January 25, 2022      Provider requested that no letter be sent. Do not send.       [facility]

## 2022-01-20 NOTE — LETTER
1/20/2022         RE: Marlo Vee  [address] [facility] [address]        Dear Colleague,    Thank you for referring your patient, Marlo Vee, to the [facility]. Please see a copy of my visit note below.    Nursing Note  Marlo Vee [facility]:   Chief Complaint   Patient presents with     Infusion     fluids, lab     During [facility]'s [facility] appointment, orders from Dr. Varela were completed.  Frequency: as needed twice weekly    Progress note:  Patient identification verified by name and date of birth.  Assessment completed.  Vitals recorded in Doc Flowsheets.  Patient was provided with education regarding medication/procedure and possible side effects.  Patient verbalized understanding.     present during visit today: Not Applicable.    Treatment Conditions: Non-applicable.    Premedications: were not ordered.    Drug Waste Record: No    Infusion length and rate:  infusion given over approximately 60 minutes    Labs: were drawn per orders, BMP post infusion.     Vascular access: peripheral IV placed today.    Is the next appt scheduled? Yes  Asymptomatic COVID test completed? no    Post Infusion Assessment:  Patient tolerated infusion without incident.     Discharge Plan:   Follow up plan of care with: ongoing infusions at [facility]. and ordering provider as scheduled.  Discharge instructions were reviewed with patient.  Patient/representative verbalized understanding of discharge instructions and all questions answered.  Patient discharged from [facility] in stable condition.    Martha Falcon RN       Administrations This Visit     0.9% sodium chloride BOLUS     Admin Date  01/20/2022 Action  New Bag Dose  1,000 mL Route  Intravenous Administered By  Martha Falcon, HEATHER                BP  (!) 153/91   Pulse 68   SpO2 100%       Again, thank you for allowing me to participate in the care of your patient.        Sincerely,        [facility]

## 2022-01-20 NOTE — PROGRESS NOTES
Nursing Note  Marlo Vee presents [facility] for:   Chief Complaint   Patient presents with     Infusion     fluids, lab     During [facility]'s [facility] appointment, orders from Dr. Varela were completed.  Frequency: as needed twice weekly    Progress note:  Patient identification verified by name and date of birth.  Assessment completed.  Vitals recorded in Doc Flowsheets.  Patient was provided with education regarding medication/procedure and possible side effects.  Patient verbalized understanding.     present during visit today: Not Applicable.    Treatment Conditions: Non-applicable.    Premedications: were not ordered.    Drug Waste Record: No    Infusion length and rate:  infusion given over approximately 60 minutes    Labs: were drawn per orders, BMP post infusion.     Vascular access: peripheral IV placed today.    Is the next appt scheduled? Yes  Asymptomatic COVID test completed? no    Post Infusion Assessment:  Patient tolerated infusion without incident.     Discharge Plan:   Follow up plan of care with: ongoing infusions at [facility]. and ordering provider as scheduled.  Discharge instructions were reviewed with patient.  Patient/representative verbalized understanding of discharge instructions and all questions answered.  Patient discharged from [facility] in stable condition.    Martha Falcon RN       Administrations This Visit     0.9% sodium chloride BOLUS     Admin Date  01/20/2022 Action  New Bag Dose  1,000 mL Route  Intravenous Administered By  Martha Falcon, HEATHER                BP (!) 153/91   Pulse 68   SpO2 100%

## 2022-01-21 ENCOUNTER — INFUSION THERAPY VISIT (OUTPATIENT)
Dept: INFUSION THERAPY | Facility: CLINIC | Age: 72
End: 2022-01-21
Attending: INTERNAL MEDICINE
Payer: MEDICARE

## 2022-01-21 VITALS
RESPIRATION RATE: 16 BRPM | HEART RATE: 64 BPM | TEMPERATURE: 97.4 F | OXYGEN SATURATION: 100 % | DIASTOLIC BLOOD PRESSURE: 82 MMHG | SYSTOLIC BLOOD PRESSURE: 151 MMHG

## 2022-01-21 DIAGNOSIS — Z94.0 KIDNEY REPLACED BY TRANSPLANT: ICD-10-CM

## 2022-01-21 DIAGNOSIS — R19.7 DIARRHEA OF PRESUMED INFECTIOUS ORIGIN: Primary | ICD-10-CM

## 2022-01-21 LAB
ANION GAP SERPL CALCULATED.3IONS-SCNC: <1 MMOL/L (ref 3–14)
BUN SERPL-MCNC: 32 MG/DL (ref 7–30)
CALCIUM SERPL-MCNC: 8.2 MG/DL (ref 8.5–10.1)
CHLORIDE BLD-SCNC: 115 MMOL/L (ref 94–109)
CO2 SERPL-SCNC: 28 MMOL/L (ref 20–32)
CREAT SERPL-MCNC: 1.87 MG/DL (ref 0.66–1.25)
GFR SERPL CREATININE-BSD FRML MDRD: 38 ML/MIN/1.73M2
GLUCOSE BLD-MCNC: 105 MG/DL (ref 70–99)
POTASSIUM BLD-SCNC: 4.3 MMOL/L (ref 3.4–5.3)
SODIUM SERPL-SCNC: 139 MMOL/L (ref 133–144)

## 2022-01-21 PROCEDURE — 258N000003 HC RX IP 258 OP 636: Performed by: INTERNAL MEDICINE

## 2022-01-21 PROCEDURE — 96360 HYDRATION IV INFUSION INIT: CPT

## 2022-01-21 PROCEDURE — 36415 COLL VENOUS BLD VENIPUNCTURE: CPT

## 2022-01-21 PROCEDURE — 80048 BASIC METABOLIC PNL TOTAL CA: CPT

## 2022-01-21 RX ORDER — MEPERIDINE HYDROCHLORIDE 25 MG/ML
25 INJECTION INTRAMUSCULAR; INTRAVENOUS; SUBCUTANEOUS EVERY 30 MIN PRN
Status: CANCELLED | OUTPATIENT
Start: 2022-01-27

## 2022-01-21 RX ORDER — HEPARIN SODIUM (PORCINE) LOCK FLUSH IV SOLN 100 UNIT/ML 100 UNIT/ML
5 SOLUTION INTRAVENOUS
Status: CANCELLED | OUTPATIENT
Start: 2022-01-27

## 2022-01-21 RX ORDER — HEPARIN SODIUM,PORCINE 10 UNIT/ML
5 VIAL (ML) INTRAVENOUS
Status: CANCELLED | OUTPATIENT
Start: 2022-01-27

## 2022-01-21 RX ORDER — ALBUTEROL SULFATE 0.83 MG/ML
2.5 SOLUTION RESPIRATORY (INHALATION)
Status: CANCELLED | OUTPATIENT
Start: 2022-01-27

## 2022-01-21 RX ORDER — METHYLPREDNISOLONE SODIUM SUCCINATE 125 MG/2ML
125 INJECTION, POWDER, LYOPHILIZED, FOR SOLUTION INTRAMUSCULAR; INTRAVENOUS
Status: CANCELLED
Start: 2022-01-27

## 2022-01-21 RX ORDER — DIPHENHYDRAMINE HYDROCHLORIDE 50 MG/ML
50 INJECTION INTRAMUSCULAR; INTRAVENOUS
Status: CANCELLED
Start: 2022-01-27

## 2022-01-21 RX ORDER — ALBUTEROL SULFATE 90 UG/1
1-2 AEROSOL, METERED RESPIRATORY (INHALATION)
Status: CANCELLED
Start: 2022-01-27

## 2022-01-21 RX ORDER — NALOXONE HYDROCHLORIDE 0.4 MG/ML
0.2 INJECTION, SOLUTION INTRAMUSCULAR; INTRAVENOUS; SUBCUTANEOUS
Status: CANCELLED | OUTPATIENT
Start: 2022-01-27

## 2022-01-21 RX ORDER — EPINEPHRINE 1 MG/ML
0.3 INJECTION, SOLUTION INTRAMUSCULAR; SUBCUTANEOUS EVERY 5 MIN PRN
Status: CANCELLED | OUTPATIENT
Start: 2022-01-27

## 2022-01-21 RX ADMIN — SODIUM CHLORIDE 1000 ML: 9 INJECTION, SOLUTION INTRAVENOUS at 15:13

## 2022-01-21 NOTE — LETTER
"    1/21/2022         RE: Marlo Vee  [address] [facility] [address]        Dear Colleague,    Thank you for referring your patient, Marlo Vee, to the [facility]. Please see a copy of my visit note below.    Chief Complaint   Patient presents with     Infusion     IV Fluids     Infusion Nursing Note:  Marlo Vee presents today for IV fluids.  Three times weekly.  Patient seen by provider today: No   present during visit today: Not Applicable.    Note: Infusion given over 1 hour.    Labs: BMP drawn post infusion.     Intravenous Access:  Peripheral IV placed.    Treatment Conditions:  Not Applicable.    Post Infusion Assessment:  Patient tolerated infusion without incident.  Site patent and intact, free from redness, edema or discomfort.  No evidence of extravasations.  Access discontinued per protocol.     Discharge Plan:   AVS to patient via MYCHART.  Patient will return 1/26 for next appointment.   Patient discharged in stable condition accompanied by: self.  Departure Mode: Ambulatory.      Administrations This Visit     0.9% sodium chloride BOLUS     Admin Date  01/21/2022 Action  New Bag Dose  1,000 mL Route  Intravenous Administered By  Zhane Chavira RN              Vital signs:  Temp: 97.4  F (36.3  C) Temp src: Oral BP: (!) 151/82 (asymptomatic) Pulse: 64   Resp: 16 SpO2: 100 % O2 Device: None (Room air)        Estimated body mass index is 20.59 kg/m  as calculated from the following:    Height as of 9/24/21: 1.778 m (5' 10\").    Weight as of 9/24/21: 65.1 kg (143 lb 8 oz).            Again, thank you for allowing me to participate in the care of your patient.      Sincerely,    [facility]    "

## 2022-01-21 NOTE — PROGRESS NOTES
"Chief Complaint   Patient presents with     Infusion     IV Fluids     Infusion Nursing Note:  Marlo Vee presents today for IV fluids.  Three times weekly.  Patient seen by provider today: No   present during visit today: Not Applicable.    Note: Infusion given over 1 hour.    Labs: BMP drawn post infusion.     Intravenous Access:  Peripheral IV placed.    Treatment Conditions:  Not Applicable.    Post Infusion Assessment:  Patient tolerated infusion without incident.  Site patent and intact, free from redness, edema or discomfort.  No evidence of extravasations.  Access discontinued per protocol.     Discharge Plan:   AVS to patient via MYCHART.  Patient will return 1/26 for next appointment.   Patient discharged in stable condition accompanied by: self.  Departure Mode: Ambulatory.      Administrations This Visit     0.9% sodium chloride BOLUS     Admin Date  01/21/2022 Action  New Bag Dose  1,000 mL Route  Intravenous Administered By  Zhane Chavira RN              Vital signs:  Temp: 97.4  F (36.3  C) Temp src: Oral BP: (!) 151/82 (asymptomatic) Pulse: 64   Resp: 16 SpO2: 100 % O2 Device: None (Room air)        Estimated body mass index is 20.59 kg/m  as calculated from the following:    Height as of 9/24/21: 1.778 m (5' 10\").    Weight as of 9/24/21: 65.1 kg (143 lb 8 oz).          "

## 2022-01-27 ENCOUNTER — INFUSION THERAPY VISIT (OUTPATIENT)
Dept: INFUSION THERAPY | Facility: CLINIC | Age: 72
End: 2022-01-27
Attending: INTERNAL MEDICINE
Payer: MEDICARE

## 2022-01-27 VITALS
DIASTOLIC BLOOD PRESSURE: 69 MMHG | TEMPERATURE: 97.4 F | RESPIRATION RATE: 16 BRPM | SYSTOLIC BLOOD PRESSURE: 151 MMHG | HEART RATE: 59 BPM | OXYGEN SATURATION: 98 %

## 2022-01-27 DIAGNOSIS — R19.7 DIARRHEA OF PRESUMED INFECTIOUS ORIGIN: Primary | ICD-10-CM

## 2022-01-27 DIAGNOSIS — Z94.0 KIDNEY REPLACED BY TRANSPLANT: ICD-10-CM

## 2022-01-27 LAB
ANION GAP SERPL CALCULATED.3IONS-SCNC: 6 MMOL/L (ref 3–14)
BUN SERPL-MCNC: 32 MG/DL (ref 7–30)
CALCIUM SERPL-MCNC: 8.3 MG/DL (ref 8.5–10.1)
CHLORIDE BLD-SCNC: 109 MMOL/L (ref 94–109)
CO2 SERPL-SCNC: 27 MMOL/L (ref 20–32)
CREAT SERPL-MCNC: 2.13 MG/DL (ref 0.66–1.25)
GFR SERPL CREATININE-BSD FRML MDRD: 33 ML/MIN/1.73M2
GLUCOSE BLD-MCNC: 90 MG/DL (ref 70–99)
POTASSIUM BLD-SCNC: 4.1 MMOL/L (ref 3.4–5.3)
SODIUM SERPL-SCNC: 142 MMOL/L (ref 133–144)

## 2022-01-27 PROCEDURE — 258N000003 HC RX IP 258 OP 636: Performed by: INTERNAL MEDICINE

## 2022-01-27 PROCEDURE — 80048 BASIC METABOLIC PNL TOTAL CA: CPT

## 2022-01-27 PROCEDURE — 36415 COLL VENOUS BLD VENIPUNCTURE: CPT

## 2022-01-27 PROCEDURE — 96360 HYDRATION IV INFUSION INIT: CPT

## 2022-01-27 RX ORDER — HEPARIN SODIUM (PORCINE) LOCK FLUSH IV SOLN 100 UNIT/ML 100 UNIT/ML
5 SOLUTION INTRAVENOUS
Status: CANCELLED | OUTPATIENT
Start: 2022-02-03

## 2022-01-27 RX ORDER — DIPHENHYDRAMINE HYDROCHLORIDE 50 MG/ML
50 INJECTION INTRAMUSCULAR; INTRAVENOUS
Status: CANCELLED
Start: 2022-02-03

## 2022-01-27 RX ORDER — MEPERIDINE HYDROCHLORIDE 25 MG/ML
25 INJECTION INTRAMUSCULAR; INTRAVENOUS; SUBCUTANEOUS EVERY 30 MIN PRN
Status: CANCELLED | OUTPATIENT
Start: 2022-02-03

## 2022-01-27 RX ORDER — ALBUTEROL SULFATE 90 UG/1
1-2 AEROSOL, METERED RESPIRATORY (INHALATION)
Status: CANCELLED
Start: 2022-02-03

## 2022-01-27 RX ORDER — METHYLPREDNISOLONE SODIUM SUCCINATE 125 MG/2ML
125 INJECTION, POWDER, LYOPHILIZED, FOR SOLUTION INTRAMUSCULAR; INTRAVENOUS
Status: CANCELLED
Start: 2022-02-03

## 2022-01-27 RX ORDER — ALBUTEROL SULFATE 0.83 MG/ML
2.5 SOLUTION RESPIRATORY (INHALATION)
Status: CANCELLED | OUTPATIENT
Start: 2022-02-03

## 2022-01-27 RX ORDER — NALOXONE HYDROCHLORIDE 0.4 MG/ML
0.2 INJECTION, SOLUTION INTRAMUSCULAR; INTRAVENOUS; SUBCUTANEOUS
Status: CANCELLED | OUTPATIENT
Start: 2022-02-03

## 2022-01-27 RX ORDER — EPINEPHRINE 1 MG/ML
0.3 INJECTION, SOLUTION INTRAMUSCULAR; SUBCUTANEOUS EVERY 5 MIN PRN
Status: CANCELLED | OUTPATIENT
Start: 2022-02-03

## 2022-01-27 RX ORDER — HEPARIN SODIUM,PORCINE 10 UNIT/ML
5 VIAL (ML) INTRAVENOUS
Status: CANCELLED | OUTPATIENT
Start: 2022-02-03

## 2022-01-27 RX ADMIN — SODIUM CHLORIDE 1000 ML: 9 INJECTION, SOLUTION INTRAVENOUS at 16:21

## 2022-01-27 ASSESSMENT — PAIN SCALES - GENERAL: PAINLEVEL: NO PAIN (0)

## 2022-01-27 NOTE — PROGRESS NOTES
Infusion Nursing Note:  Marlo Vee presents today for IVF.    Patient seen by provider today: No   present during visit today: Not Applicable.    Note: IVF given over 1 hour.    Intravenous Access:  Peripheral IV placed.  Labs drawn post infusion    Treatment Conditions:  Not Applicable.      Post Infusion Assessment:  Patient tolerated infusion without incident.  Site patent and intact, free from redness, edema or discomfort.  No evidence of extravasations.  Access discontinued per protocol.       Discharge Plan:   Discharge instructions reviewed with: Patient.  Patient and/or family verbalized understanding of discharge instructions and all questions answered.  Patient discharged in stable condition accompanied by: self.  Departure Mode: Ambulatory.    Administrations This Visit     0.9% sodium chloride BOLUS     Admin Date  01/27/2022 Action  Started Dose  1,000 mL Route  Intravenous Administered By  Rachel Bateman, RN                      Rachel Bateman RN

## 2022-01-27 NOTE — LETTER
1/27/2022         RE: Marlo Vee  [address] [facility] [address]        Dear Colleague,    Thank you for referring your patient, Marlo Vee, to the [facility]. Please see a copy of my visit note below.    Infusion Nursing Note:  Marlo Vee presents today for IVF.    Patient seen by provider today: No   present during visit today: Not Applicable.    Note: IVF given over 1 hour.    Intravenous Access:  Peripheral IV placed.  Labs drawn post infusion    Treatment Conditions:  Not Applicable.      Post Infusion Assessment:  Patient tolerated infusion without incident.  Site patent and intact, free from redness, edema or discomfort.  No evidence of extravasations.  Access discontinued per protocol.       Discharge Plan:   Discharge instructions reviewed with: Patient.  Patient and/or family verbalized understanding of discharge instructions and all questions answered.  Patient discharged in stable condition accompanied by: self.  Departure Mode: Ambulatory.    Administrations This Visit     0.9% sodium chloride BOLUS     Admin Date  01/27/2022 Action  Started Dose  1,000 mL Route  Intravenous Administered By  Rachel Bateman, RN                      Rachel Bateman RN                          Again, thank you for allowing me to participate in the care of your patient.        Sincerely,        [facility]

## 2022-01-28 ENCOUNTER — TELEPHONE (OUTPATIENT)
Dept: TRANSPLANT | Facility: CLINIC | Age: 72
End: 2022-01-28
Payer: MEDICARE

## 2022-01-28 ENCOUNTER — INFUSION THERAPY VISIT (OUTPATIENT)
Dept: INFUSION THERAPY | Facility: CLINIC | Age: 72
End: 2022-01-28
Attending: INTERNAL MEDICINE
Payer: MEDICARE

## 2022-01-28 VITALS
SYSTOLIC BLOOD PRESSURE: 140 MMHG | RESPIRATION RATE: 18 BRPM | HEART RATE: 76 BPM | DIASTOLIC BLOOD PRESSURE: 84 MMHG | TEMPERATURE: 97.4 F | OXYGEN SATURATION: 98 %

## 2022-01-28 DIAGNOSIS — R19.7 DIARRHEA OF PRESUMED INFECTIOUS ORIGIN: Primary | ICD-10-CM

## 2022-01-28 DIAGNOSIS — Z48.298 AFTERCARE FOLLOWING ORGAN TRANSPLANT: ICD-10-CM

## 2022-01-28 DIAGNOSIS — Z94.0 KIDNEY REPLACED BY TRANSPLANT: ICD-10-CM

## 2022-01-28 LAB
ANION GAP SERPL CALCULATED.3IONS-SCNC: 6 MMOL/L (ref 3–14)
BUN SERPL-MCNC: 29 MG/DL (ref 7–30)
CALCIUM SERPL-MCNC: 8.1 MG/DL (ref 8.5–10.1)
CHLORIDE BLD-SCNC: 111 MMOL/L (ref 94–109)
CO2 SERPL-SCNC: 27 MMOL/L (ref 20–32)
CREAT SERPL-MCNC: 2.12 MG/DL (ref 0.66–1.25)
GFR SERPL CREATININE-BSD FRML MDRD: 33 ML/MIN/1.73M2
GLUCOSE BLD-MCNC: 80 MG/DL (ref 70–99)
POTASSIUM BLD-SCNC: 3.7 MMOL/L (ref 3.4–5.3)
SODIUM SERPL-SCNC: 144 MMOL/L (ref 133–144)

## 2022-01-28 PROCEDURE — 258N000003 HC RX IP 258 OP 636: Performed by: INTERNAL MEDICINE

## 2022-01-28 PROCEDURE — 80048 BASIC METABOLIC PNL TOTAL CA: CPT

## 2022-01-28 PROCEDURE — 36415 COLL VENOUS BLD VENIPUNCTURE: CPT

## 2022-01-28 PROCEDURE — 96360 HYDRATION IV INFUSION INIT: CPT

## 2022-01-28 RX ORDER — DIPHENHYDRAMINE HYDROCHLORIDE 50 MG/ML
50 INJECTION INTRAMUSCULAR; INTRAVENOUS
Status: CANCELLED
Start: 2022-02-03

## 2022-01-28 RX ORDER — TACROLIMUS 0.5 MG/1
0.5 CAPSULE, GELATIN COATED ORAL 2 TIMES DAILY
Qty: 60 CAPSULE | Refills: 11 | Status: SHIPPED | OUTPATIENT
Start: 2022-01-28 | End: 2022-03-22 | Stop reason: DRUGHIGH

## 2022-01-28 RX ORDER — MEPERIDINE HYDROCHLORIDE 25 MG/ML
25 INJECTION INTRAMUSCULAR; INTRAVENOUS; SUBCUTANEOUS EVERY 30 MIN PRN
Status: CANCELLED | OUTPATIENT
Start: 2022-02-03

## 2022-01-28 RX ORDER — NALOXONE HYDROCHLORIDE 0.4 MG/ML
0.2 INJECTION, SOLUTION INTRAMUSCULAR; INTRAVENOUS; SUBCUTANEOUS
Status: CANCELLED | OUTPATIENT
Start: 2022-02-03

## 2022-01-28 RX ORDER — METHYLPREDNISOLONE SODIUM SUCCINATE 125 MG/2ML
125 INJECTION, POWDER, LYOPHILIZED, FOR SOLUTION INTRAMUSCULAR; INTRAVENOUS
Status: CANCELLED
Start: 2022-02-03

## 2022-01-28 RX ORDER — ALBUTEROL SULFATE 90 UG/1
1-2 AEROSOL, METERED RESPIRATORY (INHALATION)
Status: CANCELLED
Start: 2022-02-03

## 2022-01-28 RX ORDER — EPINEPHRINE 1 MG/ML
0.3 INJECTION, SOLUTION INTRAMUSCULAR; SUBCUTANEOUS EVERY 5 MIN PRN
Status: CANCELLED | OUTPATIENT
Start: 2022-02-03

## 2022-01-28 RX ORDER — ALBUTEROL SULFATE 0.83 MG/ML
2.5 SOLUTION RESPIRATORY (INHALATION)
Status: CANCELLED | OUTPATIENT
Start: 2022-02-03

## 2022-01-28 RX ORDER — HEPARIN SODIUM,PORCINE 10 UNIT/ML
5 VIAL (ML) INTRAVENOUS
Status: CANCELLED | OUTPATIENT
Start: 2022-02-03

## 2022-01-28 RX ORDER — HEPARIN SODIUM (PORCINE) LOCK FLUSH IV SOLN 100 UNIT/ML 100 UNIT/ML
5 SOLUTION INTRAVENOUS
Status: CANCELLED | OUTPATIENT
Start: 2022-02-03

## 2022-01-28 RX ADMIN — SODIUM CHLORIDE 1000 ML: 9 INJECTION, SOLUTION INTRAVENOUS at 15:31

## 2022-01-28 NOTE — TELEPHONE ENCOUNTER
Patient Call: General  Route to LPN    Reason for call: Patient called wanting to speak with Deborah MARTÍNEZ No details provided.    Call back needed? Yes    Return Call Needed  Same as documented in contacts section  When to return call?: Greater than one day: Route standard priority

## 2022-01-28 NOTE — LETTER
"    1/28/2022         RE: Marlo Vee  [address] [facility] [address]        Dear Colleague,    Thank you for referring your patient, Marlo Vee, to the [facility]. Please see a copy of my visit note below.    Chief Complaint   Patient presents with     Infusion     IV fluids, labs     Infusion Nursing Note:  Marlo Vee presents today for IV fluids.    Patient seen by provider today: No   present during visit today: Not Applicable.    Note: 1 L NS given over 1 hour.    Labs: BMP drawn post fluids.    Intravenous Access:  Peripheral IV placed.    Treatment Conditions:  Not Applicable.      Post Infusion Assessment:  Patient tolerated infusion without incident.  Site patent and intact, free from redness, edema or discomfort.  No evidence of extravasations.  Access discontinued per protocol.       Discharge Plan:   AVS to patient via MYCHART.  Patient will return 2/2 for next appointment.   Patient discharged in stable condition accompanied by: self.  Departure Mode: Ambulatory.      Administrations This Visit     0.9% sodium chloride BOLUS     Admin Date  01/28/2022 Action  New Bag Dose  1,000 mL Route  Intravenous Administered By  Zhane Chavira RN                Vital signs:  Temp: 97.4  F (36.3  C) Temp src: Oral BP: (!) 140/84 Pulse: 76   Resp: 18 SpO2: 98 % O2 Device: None (Room air)        Estimated body mass index is 20.59 kg/m  as calculated from the following:    Height as of 9/24/21: 1.778 m (5' 10\").    Weight as of 9/24/21: 65.1 kg (143 lb 8 oz).                                  Again, thank you for allowing me to participate in the care of your patient.        Sincerely,        [facility]    "

## 2022-01-28 NOTE — TELEPHONE ENCOUNTER
Call to John, he wanted to confirm that he will try to schedule lab appointment for tomorrow morning, ensuring accurate 12-hour tacrolimus level. States he is feeling much better and diarrhea has resolved. Confirms he has IV fluid infusion this afternoon.   Per patient no further questions or concerns at this time.

## 2022-01-28 NOTE — LETTER
Mr. Marlo Vee  [address] [place] [id number]      January 31, 2022    Dear Mr. Vee,    Below are the results of your most recent visit at the [facility].     Results for orders placed or performed in visit on 01/28/22   Basic metabolic panel     Status: Abnormal   Result Value Ref Range    Sodium 144 133 - 144 mmol/L    Potassium 3.7 3.4 - 5.3 mmol/L    Chloride 111 (H) 94 - 109 mmol/L    Carbon Dioxide (CO2) 27 20 - 32 mmol/L    Anion Gap 6 3 - 14 mmol/L    Urea Nitrogen 29 7 - 30 mg/dL    Creatinine 2.12 (H) 0.66 - 1.25 mg/dL    Calcium 8.1 (L) 8.5 - 10.1 mg/dL    Glucose 80 70 - 99 mg/dL    GFR Estimate 33 (L) >60 mL/min/1.73m2       If there are any questions regarding your results, please call the nurse line at [phone] and select option 2. If you have scheduling needs, please call the Clinic at [phone] and select option 1.    Thank you for choosing the [facility] for your healthcare needs.  We appreciate the opportunity to serve you.      Sincerely,    Daxa Verma MD

## 2022-01-28 NOTE — TELEPHONE ENCOUNTER
ISSUE:  Creatinine increasing, pt receiving IV fluids at [facility] on regular basis  No recent tacrolimus level, ordered for every 2 months    PLAN:  Pt has another IV fluid infusion scheduled for this afternoon at 1500, appointment with Dr. Varela on 2/1/22.  Call to patient, recommended he have accurate Tacrolimus level drawn prior to appointment next week. Also has PRA DSA ordered.    OUTCOME:  Call to patient, no answer, left message requesting him to have labs done prior to appointment on Tuesday, 2/1. Requested call back if he has any concerns or questions.

## 2022-01-28 NOTE — PROGRESS NOTES
"Chief Complaint   Patient presents with     Infusion     IV fluids, labs     Infusion Nursing Note:  Marlo Vee presents today for IV fluids.    Patient seen by provider today: No   present during visit today: Not Applicable.    Note: 1 L NS given over 1 hour.    Labs: BMP drawn post fluids.    Intravenous Access:  Peripheral IV placed.    Treatment Conditions:  Not Applicable.      Post Infusion Assessment:  Patient tolerated infusion without incident.  Site patent and intact, free from redness, edema or discomfort.  No evidence of extravasations.  Access discontinued per protocol.       Discharge Plan:   AVS to patient via MYCHART.  Patient will return 2/2 for next appointment.   Patient discharged in stable condition accompanied by: self.  Departure Mode: Ambulatory.      Administrations This Visit     0.9% sodium chloride BOLUS     Admin Date  01/28/2022 Action  New Bag Dose  1,000 mL Route  Intravenous Administered By  Zhane Chavira RN                Vital signs:  Temp: 97.4  F (36.3  C) Temp src: Oral BP: (!) 140/84 Pulse: 76   Resp: 18 SpO2: 98 % O2 Device: None (Room air)        Estimated body mass index is 20.59 kg/m  as calculated from the following:    Height as of 9/24/21: 1.778 m (5' 10\").    Weight as of 9/24/21: 65.1 kg (143 lb 8 oz).                              "

## 2022-01-29 ENCOUNTER — LAB (OUTPATIENT)
Dept: LAB | Facility: CLINIC | Age: 72
End: 2022-01-29
Payer: MEDICARE

## 2022-01-29 DIAGNOSIS — Z94.0 STATUS POST KIDNEY TRANSPLANT: ICD-10-CM

## 2022-01-29 DIAGNOSIS — Z48.298 AFTERCARE FOLLOWING ORGAN TRANSPLANT: ICD-10-CM

## 2022-01-29 DIAGNOSIS — Z94.0 KIDNEY TRANSPLANTED: ICD-10-CM

## 2022-01-29 DIAGNOSIS — Z94.0 KIDNEY REPLACED BY TRANSPLANT: ICD-10-CM

## 2022-01-29 DIAGNOSIS — R19.7 DIARRHEA OF PRESUMED INFECTIOUS ORIGIN: ICD-10-CM

## 2022-01-29 LAB
ANION GAP SERPL CALCULATED.3IONS-SCNC: <1 MMOL/L (ref 3–14)
BUN SERPL-MCNC: 26 MG/DL (ref 7–30)
CALCIUM SERPL-MCNC: 8.5 MG/DL (ref 8.5–10.1)
CHLORIDE BLD-SCNC: 110 MMOL/L (ref 94–109)
CO2 SERPL-SCNC: 27 MMOL/L (ref 20–32)
CREAT SERPL-MCNC: 2.25 MG/DL (ref 0.66–1.25)
ERYTHROCYTE [DISTWIDTH] IN BLOOD BY AUTOMATED COUNT: 14.4 % (ref 10–15)
GFR SERPL CREATININE-BSD FRML MDRD: 31 ML/MIN/1.73M2
GLUCOSE BLD-MCNC: 97 MG/DL (ref 70–99)
HCT VFR BLD AUTO: 31.8 % (ref 40–53)
HGB BLD-MCNC: 9.4 G/DL (ref 13.3–17.7)
MCH RBC QN AUTO: 27.6 PG (ref 26.5–33)
MCHC RBC AUTO-ENTMCNC: 29.6 G/DL (ref 31.5–36.5)
MCV RBC AUTO: 93 FL (ref 78–100)
PLATELET # BLD AUTO: 189 10E3/UL (ref 150–450)
POTASSIUM BLD-SCNC: 4.1 MMOL/L (ref 3.4–5.3)
RBC # BLD AUTO: 3.41 10E6/UL (ref 4.4–5.9)
SODIUM SERPL-SCNC: 136 MMOL/L (ref 133–144)
TACROLIMUS BLD-MCNC: 9.1 UG/L (ref 5–15)
TME LAST DOSE: NORMAL H
TME LAST DOSE: NORMAL H
WBC # BLD AUTO: 5.3 10E3/UL (ref 4–11)

## 2022-01-29 PROCEDURE — 80048 BASIC METABOLIC PNL TOTAL CA: CPT | Performed by: PATHOLOGY

## 2022-01-29 PROCEDURE — 80197 ASSAY OF TACROLIMUS: CPT | Performed by: INTERNAL MEDICINE

## 2022-01-29 PROCEDURE — 85027 COMPLETE CBC AUTOMATED: CPT | Performed by: PATHOLOGY

## 2022-01-29 PROCEDURE — 86832 HLA CLASS I HIGH DEFIN QUAL: CPT | Performed by: INTERNAL MEDICINE

## 2022-01-29 PROCEDURE — 36415 COLL VENOUS BLD VENIPUNCTURE: CPT | Performed by: PATHOLOGY

## 2022-01-29 PROCEDURE — 86833 HLA CLASS II HIGH DEFIN QUAL: CPT | Performed by: INTERNAL MEDICINE

## 2022-01-31 ENCOUNTER — TELEPHONE (OUTPATIENT)
Dept: TRANSPLANT | Facility: CLINIC | Age: 72
End: 2022-01-31
Payer: MEDICARE

## 2022-01-31 NOTE — TELEPHONE ENCOUNTER
ISSUE  Tac level 9.1, goal 4-6, current dose 0.5 mg BID.  Previous levels at goal on this dose.  Creatinine 2.25, has been variable 1.8-2.5 over the past month with IVF's 3 x weekly.  Nephrology appointment tomorrow @ 1:35      PLAN  Call John:  Confirm good 12 hour trough.  Confirm current dose 0.5 mg BID.  Assess for any new meds? Acute health issues (leonila diarrhea)?  Continue current dose and repeat level this week taking care to get a good 12 hour trough. Will decrease dose with liquid Tac if next level remains high.  Remind John of upcoming nephrology appointment.      OUTCOME  Voice message left with plan above. Requested a call back.    ADDENDUM:  Received a return call from John. He states diarrhea has resolved. He continues to watch what he eats and takes Metamucil 2-3 x's most days. He is hydrating well at home.  He confirms good 12 hour trough, current dose of 0.5 mg BID and no new medications.  Will continue current dose and repeat a level on Saturday.   John confirmed that he is aware of appointment tomorrow and will be available.   Message sent to provider to discuss IVF frequency at upcoming neph appt.

## 2022-02-01 ENCOUNTER — TELEPHONE (OUTPATIENT)
Dept: NEPHROLOGY | Facility: CLINIC | Age: 72
End: 2022-02-01
Payer: MEDICARE

## 2022-02-01 ENCOUNTER — VIRTUAL VISIT (OUTPATIENT)
Dept: NEPHROLOGY | Facility: CLINIC | Age: 72
End: 2022-02-01
Attending: INTERNAL MEDICINE
Payer: MEDICARE

## 2022-02-01 DIAGNOSIS — N18.32 ANEMIA IN STAGE 3B CHRONIC KIDNEY DISEASE (H): ICD-10-CM

## 2022-02-01 DIAGNOSIS — D84.9 IMMUNOSUPPRESSION (H): ICD-10-CM

## 2022-02-01 DIAGNOSIS — N18.32 CHRONIC KIDNEY DISEASE, STAGE 3B (H): ICD-10-CM

## 2022-02-01 DIAGNOSIS — Z94.0 KIDNEY REPLACED BY TRANSPLANT: ICD-10-CM

## 2022-02-01 DIAGNOSIS — E55.9 VITAMIN D DEFICIENCY: ICD-10-CM

## 2022-02-01 DIAGNOSIS — R19.7 DIARRHEA, UNSPECIFIED TYPE: Primary | ICD-10-CM

## 2022-02-01 DIAGNOSIS — N25.81 SECONDARY RENAL HYPERPARATHYROIDISM (H): ICD-10-CM

## 2022-02-01 DIAGNOSIS — I25.10 CAD, MULTIPLE VESSEL: ICD-10-CM

## 2022-02-01 DIAGNOSIS — Z94.0 HTN, KIDNEY TRANSPLANT RELATED: ICD-10-CM

## 2022-02-01 DIAGNOSIS — Z29.89 NEED FOR PNEUMOCYSTIS PROPHYLAXIS: ICD-10-CM

## 2022-02-01 DIAGNOSIS — Z48.298 AFTERCARE FOLLOWING ORGAN TRANSPLANT: ICD-10-CM

## 2022-02-01 DIAGNOSIS — D63.1 ANEMIA IN STAGE 3B CHRONIC KIDNEY DISEASE (H): ICD-10-CM

## 2022-02-01 DIAGNOSIS — I15.1 HTN, KIDNEY TRANSPLANT RELATED: ICD-10-CM

## 2022-02-01 DIAGNOSIS — E78.5 DYSLIPIDEMIA: ICD-10-CM

## 2022-02-01 LAB
DONOR IDENTIFICATION: NORMAL
DSA COMMENTS: NORMAL
DSA PRESENT: NO
DSA TEST METHOD: NORMAL
ORGAN: NORMAL
SA 1 CELL: NORMAL
SA 1 TEST METHOD: NORMAL
SA 2 CELL: NORMAL
SA 2 TEST METHOD: NORMAL
SA1 HI RISK ABY: NORMAL
SA1 MOD RISK ABY: NORMAL
SA2 HI RISK ABY: NORMAL
SA2 MOD RISK ABY: NORMAL
UNACCEPTABLE ANTIGENS: NORMAL
UNOS CPRA: 71
ZZZSA 1  COMMENTS: NORMAL
ZZZSA 2 COMMENTS: NORMAL

## 2022-02-01 PROCEDURE — G0463 HOSPITAL OUTPT CLINIC VISIT: HCPCS | Mod: PN,RTG | Performed by: INTERNAL MEDICINE

## 2022-02-01 PROCEDURE — 99214 OFFICE O/P EST MOD 30 MIN: CPT | Mod: 95 | Performed by: INTERNAL MEDICINE

## 2022-02-01 ASSESSMENT — PAIN SCALES - GENERAL: PAINLEVEL: NO PAIN (0)

## 2022-02-01 NOTE — PROGRESS NOTES
John is a 71 year old who is being evaluated via a billable video visit.      How would you like to obtain your AVS? MyChart  If the video visit is dropped, the invitation should be resent by: Text to cell phone: [phone]  Will anyone else be joining your video visit? No    Video Start Time: 1:36PM  Video-Visit Details    Type of service:  Video Visit    Video End Time:1:57PM    Originating Location (pt. Location): Home    Distant Location (provider location):  [facility] [place]     Platform used for Video Visit: [employer]      TRANSPLANT NEPHROLOGY CHRONIC POST TRANSPLANT VISIT    Assessment & Plan   # LDKT: Increased creatinine with multiple MARCOS episodes due to recent diarrheal illnesses.  Kidney transplant biopsy 10/2021 showed ATI and mild chronic changes, but no acute rejection. May be having supratherapeutic CNI levels due to ongoing diarrhea as well. Unclear if low to mid-2s will be new baseline.   - Baseline Creatinine:  ~ 1.1-1.3   - Proteinuria: Minimal (0.2-0.5 grams)   - Date DSA Last Checked: Oct/2021      Latest DSA: No   - BK Viremia: No   - Kidney Tx Biopsy: Oct 19, 2021; Result: No diagnostic evidence of acute rejection.  Acute tubular injury with mild interstitial fibrosis and tubular atrophy.    # Immunosuppression: Tacrolimus immediate release (goal 4-6) and Azathioprine (dose 100 mg daily)   - Continue with intensive monitoring of immunosuppression for efficacy and toxicity.   - Changes: Yes - Recently changed from MPA to AZA. If diarrhea persists and/or tac levels are high would recommend changing from tac to CSA    # Infection Prophylaxis:   Last CD4 Level: 165 (Sep/2021)  - PJP: Sulfa/TMP (Bactrim)    # Hypertension: Borderline control;  Goal BP: < 130/80   - Changes: Yes - Pt increased carvedilol to 25mg BID (up from 12.5/25)    # Anemia in Chronic Renal Disease: Hgb: Stable      LULY: No   - Iron studies: Not checked recently    # Mineral Bone Disorder:   -  Vitamin D; level: Not checked recently        On supplement: Yes  - Calcium; level: Low        On supplement: No    # Electrolytes:   - Potassium; level: Normal        On supplement: No  - Magnesium; level: Not checked recently        On supplement: No  - Bicarbonate; level: Low        On supplement: Yes - continue    # CAD, s/p PCI: Asymptomatic.    # Chronic Diarrhea: Patient was previously diagnosed with Clostridium difficile and also cryptosporidium.  He was treated for both, but no improvement in his ongoing diarrhea symptoms.  He has been seen by Transplant ID and GI.  Presently doing conservative management and has been getting IVF 3x/wk to prevent dehydration, but his stools have been better until this morning. He is currently at his cabin and drinking well water - we encouraged him to get the water tested. If diarrhea persists, will need to repeat    - Recommend having well water from his cabin checked for crypto   - No contraindications for colonoscopy - recommend proceeding    # Skin Cancer: New lesions: a few   - Discussed sun protection and recommend regular follow up with Dermatology.    # Medical Compliance: Yes    # COVID-19 Virus Review: Discussed COVID-19 virus and the potential medical risks.  Reviewed preventative health recommendations, including wearing a mask where appropriate.  Recommended COVID vaccination should be up to date with either an initial vaccination or booster shot when appropriate.  Asked the patient to inform the transplant center if they are exposed or diagnosed with this virus.    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  Significant transplant-related complications: None    Transplant Office Phone Number: [phone]    Assessment and plan was discussed  with the patient and he voiced his understanding and agreement.    Return visit: Return in about 3 months (around 5/1/2022).    Cornelia Harman MD     Attestation:  This patient has been seen and evaluated by me, Samuel Varela MD.  I have reviewed the note and agree with plan of care as documented by the fellow.       Chief Complaint   Mr. Vee is a 71 year old here for kidney transplant and immunosuppression management.    History of Present Illness   He had been doing really well for the past 2 weeks, but then has had diarrhea again this morning. He feels he ate a heavier meal last night. He's currently up at his cabin and is drinking well water. No blood in the stool. Appetite is okay. He notices that his BP has been slightly higher - 140s/80s - he's increased his carvedilol from 12.5/25 to 25mg BID. He has not had a colonoscopy yet- was waiting for our recommendation regarding undergoing the prep.     He otherwise feels well. No SOB or chest pain. No fevers or chills. No nausea or vomiting. Energy level is good.     Home BP: 130-140s/70-80s    Problem List   Patient Active Problem List   Diagnosis     HTN, kidney transplant related     Membranous glomerulonephritis     Anemia in chronic renal disease     Secondary renal hyperparathyroidism (H)     Vitamin D deficiency     Dyslipidemia     Anxiety     Status post coronary angiogram     CAD, multiple vessel     Multiple vessel coronary artery disease     Kidney replaced by transplant     Immunosuppression (H)     Aftercare following organ transplant     Closed fracture of trochanter of right femur, initial encounter (H)     Impaired fasting glucose     Erectile dysfunction     Old myocardial infarction     Diarrhea     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     carvedilol (COREG) 25 MG tablet Take 0.5 tablets (12.5 mg) by mouth every morning In addition to 25 mg every evening.     ferrous sulfate (FEROSUL) 325 (65 Fe) MG tablet Take 1 tablet (325 mg) by mouth daily (with lunch)     ondansetron (ZOFRAN-ODT) 4 MG ODT tab Take 1 tablet (4 mg) by mouth every 6 hours as needed for nausea or vomiting     PROGRAF (BRAND) 0.5 MG capsule Take 1 capsule (0.5 mg) by mouth 2 times daily     sertraline (ZOLOFT) 25 MG tablet Take 1 tablet (25 mg) by mouth every morning     sildenafil (VIAGRA) 100 MG tablet Take 1 tablet (100 mg) by mouth daily as needed (1/2 as needed 1 hour prior to sexual activity)     sodium bicarbonate 650 MG tablet Take 2 tablets (1,300 mg) by mouth 2 times daily     sulfamethoxazole-trimethoprim (BACTRIM) 400-80 MG tablet Take 1 tablet by mouth Every Mon, Wed, Fri Morning     vitamin D3 (CHOLECALCIFEROL) 50 mcg (2000 units) tablet Take 1 tablet by mouth every morning     No current facility-administered medications for this visit.     There are no discontinued medications.    Physical Exam   Vital Signs: Deferred for this telemedicine visit.    GENERAL APPEARANCE: alert and no distress  HENT: no obvious abnormalities on appearance  RESP: breathing appears unremarkable with normal rate, no audible wheezing or cough and no apparent shortness of breath with conversation  MS: extremities normal - no gross deformities noted  SKIN: no apparent rash and normal skin tone  NEURO: speech is clear with no obvious neurological deficits  PSYCH: mentation appears normal and affect normal    Data     Renal Latest Ref Rng & Units 1/29/2022 1/28/2022 1/27/2022   Na 133 -  144 mmol/L 136 144 142   Na (external) 136 - 145 meq/L - - -   K 3.4 - 5.3 mmol/L 4.1 3.7 4.1   K (external) 3.5 - 5.1 meq/L - - -   Cl 94 - 109 mmol/L 110(H) 111(H) 109   Cl (external) 98 - 109 meq/L - - -   CO2 20 - 32 mmol/L 27 27 27   CO2 (external) 22 - 31 meq/L - - -   BUN 7 - 30 mg/dL 26 29 32(H)   BUN (external) 6 - 22 mg/dL - - -   Cr 0.66 - 1.25 mg/dL 2.25(H) 2.12(H) 2.13(H)   Cr (external) 0.70 - 1.30 mg/dL - - -   Glucose 70 - 99 mg/dL 97 80 90   Glucose (external) 70 - 99 mg/dL - - -   Ca  8.5 - 10.1 mg/dL 8.5 8.1(L) 8.3(L)   Ca (external) 8.5 - 10.5 mg/dL - - -   Mg 1.6 - 2.3 mg/dL - - -     Bone Health Latest Ref Rng & Units 12/24/2021 12/16/2021 8/7/2021   Phos 2.5 - 4.5 mg/dL - 2.8 2.7   PTHi 12 - 72 pg/mL - - -   Vit D Def 20 - 75 ug/L 26 - -     Heme Latest Ref Rng & Units 1/29/2022 1/19/2022 1/13/2022   WBC 4.0 - 11.0 10e3/uL 5.3 4.4 4.2   WBC (external) 4.0 - 11.0 10*9/L - - -   Hgb 13.3 - 17.7 g/dL 9.4(L) 9.5(L) 8.0(L)   Hgb (external) 13.3 - 17.7 g/dL - - -   Plt 150 - 450 10e3/uL 189 228 170   Plt (external) 150 - 450 10*9/L - - -   ABSOLUTE NEUTROPHIL 1.6 - 8.3 10e9/L - - -   ABSOLUTE LYMPHOCYTES 0.8 - 5.3 10e9/L - - -   ABSOLUTE MONOCYTES 0.0 - 1.3 10e9/L - - -   ABSOLUTE EOSINOPHILS 0.0 - 0.7 10e9/L - - -   ABSOLUTE BASOPHILS 0.0 - 0.2 10e9/L - - -   ABS IMMATURE GRANULOCYTES 0 - 0.4 10e9/L - - -   ABSOLUTE NUCLEATED RBC - - - -     Liver Latest Ref Rng & Units 12/16/2021 8/7/2021 12/9/2020   AP 40 - 150 U/L - 83 -   TBili 0.2 - 1.3 mg/dL - 0.8 -   DBili 0.0 - 0.2 mg/dL - - -   ALT 0 - 70 U/L - 17 -   AST 0 - 45 U/L - 10 -   Tot Protein 6.8 - 8.8 g/dL - 7.5 -   Albumin 3.4 - 5.0 g/dL 3.6 3.7 2.8(L)     Pancreas Latest Ref Rng & Units 5/6/2021   A1C 0 - 5.6 % 5.7(H)     Iron studies Latest Ref Rng & Units 12/24/2021 12/16/2021 1/28/2016   Iron 35 - 180 ug/dL 44 54 62   Iron sat 15 - 46 % 24 - 34   Ferritin 26 - 388 ng/mL 255 - 787(H)     [facility] Txp Virology Latest Ref Rng & Units 1/19/2022  12/16/2021 8/7/2021   CVM DNA Quant - - - -   CMV QUANT IU/ML Not Detected IU/mL - Not Detected Not Detected   LOG IU/ML OF CMVQNT <2.1 [Log:IU]/mL - - -   BK Spec - - - -   BK Res BKNEG:BK Virus DNA Not Detected copies/mL - - -   BK Log <2.7 Log copies/mL - - -   EBV CAPSID ANTIBODY IGG 0.0 - 0.8 AI - - -   EBV DNA LOG OF COPIES - 3.9 4.4 -   Hep B Core NR - - -        Recent Labs   Lab Test 11/17/21  1119 11/24/21  1125 01/29/22  0907   DOSTAC 11/16/2021 11/23/2021 1/28/2022   TACROL 6.9 5.6 9.1     Recent Labs   Lab Test 04/25/16  0826 05/03/16  0853 08/09/21  1043   DOSMPA 4.24.2016 2030 2,030 8/8/2021   8:30 PM   MPACID 4.01* 3.81* 0.81*   MPAG 46.6 38.6 48.0

## 2022-02-01 NOTE — LETTER
2/1/2022      RE: Marlo Vee  [address] [facility] [place] [address]       John is a 71 year old who is being evaluated via a billable video visit.      How would you like to obtain your AVS? MyChart  If the video visit is dropped, the invitation should be resent by: Text to cell phone: [phone]  Will anyone else be joining your video visit? No    Video Start Time: 1:36PM  Video-Visit Details    Type of service:  Video Visit    Video End Time:1:57PM    Originating Location (pt. Location): Home    Distant Location (provider location):  [facility] [place]     Platform used for Video Visit: [facility]      TRANSPLANT NEPHROLOGY CHRONIC POST TRANSPLANT VISIT    Assessment & Plan   # LDKT: Increased creatinine with multiple MARCOS episodes due to recent diarrheal illnesses.  Kidney transplant biopsy 10/2021 showed ATI and mild chronic changes, but no acute rejection. May be having supratherapeutic CNI levels due to ongoing diarrhea as well. Unclear if low to mid-2s will be new baseline.   - Baseline Creatinine:  ~ 1.1-1.3   - Proteinuria: Minimal (0.2-0.5 grams)   - Date DSA Last Checked: Oct/2021      Latest DSA: No   - BK Viremia: No   - Kidney Tx Biopsy: Oct 19, 2021; Result: No diagnostic evidence of acute rejection.  Acute tubular injury with mild interstitial fibrosis and tubular atrophy.    # Immunosuppression: Tacrolimus immediate release (goal 4-6) and Azathioprine (dose 100 mg daily)   - Continue with intensive monitoring of immunosuppression for efficacy and toxicity.   - Changes: Yes - Recently changed from MPA to AZA. If diarrhea persists and/or tac levels are high would recommend changing from tac to CSA    # Infection Prophylaxis:   Last CD4 Level: 165 (Sep/2021)  - PJP: Sulfa/TMP (Bactrim)    # Hypertension: Borderline control;  Goal BP: < 130/80   - Changes: Yes - Pt increased carvedilol to 25mg BID (up from 12.5/25)    # Anemia in Chronic Renal Disease: Hgb: Stable       LULY: No   - Iron studies: Not checked recently    # Mineral Bone Disorder:   - Vitamin D; level: Not checked recently        On supplement: Yes  - Calcium; level: Low        On supplement: No    # Electrolytes:   - Potassium; level: Normal        On supplement: No  - Magnesium; level: Not checked recently        On supplement: No  - Bicarbonate; level: Low        On supplement: Yes - continue    # CAD, s/p PCI: Asymptomatic.    # Chronic Diarrhea: Patient was previously diagnosed with Clostridium difficile and also cryptosporidium.  He was treated for both, but no improvement in his ongoing diarrhea symptoms.  He has been seen by Transplant ID and GI.  Presently doing conservative management and has been getting IVF 3x/wk to prevent dehydration, but his stools have been better until this morning. He is currently at his cabin and drinking well water - we encouraged him to get the water tested. If diarrhea persists, will need to repeat    - Recommend having well water from his cabin checked for crypto   - No contraindications for colonoscopy - recommend proceeding    # Skin Cancer: New lesions: a few   - Discussed sun protection and recommend regular follow up with Dermatology.    # Medical Compliance: Yes    # COVID-19 Virus Review: Discussed COVID-19 virus and the potential medical risks.  Reviewed preventative health recommendations, including wearing a mask where appropriate.  Recommended COVID vaccination should be up to date with either an initial vaccination or booster shot when appropriate.  Asked the patient to inform the transplant center if they are exposed or diagnosed with this virus.    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  Significant transplant-related complications:  None    Transplant Office Phone Number: [phone]    Assessment and plan was discussed with the patient and he voiced his understanding and agreement.    Return visit: Return in about 3 months (around 5/1/2022).    Cornelia Harman MD     Attestation:  This patient has been seen and evaluated by me, Samuel Varela MD.  I have reviewed the note and agree with plan of care as documented by the fellow.       Chief Complaint   Mr. Vee is a 71 year old here for kidney transplant and immunosuppression management.    History of Present Illness   He had been doing really well for the past 2 weeks, but then has had diarrhea again this morning. He feels he ate a heavier meal last night. He's currently up at his cabin and is drinking well water. No blood in the stool. Appetite is okay. He notices that his BP has been slightly higher - 140s/80s - he's increased his carvedilol from 12.5/25 to 25mg BID. He has not had a colonoscopy yet- was waiting for our recommendation regarding undergoing the prep.     He otherwise feels well. No SOB or chest pain. No fevers or chills. No nausea or vomiting. Energy level is good.     Home BP: 130-140s/70-80s    Problem List   Patient Active Problem List   Diagnosis     HTN, kidney transplant related     Membranous glomerulonephritis     Anemia in chronic renal disease     Secondary renal hyperparathyroidism (H)     Vitamin D deficiency     Dyslipidemia     Anxiety     Status post coronary angiogram     CAD, multiple vessel     Multiple vessel coronary artery disease     Kidney replaced by transplant     Immunosuppression (H)     Aftercare following organ transplant     Closed fracture of trochanter of right femur, initial encounter (H)     Impaired fasting glucose     Erectile dysfunction     Old myocardial infarction     Diarrhea     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     carvedilol (COREG) 25 MG tablet Take 0.5 tablets (12.5 mg) by mouth every morning In addition to 25 mg every evening.     ferrous sulfate (FEROSUL) 325 (65 Fe) MG tablet Take 1 tablet (325 mg) by mouth daily (with lunch)     ondansetron (ZOFRAN-ODT) 4 MG ODT tab Take 1 tablet (4 mg) by mouth every 6 hours as needed for nausea or vomiting     PROGRAF (BRAND) 0.5 MG capsule Take 1 capsule (0.5 mg) by mouth 2 times daily     sertraline (ZOLOFT) 25 MG tablet Take 1 tablet (25 mg) by mouth every morning     sildenafil (VIAGRA) 100 MG tablet Take 1 tablet (100 mg) by mouth daily as needed (1/2 as needed 1 hour prior to sexual activity)     sodium bicarbonate 650 MG tablet Take 2 tablets (1,300 mg) by mouth 2 times daily     sulfamethoxazole-trimethoprim (BACTRIM) 400-80 MG tablet Take 1 tablet by mouth Every Mon, Wed, Fri Morning     vitamin D3 (CHOLECALCIFEROL) 50 mcg (2000 units) tablet Take 1 tablet by mouth every morning     No current facility-administered medications for this visit.     There are no discontinued medications.    Physical Exam   Vital Signs: Deferred for this telemedicine visit.    GENERAL APPEARANCE: alert and no distress  HENT: no obvious abnormalities on appearance  RESP: breathing appears unremarkable with normal rate, no audible wheezing or cough and no apparent shortness of breath with conversation  MS: extremities normal - no gross deformities noted  SKIN: no apparent rash and normal skin tone  NEURO: speech is clear with no obvious neurological deficits  PSYCH: mentation appears normal and affect  normal    Data     Renal Latest Ref Rng & Units 1/29/2022 1/28/2022 1/27/2022   Na 133 - 144 mmol/L 136 144 142   Na (external) 136 - 145 meq/L - - -   K 3.4 - 5.3 mmol/L 4.1 3.7 4.1   K (external) 3.5 - 5.1 meq/L - - -   Cl 94 - 109 mmol/L 110(H) 111(H) 109   Cl (external) 98 - 109 meq/L - - -   CO2 20 - 32 mmol/L 27 27 27   CO2 (external) 22 - 31 meq/L - - -   BUN 7 - 30 mg/dL 26 29 32(H)   BUN (external) 6 - 22 mg/dL - - -   Cr 0.66 - 1.25 mg/dL 2.25(H) 2.12(H) 2.13(H)   Cr (external) 0.70 - 1.30 mg/dL - - -   Glucose 70 - 99 mg/dL 97 80 90   Glucose (external) 70 - 99 mg/dL - - -   Ca  8.5 - 10.1 mg/dL 8.5 8.1(L) 8.3(L)   Ca (external) 8.5 - 10.5 mg/dL - - -   Mg 1.6 - 2.3 mg/dL - - -     Bone Health Latest Ref Rng & Units 12/24/2021 12/16/2021 8/7/2021   Phos 2.5 - 4.5 mg/dL - 2.8 2.7   PTHi 12 - 72 pg/mL - - -   Vit D Def 20 - 75 ug/L 26 - -     Heme Latest Ref Rng & Units 1/29/2022 1/19/2022 1/13/2022   WBC 4.0 - 11.0 10e3/uL 5.3 4.4 4.2   WBC (external) 4.0 - 11.0 10*9/L - - -   Hgb 13.3 - 17.7 g/dL 9.4(L) 9.5(L) 8.0(L)   Hgb (external) 13.3 - 17.7 g/dL - - -   Plt 150 - 450 10e3/uL 189 228 170   Plt (external) 150 - 450 10*9/L - - -   ABSOLUTE NEUTROPHIL 1.6 - 8.3 10e9/L - - -   ABSOLUTE LYMPHOCYTES 0.8 - 5.3 10e9/L - - -   ABSOLUTE MONOCYTES 0.0 - 1.3 10e9/L - - -   ABSOLUTE EOSINOPHILS 0.0 - 0.7 10e9/L - - -   ABSOLUTE BASOPHILS 0.0 - 0.2 10e9/L - - -   ABS IMMATURE GRANULOCYTES 0 - 0.4 10e9/L - - -   ABSOLUTE NUCLEATED RBC - - - -     Liver Latest Ref Rng & Units 12/16/2021 8/7/2021 12/9/2020   AP 40 - 150 U/L - 83 -   TBili 0.2 - 1.3 mg/dL - 0.8 -   DBili 0.0 - 0.2 mg/dL - - -   ALT 0 - 70 U/L - 17 -   AST 0 - 45 U/L - 10 -   Tot Protein 6.8 - 8.8 g/dL - 7.5 -   Albumin 3.4 - 5.0 g/dL 3.6 3.7 2.8(L)     Pancreas Latest Ref Rng & Units 5/6/2021   A1C 0 - 5.6 % 5.7(H)     Iron studies Latest Ref Rng & Units 12/24/2021 12/16/2021 1/28/2016   Iron 35 - 180 ug/dL 44 54 62   Iron sat 15 - 46 % 24 - 34    Ferritin 26 - 388 ng/mL 255 - 787(H)     [facility] Txp Virology Latest Ref Rng & Units 1/19/2022 12/16/2021 8/7/2021   CVM DNA Quant - - - -   CMV QUANT IU/ML Not Detected IU/mL - Not Detected Not Detected   LOG IU/ML OF CMVQNT <2.1 [Log:IU]/mL - - -   BK Spec - - - -   BK Res BKNEG:BK Virus DNA Not Detected copies/mL - - -   BK Log <2.7 Log copies/mL - - -   EBV CAPSID ANTIBODY IGG 0.0 - 0.8 AI - - -   EBV DNA LOG OF COPIES - 3.9 4.4 -   Hep B Core NR - - -        Recent Labs   Lab Test 11/17/21  1119 11/24/21  1125 01/29/22  0907   DOSTAC 11/16/2021 11/23/2021 1/28/2022   TACROL 6.9 5.6 9.1     Recent Labs   Lab Test 04/25/16  0826 05/03/16  0853 08/09/21  1043   DOSMPA 4.24.2016 2030 2,030 8/8/2021   8:30 PM   MPACID 4.01* 3.81* 0.81*   MPAG 46.6 38.6 48.0       Samuel Varela MD

## 2022-02-01 NOTE — TELEPHONE ENCOUNTER
----- Message from Zhane Chavira RN sent at 2/1/2022  8:44 AM CST -----  Regarding: FW: Infusion frequency    ----- Message -----  From: Adilia Richardson RN  Sent: 1/31/2022   2:35 PM CST  To: Zhane Chavira RN  Subject: RE: Infusion frequency                           Hi,  Thanks for reaching out. John has a nephrology appointment with Dr. Varela tomorrow. I will send him a reminder to discuss diarrhea and IVF frequency. I will let you know after his appointment. For now he can continue 3 x weekly.  Thanks,  Onelia Richardson RN, BSN  Post Kid[facility]/Pancreas Transplant Coordinator  [phone]    ----- Message -----  From: Zhane Chavira RN  Sent: 1/28/2022   7:01 AM CST  To: Samuel Varela MD, Adilia Richardson RN  Subject: Infusion frequency                               Good morning,     John is still doing 3x/wk 1 L NS infusions with BMP post. He's reported his diarrhea has greatly improved but his creatinine is on the rise again. Should we continue the 3x/wk infusions or decrease frequency?    Thanks  HEATHER De Paz  [facility]

## 2022-02-01 NOTE — LETTER
2/1/2022     RE: Marlo Vee  [address] [facility] [place] [address]     Dear Colleague,    Thank you for referring your patient, Marlo Vee, to the [facility] [place] at [facility]. Please see a copy of my visit note below.    John is a 71 year old who is being evaluated via a billable video visit.      How would you like to obtain your AVS? MyChart  If the video visit is dropped, the invitation should be resent by: Text to cell phone: [phone]  Will anyone else be joining your video visit? No    Video Start Time: 1:36PM  Video-Visit Details    Type of service:  Video Visit    Video End Time:1:57PM    Originating Location (pt. Location): Home    Distant Location (provider location):  [facility] [place]     Platform used for Video Visit: [facility]      TRANSPLANT NEPHROLOGY CHRONIC POST TRANSPLANT VISIT    Assessment & Plan   # LDKT: Increased creatinine with multiple MARCOS episodes due to recent diarrheal illnesses.  Kidney transplant biopsy 10/2021 showed ATI and mild chronic changes, but no acute rejection. May be having supratherapeutic CNI levels due to ongoing diarrhea as well. Unclear if low to mid-2s will be new baseline.   - Baseline Creatinine:  ~ 1.1-1.3   - Proteinuria: Minimal (0.2-0.5 grams)   - Date DSA Last Checked: Oct/2021      Latest DSA: No   - BK Viremia: No   - Kidney Tx Biopsy: Oct 19, 2021; Result: No diagnostic evidence of acute rejection.  Acute tubular injury with mild interstitial fibrosis and tubular atrophy.    # Immunosuppression: Tacrolimus immediate release (goal 4-6) and Azathioprine (dose 100 mg daily)   - Continue with intensive monitoring of immunosuppression for efficacy and toxicity.   - Changes: Yes - Recently changed from MPA to AZA. If diarrhea persists and/or tac levels are high would recommend changing from tac to CSA    # Infection Prophylaxis:   Last  CD4 Level: 165 (Sep/2021)  - PJP: Sulfa/TMP (Bactrim)    # Hypertension: Borderline control;  Goal BP: < 130/80   - Changes: Yes - Pt increased carvedilol to 25mg BID (up from 12.5/25)    # Anemia in Chronic Renal Disease: Hgb: Stable      LULY: No   - Iron studies: Not checked recently    # Mineral Bone Disorder:   - Vitamin D; level: Not checked recently        On supplement: Yes  - Calcium; level: Low        On supplement: No    # Electrolytes:   - Potassium; level: Normal        On supplement: No  - Magnesium; level: Not checked recently        On supplement: No  - Bicarbonate; level: Low        On supplement: Yes - continue    # CAD, s/p PCI: Asymptomatic.    # Chronic Diarrhea: Patient was previously diagnosed with Clostridium difficile and also cryptosporidium.  He was treated for both, but no improvement in his ongoing diarrhea symptoms.  He has been seen by Transplant ID and GI.  Presently doing conservative management and has been getting IVF 3x/wk to prevent dehydration, but his stools have been better until this morning. He is currently at his cabin and drinking well water - we encouraged him to get the water tested. If diarrhea persists, will need to repeat    - Recommend having well water from his cabin checked for crypto   - No contraindications for colonoscopy - recommend proceeding    # Skin Cancer: New lesions: a few   - Discussed sun protection and recommend regular follow up with Dermatology.    # Medical Compliance: Yes    # COVID-19 Virus Review: Discussed COVID-19 virus and the potential medical risks.  Reviewed preventative health recommendations, including wearing a mask where appropriate.  Recommended COVID vaccination should be up to date with either an initial vaccination or booster shot when appropriate.  Asked the patient to inform the transplant center if they are exposed or diagnosed with this virus.    # COVID Vaccination Up To Date: Yes    # Transplant History:  Etiology of Kidney  Failure: Membranous nephropathy ([place])  Tx: LDKT  Transplant: 1/21/2016 (Kidney)  Significant changes in immunosuppression: Changes off mycophenolate to azathioprine due to diarrhea.  Significant transplant-related complications: None    Transplant Office Phone Number: [phone]    Assessment and plan was discussed with the patient and he voiced his understanding and agreement.    Return visit: Return in about 3 months (around 5/1/2022).    Cornelia Harman MD     Attestation:  This patient has been seen and evaluated by me, Samuel Varela MD.  I have reviewed the note and agree with plan of care as documented by the fellow.       Chief Complaint   Mr. Vee is a 71 year old here for kidney transplant and immunosuppression management.    History of Present Illness   He had been doing really well for the past 2 weeks, but then has had diarrhea again this morning. He feels he ate a heavier meal last night. He's currently up at his cabin and is drinking well water. No blood in the stool. Appetite is okay. He notices that his BP has been slightly higher - 140s/80s - he's increased his carvedilol from 12.5/25 to 25mg BID. He has not had a colonoscopy yet- was waiting for our recommendation regarding undergoing the prep.     He otherwise feels well. No SOB or chest pain. No fevers or chills. No nausea or vomiting. Energy level is good.     Home BP: 130-140s/70-80s    Problem List   Patient Active Problem List   Diagnosis     HTN, kidney transplant related     Membranous glomerulonephritis     Anemia in chronic renal disease     Secondary renal hyperparathyroidism (H)     Vitamin D deficiency     Dyslipidemia     Anxiety     Status post coronary angiogram     CAD, multiple vessel     Multiple vessel coronary artery disease     Kidney replaced by transplant     Immunosuppression (H)     Aftercare following organ transplant     Closed fracture of trochanter of right femur, initial encounter (H)     Impaired  fasting glucose     Erectile dysfunction     Old myocardial infarction     Diarrhea     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     carvedilol (COREG) 25 MG tablet Take 0.5 tablets (12.5 mg) by mouth every morning In addition to 25 mg every evening.     ferrous sulfate (FEROSUL) 325 (65 Fe) MG tablet Take 1 tablet (325 mg) by mouth daily (with lunch)     ondansetron (ZOFRAN-ODT) 4 MG ODT tab Take 1 tablet (4 mg) by mouth every 6 hours as needed for nausea or vomiting     PROGRAF (BRAND) 0.5 MG capsule Take 1 capsule (0.5 mg) by mouth 2 times daily     sertraline (ZOLOFT) 25 MG tablet Take 1 tablet (25 mg) by mouth every morning     sildenafil (VIAGRA) 100 MG tablet Take 1 tablet (100 mg) by mouth daily as needed (1/2 as needed 1 hour prior to sexual activity)     sodium bicarbonate 650 MG tablet Take 2 tablets (1,300 mg) by mouth 2 times daily     sulfamethoxazole-trimethoprim (BACTRIM) 400-80 MG tablet Take 1 tablet by mouth Every Mon, Wed, Fri Morning     vitamin D3 (CHOLECALCIFEROL) 50 mcg (2000 units) tablet Take 1 tablet by mouth every morning     No current facility-administered medications for this visit.     There are no discontinued medications.    Physical Exam   Vital Signs: Deferred for this telemedicine visit.    GENERAL APPEARANCE: alert and no distress  HENT: no obvious abnormalities on appearance  RESP: breathing appears unremarkable with normal rate, no audible wheezing or cough and no apparent shortness  of breath with conversation  MS: extremities normal - no gross deformities noted  SKIN: no apparent rash and normal skin tone  NEURO: speech is clear with no obvious neurological deficits  PSYCH: mentation appears normal and affect normal    Data     Renal Latest Ref Rng & Units 1/29/2022 1/28/2022 1/27/2022   Na 133 - 144 mmol/L 136 144 142   Na (external) 136 - 145 meq/L - - -   K 3.4 - 5.3 mmol/L 4.1 3.7 4.1   K (external) 3.5 - 5.1 meq/L - - -   Cl 94 - 109 mmol/L 110(H) 111(H) 109   Cl (external) 98 - 109 meq/L - - -   CO2 20 - 32 mmol/L 27 27 27   CO2 (external) 22 - 31 meq/L - - -   BUN 7 - 30 mg/dL 26 29 32(H)   BUN (external) 6 - 22 mg/dL - - -   Cr 0.66 - 1.25 mg/dL 2.25(H) 2.12(H) 2.13(H)   Cr (external) 0.70 - 1.30 mg/dL - - -   Glucose 70 - 99 mg/dL 97 80 90   Glucose (external) 70 - 99 mg/dL - - -   Ca  8.5 - 10.1 mg/dL 8.5 8.1(L) 8.3(L)   Ca (external) 8.5 - 10.5 mg/dL - - -   Mg 1.6 - 2.3 mg/dL - - -     Bone Health Latest Ref Rng & Units 12/24/2021 12/16/2021 8/7/2021   Phos 2.5 - 4.5 mg/dL - 2.8 2.7   PTHi 12 - 72 pg/mL - - -   Vit D Def 20 - 75 ug/L 26 - -     Heme Latest Ref Rng & Units 1/29/2022 1/19/2022 1/13/2022   WBC 4.0 - 11.0 10e3/uL 5.3 4.4 4.2   WBC (external) 4.0 - 11.0 10*9/L - - -   Hgb 13.3 - 17.7 g/dL 9.4(L) 9.5(L) 8.0(L)   Hgb (external) 13.3 - 17.7 g/dL - - -   Plt 150 - 450 10e3/uL 189 228 170   Plt (external) 150 - 450 10*9/L - - -   ABSOLUTE NEUTROPHIL 1.6 - 8.3 10e9/L - - -   ABSOLUTE LYMPHOCYTES 0.8 - 5.3 10e9/L - - -   ABSOLUTE MONOCYTES 0.0 - 1.3 10e9/L - - -   ABSOLUTE EOSINOPHILS 0.0 - 0.7 10e9/L - - -   ABSOLUTE BASOPHILS 0.0 - 0.2 10e9/L - - -   ABS IMMATURE GRANULOCYTES 0 - 0.4 10e9/L - - -   ABSOLUTE NUCLEATED RBC - - - -     Liver Latest Ref Rng & Units 12/16/2021 8/7/2021 12/9/2020   AP 40 - 150 U/L - 83 -   TBili 0.2 - 1.3 mg/dL - 0.8 -   DBili 0.0 - 0.2 mg/dL - - -   ALT 0 - 70 U/L - 17 -   AST 0 - 45 U/L - 10 -   Tot Protein 6.8 - 8.8 g/dL - 7.5 -   Albumin 3.4 -  5.0 g/dL 3.6 3.7 2.8(L)     Pancreas Latest Ref Rng & Units 5/6/2021   A1C 0 - 5.6 % 5.7(H)     Iron studies Latest Ref Rng & Units 12/24/2021 12/16/2021 1/28/2016   Iron 35 - 180 ug/dL 44 54 62   Iron sat 15 - 46 % 24 - 34   Ferritin 26 - 388 ng/mL 255 - 787(H)     [facility] Txp Virology Latest Ref Rng & Units 1/19/2022 12/16/2021 8/7/2021   CVM DNA Quant - - - -   CMV QUANT IU/ML Not Detected IU/mL - Not Detected Not Detected   LOG IU/ML OF CMVQNT <2.1 [Log:IU]/mL - - -   BK Spec - - - -   BK Res BKNEG:BK Virus DNA Not Detected copies/mL - - -   BK Log <2.7 Log copies/mL - - -   EBV CAPSID ANTIBODY IGG 0.0 - 0.8 AI - - -   EBV DNA LOG OF COPIES - 3.9 4.4 -   Hep B Core NR - - -        Recent Labs   Lab Test 11/17/21  1119 11/24/21  1125 01/29/22  0907   DOSTAC 11/16/2021 11/23/2021 1/28/2022   TACROL 6.9 5.6 9.1     Recent Labs   Lab Test 04/25/16  0826 05/03/16  0853 08/09/21  1043   DOSMPA 4.24.2016 2030 2,030 8/8/2021   8:30 PM   MPACID 4.01* 3.81* 0.81*   MPAG 46.6 38.6 48.0     Again, thank you for allowing me to participate in the care of your patient.      Sincerely,    Samuel Varela MD

## 2022-02-03 ENCOUNTER — INFUSION THERAPY VISIT (OUTPATIENT)
Dept: INFUSION THERAPY | Facility: CLINIC | Age: 72
End: 2022-02-03
Attending: INTERNAL MEDICINE
Payer: MEDICARE

## 2022-02-03 VITALS
DIASTOLIC BLOOD PRESSURE: 96 MMHG | SYSTOLIC BLOOD PRESSURE: 146 MMHG | RESPIRATION RATE: 16 BRPM | OXYGEN SATURATION: 100 % | HEART RATE: 68 BPM | TEMPERATURE: 97.5 F

## 2022-02-03 DIAGNOSIS — Z94.0 KIDNEY REPLACED BY TRANSPLANT: ICD-10-CM

## 2022-02-03 DIAGNOSIS — R19.7 DIARRHEA OF PRESUMED INFECTIOUS ORIGIN: Primary | ICD-10-CM

## 2022-02-03 LAB
ANION GAP SERPL CALCULATED.3IONS-SCNC: 3 MMOL/L (ref 3–14)
BUN SERPL-MCNC: 29 MG/DL (ref 7–30)
CALCIUM SERPL-MCNC: 8.2 MG/DL (ref 8.5–10.1)
CHLORIDE BLD-SCNC: 113 MMOL/L (ref 94–109)
CO2 SERPL-SCNC: 26 MMOL/L (ref 20–32)
CREAT SERPL-MCNC: 1.93 MG/DL (ref 0.66–1.25)
GFR SERPL CREATININE-BSD FRML MDRD: 37 ML/MIN/1.73M2
GLUCOSE BLD-MCNC: 91 MG/DL (ref 70–99)
POTASSIUM BLD-SCNC: 4.5 MMOL/L (ref 3.4–5.3)
SODIUM SERPL-SCNC: 142 MMOL/L (ref 133–144)

## 2022-02-03 PROCEDURE — 96360 HYDRATION IV INFUSION INIT: CPT

## 2022-02-03 PROCEDURE — 82310 ASSAY OF CALCIUM: CPT

## 2022-02-03 PROCEDURE — 258N000003 HC RX IP 258 OP 636: Performed by: INTERNAL MEDICINE

## 2022-02-03 PROCEDURE — 36415 COLL VENOUS BLD VENIPUNCTURE: CPT

## 2022-02-03 RX ORDER — ALBUTEROL SULFATE 90 UG/1
1-2 AEROSOL, METERED RESPIRATORY (INHALATION)
Status: CANCELLED
Start: 2022-02-10

## 2022-02-03 RX ORDER — MEPERIDINE HYDROCHLORIDE 25 MG/ML
25 INJECTION INTRAMUSCULAR; INTRAVENOUS; SUBCUTANEOUS EVERY 30 MIN PRN
Status: CANCELLED | OUTPATIENT
Start: 2022-02-10

## 2022-02-03 RX ORDER — DIPHENHYDRAMINE HYDROCHLORIDE 50 MG/ML
50 INJECTION INTRAMUSCULAR; INTRAVENOUS
Status: CANCELLED
Start: 2022-02-10

## 2022-02-03 RX ORDER — NALOXONE HYDROCHLORIDE 0.4 MG/ML
0.2 INJECTION, SOLUTION INTRAMUSCULAR; INTRAVENOUS; SUBCUTANEOUS
Status: CANCELLED | OUTPATIENT
Start: 2022-02-10

## 2022-02-03 RX ORDER — METHYLPREDNISOLONE SODIUM SUCCINATE 125 MG/2ML
125 INJECTION, POWDER, LYOPHILIZED, FOR SOLUTION INTRAMUSCULAR; INTRAVENOUS
Status: CANCELLED
Start: 2022-02-10

## 2022-02-03 RX ORDER — ALBUTEROL SULFATE 0.83 MG/ML
2.5 SOLUTION RESPIRATORY (INHALATION)
Status: CANCELLED | OUTPATIENT
Start: 2022-02-10

## 2022-02-03 RX ORDER — EPINEPHRINE 1 MG/ML
0.3 INJECTION, SOLUTION INTRAMUSCULAR; SUBCUTANEOUS EVERY 5 MIN PRN
Status: CANCELLED | OUTPATIENT
Start: 2022-02-10

## 2022-02-03 RX ORDER — HEPARIN SODIUM,PORCINE 10 UNIT/ML
5 VIAL (ML) INTRAVENOUS
Status: CANCELLED | OUTPATIENT
Start: 2022-02-10

## 2022-02-03 RX ORDER — HEPARIN SODIUM (PORCINE) LOCK FLUSH IV SOLN 100 UNIT/ML 100 UNIT/ML
5 SOLUTION INTRAVENOUS
Status: CANCELLED | OUTPATIENT
Start: 2022-02-10

## 2022-02-03 RX ADMIN — SODIUM CHLORIDE 1000 ML: 9 INJECTION, SOLUTION INTRAVENOUS at 16:20

## 2022-02-03 NOTE — PROGRESS NOTES
"Chief Complaint   Patient presents with     Infusion     IV Fluids     Infusion Nursing Note:  Marlo Vee presents today for IV fluids.    Patient seen by provider today: No   present during visit today: Not Applicable.    Note: 1 L NS given over 1 hour.    Labs: BMP drawn post fluids.    Intravenous Access:  Peripheral IV placed.    Treatment Conditions:  Not Applicable.      Post Infusion Assessment:  Patient tolerated infusion without incident.  Site patent and intact, free from redness, edema or discomfort.  No evidence of extravasations.  Access discontinued per protocol.       Discharge Plan:   AVS to patient via MYCHART.  Patient will return tomorrow for next appointment.   Patient discharged in stable condition accompanied by: self.  Departure Mode: Ambulatory.      Administrations This Visit     0.9% sodium chloride BOLUS     Admin Date  02/03/2022 Action  New Bag Dose  1,000 mL Route  Intravenous Administered By  Zhane Chavira RN                Vital signs:  Temp: 97.5  F (36.4  C) Temp src: Oral BP: (!) 144/90 (asymptomatic) Pulse: 68   Resp: 18 SpO2: 100 % O2 Device: None (Room air)        Estimated body mass index is 20.59 kg/m  as calculated from the following:    Height as of 9/24/21: 1.778 m (5' 10\").    Weight as of 9/24/21: 65.1 kg (143 lb 8 oz).                              "

## 2022-02-03 NOTE — LETTER
"    2/3/2022         RE: Marlo Vee  [address] [facility] [address]        Dear Colleague,    Thank you for referring your patient, Marlo Vee, to the [facility]. Please see a copy of my visit note below.    Chief Complaint   Patient presents with     Infusion     IV Fluids     Infusion Nursing Note:  Marlo Vee presents today for IV fluids.    Patient seen by provider today: No   present during visit today: Not Applicable.    Note: 1 L NS given over 1 hour.    Labs: BMP drawn post fluids.    Intravenous Access:  Peripheral IV placed.    Treatment Conditions:  Not Applicable.      Post Infusion Assessment:  Patient tolerated infusion without incident.  Site patent and intact, free from redness, edema or discomfort.  No evidence of extravasations.  Access discontinued per protocol.       Discharge Plan:   AVS to patient via MYCHART.  Patient will return tomorrow for next appointment.   Patient discharged in stable condition accompanied by: self.  Departure Mode: Ambulatory.      Administrations This Visit     0.9% sodium chloride BOLUS     Admin Date  02/03/2022 Action  New Bag Dose  1,000 mL Route  Intravenous Administered By  Zhane Chavira RN                Vital signs:  Temp: 97.5  F (36.4  C) Temp src: Oral BP: (!) 144/90 (asymptomatic) Pulse: 68   Resp: 18 SpO2: 100 % O2 Device: None (Room air)        Estimated body mass index is 20.59 kg/m  as calculated from the following:    Height as of 9/24/21: 1.778 m (5' 10\").    Weight as of 9/24/21: 65.1 kg (143 lb 8 oz).                                  Again, thank you for allowing me to participate in the care of your patient.        Sincerely,        [facility]    "

## 2022-02-04 ENCOUNTER — INFUSION THERAPY VISIT (OUTPATIENT)
Dept: INFUSION THERAPY | Facility: CLINIC | Age: 72
End: 2022-02-04
Attending: INTERNAL MEDICINE
Payer: MEDICARE

## 2022-02-04 VITALS
RESPIRATION RATE: 16 BRPM | SYSTOLIC BLOOD PRESSURE: 122 MMHG | OXYGEN SATURATION: 99 % | TEMPERATURE: 97.5 F | DIASTOLIC BLOOD PRESSURE: 74 MMHG | HEART RATE: 83 BPM

## 2022-02-04 DIAGNOSIS — Z94.0 KIDNEY REPLACED BY TRANSPLANT: ICD-10-CM

## 2022-02-04 DIAGNOSIS — R19.7 DIARRHEA OF PRESUMED INFECTIOUS ORIGIN: Primary | ICD-10-CM

## 2022-02-04 LAB
ANION GAP SERPL CALCULATED.3IONS-SCNC: 5 MMOL/L (ref 3–14)
BUN SERPL-MCNC: 24 MG/DL (ref 7–30)
CALCIUM SERPL-MCNC: 6.5 MG/DL (ref 8.5–10.1)
CHLORIDE BLD-SCNC: 121 MMOL/L (ref 94–109)
CO2 SERPL-SCNC: 20 MMOL/L (ref 20–32)
CREAT SERPL-MCNC: 1.37 MG/DL (ref 0.66–1.25)
GFR SERPL CREATININE-BSD FRML MDRD: 56 ML/MIN/1.73M2
GLUCOSE BLD-MCNC: 71 MG/DL (ref 70–99)
POTASSIUM BLD-SCNC: 3.1 MMOL/L (ref 3.4–5.3)
SODIUM SERPL-SCNC: 146 MMOL/L (ref 133–144)

## 2022-02-04 PROCEDURE — 96360 HYDRATION IV INFUSION INIT: CPT

## 2022-02-04 PROCEDURE — 258N000003 HC RX IP 258 OP 636: Performed by: INTERNAL MEDICINE

## 2022-02-04 PROCEDURE — 80048 BASIC METABOLIC PNL TOTAL CA: CPT

## 2022-02-04 PROCEDURE — 36415 COLL VENOUS BLD VENIPUNCTURE: CPT

## 2022-02-04 RX ORDER — DIPHENHYDRAMINE HYDROCHLORIDE 50 MG/ML
50 INJECTION INTRAMUSCULAR; INTRAVENOUS
Status: CANCELLED
Start: 2022-02-10

## 2022-02-04 RX ORDER — HEPARIN SODIUM (PORCINE) LOCK FLUSH IV SOLN 100 UNIT/ML 100 UNIT/ML
5 SOLUTION INTRAVENOUS
Status: CANCELLED | OUTPATIENT
Start: 2022-02-10

## 2022-02-04 RX ORDER — EPINEPHRINE 1 MG/ML
0.3 INJECTION, SOLUTION INTRAMUSCULAR; SUBCUTANEOUS EVERY 5 MIN PRN
Status: CANCELLED | OUTPATIENT
Start: 2022-02-10

## 2022-02-04 RX ORDER — ALBUTEROL SULFATE 0.83 MG/ML
2.5 SOLUTION RESPIRATORY (INHALATION)
Status: CANCELLED | OUTPATIENT
Start: 2022-02-10

## 2022-02-04 RX ORDER — MEPERIDINE HYDROCHLORIDE 25 MG/ML
25 INJECTION INTRAMUSCULAR; INTRAVENOUS; SUBCUTANEOUS EVERY 30 MIN PRN
Status: CANCELLED | OUTPATIENT
Start: 2022-02-10

## 2022-02-04 RX ORDER — NALOXONE HYDROCHLORIDE 0.4 MG/ML
0.2 INJECTION, SOLUTION INTRAMUSCULAR; INTRAVENOUS; SUBCUTANEOUS
Status: CANCELLED | OUTPATIENT
Start: 2022-02-10

## 2022-02-04 RX ORDER — ALBUTEROL SULFATE 90 UG/1
1-2 AEROSOL, METERED RESPIRATORY (INHALATION)
Status: CANCELLED
Start: 2022-02-10

## 2022-02-04 RX ORDER — METHYLPREDNISOLONE SODIUM SUCCINATE 125 MG/2ML
125 INJECTION, POWDER, LYOPHILIZED, FOR SOLUTION INTRAMUSCULAR; INTRAVENOUS
Status: CANCELLED
Start: 2022-02-10

## 2022-02-04 RX ORDER — HEPARIN SODIUM,PORCINE 10 UNIT/ML
5 VIAL (ML) INTRAVENOUS
Status: CANCELLED | OUTPATIENT
Start: 2022-02-10

## 2022-02-04 RX ADMIN — SODIUM CHLORIDE 1000 ML: 9 INJECTION, SOLUTION INTRAVENOUS at 15:12

## 2022-02-04 ASSESSMENT — PAIN SCALES - GENERAL: PAINLEVEL: NO PAIN (0)

## 2022-02-04 NOTE — PATIENT INSTRUCTIONS
Dear Marlo Vee    Thank you for choosing [facility] ([facility]) for your infusion.  The following information is a summary of our appointment as well as important reminders.      We look forward in seeing you on your next appointment here at [facility] ([facility]).  Please don t hesitate to call us at [phone] to reschedule any of your appointments or to speak with one of the [facility] registered nurses.  It was a pleasure taking care of you today.    Sincerely,    [facility]  [address] [place] [address]  [address]  Phone:  [phone]

## 2022-02-04 NOTE — PROGRESS NOTES
Infusion Nursing Note:  Marlo FLORES Mariusz presents today for IVF.    Patient seen by provider today: No   present during visit today: Not Applicable.    Note: infused at 999ml/hr.    Intravenous Access:  Peripheral IV placed.    Treatment Conditions:  Labs drawn post infusion per orders     Post Infusion Assessment:  Patient tolerated infusion without incident.  Blood return noted pre and post infusion.  Site patent and intact, free from redness, edema or discomfort.  No evidence of extravasations.  Access discontinued per protocol.     Discharge Plan:   Discharge instructions reviewed with: Patient.  Patient and/or family verbalized understanding of discharge instructions and all questions answered.  AVS to patient via [employer].  Patient will return 2/9/22 for next appointment.   Patient discharged in stable condition accompanied by: self.  Departure Mode: Ambulatory.    Administrations This Visit     0.9% sodium chloride BOLUS     Admin Date  02/04/2022 Action  New Bag Dose  1,000 mL Route  Intravenous Administered By  Yolanda Connell, RN                Yolanda Connell, RN

## 2022-02-04 NOTE — LETTER
2/4/2022         RE: Marlo Vee  [address] [facility] [place] [address]        Dear Colleague,    Thank you for referring your patient, Marlo Vee, to the [facility]. Please see a copy of my visit note below.    Infusion Nursing Note:  Marlo Vee presents today for IVF.    Patient seen by provider today: No   present during visit today: Not Applicable.    Note: infused at 999ml/hr.    Intravenous Access:  Peripheral IV placed.    Treatment Conditions:  Labs drawn post infusion per orders     Post Infusion Assessment:  Patient tolerated infusion without incident.  Blood return noted pre and post infusion.  Site patent and intact, free from redness, edema or discomfort.  No evidence of extravasations.  Access discontinued per protocol.     Discharge Plan:   Discharge instructions reviewed with: Patient.  Patient and/or family verbalized understanding of discharge instructions and all questions answered.  AVS to patient via [employer].  Patient will return 2/9/22 for next appointment.   Patient discharged in stable condition accompanied by: self.  Departure Mode: Ambulatory.    Administrations This Visit     0.9% sodium chloride BOLUS     Admin Date  02/04/2022 Action  New Bag Dose  1,000 mL Route  Intravenous Administered By  Yolanda Connell, RN                Yolanda Connell, RN                        Again, thank you for allowing me to participate in the care of your patient.        Sincerely,        [facility]

## 2022-02-05 ENCOUNTER — LAB (OUTPATIENT)
Dept: LAB | Facility: CLINIC | Age: 72
End: 2022-02-05
Payer: MEDICARE

## 2022-02-05 DIAGNOSIS — Z94.0 KIDNEY REPLACED BY TRANSPLANT: ICD-10-CM

## 2022-02-05 DIAGNOSIS — Z48.298 AFTERCARE FOLLOWING ORGAN TRANSPLANT: ICD-10-CM

## 2022-02-05 DIAGNOSIS — Z94.0 KIDNEY TRANSPLANTED: ICD-10-CM

## 2022-02-05 PROBLEM — A07.2 DIARRHEA DUE TO CRYPTOSPORIDIUM (H): Status: RESOLVED | Noted: 2021-08-07 | Resolved: 2022-02-05

## 2022-02-05 PROBLEM — A04.72 CLOSTRIDIOIDES DIFFICILE DIARRHEA: Status: RESOLVED | Noted: 2021-08-07 | Resolved: 2022-02-05

## 2022-02-05 LAB
ANION GAP SERPL CALCULATED.3IONS-SCNC: 4 MMOL/L (ref 3–14)
BUN SERPL-MCNC: 28 MG/DL (ref 7–30)
CALCIUM SERPL-MCNC: 8.7 MG/DL (ref 8.5–10.1)
CHLORIDE BLD-SCNC: 110 MMOL/L (ref 94–109)
CO2 SERPL-SCNC: 26 MMOL/L (ref 20–32)
CREAT SERPL-MCNC: 1.89 MG/DL (ref 0.66–1.25)
CREAT UR-MCNC: 48 MG/DL
ERYTHROCYTE [DISTWIDTH] IN BLOOD BY AUTOMATED COUNT: 14.4 % (ref 10–15)
GFR SERPL CREATININE-BSD FRML MDRD: 38 ML/MIN/1.73M2
GLUCOSE BLD-MCNC: 92 MG/DL (ref 70–99)
HCT VFR BLD AUTO: 31.6 % (ref 40–53)
HGB BLD-MCNC: 9.1 G/DL (ref 13.3–17.7)
MCH RBC QN AUTO: 27.8 PG (ref 26.5–33)
MCHC RBC AUTO-ENTMCNC: 28.8 G/DL (ref 31.5–36.5)
MCV RBC AUTO: 97 FL (ref 78–100)
PLATELET # BLD AUTO: 193 10E3/UL (ref 150–450)
POTASSIUM BLD-SCNC: 4.2 MMOL/L (ref 3.4–5.3)
PROT UR-MCNC: 0.45 G/L
PROT/CREAT 24H UR: 0.94 G/G CR (ref 0–0.2)
RBC # BLD AUTO: 3.27 10E6/UL (ref 4.4–5.9)
SODIUM SERPL-SCNC: 140 MMOL/L (ref 133–144)
TACROLIMUS BLD-MCNC: 6.8 UG/L (ref 5–15)
TME LAST DOSE: NORMAL H
TME LAST DOSE: NORMAL H
WBC # BLD AUTO: 3.8 10E3/UL (ref 4–11)

## 2022-02-05 PROCEDURE — 36415 COLL VENOUS BLD VENIPUNCTURE: CPT

## 2022-02-05 PROCEDURE — 80197 ASSAY OF TACROLIMUS: CPT | Performed by: INTERNAL MEDICINE

## 2022-02-05 PROCEDURE — 83735 ASSAY OF MAGNESIUM: CPT | Performed by: INTERNAL MEDICINE

## 2022-02-05 PROCEDURE — 80048 BASIC METABOLIC PNL TOTAL CA: CPT | Performed by: PATHOLOGY

## 2022-02-05 PROCEDURE — 84156 ASSAY OF PROTEIN URINE: CPT | Performed by: PATHOLOGY

## 2022-02-05 PROCEDURE — 85027 COMPLETE CBC AUTOMATED: CPT | Performed by: PATHOLOGY

## 2022-02-09 ENCOUNTER — TELEPHONE (OUTPATIENT)
Dept: NEPHROLOGY | Facility: CLINIC | Age: 72
End: 2022-02-09
Payer: MEDICARE

## 2022-02-10 ENCOUNTER — INFUSION THERAPY VISIT (OUTPATIENT)
Dept: INFUSION THERAPY | Facility: CLINIC | Age: 72
End: 2022-02-10
Attending: INTERNAL MEDICINE
Payer: MEDICARE

## 2022-02-10 VITALS
TEMPERATURE: 97.5 F | SYSTOLIC BLOOD PRESSURE: 127 MMHG | HEART RATE: 62 BPM | DIASTOLIC BLOOD PRESSURE: 81 MMHG | RESPIRATION RATE: 18 BRPM

## 2022-02-10 DIAGNOSIS — R19.7 DIARRHEA OF PRESUMED INFECTIOUS ORIGIN: Primary | ICD-10-CM

## 2022-02-10 DIAGNOSIS — Z94.0 KIDNEY REPLACED BY TRANSPLANT: ICD-10-CM

## 2022-02-10 LAB
ANION GAP SERPL CALCULATED.3IONS-SCNC: 4 MMOL/L (ref 3–14)
BUN SERPL-MCNC: 42 MG/DL (ref 7–30)
CALCIUM SERPL-MCNC: 8.2 MG/DL (ref 8.5–10.1)
CHLORIDE BLD-SCNC: 113 MMOL/L (ref 94–109)
CO2 SERPL-SCNC: 26 MMOL/L (ref 20–32)
CREAT SERPL-MCNC: 2.08 MG/DL (ref 0.66–1.25)
GFR SERPL CREATININE-BSD FRML MDRD: 34 ML/MIN/1.73M2
GLUCOSE BLD-MCNC: 95 MG/DL (ref 70–99)
POTASSIUM BLD-SCNC: 4.6 MMOL/L (ref 3.4–5.3)
SODIUM SERPL-SCNC: 143 MMOL/L (ref 133–144)

## 2022-02-10 PROCEDURE — 36415 COLL VENOUS BLD VENIPUNCTURE: CPT

## 2022-02-10 PROCEDURE — 96360 HYDRATION IV INFUSION INIT: CPT

## 2022-02-10 PROCEDURE — 82310 ASSAY OF CALCIUM: CPT

## 2022-02-10 PROCEDURE — 258N000003 HC RX IP 258 OP 636: Performed by: INTERNAL MEDICINE

## 2022-02-10 RX ORDER — HEPARIN SODIUM,PORCINE 10 UNIT/ML
5 VIAL (ML) INTRAVENOUS
Status: CANCELLED | OUTPATIENT
Start: 2022-02-17

## 2022-02-10 RX ORDER — METHYLPREDNISOLONE SODIUM SUCCINATE 125 MG/2ML
125 INJECTION, POWDER, LYOPHILIZED, FOR SOLUTION INTRAMUSCULAR; INTRAVENOUS
Status: CANCELLED
Start: 2022-02-17

## 2022-02-10 RX ORDER — DIPHENHYDRAMINE HYDROCHLORIDE 50 MG/ML
50 INJECTION INTRAMUSCULAR; INTRAVENOUS
Status: CANCELLED
Start: 2022-02-17

## 2022-02-10 RX ORDER — NALOXONE HYDROCHLORIDE 0.4 MG/ML
0.2 INJECTION, SOLUTION INTRAMUSCULAR; INTRAVENOUS; SUBCUTANEOUS
Status: CANCELLED | OUTPATIENT
Start: 2022-02-17

## 2022-02-10 RX ORDER — ALBUTEROL SULFATE 0.83 MG/ML
2.5 SOLUTION RESPIRATORY (INHALATION)
Status: CANCELLED | OUTPATIENT
Start: 2022-02-17

## 2022-02-10 RX ORDER — ALBUTEROL SULFATE 90 UG/1
1-2 AEROSOL, METERED RESPIRATORY (INHALATION)
Status: CANCELLED
Start: 2022-02-17

## 2022-02-10 RX ORDER — MEPERIDINE HYDROCHLORIDE 25 MG/ML
25 INJECTION INTRAMUSCULAR; INTRAVENOUS; SUBCUTANEOUS EVERY 30 MIN PRN
Status: CANCELLED | OUTPATIENT
Start: 2022-02-17

## 2022-02-10 RX ORDER — EPINEPHRINE 1 MG/ML
0.3 INJECTION, SOLUTION INTRAMUSCULAR; SUBCUTANEOUS EVERY 5 MIN PRN
Status: CANCELLED | OUTPATIENT
Start: 2022-02-17

## 2022-02-10 RX ORDER — HEPARIN SODIUM (PORCINE) LOCK FLUSH IV SOLN 100 UNIT/ML 100 UNIT/ML
5 SOLUTION INTRAVENOUS
Status: CANCELLED | OUTPATIENT
Start: 2022-02-17

## 2022-02-10 RX ADMIN — SODIUM CHLORIDE 1000 ML: 9 INJECTION, SOLUTION INTRAVENOUS at 16:25

## 2022-02-10 NOTE — LETTER
"    2/10/2022         RE: Marlo Vee  [address] [facility] [address]        Dear Colleague,    Thank you for referring your patient, Marlo Vee, to the [facility]. Please see a copy of my visit note below.    Chief Complaint   Patient presents with     Infusion     IV Fuids, labs     Infusion Nursing Note:  Marlo Vee presents today for IV Fluids.    Patient seen by provider today: No   present during visit today: Not Applicable.    Note: 1 L NS given over 1 hour.    Labs: BMP drawn post fluids.     Intravenous Access:  Peripheral IV placed.    Treatment Conditions:  Not Applicable.    Report given to Iman ROMERO at 1715.  Care transferred at that time.      Post Infusion Assessment:  Patient tolerated infusion without incident.  Site patent and intact, free from redness, edema or discomfort.  No evidence of extravasations.  Access discontinued per protocol.       Discharge Plan:   AVS to patient via MYCHART.  Patient will return 2/11 for next appointment.   Patient discharged in stable condition accompanied by: self.  Departure Mode: Ambulatory.    Administrations This Visit     0.9% sodium chloride BOLUS     Admin Date  02/10/2022 Action  New Bag Dose  1,000 mL Route  Intravenous Administered By  Zhane Chavira RN              Vital signs:  Temp: 97.5  F (36.4  C) Temp src: Oral BP: 127/81 Pulse: 62   Resp: 18            Estimated body mass index is 20.59 kg/m  as calculated from the following:    Height as of 9/24/21: 1.778 m (5' 10\").    Weight as of 9/24/21: 65.1 kg (143 lb 8 oz).                            Again, thank you for allowing me to participate in the care of your patient.        Sincerely,        [facility]    "

## 2022-02-10 NOTE — PROGRESS NOTES
"Chief Complaint   Patient presents with     Infusion     IV Fuids, labs     Infusion Nursing Note:  Marlo Vee presents today for IV Fluids.    Patient seen by provider today: No   present during visit today: Not Applicable.    Note: 1 L NS given over 1 hour.    Labs: BMP drawn post fluids.     Intravenous Access:  Peripheral IV placed.    Treatment Conditions:  Not Applicable.    Report given to Iman ROMERO at 1715.  Care transferred at that time.      Post Infusion Assessment:  Patient tolerated infusion without incident.  Site patent and intact, free from redness, edema or discomfort.  No evidence of extravasations.  Access discontinued per protocol.       Discharge Plan:   AVS to patient via MYCHART.  Patient will return 2/11 for next appointment.   Patient discharged in stable condition accompanied by: self.  Departure Mode: Ambulatory.    Administrations This Visit     0.9% sodium chloride BOLUS     Admin Date  02/10/2022 Action  New Bag Dose  1,000 mL Route  Intravenous Administered By  Zhane Chavira RN              Vital signs:  Temp: 97.5  F (36.4  C) Temp src: Oral BP: 127/81 Pulse: 62   Resp: 18            Estimated body mass index is 20.59 kg/m  as calculated from the following:    Height as of 9/24/21: 1.778 m (5' 10\").    Weight as of 9/24/21: 65.1 kg (143 lb 8 oz).                        "

## 2022-02-10 NOTE — PATIENT INSTRUCTIONS
Dear Marlo Vee    Thank you for choosing [facility] ([facility]) for your infusion.  The following information is a summary of our appointment as well as important reminders.      We look forward in seeing you on your next appointment here at [facility] ([facility]).  Please don t hesitate to call us at [phone] to reschedule any of your appointments or to speak with one of the [facility] registered nurses.  It was a pleasure taking care of you today.    Sincerely,    [facility]  [address] [place] [address]  [address]  Phone:  [phone]

## 2022-02-11 ENCOUNTER — INFUSION THERAPY VISIT (OUTPATIENT)
Dept: INFUSION THERAPY | Facility: CLINIC | Age: 72
End: 2022-02-11
Attending: INTERNAL MEDICINE
Payer: MEDICARE

## 2022-02-11 ENCOUNTER — TELEPHONE (OUTPATIENT)
Dept: TRANSPLANT | Facility: CLINIC | Age: 72
End: 2022-02-11
Payer: MEDICARE

## 2022-02-11 VITALS
SYSTOLIC BLOOD PRESSURE: 138 MMHG | RESPIRATION RATE: 16 BRPM | HEART RATE: 70 BPM | OXYGEN SATURATION: 100 % | TEMPERATURE: 97.8 F | DIASTOLIC BLOOD PRESSURE: 82 MMHG

## 2022-02-11 DIAGNOSIS — Z94.0 KIDNEY TRANSPLANTED: ICD-10-CM

## 2022-02-11 DIAGNOSIS — Z94.0 KIDNEY TRANSPLANTED: Primary | ICD-10-CM

## 2022-02-11 DIAGNOSIS — R19.7 DIARRHEA OF PRESUMED INFECTIOUS ORIGIN: Primary | ICD-10-CM

## 2022-02-11 DIAGNOSIS — Z94.0 KIDNEY REPLACED BY TRANSPLANT: ICD-10-CM

## 2022-02-11 LAB
ALBUMIN UR-MCNC: NEGATIVE MG/DL
ANION GAP SERPL CALCULATED.3IONS-SCNC: 2 MMOL/L (ref 3–14)
APPEARANCE UR: CLEAR
BILIRUB UR QL STRIP: NEGATIVE
BUN SERPL-MCNC: 34 MG/DL (ref 7–30)
CALCIUM SERPL-MCNC: 8 MG/DL (ref 8.5–10.1)
CAOX CRY #/AREA URNS HPF: ABNORMAL /HPF
CHLORIDE BLD-SCNC: 116 MMOL/L (ref 94–109)
CO2 SERPL-SCNC: 25 MMOL/L (ref 20–32)
COLOR UR AUTO: YELLOW
CREAT SERPL-MCNC: 1.83 MG/DL (ref 0.66–1.25)
GFR SERPL CREATININE-BSD FRML MDRD: 39 ML/MIN/1.73M2
GLUCOSE BLD-MCNC: 86 MG/DL (ref 70–99)
GLUCOSE UR STRIP-MCNC: NEGATIVE MG/DL
HGB UR QL STRIP: NEGATIVE
HYALINE CASTS: 1 /LPF
KETONES UR STRIP-MCNC: NEGATIVE MG/DL
LEUKOCYTE ESTERASE UR QL STRIP: NEGATIVE
MUCOUS THREADS #/AREA URNS LPF: PRESENT /LPF
NITRATE UR QL: NEGATIVE
PH UR STRIP: 5 [PH] (ref 5–7)
POTASSIUM BLD-SCNC: 4.5 MMOL/L (ref 3.4–5.3)
RBC URINE: 0 /HPF
SODIUM SERPL-SCNC: 143 MMOL/L (ref 133–144)
SP GR UR STRIP: 1.01 (ref 1–1.03)
UROBILINOGEN UR STRIP-MCNC: NORMAL MG/DL
WBC URINE: 1 /HPF

## 2022-02-11 PROCEDURE — 258N000003 HC RX IP 258 OP 636: Performed by: INTERNAL MEDICINE

## 2022-02-11 PROCEDURE — 81001 URINALYSIS AUTO W/SCOPE: CPT

## 2022-02-11 PROCEDURE — 82310 ASSAY OF CALCIUM: CPT

## 2022-02-11 PROCEDURE — 96360 HYDRATION IV INFUSION INIT: CPT

## 2022-02-11 PROCEDURE — 36415 COLL VENOUS BLD VENIPUNCTURE: CPT

## 2022-02-11 RX ORDER — ALBUTEROL SULFATE 90 UG/1
1-2 AEROSOL, METERED RESPIRATORY (INHALATION)
Status: CANCELLED
Start: 2022-02-17

## 2022-02-11 RX ORDER — ALBUTEROL SULFATE 0.83 MG/ML
2.5 SOLUTION RESPIRATORY (INHALATION)
Status: CANCELLED | OUTPATIENT
Start: 2022-02-17

## 2022-02-11 RX ORDER — HEPARIN SODIUM,PORCINE 10 UNIT/ML
5 VIAL (ML) INTRAVENOUS
Status: CANCELLED | OUTPATIENT
Start: 2022-02-17

## 2022-02-11 RX ORDER — METHYLPREDNISOLONE SODIUM SUCCINATE 125 MG/2ML
125 INJECTION, POWDER, LYOPHILIZED, FOR SOLUTION INTRAMUSCULAR; INTRAVENOUS
Status: CANCELLED
Start: 2022-02-17

## 2022-02-11 RX ORDER — HEPARIN SODIUM (PORCINE) LOCK FLUSH IV SOLN 100 UNIT/ML 100 UNIT/ML
5 SOLUTION INTRAVENOUS
Status: CANCELLED | OUTPATIENT
Start: 2022-02-17

## 2022-02-11 RX ORDER — DIPHENHYDRAMINE HYDROCHLORIDE 50 MG/ML
50 INJECTION INTRAMUSCULAR; INTRAVENOUS
Status: CANCELLED
Start: 2022-02-17

## 2022-02-11 RX ORDER — MEPERIDINE HYDROCHLORIDE 25 MG/ML
25 INJECTION INTRAMUSCULAR; INTRAVENOUS; SUBCUTANEOUS EVERY 30 MIN PRN
Status: CANCELLED | OUTPATIENT
Start: 2022-02-17

## 2022-02-11 RX ORDER — NALOXONE HYDROCHLORIDE 0.4 MG/ML
0.2 INJECTION, SOLUTION INTRAMUSCULAR; INTRAVENOUS; SUBCUTANEOUS
Status: CANCELLED | OUTPATIENT
Start: 2022-02-17

## 2022-02-11 RX ORDER — EPINEPHRINE 1 MG/ML
0.3 INJECTION, SOLUTION INTRAMUSCULAR; SUBCUTANEOUS EVERY 5 MIN PRN
Status: CANCELLED | OUTPATIENT
Start: 2022-02-17

## 2022-02-11 RX ADMIN — SODIUM CHLORIDE 1000 ML: 9 INJECTION, SOLUTION INTRAVENOUS at 15:28

## 2022-02-11 ASSESSMENT — PAIN SCALES - GENERAL: PAINLEVEL: NO PAIN (0)

## 2022-02-11 NOTE — LETTER
2/11/2022         RE: Marlo Vee  [address] [facility] [address]        Dear Colleague,    Thank you for referring your patient, Marlo Vee, to the [facility]. Please see a copy of my visit note below.    Infusion Nursing Note:  Marlo Vee presents today for   Chief Complaint   Patient presents with     Infusion     IV Hydration     Patient seen by provider today: No   present during visit today: Not Applicable.      Intravenous Access:  Peripheral IV placed.    Treatment Conditions:  Not Applicable.      Administrations This Visit     0.9% sodium chloride BOLUS     Admin Date  02/11/2022 Action  New Bag Dose  1,000 mL Route  Intravenous Administered By  Iman Jones, HEATHER              Handoff report given to HEATHER Brown at 1625, who assumed care of patient.    Post Infusion Assessment:  Site patent and intact, free from redness, edema or discomfort.  No evidence of extravasations.  Access discontinued per protocol.     Discharge Plan:   AVS to patient via MYCHART.  Patient will return 02/16/2022 for next appointment.   Patient discharged in stable condition accompanied by: self.  Departure Mode: Ambulatory.    Iman Jones, RN            Again, thank you for allowing me to participate in the care of your patient.      Sincerely,    [facility]

## 2022-02-11 NOTE — PROGRESS NOTES
Infusion Nursing Note:  Marlo FLORES Renettaannalise presents today for   Chief Complaint   Patient presents with     Infusion     IV Hydration     Patient seen by provider today: No   present during visit today: Not Applicable.      Intravenous Access:  Peripheral IV placed.    Treatment Conditions:  Not Applicable.      Administrations This Visit     0.9% sodium chloride BOLUS     Admin Date  02/11/2022 Action  New Bag Dose  1,000 mL Route  Intravenous Administered By  Iman Jones RN              Handoff report given to HEATHER Brown at 1625, who assumed care of patient.    Post Infusion Assessment:  Site patent and intact, free from redness, edema or discomfort.  No evidence of extravasations.  Access discontinued per protocol.       Discharge Plan:   AVS to patient via MYCHART.  Patient will return 02/16/2022 for next appointment.   Patient discharged in stable condition accompanied by: self.  Departure Mode: Ambulatory.      Iman Jones RN

## 2022-02-11 NOTE — TELEPHONE ENCOUNTER
ISSUE:  Creatinine increased to 2.08    PLAN:  Norma Akers MD Harris, Adilia, RN  Uptrending Cr again, check if needs fluids, Fk trough, UA, Ucx and if any voiding issues then kidney US     OUTCOME:  Call to John to discuss. He reports feeling well and denies any issues voiding, illness or s/s of UTI. He states he has appointment for fluids at [facility] today at 1500, informed of need for UA/UC, he v/u. John states he previously discussed tacrolimus level with RNCC and was told to repeat.  Lab orders placed, instructed John to make sure next  tacrolimus is accurate 12-hour level.

## 2022-02-15 ENCOUNTER — TRANSFERRED RECORDS (OUTPATIENT)
Dept: HEALTH INFORMATION MANAGEMENT | Facility: CLINIC | Age: 72
End: 2022-02-15
Payer: MEDICARE

## 2022-02-16 ENCOUNTER — INFUSION THERAPY VISIT (OUTPATIENT)
Dept: INFUSION THERAPY | Facility: CLINIC | Age: 72
End: 2022-02-16
Attending: INTERNAL MEDICINE
Payer: MEDICARE

## 2022-02-16 VITALS
DIASTOLIC BLOOD PRESSURE: 82 MMHG | TEMPERATURE: 97.9 F | HEART RATE: 71 BPM | SYSTOLIC BLOOD PRESSURE: 139 MMHG | RESPIRATION RATE: 16 BRPM

## 2022-02-16 DIAGNOSIS — Z94.0 KIDNEY REPLACED BY TRANSPLANT: ICD-10-CM

## 2022-02-16 DIAGNOSIS — R19.7 DIARRHEA OF PRESUMED INFECTIOUS ORIGIN: Primary | ICD-10-CM

## 2022-02-16 LAB
ANION GAP SERPL CALCULATED.3IONS-SCNC: 5 MMOL/L (ref 3–14)
BUN SERPL-MCNC: 31 MG/DL (ref 7–30)
CALCIUM SERPL-MCNC: 8 MG/DL (ref 8.5–10.1)
CHLORIDE BLD-SCNC: 113 MMOL/L (ref 94–109)
CO2 SERPL-SCNC: 24 MMOL/L (ref 20–32)
CREAT SERPL-MCNC: 1.81 MG/DL (ref 0.66–1.25)
ERYTHROCYTE [DISTWIDTH] IN BLOOD BY AUTOMATED COUNT: 14.4 % (ref 10–15)
GFR SERPL CREATININE-BSD FRML MDRD: 40 ML/MIN/1.73M2
GLUCOSE BLD-MCNC: 82 MG/DL (ref 70–99)
HCT VFR BLD AUTO: 27.1 % (ref 40–53)
HGB BLD-MCNC: 8.1 G/DL (ref 13.3–17.7)
MCH RBC QN AUTO: 28.1 PG (ref 26.5–33)
MCHC RBC AUTO-ENTMCNC: 29.9 G/DL (ref 31.5–36.5)
MCV RBC AUTO: 94 FL (ref 78–100)
PLATELET # BLD AUTO: 181 10E3/UL (ref 150–450)
POTASSIUM BLD-SCNC: 4.1 MMOL/L (ref 3.4–5.3)
RBC # BLD AUTO: 2.88 10E6/UL (ref 4.4–5.9)
SODIUM SERPL-SCNC: 142 MMOL/L (ref 133–144)
WBC # BLD AUTO: 3.6 10E3/UL (ref 4–11)

## 2022-02-16 PROCEDURE — 85027 COMPLETE CBC AUTOMATED: CPT

## 2022-02-16 PROCEDURE — 36415 COLL VENOUS BLD VENIPUNCTURE: CPT

## 2022-02-16 PROCEDURE — 96360 HYDRATION IV INFUSION INIT: CPT

## 2022-02-16 PROCEDURE — 80048 BASIC METABOLIC PNL TOTAL CA: CPT

## 2022-02-16 PROCEDURE — 258N000003 HC RX IP 258 OP 636: Performed by: INTERNAL MEDICINE

## 2022-02-16 RX ORDER — ALBUTEROL SULFATE 90 UG/1
1-2 AEROSOL, METERED RESPIRATORY (INHALATION)
Status: CANCELLED
Start: 2022-02-17

## 2022-02-16 RX ORDER — HEPARIN SODIUM (PORCINE) LOCK FLUSH IV SOLN 100 UNIT/ML 100 UNIT/ML
5 SOLUTION INTRAVENOUS
Status: CANCELLED | OUTPATIENT
Start: 2022-02-17

## 2022-02-16 RX ORDER — MEPERIDINE HYDROCHLORIDE 25 MG/ML
25 INJECTION INTRAMUSCULAR; INTRAVENOUS; SUBCUTANEOUS EVERY 30 MIN PRN
Status: CANCELLED | OUTPATIENT
Start: 2022-02-17

## 2022-02-16 RX ORDER — DIPHENHYDRAMINE HYDROCHLORIDE 50 MG/ML
50 INJECTION INTRAMUSCULAR; INTRAVENOUS
Status: CANCELLED
Start: 2022-02-17

## 2022-02-16 RX ORDER — NALOXONE HYDROCHLORIDE 0.4 MG/ML
0.2 INJECTION, SOLUTION INTRAMUSCULAR; INTRAVENOUS; SUBCUTANEOUS
Status: CANCELLED | OUTPATIENT
Start: 2022-02-17

## 2022-02-16 RX ORDER — EPINEPHRINE 1 MG/ML
0.3 INJECTION, SOLUTION INTRAMUSCULAR; SUBCUTANEOUS EVERY 5 MIN PRN
Status: CANCELLED | OUTPATIENT
Start: 2022-02-17

## 2022-02-16 RX ORDER — ALBUTEROL SULFATE 0.83 MG/ML
2.5 SOLUTION RESPIRATORY (INHALATION)
Status: CANCELLED | OUTPATIENT
Start: 2022-02-17

## 2022-02-16 RX ORDER — HEPARIN SODIUM,PORCINE 10 UNIT/ML
5 VIAL (ML) INTRAVENOUS
Status: CANCELLED | OUTPATIENT
Start: 2022-02-17

## 2022-02-16 RX ORDER — METHYLPREDNISOLONE SODIUM SUCCINATE 125 MG/2ML
125 INJECTION, POWDER, LYOPHILIZED, FOR SOLUTION INTRAMUSCULAR; INTRAVENOUS
Status: CANCELLED
Start: 2022-02-17

## 2022-02-16 RX ADMIN — SODIUM CHLORIDE 1000 ML: 9 INJECTION, SOLUTION INTRAVENOUS at 16:03

## 2022-02-16 NOTE — LETTER
2/16/2022         RE: Marlo Vee  [address] [facility] [address]        Dear Colleague,    Thank you for referring your patient, Marlo Vee, to the [facility]. Please see a copy of my visit note below.    Infusion Nursing Note:  Marlo Vee presents today for IV fluids.    Patient seen by provider today: No   present during visit today: Not Applicable.    Note: Pt is getting IV fluids today. He states that his diarrhea has improved so they are going to cut back to twice weekly fluids. We will cancel his appointment tomorrow and he will come again on Friday.     Intravenous Access:  Peripheral IV placed.    Treatment Conditions:  Not Applicable.      Post Infusion Assessment:  Patient tolerated infusion without incident.  Site patent and intact, free from redness, edema or discomfort.  Access discontinued per protocol.     Discharge Plan:   [facility]  Patient will return on 2/18 for next appointment.   Patient discharged in stable condition accompanied by: self.    Administrations This Visit     0.9% sodium chloride BOLUS     Admin Date  02/16/2022 Action  New Bag Dose  1,000 mL Route  Intravenous Administered By  Afua Dixon, HEATHER Dixon, HEATHER                        Again, thank you for allowing me to participate in the care of your patient.        Sincerely,        [facility]    
No

## 2022-02-16 NOTE — PROGRESS NOTES
Infusion Nursing Note:  Marlo FLORES Mariusz presents today for IV fluids.    Patient seen by provider today: No   present during visit today: Not Applicable.    Note: Pt is getting IV fluids today. He states that his diarrhea has improved so they are going to cut back to twice weekly fluids. We will cancel his appointment tomorrow and he will come again on Friday.     Intravenous Access:  Peripheral IV placed.    Treatment Conditions:  Not Applicable.      Post Infusion Assessment:  Patient tolerated infusion without incident.  Site patent and intact, free from redness, edema or discomfort.  Access discontinued per protocol.     Discharge Plan:   [facility]  Patient will return on 2/18 for next appointment.   Patient discharged in stable condition accompanied by: self.    Administrations This Visit     0.9% sodium chloride BOLUS     Admin Date  02/16/2022 Action  New Bag Dose  1,000 mL Route  Intravenous Administered By  Afua Dixon RN Alyssa Marie Sakhitab-Kerestes, RN

## 2022-02-16 NOTE — PATIENT INSTRUCTIONS
Dear Marlo Vee    Thank you for choosing [facility] ([facility]) for your infusion.  The following information is a summary of our appointment as well as important reminders.      We look forward in seeing you on your next appointment here at [facility] ([facility]).  Please don t hesitate to call us at [phone] to reschedule any of your appointments or to speak with one of the [facility] registered nurses.  It was a pleasure taking care of you today.    Sincerely,    [facility]  [facility]  [address] [place] [address]  [address]  Phone:  [phone]

## 2022-02-18 ENCOUNTER — INFUSION THERAPY VISIT (OUTPATIENT)
Dept: INFUSION THERAPY | Facility: CLINIC | Age: 72
End: 2022-02-18
Attending: INTERNAL MEDICINE
Payer: MEDICARE

## 2022-02-18 VITALS
SYSTOLIC BLOOD PRESSURE: 143 MMHG | OXYGEN SATURATION: 100 % | RESPIRATION RATE: 16 BRPM | DIASTOLIC BLOOD PRESSURE: 90 MMHG | HEART RATE: 60 BPM

## 2022-02-18 DIAGNOSIS — Z94.0 KIDNEY REPLACED BY TRANSPLANT: ICD-10-CM

## 2022-02-18 DIAGNOSIS — R19.7 DIARRHEA OF PRESUMED INFECTIOUS ORIGIN: Primary | ICD-10-CM

## 2022-02-18 LAB
ANION GAP SERPL CALCULATED.3IONS-SCNC: 3 MMOL/L (ref 3–14)
BUN SERPL-MCNC: 28 MG/DL (ref 7–30)
CALCIUM SERPL-MCNC: 8.4 MG/DL (ref 8.5–10.1)
CHLORIDE BLD-SCNC: 112 MMOL/L (ref 94–109)
CO2 SERPL-SCNC: 27 MMOL/L (ref 20–32)
CREAT SERPL-MCNC: 1.85 MG/DL (ref 0.66–1.25)
GFR SERPL CREATININE-BSD FRML MDRD: 39 ML/MIN/1.73M2
GLUCOSE BLD-MCNC: 85 MG/DL (ref 70–99)
POTASSIUM BLD-SCNC: 4.7 MMOL/L (ref 3.4–5.3)
SODIUM SERPL-SCNC: 142 MMOL/L (ref 133–144)

## 2022-02-18 PROCEDURE — 258N000003 HC RX IP 258 OP 636: Performed by: INTERNAL MEDICINE

## 2022-02-18 PROCEDURE — 80048 BASIC METABOLIC PNL TOTAL CA: CPT

## 2022-02-18 PROCEDURE — 96360 HYDRATION IV INFUSION INIT: CPT

## 2022-02-18 PROCEDURE — 36415 COLL VENOUS BLD VENIPUNCTURE: CPT

## 2022-02-18 RX ORDER — ALBUTEROL SULFATE 0.83 MG/ML
2.5 SOLUTION RESPIRATORY (INHALATION)
Status: CANCELLED | OUTPATIENT
Start: 2022-02-25

## 2022-02-18 RX ORDER — HEPARIN SODIUM (PORCINE) LOCK FLUSH IV SOLN 100 UNIT/ML 100 UNIT/ML
5 SOLUTION INTRAVENOUS
Status: CANCELLED | OUTPATIENT
Start: 2022-02-25

## 2022-02-18 RX ORDER — NALOXONE HYDROCHLORIDE 0.4 MG/ML
0.2 INJECTION, SOLUTION INTRAMUSCULAR; INTRAVENOUS; SUBCUTANEOUS
Status: CANCELLED | OUTPATIENT
Start: 2022-02-25

## 2022-02-18 RX ORDER — MEPERIDINE HYDROCHLORIDE 25 MG/ML
25 INJECTION INTRAMUSCULAR; INTRAVENOUS; SUBCUTANEOUS EVERY 30 MIN PRN
Status: CANCELLED | OUTPATIENT
Start: 2022-02-25

## 2022-02-18 RX ORDER — DIPHENHYDRAMINE HYDROCHLORIDE 50 MG/ML
50 INJECTION INTRAMUSCULAR; INTRAVENOUS
Status: CANCELLED
Start: 2022-02-25

## 2022-02-18 RX ORDER — HEPARIN SODIUM,PORCINE 10 UNIT/ML
5 VIAL (ML) INTRAVENOUS
Status: CANCELLED | OUTPATIENT
Start: 2022-02-25

## 2022-02-18 RX ORDER — ALBUTEROL SULFATE 90 UG/1
1-2 AEROSOL, METERED RESPIRATORY (INHALATION)
Status: CANCELLED
Start: 2022-02-25

## 2022-02-18 RX ORDER — EPINEPHRINE 1 MG/ML
0.3 INJECTION, SOLUTION INTRAMUSCULAR; SUBCUTANEOUS EVERY 5 MIN PRN
Status: CANCELLED | OUTPATIENT
Start: 2022-02-25

## 2022-02-18 RX ORDER — METHYLPREDNISOLONE SODIUM SUCCINATE 125 MG/2ML
125 INJECTION, POWDER, LYOPHILIZED, FOR SOLUTION INTRAMUSCULAR; INTRAVENOUS
Status: CANCELLED
Start: 2022-02-25

## 2022-02-18 RX ADMIN — SODIUM CHLORIDE 1000 ML: 9 INJECTION, SOLUTION INTRAVENOUS at 15:22

## 2022-02-18 NOTE — PATIENT INSTRUCTIONS
Dear Marlo Vee    Thank you for choosing [facility] ([facility]) for your infusion.  The following information is a summary of our appointment as well as important reminders.      We look forward in seeing you on your next appointment here at [facility] ([facility]).  Please don t hesitate to call us at [phone] to reschedule any of your appointments or to speak with one of the [facility] registered nurses.  It was a pleasure taking care of you today.    Sincerely,    [facility]  [facility]  [address] [place] [address]  [address]  Phone:  [phone]

## 2022-02-18 NOTE — LETTER
2/18/2022         RE: Marlo Vee  [address] [facility] [address]        Dear Colleague,    Thank you for referring your patient, Marlo Vee, to the [facility]. Please see a copy of my visit note below.    Infusion Nursing Note:  Marlo Vee presents today for IVF.    Patient seen by provider today: No   present during visit today: Not Applicable.    Note:   -1L of NS infused over 1 hr    Intravenous Access:  Peripheral IV placed.    Treatment Conditions:  Not Applicable.    Post Infusion Assessment:  Patient tolerated infusion without incident.  Site patent and intact, free from redness, edema or discomfort.  No evidence of extravasations.  Access discontinued per protocol.     Discharge Plan:   AVS to patient via MYCHART.  Patient will return 2/23/22 for next appointment.   Patient discharged in stable condition accompanied by: self.  Departure Mode: Ambulatory.    Dahiana Fam, RN    Pulse 60   Resp 16   SpO2 100%     Administrations This Visit     0.9% sodium chloride BOLUS     Admin Date  02/18/2022 Action  New Bag Dose  1,000 mL Rate  999 mL/hr Route  Intravenous Administered By  Dahiana Fam, RN                              Again, thank you for allowing me to participate in the care of your patient.        Sincerely,        [facility]

## 2022-02-18 NOTE — PROGRESS NOTES
Infusion Nursing Note:  Marlo FLORES Mariusz presents today for IVF.    Patient seen by provider today: No   present during visit today: Not Applicable.    Note:   -1L of NS infused over 1 hr    Intravenous Access:  Peripheral IV placed.    Treatment Conditions:  Not Applicable.    Post Infusion Assessment:  Patient tolerated infusion without incident.  Site patent and intact, free from redness, edema or discomfort.  No evidence of extravasations.  Access discontinued per protocol.     Discharge Plan:   AVS to patient via MYCHART.  Patient will return 2/23/22 for next appointment.   Patient discharged in stable condition accompanied by: self.  Departure Mode: Ambulatory.    Dahiana Fam, RN    Pulse 60   Resp 16   SpO2 100%     Administrations This Visit     0.9% sodium chloride BOLUS     Admin Date  02/18/2022 Action  New Bag Dose  1,000 mL Rate  999 mL/hr Route  Intravenous Administered By  Dahiana Fam, RN

## 2022-02-21 DIAGNOSIS — Z94.0 KIDNEY TRANSPLANTED: ICD-10-CM

## 2022-02-21 RX ORDER — CARVEDILOL 25 MG/1
12.5 TABLET ORAL EVERY MORNING
Qty: 45 TABLET | Refills: 3 | Status: SHIPPED | OUTPATIENT
Start: 2022-02-21 | End: 2022-04-18

## 2022-02-24 ENCOUNTER — INFUSION THERAPY VISIT (OUTPATIENT)
Dept: INFUSION THERAPY | Facility: CLINIC | Age: 72
End: 2022-02-24
Attending: INTERNAL MEDICINE
Payer: MEDICARE

## 2022-02-24 VITALS — SYSTOLIC BLOOD PRESSURE: 138 MMHG | DIASTOLIC BLOOD PRESSURE: 87 MMHG | HEART RATE: 69 BPM

## 2022-02-24 DIAGNOSIS — Z48.298 AFTERCARE FOLLOWING ORGAN TRANSPLANT: ICD-10-CM

## 2022-02-24 DIAGNOSIS — R19.7 DIARRHEA OF PRESUMED INFECTIOUS ORIGIN: Primary | ICD-10-CM

## 2022-02-24 DIAGNOSIS — Z94.0 KIDNEY REPLACED BY TRANSPLANT: ICD-10-CM

## 2022-02-24 LAB
ANION GAP SERPL CALCULATED.3IONS-SCNC: 3 MMOL/L (ref 3–14)
BUN SERPL-MCNC: 35 MG/DL (ref 7–30)
CALCIUM SERPL-MCNC: 8.2 MG/DL (ref 8.5–10.1)
CHLORIDE BLD-SCNC: 112 MMOL/L (ref 94–109)
CO2 SERPL-SCNC: 25 MMOL/L (ref 20–32)
CREAT SERPL-MCNC: 2.16 MG/DL (ref 0.66–1.25)
GFR SERPL CREATININE-BSD FRML MDRD: 32 ML/MIN/1.73M2
GLUCOSE BLD-MCNC: 91 MG/DL (ref 70–99)
POTASSIUM BLD-SCNC: 4.4 MMOL/L (ref 3.4–5.3)
SODIUM SERPL-SCNC: 140 MMOL/L (ref 133–144)

## 2022-02-24 PROCEDURE — 80048 BASIC METABOLIC PNL TOTAL CA: CPT

## 2022-02-24 PROCEDURE — 96360 HYDRATION IV INFUSION INIT: CPT

## 2022-02-24 PROCEDURE — 258N000003 HC RX IP 258 OP 636: Performed by: INTERNAL MEDICINE

## 2022-02-24 PROCEDURE — 96361 HYDRATE IV INFUSION ADD-ON: CPT

## 2022-02-24 PROCEDURE — 36415 COLL VENOUS BLD VENIPUNCTURE: CPT

## 2022-02-24 RX ORDER — METHYLPREDNISOLONE SODIUM SUCCINATE 125 MG/2ML
125 INJECTION, POWDER, LYOPHILIZED, FOR SOLUTION INTRAMUSCULAR; INTRAVENOUS
Status: CANCELLED
Start: 2022-02-25

## 2022-02-24 RX ORDER — ALBUTEROL SULFATE 90 UG/1
1-2 AEROSOL, METERED RESPIRATORY (INHALATION)
Status: CANCELLED
Start: 2022-02-25

## 2022-02-24 RX ORDER — HEPARIN SODIUM (PORCINE) LOCK FLUSH IV SOLN 100 UNIT/ML 100 UNIT/ML
5 SOLUTION INTRAVENOUS
Status: CANCELLED | OUTPATIENT
Start: 2022-02-25

## 2022-02-24 RX ORDER — ALBUTEROL SULFATE 0.83 MG/ML
2.5 SOLUTION RESPIRATORY (INHALATION)
Status: CANCELLED | OUTPATIENT
Start: 2022-02-25

## 2022-02-24 RX ORDER — DIPHENHYDRAMINE HYDROCHLORIDE 50 MG/ML
50 INJECTION INTRAMUSCULAR; INTRAVENOUS
Status: CANCELLED
Start: 2022-02-25

## 2022-02-24 RX ORDER — EPINEPHRINE 1 MG/ML
0.3 INJECTION, SOLUTION INTRAMUSCULAR; SUBCUTANEOUS EVERY 5 MIN PRN
Status: CANCELLED | OUTPATIENT
Start: 2022-02-25

## 2022-02-24 RX ORDER — NALOXONE HYDROCHLORIDE 0.4 MG/ML
0.2 INJECTION, SOLUTION INTRAMUSCULAR; INTRAVENOUS; SUBCUTANEOUS
Status: CANCELLED | OUTPATIENT
Start: 2022-02-25

## 2022-02-24 RX ORDER — HEPARIN SODIUM,PORCINE 10 UNIT/ML
5 VIAL (ML) INTRAVENOUS
Status: CANCELLED | OUTPATIENT
Start: 2022-02-25

## 2022-02-24 RX ORDER — MEPERIDINE HYDROCHLORIDE 25 MG/ML
25 INJECTION INTRAMUSCULAR; INTRAVENOUS; SUBCUTANEOUS EVERY 30 MIN PRN
Status: CANCELLED | OUTPATIENT
Start: 2022-02-25

## 2022-02-24 RX ADMIN — SODIUM CHLORIDE 1000 ML: 9 INJECTION, SOLUTION INTRAVENOUS at 16:25

## 2022-02-24 NOTE — PROGRESS NOTES
Infusion Nursing Note:  Marlo FLORES Renettaannalise presents today for   Chief Complaint   Patient presents with     Infusion     IV Hydration     Patient seen by provider today: No   present during visit today: Not Applicable.      Intravenous Access:  Peripheral IV placed.    Administrations This Visit     0.9% sodium chloride BOLUS     Admin Date  02/24/2022 Action  New Bag Dose  1,000 mL Rate  0 mL/hr Route  Intravenous Administered By  Iman Jones RN                Labs collect post-infusion per therapy plan.    Post Infusion Assessment:  Site patent and intact, free from redness, edema or discomfort.  No evidence of extravasations.  Access discontinued per protocol.       Discharge Plan:   AVS to patient via MYCHART.  Patient will return tomorrow for next appointment.   Patient discharged in stable condition accompanied by: self.  Departure Mode: Ambulatory.      Iman Jones RN

## 2022-02-24 NOTE — LETTER
2/24/2022         RE: Marlo Vee  [address] [facility] [address]        Dear Colleague,    Thank you for referring your patient, Marlo Vee, to the [facility]. Please see a copy of my visit note below.    Infusion Nursing Note:  Marlo Vee presents today for   Chief Complaint   Patient presents with     Infusion     IV Hydration     Patient seen by provider today: No   present during visit today: Not Applicable.      Intravenous Access:  Peripheral IV placed.    Administrations This Visit     0.9% sodium chloride BOLUS     Admin Date  02/24/2022 Action  New Bag Dose  1,000 mL Rate  0 mL/hr Route  Intravenous Administered By  Iman Jones, RN                Labs collect post-infusion per therapy plan.    Post Infusion Assessment:  Site patent and intact, free from redness, edema or discomfort.  No evidence of extravasations.  Access discontinued per protocol.       Discharge Plan:   AVS to patient via MYCHART.  Patient will return tomorrow for next appointment.   Patient discharged in stable condition accompanied by: self.  Departure Mode: Ambulatory.      Iman Jones RN                        Again, thank you for allowing me to participate in the care of your patient.        Sincerely,        [facility]

## 2022-02-24 NOTE — PATIENT INSTRUCTIONS
Dear Marlo Vee    Thank you for choosing [facility] ([facility]) for your infusion.  The following information is a summary of our appointment as well as important reminders.      We look forward in seeing you on your next appointment here at [facility] ([facility]).  Please don t hesitate to call us at [phone] to reschedule any of your appointments or to speak with one of the [facility] registered nurses.  It was a pleasure taking care of you today.    Sincerely,    [facility]  [facility]  [address] [place] [address]  [address]  Phone:  [phone]

## 2022-02-25 ENCOUNTER — INFUSION THERAPY VISIT (OUTPATIENT)
Dept: INFUSION THERAPY | Facility: CLINIC | Age: 72
End: 2022-02-25
Attending: INTERNAL MEDICINE
Payer: MEDICARE

## 2022-02-25 VITALS
DIASTOLIC BLOOD PRESSURE: 88 MMHG | SYSTOLIC BLOOD PRESSURE: 148 MMHG | RESPIRATION RATE: 16 BRPM | OXYGEN SATURATION: 100 % | TEMPERATURE: 97.5 F | HEART RATE: 56 BPM

## 2022-02-25 DIAGNOSIS — R19.7 DIARRHEA OF PRESUMED INFECTIOUS ORIGIN: Primary | ICD-10-CM

## 2022-02-25 DIAGNOSIS — Z48.298 AFTERCARE FOLLOWING ORGAN TRANSPLANT: ICD-10-CM

## 2022-02-25 DIAGNOSIS — Z94.0 KIDNEY REPLACED BY TRANSPLANT: ICD-10-CM

## 2022-02-25 LAB
ANION GAP SERPL CALCULATED.3IONS-SCNC: 4 MMOL/L (ref 3–14)
BUN SERPL-MCNC: 31 MG/DL (ref 7–30)
CALCIUM SERPL-MCNC: 8.3 MG/DL (ref 8.5–10.1)
CHLORIDE BLD-SCNC: 113 MMOL/L (ref 94–109)
CO2 SERPL-SCNC: 26 MMOL/L (ref 20–32)
CREAT SERPL-MCNC: 1.99 MG/DL (ref 0.66–1.25)
GFR SERPL CREATININE-BSD FRML MDRD: 36 ML/MIN/1.73M2
GLUCOSE BLD-MCNC: 75 MG/DL (ref 70–99)
POTASSIUM BLD-SCNC: 4.1 MMOL/L (ref 3.4–5.3)
SODIUM SERPL-SCNC: 143 MMOL/L (ref 133–144)

## 2022-02-25 PROCEDURE — 36415 COLL VENOUS BLD VENIPUNCTURE: CPT

## 2022-02-25 PROCEDURE — 258N000003 HC RX IP 258 OP 636: Performed by: INTERNAL MEDICINE

## 2022-02-25 PROCEDURE — 80048 BASIC METABOLIC PNL TOTAL CA: CPT

## 2022-02-25 PROCEDURE — 96360 HYDRATION IV INFUSION INIT: CPT

## 2022-02-25 RX ORDER — EPINEPHRINE 1 MG/ML
0.3 INJECTION, SOLUTION INTRAMUSCULAR; SUBCUTANEOUS EVERY 5 MIN PRN
Status: CANCELLED | OUTPATIENT
Start: 2022-03-03

## 2022-02-25 RX ORDER — ALBUTEROL SULFATE 0.83 MG/ML
2.5 SOLUTION RESPIRATORY (INHALATION)
Status: CANCELLED | OUTPATIENT
Start: 2022-03-03

## 2022-02-25 RX ORDER — NALOXONE HYDROCHLORIDE 0.4 MG/ML
0.2 INJECTION, SOLUTION INTRAMUSCULAR; INTRAVENOUS; SUBCUTANEOUS
Status: CANCELLED | OUTPATIENT
Start: 2022-03-03

## 2022-02-25 RX ORDER — DIPHENHYDRAMINE HYDROCHLORIDE 50 MG/ML
50 INJECTION INTRAMUSCULAR; INTRAVENOUS
Status: CANCELLED
Start: 2022-03-03

## 2022-02-25 RX ORDER — METHYLPREDNISOLONE SODIUM SUCCINATE 125 MG/2ML
125 INJECTION, POWDER, LYOPHILIZED, FOR SOLUTION INTRAMUSCULAR; INTRAVENOUS
Status: CANCELLED
Start: 2022-03-03

## 2022-02-25 RX ORDER — HEPARIN SODIUM,PORCINE 10 UNIT/ML
5 VIAL (ML) INTRAVENOUS
Status: CANCELLED | OUTPATIENT
Start: 2022-03-03

## 2022-02-25 RX ORDER — ALBUTEROL SULFATE 90 UG/1
1-2 AEROSOL, METERED RESPIRATORY (INHALATION)
Status: CANCELLED
Start: 2022-03-03

## 2022-02-25 RX ORDER — MEPERIDINE HYDROCHLORIDE 25 MG/ML
25 INJECTION INTRAMUSCULAR; INTRAVENOUS; SUBCUTANEOUS EVERY 30 MIN PRN
Status: CANCELLED | OUTPATIENT
Start: 2022-03-03

## 2022-02-25 RX ORDER — HEPARIN SODIUM (PORCINE) LOCK FLUSH IV SOLN 100 UNIT/ML 100 UNIT/ML
5 SOLUTION INTRAVENOUS
Status: CANCELLED | OUTPATIENT
Start: 2022-03-03

## 2022-02-25 RX ADMIN — SODIUM CHLORIDE 1000 ML: 9 INJECTION, SOLUTION INTRAVENOUS at 15:21

## 2022-02-25 ASSESSMENT — PAIN SCALES - GENERAL: PAINLEVEL: NO PAIN (0)

## 2022-02-25 NOTE — LETTER
2/25/2022         RE: Marlo Vee  [address] [facility] [address]        Dear Colleague,    Thank you for referring your patient, Marlo Vee, to the [facility]. Please see a copy of my visit note below.    Infusion Nursing Note:  Marlo Vee presents today for   Chief Complaint   Patient presents with     Infusion     IV Hydration     Patient seen by provider today: No   present during visit today: Not Applicable.      Intravenous Access:  Peripheral IV placed.    Administrations This Visit     0.9% sodium chloride BOLUS     Admin Date  02/25/2022 Action  New Bag Dose  1,000 mL Route  Intravenous Administered By  Iman Jones, RN                Labs collect post-infusion per therapy plan.     Post Infusion Assessment:  Site patent and intact, free from redness, edema or discomfort.  No evidence of extravasations.  Access discontinued per protocol.       Discharge Plan:   AVS to patient via MYCHART.  Patient will follow up with provider before scheduling future appointments.  Patient discharged in stable condition accompanied by: self.  Departure Mode: Ambulatory.      Iman Jones RN                        Again, thank you for allowing me to participate in the care of your patient.        Sincerely,        [facility]

## 2022-02-25 NOTE — PATIENT INSTRUCTIONS
Dear Marlo Vee    Thank you for choosing [facility] ([facility]) for your infusion.  The following information is a summary of our appointment as well as important reminders.      We look forward in seeing you on your next appointment here at [facility] ([facility]).  Please don t hesitate to call us at [phone] to reschedule any of your appointments or to speak with one of the [facility] registered nurses.  It was a pleasure taking care of you today.    Sincerely,    [facility]  [facility]  [address] [place] [address]  [address]  Phone:  [phone]

## 2022-02-25 NOTE — PROGRESS NOTES
Infusion Nursing Note:  Marlo FLORES Angelaernst presents today for   Chief Complaint   Patient presents with     Infusion     IV Hydration     Patient seen by provider today: No   present during visit today: Not Applicable.      Intravenous Access:  Peripheral IV placed.    Administrations This Visit     0.9% sodium chloride BOLUS     Admin Date  02/25/2022 Action  New Bag Dose  1,000 mL Route  Intravenous Administered By  Iman Jones RN                Labs collect post-infusion per therapy plan.     Post Infusion Assessment:  Site patent and intact, free from redness, edema or discomfort.  No evidence of extravasations.  Access discontinued per protocol.       Discharge Plan:   [facility]  Patient will follow up with provider before scheduling future appointments.  Patient discharged in stable condition accompanied by: self.  Departure Mode: Ambulatory.      Iman Jones RN

## 2022-03-02 ENCOUNTER — TELEPHONE (OUTPATIENT)
Dept: TRANSPLANT | Facility: CLINIC | Age: 72
End: 2022-03-02

## 2022-03-02 NOTE — TELEPHONE ENCOUNTER
Patient Call: General    Reason for call: Patient called to speak to Onelia about what next steps are/and or next procedure.     Call back needed? Yes or mychart message  Return Call Needed  Same as documented in contacts section  When to return call?: Same day: Route High Priority

## 2022-03-18 ENCOUNTER — TELEPHONE (OUTPATIENT)
Dept: TRANSPLANT | Facility: CLINIC | Age: 72
End: 2022-03-18
Payer: MEDICARE

## 2022-03-18 NOTE — TELEPHONE ENCOUNTER
ISSUE:  Call from John requesting lab appointment to check tacrolimus level tomorrow AM at [facility] lab.     PLAN:  Offered to give John phone number to schedule, he is requesting [facility] to call and schedule.    OUTCOME:  [facility] lab does not have any openings until Monday, 3/21/22. Patient will go to [facility] instead.

## 2022-03-19 ENCOUNTER — LAB (OUTPATIENT)
Dept: LAB | Facility: CLINIC | Age: 72
End: 2022-03-19
Payer: MEDICARE

## 2022-03-19 DIAGNOSIS — R80.9 PROTEINURIA: ICD-10-CM

## 2022-03-19 DIAGNOSIS — Z94.0 KIDNEY REPLACED BY TRANSPLANT: ICD-10-CM

## 2022-03-19 DIAGNOSIS — Z48.298 AFTERCARE FOLLOWING ORGAN TRANSPLANT: ICD-10-CM

## 2022-03-19 DIAGNOSIS — E83.42 HYPOMAGNESEMIA: ICD-10-CM

## 2022-03-19 LAB
ANION GAP SERPL CALCULATED.3IONS-SCNC: 3 MMOL/L (ref 3–14)
BUN SERPL-MCNC: 35 MG/DL (ref 7–30)
CALCIUM SERPL-MCNC: 9 MG/DL (ref 8.5–10.1)
CHLORIDE BLD-SCNC: 109 MMOL/L (ref 94–109)
CO2 SERPL-SCNC: 23 MMOL/L (ref 20–32)
CREAT SERPL-MCNC: 2.34 MG/DL (ref 0.66–1.25)
ERYTHROCYTE [DISTWIDTH] IN BLOOD BY AUTOMATED COUNT: 15.3 % (ref 10–15)
GFR SERPL CREATININE-BSD FRML MDRD: 29 ML/MIN/1.73M2
GLUCOSE BLD-MCNC: 108 MG/DL (ref 70–99)
HCT VFR BLD AUTO: 31.8 % (ref 40–53)
HGB BLD-MCNC: 9.7 G/DL (ref 13.3–17.7)
MAGNESIUM SERPL-MCNC: 1.9 MG/DL (ref 1.6–2.3)
MCH RBC QN AUTO: 29.2 PG (ref 26.5–33)
MCHC RBC AUTO-ENTMCNC: 30.5 G/DL (ref 31.5–36.5)
MCV RBC AUTO: 96 FL (ref 78–100)
PLATELET # BLD AUTO: 207 10E3/UL (ref 150–450)
POTASSIUM BLD-SCNC: 4.4 MMOL/L (ref 3.4–5.3)
RBC # BLD AUTO: 3.32 10E6/UL (ref 4.4–5.9)
SODIUM SERPL-SCNC: 135 MMOL/L (ref 133–144)
TACROLIMUS BLD-MCNC: 7.2 UG/L (ref 5–15)
TME LAST DOSE: NORMAL H
TME LAST DOSE: NORMAL H
WBC # BLD AUTO: 5.1 10E3/UL (ref 4–11)

## 2022-03-19 PROCEDURE — 36415 COLL VENOUS BLD VENIPUNCTURE: CPT

## 2022-03-19 PROCEDURE — 85014 HEMATOCRIT: CPT

## 2022-03-19 PROCEDURE — 80197 ASSAY OF TACROLIMUS: CPT

## 2022-03-19 PROCEDURE — 83735 ASSAY OF MAGNESIUM: CPT

## 2022-03-19 PROCEDURE — 80048 BASIC METABOLIC PNL TOTAL CA: CPT

## 2022-03-22 ENCOUNTER — TELEPHONE (OUTPATIENT)
Dept: TRANSPLANT | Facility: CLINIC | Age: 72
End: 2022-03-22
Payer: MEDICARE

## 2022-03-22 DIAGNOSIS — Z94.0 KIDNEY TRANSPLANTED: Primary | ICD-10-CM

## 2022-03-22 NOTE — TELEPHONE ENCOUNTER
ISSUE  Creatinine elevated at 2.34  Tacrolimus level 7.2 on 3/19/22, goal 4-6, current dose 0.5    PLAN:  Call and assess hydration status.  How much water are they drinking per day?  Any recent illness, diarrhea, s/s of a UTI, or medication changes?  Fever? Pain over graft site?  Any increased intake of alcohol or caffeine?    Confirm current dose Tacrolimus 0.5 mg BID.  Confirm no recent illness or medication changes.  Confirm no missed doses.  Confirm level was accurate 12-hour trough.  If no missed doses or recent illness and level was accurate trough, consider switch to Tacrolimus suspension to further decrease dose.    OUTCOME  Call to John, he reports feeling well, drinking around 2L water per day, drinking 1-2 cups of coffee/week, no other caffeine or alcohol. Denies diarrhea, recent illness, pain over graft site or S/S of UTI. Reports he did increase Coreg to 25 mg BID (from 12.5 mg in AM and 25 mg in PM, addressed by Dr. Varela 2/1/22) d/t elevated BPs 130s/80s at infusion appointments, does not monitor BP at home.   Confirms Tacrolimus 0.5 mg daily, accurate 12-hour trough. Message to Dr. Varela as patient's tac levels have been consistently high on this dose, per Dr. Varela's note possible switch to CSA if diarrhea persists or tac levels remain high.  Patient will schedule appointments for IV fluids this week and next (can be cancelled if not needed). Instructed patient to continue current tacrolimus dose until we receive guidance from Dr. Varela.    Message to Dr. Varela to update on elevated creatinine and tacrolimus, ?switch to CSA vs. Tacrolimus suspension, further workup. Awaiting response.    ADDENDUM:  Samuel Varela MD Duncanson, Sarah, RN  If he is willing, would change to tacrolimus suspension.     Ramon     Call to John, no answer, left detailed message that tacrolimus dose decreased to 0.4 mg (0.4 ml) BID, rx updated, patient instructed to repeat transplant labs one week after starting  suspension,  ensuring accurate 12-hour trough. Lab orders placed. Encouraged patient to call transplant office with any questions.

## 2022-03-23 ENCOUNTER — TELEPHONE (OUTPATIENT)
Dept: TRANSPLANT | Facility: CLINIC | Age: 72
End: 2022-03-23
Payer: MEDICARE

## 2022-03-23 NOTE — TELEPHONE ENCOUNTER
Return call to patient. He states he was returning missed call from transplant number this office he received earlier today. No documentation of call to patient today.     Patient confirms [facility] and they will send him tacrolimus suspension. He will repeat tacrolimus level approximately one week after dose change. He also reports he has IV fluid appointment this Sunday, BMP after to monitor creatinine.    Per patient no further questions or concerns at this time.

## 2022-03-27 ENCOUNTER — INFUSION THERAPY VISIT (OUTPATIENT)
Dept: ONCOLOGY | Facility: CLINIC | Age: 72
End: 2022-03-27
Attending: INTERNAL MEDICINE
Payer: MEDICARE

## 2022-03-27 VITALS
TEMPERATURE: 97.4 F | DIASTOLIC BLOOD PRESSURE: 72 MMHG | RESPIRATION RATE: 16 BRPM | OXYGEN SATURATION: 100 % | HEART RATE: 52 BPM | SYSTOLIC BLOOD PRESSURE: 116 MMHG

## 2022-03-27 DIAGNOSIS — Z94.0 KIDNEY REPLACED BY TRANSPLANT: ICD-10-CM

## 2022-03-27 DIAGNOSIS — Z48.298 AFTERCARE FOLLOWING ORGAN TRANSPLANT: ICD-10-CM

## 2022-03-27 DIAGNOSIS — R19.7 DIARRHEA OF PRESUMED INFECTIOUS ORIGIN: Primary | ICD-10-CM

## 2022-03-27 LAB
ANION GAP SERPL CALCULATED.3IONS-SCNC: 9 MMOL/L (ref 3–14)
BUN SERPL-MCNC: 36 MG/DL (ref 7–30)
CALCIUM SERPL-MCNC: 8.9 MG/DL (ref 8.5–10.1)
CHLORIDE BLD-SCNC: 108 MMOL/L (ref 94–109)
CO2 SERPL-SCNC: 24 MMOL/L (ref 20–32)
CREAT SERPL-MCNC: 2.16 MG/DL (ref 0.66–1.25)
ERYTHROCYTE [DISTWIDTH] IN BLOOD BY AUTOMATED COUNT: 15.2 % (ref 10–15)
GFR SERPL CREATININE-BSD FRML MDRD: 32 ML/MIN/1.73M2
GLUCOSE BLD-MCNC: 105 MG/DL (ref 70–99)
HCT VFR BLD AUTO: 32.7 % (ref 40–53)
HGB BLD-MCNC: 9.8 G/DL (ref 13.3–17.7)
MCH RBC QN AUTO: 28.6 PG (ref 26.5–33)
MCHC RBC AUTO-ENTMCNC: 30 G/DL (ref 31.5–36.5)
MCV RBC AUTO: 95 FL (ref 78–100)
PLATELET # BLD AUTO: 207 10E3/UL (ref 150–450)
POTASSIUM BLD-SCNC: 4.2 MMOL/L (ref 3.4–5.3)
RBC # BLD AUTO: 3.43 10E6/UL (ref 4.4–5.9)
SODIUM SERPL-SCNC: 141 MMOL/L (ref 133–144)
TACROLIMUS BLD-MCNC: 4.3 UG/L (ref 5–15)
TME LAST DOSE: ABNORMAL H
TME LAST DOSE: ABNORMAL H
WBC # BLD AUTO: 4.1 10E3/UL (ref 4–11)

## 2022-03-27 PROCEDURE — 80197 ASSAY OF TACROLIMUS: CPT

## 2022-03-27 PROCEDURE — 80048 BASIC METABOLIC PNL TOTAL CA: CPT

## 2022-03-27 PROCEDURE — 96360 HYDRATION IV INFUSION INIT: CPT

## 2022-03-27 PROCEDURE — 85027 COMPLETE CBC AUTOMATED: CPT

## 2022-03-27 PROCEDURE — 36415 COLL VENOUS BLD VENIPUNCTURE: CPT

## 2022-03-27 PROCEDURE — 258N000003 HC RX IP 258 OP 636: Performed by: INTERNAL MEDICINE

## 2022-03-27 RX ORDER — DIPHENHYDRAMINE HYDROCHLORIDE 50 MG/ML
50 INJECTION INTRAMUSCULAR; INTRAVENOUS
Status: CANCELLED
Start: 2022-04-03

## 2022-03-27 RX ORDER — METHYLPREDNISOLONE SODIUM SUCCINATE 125 MG/2ML
125 INJECTION, POWDER, LYOPHILIZED, FOR SOLUTION INTRAMUSCULAR; INTRAVENOUS
Status: CANCELLED
Start: 2022-04-03

## 2022-03-27 RX ORDER — HEPARIN SODIUM,PORCINE 10 UNIT/ML
5 VIAL (ML) INTRAVENOUS
Status: CANCELLED | OUTPATIENT
Start: 2022-04-03

## 2022-03-27 RX ORDER — ALBUTEROL SULFATE 0.83 MG/ML
2.5 SOLUTION RESPIRATORY (INHALATION)
Status: CANCELLED | OUTPATIENT
Start: 2022-04-03

## 2022-03-27 RX ORDER — EPINEPHRINE 1 MG/ML
0.3 INJECTION, SOLUTION INTRAMUSCULAR; SUBCUTANEOUS EVERY 5 MIN PRN
Status: CANCELLED | OUTPATIENT
Start: 2022-04-03

## 2022-03-27 RX ORDER — NALOXONE HYDROCHLORIDE 0.4 MG/ML
0.2 INJECTION, SOLUTION INTRAMUSCULAR; INTRAVENOUS; SUBCUTANEOUS
Status: CANCELLED | OUTPATIENT
Start: 2022-04-03

## 2022-03-27 RX ORDER — HEPARIN SODIUM (PORCINE) LOCK FLUSH IV SOLN 100 UNIT/ML 100 UNIT/ML
5 SOLUTION INTRAVENOUS
Status: CANCELLED | OUTPATIENT
Start: 2022-04-03

## 2022-03-27 RX ORDER — ALBUTEROL SULFATE 90 UG/1
1-2 AEROSOL, METERED RESPIRATORY (INHALATION)
Status: CANCELLED
Start: 2022-04-03

## 2022-03-27 RX ORDER — MEPERIDINE HYDROCHLORIDE 25 MG/ML
25 INJECTION INTRAMUSCULAR; INTRAVENOUS; SUBCUTANEOUS EVERY 30 MIN PRN
Status: CANCELLED | OUTPATIENT
Start: 2022-04-03

## 2022-03-27 RX ADMIN — SODIUM CHLORIDE 1000 ML: 9 INJECTION, SOLUTION INTRAVENOUS at 10:28

## 2022-03-27 NOTE — PATIENT INSTRUCTIONS
[facility] Main Line: [phone]    Call triage nurse with chills and/or temperature greater than or equal to 100.4, uncontrolled nausea/vomiting, diarrhea, constipation, dizziness, shortness of breath, chest pain, bleeding, unexplained bruising, or any new/concerning symptoms, questions/concerns.   If you are having any concerning symptoms or wish to speak to a provider before your next infusion visit, please call your care coordinator or triage to notify them so we can adequately serve you.   Nurse Triage line:  [phone]    If after hours, weekends, or holidays, call main hospital  and ask for Oncology doctor on call @ [phone]      March 2022 Sunday Monday Tuesday Wednesday Thursday Friday Saturday             1     2     3     4     5       6     7     8     9     10     11     12       13     14     15     16     17     18     19    LAB   9:40 AM   (20 min.)    LAB ONLY   [facility]   20     21     22     23     24     25     26       27    ONC INFUSION 2 HR (120 MIN)  10:00 AM   (120 min.)    ONC INFUSION NURSE   [facility] 28     29     30     31    SPEC INFUSION 2 HR (120 MIN)   4:00 PM   (120 min.)    SIPC INFUSION NURSE   [facility]                       April 2022 Sunday Monday Tuesday Wednesday Thursday Friday Saturday                            1     2       3     4     5     6     7     8     9       10     11     12     13     14     15     16       17     18     19     20     21     22     23       24     25     26     27     28     29     30                     Lab Results:  Recent Results (from the past 12 hour(s))   Basic metabolic panel    Collection Time: 03/27/22 10:21 AM   Result Value Ref Range    Sodium 141 133 - 144 mmol/L    Potassium 4.2 3.4 - 5.3 mmol/L    Chloride 108 94 - 109 mmol/L    Carbon Dioxide (CO2) 24 20 - 32 mmol/L    Anion Gap 9  3 - 14 mmol/L    Urea Nitrogen 36 (H) 7 - 30 mg/dL    Creatinine 2.16 (H) 0.66 - 1.25 mg/dL    Calcium 8.9 8.5 - 10.1 mg/dL    Glucose 105 (H) 70 - 99 mg/dL    GFR Estimate 32 (L) >60 mL/min/1.73m2   CBC with platelets    Collection Time: 03/27/22 10:21 AM   Result Value Ref Range    WBC Count 4.1 4.0 - 11.0 10e3/uL    RBC Count 3.43 (L) 4.40 - 5.90 10e6/uL    Hemoglobin 9.8 (L) 13.3 - 17.7 g/dL    Hematocrit 32.7 (L) 40.0 - 53.0 %    MCV 95 78 - 100 fL    MCH 28.6 26.5 - 33.0 pg    MCHC 30.0 (L) 31.5 - 36.5 g/dL    RDW 15.2 (H) 10.0 - 15.0 %    Platelet Count 207 150 - 450 10e3/uL

## 2022-03-27 NOTE — PROGRESS NOTES
Infusion Nursing Note:  Marlo FLORES Angelaernst presents today for IVF.    Patient seen by provider today: No   present during visit today: Not Applicable.    Note: Patient reported to clinic today with no new complaints or concerns.    Intravenous Access:  Labs drawn without difficulty.  Peripheral IV placed.    Treatment Conditions:  Lab Results   Component Value Date    HGB 9.8 (L) 03/27/2022    WBC 4.1 03/27/2022    ANEU 4.7 12/05/2020    ANEUTAUTO 3.5 08/07/2021     03/27/2022      Lab Results   Component Value Date     03/27/2022    POTASSIUM 4.2 03/27/2022    MAG 1.9 03/19/2022    CR 2.16 (H) 03/27/2022    JUAN 8.9 03/27/2022    BILITOTAL 0.8 08/07/2021    ALBUMIN 3.6 12/16/2021    ALT 17 08/07/2021    AST 10 08/07/2021     Post Infusion Assessment:  Patient tolerated infusion without incident.  Blood return noted pre and post infusion.  Site patent and intact, free from redness, edema or discomfort.  No evidence of extravasations.  Access discontinued per protocol.     Discharge Plan:   Patient declined prescription refills.  Discharge instructions reviewed with: Patient.  Patient and/or family verbalized understanding of discharge instructions and all questions answered.  AVS to patient via [employer].  Patient will return 3/31/22 for next appointment.   Patient discharged in stable condition accompanied by: self.  Departure Mode: Ambulatory.  Face to Face time: 5 minutes.    Joselo Escalona RN

## 2022-03-31 ENCOUNTER — INFUSION THERAPY VISIT (OUTPATIENT)
Dept: INFUSION THERAPY | Facility: CLINIC | Age: 72
End: 2022-03-31
Attending: INTERNAL MEDICINE
Payer: MEDICARE

## 2022-03-31 VITALS
HEART RATE: 56 BPM | OXYGEN SATURATION: 100 % | DIASTOLIC BLOOD PRESSURE: 79 MMHG | SYSTOLIC BLOOD PRESSURE: 123 MMHG | RESPIRATION RATE: 16 BRPM | TEMPERATURE: 97.5 F

## 2022-03-31 DIAGNOSIS — R19.7 DIARRHEA OF PRESUMED INFECTIOUS ORIGIN: Primary | ICD-10-CM

## 2022-03-31 DIAGNOSIS — Z94.0 KIDNEY REPLACED BY TRANSPLANT: ICD-10-CM

## 2022-03-31 LAB
ANION GAP SERPL CALCULATED.3IONS-SCNC: 8 MMOL/L (ref 3–14)
BUN SERPL-MCNC: 37 MG/DL (ref 7–30)
CALCIUM SERPL-MCNC: 8 MG/DL (ref 8.5–10.1)
CHLORIDE BLD-SCNC: 110 MMOL/L (ref 94–109)
CO2 SERPL-SCNC: 23 MMOL/L (ref 20–32)
CREAT SERPL-MCNC: 2.28 MG/DL (ref 0.66–1.25)
GFR SERPL CREATININE-BSD FRML MDRD: 30 ML/MIN/1.73M2
GLUCOSE BLD-MCNC: 95 MG/DL (ref 70–99)
POTASSIUM BLD-SCNC: 4.8 MMOL/L (ref 3.4–5.3)
SODIUM SERPL-SCNC: 141 MMOL/L (ref 133–144)

## 2022-03-31 PROCEDURE — 36415 COLL VENOUS BLD VENIPUNCTURE: CPT

## 2022-03-31 PROCEDURE — 96360 HYDRATION IV INFUSION INIT: CPT

## 2022-03-31 PROCEDURE — 80048 BASIC METABOLIC PNL TOTAL CA: CPT

## 2022-03-31 PROCEDURE — 258N000003 HC RX IP 258 OP 636: Performed by: INTERNAL MEDICINE

## 2022-03-31 RX ORDER — EPINEPHRINE 1 MG/ML
0.3 INJECTION, SOLUTION INTRAMUSCULAR; SUBCUTANEOUS EVERY 5 MIN PRN
Status: CANCELLED | OUTPATIENT
Start: 2022-04-03

## 2022-03-31 RX ORDER — METHYLPREDNISOLONE SODIUM SUCCINATE 125 MG/2ML
125 INJECTION, POWDER, LYOPHILIZED, FOR SOLUTION INTRAMUSCULAR; INTRAVENOUS
Status: CANCELLED
Start: 2022-04-03

## 2022-03-31 RX ORDER — HEPARIN SODIUM (PORCINE) LOCK FLUSH IV SOLN 100 UNIT/ML 100 UNIT/ML
5 SOLUTION INTRAVENOUS
Status: CANCELLED | OUTPATIENT
Start: 2022-04-03

## 2022-03-31 RX ORDER — NALOXONE HYDROCHLORIDE 0.4 MG/ML
0.2 INJECTION, SOLUTION INTRAMUSCULAR; INTRAVENOUS; SUBCUTANEOUS
Status: CANCELLED | OUTPATIENT
Start: 2022-04-03

## 2022-03-31 RX ORDER — DIPHENHYDRAMINE HYDROCHLORIDE 50 MG/ML
50 INJECTION INTRAMUSCULAR; INTRAVENOUS
Status: CANCELLED
Start: 2022-04-03

## 2022-03-31 RX ORDER — ALBUTEROL SULFATE 0.83 MG/ML
2.5 SOLUTION RESPIRATORY (INHALATION)
Status: CANCELLED | OUTPATIENT
Start: 2022-04-03

## 2022-03-31 RX ORDER — HEPARIN SODIUM,PORCINE 10 UNIT/ML
5 VIAL (ML) INTRAVENOUS
Status: CANCELLED | OUTPATIENT
Start: 2022-04-03

## 2022-03-31 RX ORDER — ALBUTEROL SULFATE 90 UG/1
1-2 AEROSOL, METERED RESPIRATORY (INHALATION)
Status: CANCELLED
Start: 2022-04-03

## 2022-03-31 RX ORDER — MEPERIDINE HYDROCHLORIDE 25 MG/ML
25 INJECTION INTRAMUSCULAR; INTRAVENOUS; SUBCUTANEOUS EVERY 30 MIN PRN
Status: CANCELLED | OUTPATIENT
Start: 2022-04-03

## 2022-03-31 RX ADMIN — SODIUM CHLORIDE 1000 ML: 9 INJECTION, SOLUTION INTRAVENOUS at 16:27

## 2022-03-31 NOTE — PROGRESS NOTES
"Chief Complaint   Patient presents with     Infusion     IV fluids, labs     Infusion Nursing Note:  Marlo Vee presents today for IV fluids.    Patient seen by provider today: No   present during visit today: Not Applicable.    Note: 1 L NS given over 1 hour.    Intravenous Access:  Peripheral IV placed.  BMP drawn post infusion.     Treatment Conditions:  Not Applicable.    Post Infusion Assessment:  Patient tolerated infusion without incident.  Site patent and intact, free from redness, edema or discomfort.  No evidence of extravasations.  Access discontinued per protocol.       Discharge Plan:   AVS to patient via MYCHART.   Patient discharged in stable condition accompanied by: self.  Departure Mode: Ambulatory.    Administrations This Visit     0.9% sodium chloride BOLUS     Admin Date  03/31/2022 Action  New Bag Dose  1,000 mL Route  Intravenous Administered By  Zhane Chavira RN                Vital signs:  Temp: 97.5  F (36.4  C) Temp src: Oral BP: 120/78 Pulse: 63   Resp: 18 SpO2: 100 % O2 Device: None (Room air)        Estimated body mass index is 20.59 kg/m  as calculated from the following:    Height as of 9/24/21: 1.778 m (5' 10\").    Weight as of 9/24/21: 65.1 kg (143 lb 8 oz).                  "

## 2022-03-31 NOTE — LETTER
"    3/31/2022         RE: Marlo Vee  [address] [facility] [address]        Dear Colleague,    Thank you for referring your patient, Marlo Vee, to the [facility]. Please see a copy of my visit note below.    Chief Complaint   Patient presents with     Infusion     IV fluids, labs     Infusion Nursing Note:  Marlo Vee presents today for IV fluids.    Patient seen by provider today: No   present during visit today: Not Applicable.    Note: 1 L NS given over 1 hour.    Intravenous Access:  Peripheral IV placed.  BMP drawn post infusion.     Treatment Conditions:  Not Applicable.    Post Infusion Assessment:  Patient tolerated infusion without incident.  Site patent and intact, free from redness, edema or discomfort.  No evidence of extravasations.  Access discontinued per protocol.       Discharge Plan:   AVS to patient via MYCHART.   Patient discharged in stable condition accompanied by: self.  Departure Mode: Ambulatory.    Administrations This Visit     0.9% sodium chloride BOLUS     Admin Date  03/31/2022 Action  New Bag Dose  1,000 mL Route  Intravenous Administered By  Zhane Chavira RN                Vital signs:  Temp: 97.5  F (36.4  C) Temp src: Oral BP: 120/78 Pulse: 63   Resp: 18 SpO2: 100 % O2 Device: None (Room air)        Estimated body mass index is 20.59 kg/m  as calculated from the following:    Height as of 9/24/21: 1.778 m (5' 10\").    Weight as of 9/24/21: 65.1 kg (143 lb 8 oz).        Again, thank you for allowing me to participate in the care of your patient.      Sincerely,    [facility]    "

## 2022-04-01 ENCOUNTER — TELEPHONE (OUTPATIENT)
Dept: TRANSPLANT | Facility: CLINIC | Age: 72
End: 2022-04-01
Payer: MEDICARE

## 2022-04-01 NOTE — TELEPHONE ENCOUNTER
Creatinine = 2.28  (3/31/22)  Baseline 1.1-1.3  Received IV fluids yesterday    [employer] message sent.

## 2022-04-04 ENCOUNTER — TELEPHONE (OUTPATIENT)
Dept: TRANSPLANT | Facility: CLINIC | Age: 72
End: 2022-04-04
Payer: MEDICARE

## 2022-04-04 DIAGNOSIS — Z48.298 AFTERCARE FOLLOWING ORGAN TRANSPLANT: ICD-10-CM

## 2022-04-04 DIAGNOSIS — Z94.0 KIDNEY TRANSPLANTED: Primary | ICD-10-CM

## 2022-04-04 NOTE — TELEPHONE ENCOUNTER
Mason Irwin MD [place]um, Teresa, RN  Cc: Adilia Richardson, RN  I'm covering for Dr. Varela. Let's continue IV fluids, obtain U/A, Ucx, kidney transplant U/S. Thanks     Last IV fluids 3/31/22  Called Marlo Vee and made aware of Dr. Irwin's recommendations.  Will continue with weekly labs.  Message sent to [facility] scheduling pool.    Ordered:  Urinalysis  Urine culture  US Renal Transplant.

## 2022-04-11 ENCOUNTER — TELEPHONE (OUTPATIENT)
Dept: TRANSPLANT | Facility: CLINIC | Age: 72
End: 2022-04-11
Payer: MEDICARE

## 2022-04-12 ENCOUNTER — TELEPHONE (OUTPATIENT)
Dept: TRANSPLANT | Facility: CLINIC | Age: 72
End: 2022-04-12
Payer: MEDICARE

## 2022-04-12 DIAGNOSIS — R79.89 ELEVATED SERUM CREATININE: Primary | ICD-10-CM

## 2022-04-12 NOTE — TELEPHONE ENCOUNTER
Patient Called   Wanted to discuss care plan        Call back needed? Yes    Return Call Needed  Same as documented in contacts section  When to return call?: Greater than one day: Route standard priority

## 2022-04-12 NOTE — TELEPHONE ENCOUNTER
Return call to John. Confirmed schedule for tomorrow - IVF, labs and renal transplant US. Informed patient no timed tacro level needed, ok to take meds and eat/drink prior. BMP order placed (only CBC and UA/UC ordered).   Patient also inquiring about 4th Covid-19 vaccine. Advised him that CDC and transplant office is recommending 4th booster dose, as well as 5th 4 months after 4th now. Also educated John about Keith, he will pursue 4th Covid-19 vaccine and discuss with PCP as he has Hx of MI and cardiac stents.  Per patient no further questions or concerns at this time.

## 2022-04-13 ENCOUNTER — INFUSION THERAPY VISIT (OUTPATIENT)
Dept: INFUSION THERAPY | Facility: CLINIC | Age: 72
End: 2022-04-13
Attending: INTERNAL MEDICINE
Payer: MEDICARE

## 2022-04-13 VITALS
TEMPERATURE: 97.4 F | HEART RATE: 65 BPM | RESPIRATION RATE: 16 BRPM | SYSTOLIC BLOOD PRESSURE: 141 MMHG | OXYGEN SATURATION: 100 % | DIASTOLIC BLOOD PRESSURE: 92 MMHG

## 2022-04-13 DIAGNOSIS — Z94.0 KIDNEY TRANSPLANTED: ICD-10-CM

## 2022-04-13 DIAGNOSIS — R19.7 DIARRHEA OF PRESUMED INFECTIOUS ORIGIN: Primary | ICD-10-CM

## 2022-04-13 DIAGNOSIS — Z94.0 KIDNEY REPLACED BY TRANSPLANT: ICD-10-CM

## 2022-04-13 DIAGNOSIS — Z48.298 AFTERCARE FOLLOWING ORGAN TRANSPLANT: ICD-10-CM

## 2022-04-13 LAB
ANION GAP SERPL CALCULATED.3IONS-SCNC: 4 MMOL/L (ref 3–14)
BUN SERPL-MCNC: 32 MG/DL (ref 7–30)
CALCIUM SERPL-MCNC: 8.4 MG/DL (ref 8.5–10.1)
CHLORIDE BLD-SCNC: 113 MMOL/L (ref 94–109)
CO2 SERPL-SCNC: 22 MMOL/L (ref 20–32)
CREAT SERPL-MCNC: 1.76 MG/DL (ref 0.66–1.25)
CREAT UR-MCNC: 9 MG/DL
ERYTHROCYTE [DISTWIDTH] IN BLOOD BY AUTOMATED COUNT: 15.4 % (ref 10–15)
GFR SERPL CREATININE-BSD FRML MDRD: 41 ML/MIN/1.73M2
GLUCOSE BLD-MCNC: 92 MG/DL (ref 70–99)
HCT VFR BLD AUTO: 27.9 % (ref 40–53)
HGB BLD-MCNC: 8.6 G/DL (ref 13.3–17.7)
MCH RBC QN AUTO: 29.7 PG (ref 26.5–33)
MCHC RBC AUTO-ENTMCNC: 30.8 G/DL (ref 31.5–36.5)
MCV RBC AUTO: 96 FL (ref 78–100)
PLATELET # BLD AUTO: 154 10E3/UL (ref 150–450)
POTASSIUM BLD-SCNC: 4.4 MMOL/L (ref 3.4–5.3)
PROT UR-MCNC: 0.08 G/L
PROT/CREAT 24H UR: 0.89 G/G CR (ref 0–0.2)
RBC # BLD AUTO: 2.9 10E6/UL (ref 4.4–5.9)
SODIUM SERPL-SCNC: 139 MMOL/L (ref 133–144)
WBC # BLD AUTO: 3.7 10E3/UL (ref 4–11)

## 2022-04-13 PROCEDURE — 85027 COMPLETE CBC AUTOMATED: CPT

## 2022-04-13 PROCEDURE — 258N000003 HC RX IP 258 OP 636: Performed by: INTERNAL MEDICINE

## 2022-04-13 PROCEDURE — 84156 ASSAY OF PROTEIN URINE: CPT

## 2022-04-13 PROCEDURE — 80048 BASIC METABOLIC PNL TOTAL CA: CPT

## 2022-04-13 PROCEDURE — 96360 HYDRATION IV INFUSION INIT: CPT

## 2022-04-13 PROCEDURE — 36415 COLL VENOUS BLD VENIPUNCTURE: CPT

## 2022-04-13 RX ORDER — MEPERIDINE HYDROCHLORIDE 25 MG/ML
25 INJECTION INTRAMUSCULAR; INTRAVENOUS; SUBCUTANEOUS EVERY 30 MIN PRN
Status: CANCELLED | OUTPATIENT
Start: 2022-04-17

## 2022-04-13 RX ORDER — HEPARIN SODIUM (PORCINE) LOCK FLUSH IV SOLN 100 UNIT/ML 100 UNIT/ML
5 SOLUTION INTRAVENOUS
Status: CANCELLED | OUTPATIENT
Start: 2022-04-17

## 2022-04-13 RX ORDER — METHYLPREDNISOLONE SODIUM SUCCINATE 125 MG/2ML
125 INJECTION, POWDER, LYOPHILIZED, FOR SOLUTION INTRAMUSCULAR; INTRAVENOUS
Status: CANCELLED
Start: 2022-04-17

## 2022-04-13 RX ORDER — ALBUTEROL SULFATE 90 UG/1
1-2 AEROSOL, METERED RESPIRATORY (INHALATION)
Status: CANCELLED
Start: 2022-04-17

## 2022-04-13 RX ORDER — ALBUTEROL SULFATE 0.83 MG/ML
2.5 SOLUTION RESPIRATORY (INHALATION)
Status: CANCELLED | OUTPATIENT
Start: 2022-04-17

## 2022-04-13 RX ORDER — NALOXONE HYDROCHLORIDE 0.4 MG/ML
0.2 INJECTION, SOLUTION INTRAMUSCULAR; INTRAVENOUS; SUBCUTANEOUS
Status: CANCELLED | OUTPATIENT
Start: 2022-04-17

## 2022-04-13 RX ORDER — EPINEPHRINE 1 MG/ML
0.3 INJECTION, SOLUTION INTRAMUSCULAR; SUBCUTANEOUS EVERY 5 MIN PRN
Status: CANCELLED | OUTPATIENT
Start: 2022-04-17

## 2022-04-13 RX ORDER — HEPARIN SODIUM,PORCINE 10 UNIT/ML
5 VIAL (ML) INTRAVENOUS
Status: CANCELLED | OUTPATIENT
Start: 2022-04-17

## 2022-04-13 RX ORDER — DIPHENHYDRAMINE HYDROCHLORIDE 50 MG/ML
50 INJECTION INTRAMUSCULAR; INTRAVENOUS
Status: CANCELLED
Start: 2022-04-17

## 2022-04-13 RX ADMIN — SODIUM CHLORIDE 1000 ML: 9 INJECTION, SOLUTION INTRAVENOUS at 09:31

## 2022-04-13 NOTE — LETTER
4/13/2022         RE: Marlo Vee  [address] [facility] [address]        Dear Colleague,    Thank you for referring your patient, Marlo Vee, to the [facility]. Please see a copy of my visit note below.    Infusion Nursing Note:  Marlo Vee presents today for IVF+Labs.    Patient seen by provider today: No   present during visit today: Not Applicable.    Note: NS 1000 ml bolus given over 1 hour.      Intravenous Access:  Labs drawn without difficulty.  Peripheral IV placed.    Treatment Conditions:  Not Applicable.      Post Infusion Assessment:  Patient tolerated infusion without incident.  Site patent and intact, free from redness, edema or discomfort.  No evidence of extravasations.  Access discontinued per protocol.       Discharge Plan:   Discharge instructions reviewed with: Patient.  Patient and/or family verbalized understanding of discharge instructions and all questions answered.  Patient discharged in stable condition accompanied by: self.  Departure Mode: Ambulatory.    Administrations This Visit     0.9% sodium chloride BOLUS     Admin Date  04/13/2022 Action  New Bag Dose  1,000 mL Route  Intravenous Administered By  Rachel Bateman, RN                  Rachel Bateman RN                          Again, thank you for allowing me to participate in the care of your patient.        Sincerely,        [facility]

## 2022-04-13 NOTE — PROGRESS NOTES
Infusion Nursing Note:  Marlo Vee presents today for IVF+Labs.    Patient seen by provider today: No   present during visit today: Not Applicable.    Note: NS 1000 ml bolus given over 1 hour.      Intravenous Access:  Labs drawn without difficulty.  Peripheral IV placed.    Treatment Conditions:  Not Applicable.      Post Infusion Assessment:  Patient tolerated infusion without incident.  Site patent and intact, free from redness, edema or discomfort.  No evidence of extravasations.  Access discontinued per protocol.       Discharge Plan:   Discharge instructions reviewed with: Patient.  Patient and/or family verbalized understanding of discharge instructions and all questions answered.  Patient discharged in stable condition accompanied by: self.  Departure Mode: Ambulatory.    Administrations This Visit     0.9% sodium chloride BOLUS     Admin Date  04/13/2022 Action  New Bag Dose  1,000 mL Route  Intravenous Administered By  Rachel Bateman, RN                  Rachel Bateman RN

## 2022-04-18 ENCOUNTER — MYC MEDICAL ADVICE (OUTPATIENT)
Dept: TRANSPLANT | Facility: CLINIC | Age: 72
End: 2022-04-18
Payer: MEDICARE

## 2022-04-18 ENCOUNTER — TELEPHONE (OUTPATIENT)
Dept: TRANSPLANT | Facility: CLINIC | Age: 72
End: 2022-04-18
Payer: MEDICARE

## 2022-04-18 DIAGNOSIS — R79.89 ELEVATED SERUM CREATININE: ICD-10-CM

## 2022-04-18 DIAGNOSIS — R79.89 ELEVATED SERUM CREATININE: Primary | ICD-10-CM

## 2022-04-18 DIAGNOSIS — Z94.0 KIDNEY TRANSPLANTED: Primary | ICD-10-CM

## 2022-04-18 DIAGNOSIS — Z94.0 KIDNEY TRANSPLANTED: ICD-10-CM

## 2022-04-18 DIAGNOSIS — Z48.298 AFTERCARE FOLLOWING ORGAN TRANSPLANT: ICD-10-CM

## 2022-04-18 RX ORDER — CARVEDILOL 25 MG/1
25 TABLET ORAL EVERY MORNING
Qty: 30 TABLET | Refills: 11 | Status: SHIPPED | OUTPATIENT
Start: 2022-04-18 | End: 2022-10-25

## 2022-04-18 NOTE — TELEPHONE ENCOUNTER
"ISSUE  Creatinine continues to fluctuate 1.7-2.3 over the past couple months. COntinues to intermittently get IVF\"s, no longer having diarrhea.    PLAN (per Dr. Varela)  Discuss doing another biopsy.  Appointment with Dr. Varela on 4/20 @ 11:00.        OUTCOME  John is agreeable to a virtual appointment on Wednesday, cannot come in person.  He is hesitant to agree to a biopsy, but will discuss.  States BP's  Are running 115-140/70's. Taking Carvedilol 25 mg BID.  Scheduling request sent.  "

## 2022-04-20 ENCOUNTER — VIRTUAL VISIT (OUTPATIENT)
Dept: NEPHROLOGY | Facility: CLINIC | Age: 72
End: 2022-04-20
Attending: INTERNAL MEDICINE
Payer: MEDICARE

## 2022-04-20 ENCOUNTER — MYC MEDICAL ADVICE (OUTPATIENT)
Dept: TRANSPLANT | Facility: CLINIC | Age: 72
End: 2022-04-20

## 2022-04-20 DIAGNOSIS — Z48.298 AFTERCARE FOLLOWING ORGAN TRANSPLANT: ICD-10-CM

## 2022-04-20 DIAGNOSIS — Z94.0 KIDNEY TRANSPLANTED: Primary | ICD-10-CM

## 2022-04-20 DIAGNOSIS — Z94.0 KIDNEY TRANSPLANTED: ICD-10-CM

## 2022-04-20 DIAGNOSIS — R79.89 ELEVATED SERUM CREATININE: ICD-10-CM

## 2022-04-20 DIAGNOSIS — D84.9 IMMUNOSUPPRESSION (H): ICD-10-CM

## 2022-04-20 DIAGNOSIS — Z94.0 KIDNEY REPLACED BY TRANSPLANT: Primary | ICD-10-CM

## 2022-04-20 DIAGNOSIS — Z29.89 NEED FOR PNEUMOCYSTIS PROPHYLAXIS: ICD-10-CM

## 2022-04-20 DIAGNOSIS — I15.1 HTN, KIDNEY TRANSPLANT RELATED: ICD-10-CM

## 2022-04-20 DIAGNOSIS — N18.32 CHRONIC KIDNEY DISEASE, STAGE 3B (H): ICD-10-CM

## 2022-04-20 DIAGNOSIS — Z94.0 HTN, KIDNEY TRANSPLANT RELATED: ICD-10-CM

## 2022-04-20 PROCEDURE — 99214 OFFICE O/P EST MOD 30 MIN: CPT | Mod: 95 | Performed by: INTERNAL MEDICINE

## 2022-04-20 PROCEDURE — G0463 HOSPITAL OUTPT CLINIC VISIT: HCPCS | Mod: PN,RTG | Performed by: INTERNAL MEDICINE

## 2022-04-20 NOTE — LETTER
4/20/2022       RE: Marlo Vee  [address] [facility] [address]     Dear Colleague,    Thank you for referring your patient, Marlo Vee, to the [facility] [place] [facility]. Please see a copy of my visit note below.    John is a 71 year old who is being evaluated via a billable video visit.      How would you like to obtain your AVS? MyChart  If the video visit is dropped, the invitation should be resent by: Text to cell phone: [phone]  Will anyone else be joining your video visit? No    Video Start Time: 11:07 AM  Video-Visit Details    Type of service:  Video Visit    Video End Time:1130    Originating Location (pt. Location): Home    Distant Location (provider location):  [facility]     Platform used for Video Visit: IPICO       TRANSPLANT NEPHROLOGY CHRONIC POST TRANSPLANT VISIT    Recommendations  Monitor Labs once every 2 weeks  No IV fluids for now   If Creatinine remains < 2, Will continue to monitor labs every 2 weeks  For the next 2-3 months  If Creatinine > 2 , will plan for transplant kidney biopsy    Assessment & Plan   # LDKT: Increased creatinine with multiple MARCOS episodes due to recent diarrheal illnesses.  Kidney transplant biopsy 10/2021 showed ATI and mild chronic changes, but no acute rejection. May be having supratherapeutic CNI levels due to ongoing diarrhea as well. Unclear if low to mid-2s will be new baseline.   - Baseline Creatinine:  ~ 1.1-1.3   - Proteinuria: Moderate (1-3 grams)   - Date DSA Last Checked: Oct/2021      Latest DSA: No   - BK Viremia: No   - Kidney Tx Biopsy: Oct 19, 2021; Result: No diagnostic evidence of acute rejection.  Acute tubular injury with mild interstitial fibrosis and tubular atrophy.    # Immunosuppression: Tacrolimus immediate release (goal 4-6) and Azathioprine (dose 100 mg daily)   - Continue with intensive  monitoring of immunosuppression for efficacy and toxicity.   - Changes: Yes - Recently changed from MPA to AZA. If diarrhea persists and/or tac levels are high would recommend changing from tac to CSA    # Infection Prophylaxis:   Last CD4 Level: 165 (Sep/2021)  - PJP: Sulfa/TMP (Bactrim)    # Hypertension: Borderline control;  Goal BP: < 130/80   - Changes: Yes - Pt increased carvedilol to 25mg BID (up from 12.5/25)    # Anemia in Chronic Renal Disease: Hgb: Stable      LULY: No   - Iron studies: Low iron saturation               - on oral iron     # Mineral Bone Disorder:   - Vitamin D; level: Not checked recently        On supplement: Yes  - Calcium; level: Low        On supplement: No    # Electrolytes:   - Potassium; level: Normal        On supplement: No  - Magnesium; level: Not checked recently        On supplement: No  - Bicarbonate; level: Low        On supplement: Yes - continue    # CAD, s/p PCI: Asymptomatic.    # Chronic Diarrhea: Patient was previously diagnosed with Clostridium difficile and also cryptosporidium.  He was treated for both, but no improvement in his ongoing diarrhea symptoms.  He has been seen by Transplant ID and GI.  Presently doing conservative management and has been getting IVF 3x/wk to prevent dehydration, but his stools have been better until this morning. [place] currently [place] his cabin and drinking well water - we encouraged him to get the water tested. If diarrhea persists, will need to repeat    - Recommend having well water from his cabin checked for crypto   - No contraindications for colonoscopy - recommend proceeding    # Skin Cancer: New lesions: a few   - Discussed sun protection and recommend regular follow up with Dermatology.    # Medical Compliance: Yes    # COVID-19 Virus Review: Discussed COVID-19 virus and the potential medical risks.  Reviewed preventative health recommendations, including wearing a mask where appropriate.  Recommended COVID vaccination should be up  to date with either an initial vaccination or booster shot when appropriate.  Asked the patient to inform the transplant center if they are exposed or diagnosed with this virus.    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  Significant transplant-related complications: None    Transplant Office Phone Number: [phone]    Assessment and plan was discussed with the patient and he voiced his understanding and agreement.    Return visit: Return in about 3 months (around 7/20/2022).    Kaitlynn Reveles MD     Attestation:  This patient has been seen and evaluated by me, Samuel Varela MD.  I have reviewed the note and agree with plan of care as documented by the fellow.     Chief Complaint   Mr. Vee is a 71 year old here for kidney transplant and immunosuppression management.    History of Present Illness   Does exercise   No leg swelling   Appetite is good.   A good weight for his 160 - 165 lbs  He is around 155 lbs   Home BP: Not checked.     Problem List   Patient Active Problem List   Diagnosis     HTN, kidney transplant related     Membranous glomerulonephritis     Anemia in chronic renal disease     Secondary renal hyperparathyroidism (H)     Vitamin D deficiency     Dyslipidemia     Anxiety     Status post coronary angiogram     CAD, multiple vessel     Multiple vessel coronary artery disease     Kidney replaced by transplant     Immunosuppression (H)     Aftercare following organ transplant     Closed fracture of trochanter of right femur, initial encounter (H)     Impaired fasting glucose     Erectile dysfunction     Old myocardial infarction     Diarrhea     Prophylactic antibiotic     Chronic kidney disease, stage 3b (H)     Need for pneumocystis prophylaxis       Allergies   No  Known Allergies    Medications   Current Outpatient Medications   Medication Sig     aspirin 81 MG EC tablet Take 81 mg by mouth every morning     azaTHIOprine (IMURAN) 50 MG tablet Take 2 tablets (100 mg) by mouth daily     carvedilol (COREG) 25 MG tablet Take 1 tablet (25 mg) by mouth every morning In addition to 25 mg every evening.     PROGRAF (BRAND) 1 MG/ML suspension Take 0.4 mLs (0.4 mg) by mouth 2 times daily     sulfamethoxazole-trimethoprim (BACTRIM) 400-80 MG tablet Take 1 tablet by mouth Every Mon, Wed, Fri Morning     vitamin D3 (CHOLECALCIFEROL) 50 mcg (2000 units) tablet Take 1 tablet by mouth every morning     ferrous sulfate (FEROSUL) 325 (65 Fe) MG tablet Take 1 tablet (325 mg) by mouth daily (with lunch) (Patient not taking: No sig reported)     ondansetron (ZOFRAN-ODT) 4 MG ODT tab Take 1 tablet (4 mg) by mouth every 6 hours as needed for nausea or vomiting (Patient not taking: No sig reported)     sildenafil (VIAGRA) 100 MG tablet Take 1 tablet (100 mg) by mouth daily as needed (1/2 as needed 1 hour prior to sexual activity) (Patient not taking: No sig reported)     No current facility-administered medications for this visit.     There are no discontinued medications.    Physical Exam   Vital Signs: Deferred for this telemedicine visit.    GENERAL APPEARANCE: alert and no distress  HENT: no obvious abnormalities on appearance  RESP: breathing appears unremarkable with normal rate, no audible wheezing or cough and no apparent shortness of breath with conversation  MS: extremities normal - no gross deformities noted  SKIN: no apparent rash and normal skin tone  NEURO: speech is clear with no obvious neurological deficits  PSYCH: mentation appears normal and affect normal    Data     Renal Latest Ref Rng & Units 4/13/2022 3/31/2022 3/27/2022   Na 133 - 144 mmol/L 139 141 141   Na (external) 136 - 145 meq/L - - -   K 3.4 - 5.3 mmol/L 4.4 4.8 4.2   K (external) 3.5 - 5.1 meq/L - - -   Cl 94 - 109  mmol/L 113(H) 110(H) 108   CO2 20 - 32 mmol/L 22 23 24   CO2 (external) 22 - 31 meq/L - - -   BUN 7 - 30 mg/dL 32(H) 37(H) 36(H)   BUN (external) 6 - 22 mg/dL - - -   Cr 0.66 - 1.25 mg/dL 1.76(H) 2.28(H) 2.16(H)   Cr (external) 0.70 - 1.30 mg/dL - - -   Glucose 70 - 99 mg/dL 92 95 105(H)   Glucose (external) 70 - 99 mg/dL - - -   Ca  8.5 - 10.1 mg/dL 8.4(L) 8.0(L) 8.9   Ca (external) 8.5 - 10.5 mg/dL - - -   Mg 1.6 - 2.3 mg/dL - - -     Bone Health Latest Ref Rng & Units 12/24/2021 12/16/2021 8/7/2021   Phos 2.5 - 4.5 mg/dL - 2.8 2.7   PTHi 12 - 72 pg/mL - - -   Vit D Def 20 - 75 ug/L 26 - -     Heme Latest Ref Rng & Units 4/13/2022 3/27/2022 3/19/2022   WBC 4.0 - 11.0 10e3/uL 3.7(L) 4.1 5.1   WBC (external) 4.0 - 11.0 10*9/L - - -   Hgb 13.3 - 17.7 g/dL 8.6(L) 9.8(L) 9.7(L)   Hgb (external) 13.3 - 17.7 g/dL - - -   Plt 150 - 450 10e3/uL 154 207 207   Plt (external) 150 - 450 10*9/L - - -   ABSOLUTE NEUTROPHIL 1.6 - 8.3 10e9/L - - -   ABSOLUTE LYMPHOCYTES 0.8 - 5.3 10e9/L - - -   ABSOLUTE MONOCYTES 0.0 - 1.3 10e9/L - - -   ABSOLUTE EOSINOPHILS 0.0 - 0.7 10e9/L - - -   ABSOLUTE BASOPHILS 0.0 - 0.2 10e9/L - - -   ABS IMMATURE GRANULOCYTES 0 - 0.4 10e9/L - - -   ABSOLUTE NUCLEATED RBC - - - -     Liver Latest Ref Rng & Units 12/16/2021 8/7/2021 12/9/2020   AP 40 - 150 U/L - 83 -   TBili 0.2 - 1.3 mg/dL - 0.8 -   DBili 0.0 - 0.2 mg/dL - - -   ALT 0 - 70 U/L - 17 -   AST 0 - 45 U/L - 10 -   Tot Protein 6.8 - 8.8 g/dL - 7.5 -   Albumin 3.4 - 5.0 g/dL 3.6 3.7 2.8(L)     Pancreas Latest Ref Rng & Units 5/6/2021   A1C 0 - 5.6 % 5.7(H)     Iron studies Latest Ref Rng & Units 12/24/2021 12/16/2021 1/28/2016   Iron 35 - 180 ug/dL 44 54 62   Iron sat 15 - 46 % 24 - 34   Ferritin 26 - 388 ng/mL 255 - 787(H)     [facility] Txp Virology Latest Ref Rng & Units 1/19/2022 12/16/2021 8/7/2021   CVM DNA Quant - - - -   CMV QUANT IU/ML Not Detected IU/mL - Not Detected Not Detected   LOG IU/ML OF CMVQNT <2.1 [Log:IU]/mL - - -   BK Spec - - - -    BK Res BKNEG:BK Virus DNA Not Detected copies/mL - - -   BK Log <2.7 Log copies/mL - - -   EBV CAPSID ANTIBODY IGG 0.0 - 0.8 AI - - -   EBV DNA LOG OF COPIES - 3.9 4.4 -   Hep B Core NR - - -        Recent Labs   Lab Test 02/05/22  0949 03/19/22  0940 03/27/22  1021   DOSTAC 2/4/2022 3/18/2022 3/26/2022   TACROL 6.8 7.2 4.3*     Recent Labs   Lab Test 04/25/16  0826 05/03/16  0853 08/09/21  1043   DOSMPA 4.24.2016 2030 2,030 8/8/2021   8:30 PM   MPACID 4.01* 3.81* 0.81*   MPAG 46.6 38.6 48.0       Again, thank you for allowing me to participate in the care of your patient.      Sincerely,    Samuel Varela MD

## 2022-04-20 NOTE — LETTER
4/20/2022      RE: Marlo Vee  [address] [facility] [address]       John is a 71 year old who is being evaluated via a billable video visit.      How would you like to obtain your AVS? MyChart  If the video visit is dropped, the invitation should be resent by: Text to cell phone: [phone]  Will anyone else be joining your video visit? No    Video Start Time: 11:07 AM  Video-Visit Details    Type of service:  Video Visit    Video End Time:1130    Originating Location (pt. Location): Home    Distant Location (provider location):  [facility] [place]     Platform used for Video Visit: Onavo       TRANSPLANT NEPHROLOGY CHRONIC POST TRANSPLANT VISIT    Recommendations  Monitor Labs once every 2 weeks  No IV fluids for now   If Creatinine remains < 2, Will continue to monitor labs every 2 weeks  For the next 2-3 months  If Creatinine > 2 , will plan for transplant kidney biopsy    Assessment & Plan   # LDKT: Increased creatinine with multiple MARCOS episodes due to recent diarrheal illnesses.  Kidney transplant biopsy 10/2021 showed ATI and mild chronic changes, but no acute rejection. May be having supratherapeutic CNI levels due to ongoing diarrhea as well. Unclear if low to mid-2s will be new baseline.   - Baseline Creatinine:  ~ 1.1-1.3   - Proteinuria: Moderate (1-3 grams)   - Date DSA Last Checked: Oct/2021      Latest DSA: No   - BK Viremia: No   - Kidney Tx Biopsy: Oct 19, 2021; Result: No diagnostic evidence of acute rejection.  Acute tubular injury with mild interstitial fibrosis and tubular atrophy.    # Immunosuppression: Tacrolimus immediate release (goal 4-6) and Azathioprine (dose 100 mg daily)   - Continue with intensive monitoring of immunosuppression for efficacy and toxicity.   - Changes: Yes - Recently changed from MPA to AZA. If diarrhea persists and/or tac levels are high would recommend changing from tac to CSA    # Infection Prophylaxis:   Last  CD4 Level: 165 (Sep/2021)  - PJP: Sulfa/TMP (Bactrim)    # Hypertension: Borderline control;  Goal BP: < 130/80   - Changes: Yes - Pt increased carvedilol to 25mg BID (up from 12.5/25)    # Anemia in Chronic Renal Disease: Hgb: Stable      LULY: No   - Iron studies: Low iron saturation               - on oral iron     # Mineral Bone Disorder:   - Vitamin D; level: Not checked recently        On supplement: Yes  - Calcium; level: Low        On supplement: No    # Electrolytes:   - Potassium; level: Normal        On supplement: No  - Magnesium; level: Not checked recently        On supplement: No  - Bicarbonate; level: Low        On supplement: Yes - continue    # CAD, s/p PCI: Asymptomatic.    # Chronic Diarrhea: Patient was previously diagnosed with Clostridium difficile and also cryptosporidium.  He was treated for both, but no improvement in his ongoing diarrhea symptoms.  He has been seen by Transplant ID and GI.  Presently doing conservative management and has been getting IVF 3x/wk to prevent dehydration, but his stools have been better until this morning. He is currently at his cabin and drinking well water - we encouraged him to get the water tested. If diarrhea persists, will need to repeat    - Recommend having well water from his cabin checked for crypto   - No contraindications for colonoscopy - recommend proceeding    # Skin Cancer: New lesions: a few   - Discussed sun protection and recommend regular follow up with Dermatology.    # Medical Compliance: Yes    # COVID-19 Virus Review: Discussed COVID-19 virus and the potential medical risks.  Reviewed preventative health recommendations, including wearing a mask where appropriate.  Recommended COVID vaccination should be up to date with either an initial vaccination or booster shot when appropriate.  Asked the patient to inform the transplant center if they are exposed or diagnosed with this virus.    # COVID Vaccination Up To Date: Yes    # Transplant  History:  Etiology of Kidney Failure: Membranous nephropathy (MN)  Tx: LDKT  Transplant: 1/21/2016 (Kidney)  Significant changes in immunosuppression: Changes off mycophenolate to azathioprine due to diarrhea.  Significant transplant-related complications: None    Transplant Office Phone Number: [phone]    Assessment and plan was discussed with the patient and he voiced his understanding and agreement.    Return visit: Return in about 3 months (around 7/20/2022).    Kaitlynn Reveles MD     Attestation:  This patient has been seen and evaluated by me, Samuel Varela MD.  I have reviewed the note and agree with plan of care as documented by the fellow.     Chief Complaint   Mr. Vee is a 71 year old here for kidney transplant and immunosuppression management.    History of Present Illness   Does exercise   No leg swelling   Appetite is good.   A good weight for his 160 - 165 lbs  He is around 155 lbs   Home BP: Not checked.     Problem List   Patient Active Problem List   Diagnosis     HTN, kidney transplant related     Membranous glomerulonephritis     Anemia in chronic renal disease     Secondary renal hyperparathyroidism (H)     Vitamin D deficiency     Dyslipidemia     Anxiety     Status post coronary angiogram     CAD, multiple vessel     Multiple vessel coronary artery disease     Kidney replaced by transplant     Immunosuppression (H)     Aftercare following organ transplant     Closed fracture of trochanter of right femur, initial encounter (H)     Impaired fasting glucose     Erectile dysfunction     Old myocardial infarction     Diarrhea     Prophylactic antibiotic     Chronic kidney disease, stage 3b (H)     Need for pneumocystis prophylaxis       Allergies   No Known Allergies    Medications   Current Outpatient Medications   Medication Sig     aspirin 81 MG EC tablet Take 81 mg by mouth every morning     azaTHIOprine (IMURAN) 50 MG tablet Take 2 tablets (100 mg) by mouth daily      carvedilol (COREG) 25 MG tablet Take 1 tablet (25 mg) by mouth every morning In addition to 25 mg every evening.     PROGRAF (BRAND) 1 MG/ML suspension Take 0.4 mLs (0.4 mg) by mouth 2 times daily     sulfamethoxazole-trimethoprim (BACTRIM) 400-80 MG tablet Take 1 tablet by mouth Every Mon, Wed, Fri Morning     vitamin D3 (CHOLECALCIFEROL) 50 mcg (2000 units) tablet Take 1 tablet by mouth every morning     ferrous sulfate (FEROSUL) 325 (65 Fe) MG tablet Take 1 tablet (325 mg) by mouth daily (with lunch) (Patient not taking: No sig reported)     ondansetron (ZOFRAN-ODT) 4 MG ODT tab Take 1 tablet (4 mg) by mouth every 6 hours as needed for nausea or vomiting (Patient not taking: No sig reported)     sildenafil (VIAGRA) 100 MG tablet Take 1 tablet (100 mg) by mouth daily as needed (1/2 as needed 1 hour prior to sexual activity) (Patient not taking: No sig reported)     No current facility-administered medications for this visit.     There are no discontinued medications.    Physical Exam   Vital Signs: Deferred for this telemedicine visit.    GENERAL APPEARANCE: alert and no distress  HENT: no obvious abnormalities on appearance  RESP: breathing appears unremarkable with normal rate, no audible wheezing or cough and no apparent shortness of breath with conversation  MS: extremities normal - no gross deformities noted  SKIN: no apparent rash and normal skin tone  NEURO: speech is clear with no obvious neurological deficits  PSYCH: mentation appears normal and affect normal    Data     Renal Latest Ref Rng & Units 4/13/2022 3/31/2022 3/27/2022   Na 133 - 144 mmol/L 139 141 141   Na (external) 136 - 145 meq/L - - -   K 3.4 - 5.3 mmol/L 4.4 4.8 4.2   K (external) 3.5 - 5.1 meq/L - - -   Cl 94 - 109 mmol/L 113(H) 110(H) 108   CO2 20 - 32 mmol/L 22 23 24   CO2 (external) 22 - 31 meq/L - - -   BUN 7 - 30 mg/dL 32(H) 37(H) 36(H)   BUN (external) 6 - 22 mg/dL - - -   Cr 0.66 - 1.25 mg/dL 1.76(H) 2.28(H) 2.16(H)   Cr (external)  0.70 - 1.30 mg/dL - - -   Glucose 70 - 99 mg/dL 92 95 105(H)   Glucose (external) 70 - 99 mg/dL - - -   Ca  8.5 - 10.1 mg/dL 8.4(L) 8.0(L) 8.9   Ca (external) 8.5 - 10.5 mg/dL - - -   Mg 1.6 - 2.3 mg/dL - - -     Bone Health Latest Ref Rng & Units 12/24/2021 12/16/2021 8/7/2021   Phos 2.5 - 4.5 mg/dL - 2.8 2.7   PTHi 12 - 72 pg/mL - - -   Vit D Def 20 - 75 ug/L 26 - -     Heme Latest Ref Rng & Units 4/13/2022 3/27/2022 3/19/2022   WBC 4.0 - 11.0 10e3/uL 3.7(L) 4.1 5.1   WBC (external) 4.0 - 11.0 10*9/L - - -   Hgb 13.3 - 17.7 g/dL 8.6(L) 9.8(L) 9.7(L)   Hgb (external) 13.3 - 17.7 g/dL - - -   Plt 150 - 450 10e3/uL 154 207 207   Plt (external) 150 - 450 10*9/L - - -   ABSOLUTE NEUTROPHIL 1.6 - 8.3 10e9/L - - -   ABSOLUTE LYMPHOCYTES 0.8 - 5.3 10e9/L - - -   ABSOLUTE MONOCYTES 0.0 - 1.3 10e9/L - - -   ABSOLUTE EOSINOPHILS 0.0 - 0.7 10e9/L - - -   ABSOLUTE BASOPHILS 0.0 - 0.2 10e9/L - - -   ABS IMMATURE GRANULOCYTES 0 - 0.4 10e9/L - - -   ABSOLUTE NUCLEATED RBC - - - -     Liver Latest Ref Rng & Units 12/16/2021 8/7/2021 12/9/2020   AP 40 - 150 U/L - 83 -   TBili 0.2 - 1.3 mg/dL - 0.8 -   DBili 0.0 - 0.2 mg/dL - - -   ALT 0 - 70 U/L - 17 -   AST 0 - 45 U/L - 10 -   Tot Protein 6.8 - 8.8 g/dL - 7.5 -   Albumin 3.4 - 5.0 g/dL 3.6 3.7 2.8(L)     Pancreas Latest Ref Rng & Units 5/6/2021   A1C 0 - 5.6 % 5.7(H)     Iron studies Latest Ref Rng & Units 12/24/2021 12/16/2021 1/28/2016   Iron 35 - 180 ug/dL 44 54 62   Iron sat 15 - 46 % 24 - 34   Ferritin 26 - 388 ng/mL 255 - 787(H)     [facility] Txp Virology Latest Ref Rng & Units 1/19/2022 12/16/2021 8/7/2021   CVM DNA Quant - - - -   CMV QUANT IU/ML Not Detected IU/mL - Not Detected Not Detected   LOG IU/ML OF CMVQNT <2.1 [Log:IU]/mL - - -   BK Spec - - - -   BK Res BKNEG:BK Virus DNA Not Detected copies/mL - - -   BK Log <2.7 Log copies/mL - - -   EBV CAPSID ANTIBODY IGG 0.0 - 0.8 AI - - -   EBV DNA LOG OF COPIES - 3.9 4.4 -   Hep B Core NR - - -        Recent Labs   Lab Test  02/05/22  0949 03/19/22  0940 03/27/22  1021   DOSTAC 2/4/2022 3/18/2022 3/26/2022   TACROL 6.8 7.2 4.3*     Recent Labs   Lab Test 04/25/16  0826 05/03/16  0853 08/09/21  1043   DOSMPA 4.24.2016 2030 2,030 8/8/2021   8:30 PM   MPACID 4.01* 3.81* 0.81*   MPAG 46.6 38.6 48.0     Samuel Varela MD

## 2022-04-20 NOTE — PROGRESS NOTES
John is a 71 year old who is being evaluated via a billable video visit.      How would you like to obtain your AVS? MyChart  If the video visit is dropped, the invitation should be resent by: Text to cell phone: [phone]  Will anyone else be joining your video visit? No    Video Start Time: 11:07 AM  Video-Visit Details    Type of service:  Video Visit    Video End Time:1130    Originating Location (pt. Location): Home    Distant Location (provider location):  [facility] [place]     Platform used for Video Visit: Grapeshot       TRANSPLANT NEPHROLOGY CHRONIC POST TRANSPLANT VISIT    Recommendations  Monitor Labs once every 2 weeks  No IV fluids for now   If Creatinine remains < 2, Will continue to monitor labs every 2 weeks  For the next 2-3 months  If Creatinine > 2 , will plan for transplant kidney biopsy    Assessment & Plan   # LDKT: Increased creatinine with multiple MARCOS episodes due to recent diarrheal illnesses.  Kidney transplant biopsy 10/2021 showed ATI and mild chronic changes, but no acute rejection. May be having supratherapeutic CNI levels due to ongoing diarrhea as well. Unclear if low to mid-2s will be new baseline.   - Baseline Creatinine:  ~ 1.1-1.3   - Proteinuria: Moderate (1-3 grams)   - Date DSA Last Checked: Oct/2021      Latest DSA: No   - BK Viremia: No   - Kidney Tx Biopsy: Oct 19, 2021; Result: No diagnostic evidence of acute rejection.  Acute tubular injury with mild interstitial fibrosis and tubular atrophy.    # Immunosuppression: Tacrolimus immediate release (goal 4-6) and Azathioprine (dose 100 mg daily)   - Continue with intensive monitoring of immunosuppression for efficacy and toxicity.   - Changes: Yes - Recently changed from MPA to AZA. If diarrhea persists and/or tac levels are high would recommend changing from tac to CSA    # Infection Prophylaxis:   Last CD4 Level: 165 (Sep/2021)  - PJP: Sulfa/TMP (Bactrim)    # Hypertension: Borderline  control;  Goal BP: < 130/80   - Changes: Yes - Pt increased carvedilol to 25mg BID (up from 12.5/25)    # Anemia in Chronic Renal Disease: Hgb: Stable      LULY: No   - Iron studies: Low iron saturation               - on oral iron     # Mineral Bone Disorder:   - Vitamin D; level: Not checked recently        On supplement: Yes  - Calcium; level: Low        On supplement: No    # Electrolytes:   - Potassium; level: Normal        On supplement: No  - Magnesium; level: Not checked recently        On supplement: No  - Bicarbonate; level: Low        On supplement: Yes - continue    # CAD, s/p PCI: Asymptomatic.    # Chronic Diarrhea: Patient was previously diagnosed with Clostridium difficile and also cryptosporidium.  He was treated for both, but no improvement in his ongoing diarrhea symptoms.  He has been seen by Transplant ID and GI.  Presently doing conservative management and has been getting IVF 3x/wk to prevent dehydration, but his stools have been better until this morning. He is currently at his cabin and drinking well water - we encouraged him to get the water tested. If diarrhea persists, will need to repeat    - Recommend having well water from his cabin checked for crypto   - No contraindications for colonoscopy - recommend proceeding    # Skin Cancer: New lesions: a few   - Discussed sun protection and recommend regular follow up with Dermatology.    # Medical Compliance: Yes    # COVID-19 Virus Review: Discussed COVID-19 virus and the potential medical risks.  Reviewed preventative health recommendations, including wearing a mask where appropriate.  Recommended COVID vaccination should be up to date with either an initial vaccination or booster shot when appropriate.  Asked the patient to inform the transplant center if they are exposed or diagnosed with this virus.    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  Significant transplant-related complications: None    Transplant Office Phone Number: [phone]    Assessment and plan was discussed with the patient and he voiced his understanding and agreement.    Return visit: Return in about 3 months (around 7/20/2022).    Kaitlynn Reveles MD     Attestation:  This patient has been seen and evaluated by me, Samuel Varela MD.  I have reviewed the note and agree with plan of care as documented by the fellow.     Chief Complaint   Mr. Vee is a 71 year old here for kidney transplant and immunosuppression management.    History of Present Illness   Does exercise   No leg swelling   Appetite is good.   A good weight for his 160 - 165 lbs  He is around 155 lbs   Home BP: Not checked.     Problem List   Patient Active Problem List   Diagnosis     HTN, kidney transplant related     Membranous glomerulonephritis     Anemia in chronic renal disease     Secondary renal hyperparathyroidism (H)     Vitamin D deficiency     Dyslipidemia     Anxiety     Status post coronary angiogram     CAD, multiple vessel     Multiple vessel coronary artery disease     Kidney replaced by transplant     Immunosuppression (H)     Aftercare following organ transplant     Closed fracture of trochanter of right femur, initial encounter (H)     Impaired fasting glucose     Erectile dysfunction     Old myocardial infarction     Diarrhea     Prophylactic antibiotic     Chronic kidney disease, stage 3b (H)     Need for pneumocystis prophylaxis       Allergies   No Known Allergies    Medications   Current Outpatient Medications   Medication Sig     aspirin 81 MG EC tablet Take 81 mg by mouth every morning     azaTHIOprine (IMURAN) 50 MG tablet Take 2 tablets (100 mg) by mouth daily     carvedilol (COREG) 25 MG tablet Take 1 tablet (25 mg) by mouth every morning In  addition to 25 mg every evening.     PROGRAF (BRAND) 1 MG/ML suspension Take 0.4 mLs (0.4 mg) by mouth 2 times daily     sulfamethoxazole-trimethoprim (BACTRIM) 400-80 MG tablet Take 1 tablet by mouth Every Mon, Wed, Fri Morning     vitamin D3 (CHOLECALCIFEROL) 50 mcg (2000 units) tablet Take 1 tablet by mouth every morning     ferrous sulfate (FEROSUL) 325 (65 Fe) MG tablet Take 1 tablet (325 mg) by mouth daily (with lunch) (Patient not taking: No sig reported)     ondansetron (ZOFRAN-ODT) 4 MG ODT tab Take 1 tablet (4 mg) by mouth every 6 hours as needed for nausea or vomiting (Patient not taking: No sig reported)     sildenafil (VIAGRA) 100 MG tablet Take 1 tablet (100 mg) by mouth daily as needed (1/2 as needed 1 hour prior to sexual activity) (Patient not taking: No sig reported)     No current facility-administered medications for this visit.     There are no discontinued medications.    Physical Exam   Vital Signs: Deferred for this telemedicine visit.    GENERAL APPEARANCE: alert and no distress  HENT: no obvious abnormalities on appearance  RESP: breathing appears unremarkable with normal rate, no audible wheezing or cough and no apparent shortness of breath with conversation  MS: extremities normal - no gross deformities noted  SKIN: no apparent rash and normal skin tone  NEURO: speech is clear with no obvious neurological deficits  PSYCH: mentation appears normal and affect normal    Data     Renal Latest Ref Rng & Units 4/13/2022 3/31/2022 3/27/2022   Na 133 - 144 mmol/L 139 141 141   Na (external) 136 - 145 meq/L - - -   K 3.4 - 5.3 mmol/L 4.4 4.8 4.2   K (external) 3.5 - 5.1 meq/L - - -   Cl 94 - 109 mmol/L 113(H) 110(H) 108   CO2 20 - 32 mmol/L 22 23 24   CO2 (external) 22 - 31 meq/L - - -   BUN 7 - 30 mg/dL 32(H) 37(H) 36(H)   BUN (external) 6 - 22 mg/dL - - -   Cr 0.66 - 1.25 mg/dL 1.76(H) 2.28(H) 2.16(H)   Cr (external) 0.70 - 1.30 mg/dL - - -   Glucose 70 - 99 mg/dL 92 95 105(H)   Glucose  (external) 70 - 99 mg/dL - - -   Ca  8.5 - 10.1 mg/dL 8.4(L) 8.0(L) 8.9   Ca (external) 8.5 - 10.5 mg/dL - - -   Mg 1.6 - 2.3 mg/dL - - -     Bone Health Latest Ref Rng & Units 12/24/2021 12/16/2021 8/7/2021   Phos 2.5 - 4.5 mg/dL - 2.8 2.7   PTHi 12 - 72 pg/mL - - -   Vit D Def 20 - 75 ug/L 26 - -     Heme Latest Ref Rng & Units 4/13/2022 3/27/2022 3/19/2022   WBC 4.0 - 11.0 10e3/uL 3.7(L) 4.1 5.1   WBC (external) 4.0 - 11.0 10*9/L - - -   Hgb 13.3 - 17.7 g/dL 8.6(L) 9.8(L) 9.7(L)   Hgb (external) 13.3 - 17.7 g/dL - - -   Plt 150 - 450 10e3/uL 154 207 207   Plt (external) 150 - 450 10*9/L - - -   ABSOLUTE NEUTROPHIL 1.6 - 8.3 10e9/L - - -   ABSOLUTE LYMPHOCYTES 0.8 - 5.3 10e9/L - - -   ABSOLUTE MONOCYTES 0.0 - 1.3 10e9/L - - -   ABSOLUTE EOSINOPHILS 0.0 - 0.7 10e9/L - - -   ABSOLUTE BASOPHILS 0.0 - 0.2 10e9/L - - -   ABS IMMATURE GRANULOCYTES 0 - 0.4 10e9/L - - -   ABSOLUTE NUCLEATED RBC - - - -     Liver Latest Ref Rng & Units 12/16/2021 8/7/2021 12/9/2020   AP 40 - 150 U/L - 83 -   TBili 0.2 - 1.3 mg/dL - 0.8 -   DBili 0.0 - 0.2 mg/dL - - -   ALT 0 - 70 U/L - 17 -   AST 0 - 45 U/L - 10 -   Tot Protein 6.8 - 8.8 g/dL - 7.5 -   Albumin 3.4 - 5.0 g/dL 3.6 3.7 2.8(L)     Pancreas Latest Ref Rng & Units 5/6/2021   A1C 0 - 5.6 % 5.7(H)     Iron studies Latest Ref Rng & Units 12/24/2021 12/16/2021 1/28/2016   Iron 35 - 180 ug/dL 44 54 62   Iron sat 15 - 46 % 24 - 34   Ferritin 26 - 388 ng/mL 255 - 787(H)     [facility] Txp Virology Latest Ref Rng & Units 1/19/2022 12/16/2021 8/7/2021   CVM DNA Quant - - - -   CMV QUANT IU/ML Not Detected IU/mL - Not Detected Not Detected   LOG IU/ML OF CMVQNT <2.1 [Log:IU]/mL - - -   BK Spec - - - -   BK Res BKNEG:BK Virus DNA Not Detected copies/mL - - -   BK Log <2.7 Log copies/mL - - -   EBV CAPSID ANTIBODY IGG 0.0 - 0.8 AI - - -   EBV DNA LOG OF COPIES - 3.9 4.4 -   Hep B Core NR - - -        Recent Labs   Lab Test 02/05/22  0949 03/19/22  0940 03/27/22  1021   DOSTAC 2/4/2022 3/18/2022  3/26/2022   TACROL 6.8 7.2 4.3*     Recent Labs   Lab Test 04/25/16  0826 05/03/16  0853 08/09/21  1043   DOSMPA 4.24.2016 2030 2,030 8/8/2021   8:30 PM   MPACID 4.01* 3.81* 0.81*   MPAG 46.6 38.6 48.0

## 2022-04-20 NOTE — TELEPHONE ENCOUNTER
Plan  Abdirizak Calderón MD Harris, Kathleen, RN; Samuel Varela MD  Saw patient today in transplant nephrology clinic     Plan     Check labs once every 2 weeks   If  Cr < 2 , then continue labs every 2 weeks for the next 2-3 months and monitor   If Cr > 2 , then plan for transplant kidney biopsy     No IVF infusions for now       If any questions , please touch base with Dr Varela     Thanks     Abdirizak     OUTCOME  Lab orders updated.  My Chart message sent to Shree

## 2022-05-05 ENCOUNTER — TELEPHONE (OUTPATIENT)
Dept: NEPHROLOGY | Facility: CLINIC | Age: 72
End: 2022-05-05
Payer: MEDICARE

## 2022-05-05 NOTE — TELEPHONE ENCOUNTER
lvm for patient to call us back to schedule a 3 month f/u appointment with Dr Varela.   VTE risk/Event Note

## 2022-05-13 ENCOUNTER — LAB (OUTPATIENT)
Dept: LAB | Facility: CLINIC | Age: 72
End: 2022-05-13
Payer: MEDICARE

## 2022-05-13 DIAGNOSIS — Z94.0 KIDNEY REPLACED BY TRANSPLANT: ICD-10-CM

## 2022-05-13 DIAGNOSIS — Z48.298 AFTERCARE FOLLOWING ORGAN TRANSPLANT: ICD-10-CM

## 2022-05-13 LAB
ANION GAP SERPL CALCULATED.3IONS-SCNC: 5 MMOL/L (ref 3–14)
BUN SERPL-MCNC: 31 MG/DL (ref 7–30)
CALCIUM SERPL-MCNC: 9.1 MG/DL (ref 8.5–10.1)
CHLORIDE BLD-SCNC: 109 MMOL/L (ref 94–109)
CO2 SERPL-SCNC: 22 MMOL/L (ref 20–32)
CREAT SERPL-MCNC: 2.14 MG/DL (ref 0.66–1.25)
ERYTHROCYTE [DISTWIDTH] IN BLOOD BY AUTOMATED COUNT: 15.3 % (ref 10–15)
GFR SERPL CREATININE-BSD FRML MDRD: 33 ML/MIN/1.73M2
GLUCOSE BLD-MCNC: 103 MG/DL (ref 70–99)
HCT VFR BLD AUTO: 31.2 % (ref 40–53)
HGB BLD-MCNC: 9.9 G/DL (ref 13.3–17.7)
MCH RBC QN AUTO: 30.2 PG (ref 26.5–33)
MCHC RBC AUTO-ENTMCNC: 31.7 G/DL (ref 31.5–36.5)
MCV RBC AUTO: 95 FL (ref 78–100)
PLATELET # BLD AUTO: 189 10E3/UL (ref 150–450)
POTASSIUM BLD-SCNC: 4.1 MMOL/L (ref 3.4–5.3)
RBC # BLD AUTO: 3.28 10E6/UL (ref 4.4–5.9)
SODIUM SERPL-SCNC: 136 MMOL/L (ref 133–144)
TACROLIMUS BLD-MCNC: 5.3 UG/L (ref 5–15)
TME LAST DOSE: NORMAL H
TME LAST DOSE: NORMAL H
WBC # BLD AUTO: 4.2 10E3/UL (ref 4–11)

## 2022-05-13 PROCEDURE — 82374 ASSAY BLOOD CARBON DIOXIDE: CPT

## 2022-05-13 PROCEDURE — 82310 ASSAY OF CALCIUM: CPT

## 2022-05-13 PROCEDURE — 85027 COMPLETE CBC AUTOMATED: CPT

## 2022-05-13 PROCEDURE — 80197 ASSAY OF TACROLIMUS: CPT

## 2022-05-13 PROCEDURE — 36415 COLL VENOUS BLD VENIPUNCTURE: CPT

## 2022-05-16 ENCOUNTER — MYC MEDICAL ADVICE (OUTPATIENT)
Dept: TRANSPLANT | Facility: CLINIC | Age: 72
End: 2022-05-16
Payer: MEDICARE

## 2022-05-16 ENCOUNTER — TELEPHONE (OUTPATIENT)
Dept: TRANSPLANT | Facility: CLINIC | Age: 72
End: 2022-05-16
Payer: MEDICARE

## 2022-05-16 DIAGNOSIS — R79.89 ELEVATED SERUM CREATININE: ICD-10-CM

## 2022-05-16 DIAGNOSIS — Z94.0 KIDNEY REPLACED BY TRANSPLANT: Primary | ICD-10-CM

## 2022-05-19 NOTE — TELEPHONE ENCOUNTER
John requesting to have one more set of labs drawn to see if creatinine <2.0 before having a biopsy done.  RNCC recommended getting biopsy scheduled 10 days out. John can work on hydration and repeat labs next week. If creatinine <2.0 we can discuss cancelling the biopsy with the provider. John is agreeable.    Transplant Coordinator Renal Biopsy Communication    Call placed to Marlo Vee to discuss indication for kidney transplant biopsy per Dr. Silverman.     Indication for transplant renal biopsy: elevated creatinine   Laterality: right  Date and time of biopsy: Friday, Nitza 3, 11:00 arrival for 12:30 biopsy   Date and time of covid swab: TBD    Patient location within 70 miles of [facility]: Yes.     Marlo Vee's medication list was reviewed.   Anticoagulant: aspirin-baby ASA. Will review with IR RN.  Ibuprofen: Yes.  Fish Oil:  No.  Medications held:     Recent blood pressure readings are WNL or not applicable. Instructed to take medication, especially blood pressure medications, before arriving.    Procedure expectations and duration of stay discussed. Expressed pt can expect a phone call from hospital IR team to confirm biopsy date/time/location/directions/review of medications.   Patient verbalized understanding they will need to arrive by 11:00 on Nitza 3 and plan to stay 4-5 hours post biopsy for monitoring. Discussed need for  if patient chooses to receive conscious sedation. Pt has no additional questions at this time. Transplant Office phone number given to pt for future questions.      Adilai Richardson RN  Kidney/Pancreas Transplant Coordinator  [phone]

## 2022-05-25 DIAGNOSIS — Z11.59 ENCOUNTER FOR SCREENING FOR OTHER VIRAL DISEASES: Primary | ICD-10-CM

## 2022-05-27 ENCOUNTER — LAB (OUTPATIENT)
Dept: LAB | Facility: CLINIC | Age: 72
End: 2022-05-27
Payer: MEDICARE

## 2022-05-27 DIAGNOSIS — Z94.0 KIDNEY REPLACED BY TRANSPLANT: ICD-10-CM

## 2022-05-27 DIAGNOSIS — Z94.0 KIDNEY TRANSPLANTED: ICD-10-CM

## 2022-05-27 DIAGNOSIS — Z48.298 AFTERCARE FOLLOWING ORGAN TRANSPLANT: ICD-10-CM

## 2022-05-27 DIAGNOSIS — Z11.59 ENCOUNTER FOR SCREENING FOR OTHER VIRAL DISEASES: ICD-10-CM

## 2022-05-27 DIAGNOSIS — R79.89 ELEVATED SERUM CREATININE: ICD-10-CM

## 2022-05-27 LAB
ANION GAP SERPL CALCULATED.3IONS-SCNC: 4 MMOL/L (ref 3–14)
BUN SERPL-MCNC: 29 MG/DL (ref 7–30)
CALCIUM SERPL-MCNC: 9 MG/DL (ref 8.5–10.1)
CHLORIDE BLD-SCNC: 107 MMOL/L (ref 94–109)
CO2 SERPL-SCNC: 25 MMOL/L (ref 20–32)
CREAT SERPL-MCNC: 2.1 MG/DL (ref 0.66–1.25)
ERYTHROCYTE [DISTWIDTH] IN BLOOD BY AUTOMATED COUNT: 15.4 % (ref 10–15)
GFR SERPL CREATININE-BSD FRML MDRD: 33 ML/MIN/1.73M2
GLUCOSE BLD-MCNC: 93 MG/DL (ref 70–99)
HCT VFR BLD AUTO: 30.9 % (ref 40–53)
HGB BLD-MCNC: 9.6 G/DL (ref 13.3–17.7)
MCH RBC QN AUTO: 30.3 PG (ref 26.5–33)
MCHC RBC AUTO-ENTMCNC: 31.1 G/DL (ref 31.5–36.5)
MCV RBC AUTO: 98 FL (ref 78–100)
PLATELET # BLD AUTO: 181 10E3/UL (ref 150–450)
POTASSIUM BLD-SCNC: 4.5 MMOL/L (ref 3.4–5.3)
RBC # BLD AUTO: 3.17 10E6/UL (ref 4.4–5.9)
SODIUM SERPL-SCNC: 136 MMOL/L (ref 133–144)
TACROLIMUS BLD-MCNC: 4.1 UG/L (ref 5–15)
TME LAST DOSE: ABNORMAL H
TME LAST DOSE: ABNORMAL H
WBC # BLD AUTO: 4.1 10E3/UL (ref 4–11)

## 2022-05-27 PROCEDURE — 36415 COLL VENOUS BLD VENIPUNCTURE: CPT | Performed by: PATHOLOGY

## 2022-05-27 PROCEDURE — 80197 ASSAY OF TACROLIMUS: CPT | Performed by: INTERNAL MEDICINE

## 2022-05-27 PROCEDURE — 85027 COMPLETE CBC AUTOMATED: CPT | Performed by: PATHOLOGY

## 2022-05-27 PROCEDURE — 80048 BASIC METABOLIC PNL TOTAL CA: CPT | Performed by: PATHOLOGY

## 2022-05-31 ENCOUNTER — TELEPHONE (OUTPATIENT)
Dept: TRANSPLANT | Facility: CLINIC | Age: 72
End: 2022-05-31
Payer: MEDICARE

## 2022-05-31 NOTE — TELEPHONE ENCOUNTER
Patient Call: General  Route to LPN    Reason for call: Pt has questions and updates to review with yessica  Can wait till Wed when she is available     Call back needed? Yes    Return Call Needed  Same as documented in contacts section  When to return call?: Greater than one day: Route standard priority

## 2022-06-01 ENCOUNTER — LAB (OUTPATIENT)
Dept: LAB | Facility: CLINIC | Age: 72
End: 2022-06-01
Attending: INTERNAL MEDICINE
Payer: MEDICARE

## 2022-06-01 ENCOUNTER — MYC MEDICAL ADVICE (OUTPATIENT)
Dept: INTERVENTIONAL RADIOLOGY/VASCULAR | Facility: CLINIC | Age: 72
End: 2022-06-01

## 2022-06-01 DIAGNOSIS — Z11.59 ENCOUNTER FOR SCREENING FOR OTHER VIRAL DISEASES: ICD-10-CM

## 2022-06-01 PROCEDURE — U0005 INFEC AGEN DETEC AMPLI PROBE: HCPCS

## 2022-06-01 PROCEDURE — U0003 INFECTIOUS AGENT DETECTION BY NUCLEIC ACID (DNA OR RNA); SEVERE ACUTE RESPIRATORY SYNDROME CORONAVIRUS 2 (SARS-COV-2) (CORONAVIRUS DISEASE [COVID-19]), AMPLIFIED PROBE TECHNIQUE, MAKING USE OF HIGH THROUGHPUT TECHNOLOGIES AS DESCRIBED BY CMS-2020-01-R: HCPCS

## 2022-06-01 NOTE — TELEPHONE ENCOUNTER
Returned a call to John. Discussed that since his repeat creatinine was >2.0 that he should proceed with scheduled biopsy on Nitza 3. John verbalized understanding. He is having his Covid test done today. We reviewed pre-procedure instructions sent by the IR RN. Reinforced that he should take Prograf in the am, but OK to take other meds after biopsy.

## 2022-06-02 LAB — SARS-COV-2 RNA RESP QL NAA+PROBE: NEGATIVE

## 2022-06-03 ENCOUNTER — APPOINTMENT (OUTPATIENT)
Dept: INTERVENTIONAL RADIOLOGY/VASCULAR | Facility: CLINIC | Age: 72
End: 2022-06-03
Attending: INTERNAL MEDICINE
Payer: MEDICARE

## 2022-06-03 ENCOUNTER — APPOINTMENT (OUTPATIENT)
Dept: MEDSURG UNIT | Facility: CLINIC | Age: 72
End: 2022-06-03
Attending: INTERNAL MEDICINE
Payer: MEDICARE

## 2022-06-03 ENCOUNTER — HOSPITAL ENCOUNTER (OUTPATIENT)
Facility: CLINIC | Age: 72
Discharge: HOME | End: 2022-06-03
Attending: INTERNAL MEDICINE | Admitting: PHYSICIAN ASSISTANT
Payer: MEDICARE

## 2022-06-03 VITALS
DIASTOLIC BLOOD PRESSURE: 84 MMHG | WEIGHT: 150 LBS | TEMPERATURE: 97.7 F | HEART RATE: 64 BPM | SYSTOLIC BLOOD PRESSURE: 125 MMHG | BODY MASS INDEX: 21.52 KG/M2 | OXYGEN SATURATION: 97 % | RESPIRATION RATE: 16 BRPM

## 2022-06-03 DIAGNOSIS — R79.89 ELEVATED SERUM CREATININE: ICD-10-CM

## 2022-06-03 DIAGNOSIS — Z94.0 KIDNEY REPLACED BY TRANSPLANT: ICD-10-CM

## 2022-06-03 LAB
ALBUMIN UR-MCNC: 70 MG/DL
ANION GAP SERPL CALCULATED.3IONS-SCNC: 3 MMOL/L (ref 3–14)
APPEARANCE UR: CLEAR
BILIRUB UR QL STRIP: NEGATIVE
BUN SERPL-MCNC: 36 MG/DL (ref 7–30)
CALCIUM SERPL-MCNC: 8.9 MG/DL (ref 8.5–10.1)
CHLORIDE BLD-SCNC: 109 MMOL/L (ref 94–109)
CO2 SERPL-SCNC: 26 MMOL/L (ref 20–32)
COLOR UR AUTO: ABNORMAL
CREAT SERPL-MCNC: 2.19 MG/DL (ref 0.66–1.25)
ERYTHROCYTE [DISTWIDTH] IN BLOOD BY AUTOMATED COUNT: 15.5 % (ref 10–15)
GFR SERPL CREATININE-BSD FRML MDRD: 32 ML/MIN/1.73M2
GLUCOSE BLD-MCNC: 96 MG/DL (ref 70–99)
GLUCOSE UR STRIP-MCNC: NEGATIVE MG/DL
HCT VFR BLD AUTO: 31.9 % (ref 40–53)
HGB BLD-MCNC: 9.8 G/DL (ref 13.3–17.7)
HGB UR QL STRIP: ABNORMAL
INR PPP: 1.16 (ref 0.85–1.15)
KETONES UR STRIP-MCNC: NEGATIVE MG/DL
LEUKOCYTE ESTERASE UR QL STRIP: NEGATIVE
MCH RBC QN AUTO: 30.6 PG (ref 26.5–33)
MCHC RBC AUTO-ENTMCNC: 30.7 G/DL (ref 31.5–36.5)
MCV RBC AUTO: 100 FL (ref 78–100)
MUCOUS THREADS #/AREA URNS LPF: PRESENT /LPF
NITRATE UR QL: NEGATIVE
PH UR STRIP: 5.5 [PH] (ref 5–7)
PLATELET # BLD AUTO: 185 10E3/UL (ref 150–450)
POTASSIUM BLD-SCNC: 4.5 MMOL/L (ref 3.4–5.3)
RBC # BLD AUTO: 3.2 10E6/UL (ref 4.4–5.9)
RBC URINE: 3 /HPF
RENAL TUB EPI: <1 /HPF
SODIUM SERPL-SCNC: 138 MMOL/L (ref 133–144)
SP GR UR STRIP: 1.02 (ref 1–1.03)
SQUAMOUS EPITHELIAL: <1 /HPF
UROBILINOGEN UR STRIP-MCNC: NORMAL MG/DL
WBC # BLD AUTO: 4 10E3/UL (ref 4–11)
WBC URINE: 1 /HPF

## 2022-06-03 PROCEDURE — 36415 COLL VENOUS BLD VENIPUNCTURE: CPT | Performed by: NURSE PRACTITIONER

## 2022-06-03 PROCEDURE — 272N000505 IR RENAL BIOPSY RIGHT

## 2022-06-03 PROCEDURE — 80048 BASIC METABOLIC PNL TOTAL CA: CPT | Performed by: NURSE PRACTITIONER

## 2022-06-03 PROCEDURE — 76942 ECHO GUIDE FOR BIOPSY: CPT | Mod: 26 | Performed by: PHYSICIAN ASSISTANT

## 2022-06-03 PROCEDURE — 81001 URINALYSIS AUTO W/SCOPE: CPT | Performed by: INTERNAL MEDICINE

## 2022-06-03 PROCEDURE — 88305 TISSUE EXAM BY PATHOLOGIST: CPT | Mod: TC | Performed by: INTERNAL MEDICINE

## 2022-06-03 PROCEDURE — 99153 MOD SED SAME PHYS/QHP EA: CPT

## 2022-06-03 PROCEDURE — 86833 HLA CLASS II HIGH DEFIN QUAL: CPT | Performed by: INTERNAL MEDICINE

## 2022-06-03 PROCEDURE — 85610 PROTHROMBIN TIME: CPT | Performed by: NURSE PRACTITIONER

## 2022-06-03 PROCEDURE — 999N000133 HC STATISTIC PP CARE STAGE 2

## 2022-06-03 PROCEDURE — 87799 DETECT AGENT NOS DNA QUANT: CPT | Performed by: INTERNAL MEDICINE

## 2022-06-03 PROCEDURE — 85014 HEMATOCRIT: CPT | Performed by: NURSE PRACTITIONER

## 2022-06-03 PROCEDURE — 50200 RENAL BIOPSY PERQ: CPT | Mod: RT | Performed by: PHYSICIAN ASSISTANT

## 2022-06-03 PROCEDURE — 250N000009 HC RX 250: Performed by: PHYSICIAN ASSISTANT

## 2022-06-03 PROCEDURE — 99152 MOD SED SAME PHYS/QHP 5/>YRS: CPT

## 2022-06-03 PROCEDURE — 250N000011 HC RX IP 250 OP 636: Performed by: PHYSICIAN ASSISTANT

## 2022-06-03 PROCEDURE — 86832 HLA CLASS I HIGH DEFIN QUAL: CPT | Performed by: INTERNAL MEDICINE

## 2022-06-03 PROCEDURE — 999N000142 HC STATISTIC PROCEDURE PREP ONLY

## 2022-06-03 PROCEDURE — 99152 MOD SED SAME PHYS/QHP 5/>YRS: CPT | Performed by: PHYSICIAN ASSISTANT

## 2022-06-03 PROCEDURE — 258N000003 HC RX IP 258 OP 636: Performed by: NURSE PRACTITIONER

## 2022-06-03 RX ORDER — ONDANSETRON 2 MG/ML
4 INJECTION INTRAMUSCULAR; INTRAVENOUS
Status: COMPLETED | OUTPATIENT
Start: 2022-06-03 | End: 2022-06-03

## 2022-06-03 RX ORDER — NALOXONE HYDROCHLORIDE 0.4 MG/ML
0.4 INJECTION, SOLUTION INTRAMUSCULAR; INTRAVENOUS; SUBCUTANEOUS
Status: DISCONTINUED | OUTPATIENT
Start: 2022-06-03 | End: 2022-06-03 | Stop reason: HOSPADM

## 2022-06-03 RX ORDER — NALOXONE HYDROCHLORIDE 0.4 MG/ML
0.2 INJECTION, SOLUTION INTRAMUSCULAR; INTRAVENOUS; SUBCUTANEOUS
Status: DISCONTINUED | OUTPATIENT
Start: 2022-06-03 | End: 2022-06-03 | Stop reason: HOSPADM

## 2022-06-03 RX ORDER — SODIUM CHLORIDE 9 MG/ML
INJECTION, SOLUTION INTRAVENOUS CONTINUOUS
Status: DISCONTINUED | OUTPATIENT
Start: 2022-06-03 | End: 2022-06-03 | Stop reason: HOSPADM

## 2022-06-03 RX ORDER — LIDOCAINE 40 MG/G
CREAM TOPICAL
Status: DISCONTINUED | OUTPATIENT
Start: 2022-06-03 | End: 2022-06-03 | Stop reason: HOSPADM

## 2022-06-03 RX ORDER — FLUMAZENIL 0.1 MG/ML
0.2 INJECTION, SOLUTION INTRAVENOUS
Status: DISCONTINUED | OUTPATIENT
Start: 2022-06-03 | End: 2022-06-03 | Stop reason: HOSPADM

## 2022-06-03 RX ORDER — FENTANYL CITRATE 50 UG/ML
25-50 INJECTION, SOLUTION INTRAMUSCULAR; INTRAVENOUS EVERY 5 MIN PRN
Status: DISCONTINUED | OUTPATIENT
Start: 2022-06-03 | End: 2022-06-03 | Stop reason: HOSPADM

## 2022-06-03 RX ADMIN — MIDAZOLAM HYDROCHLORIDE 1 MG: 1 INJECTION, SOLUTION INTRAMUSCULAR; INTRAVENOUS at 13:19

## 2022-06-03 RX ADMIN — FENTANYL CITRATE 50 MCG: 50 INJECTION, SOLUTION INTRAMUSCULAR; INTRAVENOUS at 13:19

## 2022-06-03 RX ADMIN — LIDOCAINE HYDROCHLORIDE 3 ML: 10 INJECTION, SOLUTION EPIDURAL; INFILTRATION; INTRACAUDAL; PERINEURAL at 13:53

## 2022-06-03 RX ADMIN — FENTANYL CITRATE 25 MCG: 50 INJECTION, SOLUTION INTRAMUSCULAR; INTRAVENOUS at 13:29

## 2022-06-03 RX ADMIN — MIDAZOLAM HYDROCHLORIDE 0.5 MG: 1 INJECTION, SOLUTION INTRAMUSCULAR; INTRAVENOUS at 13:29

## 2022-06-03 RX ADMIN — SODIUM CHLORIDE: 9 INJECTION, SOLUTION INTRAVENOUS at 12:17

## 2022-06-03 RX ADMIN — ONDANSETRON 4 MG: 2 INJECTION INTRAMUSCULAR; INTRAVENOUS at 13:26

## 2022-06-03 NOTE — PROGRESS NOTES
Pt returns to 2a s/p transplanted kidney biopsy. RLQ site CDI, soft, flat, non tender. Pt alert, tolerating PO. Pt denies pain.

## 2022-06-03 NOTE — PROCEDURES
[facility]    Procedure: IR Procedure Note    Date/Time: 6/3/2022 2:02 PM  Performed by: Rom Kiran PA-C  Authorized by: Rom Kiran PA-C   IR Fellow Physician:  Other(s) attending procedure: Assist: HALINA Amor3      UNIVERSAL PROTOCOL   Site Marked: NA  Prior Images Obtained and Reviewed:  Yes  Required items: Required blood products, implants, devices and special equipment available    Patient identity confirmed:  Verbally with patient, arm band, provided demographic data and hospital-assigned identification number  Patient was reevaluated immediately before administering moderate or deep sedation or anesthesia  Confirmation Checklist:  Patient's identity using two indicators, relevant allergies, procedure was appropriate and matched the consent or emergent situation and correct equipment/implants were available  Time out: Immediately prior to the procedure a time out was called    Universal Protocol: the Joint Commission Universal Protocol was followed    Preparation: Patient was prepped and draped in usual sterile fashion    ESBL (mL):  1     ANESTHESIA    Anesthesia: Local infiltration  Local Anesthetic:  Lidocaine 1% without epinephrine  Anesthetic Total (mL):  3      SEDATION  Patient Sedated: Yes    Sedation Type:  Moderate (conscious) sedation  Sedation:  Fentanyl and midazolam  Vital signs: Vital signs monitored during sedation    See dictated procedure note for full details.  Findings: U/S guided RLQ transplant renal biopsy. Five 18 gauge cores taken. Adequate cortical tissue confirmed by pathology staff.    Specimens: core needle biopsy specimens sent for pathological analysis    Complications: None    Condition: Stable    Plan: Follow up per primary team. Bedrest for 2-4 hours. No strenuous activity for 3 days.      PROCEDURE    Patient Tolerance:  Patient tolerated the procedure well with no immediate complications  Length of  time physician/provider present for 1:1 monitoring during sedation: 25

## 2022-06-03 NOTE — PROGRESS NOTES
Pt tolerated oral intake. Discharge instructions reviewed, copy given to pt. Pt ambulated without complication. Voided clear yellow urine. PIV mray'pauline. Pt discharged home accompanied by wife.

## 2022-06-03 NOTE — PRE-PROCEDURE
GENERAL PRE-PROCEDURE:   Procedure:  RLQ transplant kidney biopsy    Written consent obtained?: Yes    Risks and benefits: Risks, benefits and alternatives were discussed    Consent given by:  Patient  Patient states understanding of procedure being performed: Yes    Patient's understanding of procedure matches consent: Yes    Procedure consent matches procedure scheduled: Yes    Expected level of sedation:  Moderate  Appropriately NPO:  Yes  ASA Class:  2  Mallampati  :  Grade 2- soft palate, base of uvula, tonsillar pillars, and portion of posterior pharyngeal wall visible  Lungs:  Lungs clear with good breath sounds bilaterally  Heart:  Normal heart sounds and rate  History & Physical reviewed:  History and physical reviewed and no updates needed  Statement of review:  I have reviewed the lab findings, diagnostic data, medications, and the plan for sedation

## 2022-06-03 NOTE — IR NOTE
Patient Name: Marlo Vee  Medical Record Number: [id number]  Today's Date: 6/3/2022    Procedure: Right transplant renal biopsy  Proceduralist: Teja Kiran  Pathology present: Yes    Procedure Start: 1327  Procedure end: 1354  Sedation medications administered: 75 mcg fentanyl and 1.5 mg versed     Report given to: Jada ROMERO   : ASHLEE    Other Notes: Pt arrived to IR room 6 from . Consent reviewed. Pt denies any questions or concerns regarding procedure. Pt positioned supine and monitored per protocol. Pt tolerated procedure without any noted complications. Pt transferred back to .    IV zofran given to prevent nausea (pt has history of nausea with sedation he states).

## 2022-06-06 LAB — BKV DNA # SPEC NAA+PROBE: NOT DETECTED COPIES/ML

## 2022-06-08 DIAGNOSIS — Z48.298 AFTERCARE FOLLOWING ORGAN TRANSPLANT: ICD-10-CM

## 2022-06-08 DIAGNOSIS — Z94.0 KIDNEY REPLACED BY TRANSPLANT: Primary | ICD-10-CM

## 2022-06-08 LAB
DONOR IDENTIFICATION: NORMAL
DSA COMMENTS: NORMAL
DSA PRESENT: NO
DSA TEST METHOD: NORMAL
ORGAN: NORMAL
SA 1 CELL: NORMAL
SA 1 TEST METHOD: NORMAL
SA 2 CELL: NORMAL
SA 2 TEST METHOD: NORMAL
SA1 HI RISK ABY: NORMAL
SA1 MOD RISK ABY: NORMAL
SA2 HI RISK ABY: NORMAL
SA2 MOD RISK ABY: NORMAL
UNACCEPTABLE ANTIGENS: NORMAL
UNOS CPRA: 77
ZZZSA 1  COMMENTS: NORMAL
ZZZSA 2 COMMENTS: NORMAL

## 2022-06-08 NOTE — PROGRESS NOTES
ISSUE  Recent biopsy for persistently elevated creatinine >2.0  KIDNEY, ALLOGRAFT, BIOPSY:  Chronic allograft changes including mild transplant glomerulopathy, moderate arterial sclerosis, and moderate tubulointerstitial atrophy and fibrosis as well as iIFTA 2; no T cell-mediated (acute cellular) rejection    PLAN  Samuel Varela MD Harris, Kathleen, RN  I was able to reach Mr. Vee this afternoon.     I think his new baseline Cr ~ 1.8-2.2.     He can drink a normal amount of fluids.     Recommend monthly labs.     I see him in follow up in September.     Ramon     OUTCOME  Lab orders updated.

## 2022-06-09 PROCEDURE — 88305 TISSUE EXAM BY PATHOLOGIST: CPT | Mod: 26 | Performed by: PATHOLOGY

## 2022-06-09 PROCEDURE — 88350 IMFLUOR EA ADDL 1ANTB STN PX: CPT | Mod: 26 | Performed by: PATHOLOGY

## 2022-06-09 PROCEDURE — 88342 IMHCHEM/IMCYTCHM 1ST ANTB: CPT | Mod: 26 | Performed by: PATHOLOGY

## 2022-06-09 PROCEDURE — 88313 SPECIAL STAINS GROUP 2: CPT | Mod: 26 | Performed by: PATHOLOGY

## 2022-06-09 PROCEDURE — 88346 IMFLUOR 1ST 1ANTB STAIN PX: CPT | Mod: 26 | Performed by: PATHOLOGY

## 2022-06-09 PROCEDURE — 88348 ELECTRON MICROSCOPY DX: CPT | Mod: 26 | Performed by: PATHOLOGY

## 2022-06-10 NOTE — TELEPHONE ENCOUNTER
----- Message from Shannon Harding MD sent at 6/9/2022  4:52 PM CDT -----  Regarding: schedule pt  Can we have pt scheduled to see me in August/Sept for a preventative care visit?

## 2022-06-12 ENCOUNTER — HEALTH MAINTENANCE LETTER (OUTPATIENT)
Age: 72
End: 2022-06-12

## 2022-06-20 ENCOUNTER — TELEPHONE (OUTPATIENT)
Dept: INTERNAL MEDICINE | Facility: CLINIC | Age: 72
End: 2022-06-20

## 2022-06-20 DIAGNOSIS — N52.9 ERECTILE DYSFUNCTION, UNSPECIFIED ERECTILE DYSFUNCTION TYPE: ICD-10-CM

## 2022-06-20 NOTE — TELEPHONE ENCOUNTER
pT IS REQUESTING THE SILDENAFIL BUT ITS NOT LETTING CLICK ON IT FOR REFILLS PLEASE SEND OVER   [address]   [phone]  [facility]

## 2022-06-22 ENCOUNTER — TELEPHONE (OUTPATIENT)
Dept: INTERNAL MEDICINE | Facility: CLINIC | Age: 72
End: 2022-06-22

## 2022-06-22 DIAGNOSIS — N52.9 ERECTILE DYSFUNCTION, UNSPECIFIED ERECTILE DYSFUNCTION TYPE: ICD-10-CM

## 2022-06-22 RX ORDER — SILDENAFIL 100 MG/1
100 TABLET, FILM COATED ORAL DAILY PRN
Qty: 90 TABLET | Refills: 0 | Status: SHIPPED | OUTPATIENT
Start: 2022-06-22 | End: 2022-06-24

## 2022-06-22 NOTE — TELEPHONE ENCOUNTER
Central Prior Authorization Team   Phone: [phone]      PA Initiation    Medication: Sildenafil 100mg  Insurance Company: [employer] - Phone [phone] Fax [phone]  Pharmacy Filling the Rx: [facility] - [place], [place] - [address]  Filling Pharmacy Phone: [phone]  Filling Pharmacy Fax:    Start Date: 6/22/2022

## 2022-06-23 NOTE — TELEPHONE ENCOUNTER
PRIOR AUTHORIZATION DENIED    Medication: Sildenafil 100mg-DENIED    Denial Date: 6/23/2022    Denial Rational:           Appeal Information:

## 2022-06-24 RX ORDER — SILDENAFIL 100 MG/1
100 TABLET, FILM COATED ORAL DAILY PRN
Qty: 18 TABLET | Refills: 5 | Status: SHIPPED | OUTPATIENT
Start: 2022-06-24 | End: 2022-06-28

## 2022-06-24 NOTE — TELEPHONE ENCOUNTER
RN called [facility] and asked what the quantity limit is since # 30 in 30 days was not covered.  Rx was re-sent with # 18 to see if that is approved by patient's insurance.    Leona Velazquez RN on 6/24/2022 at 8:24 AM

## 2022-06-27 ENCOUNTER — MYC MEDICAL ADVICE (OUTPATIENT)
Dept: INTERNAL MEDICINE | Facility: CLINIC | Age: 72
End: 2022-06-27

## 2022-06-27 ENCOUNTER — MYC MEDICAL ADVICE (OUTPATIENT)
Dept: TRANSPLANT | Facility: CLINIC | Age: 72
End: 2022-06-27

## 2022-06-27 DIAGNOSIS — N52.9 ERECTILE DYSFUNCTION, UNSPECIFIED ERECTILE DYSFUNCTION TYPE: ICD-10-CM

## 2022-06-28 RX ORDER — SILDENAFIL 100 MG/1
100 TABLET, FILM COATED ORAL DAILY PRN
Qty: 18 TABLET | Refills: 5 | Status: SHIPPED | OUTPATIENT
Start: 2022-06-28 | End: 2022-09-24

## 2022-07-12 LAB
PATH REPORT.ADDENDUM SPEC: NORMAL
PATH REPORT.COMMENTS IMP SPEC: NORMAL
PATH REPORT.FINAL DX SPEC: NORMAL
PATH REPORT.GROSS SPEC: NORMAL
PATH REPORT.MICROSCOPIC SPEC OTHER STN: NORMAL
PATH REPORT.PRELIMIN DX SPEC-IMP: NORMAL
PATH REPORT.RELEVANT HX SPEC: NORMAL
PHOTO IMAGE: NORMAL

## 2022-08-01 ENCOUNTER — TELEPHONE (OUTPATIENT)
Dept: TRANSPLANT | Facility: CLINIC | Age: 72
End: 2022-08-01

## 2022-08-01 ENCOUNTER — MYC MEDICAL ADVICE (OUTPATIENT)
Dept: TRANSPLANT | Facility: CLINIC | Age: 72
End: 2022-08-01

## 2022-08-01 DIAGNOSIS — Z94.0 KIDNEY REPLACED BY TRANSPLANT: Primary | ICD-10-CM

## 2022-08-12 ENCOUNTER — LAB (OUTPATIENT)
Dept: LAB | Facility: CLINIC | Age: 72
End: 2022-08-12
Payer: MEDICARE

## 2022-08-12 DIAGNOSIS — Z94.0 KIDNEY REPLACED BY TRANSPLANT: ICD-10-CM

## 2022-08-12 DIAGNOSIS — Z48.298 AFTERCARE FOLLOWING ORGAN TRANSPLANT: ICD-10-CM

## 2022-08-12 LAB
ANION GAP SERPL CALCULATED.3IONS-SCNC: 2 MMOL/L (ref 3–14)
BUN SERPL-MCNC: 40 MG/DL (ref 7–30)
CALCIUM SERPL-MCNC: 9 MG/DL (ref 8.5–10.1)
CHLORIDE BLD-SCNC: 108 MMOL/L (ref 94–109)
CO2 SERPL-SCNC: 25 MMOL/L (ref 20–32)
CREAT SERPL-MCNC: 2.22 MG/DL (ref 0.66–1.25)
ERYTHROCYTE [DISTWIDTH] IN BLOOD BY AUTOMATED COUNT: 14.6 % (ref 10–15)
GFR SERPL CREATININE-BSD FRML MDRD: 31 ML/MIN/1.73M2
GLUCOSE BLD-MCNC: 98 MG/DL (ref 70–99)
HCT VFR BLD AUTO: 32.7 % (ref 40–53)
HGB BLD-MCNC: 10.1 G/DL (ref 13.3–17.7)
MCH RBC QN AUTO: 31.3 PG (ref 26.5–33)
MCHC RBC AUTO-ENTMCNC: 30.9 G/DL (ref 31.5–36.5)
MCV RBC AUTO: 101 FL (ref 78–100)
PLATELET # BLD AUTO: 192 10E3/UL (ref 150–450)
POTASSIUM BLD-SCNC: 4.3 MMOL/L (ref 3.4–5.3)
RBC # BLD AUTO: 3.23 10E6/UL (ref 4.4–5.9)
SODIUM SERPL-SCNC: 135 MMOL/L (ref 133–144)
TACROLIMUS BLD-MCNC: 5.8 UG/L (ref 5–15)
TME LAST DOSE: NORMAL H
TME LAST DOSE: NORMAL H
WBC # BLD AUTO: 5 10E3/UL (ref 4–11)

## 2022-08-12 PROCEDURE — 80048 BASIC METABOLIC PNL TOTAL CA: CPT

## 2022-08-12 PROCEDURE — 36415 COLL VENOUS BLD VENIPUNCTURE: CPT

## 2022-08-12 PROCEDURE — 80197 ASSAY OF TACROLIMUS: CPT

## 2022-08-12 PROCEDURE — 85014 HEMATOCRIT: CPT

## 2022-08-23 NOTE — LETTER
1/19/2022         RE: Marlo Vee  [address] [facility] [place] [address]        Dear Colleague,    Thank you for referring your patient, Marlo Vee, to the [facility]AT[facility]. Please see a copy of my visit note below.    Nursing Note  Marlo Vee [facility]:   Chief Complaint   Patient presents with     Infusion     IVF     During today's [facility] appointment, orders from Dr. Varela were completed.  Frequency: three times weekly    Progress note:  Patient identification verified by name and date of birth.  Assessment completed.  Vitals recorded in Doc Flowsheets.  Patient was provided with education regarding medication/procedure and possible side effects.  Patient verbalized understanding.     present during visit today: Not Applicable.    Premedications: were not ordered.    Drug Waste Record: No    Infusion length and rate:  infusion given over approximately 60 minutes    Labs: were drawn per orders.     Vascular access: peripheral IV placed today.    Is the next appt scheduled? yes  Asymptomatic COVID test completed? no    Post Infusion Assessment:  Patient tolerated infusion without incident.     Discharge Plan:   Follow up plan of care with: ongoing infusions at [facility]. and ordering provider as scheduled.  Discharge instructions were reviewed with patient.  Patient/representative verbalized understanding of discharge instructions and all questions answered.  Patient discharged from [facility] in stable condition.    Yuki Diehl RN    Administrations This Visit     0.9% sodium chloride BOLUS     Admin Date  01/19/2022 Action  New Bag Dose  1,000 mL Route  Intravenous Administered By  Yuki Diehl RN                BP (!) 143/82 (BP Location: Right arm, Patient Position: Semi-Zuniga's)   OT SESSION ATTEMPT     Date:2022  Patient Name: Coretha Hodgkins  MRN: [id number]  : 1938  Room: [address] [place] [address]A     Occupational therapy orders received/chart review completed and OT session attempted this date:    [] unavailable due to other medical staff currently with pt   [] on hold, await MRI/ neurosurgical recommendations. [] on hold per nursing staff secondary to lab / radiology results    [] declined Occupational Therapy  this date due to ___. Benefits of participation in therapy reviewed with pt. [x] off unit   [] Other:     Will reattempt OT eval at a later time/date.     Collin Santillan OTR/L; [id number]  Pulse 70   Temp 98  F (36.7  C) (Oral)   Resp 16   SpO2 100%           Again, thank you for allowing me to participate in the care of your patient.      Sincerely,    [facility]

## 2022-09-19 ENCOUNTER — VIRTUAL VISIT (OUTPATIENT)
Dept: NEPHROLOGY | Facility: CLINIC | Age: 72
End: 2022-09-19
Attending: INTERNAL MEDICINE
Payer: MEDICARE

## 2022-09-19 VITALS — BODY MASS INDEX: 21.52 KG/M2 | WEIGHT: 150 LBS

## 2022-09-19 DIAGNOSIS — E55.9 VITAMIN D DEFICIENCY: ICD-10-CM

## 2022-09-19 DIAGNOSIS — N18.9 CHRONIC KIDNEY DISEASE: ICD-10-CM

## 2022-09-19 DIAGNOSIS — Z48.298 AFTERCARE FOLLOWING ORGAN TRANSPLANT: ICD-10-CM

## 2022-09-19 DIAGNOSIS — N18.32 CHRONIC KIDNEY DISEASE, STAGE 3B (H): ICD-10-CM

## 2022-09-19 DIAGNOSIS — D84.9 IMMUNOSUPPRESSION (H): ICD-10-CM

## 2022-09-19 DIAGNOSIS — Z29.89 NEED FOR PNEUMOCYSTIS PROPHYLAXIS: ICD-10-CM

## 2022-09-19 DIAGNOSIS — Z94.0 HTN, KIDNEY TRANSPLANT RELATED: ICD-10-CM

## 2022-09-19 DIAGNOSIS — Z94.0 KIDNEY REPLACED BY TRANSPLANT: Primary | ICD-10-CM

## 2022-09-19 DIAGNOSIS — I15.1 HTN, KIDNEY TRANSPLANT RELATED: ICD-10-CM

## 2022-09-19 DIAGNOSIS — E55.9 VITAMIN D DEFICIENCY, UNSPECIFIED: ICD-10-CM

## 2022-09-19 DIAGNOSIS — Z94.0 KIDNEY TRANSPLANTED: ICD-10-CM

## 2022-09-19 PROCEDURE — G0463 HOSPITAL OUTPT CLINIC VISIT: HCPCS | Mod: PN,RTG | Performed by: INTERNAL MEDICINE

## 2022-09-19 PROCEDURE — 99214 OFFICE O/P EST MOD 30 MIN: CPT | Mod: 95 | Performed by: INTERNAL MEDICINE

## 2022-09-19 ASSESSMENT — PAIN SCALES - GENERAL: PAINLEVEL: NO PAIN (0)

## 2022-09-19 NOTE — LETTER
9/19/2022       RE: Marlo Vee  [address] [facility] [place] [address]     Dear Colleague,    Thank you for referring your patient, Marlo Vee, to the [facility] [place] at [facility]. Please see a copy of my visit note below.        TRANSPLANT NEPHROLOGY CHRONIC POST TRANSPLANT VISIT    Assessment & Plan   # LDKT: Stable creatinine at new baseline ~ 2.0-2.3 with multiple MARCOS episodes due to past diarrheal illnesses.  Kidney transplant biopsy 6/2022 showed no acute rejection with some transplant glomerulopathy and moderate chronic changes.   - Baseline Creatinine:  ~ 2.0-2.3   - Proteinuria: Mild (0.5-1.0 grams)   - Date DSA Last Checked: Jun/2022      Latest DSA: No   - BK Viremia: No   - Kidney Tx Biopsy: Jun 03, 2022; Result: No diagnostic evidence of acute rejection.  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  Moderate interstitial fibrosis and tubular atrophy.  Severe vascular changes.             Oct 19, 2021; Result: No diagnostic evidence of acute rejection.  Acute tubular injury with mild interstitial fibrosis and tubular atrophy.    # Immunosuppression: Tacrolimus immediate release (goal 4-6) and Azathioprine (dose 100 mg daily)   - Continue with intensive monitoring of immunosuppression for efficacy and toxicity.   - Changes: No    # Infection Prophylaxis:   Last CD4 Level: 165 (Sep/2021)  - PJP: Sulfa/TMP (Bactrim)    # Hypertension: Not checked recently;  Goal BP: < 130/80   - Changes: Not at this time; Recommend patient check blood pressure at home on a regular basis, such as once every week or two, and follow up with PCP if above goal.    # Anemia in Chronic Renal Disease: Hgb: Stable      LULY: No   - Iron studies: Low iron saturation    # Mineral Bone Disorder:   - Vitamin D; level: Low        On supplement: Yes  - Calcium; level: Normal        On  supplement: No    # CAD, s/p PCI: Asymptomatic.    # Chronic Diarrhea: Patient was previously diagnosed with Clostridium difficile and also cryptosporidium.  He was treated for both, but no improvement in his ongoing diarrhea symptoms.  He has been seen by Transplant ID and GI.  Patient was then changed from mycophenolate to azathioprine and symptoms slowly improved over time.  Now with no diarrhea and regular bowel movements.    # EBV Viremia: Trend down in EBV PCR to ~ 9K with last check 1/2022.   - Will repeat EBV PCR.    # Skin Cancer: New lesions: none   - Discussed sun protection and recommend regular follow up with Dermatology.    # Medical Compliance: Yes    # COVID-19 Virus Review: Discussed COVID-19 virus and the potential medical risks.  Reviewed preventative health recommendations, including wearing a mask where appropriate.  Recommended COVID vaccination should be up to date with either an initial vaccination or booster shot when appropriate.  Asked the patient to inform the transplant center if they are exposed or diagnosed with this virus.    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  Significant transplant-related complications: None    Transplant Office Phone Number: [phone]    Assessment and plan was discussed with the patient and he voiced his understanding and agreement.    Return visit: Return in about 6 months (around 3/19/2023).    Samuel Varela MD     Chief Complaint   Mr. Vee is a 72 year old here for kidney transplant and immunosuppression management.    History of Present Illness    Mr. Vee reports feeling good overall with some medical complaints.  Since last clinic visit, patient reports no hospitalizations or new medical complaints and has been  doing well overall.  His energy level is good and now back to normal.  He is active and does get some exercise.  Denies any chest pain or shortness of breath with exertion.  No leg swelling.    Appetite is good and weight is stable.  No nausea, vomiting or diarrhea.  He is having formed bowel movements.  He does report occasional tongue pain or change.  No white covering and not sure what it is from.  No fever, sweats or chills.  Intermittent, mild night sweats.     Home BP: Not checked.     Problem List   Patient Active Problem List   Diagnosis     HTN, kidney transplant related     Membranous glomerulonephritis     Anemia in chronic renal disease     Secondary renal hyperparathyroidism (H)     Vitamin D deficiency     Dyslipidemia     Anxiety     Status post coronary angiogram     CAD, multiple vessel     Multiple vessel coronary artery disease     Kidney replaced by transplant     Immunosuppression (H)     Aftercare following organ transplant     Closed fracture of trochanter of right femur, initial encounter (H)     Impaired fasting glucose     Erectile dysfunction     Old myocardial infarction     Diarrhea     Prophylactic antibiotic     Chronic kidney disease, stage 3b (H)     Need for pneumocystis prophylaxis       Allergies   No Known Allergies    Medications   Current Outpatient Medications   Medication Sig     aspirin 81 MG EC tablet Take 81 mg by mouth every morning     azaTHIOprine (IMURAN) 50 MG tablet Take 2 tablets (100 mg) by mouth daily     carvedilol (COREG) 25 MG tablet Take 1 tablet (25 mg) by mouth every morning In addition to 25 mg every evening.     PROGRAF (BRAND) 1 MG/ML suspension Take 0.4 mLs (0.4 mg) by mouth 2 times daily     sulfamethoxazole-trimethoprim (BACTRIM) 400-80 MG tablet Take 1 tablet by mouth Every Mon, Wed, Fri Morning     vitamin D3 (CHOLECALCIFEROL) 50 mcg (2000 units) tablet Take 1 tablet by mouth every morning     No current facility-administered medications for this  visit.     Medications Discontinued During This Encounter   Medication Reason     ferrous sulfate (FEROSUL) 325 (65 Fe) MG tablet      ondansetron (ZOFRAN-ODT) 4 MG ODT tab      sildenafil (VIAGRA) 100 MG tablet        Physical Exam   Vital Signs: Wt 68 kg (150 lb)   BMI 21.52 kg/m      GENERAL APPEARANCE: alert and no distress  HENT: no obvious abnormalities on appearance  RESP: breathing appears unremarkable with normal rate, no audible wheezing or cough and no apparent shortness of breath with conversation  MS: extremities normal - no gross deformities noted  SKIN: no apparent rash and normal skin tone  NEURO: speech is clear with no obvious neurological deficits  PSYCH: mentation appears normal and affect normal    Data     Renal Latest Ref Rng & Units 8/12/2022 6/3/2022 5/27/2022   Na 133 - 144 mmol/L 135 138 136   Na (external) 136 - 145 meq/L - - -   K 3.4 - 5.3 mmol/L 4.3 4.5 4.5   K (external) 3.5 - 5.1 meq/L - - -   Cl 94 - 109 mmol/L 108 109 107   CO2 20 - 32 mmol/L 25 26 25   CO2 (external) 22 - 31 meq/L - - -   BUN 7 - 30 mg/dL 40(H) 36(H) 29   BUN (external) 6 - 22 mg/dL - - -   Cr 0.66 - 1.25 mg/dL 2.22(H) 2.19(H) 2.10(H)   Cr (external) 0.70 - 1.30 mg/dL - - -   Glucose 70 - 99 mg/dL 98 96 93   Glucose (external) 70 - 99 mg/dL - - -   Ca  8.5 - 10.1 mg/dL 9.0 8.9 9.0   Ca (external) 8.5 - 10.5 mg/dL - - -   Mg 1.6 - 2.3 mg/dL - - -     Bone Health Latest Ref Rng & Units 12/24/2021 12/16/2021 8/7/2021   Phos 2.5 - 4.5 mg/dL - 2.8 2.7   PTHi 12 - 72 pg/mL - - -   Vit D Def 20 - 75 ug/L 26 - -     Heme Latest Ref Rng & Units 8/12/2022 6/3/2022 5/27/2022   WBC 4.0 - 11.0 10e3/uL 5.0 4.0 4.1   WBC (external) 4.0 - 11.0 10*9/L - - -   Hgb 13.3 - 17.7 g/dL 10.1(L) 9.8(L) 9.6(L)   Hgb (external) 13.3 - 17.7 g/dL - - -   Plt 150 - 450 10e3/uL 192 185 181   Plt (external) 150 - 450 10*9/L - - -   ABSOLUTE NEUTROPHIL 1.6 - 8.3 10e9/L - - -   ABSOLUTE LYMPHOCYTES 0.8 - 5.3 10e9/L - - -   ABSOLUTE MONOCYTES 0.0  - 1.3 10e9/L - - -   ABSOLUTE EOSINOPHILS 0.0 - 0.7 10e9/L - - -   ABSOLUTE BASOPHILS 0.0 - 0.2 10e9/L - - -   ABS IMMATURE GRANULOCYTES 0 - 0.4 10e9/L - - -   ABSOLUTE NUCLEATED RBC - - - -     Liver Latest Ref Rng & Units 12/16/2021 8/7/2021 12/9/2020   AP 40 - 150 U/L - 83 -   TBili 0.2 - 1.3 mg/dL - 0.8 -   DBili 0.0 - 0.2 mg/dL - - -   ALT 0 - 70 U/L - 17 -   AST 0 - 45 U/L - 10 -   Tot Protein 6.8 - 8.8 g/dL - 7.5 -   Albumin 3.4 - 5.0 g/dL 3.6 3.7 2.8(L)     Pancreas Latest Ref Rng & Units 5/6/2021   A1C 0 - 5.6 % 5.7(H)     Iron studies Latest Ref Rng & Units 12/24/2021 12/16/2021 1/28/2016   Iron 35 - 180 ug/dL 44 54 62   Iron sat 15 - 46 % 24 - 34   Ferritin 26 - 388 ng/mL 255 - 787(H)     [facility] Txp Virology Latest Ref Rng & Units 1/19/2022 12/16/2021 8/7/2021   CVM DNA Quant - - - -   CMV QUANT IU/ML Not Detected IU/mL - Not Detected Not Detected   LOG IU/ML OF CMVQNT <2.1 [Log:IU]/mL - - -   BK Spec - - - -   BK Res BKNEG:BK Virus DNA Not Detected copies/mL - - -   BK Log <2.7 Log copies/mL - - -   EBV CAPSID ANTIBODY IGG 0.0 - 0.8 AI - - -   EBV DNA LOG OF COPIES - 3.9 4.4 -   Hep B Core NR - - -        Recent Labs   Lab Test 05/13/22  0939 05/27/22  1107 08/12/22  1002   DOSTAC 5/12/2022 5/26/2022 8/11/2022   TACROL 5.3 4.1* 5.8     Recent Labs   Lab Test 04/25/16  0826 05/03/16  0853 08/09/21  1043   DOSMPA 4.24.2016 2030 2,030 8/8/2021   8:30 PM   MPACID 4.01* 3.81* 0.81*   MPAG 46.6 38.6 48.0       Again, thank you for allowing me to participate in the care of your patient.      Sincerely,    Samuel Varela MD

## 2022-09-19 NOTE — LETTER
9/19/2022      RE: Marlo Vee  [address] [facility] [place] [address]       John is a 72 year old who is being evaluated via a billable video visit.      How would you like to obtain your AVS? MyChart  If the video visit is dropped, the invitation should be resent by: Send to e-mail at: [email or web address]  Will anyone else be joining your video visit? No    Video-Visit Details    Video Start Time: 1306    Type of service:  Video Visit    Video End Time:1319    Originating Location (pt. Location): Home    Distant Location (provider location):  [facility] [place]     Platform used for Video Visit: [facility]      TRANSPLANT NEPHROLOGY CHRONIC POST TRANSPLANT VISIT    Assessment & Plan   # LDKT: Stable creatinine at new baseline ~ 2.0-2.3 with multiple MARCOS episodes due to past diarrheal illnesses.  Kidney transplant biopsy 6/2022 showed no acute rejection with some transplant glomerulopathy and moderate chronic changes.   - Baseline Creatinine:  ~ 2.0-2.3   - Proteinuria: Mild (0.5-1.0 grams)   - Date DSA Last Checked: Jun/2022      Latest DSA: No   - BK Viremia: No   - Kidney Tx Biopsy: Jun 03, 2022; Result: No diagnostic evidence of acute rejection.  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  Moderate interstitial fibrosis and tubular atrophy.  Severe vascular changes.             Oct 19, 2021; Result: No diagnostic evidence of acute rejection.  Acute tubular injury with mild interstitial fibrosis and tubular atrophy.    # Immunosuppression: Tacrolimus immediate release (goal 4-6) and Azathioprine (dose 100 mg daily)   - Continue with intensive monitoring of immunosuppression for efficacy and toxicity.   - Changes: No    # Infection Prophylaxis:   Last CD4 Level: 165 (Sep/2021)  - PJP: Sulfa/TMP (Bactrim)    # Hypertension: Not checked recently;  Goal BP: < 130/80   - Changes: Not at this time; Recommend patient check blood  pressure at home on a regular basis, such as once every week or two, and follow up with PCP if above goal.    # Anemia in Chronic Renal Disease: Hgb: Stable      LULY: No   - Iron studies: Low iron saturation    # Mineral Bone Disorder:   - Vitamin D; level: Low        On supplement: Yes  - Calcium; level: Normal        On supplement: No    # CAD, s/p PCI: Asymptomatic.    # Chronic Diarrhea: Patient was previously diagnosed with Clostridium difficile and also cryptosporidium.  He was treated for both, but no improvement in his ongoing diarrhea symptoms.  He has been seen by Transplant ID and GI.  Patient was then changed from mycophenolate to azathioprine and symptoms slowly improved over time.  Now with no diarrhea and regular bowel movements.    # EBV Viremia: Trend down in EBV PCR to ~ 9K with last check 1/2022.   - Will repeat EBV PCR.    # Skin Cancer: New lesions: none   - Discussed sun protection and recommend regular follow up with Dermatology.    # Medical Compliance: Yes    # COVID-19 Virus Review: Discussed COVID-19 virus and the potential medical risks.  Reviewed preventative health recommendations, including wearing a mask where appropriate.  Recommended COVID vaccination should be up to date with either an initial vaccination or booster shot when appropriate.  Asked the patient to inform the transplant center if they are exposed or diagnosed with this virus.    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  Significant transplant-related complications: None    Transplant Office Phone Number: [phone]    Assessment and plan was discussed with the patient and he voiced his understanding and agreement.    Return visit: Return in about 6 months (around 3/19/2023).    Samuel Varela,  MD     Chief Complaint   Mr. Vee is a 72 year old here for kidney transplant and immunosuppression management.    History of Present Illness    Mr. Vee reports feeling good overall with some medical complaints.  Since last clinic visit, patient reports no hospitalizations or new medical complaints and has been doing well overall.  His energy level is good and now back to normal.  He is active and does get some exercise.  Denies any chest pain or shortness of breath with exertion.  No leg swelling.    Appetite is good and weight is stable.  No nausea, vomiting or diarrhea.  He is having formed bowel movements.  He does report occasional tongue pain or change.  No white covering and not sure what it is from.  No fever, sweats or chills.  Intermittent, mild night sweats.     Home BP: Not checked.     Problem List   Patient Active Problem List   Diagnosis     HTN, kidney transplant related     Membranous glomerulonephritis     Anemia in chronic renal disease     Secondary renal hyperparathyroidism (H)     Vitamin D deficiency     Dyslipidemia     Anxiety     Status post coronary angiogram     CAD, multiple vessel     Multiple vessel coronary artery disease     Kidney replaced by transplant     Immunosuppression (H)     Aftercare following organ transplant     Closed fracture of trochanter of right femur, initial encounter (H)     Impaired fasting glucose     Erectile dysfunction     Old myocardial infarction     Diarrhea     Prophylactic antibiotic     Chronic kidney disease, stage 3b (H)     Need for pneumocystis prophylaxis       Allergies   No Known Allergies    Medications   Current Outpatient Medications   Medication Sig     aspirin 81 MG EC tablet Take 81 mg by mouth every morning     azaTHIOprine (IMURAN) 50 MG tablet Take 2 tablets (100 mg) by mouth daily     carvedilol (COREG) 25 MG tablet Take 1 tablet (25 mg) by mouth every morning In addition to 25 mg every evening.     PROGRAF (BRAND) 1 MG/ML  suspension Take 0.4 mLs (0.4 mg) by mouth 2 times daily     sulfamethoxazole-trimethoprim (BACTRIM) 400-80 MG tablet Take 1 tablet by mouth Every Mon, Wed, Fri Morning     vitamin D3 (CHOLECALCIFEROL) 50 mcg (2000 units) tablet Take 1 tablet by mouth every morning     No current facility-administered medications for this visit.     Medications Discontinued During This Encounter   Medication Reason     ferrous sulfate (FEROSUL) 325 (65 Fe) MG tablet      ondansetron (ZOFRAN-ODT) 4 MG ODT tab      sildenafil (VIAGRA) 100 MG tablet        Physical Exam   Vital Signs: Wt 68 kg (150 lb)   BMI 21.52 kg/m      GENERAL APPEARANCE: alert and no distress  HENT: no obvious abnormalities on appearance  RESP: breathing appears unremarkable with normal rate, no audible wheezing or cough and no apparent shortness of breath with conversation  MS: extremities normal - no gross deformities noted  SKIN: no apparent rash and normal skin tone  NEURO: speech is clear with no obvious neurological deficits  PSYCH: mentation appears normal and affect normal    Data     Renal Latest Ref Rng & Units 8/12/2022 6/3/2022 5/27/2022   Na 133 - 144 mmol/L 135 138 136   Na (external) 136 - 145 meq/L - - -   K 3.4 - 5.3 mmol/L 4.3 4.5 4.5   K (external) 3.5 - 5.1 meq/L - - -   Cl 94 - 109 mmol/L 108 109 107   CO2 20 - 32 mmol/L 25 26 25   CO2 (external) 22 - 31 meq/L - - -   BUN 7 - 30 mg/dL 40(H) 36(H) 29   BUN (external) 6 - 22 mg/dL - - -   Cr 0.66 - 1.25 mg/dL 2.22(H) 2.19(H) 2.10(H)   Cr (external) 0.70 - 1.30 mg/dL - - -   Glucose 70 - 99 mg/dL 98 96 93   Glucose (external) 70 - 99 mg/dL - - -   Ca  8.5 - 10.1 mg/dL 9.0 8.9 9.0   Ca (external) 8.5 - 10.5 mg/dL - - -   Mg 1.6 - 2.3 mg/dL - - -     Bone Health Latest Ref Rng & Units 12/24/2021 12/16/2021 8/7/2021   Phos 2.5 - 4.5 mg/dL - 2.8 2.7   PTHi 12 - 72 pg/mL - - -   Vit D Def 20 - 75 ug/L 26 - -     Heme Latest Ref Rng & Units 8/12/2022 6/3/2022 5/27/2022   WBC 4.0 - 11.0 10e3/uL 5.0 4.0  4.1   WBC (external) 4.0 - 11.0 10*9/L - - -   Hgb 13.3 - 17.7 g/dL 10.1(L) 9.8(L) 9.6(L)   Hgb (external) 13.3 - 17.7 g/dL - - -   Plt 150 - 450 10e3/uL 192 185 181   Plt (external) 150 - 450 10*9/L - - -   ABSOLUTE NEUTROPHIL 1.6 - 8.3 10e9/L - - -   ABSOLUTE LYMPHOCYTES 0.8 - 5.3 10e9/L - - -   ABSOLUTE MONOCYTES 0.0 - 1.3 10e9/L - - -   ABSOLUTE EOSINOPHILS 0.0 - 0.7 10e9/L - - -   ABSOLUTE BASOPHILS 0.0 - 0.2 10e9/L - - -   ABS IMMATURE GRANULOCYTES 0 - 0.4 10e9/L - - -   ABSOLUTE NUCLEATED RBC - - - -     Liver Latest Ref Rng & Units 12/16/2021 8/7/2021 12/9/2020   AP 40 - 150 U/L - 83 -   TBili 0.2 - 1.3 mg/dL - 0.8 -   DBili 0.0 - 0.2 mg/dL - - -   ALT 0 - 70 U/L - 17 -   AST 0 - 45 U/L - 10 -   Tot Protein 6.8 - 8.8 g/dL - 7.5 -   Albumin 3.4 - 5.0 g/dL 3.6 3.7 2.8(L)     Pancreas Latest Ref Rng & Units 5/6/2021   A1C 0 - 5.6 % 5.7(H)     Iron studies Latest Ref Rng & Units 12/24/2021 12/16/2021 1/28/2016   Iron 35 - 180 ug/dL 44 54 62   Iron sat 15 - 46 % 24 - 34   Ferritin 26 - 388 ng/mL 255 - 787(H)     [facility] Txp Virology Latest Ref Rng & Units 1/19/2022 12/16/2021 8/7/2021   CVM DNA Quant - - - -   CMV QUANT IU/ML Not Detected IU/mL - Not Detected Not Detected   LOG IU/ML OF CMVQNT <2.1 [Log:IU]/mL - - -   BK Spec - - - -   BK Res BKNEG:BK Virus DNA Not Detected copies/mL - - -   BK Log <2.7 Log copies/mL - - -   EBV CAPSID ANTIBODY IGG 0.0 - 0.8 AI - - -   EBV DNA LOG OF COPIES - 3.9 4.4 -   Hep B Core NR - - -        Recent Labs   Lab Test 05/13/22  0939 05/27/22  1107 08/12/22  1002   DOSTAC 5/12/2022 5/26/2022 8/11/2022   TACROL 5.3 4.1* 5.8     Recent Labs   Lab Test 04/25/16  0826 05/03/16  0853 08/09/21  1043   DOSMPA 4.24.2016 2030 2,030 8/8/2021   8:30 PM   MPACID 4.01* 3.81* 0.81*   MPAG 46.6 38.6 48.0       Samuel Varela MD

## 2022-09-19 NOTE — PROGRESS NOTES
Samuel Hobbs MD Harris, Kathleen, RN  Please check the following labs: PTH, Vitamin D, Magnesium, Iron panel, and EBV PCR.       OUTCOME  Lab orders updated.

## 2022-09-25 NOTE — PATIENT INSTRUCTIONS
Patient Recommendations:  - Recommend checking blood pressure at home on a regular basis, such as once every week or two, and follow up with primary Nephrologist or PCP if above goal of less than 130/80.    Transplant Patient Information  Your Post Transplant Coordinator is: Onelia Richardson  You and your care team can contact your transplant coordinator Monday - Friday, 8am - 5pm at [phone] (Option 2 to reach the coordinator or Option 4 to schedule an appointment).  You can also reach your care team online via [employer].  After hours for urgent matters, please call [facility] at [phone].

## 2022-10-06 ENCOUNTER — MYC MEDICAL ADVICE (OUTPATIENT)
Dept: TRANSPLANT | Facility: CLINIC | Age: 72
End: 2022-10-06

## 2022-10-25 DIAGNOSIS — Z94.0 KIDNEY TRANSPLANTED: Primary | ICD-10-CM

## 2022-10-25 RX ORDER — CARVEDILOL 25 MG/1
25 TABLET ORAL 2 TIMES DAILY WITH MEALS
Qty: 60 TABLET | Refills: 0 | Status: SHIPPED | OUTPATIENT
Start: 2022-10-25 | End: 2022-11-26

## 2022-10-29 ENCOUNTER — HEALTH MAINTENANCE LETTER (OUTPATIENT)
Age: 72
End: 2022-10-29

## 2022-11-01 ENCOUNTER — LAB (OUTPATIENT)
Dept: LAB | Facility: CLINIC | Age: 72
End: 2022-11-01
Payer: MEDICARE

## 2022-11-01 DIAGNOSIS — E55.9 VITAMIN D DEFICIENCY, UNSPECIFIED: ICD-10-CM

## 2022-11-01 DIAGNOSIS — Z94.0 KIDNEY REPLACED BY TRANSPLANT: ICD-10-CM

## 2022-11-01 DIAGNOSIS — N18.9 CHRONIC KIDNEY DISEASE: ICD-10-CM

## 2022-11-01 DIAGNOSIS — B27.00 EBV (EPSTEIN-BARR VIRUS) VIREMIA: ICD-10-CM

## 2022-11-01 DIAGNOSIS — Z48.298 AFTERCARE FOLLOWING ORGAN TRANSPLANT: ICD-10-CM

## 2022-11-01 LAB
ANION GAP SERPL CALCULATED.3IONS-SCNC: 1 MMOL/L (ref 3–14)
BUN SERPL-MCNC: 40 MG/DL (ref 7–30)
CALCIUM SERPL-MCNC: 8.6 MG/DL (ref 8.5–10.1)
CHLORIDE BLD-SCNC: 108 MMOL/L (ref 94–109)
CO2 SERPL-SCNC: 25 MMOL/L (ref 20–32)
CREAT SERPL-MCNC: 2.33 MG/DL (ref 0.66–1.25)
CREAT UR-MCNC: 78 MG/DL
DEPRECATED CALCIDIOL+CALCIFEROL SERPL-MC: 33 UG/L (ref 20–75)
ERYTHROCYTE [DISTWIDTH] IN BLOOD BY AUTOMATED COUNT: 14.5 % (ref 10–15)
FERRITIN SERPL-MCNC: 341 NG/ML (ref 26–388)
GFR SERPL CREATININE-BSD FRML MDRD: 29 ML/MIN/1.73M2
GLUCOSE BLD-MCNC: 101 MG/DL (ref 70–99)
HCT VFR BLD AUTO: 30.4 % (ref 40–53)
HGB BLD-MCNC: 9.5 G/DL (ref 13.3–17.7)
IRON SATN MFR SERPL: 28 % (ref 15–46)
IRON SERPL-MCNC: 56 UG/DL (ref 35–180)
MAGNESIUM SERPL-MCNC: 1.8 MG/DL (ref 1.6–2.3)
MCH RBC QN AUTO: 31 PG (ref 26.5–33)
MCHC RBC AUTO-ENTMCNC: 31.3 G/DL (ref 31.5–36.5)
MCV RBC AUTO: 99 FL (ref 78–100)
PLATELET # BLD AUTO: 179 10E3/UL (ref 150–450)
POTASSIUM BLD-SCNC: 4.5 MMOL/L (ref 3.4–5.3)
PROT UR-MCNC: 1.63 G/L
PROT/CREAT 24H UR: 2.09 G/G CR (ref 0–0.2)
PTH-INTACT SERPL-MCNC: 274 PG/ML
RBC # BLD AUTO: 3.06 10E6/UL (ref 4.4–5.9)
SODIUM SERPL-SCNC: 134 MMOL/L (ref 133–144)
TACROLIMUS BLD-MCNC: 6.2 UG/L (ref 5–15)
TIBC SERPL-MCNC: 197 UG/DL (ref 240–430)
TME LAST DOSE: NORMAL H
TME LAST DOSE: NORMAL H
WBC # BLD AUTO: 3.7 10E3/UL (ref 4–11)

## 2022-11-01 PROCEDURE — 85027 COMPLETE CBC AUTOMATED: CPT

## 2022-11-01 PROCEDURE — 83970 ASSAY OF PARATHORMONE: CPT

## 2022-11-01 PROCEDURE — 82728 ASSAY OF FERRITIN: CPT

## 2022-11-01 PROCEDURE — 84156 ASSAY OF PROTEIN URINE: CPT

## 2022-11-01 PROCEDURE — 80048 BASIC METABOLIC PNL TOTAL CA: CPT

## 2022-11-01 PROCEDURE — 82784 ASSAY IGA/IGD/IGG/IGM EACH: CPT

## 2022-11-01 PROCEDURE — 83615 LACTATE (LD) (LDH) ENZYME: CPT

## 2022-11-01 PROCEDURE — 80197 ASSAY OF TACROLIMUS: CPT

## 2022-11-01 PROCEDURE — 83550 IRON BINDING TEST: CPT

## 2022-11-01 PROCEDURE — 36415 COLL VENOUS BLD VENIPUNCTURE: CPT

## 2022-11-01 PROCEDURE — 82306 VITAMIN D 25 HYDROXY: CPT

## 2022-11-01 PROCEDURE — 83735 ASSAY OF MAGNESIUM: CPT

## 2022-11-01 PROCEDURE — 87799 DETECT AGENT NOS DNA QUANT: CPT

## 2022-11-02 ENCOUNTER — MYC MEDICAL ADVICE (OUTPATIENT)
Dept: TRANSPLANT | Facility: CLINIC | Age: 72
End: 2022-11-02

## 2022-11-02 DIAGNOSIS — R80.9 PROTEINURIA: ICD-10-CM

## 2022-11-02 DIAGNOSIS — Z94.0 KIDNEY REPLACED BY TRANSPLANT: Primary | ICD-10-CM

## 2022-11-02 DIAGNOSIS — D84.9 IMMUNOSUPPRESSION (H): ICD-10-CM

## 2022-11-02 DIAGNOSIS — B27.00 EBV (EPSTEIN-BARR VIRUS) VIREMIA: ICD-10-CM

## 2022-11-02 DIAGNOSIS — Z48.298 AFTERCARE FOLLOWING ORGAN TRANSPLANT: ICD-10-CM

## 2022-11-02 LAB
EBV DNA COPIES/ML, INSTRUMENT: ABNORMAL COPIES/ML
EBV DNA SPEC NAA+PROBE-LOG#: 4.8 {LOG_COPIES}/ML

## 2022-11-02 NOTE — TELEPHONE ENCOUNTER
ISSUE  UPC elevated to 2.09 (baseline 0.5-1.0)      PLAN  Samuel Varela MD Harris, Kathleen, RN  Increased proteinuria, likely secondary to chronic changes on kidney biopsy, but would recommend usual assessment of blood pressure, blood glucose and change in medications, as well as would check UPC, Urinalysis, Microalbumin, SPEP, and Kappa and lambda light chain and repeat labs.     OUTCOME  My chart message sent to Shree

## 2022-11-03 LAB
IGG SERPL-MCNC: 4306 MG/DL (ref 610–1616)
LDH SERPL L TO P-CCNC: 156 U/L (ref 0–250)

## 2022-11-03 RX ORDER — AZATHIOPRINE 50 MG/1
50 TABLET ORAL DAILY
Qty: 30 TABLET | Refills: 11 | Status: SHIPPED | OUTPATIENT
Start: 2022-11-03 | End: 2023-01-01

## 2022-11-03 NOTE — TELEPHONE ENCOUNTER
ISSUE  Increase in EBV level to 61,980/4.8 (from 8,627/3.9 on last check)      PLAN  Samuel Varela MD Harris, Kathleen, RN  Increase EBV viremia and recommend decreasing azathioprine to 50 mg daily and checking an LDH and IgG level, as well as repeating EBV PCR in a month.        OUTCOME  John reports feeling well overall, no fevers, weight loss, fatigue or acute illness. Reports occasional night sweats.  My chart message sent with recommendation above.      ADDENDUM  IgG level 4,306    Samuel Varela MD Harris, Kathleen, HEATHER  Okay       ----- Message -----   From: Adilia Richardson RN   Sent: 11/3/2022   2:06 PM CDT   To: MD VIOLETA Sloan on John's IgG level. LDH is in process. 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   He has decreased his Aza to 50 and we will recheck EBV and do proteinuria work up the first week in December.

## 2022-11-17 ENCOUNTER — APPOINTMENT (OUTPATIENT)
Dept: URBAN - METROPOLITAN AREA CLINIC 255 | Age: 72
Setting detail: DERMATOLOGY
End: 2022-11-18

## 2022-11-17 PROBLEM — C44.219 BASAL CELL CARCINOMA OF SKIN OF LEFT EAR AND EXTERNAL AURICULAR CANAL: Status: ACTIVE | Noted: 2022-11-17

## 2022-11-17 PROCEDURE — OTHER MOHS SURGERY: OTHER

## 2022-11-17 PROCEDURE — 17311 MOHS 1 STAGE H/N/HF/G: CPT

## 2022-11-17 PROCEDURE — 17312 MOHS ADDL STAGE: CPT

## 2022-11-17 PROCEDURE — OTHER MIPS QUALITY: OTHER

## 2022-11-17 NOTE — PROCEDURE: MOHS SURGERY
I can send in the rx for this but I strongly recommend if her hair loss is increasing from our visit then she should be further evaluated by dermatology.     Dr. Wallace's office is where I would send her.    Non-Graft Cartilage Fenestration Text: The cartilage was fenestrated with a 2mm punch biopsy to help facilitate healing.

## 2022-11-17 NOTE — PROCEDURE: MIPS QUALITY
Quality 226: Preventive Care And Screening: Tobacco Use: Screening And Cessation Intervention: Patient screened for tobacco use and is an ex/non-smoker
Quality 431: Preventive Care And Screening: Unhealthy Alcohol Use - Screening: Patient not identified as an unhealthy alcohol user when screened for unhealthy alcohol use using a systematic screening method
Quality 130: Documentation Of Current Medications In The Medical Record: Current Medications Documented
Quality 143: Oncology: Medical And Radiation- Pain Intensity Quantified: Pain severity quantified, no pain present
Detail Level: Detailed
Quality 111:Pneumonia Vaccination Status For Older Adults: Pneumococcal vaccine (PPSV23) administered on or after patient’s 60th birthday and before the end of the measurement period
Quality 110: Preventive Care And Screening: Influenza Immunization: Influenza Immunization Administered during Influenza season
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
## 2022-11-26 DIAGNOSIS — Z94.0 KIDNEY TRANSPLANTED: ICD-10-CM

## 2022-11-26 RX ORDER — CARVEDILOL 25 MG/1
TABLET ORAL
Qty: 60 TABLET | Refills: 0 | Status: SHIPPED | OUTPATIENT
Start: 2022-11-26 | End: 2022-12-27

## 2022-11-28 ENCOUNTER — TELEPHONE (OUTPATIENT)
Dept: TRANSPLANT | Facility: CLINIC | Age: 72
End: 2022-11-28

## 2022-11-28 NOTE — TELEPHONE ENCOUNTER
Returned a call to John. He states he requested his Carvedilol on Saturday and was told he can not get a shipment until Friday. He will run out on Wednesday evening and is worried about not having any meds for Thursday.   Rx was signed and received by pharmacy on Saturday 11/26. Message sent to pharmacist to check on status.   Per Pharmacy Carvedilol was filled and shipped out on Saturday, should arrive on Wednesday.  John states he will call pharmacy to verify.

## 2022-12-01 ENCOUNTER — APPOINTMENT (OUTPATIENT)
Dept: URBAN - METROPOLITAN AREA CLINIC 255 | Age: 72
Setting detail: DERMATOLOGY
End: 2023-01-03

## 2022-12-01 DIAGNOSIS — L98429 CHRONIC ULCER OF OTHER SPECIFIED SITES: ICD-10-CM

## 2022-12-01 DIAGNOSIS — L98419 CHRONIC ULCER OF OTHER SPECIFIED SITES: ICD-10-CM

## 2022-12-01 PROBLEM — L98.499 NON-PRESSURE CHRONIC ULCER OF SKIN OF OTHER SITES WITH UNSPECIFIED SEVERITY: Status: ACTIVE | Noted: 2022-12-01

## 2022-12-01 PROCEDURE — OTHER COUNSELING: OTHER

## 2022-12-01 PROCEDURE — 99213 OFFICE O/P EST LOW 20 MIN: CPT

## 2022-12-01 PROCEDURE — OTHER ULCER EVALUATION: OTHER

## 2022-12-01 PROCEDURE — OTHER DIAGNOSIS COMMENT: OTHER

## 2022-12-01 PROCEDURE — OTHER MIPS QUALITY: OTHER

## 2022-12-01 ASSESSMENT — LOCATION DETAILED DESCRIPTION DERM: LOCATION DETAILED: LEFT ANTIHELIX

## 2022-12-01 ASSESSMENT — LOCATION SIMPLE DESCRIPTION DERM: LOCATION SIMPLE: LEFT EAR

## 2022-12-01 ASSESSMENT — LOCATION ZONE DERM: LOCATION ZONE: EAR

## 2022-12-01 NOTE — PROCEDURE: ULCER EVALUATION
Detail Level: Detailed
X Size Of Lesion In Cm (Optional): 1.5
Instructions (Optional): Physical Examination:\\nEschar: dry\\nGranulation Tissue: present\\nRe-Epithelialization: progressing\\nDrainage: serosanguinous\\nSurrounding Edema: absent\\nPain to palpation: 5 / 10\\nOdor: none \\nSurrounding Dermatitis: absent\\n\\nAssessment:\\nHealing as expected for dates\\nNo signs/symptoms of infection\\n\\nPlan:\\nWound cleaned with H2O2\\nWhite petrolatum ointment and non-adherent dressing applied\\nContinue QD wound care as instructed\\n\\nA 3mm punch was used on the cartilage to promote granulation tissue at the site.
Size Of Lesion In Cm (Optional): 2

## 2022-12-01 NOTE — PROCEDURE: DIAGNOSIS COMMENT
Comment: S/P Mohs + (2nd intention) for a Primary Nodular BCC on 11/17/2022
Render Risk Assessment In Note?: yes
Detail Level: Simple

## 2022-12-14 DIAGNOSIS — Z94.0 STATUS POST KIDNEY TRANSPLANT: Primary | ICD-10-CM

## 2022-12-14 DIAGNOSIS — D84.9 IMMUNOSUPPRESSED STATUS (H): ICD-10-CM

## 2022-12-14 RX ORDER — SULFAMETHOXAZOLE AND TRIMETHOPRIM 400; 80 MG/1; MG/1
1 TABLET ORAL
Qty: 39 TABLET | Refills: 3 | Status: SHIPPED | OUTPATIENT
Start: 2022-12-14 | End: 2023-01-01

## 2022-12-19 ENCOUNTER — TELEPHONE (OUTPATIENT)
Dept: INTERNAL MEDICINE | Facility: CLINIC | Age: 72
End: 2022-12-19

## 2022-12-19 ENCOUNTER — TELEPHONE (OUTPATIENT)
Dept: TRANSPLANT | Facility: CLINIC | Age: 72
End: 2022-12-19

## 2022-12-19 DIAGNOSIS — Z48.298 AFTERCARE FOLLOWING ORGAN TRANSPLANT: ICD-10-CM

## 2022-12-19 DIAGNOSIS — U07.1 2019 NOVEL CORONAVIRUS DISEASE (COVID-19): ICD-10-CM

## 2022-12-19 DIAGNOSIS — Z94.0 KIDNEY REPLACED BY TRANSPLANT: Primary | ICD-10-CM

## 2022-12-19 DIAGNOSIS — B27.00 EBV (EPSTEIN-BARR VIRUS) VIREMIA: ICD-10-CM

## 2022-12-19 DIAGNOSIS — R11.0 NAUSEA: ICD-10-CM

## 2022-12-19 RX ORDER — ONDANSETRON 4 MG/1
4 TABLET, FILM COATED ORAL EVERY 6 HOURS PRN
Qty: 10 TABLET | Refills: 0 | Status: SHIPPED | OUTPATIENT
Start: 2022-12-19

## 2022-12-19 NOTE — TELEPHONE ENCOUNTER
[facility] Call Center    Phone Message    May a detailed message be left on voicemail: yes     Reason for Call: Other: Patient just tested positive for covid, states it went right to his stomach, he is hoping to get some zofran to help him not feel like hes going to vomit 24/7. Please call patient back as soon as possible. Patient would like medication sent to pharmacy below   [place] [address], [place] [address]  Action Taken: Message routed to:  [facility] pcp    Travel Screening: Not Applicable

## 2022-12-19 NOTE — TELEPHONE ENCOUNTER
Received another call from John. He states he tested positive for Covid this morning when he retested.      COVID  Tested positive: 12/19/22  Vaccination status: Moderna x 4 and bivalent  Baseline immunosuppression: Tac 4-6, Azathioprine 50 mg daily.  DSA/Viral issues/rejection hx: No DSA, no rejection. Recent increase in EBV (Azathioprine decreased)  Symptoms: nausea/chills        Educated on the importance of staying well hydrated and using Tylenol for fever/aches/pains NO ibuprofen. Instructed on good pulmonary hygiene to prevent pneumonia and getting up for walks in the house periodically. Instructed if they begin to experience increased shortness of breath, chest pain or inability to stay hydrated and/or take transplant meds they will go directly to the ED.  Educated on a 10 day quarantine period for immunocompromised people.    Will review with provider.        PLAN  Samuel Varela MD Harris, Kathleen, RN  Would just follow for now.  Hold off on rechecking EBV for a couple of months, as it is likely to be higher with this illness.       OUTCOME  Call placed to John. Discussed above recommendations. John stating he would REALLY like some zofran. Does not think he will get it from PCP as he has not been seen in over 1 year.  Second message sent to provider.    Samuel Varela MD Harris, Adilia, RN  Ondansetron 4 mg tablets q6 hours prn nausea.  10 tablets.  If nausea persists after that, he would likely need to be seen in local ER as he would probably need IV fluids.     Ramon     Call placed to John. He verbalizedunderstanding to take Zofran as needed, but needs to be seen in the ED if he is unable to keep fluids down or unable to keep transplant meds down.

## 2022-12-19 NOTE — TELEPHONE ENCOUNTER
Patient called would like a script for Zofran to be sent to [facility] in [facility]. Patient stated he is not feeling well.

## 2022-12-19 NOTE — TELEPHONE ENCOUNTER
Returned a call to John. He states he was exposed to Covid on Friday. Yesterday he had a sore throat and today he has an upset stomach-no vomiting or diarrhea. Tested negative for Covid yesterday.  Advised John to test for Covid again today or tomorrow as he may have tested to early to get a positive yesterday.  Explained that I cannot prescribed Zofran, he should contact PCP for that.  Advised him to continue with really good hydration and let SOT know if he tests positive for Covid.

## 2022-12-22 ENCOUNTER — MYC MEDICAL ADVICE (OUTPATIENT)
Dept: TRANSPLANT | Facility: CLINIC | Age: 72
End: 2022-12-22

## 2022-12-22 DIAGNOSIS — R80.9 PROTEINURIA: ICD-10-CM

## 2022-12-22 DIAGNOSIS — Z94.0 KIDNEY REPLACED BY TRANSPLANT: ICD-10-CM

## 2022-12-22 DIAGNOSIS — R76.8 HIGH TOTAL SERUM IGG: ICD-10-CM

## 2022-12-22 DIAGNOSIS — Z48.298 AFTERCARE FOLLOWING ORGAN TRANSPLANT: ICD-10-CM

## 2022-12-22 DIAGNOSIS — R79.89 ELEVATED SERUM CREATININE: Primary | ICD-10-CM

## 2022-12-26 DIAGNOSIS — Z94.0 KIDNEY TRANSPLANTED: ICD-10-CM

## 2022-12-26 RX ORDER — CARVEDILOL 25 MG/1
25 TABLET ORAL 2 TIMES DAILY WITH MEALS
Qty: 60 TABLET | Refills: 0 | Status: CANCELLED | OUTPATIENT
Start: 2022-12-26

## 2022-12-27 ENCOUNTER — TELEPHONE (OUTPATIENT)
Dept: NEPHROLOGY | Facility: CLINIC | Age: 72
End: 2022-12-27

## 2022-12-27 DIAGNOSIS — Z94.0 KIDNEY TRANSPLANTED: ICD-10-CM

## 2022-12-27 RX ORDER — CARVEDILOL 25 MG/1
25 TABLET ORAL 2 TIMES DAILY WITH MEALS
Qty: 60 TABLET | Refills: 0 | Status: SHIPPED | OUTPATIENT
Start: 2022-12-27 | End: 2023-01-19

## 2022-12-27 NOTE — TELEPHONE ENCOUNTER
[facility] Call Center    Phone Message    May a detailed message be left on voicemail: yes     Reason for Call: Medication Refill Request    Has the patient contacted the pharmacy for the refill? Yes   Name of medication being requested: carvedilol (COREG) 25 MG tablet    Provider who prescribed the medication: Dr. Irwin  Pharmacy: [facility] - [place], [place] - [address]   Date medication is needed: ASAP    Patient needs this prescription sent and to his house by Friday. Thank you      Action Taken: Message routed to:  [facility] NEPH    Travel Screening: Not Applicable

## 2023-01-01 ENCOUNTER — LAB (OUTPATIENT)
Dept: LAB | Facility: CLINIC | Age: 73
End: 2023-01-01
Payer: MEDICARE

## 2023-01-01 ENCOUNTER — TELEPHONE (OUTPATIENT)
Dept: INTERNAL MEDICINE | Facility: CLINIC | Age: 73
End: 2023-01-01
Payer: MEDICARE

## 2023-01-01 ENCOUNTER — TELEPHONE (OUTPATIENT)
Dept: TRANSPLANT | Facility: CLINIC | Age: 73
End: 2023-01-01

## 2023-01-01 ENCOUNTER — OFFICE VISIT (OUTPATIENT)
Dept: INTERNAL MEDICINE | Facility: CLINIC | Age: 73
End: 2023-01-01
Payer: MEDICARE

## 2023-01-01 ENCOUNTER — OFFICE VISIT (OUTPATIENT)
Dept: TRANSPLANT | Facility: CLINIC | Age: 73
End: 2023-01-01
Attending: INTERNAL MEDICINE
Payer: MEDICARE

## 2023-01-01 ENCOUNTER — TELEPHONE (OUTPATIENT)
Dept: TRANSPLANT | Facility: CLINIC | Age: 73
End: 2023-01-01
Payer: MEDICARE

## 2023-01-01 VITALS
HEIGHT: 70 IN | OXYGEN SATURATION: 100 % | DIASTOLIC BLOOD PRESSURE: 89 MMHG | WEIGHT: 154.9 LBS | BODY MASS INDEX: 22.18 KG/M2 | HEART RATE: 55 BPM | SYSTOLIC BLOOD PRESSURE: 146 MMHG

## 2023-01-01 VITALS
DIASTOLIC BLOOD PRESSURE: 74 MMHG | HEIGHT: 70 IN | BODY MASS INDEX: 22.2 KG/M2 | SYSTOLIC BLOOD PRESSURE: 117 MMHG | WEIGHT: 155.1 LBS | HEART RATE: 65 BPM | TEMPERATURE: 97.4 F | RESPIRATION RATE: 16 BRPM | OXYGEN SATURATION: 100 %

## 2023-01-01 DIAGNOSIS — Z94.0 KIDNEY REPLACED BY TRANSPLANT: Primary | ICD-10-CM

## 2023-01-01 DIAGNOSIS — Z48.298 AFTERCARE FOLLOWING ORGAN TRANSPLANT: ICD-10-CM

## 2023-01-01 DIAGNOSIS — D84.9 IMMUNOSUPPRESSION (H): ICD-10-CM

## 2023-01-01 DIAGNOSIS — I15.1 HTN, KIDNEY TRANSPLANT RELATED: ICD-10-CM

## 2023-01-01 DIAGNOSIS — I10 HYPERTENSION: Primary | ICD-10-CM

## 2023-01-01 DIAGNOSIS — E78.5 DYSLIPIDEMIA: Primary | ICD-10-CM

## 2023-01-01 DIAGNOSIS — D63.1 ANEMIA IN STAGE 4 CHRONIC KIDNEY DISEASE (H): ICD-10-CM

## 2023-01-01 DIAGNOSIS — Z94.0 KIDNEY REPLACED BY TRANSPLANT: ICD-10-CM

## 2023-01-01 DIAGNOSIS — D84.9 IMMUNOSUPPRESSED STATUS (H): ICD-10-CM

## 2023-01-01 DIAGNOSIS — Z29.89 NEED FOR PNEUMOCYSTIS PROPHYLAXIS: ICD-10-CM

## 2023-01-01 DIAGNOSIS — Z94.0 KIDNEY TRANSPLANTED: Primary | ICD-10-CM

## 2023-01-01 DIAGNOSIS — Z94.0 STATUS POST KIDNEY TRANSPLANT: ICD-10-CM

## 2023-01-01 DIAGNOSIS — B27.00 EBV (EPSTEIN-BARR VIRUS) VIREMIA: ICD-10-CM

## 2023-01-01 DIAGNOSIS — I25.10 CAD, MULTIPLE VESSEL: ICD-10-CM

## 2023-01-01 DIAGNOSIS — N18.4 ANEMIA IN STAGE 4 CHRONIC KIDNEY DISEASE (H): ICD-10-CM

## 2023-01-01 DIAGNOSIS — Z94.0 KIDNEY TRANSPLANTED: ICD-10-CM

## 2023-01-01 DIAGNOSIS — Z94.0 HTN, KIDNEY TRANSPLANT RELATED: ICD-10-CM

## 2023-01-01 DIAGNOSIS — I15.1 HTN, KIDNEY TRANSPLANT RELATED: Primary | ICD-10-CM

## 2023-01-01 DIAGNOSIS — E78.5 DYSLIPIDEMIA: ICD-10-CM

## 2023-01-01 DIAGNOSIS — Z94.0 HTN, KIDNEY TRANSPLANT RELATED: Primary | ICD-10-CM

## 2023-01-01 DIAGNOSIS — E55.9 VITAMIN D DEFICIENCY: ICD-10-CM

## 2023-01-01 DIAGNOSIS — N18.4 CHRONIC KIDNEY DISEASE, STAGE 4, SEVERELY DECREASED GFR (H): ICD-10-CM

## 2023-01-01 LAB
ALBUMIN MFR UR ELPH: 89.1 MG/DL
ANION GAP SERPL CALCULATED.3IONS-SCNC: 10 MMOL/L (ref 7–15)
ANION GAP SERPL CALCULATED.3IONS-SCNC: 8 MMOL/L (ref 7–15)
ANION GAP SERPL CALCULATED.3IONS-SCNC: 9 MMOL/L (ref 7–15)
BUN SERPL-MCNC: 35.1 MG/DL (ref 8–23)
BUN SERPL-MCNC: 37.6 MG/DL (ref 8–23)
BUN SERPL-MCNC: 42.5 MG/DL (ref 8–23)
CALCIUM SERPL-MCNC: 9.1 MG/DL (ref 8.8–10.2)
CALCIUM SERPL-MCNC: 9.1 MG/DL (ref 8.8–10.2)
CALCIUM SERPL-MCNC: 9.3 MG/DL (ref 8.8–10.2)
CHLORIDE SERPL-SCNC: 104 MMOL/L (ref 98–107)
CHLORIDE SERPL-SCNC: 106 MMOL/L (ref 98–107)
CHLORIDE SERPL-SCNC: 108 MMOL/L (ref 98–107)
CHOLEST SERPL-MCNC: 186 MG/DL
CREAT SERPL-MCNC: 2.43 MG/DL (ref 0.67–1.17)
CREAT SERPL-MCNC: 2.52 MG/DL (ref 0.67–1.17)
CREAT SERPL-MCNC: 2.77 MG/DL (ref 0.67–1.17)
CREAT UR-MCNC: 65.9 MG/DL
DEPRECATED HCO3 PLAS-SCNC: 22 MMOL/L (ref 22–29)
DEPRECATED HCO3 PLAS-SCNC: 23 MMOL/L (ref 22–29)
DEPRECATED HCO3 PLAS-SCNC: 23 MMOL/L (ref 22–29)
EGFRCR SERPLBLD CKD-EPI 2021: 24 ML/MIN/1.73M2
EGFRCR SERPLBLD CKD-EPI 2021: 27 ML/MIN/1.73M2
EGFRCR SERPLBLD CKD-EPI 2021: 28 ML/MIN/1.73M2
ERYTHROCYTE [DISTWIDTH] IN BLOOD BY AUTOMATED COUNT: 13.2 % (ref 10–15)
ERYTHROCYTE [DISTWIDTH] IN BLOOD BY AUTOMATED COUNT: 13.8 % (ref 10–15)
GLUCOSE SERPL-MCNC: 100 MG/DL (ref 70–99)
GLUCOSE SERPL-MCNC: 100 MG/DL (ref 70–99)
GLUCOSE SERPL-MCNC: 99 MG/DL (ref 70–99)
HCT VFR BLD AUTO: 35.6 % (ref 40–53)
HCT VFR BLD AUTO: 35.9 % (ref 40–53)
HDLC SERPL-MCNC: 30 MG/DL
HGB BLD-MCNC: 11.1 G/DL (ref 13.3–17.7)
HGB BLD-MCNC: 11.1 G/DL (ref 13.3–17.7)
LDLC SERPL CALC-MCNC: 127 MG/DL
MCH RBC QN AUTO: 29.3 PG (ref 26.5–33)
MCH RBC QN AUTO: 29.4 PG (ref 26.5–33)
MCHC RBC AUTO-ENTMCNC: 30.9 G/DL (ref 31.5–36.5)
MCHC RBC AUTO-ENTMCNC: 31.2 G/DL (ref 31.5–36.5)
MCV RBC AUTO: 94 FL (ref 78–100)
MCV RBC AUTO: 95 FL (ref 78–100)
NONHDLC SERPL-MCNC: 156 MG/DL
PLATELET # BLD AUTO: 217 10E3/UL (ref 150–450)
PLATELET # BLD AUTO: 237 10E3/UL (ref 150–450)
POTASSIUM SERPL-SCNC: 4 MMOL/L (ref 3.4–5.3)
POTASSIUM SERPL-SCNC: 4.1 MMOL/L (ref 3.4–5.3)
POTASSIUM SERPL-SCNC: 4.6 MMOL/L (ref 3.4–5.3)
PROT/CREAT 24H UR: 1.35 MG/MG CR (ref 0–0.2)
RBC # BLD AUTO: 3.78 10E6/UL (ref 4.4–5.9)
RBC # BLD AUTO: 3.79 10E6/UL (ref 4.4–5.9)
SODIUM SERPL-SCNC: 137 MMOL/L (ref 135–145)
SODIUM SERPL-SCNC: 137 MMOL/L (ref 136–145)
SODIUM SERPL-SCNC: 139 MMOL/L (ref 135–145)
TACROLIMUS BLD-MCNC: 3.3 UG/L (ref 5–15)
TACROLIMUS BLD-MCNC: 4 UG/L (ref 5–15)
TACROLIMUS BLD-MCNC: 4.7 UG/L (ref 5–15)
TACROLIMUS BLD-MCNC: 5.1 UG/L (ref 5–15)
TME LAST DOSE: ABNORMAL H
TME LAST DOSE: NORMAL H
TME LAST DOSE: NORMAL H
TRIGL SERPL-MCNC: 145 MG/DL
WBC # BLD AUTO: 5.8 10E3/UL (ref 4–11)
WBC # BLD AUTO: 6 10E3/UL (ref 4–11)

## 2023-01-01 PROCEDURE — 80061 LIPID PANEL: CPT | Performed by: PATHOLOGY

## 2023-01-01 PROCEDURE — 84156 ASSAY OF PROTEIN URINE: CPT

## 2023-01-01 PROCEDURE — 85027 COMPLETE CBC AUTOMATED: CPT

## 2023-01-01 PROCEDURE — 36415 COLL VENOUS BLD VENIPUNCTURE: CPT

## 2023-01-01 PROCEDURE — 80197 ASSAY OF TACROLIMUS: CPT

## 2023-01-01 PROCEDURE — 36415 COLL VENOUS BLD VENIPUNCTURE: CPT | Performed by: PATHOLOGY

## 2023-01-01 PROCEDURE — 99214 OFFICE O/P EST MOD 30 MIN: CPT | Performed by: INTERNAL MEDICINE

## 2023-01-01 PROCEDURE — 99214 OFFICE O/P EST MOD 30 MIN: CPT | Mod: GC

## 2023-01-01 PROCEDURE — G0463 HOSPITAL OUTPT CLINIC VISIT: HCPCS | Performed by: INTERNAL MEDICINE

## 2023-01-01 PROCEDURE — 80048 BASIC METABOLIC PNL TOTAL CA: CPT

## 2023-01-01 RX ORDER — AZATHIOPRINE 50 MG/1
50 TABLET ORAL DAILY
Qty: 30 TABLET | Refills: 11 | Status: SHIPPED | OUTPATIENT
Start: 2023-01-01

## 2023-01-01 RX ORDER — CARVEDILOL 25 MG/1
25 TABLET ORAL 2 TIMES DAILY WITH MEALS
Qty: 180 TABLET | Refills: 0 | Status: SHIPPED | OUTPATIENT
Start: 2023-01-01 | End: 2023-01-01

## 2023-01-01 RX ORDER — SULFAMETHOXAZOLE AND TRIMETHOPRIM 400; 80 MG/1; MG/1
TABLET ORAL
Qty: 39 TABLET | Refills: 3 | Status: SHIPPED | OUTPATIENT
Start: 2023-01-01

## 2023-01-01 RX ORDER — CARVEDILOL 25 MG/1
25 TABLET ORAL 2 TIMES DAILY WITH MEALS
Qty: 180 TABLET | Refills: 0 | Status: SHIPPED | OUTPATIENT
Start: 2023-01-01 | End: 2024-01-01

## 2023-01-01 RX ORDER — ATORVASTATIN CALCIUM 10 MG/1
10 TABLET, FILM COATED ORAL DAILY
Qty: 90 TABLET | Refills: 3 | Status: SHIPPED | OUTPATIENT
Start: 2023-01-01 | End: 2024-01-01

## 2023-01-01 RX ORDER — NIFEDIPINE 30 MG
30 TABLET, EXTENDED RELEASE ORAL AT BEDTIME
Qty: 90 TABLET | Refills: 3 | Status: SHIPPED | OUTPATIENT
Start: 2023-01-01

## 2023-01-01 ASSESSMENT — PAIN SCALES - GENERAL: PAINLEVEL: NO PAIN (0)

## 2023-01-02 ENCOUNTER — MYC MEDICAL ADVICE (OUTPATIENT)
Dept: TRANSPLANT | Facility: CLINIC | Age: 73
End: 2023-01-02

## 2023-01-02 NOTE — TELEPHONE ENCOUNTER
"Call placed to John per his request in a my chart message. John states he is still feeling unwell after Covid diagnosis 12/19/22. Might be starting to \"turn a corner today\".   States he continues to feel fatigued and has a a cough and intermittent fevers which resolve with Tylenol. He is still able to hydrate well, but has poor appetite (no vomiting).  Discussed good pulmonary hygiene with hourly deep breathes and walks in his house. Discussed continuing with good hydration and trying more frequent small meals as opposed to three normal meals a day. Advised that if he continues to feel unwell, has persistent fevers, starts to have dark green sputum or shortness of breath then he needs to be seen by his PCP or in urgent care/ED.   Advised him to have transplant labs done later this week or early next week if possible.  "

## 2023-01-04 ENCOUNTER — LAB (OUTPATIENT)
Dept: LAB | Facility: CLINIC | Age: 73
End: 2023-01-04
Payer: MEDICARE

## 2023-01-04 DIAGNOSIS — Z48.298 AFTERCARE FOLLOWING ORGAN TRANSPLANT: ICD-10-CM

## 2023-01-04 DIAGNOSIS — R80.9 PROTEINURIA: ICD-10-CM

## 2023-01-04 DIAGNOSIS — R76.8 HIGH TOTAL SERUM IGG: ICD-10-CM

## 2023-01-04 DIAGNOSIS — Z94.0 KIDNEY REPLACED BY TRANSPLANT: ICD-10-CM

## 2023-01-04 PROBLEM — R79.89 ELEVATED SERUM CREATININE: Status: ACTIVE | Noted: 2023-01-04

## 2023-01-04 LAB
ANION GAP SERPL CALCULATED.3IONS-SCNC: 5 MMOL/L (ref 3–14)
BUN SERPL-MCNC: 78 MG/DL (ref 7–30)
CALCIUM SERPL-MCNC: 8.7 MG/DL (ref 8.5–10.1)
CHLORIDE BLD-SCNC: 104 MMOL/L (ref 94–109)
CO2 SERPL-SCNC: 24 MMOL/L (ref 20–32)
CREAT SERPL-MCNC: 3.32 MG/DL (ref 0.66–1.25)
ERYTHROCYTE [DISTWIDTH] IN BLOOD BY AUTOMATED COUNT: 13.7 % (ref 10–15)
GFR SERPL CREATININE-BSD FRML MDRD: 19 ML/MIN/1.73M2
GLUCOSE BLD-MCNC: 144 MG/DL (ref 70–99)
HCT VFR BLD AUTO: 28.2 % (ref 40–53)
HGB BLD-MCNC: 9.1 G/DL (ref 13.3–17.7)
MCH RBC QN AUTO: 30.8 PG (ref 26.5–33)
MCHC RBC AUTO-ENTMCNC: 32.3 G/DL (ref 31.5–36.5)
MCV RBC AUTO: 96 FL (ref 78–100)
PLATELET # BLD AUTO: 282 10E3/UL (ref 150–450)
POTASSIUM BLD-SCNC: 4.5 MMOL/L (ref 3.4–5.3)
RBC # BLD AUTO: 2.95 10E6/UL (ref 4.4–5.9)
SODIUM SERPL-SCNC: 133 MMOL/L (ref 133–144)
TACROLIMUS BLD-MCNC: 9.7 UG/L (ref 5–15)
TME LAST DOSE: NORMAL H
TME LAST DOSE: NORMAL H
WBC # BLD AUTO: 5.8 10E3/UL (ref 4–11)

## 2023-01-04 PROCEDURE — 84165 PROTEIN E-PHORESIS SERUM: CPT | Mod: TC | Performed by: PATHOLOGY

## 2023-01-04 PROCEDURE — 84155 ASSAY OF PROTEIN SERUM: CPT

## 2023-01-04 PROCEDURE — 82310 ASSAY OF CALCIUM: CPT

## 2023-01-04 PROCEDURE — 85027 COMPLETE CBC AUTOMATED: CPT

## 2023-01-04 PROCEDURE — 36415 COLL VENOUS BLD VENIPUNCTURE: CPT

## 2023-01-04 PROCEDURE — 80197 ASSAY OF TACROLIMUS: CPT

## 2023-01-04 PROCEDURE — 83521 IG LIGHT CHAINS FREE EACH: CPT

## 2023-01-04 PROCEDURE — 82784 ASSAY IGA/IGD/IGG/IGM EACH: CPT

## 2023-01-04 RX ORDER — HEPARIN SODIUM (PORCINE) LOCK FLUSH IV SOLN 100 UNIT/ML 100 UNIT/ML
5 SOLUTION INTRAVENOUS
Status: CANCELLED | OUTPATIENT
Start: 2023-01-04

## 2023-01-04 RX ORDER — MEPERIDINE HYDROCHLORIDE 25 MG/ML
25 INJECTION INTRAMUSCULAR; INTRAVENOUS; SUBCUTANEOUS EVERY 30 MIN PRN
Status: CANCELLED | OUTPATIENT
Start: 2023-01-04

## 2023-01-04 RX ORDER — ALBUTEROL SULFATE 0.83 MG/ML
2.5 SOLUTION RESPIRATORY (INHALATION)
Status: CANCELLED | OUTPATIENT
Start: 2023-01-04

## 2023-01-04 RX ORDER — ALBUTEROL SULFATE 90 UG/1
1-2 AEROSOL, METERED RESPIRATORY (INHALATION)
Status: CANCELLED
Start: 2023-01-04

## 2023-01-04 RX ORDER — EPINEPHRINE 1 MG/ML
0.3 INJECTION, SOLUTION, CONCENTRATE INTRAVENOUS EVERY 5 MIN PRN
Status: CANCELLED | OUTPATIENT
Start: 2023-01-04

## 2023-01-04 RX ORDER — METHYLPREDNISOLONE SODIUM SUCCINATE 125 MG/2ML
125 INJECTION, POWDER, LYOPHILIZED, FOR SOLUTION INTRAMUSCULAR; INTRAVENOUS
Status: CANCELLED
Start: 2023-01-04

## 2023-01-04 RX ORDER — DIPHENHYDRAMINE HYDROCHLORIDE 50 MG/ML
50 INJECTION INTRAMUSCULAR; INTRAVENOUS
Status: CANCELLED
Start: 2023-01-04

## 2023-01-04 RX ORDER — HEPARIN SODIUM,PORCINE 10 UNIT/ML
5 VIAL (ML) INTRAVENOUS
Status: CANCELLED | OUTPATIENT
Start: 2023-01-04

## 2023-01-05 ENCOUNTER — TELEPHONE (OUTPATIENT)
Dept: TRANSPLANT | Facility: CLINIC | Age: 73
End: 2023-01-05

## 2023-01-05 DIAGNOSIS — R79.89 ELEVATED SERUM CREATININE: Primary | ICD-10-CM

## 2023-01-05 DIAGNOSIS — Z94.0 KIDNEY REPLACED BY TRANSPLANT: ICD-10-CM

## 2023-01-05 DIAGNOSIS — Z48.298 AFTERCARE FOLLOWING ORGAN TRANSPLANT: ICD-10-CM

## 2023-01-05 LAB
IGG SERPL-MCNC: 3398 MG/DL (ref 610–1616)
KAPPA LC FREE SER-MCNC: 22.41 MG/DL (ref 0.33–1.94)
KAPPA LC FREE/LAMBDA FREE SER NEPH: 1.16 {RATIO} (ref 0.26–1.65)
LAMBDA LC FREE SERPL-MCNC: 19.39 MG/DL (ref 0.57–2.63)
TOTAL PROTEIN SERUM FOR ELP: 8.1 G/DL (ref 6.4–8.3)

## 2023-01-05 NOTE — TELEPHONE ENCOUNTER
"ISSUE  Creatinine elevated to 3.32. Recent Covid infection and prolonged illness.  Tac level 9.7, goal 4-6, dose 0.4mL's BID. Labs drawn at 1542        PLAN  Assess for vomiting/diarrhea?  BP's?  Graft site pain?  Samuel Varela MD Harris, Kathleen, RN  Cc: Mason Irwin MD  Yes, can give IV fluids.      Assess if Tac was a good 12 hour trough?  Current dose 0.4mL's BID?  Diarrhea? New meds?      OUTCOME  John states he feels like he is \"starting to turn a corner\" today. States BP's had been low (does not have readings) and was having dizziness when standing. Held yesterday and today's Carvedilol and feels better,  this morning while in bed. Discussed fall prevention with orthostatic hypotension. Encouraged good hydration.   Denies any diarrhea/vomiting.  Scheduled for IV fluids on 1/8/23 @ [facility].    States he does not think he took Tac in the AM prior to lab, but has been a little \"out of it\". States he did not take any oral anti-virals or antibiotics for recent illness. No diarrhea. Confirmed current Tac dose of 0.4mL's BID.   Previous levels at goal on this dose. Continue current dose and repeat a Tac level Sunday after IV fluids.        "

## 2023-01-06 LAB
ALBUMIN SERPL ELPH-MCNC: 2.6 G/DL (ref 3.7–5.1)
ALPHA1 GLOB SERPL ELPH-MCNC: 0.6 G/DL (ref 0.2–0.4)
ALPHA2 GLOB SERPL ELPH-MCNC: 1 G/DL (ref 0.5–0.9)
B-GLOBULIN SERPL ELPH-MCNC: 0.7 G/DL (ref 0.6–1)
GAMMA GLOB SERPL ELPH-MCNC: 3.1 G/DL (ref 0.7–1.6)
M PROTEIN SERPL ELPH-MCNC: 0 G/DL
PROT PATTERN SERPL ELPH-IMP: ABNORMAL

## 2023-01-06 PROCEDURE — 84165 PROTEIN E-PHORESIS SERUM: CPT | Mod: 26

## 2023-01-08 ENCOUNTER — INFUSION THERAPY VISIT (OUTPATIENT)
Dept: INFUSION THERAPY | Facility: CLINIC | Age: 73
End: 2023-01-08
Attending: INTERNAL MEDICINE
Payer: MEDICARE

## 2023-01-08 VITALS
RESPIRATION RATE: 16 BRPM | SYSTOLIC BLOOD PRESSURE: 113 MMHG | OXYGEN SATURATION: 97 % | HEART RATE: 62 BPM | DIASTOLIC BLOOD PRESSURE: 73 MMHG | TEMPERATURE: 97.5 F

## 2023-01-08 DIAGNOSIS — R79.89 ELEVATED SERUM CREATININE: ICD-10-CM

## 2023-01-08 DIAGNOSIS — Z48.298 AFTERCARE FOLLOWING ORGAN TRANSPLANT: ICD-10-CM

## 2023-01-08 DIAGNOSIS — Z94.0 KIDNEY REPLACED BY TRANSPLANT: Primary | ICD-10-CM

## 2023-01-08 LAB
ANION GAP SERPL CALCULATED.3IONS-SCNC: 4 MMOL/L (ref 3–14)
BUN SERPL-MCNC: 60 MG/DL (ref 7–30)
CALCIUM SERPL-MCNC: 8.5 MG/DL (ref 8.5–10.1)
CHLORIDE BLD-SCNC: 110 MMOL/L (ref 94–109)
CO2 SERPL-SCNC: 25 MMOL/L (ref 20–32)
CREAT SERPL-MCNC: 2.56 MG/DL (ref 0.66–1.25)
GFR SERPL CREATININE-BSD FRML MDRD: 26 ML/MIN/1.73M2
GLUCOSE BLD-MCNC: 148 MG/DL (ref 70–99)
POTASSIUM BLD-SCNC: 4.5 MMOL/L (ref 3.4–5.3)
SODIUM SERPL-SCNC: 139 MMOL/L (ref 133–144)
TACROLIMUS BLD-MCNC: 6.4 UG/L (ref 5–15)
TME LAST DOSE: NORMAL H
TME LAST DOSE: NORMAL H

## 2023-01-08 PROCEDURE — 258N000003 HC RX IP 258 OP 636: Performed by: INTERNAL MEDICINE

## 2023-01-08 PROCEDURE — 80197 ASSAY OF TACROLIMUS: CPT | Performed by: INTERNAL MEDICINE

## 2023-01-08 PROCEDURE — 96360 HYDRATION IV INFUSION INIT: CPT

## 2023-01-08 PROCEDURE — 80048 BASIC METABOLIC PNL TOTAL CA: CPT | Performed by: INTERNAL MEDICINE

## 2023-01-08 PROCEDURE — 36415 COLL VENOUS BLD VENIPUNCTURE: CPT

## 2023-01-08 RX ORDER — HEPARIN SODIUM (PORCINE) LOCK FLUSH IV SOLN 100 UNIT/ML 100 UNIT/ML
5 SOLUTION INTRAVENOUS
Status: CANCELLED | OUTPATIENT
Start: 2023-01-08

## 2023-01-08 RX ORDER — SERTRALINE HYDROCHLORIDE 25 MG/1
1 TABLET, FILM COATED ORAL DAILY
COMMUNITY
Start: 2021-04-25 | End: 2023-01-08

## 2023-01-08 RX ORDER — ALBUTEROL SULFATE 90 UG/1
1-2 AEROSOL, METERED RESPIRATORY (INHALATION)
Status: CANCELLED
Start: 2023-01-08

## 2023-01-08 RX ORDER — ALBUTEROL SULFATE 0.83 MG/ML
2.5 SOLUTION RESPIRATORY (INHALATION)
Status: CANCELLED | OUTPATIENT
Start: 2023-01-08

## 2023-01-08 RX ORDER — HEPARIN SODIUM,PORCINE 10 UNIT/ML
5 VIAL (ML) INTRAVENOUS
Status: CANCELLED | OUTPATIENT
Start: 2023-01-08

## 2023-01-08 RX ORDER — MEPERIDINE HYDROCHLORIDE 25 MG/ML
25 INJECTION INTRAMUSCULAR; INTRAVENOUS; SUBCUTANEOUS EVERY 30 MIN PRN
Status: CANCELLED | OUTPATIENT
Start: 2023-01-08

## 2023-01-08 RX ORDER — METHYLPREDNISOLONE SODIUM SUCCINATE 125 MG/2ML
125 INJECTION, POWDER, LYOPHILIZED, FOR SOLUTION INTRAMUSCULAR; INTRAVENOUS
Status: CANCELLED
Start: 2023-01-08

## 2023-01-08 RX ORDER — DIPHENHYDRAMINE HYDROCHLORIDE 50 MG/ML
50 INJECTION INTRAMUSCULAR; INTRAVENOUS
Status: CANCELLED
Start: 2023-01-08

## 2023-01-08 RX ORDER — EPINEPHRINE 1 MG/ML
0.3 INJECTION, SOLUTION INTRAMUSCULAR; SUBCUTANEOUS EVERY 5 MIN PRN
Status: CANCELLED | OUTPATIENT
Start: 2023-01-08

## 2023-01-08 RX ADMIN — SODIUM CHLORIDE 1000 ML: 9 INJECTION, SOLUTION INTRAVENOUS at 09:08

## 2023-01-08 ASSESSMENT — PAIN SCALES - GENERAL: PAINLEVEL: NO PAIN (0)

## 2023-01-09 ENCOUNTER — MYC MEDICAL ADVICE (OUTPATIENT)
Dept: TRANSPLANT | Facility: CLINIC | Age: 73
End: 2023-01-09

## 2023-01-09 NOTE — TELEPHONE ENCOUNTER
ISSUE  Creatinine down to 2.56 from 3.32 after IVF's, but remains above baseline 2.0-2.3.  Tac level down to 6.4, goal 4-6, dose 0.4 mg BID.      PLAN  Continue with good oral hydration.  Continue current Tac dose.  Repeat labs in 2 weeks.    Nephrology appointment 1/11 @ 9:00.      OUTCOME  My chart message sent.

## 2023-01-11 ENCOUNTER — VIRTUAL VISIT (OUTPATIENT)
Dept: NEPHROLOGY | Facility: CLINIC | Age: 73
End: 2023-01-11
Attending: INTERNAL MEDICINE
Payer: MEDICARE

## 2023-01-11 VITALS — WEIGHT: 145 LBS | HEIGHT: 70 IN | BODY MASS INDEX: 20.76 KG/M2

## 2023-01-11 DIAGNOSIS — Z94.0 HTN, KIDNEY TRANSPLANT RELATED: ICD-10-CM

## 2023-01-11 DIAGNOSIS — Z94.0 KIDNEY REPLACED BY TRANSPLANT: Primary | ICD-10-CM

## 2023-01-11 DIAGNOSIS — Z48.298 AFTERCARE FOLLOWING ORGAN TRANSPLANT: ICD-10-CM

## 2023-01-11 DIAGNOSIS — D84.9 IMMUNOSUPPRESSION (H): ICD-10-CM

## 2023-01-11 DIAGNOSIS — D50.9 IRON DEFICIENCY ANEMIA, UNSPECIFIED IRON DEFICIENCY ANEMIA TYPE: ICD-10-CM

## 2023-01-11 DIAGNOSIS — R80.1 PERSISTENT PROTEINURIA: ICD-10-CM

## 2023-01-11 DIAGNOSIS — I15.1 HTN, KIDNEY TRANSPLANT RELATED: ICD-10-CM

## 2023-01-11 PROBLEM — Z29.89 NEED FOR PNEUMOCYSTIS PROPHYLAXIS: Status: RESOLVED | Noted: 2022-01-08 | Resolved: 2023-01-11

## 2023-01-11 PROBLEM — N18.32 CHRONIC KIDNEY DISEASE, STAGE 3B (H): Status: RESOLVED | Noted: 2021-12-15 | Resolved: 2023-01-11

## 2023-01-11 PROBLEM — R19.7 DIARRHEA: Status: RESOLVED | Noted: 2021-08-16 | Resolved: 2023-01-11

## 2023-01-11 PROBLEM — Z79.2 PROPHYLACTIC ANTIBIOTIC: Status: RESOLVED | Noted: 2021-11-05 | Resolved: 2023-01-11

## 2023-01-11 PROBLEM — S72.101A: Status: RESOLVED | Noted: 2020-12-05 | Resolved: 2023-01-11

## 2023-01-11 PROCEDURE — G0463 HOSPITAL OUTPT CLINIC VISIT: HCPCS | Mod: PN,GT | Performed by: INTERNAL MEDICINE

## 2023-01-11 PROCEDURE — 99417 PROLNG OP E/M EACH 15 MIN: CPT | Performed by: INTERNAL MEDICINE

## 2023-01-11 PROCEDURE — 99215 OFFICE O/P EST HI 40 MIN: CPT | Mod: 95 | Performed by: INTERNAL MEDICINE

## 2023-01-11 RX ORDER — FERROUS SULFATE 325(65) MG
325 TABLET ORAL EVERY OTHER DAY
Qty: 45 TABLET | Refills: 3 | Status: SHIPPED | OUTPATIENT
Start: 2023-01-11 | End: 2023-04-11

## 2023-01-11 ASSESSMENT — PAIN SCALES - GENERAL: PAINLEVEL: NO PAIN (0)

## 2023-01-11 NOTE — LETTER
1/11/2023       RE: Marlo Vee  [address] [facility] [place] [address]     Dear Colleague,    Thank you for referring your patient, Marlo Vee, to the [facility] [place] at [facility]. Please see a copy of my visit note below.    John is a 72 year old who is being evaluated via a billable video visit.      How would you like to obtain your AVS? MyChart  If the video visit is dropped, the invitation should be resent by: Text to cell phone: [phone]  Will anyone else be joining your video visit? No      Video-Visit Details    Type of service:  Video Visit   Video Start Time: 9:01 AM  Video End Time:9:32am    Originating Location (pt. Location): Home  Distant Location (provider location):  On-site  Platform used for Video Visit: [facility]    TRANSPLANT NEPHROLOGY CHRONIC POST TRANSPLANT VISIT    Assessment & Plan   # LDKT: Trend down but still elevated from baseline ~ 2.0-2.3 due to hypotension and recent COVID illness.    - Repeat labs in coming days   - Baseline Creatinine:  ~ 2.0-2.3   - Proteinuria: Moderate (1-3 grams), 2.1g/g in 11/2022, repeat   - Date DSA Last Checked: Jun/2022      Latest DSA: No   - BK Viremia: No   - Kidney Tx Biopsy: Jun 03, 2022; Result: No diagnostic evidence of acute rejection.  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  Moderate interstitial fibrosis and tubular atrophy.  Severe vascular changes.             Oct 19, 2021; Result: No diagnostic evidence of acute rejection.  Acute tubular injury with mild interstitial fibrosis and tubular atrophy.   -Labs early next week. Patient doesn't think that he needs fluids at this point   -Refer back for repeat transplant. He had qualifying GFR of 19ml/min on 1/4/23     # Immunosuppression: Tacrolimus immediate release (goal 4-6) and Azathioprine (dose 50 mg daily)   - Continue with intensive monitoring  of immunosuppression for efficacy and toxicity.   - Changes: No    # Proteinuria:   -Noted to have increased to 2.1g/g in 11/2022, thought due to chronic changes seen on kidney biopsy   -SPEP negative, ifx negative, K/L negative   -Obtain anti PLA2R and HbA1c    -When creatinine and BP stabilize would trial ACEi/ARB due to proteinuria     # Infection Prophylaxis:   Last CD4 Level: 165 (Sep/2021)  - PJP: Sulfa/TMP (Bactrim)    # Hypertension: Controlled, but orthostatic;  Goal BP: < 130/80   - Changes: Yes - hold coreg 25mg bid. Continue checking Bps. Once blood pressures are persistently higher than 130/80 we can restart coreg at 12.5mg bid. For now, if his SBPs are >150 can take coreg 12.5mg x1.    -When creatinine and BP stabilize would trial ACEi/ARB due to proteinuria     # Anemia in Chronic Renal Disease: Hgb: Trend down      LULY: No   - Iron studies: Low iron saturation in 11/2022. Start PO iron every other day     # Mineral Bone Disorder:   - Secondary renal hyperparathyroidism; PTH level: Mildly elevated (151-300 pg/ml)        On treatment: None  - Vitamin D; level: Normal        On supplement: Yes  - Calcium; level: Normal        On supplement: No  - Phosphorus; level: Normal        On supplement: No      # Electrolytes:   - Potassium; level: Normal        On supplement: No  - Magnesium; level: Normal        On supplement: No  - Bicarbonate; level: Normal        On supplement: No      # CAD, s/p PCI: Asymptomatic.    # Chronic Diarrhea: Patient was previously diagnosed with Clostridium difficile and also cryptosporidium.  He was treated for both, but no improvement in his ongoing diarrhea symptoms.  He has been seen by Transplant ID and GI.  Patient was then changed from mycophenolate to azathioprine and symptoms slowly improved over time.  Now with no diarrhea and regular bowel movements.    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   - Repeat EBV PCR in 3 months from now as it is likely  "to remain elevated from COVID illness. AZA decreased from 100mg daily to 50mg daily on 11/3/22 due to increase in EBV.     # Skin Cancer: New lesions: none   - Discussed sun protection and recommend regular follow up with Dermatology.    # Medical Compliance: Yes    # COVID-19 Virus Review: Discussed COVID-19 virus and the potential medical risks.  Reviewed preventative health recommendations, including wearing a mask where appropriate.  Recommended COVID vaccination should be up to date with either an initial vaccination or booster shot when appropriate.  Asked the patient to inform the transplant center if they are exposed or diagnosed with this virus.   -Tested positive 12/19/22, noted himself to be hypotensive and held coreg doses. 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  Significant transplant-related complications: None    Transplant Office Phone Number: [phone]    Assessment and plan was discussed with the patient and he voiced his understanding and agreement.    Return visit: Return in about 6 months (around 7/11/2023) for visit with Dr. Varela or Dr. Irwin .    Mason Irwin MD     I spent 61 minutes on the date of the encounter doing chart review, review of outside records, review of test results, interpretation of tests, patient visit and documentation      Chief Complaint   Mr. Vee is a 72 year old here for kidney transplant and immunosuppression management.    History of Present Illness   John was diagnosed with COVID on 12/19/22 and has been feeling \"run down\". He ended up receiving IV fluids on 1/8/23 due to decreased PO intake and concern for hypotension with MARCOS. He says in the last few days he has been feeling better. He has been weak, " different from normal. John's PO intake has been decreased for a few weeks now. He is eating and drinking now.     He is no longer nauseated and is no longer taking zofran. He is not having fevers, chills in the past few days. He has been getting short of breath and notes that it is worse when he lays down at night. This is for the past week but may be getting better. He denies LE edema. He denies chest pain, no further cough. He denies diarrhea.      Home BP: 110s systolic.     Problem List   Patient Active Problem List   Diagnosis     HTN, kidney transplant related     Membranous glomerulonephritis     Anemia in chronic renal disease     Secondary renal hyperparathyroidism (H)     Vitamin D deficiency     Dyslipidemia     Anxiety     Status post coronary angiogram     CAD, multiple vessel     Multiple vessel coronary artery disease     Kidney replaced by transplant     Immunosuppression (H)     Aftercare following organ transplant     Closed fracture of trochanter of right femur, initial encounter (H)     Impaired fasting glucose     Erectile dysfunction     Old myocardial infarction     Diarrhea     Prophylactic antibiotic     Chronic kidney disease, stage 3b (H)     Need for pneumocystis prophylaxis     Elevated serum creatinine       Allergies   No Known Allergies    Medications   Current Outpatient Medications   Medication Sig     aspirin 81 MG EC tablet Take 81 mg by mouth every morning     azaTHIOprine (IMURAN) 50 MG tablet Take 1 tablet (50 mg) by mouth daily     carvedilol (COREG) 25 MG tablet Take 1 tablet (25 mg) by mouth 2 times daily (with meals)     ondansetron (ZOFRAN) 4 MG tablet Take 1 tablet (4 mg) by mouth every 6 hours as needed for nausea or vomiting     PROGRAF (BRAND) 1 MG/ML suspension Take 0.4 mLs (0.4 mg) by mouth 2 times daily     sulfamethoxazole-trimethoprim (BACTRIM) 400-80 MG tablet Take 1 tablet by mouth Every Mon, Wed, Fri Morning     vitamin D3 (CHOLECALCIFEROL) 50 mcg (2000 units)  "tablet Take 1 tablet by mouth every morning     No current facility-administered medications for this visit.     There are no discontinued medications.    Physical Exam   Vital Signs: Ht 1.778 m (5' 10\")   Wt 65.8 kg (145 lb)   BMI 20.81 kg/m      GENERAL APPEARANCE: alert and no distress  HENT: no obvious abnormalities on appearance  RESP: breathing appears unremarkable with normal rate, no audible wheezing or cough and no apparent shortness of breath with conversation  MS: extremities normal - no gross deformities noted  SKIN: no apparent rash and normal skin tone  NEURO: speech is clear with no obvious neurological deficits  PSYCH: mentation appears normal and affect normal    Data     Renal Latest Ref Rng & Units 1/8/2023 1/4/2023 11/1/2022   Na 133 - 144 mmol/L 139 133 134   Na (external) 136 - 145 meq/L - - -   K 3.4 - 5.3 mmol/L 4.5 4.5 4.5   K (external) 3.5 - 5.1 meq/L - - -   Cl 94 - 109 mmol/L 110(H) 104 108   CO2 20 - 32 mmol/L 25 24 25   CO2 (external) 22 - 31 meq/L - - -   BUN 7 - 30 mg/dL 60(H) 78(H) 40(H)   BUN (external) 6 - 22 mg/dL - - -   Cr 0.66 - 1.25 mg/dL 2.56(H) 3.32(H) 2.33(H)   Cr (external) 0.70 - 1.30 mg/dL - - -   Glucose 70 - 99 mg/dL 148(H) 144(H) 101(H)   Glucose (external) 70 - 99 mg/dL - - -   Ca  8.5 - 10.1 mg/dL 8.5 8.7 8.6   Ca (external) 8.5 - 10.5 mg/dL - - -   Mg 1.6 - 2.3 mg/dL - - 1.8     Bone Health Latest Ref Rng & Units 11/1/2022 12/24/2021 12/16/2021   Phos 2.5 - 4.5 mg/dL - - 2.8   PTHi pg/mL 274 - -   Vit D Def 20 - 75 ug/L 33 26 -     Heme Latest Ref Rng & Units 1/4/2023 11/1/2022 8/12/2022   WBC 4.0 - 11.0 10e3/uL 5.8 3.7(L) 5.0   WBC (external) 4.0 - 11.0 10*9/L - - -   Hgb 13.3 - 17.7 g/dL 9.1(L) 9.5(L) 10.1(L)   Hgb (external) 13.3 - 17.7 g/dL - - -   Plt 150 - 450 10e3/uL 282 179 192   Plt (external) 150 - 450 10*9/L - - -   ABSOLUTE NEUTROPHIL 1.6 - 8.3 10e9/L - - -   ABSOLUTE LYMPHOCYTES 0.8 - 5.3 10e9/L - - -   ABSOLUTE MONOCYTES 0.0 - 1.3 10e9/L - - - "   ABSOLUTE EOSINOPHILS 0.0 - 0.7 10e9/L - - -   ABSOLUTE BASOPHILS 0.0 - 0.2 10e9/L - - -   ABS IMMATURE GRANULOCYTES 0 - 0.4 10e9/L - - -   ABSOLUTE NUCLEATED RBC - - - -     Liver Latest Ref Rng & Units 12/16/2021 8/7/2021 12/9/2020   AP 40 - 150 U/L - 83 -   TBili 0.2 - 1.3 mg/dL - 0.8 -   DBili 0.0 - 0.2 mg/dL - - -   ALT 0 - 70 U/L - 17 -   AST 0 - 45 U/L - 10 -   Tot Protein 6.8 - 8.8 g/dL - 7.5 -   Albumin 3.4 - 5.0 g/dL 3.6 3.7 2.8(L)     Pancreas Latest Ref Rng & Units 5/6/2021   A1C 0 - 5.6 % 5.7(H)     Iron studies Latest Ref Rng & Units 11/1/2022 12/24/2021 12/16/2021   Iron 35 - 180 ug/dL 56 44 54   Iron sat 15 - 46 % 28 24 -   Ferritin 26 - 388 ng/mL 341 255 -     [facility] Txp Virology Latest Ref Rng & Units 11/1/2022 1/19/2022 12/16/2021   CVM DNA Quant - - - -   CMV QUANT IU/ML Not Detected IU/mL - - Not Detected   LOG IU/ML OF CMVQNT <2.1 [Log:IU]/mL - - -   BK Spec - - - -   BK Res BKNEG:BK Virus DNA Not Detected copies/mL - - -   BK Log <2.7 Log copies/mL - - -   EBV CAPSID ANTIBODY IGG 0.0 - 0.8 AI - - -   EBV DNA LOG OF COPIES - 4.8 3.9 4.4   Hep B Core NR - - -        Recent Labs   Lab Test 11/01/22  1033 01/04/23  1542 01/08/23  1009   DOSTAC 10/31/2022 1/3/2023 1/7/2023   TACROL 6.2 9.7 6.4     Recent Labs   Lab Test 04/25/16  0826 05/03/16  0853 08/09/21  1043   DOSMPA 4.24.2016 2030 2,030 8/8/2021   8:30 PM   MPACID 4.01* 3.81* 0.81*   MPAG 46.6 38.6 48.0       Again, thank you for allowing me to participate in the care of your patient.      Sincerely,    Mason Irwin MD

## 2023-01-11 NOTE — PROGRESS NOTES
John is a 72 year old who is being evaluated via a billable video visit.      How would you like to obtain your AVS? MyChart  If the video visit is dropped, the invitation should be resent by: Text to cell phone: [phone]  Will anyone else be joining your video visit? No      Video-Visit Details    Type of service:  Video Visit   Video Start Time: 9:01 AM  Video End Time:9:32am    Originating Location (pt. Location): Home  Distant Location (provider location):  On-site  Platform used for Video Visit: [facility]    TRANSPLANT NEPHROLOGY CHRONIC POST TRANSPLANT VISIT    Assessment & Plan   # LDKT: Trend down but still elevated from baseline ~ 2.0-2.3 due to hypotension and recent COVID illness.    - Repeat labs in coming days   - Baseline Creatinine:  ~ 2.0-2.3   - Proteinuria: Moderate (1-3 grams), 2.1g/g in 11/2022, repeat   - Date DSA Last Checked: Jun/2022      Latest DSA: No   - BK Viremia: No   - Kidney Tx Biopsy: Jun 03, 2022; Result: No diagnostic evidence of acute rejection.  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  Moderate interstitial fibrosis and tubular atrophy.  Severe vascular changes.             Oct 19, 2021; Result: No diagnostic evidence of acute rejection.  Acute tubular injury with mild interstitial fibrosis and tubular atrophy.   -Labs early next week. Patient doesn't think that he needs fluids at this point   -Refer back for repeat transplant. He had qualifying GFR of 19ml/min on 1/4/23     # Immunosuppression: Tacrolimus immediate release (goal 4-6) and Azathioprine (dose 50 mg daily)   - Continue with intensive monitoring of immunosuppression for efficacy and toxicity.   - Changes: No    # Proteinuria:   -Noted to have increased to 2.1g/g in 11/2022, thought due to chronic changes seen on kidney biopsy   -SPEP negative, ifx negative, K/L negative   -Obtain anti PLA2R and HbA1c    -When creatinine and BP stabilize would trial ACEi/ARB due to  proteinuria     # Infection Prophylaxis:   Last CD4 Level: 165 (Sep/2021)  - PJP: Sulfa/TMP (Bactrim)    # Hypertension: Controlled, but orthostatic;  Goal BP: < 130/80   - Changes: Yes - hold coreg 25mg bid. Continue checking Bps. Once blood pressures are persistently higher than 130/80 we can restart coreg at 12.5mg bid. For now, if his SBPs are >150 can take coreg 12.5mg x1.    -When creatinine and BP stabilize would trial ACEi/ARB due to proteinuria     # Anemia in Chronic Renal Disease: Hgb: Trend down      LULY: No   - Iron studies: Low iron saturation in 11/2022. Start PO iron every other day     # Mineral Bone Disorder:   - Secondary renal hyperparathyroidism; PTH level: Mildly elevated (151-300 pg/ml)        On treatment: None  - Vitamin D; level: Normal        On supplement: Yes  - Calcium; level: Normal        On supplement: No  - Phosphorus; level: Normal        On supplement: No      # Electrolytes:   - Potassium; level: Normal        On supplement: No  - Magnesium; level: Normal        On supplement: No  - Bicarbonate; level: Normal        On supplement: No      # CAD, s/p PCI: Asymptomatic.    # Chronic Diarrhea: Patient was previously diagnosed with Clostridium difficile and also cryptosporidium.  He was treated for both, but no improvement in his ongoing diarrhea symptoms.  He has been seen by Transplant ID and GI.  Patient was then changed from mycophenolate to azathioprine and symptoms slowly improved over time.  Now with no diarrhea and regular bowel movements.    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   - Repeat EBV PCR in 3 months from now as it is likely to remain elevated from COVID illness. AZA decreased from 100mg daily to 50mg daily on 11/3/22 due to increase in EBV.     # Skin Cancer: New lesions: none   - Discussed sun protection and recommend regular follow up with Dermatology.    # Medical Compliance: Yes    # COVID-19 Virus Review: Discussed COVID-19 virus and the  "potential medical risks.  Reviewed preventative health recommendations, including wearing a mask where appropriate.  Recommended COVID vaccination should be up to date with either an initial vaccination or booster shot when appropriate.  Asked the patient to inform the transplant center if they are exposed or diagnosed with this virus.   -Tested positive 12/19/22, noted himself to be hypotensive and held coreg doses. 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  Significant transplant-related complications: None    Transplant Office Phone Number: [phone]    Assessment and plan was discussed with the patient and he voiced his understanding and agreement.    Return visit: Return in about 6 months (around 7/11/2023) for visit with Dr. Varela or Dr. Irwin .    Mason Irwin MD     I spent 61 minutes on the date of the encounter doing chart review, review of outside records, review of test results, interpretation of tests, patient visit and documentation      Chief Complaint   Mr. Vee is a 72 year old here for kidney transplant and immunosuppression management.    History of Present Illness   John was diagnosed with COVID on 12/19/22 and has been feeling \"run down\". He ended up receiving IV fluids on 1/8/23 due to decreased PO intake and concern for hypotension with MARCOS. He says in the last few days he has been feeling better. He has been weak, different from normal. John's PO intake has been decreased for a few weeks now. He is eating and drinking now.     He is no longer nauseated and is no longer taking zofran. He is not having fevers, chills in the past few days. He has been getting short of breath and notes that it is worse when he lays down at night. This is " "for the past week but may be getting better. He denies LE edema. He denies chest pain, no further cough. He denies diarrhea.      Home BP: 110s systolic.     Problem List   Patient Active Problem List   Diagnosis     HTN, kidney transplant related     Membranous glomerulonephritis     Anemia in chronic renal disease     Secondary renal hyperparathyroidism (H)     Vitamin D deficiency     Dyslipidemia     Anxiety     Status post coronary angiogram     CAD, multiple vessel     Multiple vessel coronary artery disease     Kidney replaced by transplant     Immunosuppression (H)     Aftercare following organ transplant     Closed fracture of trochanter of right femur, initial encounter (H)     Impaired fasting glucose     Erectile dysfunction     Old myocardial infarction     Diarrhea     Prophylactic antibiotic     Chronic kidney disease, stage 3b (H)     Need for pneumocystis prophylaxis     Elevated serum creatinine       Allergies   No Known Allergies    Medications   Current Outpatient Medications   Medication Sig     aspirin 81 MG EC tablet Take 81 mg by mouth every morning     azaTHIOprine (IMURAN) 50 MG tablet Take 1 tablet (50 mg) by mouth daily     carvedilol (COREG) 25 MG tablet Take 1 tablet (25 mg) by mouth 2 times daily (with meals)     ondansetron (ZOFRAN) 4 MG tablet Take 1 tablet (4 mg) by mouth every 6 hours as needed for nausea or vomiting     PROGRAF (BRAND) 1 MG/ML suspension Take 0.4 mLs (0.4 mg) by mouth 2 times daily     sulfamethoxazole-trimethoprim (BACTRIM) 400-80 MG tablet Take 1 tablet by mouth Every Mon, Wed, Fri Morning     vitamin D3 (CHOLECALCIFEROL) 50 mcg (2000 units) tablet Take 1 tablet by mouth every morning     No current facility-administered medications for this visit.     There are no discontinued medications.    Physical Exam   Vital Signs: Ht 1.778 m (5' 10\")   Wt 65.8 kg (145 lb)   BMI 20.81 kg/m      GENERAL APPEARANCE: alert and no distress  HENT: no obvious abnormalities " on appearance  RESP: breathing appears unremarkable with normal rate, no audible wheezing or cough and no apparent shortness of breath with conversation  MS: extremities normal - no gross deformities noted  SKIN: no apparent rash and normal skin tone  NEURO: speech is clear with no obvious neurological deficits  PSYCH: mentation appears normal and affect normal    Data     Renal Latest Ref Rng & Units 1/8/2023 1/4/2023 11/1/2022   Na 133 - 144 mmol/L 139 133 134   Na (external) 136 - 145 meq/L - - -   K 3.4 - 5.3 mmol/L 4.5 4.5 4.5   K (external) 3.5 - 5.1 meq/L - - -   Cl 94 - 109 mmol/L 110(H) 104 108   CO2 20 - 32 mmol/L 25 24 25   CO2 (external) 22 - 31 meq/L - - -   BUN 7 - 30 mg/dL 60(H) 78(H) 40(H)   BUN (external) 6 - 22 mg/dL - - -   Cr 0.66 - 1.25 mg/dL 2.56(H) 3.32(H) 2.33(H)   Cr (external) 0.70 - 1.30 mg/dL - - -   Glucose 70 - 99 mg/dL 148(H) 144(H) 101(H)   Glucose (external) 70 - 99 mg/dL - - -   Ca  8.5 - 10.1 mg/dL 8.5 8.7 8.6   Ca (external) 8.5 - 10.5 mg/dL - - -   Mg 1.6 - 2.3 mg/dL - - 1.8     Bone Health Latest Ref Rng & Units 11/1/2022 12/24/2021 12/16/2021   Phos 2.5 - 4.5 mg/dL - - 2.8   PTHi pg/mL 274 - -   Vit D Def 20 - 75 ug/L 33 26 -     Heme Latest Ref Rng & Units 1/4/2023 11/1/2022 8/12/2022   WBC 4.0 - 11.0 10e3/uL 5.8 3.7(L) 5.0   WBC (external) 4.0 - 11.0 10*9/L - - -   Hgb 13.3 - 17.7 g/dL 9.1(L) 9.5(L) 10.1(L)   Hgb (external) 13.3 - 17.7 g/dL - - -   Plt 150 - 450 10e3/uL 282 179 192   Plt (external) 150 - 450 10*9/L - - -   ABSOLUTE NEUTROPHIL 1.6 - 8.3 10e9/L - - -   ABSOLUTE LYMPHOCYTES 0.8 - 5.3 10e9/L - - -   ABSOLUTE MONOCYTES 0.0 - 1.3 10e9/L - - -   ABSOLUTE EOSINOPHILS 0.0 - 0.7 10e9/L - - -   ABSOLUTE BASOPHILS 0.0 - 0.2 10e9/L - - -   ABS IMMATURE GRANULOCYTES 0 - 0.4 10e9/L - - -   ABSOLUTE NUCLEATED RBC - - - -     Liver Latest Ref Rng & Units 12/16/2021 8/7/2021 12/9/2020   AP 40 - 150 U/L - 83 -   TBili 0.2 - 1.3 mg/dL - 0.8 -   DBili 0.0 - 0.2 mg/dL - - -   ALT  0 - 70 U/L - 17 -   AST 0 - 45 U/L - 10 -   Tot Protein 6.8 - 8.8 g/dL - 7.5 -   Albumin 3.4 - 5.0 g/dL 3.6 3.7 2.8(L)     Pancreas Latest Ref Rng & Units 5/6/2021   A1C 0 - 5.6 % 5.7(H)     Iron studies Latest Ref Rng & Units 11/1/2022 12/24/2021 12/16/2021   Iron 35 - 180 ug/dL 56 44 54   Iron sat 15 - 46 % 28 24 -   Ferritin 26 - 388 ng/mL 341 255 -     [facility] Txp Virology Latest Ref Rng & Units 11/1/2022 1/19/2022 12/16/2021   CVM DNA Quant - - - -   CMV QUANT IU/ML Not Detected IU/mL - - Not Detected   LOG IU/ML OF CMVQNT <2.1 [Log:IU]/mL - - -   BK Spec - - - -   BK Res BKNEG:BK Virus DNA Not Detected copies/mL - - -   BK Log <2.7 Log copies/mL - - -   EBV CAPSID ANTIBODY IGG 0.0 - 0.8 AI - - -   EBV DNA LOG OF COPIES - 4.8 3.9 4.4   Hep B Core NR - - -        Recent Labs   Lab Test 11/01/22  1033 01/04/23  1542 01/08/23  1009   DOSTAC 10/31/2022 1/3/2023 1/7/2023   TACROL 6.2 9.7 6.4     Recent Labs   Lab Test 04/25/16  0826 05/03/16  0853 08/09/21  1043   DOSMPA 4.24.2016 2030 2,030 8/8/2021   8:30 PM   MPACID 4.01* 3.81* 0.81*   MPAG 46.6 38.6 48.0

## 2023-01-11 NOTE — PATIENT INSTRUCTIONS
Patient Recommendations:  -Hold carvedilol for now. Check your blood pressure daily. When you blood pressure is persistently higher than 130/80 and no longer getting dizzy with standing let us know.   -If your blood pressure if higher than 150 systolic take carvedilol 12.5mg once  -Labs early next week  -Start ferrous sulfate (iron) every other day in the morning with breakfast  -I will refer you for transplant evaluation    Transplant Patient Information  Your Post Transplant Coordinator is: Onelia Richardson  For non urgent items, we encourage you to contact your coordinator/care team online via [employer]  You and your care team can also contact your transplant coordinator Monday - Friday, 8am - 5pm at [phone] (Option 2 to reach the coordinator or Option 4 to schedule an appointment).  After hours for urgent matters, please call [facility] at [phone].

## 2023-01-16 ENCOUNTER — TELEPHONE (OUTPATIENT)
Dept: INTERNAL MEDICINE | Facility: CLINIC | Age: 73
End: 2023-01-16
Payer: MEDICARE

## 2023-01-16 DIAGNOSIS — Z94.0 KIDNEY TRANSPLANTED: ICD-10-CM

## 2023-01-17 ENCOUNTER — TELEPHONE (OUTPATIENT)
Dept: TRANSPLANT | Facility: CLINIC | Age: 73
End: 2023-01-17
Payer: MEDICARE

## 2023-01-17 NOTE — TELEPHONE ENCOUNTER
John calling and would like to discuss with RN[facility] some Health Issues going on with him. No other details provided

## 2023-01-18 NOTE — TELEPHONE ENCOUNTER
Returned a call to John. He states he is approaching his 7 year kidney anniversary and just wanted to express his gratitude to his transplant team.  States he is finally feeling recovered from Covid, pushing hydration and has a good appetite.  States he is still holding his carvedilol. Will check BP's a few times a week, if SBP >130 consistently will call [facility] to discuss.   Will go in for labs in the next week.

## 2023-01-19 ENCOUNTER — REFERRAL (OUTPATIENT)
Dept: TRANSPLANT | Facility: CLINIC | Age: 73
End: 2023-01-19
Payer: MEDICARE

## 2023-01-19 DIAGNOSIS — N18.4 CHRONIC KIDNEY DISEASE, STAGE IV (SEVERE) (H): ICD-10-CM

## 2023-01-19 DIAGNOSIS — T86.12 KIDNEY TRANSPLANT FAILURE: ICD-10-CM

## 2023-01-19 DIAGNOSIS — N05.5 NEPHRITIS AND NEPHROPATHY, WITH MEMBRANOPROLIFERATIVE GLOMERULONEPHRITIS: ICD-10-CM

## 2023-01-19 DIAGNOSIS — Z01.818 PRE-TRANSPLANT EVALUATION FOR KIDNEY TRANSPLANT: ICD-10-CM

## 2023-01-19 RX ORDER — CARVEDILOL 25 MG/1
25 TABLET ORAL 2 TIMES DAILY WITH MEALS
Qty: 60 TABLET | Refills: 0 | Status: SHIPPED | OUTPATIENT
Start: 2023-01-19 | End: 2023-03-13

## 2023-01-19 NOTE — Clinical Note
John would like to change his eval to virtual if possible. 2/14 instead of 2/15 (his STD) works for him if that works for us. Let me know if that's not possible. Thank you!

## 2023-01-19 NOTE — TELEPHONE ENCOUNTER
carvedilol (COREG) 25 MG tablet      Last Written Prescription Date:  12/27/22  Last Fill Quantity: 60,   # refills: 0  Last Office Visit : 9/24/21  Future Office visit:  none    Routing refill request to provider for review/approval because:  Overdue for office visit  Already given 30d liset refill

## 2023-01-19 NOTE — LETTER
January 26, 2023    Marlo Vee  [address] [address]      Dear Marlo,    Thank you for your interest in the Transplant Center at [facility]. We look forward to being a part of your care team and assisting you through the transplant process.    As we discussed, your transplant coordinator is Deborah Armenta (Kidney).  You may call your coordinator at any time with questions or concerns.  Your first scheduled call will be on 2/6/2023 between 9am and 12pm.  If this needs to change, call [phone].    Please complete the following.    1. Fill out and return the enclosed forms    Authorization for Electronic Communication    Authorization to Discuss Protected Health Information    Authorization for Release of Protected Health Information    2. Sign up for:    [facility] [employer], access to your electronic medical record (see enclosed pamphlet)    [place] [employer], a transplant education website    You can use these tools to learn more about your transplant, communicate with your care team, and track your medical details      Sincerely,      Solid Organ Transplant  [facility]    cc: Referring Physician

## 2023-01-25 VITALS — HEIGHT: 70 IN | BODY MASS INDEX: 21.47 KG/M2 | WEIGHT: 150 LBS

## 2023-01-25 NOTE — TELEPHONE ENCOUNTER
[facility]  Referring Provider: Dr Mason Irwin MD  Referring Diagnosis: CKD Stage 4  Skin Cancer/Dermatology - patient could not remember Drs name.  Kidney transplant x1, 1/21/2016.    GFR/Date: 19 (1/4/2023)    Is patient under the age of 65? No  Is patient diabetic? No  Is patient on insulin? No  Was patient offered a pancreas transplant referral? No    Is patient in a group home/assisted living? No  Does patient have a guardian? No    Referral intake process completed.  Patient is aware that after financial approval is received, medical records will be requested.   Patient confirmed for a callback from transplant coordinator on 2/6/2023. (within 2 weeks)  Tentative evaluation date 2/15/2023 (within 4 weeks) if appointment is virtual, does patient have capabilities of setting this up? Patient request for in person appointment with transplant team.    Confirmed coordinator will discuss evaluation process in more detail at the time of their call.   Patient is aware of the need to arrange age appropriate cancer screening, vaccinations, and dental care.  Reminded patient to complete questionnaire, complete medical records release, and review packet prior to evaluation visit .  Assessed patient for special needs (ie-wheelchair, assistance, guardian, and ):  None   Patient instructed to call [phone] with questions.     Patient gave verbal consent during intake call to obtain medical records and documents outside of [facility]:  Yes

## 2023-01-26 ENCOUNTER — DOCUMENTATION ONLY (OUTPATIENT)
Dept: TRANSPLANT | Facility: CLINIC | Age: 73
End: 2023-01-26
Payer: MEDICARE

## 2023-02-06 NOTE — TELEPHONE ENCOUNTER
Reviewed pt's chart for pre-kidney transplant evaluation planning. Pt lives in [place], [place]. Referred to our center by Dr. Irwin. 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 Last GFR 26 (has had a qualifying in January). Multiple MARCOS episodes in 2022 due to diarrheal illness and was switched to AZA which has helped. 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     Pt is not diabetic. Other hx includes treated c diff and cryptosporidium infections in 2021. Heart hx: coronary angiogram with stenting in April 2015 prior to kidney transplant, last seen by cards July 2020.  Lung status: recently recovered from COVID and still feeling SOB with exercion. BMI 20 on 1/11/23.  Discussed BMI requirement for transplant with patient: meets criteria. Last colonoscopy was in 2011, patient has had problems completing due to difficult anatomy.  Dental: encouraged update.  Pt is not a smoker, does not consume alcohol, and denies use of recreational drugs. Pt is independent w/ ADLs.  Pt lives w/ spouse and has support following transplant.     I also introduced [employer] and asked pt to create an account and view pre-kidney transplant videos for review with me following evaluation. John and his wife are traveling in March and prefer to complete virtual PKE first, and return to clinic after their travels for in person testing. Message sent to scheduling with updates.

## 2023-02-08 ENCOUNTER — LAB (OUTPATIENT)
Dept: LAB | Facility: CLINIC | Age: 73
End: 2023-02-08
Payer: MEDICARE

## 2023-02-08 DIAGNOSIS — Z48.298 AFTERCARE FOLLOWING ORGAN TRANSPLANT: ICD-10-CM

## 2023-02-08 DIAGNOSIS — R79.89 OTHER SPECIFIED ABNORMAL FINDINGS OF BLOOD CHEMISTRY: ICD-10-CM

## 2023-02-08 DIAGNOSIS — Z94.0 KIDNEY TRANSPLANTED: ICD-10-CM

## 2023-02-08 DIAGNOSIS — R80.9 PROTEINURIA: ICD-10-CM

## 2023-02-08 DIAGNOSIS — R79.89 ELEVATED SERUM CREATININE: ICD-10-CM

## 2023-02-08 DIAGNOSIS — N18.4 CHRONIC KIDNEY DISEASE, STAGE 4, SEVERELY DECREASED GFR (H): ICD-10-CM

## 2023-02-08 DIAGNOSIS — Z94.0 KIDNEY REPLACED BY TRANSPLANT: ICD-10-CM

## 2023-02-08 LAB
ALBUMIN MFR UR ELPH: 130.1 MG/DL (ref 1–14)
ANION GAP SERPL CALCULATED.3IONS-SCNC: 4 MMOL/L (ref 7–15)
BUN SERPL-MCNC: 43.7 MG/DL (ref 8–23)
CALCIUM SERPL-MCNC: 9.4 MG/DL (ref 8.8–10.2)
CHLORIDE SERPL-SCNC: 103 MMOL/L (ref 98–107)
CREAT SERPL-MCNC: 2.4 MG/DL (ref 0.67–1.17)
CREAT UR-MCNC: 71 MG/DL
DEPRECATED HCO3 PLAS-SCNC: 26 MMOL/L (ref 22–29)
ERYTHROCYTE [DISTWIDTH] IN BLOOD BY AUTOMATED COUNT: 14.9 % (ref 10–15)
GFR SERPL CREATININE-BSD FRML MDRD: 28 ML/MIN/1.73M2
GLUCOSE SERPL-MCNC: 104 MG/DL (ref 70–99)
HBA1C MFR BLD: 5.5 %
HCT VFR BLD AUTO: 30.8 % (ref 40–53)
HGB BLD-MCNC: 9.3 G/DL (ref 13.3–17.7)
Lab: NORMAL
MCH RBC QN AUTO: 30.4 PG (ref 26.5–33)
MCHC RBC AUTO-ENTMCNC: 30.2 G/DL (ref 31.5–36.5)
MCV RBC AUTO: 101 FL (ref 78–100)
PERFORMING LABORATORY: NORMAL
PLATELET # BLD AUTO: 223 10E3/UL (ref 150–450)
POTASSIUM SERPL-SCNC: 5.1 MMOL/L (ref 3.4–5.3)
PROT/CREAT 24H UR: 1.83 MG/MG CR (ref 0–0.2)
RBC # BLD AUTO: 3.06 10E6/UL (ref 4.4–5.9)
SODIUM SERPL-SCNC: 133 MMOL/L (ref 136–145)
TACROLIMUS BLD-MCNC: 5.5 UG/L (ref 5–15)
TEST NAME: NORMAL
TME LAST DOSE: NORMAL H
TME LAST DOSE: NORMAL H
WBC # BLD AUTO: 2.3 10E3/UL (ref 4–11)

## 2023-02-08 PROCEDURE — 36415 COLL VENOUS BLD VENIPUNCTURE: CPT

## 2023-02-08 PROCEDURE — 80048 BASIC METABOLIC PNL TOTAL CA: CPT

## 2023-02-08 PROCEDURE — 80197 ASSAY OF TACROLIMUS: CPT

## 2023-02-08 PROCEDURE — 85027 COMPLETE CBC AUTOMATED: CPT

## 2023-02-08 PROCEDURE — 84156 ASSAY OF PROTEIN URINE: CPT

## 2023-02-08 PROCEDURE — 84999 UNLISTED CHEMISTRY PROCEDURE: CPT

## 2023-02-08 PROCEDURE — 83520 IMMUNOASSAY QUANT NOS NONAB: CPT

## 2023-02-08 PROCEDURE — 86255 FLUORESCENT ANTIBODY SCREEN: CPT

## 2023-02-08 PROCEDURE — 83036 HEMOGLOBIN GLYCOSYLATED A1C: CPT

## 2023-02-13 NOTE — PROGRESS NOTES
Pt evaluated via billable telephone visit d/t COVID-19 restrictions. Time spent: 15 min  Provider location: onsite ([facility] Solid Organ Transplant  Outpatient MNT: Kidney Transplant Evaluation    Current BMI: 21.5 (HT 70 in,  lbs/68 kg)     Time Spent: 15 minutes  Visit Type: Initial   Referring Physician: Finger   Pt accompanied by: self     History of previous txp: kidney 2016  Dialysis: no    Nutrition Assessment  Pt reports trying to consume less sugar.   Pt consumes red meat ~1x/month     - Appetite: very good   - Food allergies/intolerances: no   - Meal prep & grocery shopping: pt/wife does  - Previous RD education: not asked   - Issues chewing or swallowing: no   - N/V/D/C: no   - Food access concerns: no     Vitamins, Supplements, Pertinent Meds: vit D   Herbal Medicines/Supplements: none     Edema: no     Weight hx: lost 20 lbs as of 2021 - crypto sporidium & C diff; trying to regain weight  Wt Readings from Last 10 Encounters:   02/14/23 68 kg (150 lb)   01/25/23 68 kg (150 lb)   01/11/23 65.8 kg (145 lb)   09/19/22 68 kg (150 lb)   06/03/22 68 kg (150 lb)   09/24/21 65.1 kg (143 lb 8 oz)   09/09/21 65.3 kg (144 lb)   04/29/21 75.3 kg (166 lb)   12/09/20 81.6 kg (179 lb 14 oz)   01/06/20 77.2 kg (170 lb 3.2 oz)     Diet Recall  Breakfast Oatmeal w/ blueberries; scrambled eggs   Lunch PB s/w   Dinner Spaghetti; chili; chicken (in chili), some salads    Snacks Toast w/ butter or PB   Beverages Gatorade zero (40 oz/day at least)   Alcohol Almost none    Dining out 2x/month     Physical Activity  Walking (weather dependent)- 30 min yesterday   Stair master at home- uses nightly for 25 minutes    Lifts weights   40 push ups nightly, knee bends      Labs  1/8/23 K 4.5   No recent phos on file    Nutrition Diagnosis  No nutrition diagnosis identified at this time     Nutrition Intervention  Nutrition education provided:  Discussed sodium intake (low sodium foods and drinks, seasoning food  without salt and tips for low sodium diet).  Reviewed wnl K levels dating back, which does not warrant dietary modification at this time. No recent phos on file to interpret/educate on. Reviewed high K foods pt is consuming that he may need to limit in the future.     Reviewed post txp diet guidelines in brief (will review in further detail post txp):  (1) Review of proper food safety measures d/t immunosuppressant therapy post-op and increased risk for food-borne illness    (2) Avoid the following post txp d/t risk for rejection, unknown effects on the organs, and/or potential interactions with immunosuppressants:  - Herbal, Chinese, holistic, chiropractic, natural, alternative medicines and supplements  - Detoxes and cleanses  - Weight loss pills  - Protein powders or other products with extracts or herbs (ie green tea extract)    (3) Med regimen and possible side effects    Patient Understanding: Pt verbalized understanding of education provided.  Expected Engagement: Good  Follow-Up Plans: PRN     Nutrition Goals  No nutrition goals identified at this time     Onelia Angulo, RD, LD, CCTD

## 2023-02-14 ENCOUNTER — VIRTUAL VISIT (OUTPATIENT)
Dept: TRANSPLANT | Facility: CLINIC | Age: 73
End: 2023-02-14
Attending: NURSE PRACTITIONER
Payer: MEDICARE

## 2023-02-14 ENCOUNTER — DOCUMENTATION ONLY (OUTPATIENT)
Dept: TRANSPLANT | Facility: CLINIC | Age: 73
End: 2023-02-14

## 2023-02-14 VITALS — BODY MASS INDEX: 21.47 KG/M2 | WEIGHT: 150 LBS | HEIGHT: 70 IN

## 2023-02-14 DIAGNOSIS — N18.4 CHRONIC KIDNEY DISEASE, STAGE IV (SEVERE) (H): Primary | ICD-10-CM

## 2023-02-14 DIAGNOSIS — T86.12 KIDNEY TRANSPLANT FAILURE: ICD-10-CM

## 2023-02-14 DIAGNOSIS — N05.5 NEPHRITIS AND NEPHROPATHY, WITH MEMBRANOPROLIFERATIVE GLOMERULONEPHRITIS: ICD-10-CM

## 2023-02-14 DIAGNOSIS — Z01.818 PRE-TRANSPLANT EVALUATION FOR KIDNEY TRANSPLANT: ICD-10-CM

## 2023-02-14 DIAGNOSIS — N18.4 CHRONIC KIDNEY DISEASE, STAGE IV (SEVERE) (H): ICD-10-CM

## 2023-02-14 PROCEDURE — 99215 OFFICE O/P EST HI 40 MIN: CPT | Mod: VID

## 2023-02-14 PROCEDURE — 99207 PR NO CHARGE COORDINATED CARE PS: CPT

## 2023-02-14 PROCEDURE — 99205 OFFICE O/P NEW HI 60 MIN: CPT | Mod: VID

## 2023-02-14 NOTE — LETTER
2/14/2023         RE: Marlo Vee  [address] [facility] [place] [address]        Dear Colleague,    Thank you for referring your patient, Marlo Vee, to the [facility] TRANSPLANT [facility]. Please see a copy of my visit note below.    Pt evaluated via billable telephone visit d/t COVID-19 restrictions. Time spent: 15 min  [place] [place]     [facility] Solid Organ Transplant  Outpatient MNT: Kidney Transplant Evaluation    Current BMI: 21.5 (HT 70 in,  lbs/68 kg)     Time Spent: 15 minutes  Visit Type: Initial   Referring Physician: Finger   Pt accompanied by: self     History of previous txp: kidney 2016  Dialysis: no    Nutrition Assessment  Pt reports trying to consume less sugar.   Pt consumes red meat ~1x/month     - Appetite: very good   - Food allergies/intolerances: no   - Meal prep & grocery shopping: pt/wife does  - Previous RD education: not asked   - Issues chewing or swallowing: no   - N/V/D/C: no   - Food access concerns: no     Vitamins, Supplements, Pertinent Meds: vit D   Herbal Medicines/Supplements: none     Edema: no     Weight hx: lost 20 lbs as of 2021 - crypto sporidium & C diff; trying to regain weight  Wt Readings from Last 10 Encounters:   02/14/23 68 kg (150 lb)   01/25/23 68 kg (150 lb)   01/11/23 65.8 kg (145 lb)   09/19/22 68 kg (150 lb)   06/03/22 68 kg (150 lb)   09/24/21 65.1 kg (143 lb 8 oz)   09/09/21 65.3 kg (144 lb)   04/29/21 75.3 kg (166 lb)   12/09/20 81.6 kg (179 lb 14 oz)   01/06/20 77.2 kg (170 lb 3.2 oz)     Diet Recall  Breakfast Oatmeal w/ blueberries; scrambled eggs   Lunch PB s/w   Dinner Spaghetti; chili; chicken (in chili), some salads    Snacks Toast w/ butter or PB   Beverages Gatorade zero (40 oz/day at least)   Alcohol Almost none    Dining out 2x/month     Physical Activity  Walking (weather dependent)- 30 min yesterday   Stair master at home- uses nightly for 25 minutes    Lifts weights   40 push ups  nightly, knee bends      Labs  1/8/23 K 4.5   No recent phos on file    Nutrition Diagnosis  No nutrition diagnosis identified at this time     Nutrition Intervention  Nutrition education provided:  Discussed sodium intake (low sodium foods and drinks, seasoning food without salt and tips for low sodium diet).  Reviewed wnl K levels dating back, which does not warrant dietary modification at this time. No recent phos on file to interpret/educate on. Reviewed high K foods pt is consuming that he may need to limit in the future.     Reviewed post txp diet guidelines in brief (will review in further detail post txp):  (1) Review of proper food safety measures d/t immunosuppressant therapy post-op and increased risk for food-borne illness    (2) Avoid the following post txp d/t risk for rejection, unknown effects on the organs, and/or potential interactions with immunosuppressants:  - Herbal, Chinese, holistic, chiropractic, natural, alternative medicines and supplements  - Detoxes and cleanses  - Weight loss pills  - Protein powders or other products with extracts or herbs (ie green tea extract)    (3) Med regimen and possible side effects    Patient Understanding: Pt verbalized understanding of education provided.  Expected Engagement: Good  Follow-Up Plans: PRN     Nutrition Goals  No nutrition goals identified at this time     Onelia Angulo, RD, LD, CCTD

## 2023-02-14 NOTE — LETTER
2/14/2023         RE: Marlo Vee  [address] [facility] [address]        Dear Colleague,    Thank you for referring your patient, Marlo Vee, to the [facility] TRANSPLANT CLINIC. Please see a copy of my visit note below.      TRANSPLANT NEPHROLOGY RECIPIENT EVALUATION NOTE    Assessment and Plan:  # Kidney Transplant Evaluation: Patient is a good candidate overall. Benefits of a living donor transplant were discussed.    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 Maintained on Aza and tacrolimus for IS.  No donors, ABO-A, cPRA 77%. When ready, he would likely benefit from another kidney transplant.     # Cardiac Risk:   - multivessel CAD, s/p PCI with ALANNA placements to p/m/d RCA, Mid LAD and D1 in 2015.   - HFpEF, 55-60% as of 2019 (charisse ~ 35% in 2015).   - will need risk assessment     # PAD Screening: per surgery.     # Chronic Diarrhea: previously diagnosed with Clostridium difficile and also cryptosporidium and  treated for both. Changed from mycophenolate to azathioprine and symptoms slowly improved over time.     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      # Nonmelanoma skin cancer s/p Mohs: will need to see derm if not in the past year.     # Health Maintenance: Colonoscopy: Not up to date and Dental: Up to date    Discussed the risks and benefits of a transplant, including the risk of surgery and immunosuppression medications.  Patient's overall evaluation will be discussed in the Transplant Program's regular meeting with a final recommendation on the patients suitability for transplant to be made at that time.    Pending completion of the full evaluation, patient presently appears to be enough of an acceptable kidney transplant recipient candidate to have any potential kidney donors  start the evaluation process.      Evaluation:  Marlo Vee was seen in consultation at the request of Dr. Kyle Augustine for evaluation as a potential kidney transplant recipient.    Reason for Visit:  Marlo Vee is a 72 year old male with failing LDKT , who presents for kidney transplant evaluation.    History of Present Illness:    Kidney Disease Hx  Marlo Vee is a 72-year-old male with history of membranous nephropathy s/p LDKT in Jan 2016. Did require HD for about a year prior to transplant. In 2021 his course was c/b diarrhea issues r/t Clostridium difficile and also cryptosporidium.  He was treated for both and changed from mycophenolate to azathioprine and symptoms slowly improved over time. Creatinine increased to 1.8-1.9 from a baseline of 1.0. This past December he had COVID c/b dehydration/hypotension/damon with a creatinine up to 3.2 on 1/4/23, which has since recovered to 2.4 as of 2/8/23. Overall, feeling well and recovered from COVID. Working and exercising regularly. No potential donors this time, ABO-A, cPRA 77%.          Kidney Disease Dx: Membranous nephropathy ([place]) S/p failing LDKT (2016)        On Dialysis: No       Primary Nephrologist: [facility].        H/o Kidney Stones: No       H/o Recurrent/Frequent UTI: No         Diabetic Hx: None           Cardiac/Vascular Disease Risk Factors:    - multivessel CAD, s/p PCI with ALANNA placements to p/m/d RCA, Mid LAD and D1.   - HFpEF, 55-60% in 2019 (charisse ~ 35% in 2015)         Viral Serology Status       CMV IgG Antibody: Positive       EBV IgG Antibody: Positive         Volume Status/Weight:        Volume status: Not assessed       Weight:  Acceptable BMI       BMI: Body mass index is 21.52 kg/m .         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 Stays active maintaining his cabin. Bikes for exercise on his bike on a regular basis. No chest pains or shortness of breath.      Fatigue/Decreased  Energy: [x] No [] Yes    Chest Pain or SOB with Exertion: [x] No [] Yes    Significant Weight Change: [x] No [] Yes    Nausea, Vomiting or Diarrhea: [x] No [] Yes    Fever, Sweats or Chills:  [x] No [] Yes    Leg Swelling [x] No [] Yes        History of Cancer: None      Allergy Testing Questions:   Medication that caused a reaction None   Antibiotics used that didn't give an allergic reaction?  None    COVID Vaccination Up To Date: Yes    Potential Living Kidney Donors: No    Review of Systems:  A comprehensive review of systems was obtained and negative, except as noted in the HPI or PMH.    Past Medical History:   Medical record was reviewed and PMH was discussed with patient and noted below.  Past Medical History:   Diagnosis Date     Anemia in ESRD (end-stage renal disease) (H)      Antiplatelet or antithrombotic long-term use      Anxiety      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     Coronary artery disease      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) 08/07/2021     Dyslipidemia      End stage kidney disease (H)      Heart attack (H)     not sure     History of blood transfusion      Hypertension      Membranous glomerulonephritis 2002     PONV (postoperative nausea and vomiting)      PUD (peptic ulcer disease)      Secondary hyperparathyroidism (of renal origin)      Skin abnormalities      Stented coronary artery      Vitamin D deficiency        Past Social History:   Past Surgical History:   Procedure Laterality Date     BIOPSY       CYSTOSCOPY, REMOVE STENT(S), COMBINED Right 02/23/2016    Procedure: COMBINED CYSTOSCOPY, REMOVE STENT(S);  Surgeon: Cody Temple MD;  Location: [facility] OR     IR RENAL BIOPSY RIGHT  10/19/2021     IR RENAL BIOPSY RIGHT  06/03/2022     OPEN REDUCTION INTERNAL FIXATION HIP NAILING Right 12/06/2020    Procedure: OPEN REDUCTION INTERNAL FIXATION, FRACTURE, FEMUR, USING INTRAMEDULLARY SHU.;  Surgeon: Jimmy Stoner MD;  Location: [facility] OR     s/p right upper extremity  AV fistula       TRANSPLANT      kidney transplant      VASCULAR SURGERY       Personal history of bleeding or anesthesia problems: No    Family History:  Family History   Problem Relation Age of Onset     Breast Cancer Mother      Cancer - colorectal Father      Coronary Artery Disease Father      Hypertension Father      Breast Cancer Sister      Cancer Brother         Pancreatic CA       Personal History:   Social History     Socioeconomic History     Marital status:      Spouse name: Not on file     Number of children: 4     Years of education: Not on file     Highest education level: Not on file   Occupational History     Not on file   Tobacco Use     Smoking status: Never     Smokeless tobacco: Never   Substance and Sexual Activity     Alcohol use: Not Currently     Comment: Patient reports drinking beer on a rare ocassion.     Drug use: No     Sexual activity: Yes     Partners: Female   Other Topics Concern     Parent/sibling w/ CABG, MI or angioplasty before 65F 55M? 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     Has 4 kids - one donated kidney    Walks everyday     Doesn't eat a lot of meat     Social Determinants of Health     Financial Resource Strain: Not on file   Food Insecurity: Not on file   Transportation Needs: Not on file   Physical Activity: Not on file   Stress: Not on file   Social Connections: Not on file   Intimate Partner Violence: Not on file   Housing Stability: Not on file       Allergies:  No Known Allergies    Medications:  Current Outpatient Medications   Medication Sig     aspirin 81 MG EC tablet Take 81 mg by mouth every morning     azaTHIOprine (IMURAN) 50 MG tablet Take 1 tablet (50 mg) by mouth daily     carvedilol (COREG) 25 MG tablet Take 1 tablet (25 mg) by mouth 2 times daily (with meals) Please call for follow up visit with Primary Care Clinic for further refills. [phone]     ferrous sulfate  "(FEROSUL) 325 (65 Fe) MG tablet Take 1 tablet (325 mg) by mouth every other day for 90 days Take with breakfast     ondansetron (ZOFRAN) 4 MG tablet Take 1 tablet (4 mg) by mouth every 6 hours as needed for nausea or vomiting     PROGRAF (BRAND) 1 MG/ML suspension Take 0.4 mLs (0.4 mg) by mouth 2 times daily     sulfamethoxazole-trimethoprim (BACTRIM) 400-80 MG tablet Take 1 tablet by mouth Every Mon, Wed, Fri Morning     vitamin D3 (CHOLECALCIFEROL) 50 mcg (2000 units) tablet Take 1 tablet by mouth every morning     No current facility-administered medications for this visit.       Vitals:  Ht 1.778 m (5' 10\")   Wt 68 kg (150 lb)   BMI 21.52 kg/m      Exam:  GENERAL APPEARANCE: alert and no distress  HENT: no obvious abnormalities on appearance  RESP: breathing appears unremarkable with normal rate, no audible wheezing or cough and no apparent shortness of breath with conversation  MS: extremities normal - no gross deformities noted  SKIN: no apparent rash and normal skin tone  NEURO: speech is clear with no obvious neurological deficits  PSYCH: mentation appears normal and affect normal    Results:   Recent Results (from the past 336 hour(s))   Basic metabolic panel    Collection Time: 02/08/23 10:24 AM   Result Value Ref Range    Sodium 133 (L) 136 - 145 mmol/L    Potassium 5.1 3.4 - 5.3 mmol/L    Chloride 103 98 - 107 mmol/L    Carbon Dioxide (CO2) 26 22 - 29 mmol/L    Anion Gap 4 (L) 7 - 15 mmol/L    Urea Nitrogen 43.7 (H) 8.0 - 23.0 mg/dL    Creatinine 2.40 (H) 0.67 - 1.17 mg/dL    Calcium 9.4 8.8 - 10.2 mg/dL    Glucose 104 (H) 70 - 99 mg/dL    GFR Estimate 28 (L) >60 mL/min/1.73m2   CBC with platelets    Collection Time: 02/08/23 10:24 AM   Result Value Ref Range    WBC Count 2.3 (L) 4.0 - 11.0 10e3/uL    RBC Count 3.06 (L) 4.40 - 5.90 10e6/uL    Hemoglobin 9.3 (L) 13.3 - 17.7 g/dL    Hematocrit 30.8 (L) 40.0 - 53.0 %     (H) 78 - 100 fL    MCH 30.4 26.5 - 33.0 pg    MCHC 30.2 (L) 31.5 - 36.5 g/dL "    RDW 14.9 10.0 - 15.0 %    Platelet Count 223 150 - 450 10e3/uL   Tacrolimus by Tandem Mass Spectrometry    Collection Time: 02/08/23 10:24 AM   Result Value Ref Range    Tacrolimus by Tandem Mass Spectrometry 5.5 5.0 - 15.0 ug/L    Tacrolimus Last Dose Date 2/7/2023     Tacrolimus Last Dose Time  9:30 PM    Hemoglobin A1c    Collection Time: 02/08/23 10:24 AM   Result Value Ref Range    Hemoglobin A1C 5.5 <5.7 %   [facility]; PMND1; 88574-8 (Laboratory Miscellaneous Order)    Collection Time: 02/08/23 10:24 AM   Result Value Ref Range    Performing Laboratory [facility]     Test Name 04410-4     Test Code PMND1    PMND1; 16297-6: [facility] Miscellaneous Test    Collection Time: 02/08/23 10:24 AM   Result Value Ref Range    [facility] Result SEE NOTE    Protein  random urine    Collection Time: 02/08/23 10:27 AM   Result Value Ref Range    Total Protein Urine mg/dL 130.1 (H) 1.0 - 14.0 mg/dL    Total Protein UR MG/MG CR 1.83 (H) 0.00 - 0.20 mg/mg Cr    Creatinine Urine mg/dL 71.0 mg/dL     Again, thank you for allowing me to participate in the care of your patient.        Sincerely,        BAYRON

## 2023-02-14 NOTE — PROGRESS NOTES
Transplant Surgery Consult Note     Medical record number: [id number]  YOB: 1950,   Consult requested for evaluation of kidney transplant candidacy.    Virtual visit    Joined the call at 2023, 10:29:16 am.  Left the call at 2023, 10:49:16 am.  You were on the call for 20 minutes .      Assessment and Recommendations:Mr. Vee appears to be a good candidate for kidney transplantation and has a good understanding of the risks and benefits of this approach to the management of renal failure. The following issues should be addressed prior to finalizing his transplant candidacy:     Mr. 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  Cardiology consult for cardiac risk stratification to be ordered: Yes  CT abdomen and pelvis without contrast to be ordered for assessment of vascular targets: NO   Dietician consult ordered: Yes  Social work consult ordered: Yes  Transplant listing labs ordered to include HLA, ABOx2, Cr, etc.  Imaging reports reviewed:  Transplant US, Hip CT  Radiology images reviewed: Hip CT  Recipient suitable to move forward with work up of living donors:  Yes  I reviewed CT hip and patient likely has adequate vascular target on left but would get iliac US.    At this point I would likely not place patient on active list.  If renal function starts to decline would consider 0MM offers or other ideal circumstances.  Over time may make more liberal in selection criteria.    The majority of our visit was spent in counselling, discussing the medical and surgical risks of kidney transplantation. We discussed approximate wait time and how that is influenced by issues such as blood type and sensitization (PRA) and access to a living donor. I contrasted potential waiting time for living vs  donor kidneys from  normal (0-85%) or  higher (%) kidney donor profile index (KDPI) donors and their associated outcomes. I would not recommend this individual to consider kidneys from high KDPI donors. The reason for this decision is best summarized as: improved long term graft survival. Potential surgical complications of kidney transplantation include bleeding, superficial or deep wound complications (infection, hernia, lymphocele), ureteral anastomotic failure (leak or stenosis), graft thrombosis, need for reoperation and other issues such as cardiac complications, pneumonia, deep venous thrombosis, pulmonary embolism, post transplant diabetes and death. The potential for recurrent disease or need for retransplantation was also addressed. We discussed the possible need for ureteral stent (and subsequent removal), and the utility of protocol biopsy and laboratory studies to evaluate for rejection or recurrent disease. We discussed the risk of graft rejection, our center's average graft and patient survival rates, immunosuppression protocols, as well as the potential opportunity to participate in clinical trials.  We also discussed the average length of stay, recovery process, and posttransplant lab and monitoring protocol.  I emphasized the need for strict immunosuppression medication adherence and the potential for complications of immunosuppression such as skin cancer or lymphoma, as well as a very low but not zero risk of donor-derived disease transmission risks (infection, cancer). Mr. Vee asked good questions and his candidacy will be reviewed at our Multidisciplinary Selection Committee. Thank you for the opportunity to participate in Mr. Vee's care.      Total time: 30 minutes    Kyle Augustine MD, PhD  Associate Professor of Surgery      ---------------------------------------------------------------------------------------------------    HPI: Mr. Vee has Chronic renal failure due to membranous GN and failing prior  LDKT. The patient is non-diabetic. Had a qualifying GFR of 19 during C diff infection but has now improved.    LDKT 2016    C Diff infection and cryptosporidium.  MMF changed to aza for diarrhea.  Now improved.  EBV viremia    IS:  Tac, aza     The patient is not on dialysis.    Has potential kidney donors:  No.  Interested in participation in paired exchange if a donor is willing: Doesn't know     The patient has the following pertinent history:       No    Yes  Dialysis:    []      [x] via:       Blood Transfusion                  []      [x]  Number of units:   Most recently:  Pregnancy:    [x]      [] Number:       Previous Transplant:  []      [x] Details:    Cancer   [x]      [] Comment:   Kidney stones   [x]      [] Comment:      Recurrent infections  [x]      []  Type:                  Bladder dysfunction  [x]      [] Cause:    Claudication   [x]      [] Distance:    Previous Amputation  [x]      [] Cause:     Chronic anticoagulation [x]      [] Indication:   Mosque  []      []      Past Medical History:   Diagnosis Date     Anemia in ESRD (end-stage renal disease) (H)      Antiplatelet or antithrombotic long-term use      Anxiety      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     Coronary artery disease      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) 08/07/2021     Dyslipidemia      End stage kidney disease (H)      Heart attack (H)     not sure     History of blood transfusion      Hypertension      Membranous glomerulonephritis 2002     PONV (postoperative nausea and vomiting)      PUD (peptic ulcer disease)      Secondary hyperparathyroidism (of renal origin)      Skin abnormalities      Stented coronary artery      Vitamin D deficiency      Past Surgical History:   Procedure Laterality Date     BIOPSY       CYSTOSCOPY, REMOVE STENT(S), COMBINED Right 02/23/2016    Procedure: COMBINED CYSTOSCOPY, REMOVE STENT(S);  Surgeon: Cody Temple MD;  Location: [facility] OR     IR RENAL BIOPSY  RIGHT  10/19/2021     IR RENAL BIOPSY RIGHT  06/03/2022     OPEN REDUCTION INTERNAL FIXATION HIP NAILING Right 12/06/2020    Procedure: OPEN REDUCTION INTERNAL FIXATION, FRACTURE, FEMUR, USING INTRAMEDULLARY SHU.;  Surgeon: Jimmy Stoner MD;  Location: [facility] OR     s/p right upper extremity AV fistula       TRANSPLANT      kidney transplant      VASCULAR SURGERY       Family History   Problem Relation Age of Onset     Breast Cancer Mother      Cancer - colorectal Father      Coronary Artery Disease Father      Hypertension Father      Breast Cancer Sister      Cancer Brother         Pancreatic CA     Social History     Socioeconomic History     Marital status:      Spouse name: Not on file     Number of children: 4     Years of education: Not on file     Highest education level: Not on file   Occupational History     Not on file   Tobacco Use     Smoking status: Never     Smokeless tobacco: Never   Substance and Sexual Activity     Alcohol use: Not Currently     Comment: Patient reports drinking beer on a rare ocassion.     Drug use: No     Sexual activity: Yes     Partners: Female   Other Topics Concern     Parent/sibling w/ CABG, MI or angioplasty before 65F 55M? Not Asked   Social History Narrative    Lives in [place] with wife and Vizla dog     Owns [employer]     Has 4 kids - one donated kidney    Walks everyday     Doesn't eat a lot of meat     Social Determinants of Health     Financial Resource Strain: Not on file   Food Insecurity: Not on file   Transportation Needs: Not on file   Physical Activity: Not on file   Stress: Not on file   Social Connections: Not on file   Intimate Partner Violence: Not on file   Housing Stability: Not on file       ROS:   CONSTITUTIONAL:  No fevers or chills  EYES: negative for icterus  ENT:  negative for hearing loss, tinnitus and sore throat  RESPIRATORY:  negative for cough, sputum, dyspnea  CARDIOVASCULAR:  negative for chest pain    GASTROINTESTINAL:  negative  for nausea, vomiting, diarrhea or constipation  GENITOURINARY:  negative for incontinence, dysuria, bladder emptying problems  HEME:  No easy bruising  INTEGUMENT:  negative for rash and pruritus  NEURO:  Negative for headache, seizure disorder  Allergies:   No Known Allergies  Medications:  Prescription Medications as of 2/14/2023       Rx Number Disp Refills Start End Last Dispensed Date Next Fill Date Owning Pharmacy    aspirin 81 MG EC tablet            Sig: Take 81 mg by mouth every morning    Class: Historical    Route: Oral    azaTHIOprine (IMURAN) 50 MG tablet  30 tablet 11 11/3/2022    [facility] - [place] [address] [address]    Sig: Take 1 tablet (50 mg) by mouth daily    Class: E-Prescribe    Notes to Pharmacy: TXP DT 1/21/2016 (Kidney) TXP Dischg DT 1/26/2016 DX Kidney replaced by transplant Z94.0 [facility] ([place], [place])    Route: Oral    carvedilol (COREG) 25 MG tablet  60 tablet 0 1/19/2023    [facility] - [place] [address] [address]    Sig: Take 1 tablet (25 mg) by mouth 2 times daily (with meals) Please call for follow up visit with [facility] for further refills. [phone]    Class: E-Prescribe    Route: Oral    ferrous sulfate (FEROSUL) 325 (65 Fe) MG tablet  45 tablet 3 1/11/2023 4/11/2023   [facility] - [place] [address] [address]    Sig: Take 1 tablet (325 mg) by mouth every other day for 90 days Take with breakfast    Class: E-Prescribe    Route: Oral    ondansetron (ZOFRAN) 4 MG tablet  10 tablet 0 12/19/2022    [id number] - [place], [place] - [facility] [address]    Sig: Take 1 tablet (4 mg) by mouth every 6 hours as needed for nausea or vomiting    Class: E-Prescribe    Route: Oral    PROGRAF (BRAND) 1 MG/ML suspension  25 mL 11 3/22/2022    [facility] - [place] [address]  [address]    Sig: Take 0.4 mLs (0.4 mg) by mouth 2 times daily    Class: E-Prescribe    Notes to Pharmacy: TXP DT 1/21/2016 (Kidney) TXP Dischg DT 1/26/2016 DX Kidney replaced by transplant Z94.0 TX Center [facility] [place] ([place], [place])    Route: Oral    sulfamethoxazole-trimethoprim (BACTRIM) 400-80 MG tablet  39 tablet 3 12/14/2022    [facility] - [place], [place] - [address]    Sig: Take 1 tablet by mouth Every Mon, Wed, Fri Morning    Class: E-Prescribe    Route: Oral    vitamin D3 (CHOLECALCIFEROL) 50 mcg (2000 units) tablet            Sig: Take 1 tablet by mouth every morning    Class: Historical    Route: Oral        Exam:   Constitutional - A&O in NAD.   Eyes - no redness or discharge.  Sclera anicteric  Respiratory - no cough, no labored breathing  Musculoskeletal - range of motion normal  Skin - no discoloration, no jaundice  Neurological - no tremors.  No facial droop or dysarthria  Psychiatric - normal mood and affect  The rest of a comprehensive physical examination is deferred due to PHE (public health emergency) video visit restrictions     Diagnostics:   Recent Results (from the past 672 hour(s))   Basic metabolic panel    Collection Time: 02/08/23 10:24 AM   Result Value Ref Range    Sodium 133 (L) 136 - 145 mmol/L    Potassium 5.1 3.4 - 5.3 mmol/L    Chloride 103 98 - 107 mmol/L    Carbon Dioxide (CO2) 26 22 - 29 mmol/L    Anion Gap 4 (L) 7 - 15 mmol/L    Urea Nitrogen 43.7 (H) 8.0 - 23.0 mg/dL    Creatinine 2.40 (H) 0.67 - 1.17 mg/dL    Calcium 9.4 8.8 - 10.2 mg/dL    Glucose 104 (H) 70 - 99 mg/dL    GFR Estimate 28 (L) >60 mL/min/1.73m2   CBC with platelets    Collection Time: 02/08/23 10:24 AM   Result Value Ref Range    WBC Count 2.3 (L) 4.0 - 11.0 10e3/uL    RBC Count 3.06 (L) 4.40 - 5.90 10e6/uL    Hemoglobin 9.3 (L) 13.3 - 17.7 g/dL    Hematocrit 30.8 (L) 40.0 - 53.0 %     (H) 78 - 100 fL    MCH 30.4 26.5 - 33.0 pg     MCHC 30.2 (L) 31.5 - 36.5 g/dL    RDW 14.9 10.0 - 15.0 %    Platelet Count 223 150 - 450 10e3/uL   Tacrolimus by Tandem Mass Spectrometry    Collection Time: 02/08/23 10:24 AM   Result Value Ref Range    Tacrolimus by Tandem Mass Spectrometry 5.5 5.0 - 15.0 ug/L    Tacrolimus Last Dose Date 2/7/2023     Tacrolimus Last Dose Time  9:30 PM    Hemoglobin A1c    Collection Time: 02/08/23 10:24 AM   Result Value Ref Range    Hemoglobin A1C 5.5 <5.7 %   [facility]; PMND1; 54361-5 (Laboratory Miscellaneous Order)    Collection Time: 02/08/23 10:24 AM   Result Value Ref Range    Performing Laboratory [facility]     Test Name 42456-0     Test Code PMND1    PMND1; 23218-9: [facility] Miscellaneous Test    Collection Time: 02/08/23 10:24 AM   Result Value Ref Range    [facility] Result SEE NOTE    Protein  random urine    Collection Time: 02/08/23 10:27 AM   Result Value Ref Range    Total Protein Urine mg/dL 130.1 (H) 1.0 - 14.0 mg/dL    Total Protein UR MG/MG CR 1.83 (H) 0.00 - 0.20 mg/mg Cr    Creatinine Urine mg/dL 71.0 mg/dL     [facility] cPRA   Date Value Ref Range Status   03/09/2021 70  Final     [facility] CPRA   Date Value Ref Range Status   06/03/2022 77  Final

## 2023-02-14 NOTE — PROGRESS NOTES
Kidney Transplant Evaluation - 2/14/2023  Called pt after virtual visit, he had no further questions at this time, has not yet reviewed pt education materials, encouraged to do so this week.     Summary  Pt attended virtual PKE 2/14/23    Candidacy category: yellow    Action/Plan: Pt will need to review MTP videos, will send email w/ link    Expected Selection Meeting Discussion: 2/22/23

## 2023-02-14 NOTE — LETTER
2/14/2023         RE: Marlo Vee  [address] [facility] [address]        Dear Colleague,    Thank you for referring your patient, Marlo Vee, to the [facility]. Please see a copy of my visit note below.    Transplant Surgery Consult Note     Medical record number: [id number]  YOB: 1950,   Consult requested for evaluation of kidney transplant candidacy.    Virtual visit    Joined the call at 2/14/2023, 10:29:16 am.  Left the call at 2/14/2023, 10:49:16 am.  You were on the call for 20 minutes .      Assessment and Recommendations:Mr. Vee appears to be a good candidate for kidney transplantation and has a good understanding of the risks and benefits of this approach to the management of renal failure. The following issues should be addressed prior to finalizing his transplant candidacy:     Mr. 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  Cardiology consult for cardiac risk stratification to be ordered: Yes  CT abdomen and pelvis without contrast to be ordered for assessment of vascular targets: NO   Dietician consult ordered: Yes  Social work consult ordered: Yes  Transplant listing labs ordered to include HLA, ABOx2, Cr, etc.  Imaging reports reviewed:  Transplant US, Hip CT  Radiology images reviewed: Hip CT  Recipient suitable to move forward with work up of living donors:  Yes  I reviewed CT hip and patient likely has adequate vascular target on left but would get iliac US.    At this point I would likely not place patient on active list.  If renal function starts to decline would consider 0MM offers or other ideal circumstances.  Over time may make more liberal in selection criteria.    The majority of our visit was spent in counselling, discussing the medical and surgical risks of kidney  transplantation. We discussed approximate wait time and how that is influenced by issues such as blood type and sensitization (PRA) and access to a living donor. I contrasted potential waiting time for living vs  donor kidneys from  normal (0-85%) or higher (%) kidney donor profile index (KDPI) donors and their associated outcomes. I would not recommend this individual to consider kidneys from high KDPI donors. The reason for this decision is best summarized as: improved long term graft survival. Potential surgical complications of kidney transplantation include bleeding, superficial or deep wound complications (infection, hernia, lymphocele), ureteral anastomotic failure (leak or stenosis), graft thrombosis, need for reoperation and other issues such as cardiac complications, pneumonia, deep venous thrombosis, pulmonary embolism, post transplant diabetes and death. The potential for recurrent disease or need for retransplantation was also addressed. We discussed the possible need for ureteral stent (and subsequent removal), and the utility of protocol biopsy and laboratory studies to evaluate for rejection or recurrent disease. We discussed the risk of graft rejection, our center's average graft and patient survival rates, immunosuppression protocols, as well as the potential opportunity to participate in clinical trials.  We also discussed the average length of stay, recovery process, and posttransplant lab and monitoring protocol.  I emphasized the need for strict immunosuppression medication adherence and the potential for complications of immunosuppression such as skin cancer or lymphoma, as well as a very low but not zero risk of donor-derived disease transmission risks (infection, cancer). Mr. Vee asked good questions and his candidacy will be reviewed at our Multidisciplinary Selection Committee. Thank you for the opportunity to participate in Mr. Vee's care.      Total time: 30  minutes    Kyle Augustine MD, PhD  Associate Professor of Surgery      ---------------------------------------------------------------------------------------------------    HPI: Mr. 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 The patient is non-diabetic. Had a qualifying GFR of 19 during C diff infection but has now improved.    LDKT 2016    C Diff infection and cryptosporidium.  MMF changed to aza for diarrhea.  Now improved.  EBV viremia    IS:  Tac, aza     The patient is not on dialysis.    Has potential kidney donors:  No.  Interested in participation in paired exchange if a donor is willing: Doesn't know     The patient has the following pertinent history:       No    Yes  Dialysis:    []      [x] via:       Blood Transfusion                  []      [x]  Number of units:   Most recently:  Pregnancy:    [x]      [] Number:       Previous Transplant:  []      [x] Details:    Cancer   [x]      [] Comment:   Kidney stones   [x]      [] Comment:      Recurrent infections  [x]      []  Type:                  Bladder dysfunction  [x]      [] Cause:    Claudication   [x]      [] Distance:    Previous Amputation  [x]      [] Cause:     Chronic anticoagulation [x]      [] Indication:   Orthodox  []      []      Past Medical History:   Diagnosis Date     Anemia in ESRD (end-stage renal disease) (H)      Antiplatelet or antithrombotic long-term use      Anxiety      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     Coronary artery disease      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) 08/07/2021     Dyslipidemia      End stage kidney disease (H)      Heart attack (H)     not sure     History of blood transfusion      Hypertension      Membranous glomerulonephritis 2002     PONV (postoperative nausea and vomiting)      PUD (peptic ulcer disease)      Secondary hyperparathyroidism (of renal origin)      Skin abnormalities      Stented coronary artery      Vitamin D  deficiency      Past Surgical History:   Procedure Laterality Date     BIOPSY       CYSTOSCOPY, REMOVE STENT(S), COMBINED Right 02/23/2016    Procedure: COMBINED CYSTOSCOPY, REMOVE STENT(S);  Surgeon: Cody Temple MD;  Location: [facility] OR     IR RENAL BIOPSY RIGHT  10/19/2021     IR RENAL BIOPSY RIGHT  06/03/2022     OPEN REDUCTION INTERNAL FIXATION HIP NAILING Right 12/06/2020    Procedure: OPEN REDUCTION INTERNAL FIXATION, FRACTURE, FEMUR, USING INTRAMEDULLARY SHU.;  Surgeon: Jimmy Stoner MD;  Location: [facility] OR     s/p right upper extremity AV fistula       TRANSPLANT      kidney transplant      VASCULAR SURGERY       Family History   Problem Relation Age of Onset     Breast Cancer Mother      Cancer - colorectal Father      Coronary Artery Disease Father      Hypertension Father      Breast Cancer Sister      Cancer Brother         Pancreatic CA     Social History     Socioeconomic History     Marital status:      Spouse name: Not on file     Number of children: 4     Years of education: Not on file     Highest education level: Not on file   Occupational History     Not on file   Tobacco Use     Smoking status: Never     Smokeless tobacco: Never   Substance and Sexual Activity     Alcohol use: Not Currently     Comment: Patient reports drinking beer on a rare ocassion.     Drug use: No     Sexual activity: Yes     Partners: Female   Other Topics Concern     Parent/sibling w/ CABG, MI or angioplasty before 65F 55M? 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     Has 4 kids - one donated kidney    Walks everyday     Doesn't eat a lot of meat     Social Determinants of Health     Financial Resource Strain: Not on file   Food Insecurity: Not on file   Transportation Needs: Not on file   Physical Activity: Not on file   Stress: Not on file   Social Connections: Not on file   Intimate Partner Violence: Not on file   Housing Stability: Not on file        ROS:   CONSTITUTIONAL:  No fevers or chills  EYES: negative for icterus  ENT:  negative for hearing loss, tinnitus and sore throat  RESPIRATORY:  negative for cough, sputum, dyspnea  CARDIOVASCULAR:  negative for chest pain    GASTROINTESTINAL:  negative for nausea, vomiting, diarrhea or constipation  GENITOURINARY:  negative for incontinence, dysuria, bladder emptying problems  HEME:  No easy bruising  INTEGUMENT:  negative for rash and pruritus  NEURO:  Negative for headache, seizure disorder  Allergies:   No Known Allergies  Medications:  Prescription Medications as of 2/14/2023       Rx Number Disp Refills Start End Last Dispensed Date Next Fill Date Owning Pharmacy    aspirin 81 MG EC tablet            Sig: Take 81 mg by mouth every morning    Class: Historical    Route: Oral    azaTHIOprine (IMURAN) 50 MG tablet  30 tablet 11 11/3/2022    [facility] - [place] [address] [address]    Sig: Take 1 tablet (50 mg) by mouth daily    Class: E-Prescribe    Notes to Pharmacy: TXP DT 1/21/2016 (Kidney) TXP Dischg DT 1/26/2016 DX Kidney replaced by transplant Z94.0 [facility] ([place], [place])    Route: Oral    carvedilol (COREG) 25 MG tablet  60 tablet 0 1/19/2023    [facility] - [place] [address] [address]    Sig: Take 1 tablet (25 mg) by mouth 2 times daily (with meals) Please call for follow up visit with Primary Care Clinic for further refills. [phone]    Class: E-Prescribe    Route: Oral    ferrous sulfate (FEROSUL) 325 (65 Fe) MG tablet  45 tablet 3 1/11/2023 4/11/2023   [facility] - [place] [address] [address]    Sig: Take 1 tablet (325 mg) by mouth every other day for 90 days Take with breakfast    Class: E-Prescribe    Route: Oral    ondansetron (ZOFRAN) 4 MG tablet  10 tablet 0 12/19/2022    [id number] - [place], [place] - [facility] [address]    Sig: Take 1 tablet (4 mg) by mouth every 6 hours as needed for nausea or vomiting    Class: E-Prescribe    Route: Oral    PROGRAF (BRAND) 1 MG/ML suspension  25 mL 11 3/22/2022    [facility] - [place], [place] - [address]    Sig: Take 0.4 mLs (0.4 mg) by mouth 2 times daily    Class: E-Prescribe    Notes to Pharmacy: TXP DT 1/21/2016 (Kidney) TXP Dischg DT 1/26/2016 DX Kidney replaced by transplant Z94.0 TX [facility] ([place] [place])    Route: Oral    sulfamethoxazole-trimethoprim (BACTRIM) 400-80 MG tablet  39 tablet 3 12/14/2022    [facility] - [place], [place] - [facility] [address]    Sig: Take 1 tablet by mouth Every Mon, Wed, Fri Morning    Class: E-Prescribe    Route: Oral    vitamin D3 (CHOLECALCIFEROL) 50 mcg (2000 units) tablet            Sig: Take 1 tablet by mouth every morning    Class: Historical    Route: Oral        Exam:   Constitutional - A&O in NAD.   Eyes - no redness or discharge.  Sclera anicteric  Respiratory - no cough, no labored breathing  Musculoskeletal - range of motion normal  Skin - no discoloration, no jaundice  Neurological - no tremors.  No facial droop or dysarthria  Psychiatric - normal mood and affect  The rest of a comprehensive physical examination is deferred due to PHE (public health emergency) video visit restrictions     Diagnostics:   Recent Results (from the past 672 hour(s))   Basic metabolic panel    Collection Time: 02/08/23 10:24 AM   Result Value Ref Range    Sodium 133 (L) 136 - 145 mmol/L    Potassium 5.1 3.4 - 5.3 mmol/L    Chloride 103 98 - 107 mmol/L    Carbon Dioxide (CO2) 26 22 - 29 mmol/L    Anion Gap 4 (L) 7 - 15 mmol/L    Urea Nitrogen 43.7 (H) 8.0 - 23.0 mg/dL    Creatinine 2.40 (H) 0.67 - 1.17 mg/dL    Calcium 9.4 8.8 - 10.2 mg/dL    Glucose 104 (H) 70 - 99 mg/dL    GFR Estimate 28 (L) >60 mL/min/1.73m2   CBC with platelets    Collection  Time: 02/08/23 10:24 AM   Result Value Ref Range    WBC Count 2.3 (L) 4.0 - 11.0 10e3/uL    RBC Count 3.06 (L) 4.40 - 5.90 10e6/uL    Hemoglobin 9.3 (L) 13.3 - 17.7 g/dL    Hematocrit 30.8 (L) 40.0 - 53.0 %     (H) 78 - 100 fL    MCH 30.4 26.5 - 33.0 pg    MCHC 30.2 (L) 31.5 - 36.5 g/dL    RDW 14.9 10.0 - 15.0 %    Platelet Count 223 150 - 450 10e3/uL   Tacrolimus by Tandem Mass Spectrometry    Collection Time: 02/08/23 10:24 AM   Result Value Ref Range    Tacrolimus by Tandem Mass Spectrometry 5.5 5.0 - 15.0 ug/L    Tacrolimus Last Dose Date 2/7/2023     Tacrolimus Last Dose Time  9:30 PM    Hemoglobin A1c    Collection Time: 02/08/23 10:24 AM   Result Value Ref Range    Hemoglobin A1C 5.5 <5.7 %   [facility]; PMND1; 02675-9 (Laboratory Miscellaneous Order)    Collection Time: 02/08/23 10:24 AM   Result Value Ref Range    Performing Laboratory [facility]     Test Name 42007-4     Test Code PMND1    PMND1; 85235-4: [facility] Miscellaneous Test    Collection Time: 02/08/23 10:24 AM   Result Value Ref Range    [facility] Result SEE NOTE    Protein  random urine    Collection Time: 02/08/23 10:27 AM   Result Value Ref Range    Total Protein Urine mg/dL 130.1 (H) 1.0 - 14.0 mg/dL    Total Protein UR MG/MG CR 1.83 (H) 0.00 - 0.20 mg/mg Cr    Creatinine Urine mg/dL 71.0 mg/dL     UNOS cPRA   Date Value Ref Range Status   03/09/2021 70  Final     UNOS CPRA   Date Value Ref Range Status   06/03/2022 77  Final           Again, thank you for allowing me to participate in the care of your patient.        Sincerely,        BAYRON

## 2023-02-14 NOTE — LETTER
2/14/2023         RE: Marlo Vee  [address] [facility] [address]        Dear Colleague,    Thank you for referring your patient, Marlo Vee, to the [facility]. Please see a copy of my visit note below.    Psychosocial Assessment For Kidney Transplantation  Patient Name/ Age: Marlo Vee 72 year old   Medical Record #: [id number]  Duration of Interview:     30 min  Process:   Phone Interview                (counseling < 50%)   Present at Appointment: John via virtual visit         : ROBIN Bowie  Date:  February 14, 2023        Type of transplant: Kidney     Donor type:      Cadaver   Prior Transplants:    Yes  Status of Transplant:           Current Living Situation    Location:   [address] [place] [id number]  With Whom: lives with their spouse       Family/ Social Support:    John resides in [place], [place] with his spouse Salo. They have four adult children- Sharee, Yenifer, Al and Dustin. Their children are all . John has nine grandchildren. It is important to note that only one adult child lives in [place]. John has no existing parents and one sister.     His spouse Salo to be post transplant support.  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     Income   Salary/wages, SS correction, Savings and Spouse's Income   Insurance    Medicare A, B and BCBS with Part D  At this time, patient can afford medication costs:  Yes  Private Insurance and Medicare       Medical Status    Current Mode of Treatment for ESRD None    Complications None       Behavioral    Tobacco Use No Chemical Dependency No         Psychiatric Impairment No      Reading Ability: Good  Education Level: Bachelors Degree Recent Legal History No      Coping Style/Strategies: golfing and being outdoors        Ability to Adhere to Complex Medical Regime: Yes     Adherence History:        Education  _X_ Medicare  _X_ Rehabilitation  _X_ Donor issues  _X_ Community resources  _X_ Post discharge housing  _X_ Financial resources  _X_ Medical insurance options  _X_ Psych adjustment  _X_ Family adjustment  _X_ Health Care Directive Provided Education   Psychosocial Risks of Transplant Reviewed and Discussed:  _X_ Increased stress related to emotional,            family, social, employment or financial           situation  _X_ Effect on work and/or disability benefits  _X_ Effect on future health and life           insurance  _X_ Transplant outcome expectations may           not be met  _X_ Mental Health Risks: anxiety,           depression, PTSD, guilt, grief and           chronic fatigue     Notable Items:   None noted.       Final Evaluation/Assessment   Patient seemed to process information well. Appeared well informed, motivated and able to follow post transplant requirements. Behavior was appropriate during interview. Has adequate income and insurance coverage. Adequate social support. No major contraindications noted for transplant.  At this time patient appears to understand the risks and benefits of transplant.      Recommendation  Acceptable    Selection Criteria Met:  Plan for support Yes   Chemical Dependence Yes   Smoking Yes   Mental Health Yes   Adequate Finances Yes    Signature: ROBIN Bowie    Title: Clinical        Again, thank you for allowing me to participate in the care of your patient.        Sincerely,        BAYRON

## 2023-02-14 NOTE — PROGRESS NOTES
Psychosocial Assessment For Kidney Transplantation  Patient Name/ Age: Marlo Vee 72 year old   Medical Record #: [id number]  Duration of Interview:     30 min  Process:   Phone Interview                (counseling < 50%)   Present at Appointment: John via virtual visit         : ROBIN Bowie  Date:  February 14, 2023        Type of transplant: Kidney     Donor type:      Cadaver   Prior Transplants:    Yes  Status of Transplant:           Current Living Situation    [facility]   [facility] [address]  With Whom: lives with their spouse       Family/ Social Support:    John resides in [place], [place] with his spouse Salo. They have four adult children- Sharee, Yenifer, Al and Dustin. Their children are all . John has nine grandchildren. It is important to note that only one adult child lives in [place]. John has no existing parents and one sister.     His spouse Salo to be post transplant support.  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     Income   Salary/wages, SS nursing home, Savings and Spouse's Income   Insurance    Medicare A, B and BCBS with Part D  At this time, patient can afford medication costs:  Yes  Private Insurance and Medicare       Medical Status    Current Mode of Treatment for ESRD None   Complications None       Behavioral    Tobacco Use No Chemical Dependency No         Psychiatric Impairment No      Reading Ability: Good  Education Level: Bachelors Degree Recent Legal History No      Coping Style/Strategies: golfing and being outdoors         Ability to Adhere to Complex Medical Regime: Yes     Adherence History:        Education  _X_ Medicare  _X_ Rehabilitation  _X_ Donor issues  _X_ Community resources  _X_ Post discharge housing  _X_ Financial resources  _X_ Medical insurance options  _X_ Psych adjustment  _X_ Family adjustment  _X_ Health Care Directive Provided Education   Psychosocial Risks of Transplant Reviewed and Discussed:  _X_ Increased stress related to emotional,            family, social, employment or financial           situation  _X_ Effect on work and/or disability benefits  _X_ Effect on future health and life           insurance  _X_ Transplant outcome expectations may           not be met  _X_ Mental Health Risks: anxiety,           depression, PTSD, guilt, grief and           chronic fatigue     Notable Items:   None noted.       Final Evaluation/Assessment   Patient seemed to process information well. Appeared well informed, motivated and able to follow post transplant requirements. Behavior was appropriate during interview. Has adequate income and insurance coverage. Adequate social support. No major contraindications noted for transplant.  At this time patient appears to understand the risks and benefits of transplant.      Recommendation  Acceptable    Selection Criteria Met:  Plan for support Yes   Chemical Dependence Yes   Smoking Yes   Mental Health Yes   Adequate Finances Yes    Signature: ROBIN Bowie    Title: Clinical

## 2023-02-14 NOTE — PROGRESS NOTES
John is a 72 year old who is being evaluated via a billable video visit.      How would you like to obtain your AVS? MyChart  If the video visit is dropped, the invitation should be resent by: Text to cell phone: [phone]  Will anyone else be joining your video visit? No        Video-Visit Details    Type of service:  Video Visit   Video Start Time: 8:30 am   Video End Time:9:10 am     Originating Location (pt. Location): Home    Distant Location (provider location):  Off-site  Platform used for Video Visit: [facility]    TRANSPLANT NEPHROLOGY RECIPIENT EVALUATION NOTE    Assessment and Plan:  # Kidney Transplant Evaluation: Patient is a good candidate overall. Benefits of a living donor transplant were discussed.    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 Maintained on Aza and tacrolimus for IS.  No donors, ABO-A, cPRA 77%. When ready, he would likely benefit from another kidney transplant.     # Cardiac Risk:   - multivessel CAD, s/p PCI with ALANNA placements to p/m/d RCA, Mid LAD and D1 in 2015.   - HFpEF, 55-60% as of 2019 (charisse ~ 35% in 2015).   - will need risk assessment     # PAD Screening: per surgery.     # Chronic Diarrhea: previously diagnosed with Clostridium difficile and also cryptosporidium and  treated for both. Changed from mycophenolate to azathioprine and symptoms slowly improved over time.     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      # Nonmelanoma skin cancer s/p Mohs: will need to see derm if not in the past year.     # Health Maintenance: Colonoscopy: Not up to date and Dental: Up to date    Discussed the risks and benefits of a transplant, including the risk of surgery and immunosuppression medications.  Patient's overall evaluation will be discussed in the Transplant Program's regular meeting with a final  recommendation on the patients suitability for transplant to be made at that time.    Pending completion of the full evaluation, patient presently appears to be enough of an acceptable kidney transplant recipient candidate to have any potential kidney donors start the evaluation process.      Evaluation:  Marlo Vee was seen in consultation at the request of Dr. Kyle Augustine for evaluation as a potential kidney transplant recipient.    Reason for Visit:  Marlo Vee is a 72 year old male with failing LDKT , who presents for kidney transplant evaluation.    History of Present Illness:    Kidney Disease Hx  Marlo Vee is a 72-year-old male with history of membranous nephropathy s/p LDKT in Jan 2016. Did require HD for about a year prior to transplant. In 2021 his course was c/b diarrhea issues r/t Clostridium difficile and also cryptosporidium.  He was treated for both and changed from mycophenolate to azathioprine and symptoms slowly improved over time. Creatinine increased to 1.8-1.9 from a baseline of 1.0. This past December he had COVID c/b dehydration/hypotension/damon with a creatinine up to 3.2 on 1/4/23, which has since recovered to 2.4 as of 2/8/23. Overall, feeling well and recovered from COVID. Working and exercising regularly. No potential donors this time, ABO-A, cPRA 77%.          Kidney Disease Dx: Membranous nephropathy ([place]) S/p failing LDKT (2016)        On Dialysis: No       Primary Nephrologist: [facility].        H/o Kidney Stones: No       H/o Recurrent/Frequent UTI: No         Diabetic Hx: None           Cardiac/Vascular Disease Risk Factors:    - multivessel CAD, s/p PCI with ALANNA placements to p/m/d RCA, Mid LAD and D1.   - HFpEF, 55-60% in 2019 (charisse ~ 35% in 2015)         Viral Serology Status       CMV IgG Antibody: Positive       EBV IgG Antibody: Positive         Volume Status/Weight:        Volume status: Not assessed       Weight:  Acceptable BMI       BMI: Body  mass index is 21.52 kg/m .         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 Stays active maintaining his cabin. Bikes for exercise on his bike on a regular basis. No chest pains or shortness of breath.      Fatigue/Decreased Energy: [x] No [] Yes    Chest Pain or SOB with Exertion: [x] No [] Yes    Significant Weight Change: [x] No [] Yes    Nausea, Vomiting or Diarrhea: [x] No [] Yes    Fever, Sweats or Chills:  [x] No [] Yes    Leg Swelling [x] No [] Yes        History of Cancer: None      Allergy Testing Questions:   Medication that caused a reaction None   Antibiotics used that didn't give an allergic reaction?  None    COVID Vaccination Up To Date: Yes    Potential Living Kidney Donors: No    Review of Systems:  A comprehensive review of systems was obtained and negative, except as noted in the HPI or PMH.    Past Medical History:   Medical record was reviewed and PMH was discussed with patient and noted below.  Past Medical History:   Diagnosis Date     Anemia in ESRD (end-stage renal disease) (H)      Antiplatelet or antithrombotic long-term use      Anxiety      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     Coronary artery disease      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) 08/07/2021     Dyslipidemia      End stage kidney disease (H)      Heart attack (H)     not sure     History of blood transfusion      Hypertension      Membranous glomerulonephritis 2002     PONV (postoperative nausea and vomiting)      PUD (peptic ulcer disease)      Secondary hyperparathyroidism (of renal origin)      Skin abnormalities      Stented coronary artery      Vitamin D deficiency        Past Social History:   Past Surgical History:   Procedure Laterality Date     BIOPSY       CYSTOSCOPY, REMOVE STENT(S), COMBINED Right 02/23/2016    Procedure: COMBINED CYSTOSCOPY, REMOVE STENT(S);  Surgeon: Cody Temple MD;  Location: [facility] OR     IR RENAL BIOPSY RIGHT  10/19/2021     IR  RENAL BIOPSY RIGHT  06/03/2022     OPEN REDUCTION INTERNAL FIXATION HIP NAILING Right 12/06/2020    Procedure: OPEN REDUCTION INTERNAL FIXATION, FRACTURE, FEMUR, USING INTRAMEDULLARY SHU.;  Surgeon: Jimmy Stoner MD;  Location: [facility] OR     s/p right upper extremity AV fistula       TRANSPLANT      kidney transplant      VASCULAR SURGERY       Personal history of bleeding or anesthesia problems: No    Family History:  Family History   Problem Relation Age of Onset     Breast Cancer Mother      Cancer - colorectal Father      Coronary Artery Disease Father      Hypertension Father      Breast Cancer Sister      Cancer Brother         Pancreatic CA       Personal History:   Social History     Socioeconomic History     Marital status:      Spouse name: Not on file     Number of children: 4     Years of education: Not on file     Highest education level: Not on file   Occupational History     Not on file   Tobacco Use     Smoking status: Never     Smokeless tobacco: Never   Substance and Sexual Activity     Alcohol use: Not Currently     Comment: Patient reports drinking beer on a rare ocassion.     Drug use: No     Sexual activity: Yes     Partners: Female   Other Topics Concern     Parent/sibling w/ CABG, MI or angioplasty before 65F 55M? 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     Has 4 kids - one donated kidney    Walks everyday     Doesn't eat a lot of meat     Social Determinants of Health     Financial Resource Strain: Not on file   Food Insecurity: Not on file   Transportation Needs: Not on file   Physical Activity: Not on file   Stress: Not on file   Social Connections: Not on file   Intimate Partner Violence: Not on file   Housing Stability: Not on file       Allergies:  No Known Allergies    Medications:  Current Outpatient Medications   Medication Sig     aspirin 81 MG EC tablet Take 81 mg by mouth every morning     azaTHIOprine (IMURAN)  "50 MG tablet Take 1 tablet (50 mg) by mouth daily     carvedilol (COREG) 25 MG tablet Take 1 tablet (25 mg) by mouth 2 times daily (with meals) Please call for follow up visit with Primary Care Clinic for further refills. [phone]     ferrous sulfate (FEROSUL) 325 (65 Fe) MG tablet Take 1 tablet (325 mg) by mouth every other day for 90 days Take with breakfast     ondansetron (ZOFRAN) 4 MG tablet Take 1 tablet (4 mg) by mouth every 6 hours as needed for nausea or vomiting     PROGRAF (BRAND) 1 MG/ML suspension Take 0.4 mLs (0.4 mg) by mouth 2 times daily     sulfamethoxazole-trimethoprim (BACTRIM) 400-80 MG tablet Take 1 tablet by mouth Every Mon, Wed, Fri Morning     vitamin D3 (CHOLECALCIFEROL) 50 mcg (2000 units) tablet Take 1 tablet by mouth every morning     No current facility-administered medications for this visit.       Vitals:  Ht 1.778 m (5' 10\")   Wt 68 kg (150 lb)   BMI 21.52 kg/m      Exam:  GENERAL APPEARANCE: alert and no distress  HENT: no obvious abnormalities on appearance  RESP: breathing appears unremarkable with normal rate, no audible wheezing or cough and no apparent shortness of breath with conversation  MS: extremities normal - no gross deformities noted  SKIN: no apparent rash and normal skin tone  NEURO: speech is clear with no obvious neurological deficits  PSYCH: mentation appears normal and affect normal    Results:   Recent Results (from the past 336 hour(s))   Basic metabolic panel    Collection Time: 02/08/23 10:24 AM   Result Value Ref Range    Sodium 133 (L) 136 - 145 mmol/L    Potassium 5.1 3.4 - 5.3 mmol/L    Chloride 103 98 - 107 mmol/L    Carbon Dioxide (CO2) 26 22 - 29 mmol/L    Anion Gap 4 (L) 7 - 15 mmol/L    Urea Nitrogen 43.7 (H) 8.0 - 23.0 mg/dL    Creatinine 2.40 (H) 0.67 - 1.17 mg/dL    Calcium 9.4 8.8 - 10.2 mg/dL    Glucose 104 (H) 70 - 99 mg/dL    GFR Estimate 28 (L) >60 mL/min/1.73m2   CBC with platelets    Collection Time: 02/08/23 10:24 AM   Result Value " Ref Range    WBC Count 2.3 (L) 4.0 - 11.0 10e3/uL    RBC Count 3.06 (L) 4.40 - 5.90 10e6/uL    Hemoglobin 9.3 (L) 13.3 - 17.7 g/dL    Hematocrit 30.8 (L) 40.0 - 53.0 %     (H) 78 - 100 fL    MCH 30.4 26.5 - 33.0 pg    MCHC 30.2 (L) 31.5 - 36.5 g/dL    RDW 14.9 10.0 - 15.0 %    Platelet Count 223 150 - 450 10e3/uL   Tacrolimus by Tandem Mass Spectrometry    Collection Time: 02/08/23 10:24 AM   Result Value Ref Range    Tacrolimus by Tandem Mass Spectrometry 5.5 5.0 - 15.0 ug/L    Tacrolimus Last Dose Date 2/7/2023     Tacrolimus Last Dose Time  9:30 PM    Hemoglobin A1c    Collection Time: 02/08/23 10:24 AM   Result Value Ref Range    Hemoglobin A1C 5.5 <5.7 %   [facility]; PMND1; 27560-3 (Laboratory Miscellaneous Order)    Collection Time: 02/08/23 10:24 AM   Result Value Ref Range    Performing Laboratory [facility]     Test Name 83763-7     Test Code PMND1    PMND1; 99168-1: [facility] Miscellaneous Test    Collection Time: 02/08/23 10:24 AM   Result Value Ref Range    [facility] Result SEE NOTE    Protein  random urine    Collection Time: 02/08/23 10:27 AM   Result Value Ref Range    Total Protein Urine mg/dL 130.1 (H) 1.0 - 14.0 mg/dL    Total Protein UR MG/MG CR 1.83 (H) 0.00 - 0.20 mg/mg Cr    Creatinine Urine mg/dL 71.0 mg/dL

## 2023-02-22 ENCOUNTER — COMMITTEE REVIEW (OUTPATIENT)
Dept: TRANSPLANT | Facility: CLINIC | Age: 73
End: 2023-02-22
Payer: MEDICARE

## 2023-02-22 LAB
MAYO MISC RESULT: NORMAL
SCANNED LAB RESULT: NORMAL
TEST NAME: NORMAL

## 2023-02-22 NOTE — COMMITTEE REVIEW
Abdominal Committee Review Note     Evaluation Date: 2/14/2023  Committee Review Date: 2/22/2023    Organ being evaluated for: Kidney    Transplant Phase: Evaluation  Transplant Status: Active    Transplant Coordinator: Adilia Israel  Transplant Surgeon:   Masoud Alexandre    Referring Physician: Mason Jack    Primary Diagnosis: Membranous Nephropathy  Secondary Diagnosis:     Committee Review Members:  Bea, Radha Angulo RD   Nephrology Salo Ramesh, APRN CNP, Norma Moya MD, Samuel Varela MD   Pharmacy Nick Robertson, [facility], Lucille Bruce, [facility]    - Clinical Emily Sandi Kemp, [facility], Kira Paulson, [facility]   Transplant Evelyn Fernández, HEATHER, Jada José, RN, Jeanine Westbrook, RN, Kira Powers, RN, Vaishnavi Ma, NP, Ashley Faulkner, RN, Alecia Pham, HEATHER, Vernell Sykes MD, Deborah Armenta, RN   Transplant Surgery Vernell Sykes MD       Transplant Eligibility: Irreversible chronic kidney disease treated w/dialysis or expected need for dialysis    Committee Review Decision: Approved    Relative Contraindications: None    Absolute Contraindications: None    Committee Chair Vernell Sykes MD verbally attested to the committee's decision.    Committee Discussion Details: Reviewed pt's medical status and evaluation results to date.  Patient is determined to be a good candidate for kidney transplant after his transplanted kidney suffered reduced function due to hypotension during COVID infection, as well as MARCOS episodes in the last year due to diarrheal illness. Because his function has improved, the selection committee has decided to approve patient for inactive listing on the kidney transplant wait list, and bring patient back from in person surgeon visit with baseline testing once his GFR reaches low 20's.      Will be listed inactive status at this time. They will be rediscussed once the above are complete for consideration of  active status.

## 2023-02-23 ENCOUNTER — TELEPHONE (OUTPATIENT)
Dept: TRANSPLANT | Facility: CLINIC | Age: 73
End: 2023-02-23

## 2023-02-23 ASSESSMENT — ENCOUNTER SYMPTOMS: NEW SYMPTOMS OF CORONARY ARTERY DISEASE: 0

## 2023-02-23 NOTE — LETTER
02/23/23        Marlo Vee  [address] [address]        Dear Marlo,    It was a pleasure to see you recently for consideration of kidney transplantation. Your pre-transplant evaluation results were reviewed at our Multidisciplinary Selection Committee. The committee is requesting the following items are completed as a part of your evaluation:    1. Continue to follow up with your post-transplant team for management of your kidney transplant. If your function declines, further work up may be requested before activating you on the wait list, but no further work up is indicated at this time.      For any questions, please contact the Transplant Office at [phone].      Sincerely,    Deborah Armenta RN BSN CCTN  Solid Organ Transplant  [facility], [facility]

## 2023-02-23 NOTE — LETTER
2023    Marlo FLORES Mariusz  [address] [facility] [place], [place] [address]      Dear Mr. Vee,    This letter is sent to confirm that you are a candidate in the kidney transplant program at the [facility].  You were placed on the kidney inactive waitlist on 23.  This means you will accumulate waiting time but not receive  donor calls.       Items we will need from you:      We have received approval from you insurance company for the transplant procedure.  It is critical that you notify us if there is any change in your insurance.  It is also important that you familiarize yourself with the details of your specific insurance policy.  Our patient  is available to assist you if you should have any questions regarding your coverage.      An ALA or PRA blood sample may need to be sent here every 3 to 6 months to match you with  donors or any potential living donors.  If you need this testing, special mailing boxes (called mailers) will be sent to you directly from the Outreach Department.  You should take the physician order form and the  to your home laboratory when it is time to for this testing to be done.  Additional mailers can be obtained by calling the Transplant Office and asking to speak to a kidney .      During this waiting period, we may request additional periodic laboratory tests with your primary physician.  It will be your responsibility to remind your physician to forward your results to the Transplant Office.      We need to be kept informed of any changes in your medical condition such as:    o changes in your medications,   o significant changes in your health  o significant infections (such as pneumonia or abscesses)  o blood transfusions  o any condition which requires hospitalization  o any surgery      Remember to complete any routine cancer screening tests  required before your transplant.  This includes colonoscopy; prostrate screening for men, and mammogram and gynecologic testing for women, as well as dental work.  Your primary care clinic can assist you with arranging for these exams.  Remind your caregivers to forward copies of the records and final reports.    We want you to know that our program has physician and surgeon coverage 24 hours a day, 365 days a year. In addition, our transplant surgeons and physicians will not be on call for two or more transplant programs more than 30 miles apart unless the circumstances have been reviewed and approved by the [employer] Organ [employer]aring (UNOS) Membership and Professional Standards Committee (MPSC). Finally, our primary physician and primary surgeons are not designated as the primary surgeon or primary physician at more than 1 transplant hospital. If this coverage changes or there are substantial program changes, you will be notified in writing by letter.     Attached is a letter from UN[employer] that describes the services and information offered to patients by UNOS and the Organ Procurement and Transplantation Network (OPTN).    We appreciate having had the opportunity to participate in your care.  If you have questions, please feel free to call the Transplant Office at [phone] or [phone].      Sincerely,       Kidney Transplant Program    Enclosures: OS Letter  CC:   Samuel Varela MD- transplant nephrology; Onelia Richardson RN- post-transplant coordinator                                The Organ Procurement and Transplantation Network  Toll-free patient services line:     Your resource for organ transplant information    If you have a question regarding your own medical care, you always should call your transplant hospital first. However, for general organ transplant-related information, you can call the Organ Procurement and Transplantation Network (OPTN) toll-free patient services line at [phone]  [phone]. Anyone, including potential transplant candidates, candidates, recipients, family members, friends, living donors, and donor family members, can call this number to:          Talk about organ donation, living donation, the transplant process, the donation process, and transplant policies.    Get a free patient information kit with helpful booklets, waiting list and transplant information, and a list of all transplant hospitals.    Ask questions about the OPTN website (https://optn.transplant.hrsa.gov/), the [employer] for Organ Sharing s (UNOS) website (https://unos.org/), or the UNOS website for living donors and transplant recipients. (https://www.transplantliving.org/).    Learn how the OPTN can help you.    Talk about any concerns that you may have with a transplant hospital.    The [facility] transplant system, the OPTN, is managed under federal contract by the [employer] Organ [employer]aring (UNOS), which is a non-profit charitable organization. The OPTN helps create and define organ sharing policies that make the best use of donated organs. This process continuously evaluating new advances and discoveries so policies can be adapted to best serve patients waiting for transplants. To do so, the OPTN works closely with transplant professionals, transplant patients, transplant candidates, donor families, living donors, and the public. All transplant programs and organ procurement organizations throughout the country are OPTN members and are obligated to follow the policies the OPTN creates for allocating organs.    The OPTN also is responsible for:      Providing educational material for patients, the public, and professionals.    Raising awareness of the need for donated organs and tissue.    Coordinating organ procurement, matching, and placement.    Collecting information about every organ transplant and donation that occurs in the [place].    Remember, you should contact your transplant  hospital directly if you have questions or concerns about your own medical care including medical records, work-up progress, and test results.    We are not your transplant hospital, and our staff will not be able to answer questions about your case, so please keep your transplant hospital s phone number handy.    However, while you research your transplant needs and learn as much as you can about transplantation and donation, we welcome your call to our toll-free patient services line at [phone].          Updated 4/1/2019

## 2023-02-23 NOTE — TELEPHONE ENCOUNTER
Contacted patient to review outcome of selection committee meeting (See selection committee encounter).   Explained to patient that he/she needs to complete all components of the evaluation to be eligible for active status on the waiting list or to proceed with a live donor kidney transplant.   Reviewed next steps based on outcomes:   Patient will be listed as inactive (is not on dialysis and evaluation is not complete)-will receive:   -An Evaluation Summary Letter indicating what is needed to complete evaluation-discussed with patient if they would like to have testing done with [facility] or locally   -A listing letter indicating inactive status   -A PRA Order   -PRA Mailers  Confirmed with patient that on successful completion of outstanding components, patient is eligible for active status and they will receive a follow-up call.   Confirmed that patient has contact information for additional questions or concerns.     Discussed next steps with patient. He is approved to be listed inactive. No other plans moving forward unless his kidney function declines, at which point he should see a surgeon in person and be discussed for need for repeat imaging, cardiology consult, etc. John is in good understanding of the plan moving forward.

## 2023-03-10 PROBLEM — B27.90 EBV INFECTION: Status: ACTIVE | Noted: 2022-11-01

## 2023-03-13 DIAGNOSIS — Z94.0 KIDNEY TRANSPLANTED: ICD-10-CM

## 2023-03-13 RX ORDER — CARVEDILOL 25 MG/1
25 TABLET ORAL 2 TIMES DAILY WITH MEALS
Qty: 60 TABLET | Refills: 0 | Status: SHIPPED | OUTPATIENT
Start: 2023-03-13 | End: 2023-01-01

## 2023-03-13 NOTE — TELEPHONE ENCOUNTER
carvedilol (COREG) 25 MG tablet      Last Written Prescription Date:  1/19/23  Last Fill Quantity: 60,   # refills: 0  Last Office Visit : 9/24/21  Future Office visit:  0    Routing refill request to provider for review/approval because:    Pt is outside of RTC timeframe  Now scheduled for follow-up 3/23/23

## 2023-03-13 NOTE — TELEPHONE ENCOUNTER
[facility] Call Center    Phone Message    May a detailed message be left on voicemail: yes     Reason for Call: Medication Refill Request    Has the patient contacted the pharmacy for the refill? Yes   Name of medication being requested: carvedilol (COREG) 25 MG tablet     Provider who prescribed the medication: Sally Dumont NP  Pharmacy:       Patient is going out of town and needs this by Thursday. He needs to know if he can have this or not.        [facility] - [place], [place] - [address]        Date medication is needed: ASAP           Action Taken: Message routed to:  [facility]    Travel Screening: Not Applicable

## 2023-03-23 ENCOUNTER — OFFICE VISIT (OUTPATIENT)
Dept: INTERNAL MEDICINE | Facility: CLINIC | Age: 73
End: 2023-03-23
Payer: MEDICARE

## 2023-03-23 VITALS
HEIGHT: 70 IN | HEART RATE: 67 BPM | BODY MASS INDEX: 21.43 KG/M2 | SYSTOLIC BLOOD PRESSURE: 121 MMHG | DIASTOLIC BLOOD PRESSURE: 76 MMHG | WEIGHT: 149.7 LBS | OXYGEN SATURATION: 100 %

## 2023-03-23 DIAGNOSIS — I25.10 CAD, MULTIPLE VESSEL: ICD-10-CM

## 2023-03-23 DIAGNOSIS — Z00.00 PREVENTATIVE HEALTH CARE: Primary | ICD-10-CM

## 2023-03-23 DIAGNOSIS — Z94.0 HTN, KIDNEY TRANSPLANT RELATED: ICD-10-CM

## 2023-03-23 DIAGNOSIS — I15.1 HTN, KIDNEY TRANSPLANT RELATED: ICD-10-CM

## 2023-03-23 PROCEDURE — 99214 OFFICE O/P EST MOD 30 MIN: CPT | Mod: GC

## 2023-03-23 RX ORDER — CARVEDILOL 25 MG/1
25 TABLET ORAL 2 TIMES DAILY WITH MEALS
Qty: 90 TABLET | Refills: 3 | Status: SHIPPED | OUTPATIENT
Start: 2023-03-23 | End: 2023-01-01

## 2023-03-23 NOTE — PROGRESS NOTES
Medicare Annual Wellness Questionnaire:  This 72 year old year old male presents for a Medicare Wellness Exam.    Fall Risk Assessment:  Have you fallen 2 or more times in the last year?  No    How many times were you injured due to a fall in the last year?  No    PHQ-2:  Over the last 2 weeks, how often have you been bothered by feeling down, depressed, or hopeless?  Not at all (0)     Over the last 2 weeks, how often have you had little interest or pleasure in doing things?  Not at all (0)    Social History:  What is your marital status?      Who lives in your household?  Wife    Does your home have loose rugs in the hallway:     Yes     Does your home have grab bars in the bathroom:    No    Does your home have handrails on the stairs?  Yes     Does your home have poorly lit areas?    No    Do you feel threatened or controlled by a partner, ex-partner or anyone in your life?   No    Has anyone hurt you physically, for example by pushing, hitting, slapping or kicking you or forcing you to have sex?   No    Do you need help with the phone, transportation, shopping, preparing meals, housework, laundry, medications or managing money?   No    Sexual Health:  Are you sexually active?    Yes     If yes, with men, women, or both?     Women    If yes, how many partners?  1    If yes, are you using condoms?    No    Have you had any sexually transmitted infections in the last year?   No    Do you have any sexual concerns?    No    General Health Assessment:  Have you noticed any hearing difficulties?   No    Do you wear hearing aids?   No    Have you seen a hearing professional such as an audiologist in the last 1 year?   Yes     Do you have vision difficulty?    No    Do you wear glasses or contacts?   Yes     Have you seen an eye doctor in the last 1 year?   Yes     How many servings of fruits and vegetables do you eat a day?  Fruit: 1-2  Vegetables: 1-2    How often do you exercise in a week?  Daily    How long  and what kind of exercise do you do?  Walking, bike machine; 20-30 minutes    Tobacco and Alcohol History:  Do you use tobacco/nicotine products?    No    If yes, please list the method of use and average weekly consumption?  N/A    Do you use any other drugs?   No         Do you drink alcohol?   Yes     If you drink alcohol, how many drinks per week?  0-1    Advance Directive:  Have you completed an Advance Directives document?  No    If yes, have you given a copy to the clinic?   N/A    Do you need information on Advance Directives?   No

## 2023-03-23 NOTE — NURSING NOTE
Marlo Vee is a 72 year old male patient that presents today in clinic for the following:    Chief Complaint   Patient presents with     Physical     Medication Refill     Carvedilol refill     The patient's allergies and medications were reviewed as noted. A set of vitals were recorded as noted without incident. The patient does not have any other questions for the provider.    Irma Mccarty, EMT at 8:18 AM on 3/23/2023

## 2023-03-23 NOTE — PROGRESS NOTES
[facility]  Internal Medicine    Chief Complaint   Patient presents with     Physical     Medication Refill     Carvedilol refill       Primary Care Provider: Dr. Adams Bailey    Subjective:    HPI:  Marlo Vee is a/an 72 year old male with a past medical history as noted below, who presents today with above chief complaint. The patient reports he is doing okay and has no complaints other than wanting a refill for his Carvedilol. He was told he needed an appointment in order to have this filled.  He says that this medication is being used for hypertension.  He also reports he is due for a physical and would like a new primary care provider as his current one is retiring. He denies any fevers, dyspnea, chest pain, abdominal pain or weight changes. He also reports he has been trying to gain weight but hasn't been able to and has had episodes of acid reflux when he eats too much. He states he used to be ~170 pounds before he got C. difficile. He also had COVID over [place] and does not currently have any lingering symptoms. He has no further concerns or questions at this time.     Review of systems:  See HPI    Patient Active Problem List   Diagnosis     HTN, kidney transplant related     Membranous glomerulonephritis     Anemia in stage 4 chronic kidney disease (H)     Secondary renal hyperparathyroidism (H)     Vitamin D deficiency     Dyslipidemia     CAD, multiple vessel     Kidney replaced by transplant     Immunosuppression (H)     Aftercare following organ transplant     Impaired fasting glucose     Erectile dysfunction     Elevated serum creatinine     Persistent proteinuria     EBV infection     Past Medical History:   Diagnosis Date     Anemia in ESRD (end-stage renal disease) (H)      Antiplatelet or antithrombotic long-term use      Anxiety      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     Coronary artery disease      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) 08/07/2021      Dyslipidemia      EBV infection 11/1/2022    Blood     End stage kidney disease (H)      Heart attack (H)     not sure     History of blood transfusion      Hypertension      Membranous glomerulonephritis 2002     PONV (postoperative nausea and vomiting)      PUD (peptic ulcer disease)      Secondary hyperparathyroidism (of renal origin)      Skin abnormalities      Stented coronary artery      Vitamin D deficiency      Past Surgical History:   Procedure Laterality Date     BIOPSY       CYSTOSCOPY, REMOVE STENT(S), COMBINED Right 02/23/2016    Procedure: COMBINED CYSTOSCOPY, REMOVE STENT(S);  Surgeon: Cody Temple MD;  Location: [facility] OR     IR RENAL BIOPSY RIGHT  10/19/2021     IR RENAL BIOPSY RIGHT  06/03/2022     OPEN REDUCTION INTERNAL FIXATION HIP NAILING Right 12/06/2020    Procedure: OPEN REDUCTION INTERNAL FIXATION, FRACTURE, FEMUR, USING INTRAMEDULLARY SHU.;  Surgeon: Jimmy Stoner MD;  Location: [facility] OR     s/p right upper extremity AV fistula       TRANSPLANT      kidney transplant      VASCULAR SURGERY       Current Outpatient Medications   Medication Sig Dispense Refill     aspirin 81 MG EC tablet Take 81 mg by mouth every morning       azaTHIOprine (IMURAN) 50 MG tablet Take 1 tablet (50 mg) by mouth daily 30 tablet 11     carvedilol (COREG) 25 MG tablet Take 1 tablet (25 mg) by mouth 2 times daily (with meals) 90 tablet 3     carvedilol (COREG) 25 MG tablet Take 1 tablet (25 mg) by mouth 2 times daily (with meals) 60 tablet 0     ondansetron (ZOFRAN) 4 MG tablet Take 1 tablet (4 mg) by mouth every 6 hours as needed for nausea or vomiting 10 tablet 0     PROGRAF (BRAND) 1 MG/ML suspension Take 0.4 mLs (0.4 mg) by mouth 2 times daily 25 mL 11     sulfamethoxazole-trimethoprim (BACTRIM) 400-80 MG tablet Take 1 tablet by mouth Every Mon, Wed, Fri Morning 39 tablet 3     vitamin D3 (CHOLECALCIFEROL) 50 mcg (2000 units) tablet Take 1 tablet by mouth every morning       ferrous sulfate (FEROSUL) 325 (65 Fe)  "MG tablet Take 1 tablet (325 mg) by mouth every other day for 90 days Take with breakfast (Patient not taking: Reported on 3/23/2023) 45 tablet 3     No Known Allergies     Social History     Tobacco Use     Smoking status: Never     Smokeless tobacco: Never   Substance Use Topics     Alcohol use: Not Currently     Comment: Patient reports drinking beer on a rare ocassion.     Drug use: No     Has never lived abroad. Lives with wife, feels safe. Patient has a vizsla.     Family History   Problem Relation Age of Onset     Breast Cancer Mother      Cancer - colorectal Father      Coronary Artery Disease Father      Hypertension Father      Breast Cancer Sister      Cancer Brother         Pancreatic CA       Objective:  BP Readings from Last 6 Encounters:   03/23/23 121/76   01/08/23 113/73   06/03/22 125/84   04/13/22 (!) 141/92   03/31/22 123/79   03/27/22 116/72     Wt Readings from Last 5 Encounters:   03/23/23 67.9 kg (149 lb 11.2 oz)   02/14/23 68 kg (150 lb)   01/25/23 68 kg (150 lb)   01/11/23 65.8 kg (145 lb)   09/19/22 68 kg (150 lb)     Estimated body mass index is 21.48 kg/m  as calculated from the following:    Height as of this encounter: 1.778 m (5' 10\").    Weight as of this encounter: 67.9 kg (149 lb 11.2 oz).  /76 (BP Location: Right arm, Patient Position: Sitting, Cuff Size: Adult Regular)   Pulse 67   Ht 1.778 m (5' 10\")   Wt 67.9 kg (149 lb 11.2 oz)   SpO2 100%   BMI 21.48 kg/m      Physical Exam:    General: Alert and oriented without distress  HEENT: Normocephalic/atraumatic  Respiratory: CTAB without increased respiratory effort or accessory muscle use  Cardiovascular: RRR without murmurs, rubs or gallop. S1, S2 intact.  Abdomen: Bowel sounds present. Soft, non-distended without tenderness to palpation or rebound.   Skin: No overt lesions, bruises, rashes or bleeding on exposed skin surfaces.  Neuro: CN II-XII grossly intact.      THE FOLLOWING PERTINENT TEST RESULTS WERE REVIEWED " TODAY:    HbA1c, CBC, BMP    Assessment and Plan:    Marlo was seen today for physical and medication refill.    Diagnoses and all orders for this visit:    Preventative health care  Patient is due for multiple immunizations final colonoscopy.  He would like to have his colonoscopy scheduled by his kidney transplant coordinator.  - Recommend colonoscopy  - Recommend getting shingles and pneumococcal immunizations at pharmacy    HTN, kidney transplant related  Coronary artery disease  Patient reports he needs a refill for carvedilol and that this was being managed by his primary care provider. He is also due for cholesterol screening.   -     carvedilol (COREG) 25 MG tablet; Take 1 tablet (25 mg) by mouth 2 times daily (with meals)  -     Lipid panel reflex to direct LDL Fasting; Future  - Patient is additionally due for a follow-up appointment with cardiology and is overdue for a repeat echocardiogram.  He was last seen by Dr. Ayala in 7/7/2020  - Patient is followed by Dr. Mason Irwin and Dr. Varela for his transplanted kidney    Patient seen and case dicussed with Dr. Evangelista who agrees with the above assessment and plan    Adams Bailey, DO  PGY-2, Internal Medicine  [facility]      Patients: if you have questions or concerns about this progress note, please discuss them with the provider at a future office visit.

## 2023-03-23 NOTE — PROGRESS NOTES
"I, Jignesh Evangelista MD saw the patient with the resident, and agree with the resident's findings and plan of care as documented in the resident's note.  /76 (BP Location: Right arm, Patient Position: Sitting, Cuff Size: Adult Regular)   Pulse 67   Ht 1.778 m (5' 10\")   Wt 67.9 kg (149 lb 11.2 oz)   SpO2 100%   BMI 21.48 kg/m    I personally reviewed vital signs and past record.  Key findings: multiple chronic medical problems including transplant, here for chronic follow-up / refills.  Has had difficulty getting back to C. Diff weight.           "

## 2023-03-24 ENCOUNTER — LAB (OUTPATIENT)
Dept: LAB | Facility: CLINIC | Age: 73
End: 2023-03-24
Payer: MEDICARE

## 2023-03-24 DIAGNOSIS — I15.1 HTN, KIDNEY TRANSPLANT RELATED: ICD-10-CM

## 2023-03-24 DIAGNOSIS — I25.10 CAD, MULTIPLE VESSEL: ICD-10-CM

## 2023-03-24 DIAGNOSIS — Z48.298 AFTERCARE FOLLOWING ORGAN TRANSPLANT: ICD-10-CM

## 2023-03-24 DIAGNOSIS — Z94.0 HTN, KIDNEY TRANSPLANT RELATED: ICD-10-CM

## 2023-03-24 DIAGNOSIS — B27.00 EBV (EPSTEIN-BARR VIRUS) VIREMIA: ICD-10-CM

## 2023-03-24 DIAGNOSIS — Z94.0 KIDNEY REPLACED BY TRANSPLANT: ICD-10-CM

## 2023-03-24 LAB
ANION GAP SERPL CALCULATED.3IONS-SCNC: 5 MMOL/L (ref 7–15)
BUN SERPL-MCNC: 46.4 MG/DL (ref 8–23)
CALCIUM SERPL-MCNC: 9.2 MG/DL (ref 8.8–10.2)
CHLORIDE SERPL-SCNC: 106 MMOL/L (ref 98–107)
CHOLEST SERPL-MCNC: 159 MG/DL
CREAT SERPL-MCNC: 2.48 MG/DL (ref 0.67–1.17)
DEPRECATED HCO3 PLAS-SCNC: 25 MMOL/L (ref 22–29)
ERYTHROCYTE [DISTWIDTH] IN BLOOD BY AUTOMATED COUNT: 13.7 % (ref 10–15)
GFR SERPL CREATININE-BSD FRML MDRD: 27 ML/MIN/1.73M2
GLUCOSE SERPL-MCNC: 96 MG/DL (ref 70–99)
HCT VFR BLD AUTO: 31.8 % (ref 40–53)
HDLC SERPL-MCNC: 30 MG/DL
HGB BLD-MCNC: 9.7 G/DL (ref 13.3–17.7)
LDLC SERPL CALC-MCNC: 107 MG/DL
MCH RBC QN AUTO: 30 PG (ref 26.5–33)
MCHC RBC AUTO-ENTMCNC: 30.5 G/DL (ref 31.5–36.5)
MCV RBC AUTO: 99 FL (ref 78–100)
NONHDLC SERPL-MCNC: 129 MG/DL
PLATELET # BLD AUTO: 174 10E3/UL (ref 150–450)
POTASSIUM SERPL-SCNC: 4.4 MMOL/L (ref 3.4–5.3)
RBC # BLD AUTO: 3.23 10E6/UL (ref 4.4–5.9)
SODIUM SERPL-SCNC: 136 MMOL/L (ref 136–145)
TACROLIMUS BLD-MCNC: 3 UG/L (ref 5–15)
TME LAST DOSE: ABNORMAL H
TME LAST DOSE: ABNORMAL H
TRIGL SERPL-MCNC: 108 MG/DL
WBC # BLD AUTO: 2.7 10E3/UL (ref 4–11)

## 2023-03-24 PROCEDURE — 80061 LIPID PANEL: CPT

## 2023-03-24 PROCEDURE — 80048 BASIC METABOLIC PNL TOTAL CA: CPT

## 2023-03-24 PROCEDURE — 36415 COLL VENOUS BLD VENIPUNCTURE: CPT

## 2023-03-24 PROCEDURE — 85027 COMPLETE CBC AUTOMATED: CPT

## 2023-03-24 PROCEDURE — 80197 ASSAY OF TACROLIMUS: CPT

## 2023-03-27 ENCOUNTER — TELEPHONE (OUTPATIENT)
Dept: TRANSPLANT | Facility: CLINIC | Age: 73
End: 2023-03-27
Payer: MEDICARE

## 2023-03-27 NOTE — TELEPHONE ENCOUNTER
ISSUE  Creatinine 2.48, baseline 2.0-2.3, running higher since Covid in jan.  Tac level 3.0, goal 4-6, current dose 0.4 mg BID. Previous levels at goal.      PLAN  Assess hydration status.  How much water is he drinking per day?  Any recent illness, diarrhea, s/s of a UTI, or medication changes?  Any increased intake of alcohol or caffeine?  Recommend increasing hydration and repeating labs in 1-2 weeks.    Confirm good 12 hour Tac trough.  Confirm current dose 0.4 mg BID.  Assess for any missed doses?      OUTCOME  Call placed to John. No answer.   My chart message sent.

## 2023-03-29 NOTE — RESULT ENCOUNTER NOTE
Tuan Lopez, it was a pleasure to meet you last week. Your cholesterol labs have come back and show an elevated LDL which increases your long term risk of worsening cardiovascular disease. I would recommend you schedule a follow up appointment with us in the next month to discuss treatment options. Please reach out to us with any questions or concerns.

## 2023-04-06 ENCOUNTER — DOCUMENTATION ONLY (OUTPATIENT)
Dept: TRANSPLANT | Facility: CLINIC | Age: 73
End: 2023-04-06

## 2023-04-06 ENCOUNTER — TELEPHONE (OUTPATIENT)
Dept: TRANSPLANT | Facility: CLINIC | Age: 73
End: 2023-04-06
Payer: MEDICARE

## 2023-04-06 NOTE — TELEPHONE ENCOUNTER
Called pt to introduce myself as [facility] coordinator and encouraged pt to stay up on health maintenance and to let us know if insurance changes, contact info updates. Pt is considered too well and will review GFR every 3 months. Pt to reach out if GFR <20. Explained [facility] protocol for pt's status and when follow up is needed. Gave pt direct information and encouraged to reach out with any questions/concerns.

## 2023-04-18 DIAGNOSIS — Z94.0 KIDNEY TRANSPLANTED: ICD-10-CM

## 2023-04-20 RX ORDER — CARVEDILOL 25 MG/1
TABLET ORAL
Qty: 60 TABLET | Refills: 0 | OUTPATIENT
Start: 2023-04-20

## 2023-04-21 NOTE — LETTER
12/3/2021         RE: Marlo Vee  [address] [place] [facility] [place] [address]        Dear Colleague,    Thank you for referring your patient, Marlo Vee, to the [facility]. Please see a copy of my visit note below.    Chief Complaint   Patient presents with     Infusion     IV hydration       Infusion Nursing Note:  Marlo Vee presents today for IV fluids.    Patient seen by provider today: No   present during visit today: Not Applicable.    Note: fluids given over 1 hour at 999 ml/hr.    Labs:  BMP drawn post infusion.    Intravenous Access:  Peripheral IV placed.    Treatment Conditions:  Not Applicable.      Post Infusion Assessment:  Patient tolerated infusion without incident.  Site patent and intact, free from redness, edema or discomfort.  No evidence of extravasations.  Access discontinued per protocol.       Discharge Plan:   AVS to patient via [employer].  Contacted scheduling for future appointments.  Patient discharged in stable condition accompanied by: self.  Departure Mode: Ambulatory.      Administrations This Visit     0.9% sodium chloride BOLUS     Admin Date  12/03/2021 Action  New Bag Dose  1,000 mL Rate  999 mL/hr Route  Intravenous Administered By  Dahiana Fam, RN              /82 (BP Location: Left arm, Patient Position: Sitting)   Pulse 69   Temp 97.4  F (36.3  C) (Oral)   Resp 16   SpO2 95%       Iman Jones RN                    Again, thank you for allowing me to participate in the care of your patient.        Sincerely,        [facility]     Patient called, wife answered.   Reports rash still exists, is still \"severe\" all over his body. Using Bendryl cream. Is not trying Epsom salt soaks or Oat Meal baths to calm inflammation. Has been in discussion with their PCP  If it doesn't go away they will go to a dermatologist.     Did stop Tyvaso four days ago, made aware medication is out of his system by now. Half life is 4 hours. Dr. Sutherland aware of this incident.   Weight still at 135 lb's, still has a bit of swelling in his one ankle. Does not wear compression stockings, doesn't always elevate his feet. Is active.   Labs reviewed.   Blood sugar elevated. They are aware.     Instructed to take 40 mg Furosemide daily moving forward, will reevaluate the need to add 20 mg on M,W,F next week.    Wife agreed and will call next week to report status. Is getting his echo next week.

## 2023-04-25 DIAGNOSIS — Z94.0 KIDNEY TRANSPLANTED: ICD-10-CM

## 2023-04-25 NOTE — TELEPHONE ENCOUNTER
Hello,    It appears that this patient requested a refill for his tacrolimus on 4/4 but there is no fax number connected to Dr. Gant [address] [place] [address] so the request was never able to send and whomever initiated the request never sent the request via epic and so on.     Patient has a day or two left at most. Please send a new rx as soon as you're able.     Thank you!!    Muna Paredes CPhT, PACO    [facility]  [address] [place] [id number]   [email or web address]  [email or web address]   Phone: [phone]  Fax: [phone]

## 2023-05-05 ENCOUNTER — LAB (OUTPATIENT)
Dept: LAB | Facility: CLINIC | Age: 73
End: 2023-05-05
Payer: MEDICARE

## 2023-05-05 DIAGNOSIS — Z94.0 KIDNEY TRANSPLANTED: ICD-10-CM

## 2023-05-05 DIAGNOSIS — B27.00 EBV (EPSTEIN-BARR VIRUS) VIREMIA: ICD-10-CM

## 2023-05-05 DIAGNOSIS — R79.89 ELEVATED SERUM CREATININE: ICD-10-CM

## 2023-05-05 LAB
ANION GAP SERPL CALCULATED.3IONS-SCNC: 9 MMOL/L (ref 7–15)
BUN SERPL-MCNC: 40 MG/DL (ref 8–23)
CALCIUM SERPL-MCNC: 9.6 MG/DL (ref 8.8–10.2)
CHLORIDE SERPL-SCNC: 104 MMOL/L (ref 98–107)
CREAT SERPL-MCNC: 2.66 MG/DL (ref 0.67–1.17)
DEPRECATED HCO3 PLAS-SCNC: 23 MMOL/L (ref 22–29)
ERYTHROCYTE [DISTWIDTH] IN BLOOD BY AUTOMATED COUNT: 12.8 % (ref 10–15)
GFR SERPL CREATININE-BSD FRML MDRD: 25 ML/MIN/1.73M2
GLUCOSE SERPL-MCNC: 88 MG/DL (ref 70–99)
HCT VFR BLD AUTO: 34.6 % (ref 40–53)
HGB BLD-MCNC: 10.7 G/DL (ref 13.3–17.7)
MCH RBC QN AUTO: 29.3 PG (ref 26.5–33)
MCHC RBC AUTO-ENTMCNC: 30.9 G/DL (ref 31.5–36.5)
MCV RBC AUTO: 95 FL (ref 78–100)
PLATELET # BLD AUTO: 202 10E3/UL (ref 150–450)
POTASSIUM SERPL-SCNC: 4.3 MMOL/L (ref 3.4–5.3)
RBC # BLD AUTO: 3.65 10E6/UL (ref 4.4–5.9)
SODIUM SERPL-SCNC: 136 MMOL/L (ref 136–145)
WBC # BLD AUTO: 5.4 10E3/UL (ref 4–11)

## 2023-05-05 PROCEDURE — 80048 BASIC METABOLIC PNL TOTAL CA: CPT

## 2023-05-05 PROCEDURE — 85027 COMPLETE CBC AUTOMATED: CPT

## 2023-05-05 PROCEDURE — 87799 DETECT AGENT NOS DNA QUANT: CPT

## 2023-05-05 PROCEDURE — 80197 ASSAY OF TACROLIMUS: CPT

## 2023-05-05 PROCEDURE — 36415 COLL VENOUS BLD VENIPUNCTURE: CPT

## 2023-05-06 LAB
TACROLIMUS BLD-MCNC: 3.5 UG/L (ref 5–15)
TME LAST DOSE: ABNORMAL H
TME LAST DOSE: ABNORMAL H

## 2023-05-08 LAB
EBV DNA COPIES/ML, INSTRUMENT: 5601 COPIES/ML
EBV DNA SPEC NAA+PROBE-LOG#: 3.7 {LOG_COPIES}/ML

## 2023-05-10 DIAGNOSIS — Z94.0 KIDNEY TRANSPLANTED: ICD-10-CM

## 2023-06-23 ENCOUNTER — TELEPHONE (OUTPATIENT)
Dept: INTERNAL MEDICINE | Facility: CLINIC | Age: 73
End: 2023-06-23

## 2023-06-23 ENCOUNTER — LAB (OUTPATIENT)
Dept: LAB | Facility: CLINIC | Age: 73
End: 2023-06-23
Payer: MEDICARE

## 2023-06-23 DIAGNOSIS — Z94.0 KIDNEY REPLACED BY TRANSPLANT: ICD-10-CM

## 2023-06-23 DIAGNOSIS — Z48.298 AFTERCARE FOLLOWING ORGAN TRANSPLANT: ICD-10-CM

## 2023-06-23 LAB
ALBUMIN MFR UR ELPH: 183 MG/DL
ANION GAP SERPL CALCULATED.3IONS-SCNC: 10 MMOL/L (ref 7–15)
BUN SERPL-MCNC: 42.9 MG/DL (ref 8–23)
CALCIUM SERPL-MCNC: 9.2 MG/DL (ref 8.8–10.2)
CHLORIDE SERPL-SCNC: 106 MMOL/L (ref 98–107)
CREAT SERPL-MCNC: 2.26 MG/DL (ref 0.67–1.17)
CREAT UR-MCNC: 79.8 MG/DL
DEPRECATED HCO3 PLAS-SCNC: 21 MMOL/L (ref 22–29)
ERYTHROCYTE [DISTWIDTH] IN BLOOD BY AUTOMATED COUNT: 12.9 % (ref 10–15)
GFR SERPL CREATININE-BSD FRML MDRD: 30 ML/MIN/1.73M2
GLUCOSE SERPL-MCNC: 99 MG/DL (ref 70–99)
HCT VFR BLD AUTO: 34.5 % (ref 40–53)
HGB BLD-MCNC: 10.9 G/DL (ref 13.3–17.7)
MCH RBC QN AUTO: 28.7 PG (ref 26.5–33)
MCHC RBC AUTO-ENTMCNC: 31.6 G/DL (ref 31.5–36.5)
MCV RBC AUTO: 91 FL (ref 78–100)
PLATELET # BLD AUTO: 186 10E3/UL (ref 150–450)
POTASSIUM SERPL-SCNC: 4.4 MMOL/L (ref 3.4–5.3)
PROT/CREAT 24H UR: 2.29 MG/MG CR (ref 0–0.2)
RBC # BLD AUTO: 3.8 10E6/UL (ref 4.4–5.9)
SODIUM SERPL-SCNC: 137 MMOL/L (ref 136–145)
TACROLIMUS BLD-MCNC: 3.5 UG/L (ref 5–15)
TME LAST DOSE: ABNORMAL H
TME LAST DOSE: ABNORMAL H
WBC # BLD AUTO: 5.1 10E3/UL (ref 4–11)

## 2023-06-23 PROCEDURE — 80048 BASIC METABOLIC PNL TOTAL CA: CPT

## 2023-06-23 PROCEDURE — 84156 ASSAY OF PROTEIN URINE: CPT

## 2023-06-23 PROCEDURE — 80197 ASSAY OF TACROLIMUS: CPT

## 2023-06-23 PROCEDURE — 36415 COLL VENOUS BLD VENIPUNCTURE: CPT

## 2023-06-23 PROCEDURE — 85027 COMPLETE CBC AUTOMATED: CPT

## 2023-06-23 NOTE — TELEPHONE ENCOUNTER
Patient arrived at [facility] for lab appointment.  He asked if his 6/29/23 appointment could be cancelled.  It has been cancelled and he is asking if he can get a call back to reschedule.   The best # is [phone]    Carlita MORATAYA, Pt rep, [facility]

## 2023-06-24 ENCOUNTER — HEALTH MAINTENANCE LETTER (OUTPATIENT)
Age: 73
End: 2023-06-24

## 2023-08-10 NOTE — LETTER
9/12/2017       RE: Marlo Vee  [address] [facility] [address]     Dear Colleague,    Thank you for referring your patient, Marlo Vee, to the [facility] NEPHROLOGY at [facility]. Please see a copy of my visit note below.    Assessment and Plan:  1. LDKT - baseline Cr ~ 1.0-1.2, which has remained stable.  Minimal proteinuria.  New, low level DSA to Cw, which is felt to be less antigenic.  Will increase tacrolimus to 1 mg bid with goal closer to 6 in the 4-6 range.  Will make no other changes in immunosuppression.  2. HTN - well controlled at target of less than 140/90.  No changes.  3. CAD - asymptomatic.  Recommend follow up with Cardiology.  4. Anemia in chronic renal disease - stable Hgb, near normal.  Will follow.  5. Secondary renal hyperparathyroidism - mildly elevated PTH when last checked.  Will recheck PTH with next labs.  6. Vitamin D deficiency - low vitamin D level with last check and will continue cholecalciferol.  Will recheck vitamin D level with next labs.  7. CMV IgG Ab discordance - patient is now off Valcyte.  He has seroconverted with detectable CMV PCR in the past, but last CMV PCR was negative.  No need to follow any further unless patient has symptoms.  8. Skin cancer risk - one new skin cancer lesion removed.  Recommend regular follow up with Dermatology.  9. Recommend return visit in 6 months.    Assessment and plan was discussed with patient and he voiced his understanding and agreement.    Reason for Visit:  Mr. Vee is here for routine follow up.    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         Transplant Hx:       Tx: LDKT  Date: 1/21/16       Present Maintenance IS: Tacrolimus and Mycophenolate mofetil       Baseline Creatinine: 1.0-1.2       Recent DSA: Yes  Date last checked: 8/2017       Biopsy: No    Mr. Vee reports  feeling good overall with minimal medical complaints.  His energy level is good and has remained normal.  He is active and does get some exercise, mostly with walking.  Denies any chest pain or shortness of breath with exertion.  Appetite is good and weight has been stable.  No nausea, vomiting or diarrhea.  No fever, sweats or chills.  No leg swelling.    Home BP: Not checked.      ROS:   A comprehensive review of systems was obtained and negative, except as noted in the HPI or PMH.    Active Medical Problems:  Patient Active Problem List   Diagnosis     Hypertension secondary to other renal disorders     Membranous glomerulonephritis     Anemia in chronic renal disease     Secondary renal hyperparathyroidism (H)     Vitamin D deficiency     Dyslipidemia     Anxiety     Status post coronary angiogram     CAD, multiple vessel     Multiple vessel coronary artery disease     Kidney replaced by transplant     Immunosuppressed status (H)     Aftercare following organ transplant       Personal Hx:  Social History     Social History     Marital status:      Spouse name: N/A     Number of children: 4     Years of education: N/A     Occupational History     Not on file.     Social History Main Topics     Smoking status: Never Smoker     Smokeless tobacco: Never Used     Alcohol use No      Comment: Patient reports drinking beer on a rare ocassion.     Drug use: No     Sexual activity: Not on file     Other Topics Concern     Not on file     Social History Narrative       Allergies:  No Known Allergies    Medications:  Prior to Admission medications    Medication Sig Start Date End Date Taking? Authorizing Provider   phosphorus tablet 250 mg (K PHOS NEUTRAL) 250 MG tablet Take 1 tablet (250 mg) by mouth 2 times daily 1/27/16   Samuel Varela MD   PROGRAF 0.5 MG PO CAPSULE Take 1 capsule (0.5 mg) by mouth 2 times daily 1/27/16   Samuel Varela MD   HYDROmorphone (DILAUDID) 2 MG tablet Take 1 tablet (2  mg) by mouth every 4 hours as needed for moderate to severe pain 1/26/16   Maryellen Joe PA-C   atorvastatin (LIPITOR) 10 MG tablet Take 0.5 tablets (5 mg) by mouth daily 1/26/16   Maryellen Joe PA-C   hydrALAZINE (APRESOLINE) 100 MG TABS Take 1 tablet (100 mg) by mouth every 8 hours 1/26/16   Maryellen Joe PA-C   valGANciclovir (VALCYTE) 450 MG tablet Take 1 tablet (450 mg) by mouth daily 1/26/16   Maryellen Joe PA-C   PROGRAF 1 MG PO CAPSULE Take 2 capsules (2 mg) by mouth 2 times daily 1/26/16 1/20/17  Maryellen Joe PA-C   mycophenolate (CELLCEPT - GENERIC EQUIVALENT) 250 MG capsule Take 3 capsules (750 mg) by mouth 2 times daily 1/26/16 1/20/17  Maryellen Joe PA-C   carvedilol (COREG) 25 MG tablet Take 1 tablet (25 mg) by mouth 2 times daily (with meals) 1/26/16   Maryellen Joe PA-C   senna-docusate (SENOKOT-S;PERICOLACE) 8.6-50 MG per tablet Take 1-2 tablets by mouth 2 times daily 1/26/16   Maryellen Joe PA-C   sulfamethoxazole-trimethoprim (BACTRIM,SEPTRA) 400-80 MG per tablet Take 1 tablet by mouth daily 1/26/16   Maryellen Joe PA-C   clotrimazole (MYCELEX) 10 MG LOZG Place 1 lozenge (10 mg) inside cheek 3 times daily 1/26/16   Maryellen Joe PA-C   pantoprazole (PROTONIX) 40 MG enteric coated tablet Take 1 tablet (40 mg) by mouth daily 1/26/16 4/25/16  Maryellen Joe PA-C   Clopidogrel Bisulfate (PLAVIX PO) Take 75 mg by mouth daily     Reported, Patient   aspirin 81 MG chewable tablet Take 1 tablet (81 mg) by mouth daily 4/25/15   Jimmy Jennings MD   B Complex-C-Folic Acid (TRIPHROCAPS) 1 MG CAPS Take 1 capsule by mouth daily    Reported, Patient   Cholecalciferol (VITAMIN D3 PO) Take 2,000 Units by mouth daily    Reported, Patient   SERTRALINE HCL PO Take 25 mg by mouth daily    Reported, Patient   TRAZODONE HCL PO Take 50 mg by mouth At Bedtime    Reported, Patient       Vitals:  /81  Pulse 60  Temp 97.8  F (36.6  C)  "(Oral)  Ht 1.778 m (5' 10\")  Wt 75.2 kg (165 lb 12.8 oz)  BMI 23.79 kg/m2    Exam:   GENERAL APPEARANCE: alert and no distress  HENT: mouth without ulcers or lesions  LYMPHATICS: no cervical or supraclavicular nodes  RESP: lungs clear to auscultation - no rales, rhonchi or wheezes  CV: regular rhythm, normal rate, no rub, no murmur  EDEMA: no LE edema bilaterally  ABDOMEN: soft, nondistended, nontender, bowel sounds normal  MS: extremities normal - no gross deformities noted, no evidence of inflammation in joints, no muscle tenderness  SKIN: no rash  TX KIDNEY: normal    Results:   Recent Results (from the past 504 hour(s))   [facility] Unacceptable Antigens    Collection Time: 08/30/17 12:00 AM   Result Value Ref Range    Protocol Cutoff Plan A FCS treated sera, 500 mfi cumulative     Unacceptable Antigen A:25 26 30 43 66 69 B:37 57 58 75 DQ:5 6    UNOS cPRA    Collection Time: 08/30/17 12:00 AM   Result Value Ref Range    UNOS cPRA 74    CBC with platelets    Collection Time: 09/08/17  8:30 AM   Result Value Ref Range    WBC 3.8 (L) 4.0 - 11.0 10e9/L    RBC Count 4.49 4.4 - 5.9 10e12/L    Hemoglobin 12.7 (L) 13.3 - 17.7 g/dL    Hematocrit 40.0 40.0 - 53.0 %    MCV 89 78 - 100 fl    MCH 28.3 26.5 - 33.0 pg    MCHC 31.8 31.5 - 36.5 g/dL    RDW 13.8 10.0 - 15.0 %    Platelet Count 166 150 - 450 10e9/L   Basic metabolic panel    Collection Time: 09/08/17  8:30 AM   Result Value Ref Range    Sodium 138 133 - 144 mmol/L    Potassium 3.7 3.4 - 5.3 mmol/L    Chloride 105 94 - 109 mmol/L    Carbon Dioxide 25 20 - 32 mmol/L    Anion Gap 8 3 - 14 mmol/L    Glucose 99 70 - 99 mg/dL    Urea Nitrogen 25 7 - 30 mg/dL    Creatinine 1.19 0.66 - 1.25 mg/dL    GFR Estimate 61 >60 mL/min/1.7m2    GFR Estimate If Black 74 >60 mL/min/1.7m2    Calcium 9.0 8.5 - 10.1 mg/dL   Tacrolimus level    Collection Time: 09/08/17  8:30 AM   Result Value Ref Range    Tacrolimus Last Dose 2030 09/7/2017     Tacrolimus Level 4.2 (L) 5.0 - 15.0 ug/L "   CMV DNA quantification    Collection Time: 09/08/17  8:30 AM   Result Value Ref Range    CMV DNA Quantitation Specimen Plasma, EDTA anticoagulant     CMV Quant IU/mL CMV DNA Not Detected CMVND^CMV DNA Not Detected [IU]/mL    Log IU/mL of CMVQNT Not Calculated <2.1 [Log_IU]/mL   PRA Donor Specific Antibody    Collection Time: 09/08/17  8:30 AM   Result Value Ref Range    PRA Donor Specific Leticia       Specimen received - Immunology report to follow upon completion.   Protein  random urine with Creat Ratio    Collection Time: 09/08/17  8:40 AM   Result Value Ref Range    Protein Random Urine 0.28 g/L    Protein Total Urine g/gr Creatinine 0.22 (H) 0 - 0.2 g/g Cr   Creatinine urine calculation only    Collection Time: 09/08/17  8:40 AM   Result Value Ref Range    Creatinine Urine 125 mg/dL       Again, thank you for allowing me to participate in the care of your patient.      Sincerely,    Samuel Varela MD       [id number] diagnostic evaluation with medical services

## 2023-08-31 ENCOUNTER — LAB REQUISITION (OUTPATIENT)
Dept: LAB | Facility: CLINIC | Age: 73
End: 2023-08-31
Payer: MEDICARE

## 2023-08-31 DIAGNOSIS — D48.5 NEOPLASM OF UNCERTAIN BEHAVIOR OF SKIN: ICD-10-CM

## 2023-08-31 PROCEDURE — 88305 TISSUE EXAM BY PATHOLOGIST: CPT | Mod: 26 | Performed by: DERMATOLOGY

## 2023-08-31 PROCEDURE — 88305 TISSUE EXAM BY PATHOLOGIST: CPT | Mod: TC,ORL | Performed by: DERMATOLOGY

## 2023-09-05 LAB
PATH REPORT.COMMENTS IMP SPEC: NORMAL
PATH REPORT.COMMENTS IMP SPEC: NORMAL
PATH REPORT.FINAL DX SPEC: NORMAL
PATH REPORT.GROSS SPEC: NORMAL
PATH REPORT.MICROSCOPIC SPEC OTHER STN: NORMAL
PATH REPORT.RELEVANT HX SPEC: NORMAL

## 2023-09-21 NOTE — PROGRESS NOTES
"[facility]  Internal Medicine    Chief Complaint   Patient presents with    Follow Up       Primary Care Provider: Adams Bailey MD    Subjective:    HPI:  Marlo Vee is a/an 73 year old male with a past medical history as noted below, who presents today for follow up. At the patient's last visit he was here for a refill of carvedilol and establishment of care. During his routine labs, he was found to have an elevated LDL of 107 and HDL was low at 30. He states he had previously been on atorvastatin but discontinued it during his C. difficile infection. He had also been reporting issues gaining weight which is no longer a concern for him. Additionally, his GERD has resolved. He has not yet scheduled a colonoscopy but wants to look for providers himself due to concerns about a \"twisted\" bowel and risk for perforation. He also still needs to get his shingles, pneumococcal, influenza and Covid immunizations. He is scheduled to get his influenza and Covid immunizations this weekend. He has concerns about which vaccines he may be eligible for due to his immunosuppression. He also reports he sees a cardiologist yearly. He has no further concerns or questions at this time.     Review of systems:  See HPI    Patient Active Problem List   Diagnosis    HTN, kidney transplant related    Membranous glomerulonephritis    Anemia in stage 4 chronic kidney disease (H)    Secondary renal hyperparathyroidism (H)    Vitamin D deficiency    Dyslipidemia    CAD, multiple vessel    Kidney replaced by transplant    Immunosuppression (H)    Aftercare following organ transplant    Impaired fasting glucose    Erectile dysfunction    Elevated serum creatinine    Persistent proteinuria    EBV infection     Past Medical History:   Diagnosis Date    Anemia in ESRD (end-stage renal disease) (H)     Antiplatelet or antithrombotic long-term use     Anxiety     Broken femur (H)     Related to tripping over dog.    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) 08/07/2021    Dyslipidemia     EBV infection 11/01/2022    Blood    End stage kidney disease (H)     Heart attack (H)     not sure    History of blood transfusion     Hypertension     Membranous glomerulonephritis 2002    PONV (postoperative nausea and vomiting)     PUD (peptic ulcer disease)     Secondary hyperparathyroidism (of renal origin)     Skin abnormalities     Stented coronary artery     Vitamin D deficiency      Past Surgical History:   Procedure Laterality Date    BIOPSY      CYSTOSCOPY, REMOVE STENT(S), COMBINED Right 02/23/2016    Procedure: COMBINED CYSTOSCOPY, REMOVE STENT(S);  Surgeon: Cody Temple MD;  Location: [facility] OR    IR RENAL BIOPSY RIGHT  10/19/2021    IR RENAL BIOPSY RIGHT  06/03/2022    OPEN REDUCTION INTERNAL FIXATION HIP NAILING Right 12/06/2020    Procedure: OPEN REDUCTION INTERNAL FIXATION, FRACTURE, FEMUR, USING INTRAMEDULLARY SHU.;  Surgeon: Jimmy Stoner MD;  Location: [facility] OR    s/p right upper extremity AV fistula      TRANSPLANT      kidney transplant     VASCULAR SURGERY       Current Outpatient Medications   Medication Sig Dispense Refill    atorvastatin (LIPITOR) 10 MG tablet Take 1 tablet (10 mg) by mouth daily 90 tablet 3    aspirin 81 MG EC tablet Take 81 mg by mouth every morning      azaTHIOprine (IMURAN) 50 MG tablet Take 1 tablet (50 mg) by mouth daily 30 tablet 11    carvedilol (COREG) 25 MG tablet Take 1 tablet (25 mg) by mouth 2 times daily (with meals) 90 tablet 3    carvedilol (COREG) 25 MG tablet Take 1 tablet (25 mg) by mouth 2 times daily (with meals) 60 tablet 0    ondansetron (ZOFRAN) 4 MG tablet Take 1 tablet (4 mg) by mouth every 6 hours as needed for nausea or vomiting 10 tablet 0    PROGRAF (BRAND) 1 MG/ML suspension Take 0.5 mLs (0.5 mg) by mouth 2 times daily 30 mL 11    sulfamethoxazole-trimethoprim (BACTRIM) 400-80 MG tablet Take 1 tablet by  "mouth Every Mon, Wed, Fri Morning 39 tablet 3    vitamin D3 (CHOLECALCIFEROL) 50 mcg (2000 units) tablet Take 1 tablet by mouth every morning       No Known Allergies  Social History     Tobacco Use    Smoking status: Never    Smokeless tobacco: Never   Substance Use Topics    Alcohol use: Not Currently     Comment: Patient reports drinking beer on a rare ocassion.    Drug use: No     Family History   Problem Relation Age of Onset    Breast Cancer Mother     Cancer - colorectal Father     Coronary Artery Disease Father     Hypertension Father     Breast Cancer Sister     Cancer Brother         Pancreatic CA       Objective:  BP Readings from Last 6 Encounters:   09/21/23 (!) 146/89   03/23/23 121/76   01/08/23 113/73   06/03/22 125/84   04/13/22 (!) 141/92   03/31/22 123/79     Wt Readings from Last 5 Encounters:   09/21/23 70.3 kg (154 lb 14.4 oz)   03/23/23 67.9 kg (149 lb 11.2 oz)   02/14/23 68 kg (150 lb)   01/25/23 68 kg (150 lb)   01/11/23 65.8 kg (145 lb)     Estimated body mass index is 22.23 kg/m  as calculated from the following:    Height as of this encounter: 1.778 m (5' 10\").    Weight as of this encounter: 70.3 kg (154 lb 14.4 oz).  BP (!) 146/89 (BP Location: Right arm, Patient Position: Sitting, Cuff Size: Adult Regular)   Pulse 55   Ht 1.778 m (5' 10\")   Wt 70.3 kg (154 lb 14.4 oz)   SpO2 100%   BMI 22.23 kg/m      Physical Exam:    General: Alert and oriented without distress  HEENT: Normocephalic/atraumatic  Respiratory: CTAB without increased respiratory effort or accessory muscle use  Cardiovascular: RRR without murmurs, rubs or gallop. S1, S2 intact.  Skin: No overt lesions, bruises, rashes or bleeding on exposed skin surfaces.    THE FOLLOWING PERTINENT TEST RESULTS WERE REVIEWED TODAY:  Lipid panel, BMP, CBC    Assessment and Plan:    Marlo was seen today for follow up.    Diagnoses and all orders for this visit:    Dyslipidemia  History of coronary artery disease with stent " placement  Patient reports he has previously had dyslipidemia and was on a statin prior to discontinuing it during his C. difficile infection which he has not restarted. He was found to have an elevated level during our last appointment and is agreeable to start it again.   -     Lipid panel reflex to direct LDL Non-fasting; Future  -     atorvastatin (LIPITOR) 10 MG tablet; Take 1 tablet (10 mg) by mouth daily    Preventative Care  Patient is due for colonoscopy and routine immunizations.    Hypertension  Patient was found to have an elevated blood pressure in clinic.  - Will reach out to nephrology team    Patient seen and case dicussed with Dr. Evangelista who agrees with the above assessment and plan    Adams Bailey, DO  PGY-3, Internal Medicine  [facility]      Patients: if you have questions or concerns about this progress note, please discuss them with the provider at a future office visit.

## 2023-09-21 NOTE — PROGRESS NOTES
"I, Jignesh Evangelista MD saw the patient with the resident, and agree with the resident's findings and plan of care as documented in the resident's note.  BP (!) 163/89 (BP Location: Right arm, Patient Position: Sitting, Cuff Size: Adult Regular)   Pulse 55   Ht 1.778 m (5' 10\")   Wt 70.3 kg (154 lb 14.4 oz)   SpO2 100%   BMI 22.23 kg/m    I personally reviewed vital signs and past record.  Key findings: doing well with treatment.       "

## 2023-09-21 NOTE — NURSING NOTE
Marlo Vee is a 73 year old male patient that presents today in clinic for the following:    Chief Complaint   Patient presents with    Follow Up     The patient's allergies and medications were reviewed as noted. A set of vitals were recorded as noted without incident. The patient does not have any other questions for the provider.    Irma Mccarty, EMT at 9:17 AM on 9/21/2023

## 2023-09-21 NOTE — TELEPHONE ENCOUNTER
ISSUE  SBP ~170's at PCP appt today.  PCP messaged SOT for recommendations.      PLAN  Samuel Varela MD Harris, Kathleen, RN; Mason Irwin MD Katie,    I would recommend trying nifedipine XL 60 mg nightly.  He did have some gum hyperplasia on amlodipine it seems, which can also happen with nifedipine, but would try it and see how he does.    In addition, would refer him for CKD management with general Nephrology or back to his previous general Nephrologist.    Ramon      OUTCOME  Rx sent.  My chart message sent to John.      ADDENDUM:  Received a call back from John. He states he has been feeling great lately. He will schedule a delivery of Nifedipine and start that when it arrives. He verbalized understanding to check BP's a few times a week.  Discussed finding a general nephrologist. He states he will check with his insurance and let SOT know if he needs a referral at [place].

## 2023-09-28 NOTE — TELEPHONE ENCOUNTER
carvedilol (COREG) 25 MG tablet       Take 1 tablet (25 mg) by mouth 2 times daily (with meals)   Last Written Prescription Date:  3-23-23  Last Fill Quantity: 90,   # refills: 3  Last Office Visit : 9-21-23  Future Office visit:  none      Last clinic note:Hypertension  Patient was found to have an elevated blood pressure in clinic.  - Will reach out to nephrology team     See SOT tele note: 9-21-23, med added  RTC 10-26-23, Dr. Pepe    Routing refill request to provider for review/approval because:  Blood pressure out of range   Authorize RF,

## 2023-10-26 NOTE — LETTER
10/26/2023      RE: Marlo Vee  [address] [facility], [place] [address]       TRANSPLANT NEPHROLOGY CHRONIC POST TRANSPLANT VISIT    Assessment & Plan  # LDKT: Stable creatinine in the ~ 2.3-2.8 range over the last year, but increased from previous baseline closer to ~ 2.0-2.3.  Last kidney transplant biopsy showed moderate chronic changes after recurrent MARCOS episodes with cryptosporidium and C. diff colitis.  He does have moderate proteinuria, but work up for other etiology was unremarkable.  He is on the kidney transplant waiting list again, but inactive due to being too well.   - Baseline Creatinine:  ~ 2.3-2.8   - Proteinuria: Moderate (1-3 grams)   - Date DSA Last Checked: Jun/2022      Latest DSA: No cPRA: 77%   - BK Viremia: Not checked recently due to time from transplant   - Kidney Tx Biopsy: Jun 03, 2022; Result: No diagnostic evidence of acute rejection.  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  Moderate interstitial fibrosis and tubular atrophy.  Severe vascular changes.             Oct 19, 2021; Result: No diagnostic evidence of acute rejection.  Acute tubular injury with mild interstitial fibrosis and tubular atrophy.   -Labs early next week. Patient doesn't think that he needs fluids at this point   -Refer back for repeat transplant. He had qualifying GFR of 19ml/min on 1/4/23     # Immunosuppression: Tacrolimus immediate release (goal 4-6) and Azathioprine (dose 50 mg daily)   - Continue with intensive monitoring of immunosuppression for efficacy and toxicity.   - Changes: Not at this time    # Infection Prophylaxis:   Last CD4 Level: 165 (Sep/2021)  - PJP: Sulfa/TMP (Bactrim)    # Hypertension: Controlled, but low at times;  Goal BP: < 130/80   - Changes: Yes - Will decrease nifedipine XL to 30 mg nightly.    # Anemia in Chronic Renal Disease: Hgb: Stable      LULY: No   - Iron studies: Low iron saturation, but high ferritin     # Mineral  Bone Disorder:   - Secondary renal hyperparathyroidism; PTH level: Mildly elevated (151-300 pg/ml)        On treatment: None  - Vitamin D; level: Normal        On supplement: Yes  - Calcium; level: Normal        On supplement: No    # Electrolytes:   - Potassium; level: Normal        On supplement: No  - Bicarbonate; level: Normal        On supplement: No    # CAD, s/p PCI: Asymptomatic.    # Chronic Diarrhea: Now resolved after treating previous C. Diff and lowering immunosuppression, as well as changing off mycophenolic acid.  Patient was previously diagnosed with Clostridium difficile and also cryptosporidium.  He was treated for both, but no improvement in his ongoing diarrhea symptoms.  He has been seen by Transplant ID and GI.    # EBV Viremia: Trend down, minimal EBV PCR at ~ 6K with last check May/2023.    # Skin Cancer: New lesions: none   - Discussed sun protection and recommend regular follow up with Dermatology.    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  Significant transplant-related complications: EBV Viremia    Transplant Office Phone Number: [phone]    Assessment and plan was discussed with the patient and he voiced his understanding and agreement.    Return visit: Return in about 6 months (around 4/26/2024).    Samuel Varela MD    Chief Complaint  Mr. Vee is a 73 year old here for kidney transplant and immunosuppression management.    History of Present Illness   Mr. Vee reports feeling good overall with some medical complaints.  Since last clinic visit, patient reports no hospitalizations or new medical complaints and has been doing well overall.  His energy level is okay, but not normal as he does get  tired at the end of the day.  He reports not sleeping well as he wakes up in the middle of the night and cannot get back to sleep.  He is active and does get some exercise.  Denies any chest pain or shortness of breath with exertion.  No leg swelling.    Appetite is good and weight has been stable.  No nausea or vomiting.  The previous diarrhea issues have now resolved.  No heartburn symptoms.  No fever, sweats or chills.  No night sweats.     Home BP:  110/70s  He does report occasional lightheadedness with SBP as low as the 90s.    Problem List  Patient Active Problem List   Diagnosis     HTN, kidney transplant related     Membranous glomerulonephritis     Anemia in chronic renal disease     Secondary renal hyperparathyroidism (H24)     Vitamin D deficiency     Dyslipidemia     CAD, multiple vessel     Kidney replaced by transplant     Immunosuppressed status (H24)     Aftercare following organ transplant     Impaired fasting glucose     Erectile dysfunction     Need for pneumocystis prophylaxis     EBV (Madeline-Barr virus) viremia     Chronic kidney disease, stage 4, severely decreased GFR (H)       Allergies  No Known Allergies    Medications  Current Outpatient Medications   Medication Sig     aspirin 81 MG EC tablet Take 81 mg by mouth every morning     atorvastatin (LIPITOR) 10 MG tablet Take 1 tablet (10 mg) by mouth daily     azaTHIOprine (IMURAN) 50 MG tablet Take 1 tablet (50 mg) by mouth daily     carvedilol (COREG) 25 MG tablet Take 1 tablet (25 mg) by mouth 2 times daily (with meals)     NIFEdipine ER (ADALAT CC) 30 MG 24 hr tablet Take 1 tablet (30 mg) by mouth at bedtime     ondansetron (ZOFRAN) 4 MG tablet Take 1 tablet (4 mg) by mouth every 6 hours as needed for nausea or vomiting     sulfamethoxazole-trimethoprim (BACTRIM) 400-80 MG tablet Take 1 tablet by mouth Every Mon, Wed, Fri Morning     vitamin D3 (CHOLECALCIFEROL) 50 mcg (2000 units) tablet Take 1 tablet by mouth every morning      "tacrolimus (GENERIC) 1 mg/mL suspension Take 0.6 mLs (0.6 mg) by mouth 2 times daily     No current facility-administered medications for this visit.     Medications Discontinued During This Encounter   Medication Reason     NIFEdipine ER (ADALAT CC) 60 MG 24 hr tablet      carvedilol (COREG) 25 MG tablet        Physical Exam  Vital Signs: /74 (BP Location: Right arm, Patient Position: Sitting, Cuff Size: Adult Regular)   Pulse 65   Temp 97.4  F (36.3  C) (Oral)   Resp 16   Ht 1.778 m (5' 10\")   Wt 70.4 kg (155 lb 1.6 oz)   SpO2 100%   BMI 22.25 kg/m      GENERAL APPEARANCE: alert and no distress  HENT: mouth without ulcers or lesions  RESP: lungs clear to auscultation - no rales, rhonchi or wheezes  CV: regular rhythm, normal rate, no rub, no murmur  EDEMA: no LE edema bilaterally  ABDOMEN: soft, nondistended, nontender, bowel sounds normal  MS: extremities normal - no gross deformities noted, no evidence of inflammation in joints, no muscle tenderness  SKIN: no rash  TX KIDNEY: normal  DIALYSIS ACCESS:  None    Data        Latest Ref Rng & Units 10/26/2023    10:44 AM 9/19/2023    11:15 AM 6/23/2023    10:16 AM   Renal   Sodium 135 - 145 mmol/L 137  137  137    K 3.4 - 5.3 mmol/L 4.1  4.6  4.4    Cl 98 - 107 mmol/L 106  104  106    Cl (external) 98 - 107 mmol/L 106  104  106    CO2 22 - 29 mmol/L 22  23  21    Urea Nitrogen 8.0 - 23.0 mg/dL 35.1  42.5  42.9    Creatinine 0.67 - 1.17 mg/dL 2.77  2.52  2.26    Glucose 70 - 99 mg/dL 100  100  99    Calcium 8.8 - 10.2 mg/dL 9.1  9.1  9.2          Latest Ref Rng & Units 11/1/2022    10:33 AM 12/24/2021     9:16 AM 12/16/2021     3:58 PM   Bone Health   Phosphorus 2.5 - 4.5 mg/dL   2.8    Parathyroid Hormone Intact pg/mL 274      Vit D Def 20 - 75 ug/L 33  26           Latest Ref Rng & Units 10/26/2023    10:44 AM 6/23/2023    10:16 AM 5/5/2023    11:20 AM   Heme   WBC 4.0 - 11.0 10e3/uL 5.8  5.1  5.4    Hgb 13.3 - 17.7 g/dL 11.1  10.9  10.7    Plt 150 - " 450 10e3/uL 217  186  202          Latest Ref Rng & Units 12/16/2021     3:58 PM 8/7/2021     5:41 PM 12/9/2020     7:16 AM   Liver   AP 40 - 150 U/L  83     TBili 0.2 - 1.3 mg/dL  0.8     ALT 0 - 70 U/L  17     AST 0 - 45 U/L  10     Tot Protein 6.8 - 8.8 g/dL  7.5     Albumin 3.4 - 5.0 g/dL 3.6  3.7  2.8          Latest Ref Rng & Units 2/8/2023    10:24 AM 5/6/2021     9:28 AM   Pancreas   A1C <5.7 % 5.5  5.7          Latest Ref Rng & Units 11/1/2022    10:33 AM 12/24/2021     9:16 AM 12/16/2021     3:58 PM   Iron studies   Iron 35 - 180 ug/dL 56  44  54    Iron Saturation Index 15 - 46 % 28  24     Ferritin 26 - 388 ng/mL 341  255           5/5/2023    11:20 AM 11/1/2022    10:33 AM 1/19/2022     4:16 PM   [facility]   EBV DNA LOG OF COPIES 3.7  4.8  3.9        Recent Labs   Lab Test 06/23/23  1016 09/19/23  1115 10/26/23  1044   DOSTAC 6/22/2023 9/18/2023 10/25/2023   TACROL 3.5* 4.7* 3.3*     Recent Labs   Lab Test 04/25/16  0826 05/03/16  0853 08/09/21  1043   DOSMPA 4.24.2016 2030 2,030 8/8/2021   8:30 PM   MPACID 4.01* 3.81* 0.81*   MPAG 46.6 38.6 48.0       Samuel Varela MD

## 2023-10-26 NOTE — LETTER
10/26/2023         RE: Marlo Vee  [address] [facility] [address]        Dear Colleague,    Thank you for referring your patient, Marlo Vee, to the [facility]. Please see a copy of my visit note below.    TRANSPLANT NEPHROLOGY CHRONIC POST TRANSPLANT VISIT    Assessment & Plan  # LDKT: Stable creatinine in the ~ 2.3-2.8 range over the last year, but increased from previous baseline closer to ~ 2.0-2.3.  Last kidney transplant biopsy showed moderate chronic changes after recurrent MARCOS episodes with cryptosporidium and C. diff colitis.  He does have moderate proteinuria, but work up for other etiology was unremarkable.  He is on the kidney transplant waiting list again, but inactive due to being too well.   - Baseline Creatinine:  ~ 2.3-2.8   - Proteinuria: Moderate (1-3 grams)   - Date DSA Last Checked: Jun/2022      Latest DSA: No cPRA: 77%   - BK Viremia: Not checked recently due to time from transplant   - Kidney Tx Biopsy: Jun 03, 2022; Result: No diagnostic evidence of acute rejection.  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  Moderate interstitial fibrosis and tubular atrophy.  Severe vascular changes.             Oct 19, 2021; Result: No diagnostic evidence of acute rejection.  Acute tubular injury with mild interstitial fibrosis and tubular atrophy.   -Labs early next week. Patient doesn't think that he needs fluids at this point   -Refer back for repeat transplant. He had qualifying GFR of 19ml/min on 1/4/23     # Immunosuppression: Tacrolimus immediate release (goal 4-6) and Azathioprine (dose 50 mg daily)   - Continue with intensive monitoring of immunosuppression for efficacy and toxicity.   - Changes: Not at this time    # Infection Prophylaxis:   Last CD4 Level: 165 (Sep/2021)  - PJP: Sulfa/TMP (Bactrim)    # Hypertension: Controlled, but low at times;  Goal BP: < 130/80   - Changes: Yes - Will  decrease nifedipine XL to 30 mg nightly.    # Anemia in Chronic Renal Disease: Hgb: Stable      LULY: No   - Iron studies: Low iron saturation, but high ferritin     # Mineral Bone Disorder:   - Secondary renal hyperparathyroidism; PTH level: Mildly elevated (151-300 pg/ml)        On treatment: None  - Vitamin D; level: Normal        On supplement: Yes  - Calcium; level: Normal        On supplement: No    # Electrolytes:   - Potassium; level: Normal        On supplement: No  - Bicarbonate; level: Normal        On supplement: No    # CAD, s/p PCI: Asymptomatic.    # Chronic Diarrhea: Now resolved after treating previous C. Diff and lowering immunosuppression, as well as changing off mycophenolic acid.  Patient was previously diagnosed with Clostridium difficile and also cryptosporidium.  He was treated for both, but no improvement in his ongoing diarrhea symptoms.  He has been seen by Transplant ID and GI.    # EBV Viremia: Trend down, minimal EBV PCR at ~ 6K with last check May/2023.    # Skin Cancer: New lesions: none   - Discussed sun protection and recommend regular follow up with Dermatology.    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  Significant transplant-related complications: EBV Viremia    Transplant Office Phone Number: [phone]    Assessment and plan was discussed with the patient and he voiced his understanding and agreement.    Return visit: Return in about 6 months (around 4/26/2024).    Samuel Varela MD    Chief Complaint  Mr. Vee is a 73 year old here for kidney transplant and immunosuppression management.    History of Present Illness   Mr. Vee reports feeling good overall with some medical complaints.   Since last clinic visit, patient reports no hospitalizations or new medical complaints and has been doing well overall.  His energy level is okay, but not normal as he does get tired at the end of the day.  He reports not sleeping well as he wakes up in the middle of the night and cannot get back to sleep.  He is active and does get some exercise.  Denies any chest pain or shortness of breath with exertion.  No leg swelling.    Appetite is good and weight has been stable.  No nausea or vomiting.  The previous diarrhea issues have now resolved.  No heartburn symptoms.  No fever, sweats or chills.  No night sweats.     Home BP:  110/70s  He does report occasional lightheadedness with SBP as low as the 90s.    Problem List  Patient Active Problem List   Diagnosis     HTN, kidney transplant related     Membranous glomerulonephritis     Anemia in chronic renal disease     Secondary renal hyperparathyroidism (H24)     Vitamin D deficiency     Dyslipidemia     CAD, multiple vessel     Kidney replaced by transplant     Immunosuppressed status (H24)     Aftercare following organ transplant     Impaired fasting glucose     Erectile dysfunction     Need for pneumocystis prophylaxis     EBV (Madeline-Barr virus) viremia     Chronic kidney disease, stage 4, severely decreased GFR (H)       Allergies  No Known Allergies    Medications  Current Outpatient Medications   Medication Sig     aspirin 81 MG EC tablet Take 81 mg by mouth every morning     atorvastatin (LIPITOR) 10 MG tablet Take 1 tablet (10 mg) by mouth daily     azaTHIOprine (IMURAN) 50 MG tablet Take 1 tablet (50 mg) by mouth daily     carvedilol (COREG) 25 MG tablet Take 1 tablet (25 mg) by mouth 2 times daily (with meals)     NIFEdipine ER (ADALAT CC) 30 MG 24 hr tablet Take 1 tablet (30 mg) by mouth at bedtime     ondansetron (ZOFRAN) 4 MG tablet Take 1 tablet (4 mg) by mouth every 6 hours as needed for nausea or vomiting     sulfamethoxazole-trimethoprim (BACTRIM)  "400-80 MG tablet Take 1 tablet by mouth Every Mon, Wed, Fri Morning     vitamin D3 (CHOLECALCIFEROL) 50 mcg (2000 units) tablet Take 1 tablet by mouth every morning     tacrolimus (GENERIC) 1 mg/mL suspension Take 0.6 mLs (0.6 mg) by mouth 2 times daily     No current facility-administered medications for this visit.     Medications Discontinued During This Encounter   Medication Reason     NIFEdipine ER (ADALAT CC) 60 MG 24 hr tablet      carvedilol (COREG) 25 MG tablet        Physical Exam  Vital Signs: /74 (BP Location: Right arm, Patient Position: Sitting, Cuff Size: Adult Regular)   Pulse 65   Temp 97.4  F (36.3  C) (Oral)   Resp 16   Ht 1.778 m (5' 10\")   Wt 70.4 kg (155 lb 1.6 oz)   SpO2 100%   BMI 22.25 kg/m      GENERAL APPEARANCE: alert and no distress  HENT: mouth without ulcers or lesions  RESP: lungs clear to auscultation - no rales, rhonchi or wheezes  CV: regular rhythm, normal rate, no rub, no murmur  EDEMA: no LE edema bilaterally  ABDOMEN: soft, nondistended, nontender, bowel sounds normal  MS: extremities normal - no gross deformities noted, no evidence of inflammation in joints, no muscle tenderness  SKIN: no rash  TX KIDNEY: normal  DIALYSIS ACCESS:  None    Data        Latest Ref Rng & Units 10/26/2023    10:44 AM 9/19/2023    11:15 AM 6/23/2023    10:16 AM   Renal   Sodium 135 - 145 mmol/L 137  137  137    K 3.4 - 5.3 mmol/L 4.1  4.6  4.4    Cl 98 - 107 mmol/L 106  104  106    Cl (external) 98 - 107 mmol/L 106  104  106    CO2 22 - 29 mmol/L 22  23  21    Urea Nitrogen 8.0 - 23.0 mg/dL 35.1  42.5  42.9    Creatinine 0.67 - 1.17 mg/dL 2.77  2.52  2.26    Glucose 70 - 99 mg/dL 100  100  99    Calcium 8.8 - 10.2 mg/dL 9.1  9.1  9.2          Latest Ref Rng & Units 11/1/2022    10:33 AM 12/24/2021     9:16 AM 12/16/2021     3:58 PM   Bone Health   Phosphorus 2.5 - 4.5 mg/dL   2.8    Parathyroid Hormone Intact pg/mL 274      Vit D Def 20 - 75 ug/L 33  26           Latest Ref Rng & Units " 10/26/2023    10:44 AM 6/23/2023    10:16 AM 5/5/2023    11:20 AM   Heme   WBC 4.0 - 11.0 10e3/uL 5.8  5.1  5.4    Hgb 13.3 - 17.7 g/dL 11.1  10.9  10.7    Plt 150 - 450 10e3/uL 217  186  202          Latest Ref Rng & Units 12/16/2021     3:58 PM 8/7/2021     5:41 PM 12/9/2020     7:16 AM   Liver   AP 40 - 150 U/L  83     TBili 0.2 - 1.3 mg/dL  0.8     ALT 0 - 70 U/L  17     AST 0 - 45 U/L  10     Tot Protein 6.8 - 8.8 g/dL  7.5     Albumin 3.4 - 5.0 g/dL 3.6  3.7  2.8          Latest Ref Rng & Units 2/8/2023    10:24 AM 5/6/2021     9:28 AM   Pancreas   A1C <5.7 % 5.5  5.7          Latest Ref Rng & Units 11/1/2022    10:33 AM 12/24/2021     9:16 AM 12/16/2021     3:58 PM   Iron studies   Iron 35 - 180 ug/dL 56  44  54    Iron Saturation Index 15 - 46 % 28  24     Ferritin 26 - 388 ng/mL 341  255           5/5/2023    11:20 AM 11/1/2022    10:33 AM 1/19/2022     4:16 PM   [facility] Virology   EBV DNA LOG OF COPIES 3.7  4.8  3.9        Recent Labs   Lab Test 06/23/23  1016 09/19/23  1115 10/26/23  1044   DOSTAC 6/22/2023 9/18/2023 10/25/2023   TACROL 3.5* 4.7* 3.3*     Recent Labs   Lab Test 04/25/16  0826 05/03/16  0853 08/09/21  1043   DOSMPA 4.24.2016 2030 2,030 8/8/2021   8:30 PM   MPACID 4.01* 3.81* 0.81*   MPAG 46.6 38.6 48.0         Again, thank you for allowing me to participate in the care of your patient.        Sincerely,        Samuel Varela MD   Admission Reconciliation is Completed  Discharge Reconciliation is Not Complete Admission Reconciliation is Completed  Discharge Reconciliation is Completed

## 2023-10-26 NOTE — NURSING NOTE
"Chief Complaint   Patient presents with    RECHECK     S/P Kidney TX 1/21/2016     Vital signs:  Temp: 97.4  F (36.3  C) Temp src: Oral BP: 117/74 Pulse: 65   Resp: 16 SpO2: 100 %     Height: 177.8 cm (5' 10\") Weight: 70.4 kg (155 lb 1.6 oz)  Estimated body mass index is 22.25 kg/m  as calculated from the following:    Height as of this encounter: 1.778 m (5' 10\").    Weight as of this encounter: 70.4 kg (155 lb 1.6 oz).      Cristin Joshi [facility]  10/26/2023 3:47 PM    "

## 2023-10-26 NOTE — PATIENT INSTRUCTIONS
Patient Recommendations:  - Decrease nifedipine XL 30 mg nightly.  - Recommend routine follow up with Dr. Castañeda in 3-4 months or so to resume care with a goal of ongoing co-management between your primary Nephrologist and the Transplant Team.     Transplant Patient Information  Your Post Transplant Coordinator is: Onelia Richardson  For non urgent items, we encourage you to contact your coordinator/care team online via [employer]  You and your care team can also contact your transplant coordinator Monday - Friday, 8am - 5pm at [phone] (Option 2 to reach the coordinator or Option 4 to schedule an appointment).  After hours for urgent matters, please call [facility] at [phone].

## 2023-10-27 NOTE — TELEPHONE ENCOUNTER
ISSUE:   Tacrolimus IR level 3.3 on 10/26, goal 4-6, dose 0.5 mg BID.    PLAN:   Please call patient and confirm this was an accurate 12-hour trough. Verify Tacrolimus IR dose 0.5 mg BID. Confirm no new medications or illness. Confirm no missed doses. If accurate trough and accurate dose, increase Tacrolimus IR dose to 0.6 mg BID and repeat labs in 2 weeks.

## 2023-10-31 NOTE — TELEPHONE ENCOUNTER
Call returned to patient. Patient confirms current dose and accurate trough level. Denies any recent illness, diarrhea or medication changes. Patient v\u to increase tacrolimus dose to 0.6 mg bid and repeat level in 2 weeks. Order/rx sent

## 2023-11-10 PROBLEM — N18.4 CHRONIC KIDNEY DISEASE, STAGE 4, SEVERELY DECREASED GFR (H): Status: ACTIVE | Noted: 2023-01-01

## 2023-11-10 PROBLEM — B27.00 EBV (EPSTEIN-BARR VIRUS) VIREMIA: Status: ACTIVE | Noted: 2022-11-01

## 2023-11-10 PROBLEM — R79.89 ELEVATED SERUM CREATININE: Status: RESOLVED | Noted: 2023-01-04 | Resolved: 2023-01-01

## 2023-11-10 PROBLEM — R80.1 PERSISTENT PROTEINURIA: Status: RESOLVED | Noted: 2023-01-11 | Resolved: 2023-01-01

## 2023-11-10 NOTE — PROGRESS NOTES
TRANSPLANT NEPHROLOGY CHRONIC POST TRANSPLANT VISIT    Assessment & Plan   # LDKT: Stable creatinine in the ~ 2.3-2.8 range over the last year, but increased from previous baseline closer to ~ 2.0-2.3.  Last kidney transplant biopsy showed moderate chronic changes after recurrent MARCOS episodes with cryptosporidium and C. diff colitis.  He does have moderate proteinuria, but work up for other etiology was unremarkable.  He is on the kidney transplant waiting list again, but inactive due to being too well.   - Baseline Creatinine:  ~ 2.3-2.8   - Proteinuria: Moderate (1-3 grams)   - Date DSA Last Checked: Jun/2022      Latest DSA: No cPRA: 77%   - BK Viremia: Not checked recently due to time from transplant   - Kidney Tx Biopsy: Jun 03, 2022; Result: No diagnostic evidence of acute rejection.  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  Moderate interstitial fibrosis and tubular atrophy.  Severe vascular changes.             Oct 19, 2021; Result: No diagnostic evidence of acute rejection.  Acute tubular injury with mild interstitial fibrosis and tubular atrophy.   -Labs early next week. Patient doesn't think that he needs fluids at this point   -Refer back for repeat transplant. He had qualifying GFR of 19ml/min on 1/4/23     # Immunosuppression: Tacrolimus immediate release (goal 4-6) and Azathioprine (dose 50 mg daily)   - Continue with intensive monitoring of immunosuppression for efficacy and toxicity.   - Changes: Not at this time    # Infection Prophylaxis:   Last CD4 Level: 165 (Sep/2021)  - PJP: Sulfa/TMP (Bactrim)    # Hypertension: Controlled, but low at times;  Goal BP: < 130/80   - Changes: Yes - Will decrease nifedipine XL to 30 mg nightly.    # Anemia in Chronic Renal Disease: Hgb: Stable      ULLY: No   - Iron studies: Low iron saturation, but high ferritin     # Mineral Bone Disorder:   - Secondary renal hyperparathyroidism; PTH level: Mildly elevated  (151-300 pg/ml)        On treatment: None  - Vitamin D; level: Normal        On supplement: Yes  - Calcium; level: Normal        On supplement: No    # Electrolytes:   - Potassium; level: Normal        On supplement: No  - Bicarbonate; level: Normal        On supplement: No    # CAD, s/p PCI: Asymptomatic.    # Chronic Diarrhea: Now resolved after treating previous C. Diff and lowering immunosuppression, as well as changing off mycophenolic acid.  Patient was previously diagnosed with Clostridium difficile and also cryptosporidium.  He was treated for both, but no improvement in his ongoing diarrhea symptoms.  He has been seen by Transplant ID and GI.    # EBV Viremia: Trend down, minimal EBV PCR at ~ 6K with last check May/2023.    # Skin Cancer: New lesions: none   - Discussed sun protection and recommend regular follow up with Dermatology.    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  Significant transplant-related complications: EBV Viremia    Transplant Office Phone Number: [phone]    Assessment and plan was discussed with the patient and he voiced his understanding and agreement.    Return visit: Return in about 6 months (around 4/26/2024).    Samuel Varela MD    Chief Complaint   Mr. Vee is a 73 year old here for kidney transplant and immunosuppression management.    History of Present Illness    Mr. Vee reports feeling good overall with some medical complaints.  Since last clinic visit, patient reports no hospitalizations or new medical complaints and has been doing well overall.  His energy level is okay, but not normal as he does get tired at the end of the day.  He reports not sleeping well as he wakes up in the  middle of the night and cannot get back to sleep.  He is active and does get some exercise.  Denies any chest pain or shortness of breath with exertion.  No leg swelling.    Appetite is good and weight has been stable.  No nausea or vomiting.  The previous diarrhea issues have now resolved.  No heartburn symptoms.  No fever, sweats or chills.  No night sweats.     Home BP:  110/70s  He does report occasional lightheadedness with SBP as low as the 90s.    Problem List   Patient Active Problem List   Diagnosis    HTN, kidney transplant related    Membranous glomerulonephritis    Anemia in chronic renal disease    Secondary renal hyperparathyroidism (H24)    Vitamin D deficiency    Dyslipidemia    CAD, multiple vessel    Kidney replaced by transplant    Immunosuppressed status (H24)    Aftercare following organ transplant    Impaired fasting glucose    Erectile dysfunction    Need for pneumocystis prophylaxis    EBV (Madeline-Barr virus) viremia    Chronic kidney disease, stage 4, severely decreased GFR (H)       Allergies   No Known Allergies    Medications   Current Outpatient Medications   Medication Sig    aspirin 81 MG EC tablet Take 81 mg by mouth every morning    atorvastatin (LIPITOR) 10 MG tablet Take 1 tablet (10 mg) by mouth daily    azaTHIOprine (IMURAN) 50 MG tablet Take 1 tablet (50 mg) by mouth daily    carvedilol (COREG) 25 MG tablet Take 1 tablet (25 mg) by mouth 2 times daily (with meals)    NIFEdipine ER (ADALAT CC) 30 MG 24 hr tablet Take 1 tablet (30 mg) by mouth at bedtime    ondansetron (ZOFRAN) 4 MG tablet Take 1 tablet (4 mg) by mouth every 6 hours as needed for nausea or vomiting    sulfamethoxazole-trimethoprim (BACTRIM) 400-80 MG tablet Take 1 tablet by mouth Every Mon, Wed, Fri Morning    vitamin D3 (CHOLECALCIFEROL) 50 mcg (2000 units) tablet Take 1 tablet by mouth every morning    tacrolimus (GENERIC) 1 mg/mL suspension Take 0.6 mLs (0.6 mg) by mouth 2 times daily     No current  "facility-administered medications for this visit.     Medications Discontinued During This Encounter   Medication Reason    NIFEdipine ER (ADALAT CC) 60 MG 24 hr tablet     carvedilol (COREG) 25 MG tablet        Physical Exam   Vital Signs: /74 (BP Location: Right arm, Patient Position: Sitting, Cuff Size: Adult Regular)   Pulse 65   Temp 97.4  F (36.3  C) (Oral)   Resp 16   Ht 1.778 m (5' 10\")   Wt 70.4 kg (155 lb 1.6 oz)   SpO2 100%   BMI 22.25 kg/m      GENERAL APPEARANCE: alert and no distress  HENT: mouth without ulcers or lesions  RESP: lungs clear to auscultation - no rales, rhonchi or wheezes  CV: regular rhythm, normal rate, no rub, no murmur  EDEMA: no LE edema bilaterally  ABDOMEN: soft, nondistended, nontender, bowel sounds normal  MS: extremities normal - no gross deformities noted, no evidence of inflammation in joints, no muscle tenderness  SKIN: no rash  TX KIDNEY: normal  DIALYSIS ACCESS:  None    Data         Latest Ref Rng & Units 10/26/2023    10:44 AM 9/19/2023    11:15 AM 6/23/2023    10:16 AM   Renal   Sodium 135 - 145 mmol/L 137  137  137    K 3.4 - 5.3 mmol/L 4.1  4.6  4.4    Cl 98 - 107 mmol/L 106  104  106    Cl (external) 98 - 107 mmol/L 106  104  106    CO2 22 - 29 mmol/L 22  23  21    Urea Nitrogen 8.0 - 23.0 mg/dL 35.1  42.5  42.9    Creatinine 0.67 - 1.17 mg/dL 2.77  2.52  2.26    Glucose 70 - 99 mg/dL 100  100  99    Calcium 8.8 - 10.2 mg/dL 9.1  9.1  9.2          Latest Ref Rng & Units 11/1/2022    10:33 AM 12/24/2021     9:16 AM 12/16/2021     3:58 PM   Bone Health   Phosphorus 2.5 - 4.5 mg/dL   2.8    Parathyroid Hormone Intact pg/mL 274      Vit D Def 20 - 75 ug/L 33  26           Latest Ref Rng & Units 10/26/2023    10:44 AM 6/23/2023    10:16 AM 5/5/2023    11:20 AM   Heme   WBC 4.0 - 11.0 10e3/uL 5.8  5.1  5.4    Hgb 13.3 - 17.7 g/dL 11.1  10.9  10.7    Plt 150 - 450 10e3/uL 217  186  202          Latest Ref Rng & Units 12/16/2021     3:58 PM 8/7/2021     5:41 PM " 12/9/2020     7:16 AM   Liver   AP 40 - 150 U/L  83     TBili 0.2 - 1.3 mg/dL  0.8     ALT 0 - 70 U/L  17     AST 0 - 45 U/L  10     Tot Protein 6.8 - 8.8 g/dL  7.5     Albumin 3.4 - 5.0 g/dL 3.6  3.7  2.8          Latest Ref Rng & Units 2/8/2023    10:24 AM 5/6/2021     9:28 AM   Pancreas   A1C <5.7 % 5.5  5.7          Latest Ref Rng & Units 11/1/2022    10:33 AM 12/24/2021     9:16 AM 12/16/2021     3:58 PM   Iron studies   Iron 35 - 180 ug/dL 56  44  54    Iron Saturation Index 15 - 46 % 28  24     Ferritin 26 - 388 ng/mL 341  255           5/5/2023    11:20 AM 11/1/2022    10:33 AM 1/19/2022     4:16 PM   [facility] Virology   EBV DNA LOG OF COPIES 3.7  4.8  3.9        Recent Labs   Lab Test 06/23/23  1016 09/19/23  1115 10/26/23  1044   DOSTAC 6/22/2023 9/18/2023 10/25/2023   TACROL 3.5* 4.7* 3.3*     Recent Labs   Lab Test 04/25/16  0826 05/03/16  0853 08/09/21  1043   DOSMPA 4.24.2016 2030 2,030 8/8/2021   8:30 PM   MPACID 4.01* 3.81* 0.81*   MPAG 46.6 38.6 48.0

## 2023-11-21 NOTE — PROGRESS NOTES
ISSUE:   John Vee [id number] looking for refill on Aza. This is one of Onelia's patient. Can you still help with this?    PLAN:   Chart reviewed; Last visit w/Dr. Varela 10/26/23.   # Immunosuppression: Tacrolimus immediate release (goal 4-6) and Azathioprine (dose 50 mg daily)              - Continue with intensive monitoring of immunosuppression for efficacy and toxicity.              - Changes: Not at this time    OUTCOME:   [facility] for the [facility]    Maria Del Rosario Alcazar, RN, BSN  Solid Organ Transplant, Post Kidney and Pancreas  Transplant Care Coordinator  [phone]

## 2023-12-27 NOTE — CONFIDENTIAL NOTE
Coordinators, please contact patient.  We refilled the carvedilol.  Please ask him to make an in-person appt with Dr. Bailey this Spring (any time before June)  thanks

## 2023-12-28 NOTE — TELEPHONE ENCOUNTER
Left Voicemail (1st Attempt) and Sent Mychart (1st Attempt) for the patient to call back and schedule the following:    Appointment type: [facility] RETURN or PHYSICAL  Provider: PCP  Return date: 3/21/24

## 2024-01-01 ENCOUNTER — OFFICE VISIT (OUTPATIENT)
Dept: NEPHROLOGY | Facility: CLINIC | Age: 74
End: 2024-01-01
Payer: MEDICARE

## 2024-01-01 ENCOUNTER — OFFICE VISIT (OUTPATIENT)
Dept: INTERNAL MEDICINE | Facility: CLINIC | Age: 74
End: 2024-01-01
Payer: MEDICARE

## 2024-01-01 ENCOUNTER — LAB (OUTPATIENT)
Dept: LAB | Facility: CLINIC | Age: 74
End: 2024-01-01
Payer: MEDICARE

## 2024-01-01 ENCOUNTER — TELEPHONE (OUTPATIENT)
Dept: TRANSPLANT | Facility: CLINIC | Age: 74
End: 2024-01-01
Payer: MEDICARE

## 2024-01-01 ENCOUNTER — ANCILLARY PROCEDURE (OUTPATIENT)
Dept: MRI IMAGING | Facility: CLINIC | Age: 74
End: 2024-01-01
Attending: STUDENT IN AN ORGANIZED HEALTH CARE EDUCATION/TRAINING PROGRAM
Payer: MEDICARE

## 2024-01-01 ENCOUNTER — PRE VISIT (OUTPATIENT)
Dept: CARDIOLOGY | Facility: CLINIC | Age: 74
End: 2024-01-01
Payer: MEDICARE

## 2024-01-01 ENCOUNTER — TELEPHONE (OUTPATIENT)
Dept: UROLOGY | Facility: CLINIC | Age: 74
End: 2024-01-01
Payer: MEDICARE

## 2024-01-01 ENCOUNTER — OFFICE VISIT (OUTPATIENT)
Dept: UROLOGY | Facility: CLINIC | Age: 74
End: 2024-01-01
Attending: PEDIATRICS
Payer: MEDICARE

## 2024-01-01 ENCOUNTER — TELEPHONE (OUTPATIENT)
Dept: INTERNAL MEDICINE | Facility: CLINIC | Age: 74
End: 2024-01-01
Payer: MEDICARE

## 2024-01-01 ENCOUNTER — PRE VISIT (OUTPATIENT)
Dept: UROLOGY | Facility: CLINIC | Age: 74
End: 2024-01-01

## 2024-01-01 ENCOUNTER — LAB REQUISITION (OUTPATIENT)
Dept: LAB | Facility: CLINIC | Age: 74
End: 2024-01-01
Payer: MEDICARE

## 2024-01-01 ENCOUNTER — MYC MEDICAL ADVICE (OUTPATIENT)
Dept: NEPHROLOGY | Facility: CLINIC | Age: 74
End: 2024-01-01
Payer: MEDICARE

## 2024-01-01 ENCOUNTER — PATIENT OUTREACH (OUTPATIENT)
Dept: NEPHROLOGY | Facility: CLINIC | Age: 74
End: 2024-01-01
Payer: MEDICARE

## 2024-01-01 ENCOUNTER — ANCILLARY PROCEDURE (OUTPATIENT)
Dept: CARDIOLOGY | Facility: CLINIC | Age: 74
End: 2024-01-01
Attending: INTERNAL MEDICINE
Payer: MEDICARE

## 2024-01-01 ENCOUNTER — HEALTH MAINTENANCE LETTER (OUTPATIENT)
Age: 74
End: 2024-01-01

## 2024-01-01 ENCOUNTER — PRE VISIT (OUTPATIENT)
Dept: UROLOGY | Facility: CLINIC | Age: 74
End: 2024-01-01
Payer: MEDICARE

## 2024-01-01 ENCOUNTER — MYC MEDICAL ADVICE (OUTPATIENT)
Dept: UROLOGY | Facility: CLINIC | Age: 74
End: 2024-01-01
Payer: MEDICARE

## 2024-01-01 ENCOUNTER — TRANSFERRED RECORDS (OUTPATIENT)
Dept: HEALTH INFORMATION MANAGEMENT | Facility: CLINIC | Age: 74
End: 2024-01-01

## 2024-01-01 ENCOUNTER — TELEPHONE (OUTPATIENT)
Dept: TRANSPLANT | Facility: CLINIC | Age: 74
End: 2024-01-01

## 2024-01-01 ENCOUNTER — TELEPHONE (OUTPATIENT)
Dept: NEPHROLOGY | Facility: CLINIC | Age: 74
End: 2024-01-01
Payer: MEDICARE

## 2024-01-01 VITALS — DIASTOLIC BLOOD PRESSURE: 83 MMHG | HEART RATE: 83 BPM | SYSTOLIC BLOOD PRESSURE: 152 MMHG

## 2024-01-01 VITALS
HEART RATE: 58 BPM | DIASTOLIC BLOOD PRESSURE: 70 MMHG | BODY MASS INDEX: 21.69 KG/M2 | WEIGHT: 151.2 LBS | OXYGEN SATURATION: 99 % | SYSTOLIC BLOOD PRESSURE: 109 MMHG

## 2024-01-01 VITALS
DIASTOLIC BLOOD PRESSURE: 76 MMHG | BODY MASS INDEX: 22.38 KG/M2 | SYSTOLIC BLOOD PRESSURE: 123 MMHG | OXYGEN SATURATION: 100 % | WEIGHT: 156.3 LBS | HEIGHT: 70 IN | HEART RATE: 54 BPM

## 2024-01-01 VITALS
BODY MASS INDEX: 21.55 KG/M2 | HEART RATE: 57 BPM | SYSTOLIC BLOOD PRESSURE: 106 MMHG | OXYGEN SATURATION: 98 % | DIASTOLIC BLOOD PRESSURE: 62 MMHG | WEIGHT: 150.2 LBS

## 2024-01-01 DIAGNOSIS — Z94.0 KIDNEY REPLACED BY TRANSPLANT: ICD-10-CM

## 2024-01-01 DIAGNOSIS — Z98.61 STATUS POST PERCUTANEOUS TRANSLUMINAL CORONARY ANGIOPLASTY: Primary | ICD-10-CM

## 2024-01-01 DIAGNOSIS — Z12.5 ENCOUNTER FOR SCREENING FOR MALIGNANT NEOPLASM OF PROSTATE: ICD-10-CM

## 2024-01-01 DIAGNOSIS — E55.9 VITAMIN D DEFICIENCY: ICD-10-CM

## 2024-01-01 DIAGNOSIS — R97.20 ELEVATED PROSTATE SPECIFIC ANTIGEN (PSA): ICD-10-CM

## 2024-01-01 DIAGNOSIS — Z98.61 STATUS POST PERCUTANEOUS TRANSLUMINAL CORONARY ANGIOPLASTY: ICD-10-CM

## 2024-01-01 DIAGNOSIS — Z48.298 AFTERCARE FOLLOWING ORGAN TRANSPLANT: ICD-10-CM

## 2024-01-01 DIAGNOSIS — I15.1 HTN, KIDNEY TRANSPLANT RELATED: ICD-10-CM

## 2024-01-01 DIAGNOSIS — Z80.0 FAMILY HISTORY OF COLON CANCER: ICD-10-CM

## 2024-01-01 DIAGNOSIS — I25.10 CAD, MULTIPLE VESSEL: ICD-10-CM

## 2024-01-01 DIAGNOSIS — Z94.0 HTN, KIDNEY TRANSPLANT RELATED: ICD-10-CM

## 2024-01-01 DIAGNOSIS — C44.619 BASAL CELL CARCINOMA OF SKIN OF LEFT UPPER LIMB, INCLUDING SHOULDER: ICD-10-CM

## 2024-01-01 DIAGNOSIS — Z86.0100 HISTORY OF COLONIC POLYPS: ICD-10-CM

## 2024-01-01 DIAGNOSIS — E78.5 DYSLIPIDEMIA: ICD-10-CM

## 2024-01-01 DIAGNOSIS — E78.5 HYPERLIPIDEMIA WITH TARGET LDL LESS THAN 70: ICD-10-CM

## 2024-01-01 DIAGNOSIS — R97.20 ELEVATED PROSTATE SPECIFIC ANTIGEN (PSA): Primary | ICD-10-CM

## 2024-01-01 DIAGNOSIS — N18.4 CHRONIC KIDNEY DISEASE, STAGE IV (SEVERE) (H): Primary | ICD-10-CM

## 2024-01-01 DIAGNOSIS — N18.4 ANEMIA IN STAGE 4 CHRONIC KIDNEY DISEASE (H): ICD-10-CM

## 2024-01-01 DIAGNOSIS — R39.89 SUSPECTED UTI: ICD-10-CM

## 2024-01-01 DIAGNOSIS — Z48.298 AFTERCARE FOLLOWING ORGAN TRANSPLANT: Primary | ICD-10-CM

## 2024-01-01 DIAGNOSIS — N18.4 CKD (CHRONIC KIDNEY DISEASE) STAGE 4, GFR 15-29 ML/MIN (H): Primary | ICD-10-CM

## 2024-01-01 DIAGNOSIS — D48.5 NEOPLASM OF UNCERTAIN BEHAVIOR OF SKIN: ICD-10-CM

## 2024-01-01 DIAGNOSIS — D84.9 IMMUNOSUPPRESSED STATUS (H): ICD-10-CM

## 2024-01-01 DIAGNOSIS — Z00.00 PREVENTATIVE HEALTH CARE: ICD-10-CM

## 2024-01-01 DIAGNOSIS — N18.4 CKD (CHRONIC KIDNEY DISEASE) STAGE 4, GFR 15-29 ML/MIN (H): ICD-10-CM

## 2024-01-01 DIAGNOSIS — R79.89 ELEVATED SERUM CREATININE: ICD-10-CM

## 2024-01-01 DIAGNOSIS — N18.4 CHRONIC KIDNEY DISEASE, STAGE IV (SEVERE) (H): ICD-10-CM

## 2024-01-01 DIAGNOSIS — D63.1 ANEMIA IN STAGE 4 CHRONIC KIDNEY DISEASE (H): ICD-10-CM

## 2024-01-01 LAB
ALBUMIN MFR UR ELPH: 103 MG/DL
ALBUMIN SERPL BCG-MCNC: 4 G/DL (ref 3.5–5.2)
ANION GAP SERPL CALCULATED.3IONS-SCNC: 10 MMOL/L (ref 7–15)
ANION GAP SERPL CALCULATED.3IONS-SCNC: 12 MMOL/L (ref 7–15)
ANION GAP SERPL CALCULATED.3IONS-SCNC: 8 MMOL/L (ref 7–15)
ANION GAP SERPL CALCULATED.3IONS-SCNC: 9 MMOL/L (ref 7–15)
BUN SERPL-MCNC: 32.3 MG/DL (ref 8–23)
BUN SERPL-MCNC: 34 MG/DL (ref 8–23)
BUN SERPL-MCNC: 35.5 MG/DL (ref 8–23)
BUN SERPL-MCNC: 36.7 MG/DL (ref 8–23)
BUN SERPL-MCNC: 39.4 MG/DL (ref 8–23)
BUN SERPL-MCNC: 44.5 MG/DL (ref 8–23)
CALCIUM SERPL-MCNC: 10.6 MG/DL (ref 8.8–10.2)
CALCIUM SERPL-MCNC: 8.8 MG/DL (ref 8.8–10.4)
CALCIUM SERPL-MCNC: 9 MG/DL (ref 8.8–10.2)
CALCIUM SERPL-MCNC: 9.1 MG/DL (ref 8.8–10.2)
CALCIUM SERPL-MCNC: 9.1 MG/DL (ref 8.8–10.2)
CALCIUM SERPL-MCNC: 9.2 MG/DL (ref 8.8–10.4)
CHLORIDE SERPL-SCNC: 102 MMOL/L (ref 98–107)
CHLORIDE SERPL-SCNC: 103 MMOL/L (ref 98–107)
CHLORIDE SERPL-SCNC: 105 MMOL/L (ref 98–107)
CHLORIDE SERPL-SCNC: 107 MMOL/L (ref 98–107)
CHLORIDE SERPL-SCNC: 107 MMOL/L (ref 98–107)
CHLORIDE SERPL-SCNC: 109 MMOL/L (ref 98–107)
CREAT SERPL-MCNC: 2.29 MG/DL (ref 0.67–1.17)
CREAT SERPL-MCNC: 2.31 MG/DL (ref 0.67–1.17)
CREAT SERPL-MCNC: 2.38 MG/DL (ref 0.67–1.17)
CREAT SERPL-MCNC: 2.55 MG/DL (ref 0.67–1.17)
CREAT SERPL-MCNC: 2.57 MG/DL (ref 0.67–1.17)
CREAT SERPL-MCNC: 3.02 MG/DL (ref 0.67–1.17)
CREAT UR-MCNC: 76 MG/DL
DEPRECATED HCO3 PLAS-SCNC: 22 MMOL/L (ref 22–29)
DEPRECATED HCO3 PLAS-SCNC: 22 MMOL/L (ref 22–29)
DEPRECATED HCO3 PLAS-SCNC: 23 MMOL/L (ref 22–29)
DEPRECATED HCO3 PLAS-SCNC: 24 MMOL/L (ref 22–29)
EGFRCR SERPLBLD CKD-EPI 2021: 21 ML/MIN/1.73M2
EGFRCR SERPLBLD CKD-EPI 2021: 26 ML/MIN/1.73M2
EGFRCR SERPLBLD CKD-EPI 2021: 26 ML/MIN/1.73M2
EGFRCR SERPLBLD CKD-EPI 2021: 28 ML/MIN/1.73M2
EGFRCR SERPLBLD CKD-EPI 2021: 29 ML/MIN/1.73M2
EGFRCR SERPLBLD CKD-EPI 2021: 30 ML/MIN/1.73M2
ERYTHROCYTE [DISTWIDTH] IN BLOOD BY AUTOMATED COUNT: 12.9 % (ref 10–15)
ERYTHROCYTE [DISTWIDTH] IN BLOOD BY AUTOMATED COUNT: 13.1 % (ref 10–15)
ERYTHROCYTE [DISTWIDTH] IN BLOOD BY AUTOMATED COUNT: 13.6 % (ref 10–15)
ERYTHROCYTE [DISTWIDTH] IN BLOOD BY AUTOMATED COUNT: 13.8 % (ref 10–15)
GLUCOSE SERPL-MCNC: 102 MG/DL (ref 70–99)
GLUCOSE SERPL-MCNC: 111 MG/DL (ref 70–99)
GLUCOSE SERPL-MCNC: 115 MG/DL (ref 70–99)
GLUCOSE SERPL-MCNC: 116 MG/DL (ref 70–99)
GLUCOSE SERPL-MCNC: 92 MG/DL (ref 70–99)
GLUCOSE SERPL-MCNC: 95 MG/DL (ref 70–99)
HCO3 SERPL-SCNC: 24 MMOL/L (ref 22–29)
HCO3 SERPL-SCNC: 24 MMOL/L (ref 22–29)
HCT VFR BLD AUTO: 34.3 % (ref 40–53)
HCT VFR BLD AUTO: 34.4 % (ref 40–53)
HCT VFR BLD AUTO: 36.8 % (ref 40–53)
HCT VFR BLD AUTO: 36.9 % (ref 40–53)
HGB BLD-MCNC: 11 G/DL (ref 13.3–17.7)
HGB BLD-MCNC: 11.1 G/DL (ref 13.3–17.7)
HGB BLD-MCNC: 11.1 G/DL (ref 13.3–17.7)
HGB BLD-MCNC: 11.5 G/DL (ref 13.3–17.7)
HGB BLD-MCNC: 11.5 G/DL (ref 13.3–17.7)
LVEF ECHO: NORMAL
MCH RBC QN AUTO: 28.9 PG (ref 26.5–33)
MCH RBC QN AUTO: 29 PG (ref 26.5–33)
MCH RBC QN AUTO: 29.1 PG (ref 26.5–33)
MCH RBC QN AUTO: 29.1 PG (ref 26.5–33)
MCHC RBC AUTO-ENTMCNC: 31.2 G/DL (ref 31.5–36.5)
MCHC RBC AUTO-ENTMCNC: 31.3 G/DL (ref 31.5–36.5)
MCHC RBC AUTO-ENTMCNC: 32 G/DL (ref 31.5–36.5)
MCHC RBC AUTO-ENTMCNC: 32.4 G/DL (ref 31.5–36.5)
MCV RBC AUTO: 90 FL (ref 78–100)
MCV RBC AUTO: 90 FL (ref 78–100)
MCV RBC AUTO: 93 FL (ref 78–100)
MCV RBC AUTO: 93 FL (ref 78–100)
PATH REPORT.COMMENTS IMP SPEC: ABNORMAL
PATH REPORT.COMMENTS IMP SPEC: ABNORMAL
PATH REPORT.COMMENTS IMP SPEC: NORMAL
PATH REPORT.COMMENTS IMP SPEC: NORMAL
PATH REPORT.COMMENTS IMP SPEC: YES
PATH REPORT.FINAL DX SPEC: ABNORMAL
PATH REPORT.FINAL DX SPEC: NORMAL
PATH REPORT.GROSS SPEC: ABNORMAL
PATH REPORT.GROSS SPEC: NORMAL
PATH REPORT.MICROSCOPIC SPEC OTHER STN: ABNORMAL
PATH REPORT.MICROSCOPIC SPEC OTHER STN: NORMAL
PATH REPORT.RELEVANT HX SPEC: ABNORMAL
PATH REPORT.RELEVANT HX SPEC: NORMAL
PHOSPHATE SERPL-MCNC: 3 MG/DL (ref 2.5–4.5)
PLATELET # BLD AUTO: 191 10E3/UL (ref 150–450)
PLATELET # BLD AUTO: 224 10E3/UL (ref 150–450)
PLATELET # BLD AUTO: 227 10E3/UL (ref 150–450)
PLATELET # BLD AUTO: 248 10E3/UL (ref 150–450)
POTASSIUM SERPL-SCNC: 3.7 MMOL/L (ref 3.4–5.3)
POTASSIUM SERPL-SCNC: 3.9 MMOL/L (ref 3.4–5.3)
POTASSIUM SERPL-SCNC: 3.9 MMOL/L (ref 3.4–5.3)
POTASSIUM SERPL-SCNC: 4 MMOL/L (ref 3.4–5.3)
POTASSIUM SERPL-SCNC: 4 MMOL/L (ref 3.4–5.3)
POTASSIUM SERPL-SCNC: 4.4 MMOL/L (ref 3.4–5.3)
PROT/CREAT 24H UR: 1.36 MG/MG CR (ref 0–0.2)
PSA SERPL DL<=0.01 NG/ML-MCNC: 8.98 NG/ML (ref 0–6.5)
PSA SERPL DL<=0.01 NG/ML-MCNC: 9.62 NG/ML (ref 0–6.5)
RBC # BLD AUTO: 3.81 10E6/UL (ref 4.4–5.9)
RBC # BLD AUTO: 3.82 10E6/UL (ref 4.4–5.9)
RBC # BLD AUTO: 3.95 10E6/UL (ref 4.4–5.9)
RBC # BLD AUTO: 3.96 10E6/UL (ref 4.4–5.9)
SODIUM SERPL-SCNC: 136 MMOL/L (ref 135–145)
SODIUM SERPL-SCNC: 136 MMOL/L (ref 135–145)
SODIUM SERPL-SCNC: 137 MMOL/L (ref 135–145)
SODIUM SERPL-SCNC: 140 MMOL/L (ref 135–145)
SODIUM SERPL-SCNC: 141 MMOL/L (ref 135–145)
SODIUM SERPL-SCNC: 141 MMOL/L (ref 135–145)
TACROLIMUS BLD-MCNC: 3.7 UG/L (ref 5–15)
TACROLIMUS BLD-MCNC: 4 UG/L (ref 5–15)
TACROLIMUS BLD-MCNC: 4.1 UG/L (ref 5–15)
TACROLIMUS BLD-MCNC: 4.2 UG/L (ref 5–15)
TACROLIMUS BLD-MCNC: 4.7 UG/L (ref 5–15)
TME LAST DOSE: ABNORMAL H
WBC # BLD AUTO: 4.8 10E3/UL (ref 4–11)
WBC # BLD AUTO: 5.5 10E3/UL (ref 4–11)
WBC # BLD AUTO: 6.1 10E3/UL (ref 4–11)
WBC # BLD AUTO: 7.2 10E3/UL (ref 4–11)

## 2024-01-01 PROCEDURE — G0103 PSA SCREENING: HCPCS | Performed by: PATHOLOGY

## 2024-01-01 PROCEDURE — 85027 COMPLETE CBC AUTOMATED: CPT

## 2024-01-01 PROCEDURE — 36415 COLL VENOUS BLD VENIPUNCTURE: CPT | Performed by: PATHOLOGY

## 2024-01-01 PROCEDURE — 99214 OFFICE O/P EST MOD 30 MIN: CPT

## 2024-01-01 PROCEDURE — 93306 TTE W/DOPPLER COMPLETE: CPT | Performed by: INTERNAL MEDICINE

## 2024-01-01 PROCEDURE — 84153 ASSAY OF PSA TOTAL: CPT

## 2024-01-01 PROCEDURE — 91320 SARSCV2 VAC 30MCG TRS-SUC IM: CPT

## 2024-01-01 PROCEDURE — 80069 RENAL FUNCTION PANEL: CPT | Performed by: PATHOLOGY

## 2024-01-01 PROCEDURE — 88305 TISSUE EXAM BY PATHOLOGIST: CPT | Mod: 26 | Performed by: DERMATOLOGY

## 2024-01-01 PROCEDURE — 80197 ASSAY OF TACROLIMUS: CPT

## 2024-01-01 PROCEDURE — 80048 BASIC METABOLIC PNL TOTAL CA: CPT

## 2024-01-01 PROCEDURE — G0463 HOSPITAL OUTPT CLINIC VISIT: HCPCS

## 2024-01-01 PROCEDURE — 36415 COLL VENOUS BLD VENIPUNCTURE: CPT

## 2024-01-01 PROCEDURE — 84156 ASSAY OF PROTEIN URINE: CPT | Performed by: PATHOLOGY

## 2024-01-01 PROCEDURE — G0463 HOSPITAL OUTPT CLINIC VISIT: HCPCS | Performed by: INTERNAL MEDICINE

## 2024-01-01 PROCEDURE — 90480 ADMN SARSCOV2 VAC 1/ONLY CMP: CPT

## 2024-01-01 PROCEDURE — G1010 CDSM STANSON: HCPCS | Performed by: STUDENT IN AN ORGANIZED HEALTH CARE EDUCATION/TRAINING PROGRAM

## 2024-01-01 PROCEDURE — 99204 OFFICE O/P NEW MOD 45 MIN: CPT | Performed by: STUDENT IN AN ORGANIZED HEALTH CARE EDUCATION/TRAINING PROGRAM

## 2024-01-01 PROCEDURE — 99203 OFFICE O/P NEW LOW 30 MIN: CPT | Performed by: INTERNAL MEDICINE

## 2024-01-01 PROCEDURE — 85018 HEMOGLOBIN: CPT | Performed by: PATHOLOGY

## 2024-01-01 PROCEDURE — 99214 OFFICE O/P EST MOD 30 MIN: CPT | Mod: 25

## 2024-01-01 PROCEDURE — A9585 GADOBUTROL INJECTION: HCPCS | Mod: JZ | Performed by: STUDENT IN AN ORGANIZED HEALTH CARE EDUCATION/TRAINING PROGRAM

## 2024-01-01 PROCEDURE — 88305 TISSUE EXAM BY PATHOLOGIST: CPT | Mod: TC,ORL | Performed by: DERMATOLOGY

## 2024-01-01 PROCEDURE — 72197 MRI PELVIS W/O & W/DYE: CPT | Mod: MG | Performed by: STUDENT IN AN ORGANIZED HEALTH CARE EDUCATION/TRAINING PROGRAM

## 2024-01-01 RX ORDER — GADOBUTROL 604.72 MG/ML
7.5 INJECTION INTRAVENOUS ONCE
Status: COMPLETED | OUTPATIENT
Start: 2024-01-01 | End: 2024-01-01

## 2024-01-01 RX ORDER — CARVEDILOL 25 MG/1
25 TABLET ORAL 2 TIMES DAILY WITH MEALS
Qty: 180 TABLET | Refills: 0 | Status: CANCELLED | OUTPATIENT
Start: 2024-01-01

## 2024-01-01 RX ORDER — CARVEDILOL 25 MG/1
25 TABLET ORAL 2 TIMES DAILY WITH MEALS
Qty: 180 TABLET | Refills: 0 | Status: SHIPPED | OUTPATIENT
Start: 2024-01-01

## 2024-01-01 RX ORDER — CIPROFLOXACIN 500 MG/1
500 TABLET, FILM COATED ORAL 2 TIMES DAILY
Qty: 6 TABLET | Refills: 0 | Status: SHIPPED | OUTPATIENT
Start: 2024-01-01 | End: 2024-01-01

## 2024-01-01 RX ORDER — EZETIMIBE 10 MG/1
10 TABLET ORAL DAILY
Qty: 90 TABLET | Refills: 3 | Status: SHIPPED | OUTPATIENT
Start: 2024-01-01

## 2024-01-01 RX ORDER — EZETIMIBE 10 MG/1
10 TABLET ORAL DAILY
Qty: 90 TABLET | Refills: 3 | Status: SHIPPED | OUTPATIENT
Start: 2024-01-01 | End: 2024-01-01

## 2024-01-01 RX ORDER — ATORVASTATIN CALCIUM 10 MG/1
10 TABLET, FILM COATED ORAL DAILY
Qty: 90 TABLET | Refills: 3 | Status: SHIPPED | OUTPATIENT
Start: 2024-01-01 | End: 2024-01-01

## 2024-01-01 RX ORDER — GENTAMICIN 40 MG/ML
80 INJECTION, SOLUTION INTRAMUSCULAR; INTRAVENOUS ONCE
Status: ACTIVE | OUTPATIENT
Start: 2024-01-01

## 2024-01-01 RX ORDER — CARVEDILOL 25 MG/1
25 TABLET ORAL 2 TIMES DAILY WITH MEALS
Qty: 180 TABLET | Refills: 0 | Status: SHIPPED | OUTPATIENT
Start: 2024-01-01 | End: 2024-01-01

## 2024-01-01 RX ADMIN — GADOBUTROL 6.5 ML: 604.72 INJECTION INTRAVENOUS at 17:43

## 2024-01-01 ASSESSMENT — PAIN SCALES - GENERAL
PAINLEVEL: NO PAIN (0)

## 2024-02-23 NOTE — TELEPHONE ENCOUNTER
ISSUE:   Tacrolimus IR level 3.7 on 2/20/24, goal 4-6, dose 0.6 mg BID.    PLAN:   Call Patientand confirm this was an accurate 12-hour trough.   Verify Tacrolimus IR dose 0.6 mg BID.   Confirm no new medications or illness.   Confirm no missed doses.   If accurate trough and accurate dose, increase Tacrolimus IR dose to 0.7 mg BID     Is this more than a 50% increase or decrease in current IS dose: No  If YES, justification: n/a    Repeat labs in 2 weeks.  *If > 50% change in immunosuppression dose, repeat labs in 1 week.     LPN TASK:   Please contact patient to assess and review the plan.  Update prescription and place repeat lab orders as necessary.

## 2024-02-24 NOTE — TELEPHONE ENCOUNTER
Call placed to patient. Patient confirms current dose and 12.5 hour trough level. Denies any recent illness, diarrhea or medication changes. Patient v\u to increase tacrolimus dose to 0.7 mg bid and repeat level in 1-2 weeks. Order/rx sent

## 2024-03-21 NOTE — PROGRESS NOTES
Assessment & Plan     HTN, kidney transplant related  - carvedilol (COREG) 25 MG tablet  Dispense: 180 tablet; Refill: 0    CAD, multiple vessel  Patient is overdue for follow up with Cardiology and during his last visit was recommended to get a repeat echocardiogram.   - carvedilol (COREG) 25 MG tablet  Dispense: 180 tablet; Refill: 0  - Echocardiogram Complete  - Adult Cardiology Eval  Referral    Elevated prostate specific antigen (PSA)  Encounter for screening for malignant neoplasm of prostate  Patient previously had elevation on his PSA and was referred to Urology who he has not yet seen.   - PSA, screen  - Adult Urology  Referral    History of colonic polyps  Family history of colon cancer  Patient is overdue for a colonoscopy but has concerns about the procedure due to prior issues with them. He is agreeable to discuss a repeat colonoscopy with the GI team and potentially explore other options.   - Adult GI  Referral - Consult Only    Preventative health care  - COVID-19 12+ (2023-24) (PFIZER)  - Patient encouraged to obtain other recommended vaccinations at a local pharmacy    Insomnia  - Patient given form for sleep hygiene tips    No follow-ups on file.    Kirstin Lopez is a 73 year old, presenting for the following health issues:  Follow Up (Pt reports no new concerns; needs refills)        3/21/2024    11:47 AM   Additional Questions   Roomed by Irma JACOBS     History of Present Illness       CKD: He is not using over the counter pain medicine.     He eats 2-3 servings of fruits and vegetables daily.He consumes 0 sweetened beverage(s) daily.He exercises with enough effort to increase his heart rate 20 to 29 minutes per day.  He exercises with enough effort to increase his heart rate 7 days per week. He is missing 7 dose(s) of medications per week.     Mr. John Vee is a 73 year old M with a pertinent past medical history of ESRD status post kidney transplant, CAD, C.  "difficile infection and anxiety who is seen for a routine follow up. The patient reports he is going to [place] on Saturday and needs a refill of his carvedilol before leaving. He additionally states that he has been dealing with insomnia over the last six months. He has tried 5 mg of melatonin which has helped a little bit but he is still waking up. He has also tried taking melatonin in the middle of the night if he wakes up. Despite his insomnia, he says that he still feels well rested. He reports that he snores but has never been told that he has had an apneic episode. He also has been waking up to urinate in the middle of the night but has not had any issues with starting or stopping his urinary stream. He denies experiencing any recent symptoms of fevers, weight changes, chest pain, lower extremity edema, dyspnea, issues with bowel movements or issues with urination. Of note, he has still not had a colonoscopy due to his concerns with the procedure and has not yet followed up with Cardiology or Urology. He has also not yet started atorvastatin. He is agreeable for referrals to these services to establish/re-establish care. He has no further concerns or questions at this time.     Objective    /76 (BP Location: Left arm, Patient Position: Sitting, Cuff Size: Adult Regular)   Pulse 54   Ht 1.778 m (5' 10\")   Wt 70.9 kg (156 lb 4.8 oz)   SpO2 100%   BMI 22.43 kg/m    Body mass index is 22.43 kg/m .    Physical Exam     General: Alert and oriented without distress  HEENT: Normocephalic/atraumatic  Respiratory: CTAB without increased respiratory effort or accessory muscle use  Cardiovascular: RRR without murmurs, rubs or gallop. S1, S2 intact.  Abdomen: Bowel sounds present. Soft, non-distended without tenderness to palpation or rebound.   Skin: No overt lesions, bruises, rashes or bleeding on exposed skin surfaces.  Neuro: Alert & oriented x3. CN II-XII grossly intact.     Patient seen and case " discussed with Dr. Healy who agrees with the above assessment and plan.     Adams Bailey, DO  PGY-3, Internal Medicine  [facility]

## 2024-05-13 NOTE — TELEPHONE ENCOUNTER
ISSUE   Creatinine elevated to 3.02      OUTCOME  Call placed to John. He states he was in his normal state of health until Saturday. He was nauseas in the morning and then began vomiting. States he vomited ~10 times throughout the day. Was traveling to visit family. No sick contacts he knows of. No diarrhea, fever or graft site pain. No ETOH, no new meds, no swelling. Tried to hydrate well yesterday, but still a little nauseas. Feeling better today. No vomiting since Saturday.  Discussed trying to maintain a bland, easy to tolerate diet for right now and trying to hydrate in small amounts throughout the day rather than drinking large amounts of fluid at a time.   John will continue with good oral hydration and repeat a creatinine level on Thursday.     
Patient Call: General  Route to LPN    Reason for call: patient had labs recently and results were not the best. Patient is currently not feeling well and looking to touch base and follow up. Patient had symptoms last Saturday with consisted of fever and vomiting. They currently feel nauseated. More details with call back.     Call back needed? Yes    Return Call Needed  Same as documented in contacts section  When to return call?: Same day: Route High Priority  
nasal congestion

## 2024-05-15 NOTE — PROGRESS NOTES
Nephrology Note: Patient Outreach Encounter    REASON FOR CALL:     REASON FOR CALL: No chief complaint on file.                                  SITUATION/BACKROUND:   Patient is being treated for CKD Stage 4.    CKD Journey     Patient Journey Status:  Active:   Neph Tracking Flowsheet Last Filled Values       CKD Education Status Referred    CKD Education Referral Date 05/15/24    Patient's Referral Dates Auto Populate Patient's Referral Dates    Dietician Referral 2/14/23    Home Care Referral 1/26/16    Journey Referral 5/14/24    Transplant Evaluation Referral 1/11/23            ASSESSMENT:     Patient has been added to the CKD Journey due to GFR less than 30. Blue note has been updated, CKD journey episode has been created, writer has been added to care team, message sent to patient with contact information.     Neph Assessments:  Recent Labs      Latest Ref Rng & Units 5/13/2024 2/20/2024 12/8/2023   Neph Labs   Sodium 135 - 145 mmol/L 136  141  139    GFR Estimate >60 mL/min/1.73m2 21  30  28    Potassium 3.4 - 5.3 mmol/L 4.0  3.9  4.0    Creatinine 0.67 - 1.17 mg/dL 3.02  2.29  2.43    Urea Nitrogen 8.0 - 23.0 mg/dL 44.5  34.0  37.6    Hemoglobin 13.3 - 17.7 g/dL 11.5  11.5  11.1        PLAN:     Follow Up:   Patient to follow up as scheduled at next appointment  Patient to call/MyChart message with updates     Patient verbalized understanding and will follow up as recommended.    Christine Burkett RN

## 2024-05-16 NOTE — PROGRESS NOTES
Received message from SOT RN CC, patient to be seen in Gen Neph by KAMILAH. Will continue to follow with Dr. Varela. Has been referred for Transplant. Neph Tracking updated. To let patient know that he will see KAMILAH between appointments with Dr. Varela to manage kidney function.     Neph Tracking Flowsheet Last Filled Values       CKD Education Status Referred    CKD Education Referral Date 05/15/24    Patient's Referral Dates Auto Populate Patient's Referral Dates    Dietician Referral 2/14/23    Home Care Referral 1/26/16    Journey Referral 5/14/24    Transplant Evaluation Referral 1/11/23    Transplant Status  Referred          Christine Burkett RN, BSN   Nephrology RN Care Coordinator   [facility]

## 2024-05-16 NOTE — TELEPHONE ENCOUNTER
A drug coated (Catheter Bln Lutonix 5fr 5mm 40mm 130cm Accepts) balloon was inflated in the left superficial femoral artery. The balloon was inflated at 8 mary for 120 seconds at 4/23/2018 8:46 AM.   Returned a call to John. He had questions regarding the CKD team and why he was referred. Discussed the role of CKD team. Patient quite anxious that referral indicates kidney is failing and dialysis needed. Reassured patient that this is not the case.  Discussed benefits of following with general nephrology in additions to transplant nephrology.   John states he is hydrating well and will repeat BMP this afternoon.

## 2024-05-22 NOTE — TELEPHONE ENCOUNTER
Patient confirmed scheduled appointment:  Date: 9/26  Time: 12p  Visit type: re establish new  Provider: dr. mc

## 2024-05-28 NOTE — PROCEDURE: MOHS SURGERY
Satisfactory Crescentic Advancement Flap Text: The defect edges were debeveled with a #15 scalpel blade.  Given the location of the defect and the proximity to free margins a crescentic advancement flap was deemed most appropriate.  Using a sterile surgical marker, the appropriate advancement flap was drawn incorporating the defect and placing the expected incisions within the relaxed skin tension lines where possible.    The area thus outlined was incised deep to adipose tissue with a #15 scalpel blade.  The skin margins were undermined to an appropriate distance in all directions utilizing iris scissors.

## 2024-05-30 NOTE — PROGRESS NOTES
Subjective     REFERRAL SOURCE  The patient is being seen in consultation at the request of Jignesh Evangelista MD  [address] [facility] [address] [place] [address]    REASON FOR CONSULT  Elevated PSA    HISTORY OF PRESENT ILLNESS  Mr. Vee is a pleasant 73 year old male who presents today for further evaluation and management of his elevated PSA. His past medical history is significant for renal hyperparathyroidism, dyslipidemia, hypertension, status post renal transplant, erectile dysfunction, and coronary artery disease.  I personally reviewed the family practice visit note from 3/21/2024 in preparation for today's visit.    According to that note, this is not the first time John has been found to have elevated PSA, but did not follow through with the referral in 2021.  More recently, however was found to have his PSA higher than previous and was once again recommended to meet with urology.    Today:  No daytime bothersome lower urinary tract symptoms   Bothersome nocturia x2-4  For years   No history of intervention  Only one episode of gross hematuria in his past, post-coidal, years ago   No history of retention   No history of UTIs or prostatitis   No erectile dysfunction     FAMILY HISTORY  Denies any known family history of urologic malignancy     RELEVANT SURGICAL HISTORY  History of right sided transplant kidney placement     Objective     PHYSICAL EXAMINATION  General: Well-appearing male in no acute apparent distress.  Neuro: Alert and orientated x3.  Eyes: Anictric  Head: Normocephalic  Psych: Maintained eye contact throughout conversation.  Skin: No lesions in the areas examined.  Lungs: Normal respiratory effort upon inspiration.  Musculoskeletal: Normal gait noted with ambulation.  Abdomen: Appears nondistended.  Rectal: Deferred, see plan    LABS  Lab Results   Component Value Date    PSA 8.98 (H) 03/21/2024    PSA 6.08 (H) 05/06/2021    PSA 4.68 (H) 04/09/2015      IMPRESSION  Elevated  PSA  History of kidney transplant, chronically immunosuppressed    It was my pleasure speaking with Mr. Vee today for discussion of his elevated PSA. We first discussed the etiologies of elevated PSA, including infection, inflammation, ejaculation prior to sampling, BPH, recent perineal trauma or catheterization, versus malignancy. We then discussed the natural history of prostate cancer and how it shapes our prostate cancer screening. Finally, we reviewed Mr. Vee current and previous PSA's and discussed that we do not have very many PSAs and his history, but we see that his PSA has rather consistently been increasing over time.  With this in mind, we discussed options including a digital rectal exam in the office today with a repeat PSA in a month assuming it feels benign, a repeat PSA first available including today, or a first available prostate MRI.  We specifically reviewed the PI-RADS scoring system for MRIs.    After a detailed discussion during which we reviewed the risks and benefits of the above options, Mr. Vee elects to proceed with a repeat PSA first available as soon as today if possible with a MyChart message to discuss results and recommended next steps. Mr. Vee expressed understanding and agreement to the above discussion and plan and all of his questions were answered to his satisfaction.    PLAN  Repeat PSA today with [facility] message to discuss results    Signed by:    Willy Fernandes PA-C

## 2024-06-03 NOTE — TELEPHONE ENCOUNTER
RECORDS RECEIVED FROM:    DATE RECEIVED:    NOTES STATUS DETAILS   OFFICE NOTE from referring provider  Internal 03/21/24 - Jenni Healy MD [facility] Internal    OFFICE NOTE from other cardiologists  Internal 07/16/19 - KAYLA Gómez MD [facility]    RECORDS from hospital/ED N/A    MEDICATION LIST Internal    GENERAL CARDIO RECORDS   (ALL APPOINTMENT TYPES)     LABS (CBC,BMP,CMP, TSH) Internal    EKG (STRIPS & REPORTS) Internal 01/06/22   ECHOS (IMAGES AND REPORTS) In process Scheduled 06/03/24

## 2024-06-03 NOTE — NURSING NOTE
Chief Complaint   Patient presents with    new urology     PSA       Blood pressure (!) 152/83, pulse 83. There is no height or weight on file to calculate BMI.    Patient Active Problem List   Diagnosis    HTN, kidney transplant related    Membranous glomerulonephritis    Anemia in chronic renal disease    Secondary renal hyperparathyroidism (H24)    Vitamin D deficiency    Dyslipidemia    CAD, multiple vessel    Kidney replaced by transplant    Immunosuppressed status (H24)    Aftercare following organ transplant    Impaired fasting glucose    Erectile dysfunction    Need for pneumocystis prophylaxis    EBV (Madeline-Barr virus) viremia    Chronic kidney disease, stage 4, severely decreased GFR (H)       No Known Allergies    Current Outpatient Medications   Medication Sig Dispense Refill    aspirin 81 MG EC tablet Take 81 mg by mouth every morning      azaTHIOprine (IMURAN) 50 MG tablet Take 1 tablet (50 mg) by mouth daily 30 tablet 11    carvedilol (COREG) 25 MG tablet Take 1 tablet (25 mg) by mouth 2 times daily (with meals) 180 tablet 0    NIFEdipine ER (ADALAT CC) 30 MG 24 hr tablet Take 1 tablet (30 mg) by mouth at bedtime 90 tablet 3    ondansetron (ZOFRAN) 4 MG tablet Take 1 tablet (4 mg) by mouth every 6 hours as needed for nausea or vomiting 10 tablet 0    sulfamethoxazole-trimethoprim (BACTRIM) 400-80 MG tablet TAKE 1 TABLET BY MOUTH EVERY MONDAY, WEDNESDAY, FRIDAY MORNING 39 tablet 3    tacrolimus (GENERIC) 1 mg/mL suspension Take 0.7 mLs (0.7 mg) by mouth 2 times daily 42 mL 11    vitamin D3 (CHOLECALCIFEROL) 50 mcg (2000 units) tablet Take 1 tablet by mouth every morning      atorvastatin (LIPITOR) 10 MG tablet Take 1 tablet (10 mg) by mouth daily 90 tablet 3       Social History     Tobacco Use    Smoking status: Never    Smokeless tobacco: Never   Substance Use Topics    Alcohol use: Not Currently     Comment: Patient reports drinking beer on a rare ocassion.    Drug use: No       Merlyn  Gretchen  6/3/2024  1:30 PM

## 2024-06-03 NOTE — LETTER
6/3/2024       RE: Marlo Vee  [address] [facility] [place] [address]     Dear Colleague,    Thank you for referring your patient, Marlo Vee, to the [facility] [place] at [facility]. Please see a copy of my visit note below.    Subjective    REFERRAL SOURCE  The patient is being seen in consultation at the request of Jignesh Evangelista MD  [address] [facility] [address] [place] [address]    REASON FOR CONSULT  Elevated PSA    HISTORY OF PRESENT ILLNESS  Mr. Vee is a pleasant 73 year old male who presents today for further evaluation and management of his elevated PSA. His past medical history is significant for renal hyperparathyroidism, dyslipidemia, hypertension, status post renal transplant, erectile dysfunction, and coronary artery disease.  I personally reviewed the family practice visit note from 3/21/2024 in preparation for today's visit.    According to that note, this is not the first time John has been found to have elevated PSA, but did not follow through with the referral in 2021.  More recently, however was found to have his PSA higher than previous and was once again recommended to meet with urology.    Today:  No daytime bothersome lower urinary tract symptoms   Bothersome nocturia x2-4  For years   No history of intervention  Only one episode of gross hematuria in his past, post-coidal, years ago   No history of retention   No history of UTIs or prostatitis   No erectile dysfunction     FAMILY HISTORY  Denies any known family history of urologic malignancy     RELEVANT SURGICAL HISTORY  History of right sided transplant kidney placement     Objective    PHYSICAL EXAMINATION  General: Well-appearing male in no acute apparent distress.  Neuro: Alert and orientated x3.  Eyes: Anictric  Head: Normocephalic  Psych: Maintained eye contact throughout conversation.  Skin: No lesions in the areas  examined.  Lungs: Normal respiratory effort upon inspiration.  Musculoskeletal: Normal gait noted with ambulation.  Abdomen: Appears nondistended.  Rectal: Deferred, see plan    LABS  Lab Results   Component Value Date    PSA 8.98 (H) 03/21/2024    PSA 6.08 (H) 05/06/2021    PSA 4.68 (H) 04/09/2015      IMPRESSION  Elevated PSA  History of kidney transplant, chronically immunosuppressed    It was my pleasure speaking with Mr. Vee today for discussion of his elevated PSA. We first discussed the etiologies of elevated PSA, including infection, inflammation, ejaculation prior to sampling, BPH, recent perineal trauma or catheterization, versus malignancy. We then discussed the natural history of prostate cancer and how it shapes our prostate cancer screening. Finally, we reviewed Mr. Vee current and previous PSA's and discussed that we do not have very many PSAs and his history, but we see that his PSA has rather consistently been increasing over time.  With this in mind, we discussed options including a digital rectal exam in the office today with a repeat PSA in a month assuming it feels benign, a repeat PSA first available including today, or a first available prostate MRI.  We specifically reviewed the PI-RADS scoring system for MRIs.    After a detailed discussion during which we reviewed the risks and benefits of the above options, Mr. Vee elects to proceed with a repeat PSA first available as soon as today if possible with a [employer] message to discuss results and recommended next steps. Mr. Vee expressed understanding and agreement to the above discussion and plan and all of his questions were answered to his satisfaction.    PLAN  Repeat PSA today with [employer] message to discuss results    Signed by:    Willy Fernandes PA-C

## 2024-06-04 NOTE — NURSING NOTE
Chief Complaint   Patient presents with    New Patient     VISIT TYPE : new - referral from PCP Jonathan Healy       Vitals were taken and medications reconciled.    Eric Marley, RADHIKA  9:54 AM

## 2024-06-04 NOTE — PATIENT INSTRUCTIONS
Follow up in 2 years - lab appt for blood draw (fast for 12 hours prior)  You will get a scheduling reminder in advance.

## 2024-06-04 NOTE — PROGRESS NOTES
SUBJECTIVE:  Marlo Vee is a 73 year old male who presents for reestablishing cardiac care.  Had PCI/Stent to m.dRCA,mLAD and D1 on 4/24/15. EF improved from 35% to low normal 50-55%.  Had Living unrelated donor kidney Tx in 1/2016.  Current GFR 26.  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    Today patient had no cardiac complaints.  Overall he remained very active and asymptomatic.  Currently patient is not on atorvastatin as it gave him some muscle pain.  LDL is above 120.  Patient Active Problem List    Diagnosis Date Noted    Chronic kidney disease, stage 4, severely decreased GFR (H) 11/10/2023     Priority: Medium    EBV (Madeline-Barr virus) viremia 11/01/2022     Priority: Medium     Blood      Need for pneumocystis prophylaxis 01/08/2022     Priority: Medium    Impaired fasting glucose 01/23/2018     Priority: Medium    Aftercare following organ transplant 12/08/2016     Priority: Medium    Erectile dysfunction 08/10/2016     Priority: Medium    Kidney replaced by transplant 01/26/2016     Priority: Medium    Immunosuppressed status (H24) 01/26/2016     Priority: Medium    CAD, multiple vessel 04/21/2015     Priority: Medium    Anemia in chronic renal disease      Priority: Medium    Secondary renal hyperparathyroidism (H24)      Priority: Medium    Vitamin D deficiency      Priority: Medium    Dyslipidemia      Priority: Medium    HTN, kidney transplant related 03/14/2015     Priority: Medium    Membranous glomerulonephritis 10/30/2002     Priority: Medium     Kidney biopsy [facility] 10/30/2002 scanned into [facility]       .  Current Outpatient Medications   Medication Sig Dispense Refill    aspirin 81 MG EC tablet Take 81 mg by mouth every morning      atorvastatin (LIPITOR) 10 MG tablet Take 1 tablet (10 mg) by mouth daily 90 tablet 3    azaTHIOprine (IMURAN) 50 MG tablet Take 1 tablet (50 mg) by mouth daily 30 tablet 11     carvedilol (COREG) 25 MG tablet Take 1 tablet (25 mg) by mouth 2 times daily (with meals) 180 tablet 0    NIFEdipine ER (ADALAT CC) 30 MG 24 hr tablet Take 1 tablet (30 mg) by mouth at bedtime 90 tablet 3    ondansetron (ZOFRAN) 4 MG tablet Take 1 tablet (4 mg) by mouth every 6 hours as needed for nausea or vomiting 10 tablet 0    sulfamethoxazole-trimethoprim (BACTRIM) 400-80 MG tablet TAKE 1 TABLET BY MOUTH EVERY MONDAY, WEDNESDAY, FRIDAY MORNING 39 tablet 3    tacrolimus (GENERIC) 1 mg/mL suspension Take 0.7 mLs (0.7 mg) by mouth 2 times daily 42 mL 11    vitamin D3 (CHOLECALCIFEROL) 50 mcg (2000 units) tablet Take 1 tablet by mouth every morning       No current facility-administered medications for this visit.     Past Medical History:   Diagnosis Date    Anemia in ESRD (end-stage renal disease) (H)     Antiplatelet or antithrombotic long-term use     Anxiety     Broken femur (H)     Related to tripping over dog.    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) 08/07/2021    Dyslipidemia     EBV infection 11/01/2022    Blood    End stage kidney disease (H)     Heart attack (H)     not sure    History of blood transfusion     Hypertension     Membranous glomerulonephritis 2002    PONV (postoperative nausea and vomiting)     PUD (peptic ulcer disease)     Secondary hyperparathyroidism (of renal origin)     Skin abnormalities     Stented coronary artery     Vitamin D deficiency      Past Surgical History:   Procedure Laterality Date    BIOPSY      CYSTOSCOPY, REMOVE STENT(S), COMBINED Right 02/23/2016    Procedure: COMBINED CYSTOSCOPY, REMOVE STENT(S);  Surgeon: Cody Temple MD;  Location: [facility] OR    IR RENAL BIOPSY RIGHT  10/19/2021    IR RENAL BIOPSY RIGHT  06/03/2022    OPEN REDUCTION INTERNAL FIXATION HIP NAILING Right 12/06/2020    Procedure: OPEN REDUCTION INTERNAL FIXATION, FRACTURE, FEMUR, USING INTRAMEDULLARY SHU.;  Surgeon:  Jimmy Stoner MD;  Location:  OR    s/p right upper extremity AV fistula      TRANSPLANT      kidney transplant     VASCULAR SURGERY       No Known Allergies  Social History     Socioeconomic History    Marital status:      Spouse name: Not on file    Number of children: 4    Years of education: Not on file    Highest education level: Not on file   Occupational History    Not on file   Tobacco Use    Smoking status: Never    Smokeless tobacco: Never   Substance and Sexual Activity    Alcohol use: Not Currently     Comment: Patient reports drinking beer on a rare ocassion.    Drug use: No    Sexual activity: Yes     Partners: Female   Other Topics Concern    Parent/sibling w/ CABG, MI or angioplasty before 65F 55M? 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     Has 4 kids - one donated kidney    Walks everyday     Doesn't eat a lot of meat     Social Determinants of Health     Financial Resource Strain: Low Risk  (9/20/2023)    Financial Resource Strain     Within the past 12 months, have you or your family members you live with been unable to get utilities (heat, electricity) when it was really needed?: No   Food Insecurity: Low Risk  (9/20/2023)    Food Insecurity     Within the past 12 months, did you worry that your food would run out before you got money to buy more?: No     Within the past 12 months, did the food you bought just not last and you didn t have money to get more?: No   Transportation Needs: Low Risk  (9/20/2023)    Transportation Needs     Within the past 12 months, has lack of transportation kept you from medical appointments, getting your medicines, non-medical meetings or appointments, work, or from getting things that you need?: No   Physical Activity: Not on file   Stress: Not on file   Social Connections: Not on file   Interpersonal Safety: Not on file   Housing Stability: Low Risk  (9/20/2023)    Housing Stability      Do you have housing? : Yes     Are you worried about losing your housing?: No     Family History   Problem Relation Age of Onset    Breast Cancer Mother     Cancer - colorectal Father     Coronary Artery Disease Father     Hypertension Father     Breast Cancer Sister     Cancer Brother         Pancreatic CA          REVIEW OF SYSTEMS:  General: negative, fever, chills, night sweats  Skin: negative, acne, rash, and scaling  Eyes: negative, double vision, eye pain, and photophobia  Ears/Nose/Throat: negative, nasal congestion, and purulent rhinorrhea  Respiratory: No dyspnea on exertion, No cough, No hemoptysis, and negative  Cardiovascular: negative, palpitations, tachycardia, irregular heart beat, chest pain, exertional chest pain or pressure, paroxysmal nocturnal dyspnea, dyspnea on exertion, and orthopnea       OBJECTIVE:  There were no vitals taken for this visit.  General Appearance: healthy, alert, active, and no distress  Head: Normocephalic. No masses, lesions, tenderness or abnormalities  Eyes: conjuctiva clear, PERRL, EOM intact  Ears: External ears normal. Canals clear. TM's normal.  Nose: Nares normal  Mouth: normal  Neck: Supple, no cervical adenopathy, no thyromegaly  Lungs: clear to auscultation  Cardiac: regular rate and rhythm, normal S1 and S2, no murmur     ASSESSMENT/PLAN:  Patient here for reestablishing cardiac care.  Last was seen in 2020.  Patient with prior history of coronary artery disease.Had PCI/Stent to m.Bowen,mLAD and D1 on 4/24/15. EF improved from 35% to low normal 50-55%.  Had Living unrelated donor kidney Tx in 1/2016. Normal function.  Current GFR 26.  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   Currently patient is very active with no cardiac symptoms.  He stopped his Lipitor as patient had some muscle aches.  Current LDL is above 120.  Recent EKG reviewed normal sinus rhythm frequent PVCs old inferior wall MI.  Yesterday's echocardiogram reviewed and result discussed with patient.   Normal biventricular function.  Inferior wall akinesis as noted before.  No significant valvular abnormalities.  Due to his CAD and apparent intolerance to statin we will start him on Zetia 10 mg daily.  Apart from aspirin patient is on carvedilol 25 mg twice a day and nifedipine ER 30 mg daily.  Home blood pressure is reported as normal.  Patient is advised to continue these medications and return to clinic in 2 years time.  Total visit duration 30 minutes.  This included face-to-face interview, physical exam, chart review, review of echocardiograms EKG and documentation.

## 2024-06-04 NOTE — LETTER
6/4/2024      RE: Marlo Vee  [address] [facility] [address]       Dear Colleague,    Thank you for the opportunity to participate in the care of your patient, Marlo Vee, at the [facility] [place] at [facility]. Please see a copy of my visit note below.       SUBJECTIVE:  Marlo Vee is a 73 year old male who presents for reestablishing cardiac care.  Had PCI/Stent to m.dRCA,mLAD and D1 on 4/24/15. EF improved from 35% to low normal 50-55%.  Had Living unrelated donor kidney Tx in 1/2016.  Current GFR 26.  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    Today patient had no cardiac complaints.  Overall he remained very active and asymptomatic.  Currently patient is not on atorvastatin as it gave him some muscle pain.  LDL is above 120.  Patient Active Problem List    Diagnosis Date Noted     Chronic kidney disease, stage 4, severely decreased GFR (H) 11/10/2023     Priority: Medium     EBV (Madeline-Barr virus) viremia 11/01/2022     Priority: Medium     Blood       Need for pneumocystis prophylaxis 01/08/2022     Priority: Medium     Impaired fasting glucose 01/23/2018     Priority: Medium     Aftercare following organ transplant 12/08/2016     Priority: Medium     Erectile dysfunction 08/10/2016     Priority: Medium     Kidney replaced by transplant 01/26/2016     Priority: Medium     Immunosuppressed status (H24) 01/26/2016     Priority: Medium     CAD, multiple vessel 04/21/2015     Priority: Medium     Anemia in chronic renal disease      Priority: Medium     Secondary renal hyperparathyroidism (H24)      Priority: Medium     Vitamin D deficiency      Priority: Medium     Dyslipidemia      Priority: Medium     HTN, kidney transplant related 03/14/2015     Priority: Medium     Membranous glomerulonephritis 10/30/2002     Priority: Medium      Kidney [place] [facility] [place] scanned into [facility]       .  Current Outpatient Medications   Medication Sig Dispense Refill     aspirin 81 MG EC tablet Take 81 mg by mouth every morning       atorvastatin (LIPITOR) 10 MG tablet Take 1 tablet (10 mg) by mouth daily 90 tablet 3     azaTHIOprine (IMURAN) 50 MG tablet Take 1 tablet (50 mg) by mouth daily 30 tablet 11     carvedilol (COREG) 25 MG tablet Take 1 tablet (25 mg) by mouth 2 times daily (with meals) 180 tablet 0     NIFEdipine ER (ADALAT CC) 30 MG 24 hr tablet Take 1 tablet (30 mg) by mouth at bedtime 90 tablet 3     ondansetron (ZOFRAN) 4 MG tablet Take 1 tablet (4 mg) by mouth every 6 hours as needed for nausea or vomiting 10 tablet 0     sulfamethoxazole-trimethoprim (BACTRIM) 400-80 MG tablet TAKE 1 TABLET BY MOUTH EVERY MONDAY, WEDNESDAY, FRIDAY MORNING 39 tablet 3     tacrolimus (GENERIC) 1 mg/mL suspension Take 0.7 mLs (0.7 mg) by mouth 2 times daily 42 mL 11     vitamin D3 (CHOLECALCIFEROL) 50 mcg (2000 units) tablet Take 1 tablet by mouth every morning       No current facility-administered medications for this visit.     Past Medical History:   Diagnosis Date     Anemia in ESRD (end-stage renal disease) (H)      Antiplatelet or antithrombotic long-term use      Anxiety      Broken femur (H)     Related to tripping over dog.     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     Coronary artery disease      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) 08/07/2021     Dyslipidemia      EBV infection 11/01/2022    Blood     End stage kidney disease (H)      Heart attack (H)     not sure     History of blood transfusion      Hypertension      Membranous glomerulonephritis 2002     PONV (postoperative nausea and vomiting)      PUD (peptic ulcer disease)      Secondary hyperparathyroidism (of renal origin)      Skin abnormalities      Stented coronary artery      Vitamin D deficiency      Past Surgical History:   Procedure Laterality Date     BIOPSY        CYSTOSCOPY, REMOVE STENT(S), COMBINED Right 02/23/2016    Procedure: COMBINED CYSTOSCOPY, REMOVE STENT(S);  Surgeon: Cody Temple MD;  Location: [facility] OR     IR RENAL BIOPSY RIGHT  10/19/2021     IR RENAL BIOPSY RIGHT  06/03/2022     OPEN REDUCTION INTERNAL FIXATION HIP NAILING Right 12/06/2020    Procedure: OPEN REDUCTION INTERNAL FIXATION, FRACTURE, FEMUR, USING INTRAMEDULLARY SHU.;  Surgeon: Jimmy Stoner MD;  Location: [facility] OR     s/p right upper extremity AV fistula       TRANSPLANT      kidney transplant      VASCULAR SURGERY       No Known Allergies  Social History     Socioeconomic History     Marital status:      Spouse name: Not on file     Number of children: 4     Years of education: Not on file     Highest education level: Not on file   Occupational History     Not on file   Tobacco Use     Smoking status: Never     Smokeless tobacco: Never   Substance and Sexual Activity     Alcohol use: Not Currently     Comment: Patient reports drinking beer on a rare ocassion.     Drug use: No     Sexual activity: Yes     Partners: Female   Other Topics Concern     Parent/sibling w/ CABG, MI or angioplasty before 65F 55M? 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     Has 4 kids - one donated kidney    Walks everyday     Doesn't eat a lot of meat     Social Determinants of Health     Financial Resource Strain: Low Risk  (9/20/2023)    Financial Resource Strain      Within the past 12 months, have you or your family members you live with been unable to get utilities (heat, electricity) when it was really needed?: No   Food Insecurity: Low Risk  (9/20/2023)    Food Insecurity      Within the past 12 months, did you worry that your food would run out before you got money to buy more?: No      Within the past 12 months, did the food you bought just not last and you didn t have money to get more?: No   Transportation Needs: Low Risk  (9/20/2023)     Transportation Needs      Within the past 12 months, has lack of transportation kept you from medical appointments, getting your medicines, non-medical meetings or appointments, work, or from getting things that you need?: No   Physical Activity: Not on file   Stress: Not on file   Social Connections: Not on file   Interpersonal Safety: Not on file   Housing Stability: Low Risk  (9/20/2023)    Housing Stability      Do you have housing? : Yes      Are you worried about losing your housing?: No     Family History   Problem Relation Age of Onset     Breast Cancer Mother      Cancer - colorectal Father      Coronary Artery Disease Father      Hypertension Father      Breast Cancer Sister      Cancer Brother         Pancreatic CA          REVIEW OF SYSTEMS:  General: negative, fever, chills, night sweats  Skin: negative, acne, rash, and scaling  Eyes: negative, double vision, eye pain, and photophobia  Ears/Nose/Throat: negative, nasal congestion, and purulent rhinorrhea  Respiratory: No dyspnea on exertion, No cough, No hemoptysis, and negative  Cardiovascular: negative, palpitations, tachycardia, irregular heart beat, chest pain, exertional chest pain or pressure, paroxysmal nocturnal dyspnea, dyspnea on exertion, and orthopnea       OBJECTIVE:  There were no vitals taken for this visit.  General Appearance: healthy, alert, active, and no distress  Head: Normocephalic. No masses, lesions, tenderness or abnormalities  Eyes: conjuctiva clear, PERRL, EOM intact  Ears: External ears normal. Canals clear. TM's normal.  Nose: Nares normal  Mouth: normal  Neck: Supple, no cervical adenopathy, no thyromegaly  Lungs: clear to auscultation  Cardiac: regular rate and rhythm, normal S1 and S2, no murmur     ASSESSMENT/PLAN:  Patient here for reestablishing cardiac care.  Last was seen in 2020.  Patient with prior history of coronary artery disease.Had PCI/Stent to mHollie,mLAD and D1 on 4/24/15. EF improved from 35% to low normal  50-55%.  Had Living unrelated donor kidney Tx in 1/2016. Normal function.  Current GFR 26.  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   Currently patient is very active with no cardiac symptoms.  He stopped his Lipitor as patient had some muscle aches.  Current LDL is above 120.  Recent EKG reviewed normal sinus rhythm frequent PVCs old inferior wall MI.  Yesterday's echocardiogram reviewed and result discussed with patient.  Normal biventricular function.  Inferior wall akinesis as noted before.  No significant valvular abnormalities.  Due to his CAD and apparent intolerance to statin we will start him on Zetia 10 mg daily.  Apart from aspirin patient is on carvedilol 25 mg twice a day and nifedipine ER 30 mg daily.  Home blood pressure is reported as normal.  Patient is advised to continue these medications and return to clinic in 2 years time.  Total visit duration 30 minutes.  This included face-to-face interview, physical exam, chart review, review of echocardiograms EKG and documentation.      Please do not hesitate to contact me if you have any questions/concerns.     Sincerely,     ALF Ayala MD

## 2024-06-05 NOTE — TELEPHONE ENCOUNTER
Clinic coordinators, would you please assist me in contacting John to help get him set up for first available prostate MRI?  Thank you!

## 2024-06-06 NOTE — TELEPHONE ENCOUNTER
Sent KitCheckt (1st Attempt) for the patient to call back and schedule the following:    Appointment type:Prostate MRI  Provider: imaging  Return date: next available   Specialty phone number: [phone]

## 2024-06-19 NOTE — DISCHARGE INSTRUCTIONS
MRI Contrast Discharge Instructions    The IV contrast you received today will pass out of your body in your  urine. This will happen in the next 24 hours. You will not feel this process.  Your urine will not change color.    Drink at least 4 extra glasses of water or juice today (unless your doctor  has restricted your fluids). This reduces the stress on your kidneys.  You may take your regular medicines.    If you are on dialysis: It is best to have dialysis today.    If you have a reaction: Most reactions happen right away. If you have  any new symptoms after leaving the hospital (such as hives or swelling),  call your hospital at the correct number below. Or call your family doctor.  If you have breathing distress or wheezing, call 911.    Special instructions: ***    I have read and understand the above information.    Signature:______________________________________ Date:___________    Staff:__________________________________________ Date:___________     Time:__________    [place] [facility] Departments:    ___[place]: [phone]  ___[facility]: [phone]  ___[place]: [phone] ___[facility]: [phone]  ___[facility]: [phone]  ___[facility]: [phone]  ___[phone]  ___[facility]: [phone]  ___[phone]

## 2024-06-24 NOTE — TELEPHONE ENCOUNTER
Returned a callt o John. Discussed he needs a prostate biopsy based on prostate MRI results. John is anxious about results and wanted to discuss next steps.   Discussed starting with biopsy and pathology and going from there. Advised that the urology team is the expert team to be doing the biopsy.   John will keep transplant team updated on results and plan of care.

## 2024-06-24 NOTE — TELEPHONE ENCOUNTER
Clinic coordinators, would you please assist me in contacting this patient to get him scheduled for first available prostate biopsy at any location based on his preference?  Please then schedule a follow-up in person or virtual visit with the same surgeon 1 week later to discuss the results.  Thank you!

## 2024-06-26 NOTE — TELEPHONE ENCOUNTER
Pt's prostate biopsy is scheduled on 9/4/24 at the [facility] [place].  Offered the pt 7/12/24 or 8/9/24 at the [place] location with Dr Jha and pt said he wanted to keep it on 9/4 so he has time to prepare.

## 2024-06-28 NOTE — LETTER
6/28/2024       RE: Marlo Vee  [address] [facility] [place] [address]     Dear Colleague,    Thank you for referring your patient, Marlo Vee, to the [facility] [place] at [facility]. Please see a copy of my visit note below.    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 Creat 2.3, eGFR 28 ml/mn, UCPR 1.3 mg/mgCr   - Baseline creat mid 2's   - Kidney Tx Biopsy: Jun 03, 2022; Result: No diagnostic evidence of acute rejection.  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  Moderate interstitial fibrosis and tubular atrophy.  Severe vascular changes    - Blood pressure controlled   - Proteinuria has improved from 2021 when up to 2 g. Would consider addition of ARB once his prostate issues have been sorted out   - Transplant IS: TAC/AZA. Tac level 4.0 ( 5/24)   - Patient did require HD for approx 1 year prior to his transplant. No longer has access   - Not tolerating statins so just started on Zetia per Cards 6/4/24    2. CV - B/p well controlled w/o edema. Clinic b/ps 104-116/66-74 HR 58  Echo 6/24: Left ventricular size is normal. The visual ejection fraction is 60-65%. Inferior wall akinesis is present. The right ventricle is normal size.  Global right ventricular function is mildly reduced. Pulmonary artery systolic pressure is normal.  Current regimen:     Coreg 25 mg bid    Nifedipine ER 30 mg every day     - Continue current regimen    3. Prostate lesions - PSA 9.6. Prostate lesions seen on prostate MRI. Scheduled for prostate bx 9/24    - Urology managing    4. Electrolytes - No acute concerns. K 3.7 Na 140    5. Acid base - No acute concerns. Bicarb 24    6.  BMD - Ca 9.1 Phos 3.0 albumin 4.0   - On Vit D 50 mcg every  day   - Check levels next visit    7. Anemia - Hgb 11.1   - No recent iron studies. Will check next visit   - Colonoscopy 2011?    8. Dispo- Has follow up scheduled with Dr Varela 10/3/24 and I'll see him back 2/25    Assessment and plan was discussed with patient and he voiced his understanding and agreement.    Reason for Visit:  CKD4.    HPI:  Mr Vee is a 75 yo male with PMH significant for Membranous Nephropathy s/p LDKT 2016, CAD s/p PCI, EBV viremia, HTN, Anemia, prostate lesions, present today for routine CKD follow up.   Last seen in clinic by Dr Irwin 1/11/23  Baseline creat mid 2's     ROS:   Major concern at this time is ongoing evaluation for elevated PSA/prostate lesions on MRI  Does note nocturia  No chest pain/dyspnea  No GI concerns  No edema  Energy level is good. Walking his dog twice per day  Appetite is good. Drinking fluids liberally    Chronic Health Problems:    Membranous Nephropathy s/p LDKT 2016  CAD s/p PCI/stent  EBV viremia  HTN  Anemia  prostate lesions  Secondary hyperparathyroidism  Vit D def  HLD  Anxiety  ED  H/O C diff    PSH:   Past Surgical History:   Procedure Laterality Date     BIOPSY       CYSTOSCOPY, REMOVE STENT(S), COMBINED Right 02/23/2016    Procedure: COMBINED CYSTOSCOPY, REMOVE STENT(S);  Surgeon: Cody Temple MD;  Location: [facility] OR     IR RENAL BIOPSY RIGHT  10/19/2021     IR RENAL BIOPSY RIGHT  06/03/2022     OPEN REDUCTION INTERNAL FIXATION HIP NAILING Right 12/06/2020    Procedure: OPEN REDUCTION INTERNAL FIXATION, FRACTURE, FEMUR, USING INTRAMEDULLARY SHU.;  Surgeon: Jimmy Stoner MD;  Location: [facility] OR     s/p right upper extremity AV fistula       TRANSPLANT      kidney transplant      VASCULAR SURGERY         Family Hx:   Family History   Problem Relation Age of Onset     Breast Cancer Mother      Cancer - colorectal Father      Coronary Artery Disease Father      Hypertension Father      Breast Cancer Sister      Cancer Brother         Pancreatic CA      Personal Hx:     Social History     Tobacco Use     Smoking status: Never     Smokeless tobacco: Never   Substance Use Topics     Alcohol use: Not Currently     Comment: Patient reports drinking beer on a rare ocassion.       Allergies:  No Known Allergies    Medications:  Current Outpatient Medications   Medication Sig Dispense Refill     aspirin 81 MG EC tablet Take 81 mg by mouth every morning       azaTHIOprine (IMURAN) 50 MG tablet Take 1 tablet (50 mg) by mouth daily 30 tablet 11     carvedilol (COREG) 25 MG tablet Take 1 tablet (25 mg) by mouth 2 times daily (with meals) 180 tablet 0     ezetimibe (ZETIA) 10 MG tablet Take 1 tablet (10 mg) by mouth daily 90 tablet 3     NIFEdipine ER (ADALAT CC) 30 MG 24 hr tablet Take 1 tablet (30 mg) by mouth at bedtime 90 tablet 3     ondansetron (ZOFRAN) 4 MG tablet Take 1 tablet (4 mg) by mouth every 6 hours as needed for nausea or vomiting 10 tablet 0     sulfamethoxazole-trimethoprim (BACTRIM) 400-80 MG tablet TAKE 1 TABLET BY MOUTH EVERY MONDAY, WEDNESDAY, FRIDAY MORNING 39 tablet 3     tacrolimus (GENERIC) 1 mg/mL suspension Take 0.7 mLs (0.7 mg) by mouth 2 times daily 42 mL 11     vitamin D3 (CHOLECALCIFEROL) 50 mcg (2000 units) tablet Take 1 tablet by mouth every morning       No current facility-administered medications for this visit.      Vitals:  /70   Pulse 58   Wt 68.6 kg (151 lb 3.2 oz)   SpO2 99%   BMI 21.69 kg/m      Exam:  GENERAL APPEARANCE: Pleasant male in NAD   RESP: lungs clear to auscultation  CV: Alvaro  EDEMA: no LE edema bilaterally  ABDOMEN: soft, nondistended, nontender  MS: extremities normal - no gross deformities noted  SKIN: no visible rash  NEURO: A/O    LABS:   CMP  Recent Labs   Lab Test 06/28/24  1015 05/16/24  1408 05/13/24  1033 02/20/24  0953 07/15/21  0926 05/06/21  0928 03/09/21  0932 01/07/21  0938 12/09/20  0716    136 136 141   < > 137 138 137 139   POTASSIUM 3.7 4.4 4.0 3.9   < > 3.7 3.8 3.5 3.6    CHLORIDE 107 103 102 109*   < > 107 107 106 109   CO2 24 23 22 22   < > 29 26 27 28   ANIONGAP 9 10 12 10   < > 1* 5 4 2*   * 92 115* 111*   < > 99 102* 89 101*   BUN 32.3* 36.7* 44.5* 34.0*   < > 21 22 25 26   CR 2.38* 2.55* 3.02* 2.29*   < > 1.04 1.09 1.08 1.01   GFRESTIMATED 28* 26* 21* 30*   < > 73 69 70 76   GFRESTBLACK  --   --   --   --   --  85 80 81 88   JUAN 9.1 10.6* 9.0 9.1   < > 8.9 9.6 9.2 8.4*    < > = values in this interval not displayed.     Recent Labs   Lab Test 08/07/21  1741 11/16/16  0934 07/19/16  0720   BILITOTAL 0.8 0.7 0.4   ALKPHOS 83 116 142   ALT 17 25 21   AST 10 15 20     CBC  Recent Labs   Lab Test 06/28/24  1015 05/13/24  1033 02/20/24  0953 12/08/23  0952 10/26/23  1044   HGB 11.1* 11.5* 11.5* 11.1* 11.1*   WBC  --  7.2 5.5 6.0 5.8   RBC  --  3.96* 3.95* 3.78* 3.79*   HCT  --  36.9* 36.8* 35.9* 35.6*   MCV  --  93 93 95 94   MCH  --  29.0 29.1 29.4 29.3   MCHC  --  31.2* 31.3* 30.9* 31.2*   RDW  --  13.8 13.6 13.8 13.2   PLT  --  248 227 237 217     URINE STUDIES  Recent Labs   Lab Test 06/03/22  1219 02/11/22  1626 10/19/21  1250 06/23/20  1616   COLOR Light Yellow Yellow Yellow Yellow   APPEARANCE Clear Clear Slightly Cloudy* Clear   URINEGLC Negative Negative Negative Negative   URINEBILI Negative Negative Negative Negative   URINEKETONE Negative Negative Negative Negative   SG 1.016 1.012 1.019 1.009   UBLD Small* Negative Large* Small*   URINEPH 5.5 5.0 5.5 5.0   PROTEIN 70* Negative 50* Negative   NITRITE Negative Negative Negative Negative   LEUKEST Negative Negative Negative Negative   RBCU 3* 0 >182* 1   WBCU 1 1 1 <1     Recent Labs   Lab Test 11/01/22  1036 04/13/22  1043 02/05/22  0953 03/09/21  0930   UTPG 2.09* 0.89* 0.94* 0.27*     PTH  Recent Labs   Lab Test 11/01/22  1033 10/09/17  1015 03/09/17  0845   PTHI 274 94* 135*     IRON STUDIES  Recent Labs   Lab Test 11/01/22  1033 12/24/21  0916 12/16/21  1558   IRON 56 44 54   * 182*  --    IRONSAT 28 24   --    BLAYNE 341 255  --      Social Determinants of Health     Financial Resource Strain: Low Risk  (9/20/2023)    Financial Resource Strain      Within the past 12 months, have you or your family members you live with been unable to get utilities (heat, electricity) when it was really needed?: No   Food Insecurity: Low Risk  (9/20/2023)    Food Insecurity      Within the past 12 months, did you worry that your food would run out before you got money to buy more?: No      Within the past 12 months, did the food you bought just not last and you didn t have money to get more?: No   Transportation Needs: Low Risk  (9/20/2023)    Transportation Needs      Within the past 12 months, has lack of transportation kept you from medical appointments, getting your medicines, non-medical meetings or appointments, work, or from getting things that you need?: No   Housing Stability: Low Risk  (9/20/2023)    Housing Stability      Do you have housing? : Yes      Are you worried about losing your housing?: No        Immunization History   Administered Date(s) Administered     COVID-19 12+ (2023-24) (MODERNA) 09/24/2023     COVID-19 12+ (2023-24) (Pfizer) 03/21/2024     COVID-19 Bivalent 18+ (Moderna) 09/14/2022     COVID-19 Monovalent 18+ (Moderna) 02/16/2021, 03/18/2021, 11/16/2021     COVID-19 Monovalent Booster 18+ (Moderna) 04/20/2022     HepB 03/04/2015     HepB, Unspecified 03/04/2015     Influenza (High Dose) 3 valent vaccine 12/05/2017, 01/28/2019     Influenza Vaccine 65+ (FLUAD) 11/02/2020     Influenza Vaccine 65+ (Fluzone HD) 09/24/2023     Pneumo Conj 13-V (2010&after) 05/23/2016     Pneumococcal 23 valent 01/28/2015     TDAP (Adacel,Boostrix) 03/09/2015     Health Maintenance   Topic Date Due     ADVANCE CARE PLANNING  Never done     ZOSTER IMMUNIZATION (1 of 2) Never done     RSV VACCINE (Pregnancy & 60+) (1 - 1-dose 60+ series) Never done     MICROALBUMIN  04/23/2016     Pneumococcal Vaccine: 65+ Years (3 of 3 -  PPSV23 or PCV20) 01/28/2020     COLORECTAL CANCER SCREENING  11/09/2021     MEDICARE ANNUAL WELLNESS VISIT  04/29/2022     COVID-19 Vaccine (8 - 2023-24 season) 05/16/2024     LIPID  09/21/2024     BMP  09/28/2024     HEMOGLOBIN  12/28/2024     DTAP/TDAP/TD IMMUNIZATION (3 - Td or Tdap) 03/09/2025     FALL RISK ASSESSMENT  03/21/2025     GLUCOSE  06/28/2027     PARATHYROID  Completed     PHOSPHORUS  Completed     HEPATITIS C SCREENING  Completed     PHQ-2 (once per calendar year)  Completed     INFLUENZA VACCINE  Completed     URINALYSIS  Completed     ALK PHOS  Completed     IPV IMMUNIZATION  Aged Out     HPV IMMUNIZATION  Aged Out     MENINGITIS IMMUNIZATION  Aged Out     RSV MONOCLONAL ANTIBODY  Aged Out      STEPHON  No data recorded  No data recorded    PHQ-9  No data recorded  No data recorded      Aracelis Shahid, MEGHANN  [facility] [place]

## 2024-06-28 NOTE — NURSING NOTE
Chief Complaint   Patient presents with    RECHECK     CC. Recently diagnosis with prostate issues. Going in for biopsy in sept.      Vitals:    06/28/24 1035 06/28/24 1037 06/28/24 1038 06/28/24 1040   BP: 116/74 104/66 108/69 109/70   BP Location: Right arm Left arm Right arm    Patient Position: Sitting Sitting Sitting    Cuff Size: Adult Regular Adult Regular Adult Regular    Pulse: 58      SpO2: 99%      Weight: 68.6 kg (151 lb 3.2 oz)          BP Readings from Last 3 Encounters:   06/28/24 109/70   06/04/24 106/62   06/03/24 (!) 152/83       /70   Pulse 58   Wt 68.6 kg (151 lb 3.2 oz)   SpO2 99%   BMI 21.69 kg/m       Valentina Hernandez

## 2024-07-01 NOTE — PROGRESS NOTES
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 Creat 2.3, eGFR 28 ml/mn, UCPR 1.3 mg/mgCr   - Baseline creat mid 2's   - Kidney Tx Biopsy: Jun 03, 2022; Result: No diagnostic evidence of acute rejection.  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  Moderate interstitial fibrosis and tubular atrophy.  Severe vascular changes    - Blood pressure controlled   - Proteinuria has improved from 2021 when up to 2 g. Would consider addition of ARB once his prostate issues have been sorted out   - Transplant IS: TAC/AZA. Tac level 4.0 ( 5/24)   - Patient did require HD for approx 1 year prior to his transplant. No longer has access   - Not tolerating statins so just started on Zetia per Cards 6/4/24    2. CV - B/p well controlled w/o edema. Clinic b/ps 104-116/66-74 HR 58  Echo 6/24: Left ventricular size is normal. The visual ejection fraction is 60-65%. Inferior wall akinesis is present. The right ventricle is normal size.  Global right ventricular function is mildly reduced. Pulmonary artery systolic pressure is normal.  Current regimen:     Coreg 25 mg bid    Nifedipine ER 30 mg every day     - Continue current regimen    3. Prostate lesions - PSA 9.6. Prostate lesions seen on prostate MRI. Scheduled for prostate bx 9/24    - Urology managing    4. Electrolytes - No acute concerns. K 3.7 Na 140    5. Acid base - No acute concerns. Bicarb 24    6.  BMD - Ca 9.1 Phos 3.0 albumin 4.0   - On Vit D 50 mcg every day   - Check levels next visit    7. Anemia - Hgb 11.1   - No recent iron studies. Will check next visit   - Colonoscopy 2011?    8. Dispo- Has follow up scheduled with Dr Varela 10/3/24 and I'll see him back 2/25    Assessment and plan was discussed with patient and he voiced his understanding and agreement.    Reason for  Visit:  CKD4.    HPI:  Mr Vee is a 75 yo male with PMH significant for Membranous Nephropathy s/p LDKT 2016, CAD s/p PCI, EBV viremia, HTN, Anemia, prostate lesions, present today for routine CKD follow up.   Last seen in clinic by Dr Irwin 1/11/23  Baseline creat mid 2's     ROS:   Major concern at this time is ongoing evaluation for elevated PSA/prostate lesions on MRI  Does note nocturia  No chest pain/dyspnea  No GI concerns  No edema  Energy level is good. Walking his dog twice per day  Appetite is good. Drinking fluids liberally    Chronic Health Problems:    Membranous Nephropathy s/p LDKT 2016  CAD s/p PCI/stent  EBV viremia  HTN  Anemia  prostate lesions  Secondary hyperparathyroidism  Vit D def  HLD  Anxiety  ED  H/O C diff    PSH:   Past Surgical History:   Procedure Laterality Date    BIOPSY      CYSTOSCOPY, REMOVE STENT(S), COMBINED Right 02/23/2016    Procedure: COMBINED CYSTOSCOPY, REMOVE STENT(S);  Surgeon: Cody Temple MD;  Location: [facility] OR    IR RENAL BIOPSY RIGHT  10/19/2021    IR RENAL BIOPSY RIGHT  06/03/2022    OPEN REDUCTION INTERNAL FIXATION HIP NAILING Right 12/06/2020    Procedure: OPEN REDUCTION INTERNAL FIXATION, FRACTURE, FEMUR, USING INTRAMEDULLARY SHU.;  Surgeon: Jimmy Stoner MD;  Location: [facility] OR    s/p right upper extremity AV fistula      TRANSPLANT      kidney transplant     VASCULAR SURGERY         Family Hx:   Family History   Problem Relation Age of Onset    Breast Cancer Mother     Cancer - colorectal Father     Coronary Artery Disease Father     Hypertension Father     Breast Cancer Sister     Cancer Brother         Pancreatic CA     Personal Hx:     Social History     Tobacco Use    Smoking status: Never    Smokeless tobacco: Never   Substance Use Topics    Alcohol use: Not Currently     Comment: Patient reports drinking beer on a rare ocassion.       Allergies:  No Known Allergies    Medications:  Current Outpatient Medications   Medication Sig Dispense Refill     aspirin 81 MG EC tablet Take 81 mg by mouth every morning      azaTHIOprine (IMURAN) 50 MG tablet Take 1 tablet (50 mg) by mouth daily 30 tablet 11    carvedilol (COREG) 25 MG tablet Take 1 tablet (25 mg) by mouth 2 times daily (with meals) 180 tablet 0    ezetimibe (ZETIA) 10 MG tablet Take 1 tablet (10 mg) by mouth daily 90 tablet 3    NIFEdipine ER (ADALAT CC) 30 MG 24 hr tablet Take 1 tablet (30 mg) by mouth at bedtime 90 tablet 3    ondansetron (ZOFRAN) 4 MG tablet Take 1 tablet (4 mg) by mouth every 6 hours as needed for nausea or vomiting 10 tablet 0    sulfamethoxazole-trimethoprim (BACTRIM) 400-80 MG tablet TAKE 1 TABLET BY MOUTH EVERY MONDAY, WEDNESDAY, FRIDAY MORNING 39 tablet 3    tacrolimus (GENERIC) 1 mg/mL suspension Take 0.7 mLs (0.7 mg) by mouth 2 times daily 42 mL 11    vitamin D3 (CHOLECALCIFEROL) 50 mcg (2000 units) tablet Take 1 tablet by mouth every morning       No current facility-administered medications for this visit.      Vitals:  /70   Pulse 58   Wt 68.6 kg (151 lb 3.2 oz)   SpO2 99%   BMI 21.69 kg/m      Exam:  GENERAL APPEARANCE: Pleasant male in NAD   RESP: lungs clear to auscultation  CV: Alvaro  EDEMA: no LE edema bilaterally  ABDOMEN: soft, nondistended, nontender  MS: extremities normal - no gross deformities noted  SKIN: no visible rash  NEURO: A/O    LABS:   CMP  Recent Labs   Lab Test 06/28/24  1015 05/16/24  1408 05/13/24  1033 02/20/24  0953 07/15/21  0926 05/06/21  0928 03/09/21  0932 01/07/21  0938 12/09/20  0716    136 136 141   < > 137 138 137 139   POTASSIUM 3.7 4.4 4.0 3.9   < > 3.7 3.8 3.5 3.6   CHLORIDE 107 103 102 109*   < > 107 107 106 109   CO2 24 23 22 22   < > 29 26 27 28   ANIONGAP 9 10 12 10   < > 1* 5 4 2*   * 92 115* 111*   < > 99 102* 89 101*   BUN 32.3* 36.7* 44.5* 34.0*   < > 21 22 25 26   CR 2.38* 2.55* 3.02* 2.29*   < > 1.04 1.09 1.08 1.01   GFRESTIMATED 28* 26* 21* 30*   < > 73 69 70 76   GFRESTBLACK  --   --   --   --   --  85  80 81 88   JUAN 9.1 10.6* 9.0 9.1   < > 8.9 9.6 9.2 8.4*    < > = values in this interval not displayed.     Recent Labs   Lab Test 08/07/21  1741 11/16/16  0934 07/19/16  0720   BILITOTAL 0.8 0.7 0.4   ALKPHOS 83 116 142   ALT 17 25 21   AST 10 15 20     CBC  Recent Labs   Lab Test 06/28/24  1015 05/13/24  1033 02/20/24  0953 12/08/23  0952 10/26/23  1044   HGB 11.1* 11.5* 11.5* 11.1* 11.1*   WBC  --  7.2 5.5 6.0 5.8   RBC  --  3.96* 3.95* 3.78* 3.79*   HCT  --  36.9* 36.8* 35.9* 35.6*   MCV  --  93 93 95 94   MCH  --  29.0 29.1 29.4 29.3   MCHC  --  31.2* 31.3* 30.9* 31.2*   RDW  --  13.8 13.6 13.8 13.2   PLT  --  248 227 237 217     URINE STUDIES  Recent Labs   Lab Test 06/03/22  1219 02/11/22  1626 10/19/21  1250 06/23/20  1616   COLOR Light Yellow Yellow Yellow Yellow   APPEARANCE Clear Clear Slightly Cloudy* Clear   URINEGLC Negative Negative Negative Negative   URINEBILI Negative Negative Negative Negative   URINEKETONE Negative Negative Negative Negative   SG 1.016 1.012 1.019 1.009   UBLD Small* Negative Large* Small*   URINEPH 5.5 5.0 5.5 5.0   PROTEIN 70* Negative 50* Negative   NITRITE Negative Negative Negative Negative   LEUKEST Negative Negative Negative Negative   RBCU 3* 0 >182* 1   WBCU 1 1 1 <1     Recent Labs   Lab Test 11/01/22  1036 04/13/22  1043 02/05/22  0953 03/09/21  0930   UTPG 2.09* 0.89* 0.94* 0.27*     PTH  Recent Labs   Lab Test 11/01/22  1033 10/09/17  1015 03/09/17  0845   PTHI 274 94* 135*     IRON STUDIES  Recent Labs   Lab Test 11/01/22  1033 12/24/21  0916 12/16/21  1558   IRON 56 44 54   * 182*  --    IRONSAT 28 24  --    BLAYNE 341 255  --      Social Determinants of Health     Financial Resource Strain: Low Risk  (9/20/2023)    Financial Resource Strain     Within the past 12 months, have you or your family members you live with been unable to get utilities (heat, electricity) when it was really needed?: No   Food Insecurity: Low Risk  (9/20/2023)    Food Insecurity      Within the past 12 months, did you worry that your food would run out before you got money to buy more?: No     Within the past 12 months, did the food you bought just not last and you didn t have money to get more?: No   Transportation Needs: Low Risk  (9/20/2023)    Transportation Needs     Within the past 12 months, has lack of transportation kept you from medical appointments, getting your medicines, non-medical meetings or appointments, work, or from getting things that you need?: No   Housing Stability: Low Risk  (9/20/2023)    Housing Stability     Do you have housing? : Yes     Are you worried about losing your housing?: No        Immunization History   Administered Date(s) Administered    COVID-19 12+ (2023-24) (MODERNA) 09/24/2023    COVID-19 12+ (2023-24) (Pfizer) 03/21/2024    COVID-19 Bivalent 18+ (Moderna) 09/14/2022    COVID-19 Monovalent 18+ (Moderna) 02/16/2021, 03/18/2021, 11/16/2021    COVID-19 Monovalent Booster 18+ (Moderna) 04/20/2022    HepB 03/04/2015    HepB, Unspecified 03/04/2015    Influenza (High Dose) 3 valent vaccine 12/05/2017, 01/28/2019    Influenza Vaccine 65+ (FLUAD) 11/02/2020    Influenza Vaccine 65+ (Fluzone HD) 09/24/2023    Pneumo Conj 13-V (2010&after) 05/23/2016    Pneumococcal 23 valent 01/28/2015    TDAP (Adacel,Boostrix) 03/09/2015     Health Maintenance   Topic Date Due    ADVANCE CARE PLANNING  Never done    ZOSTER IMMUNIZATION (1 of 2) Never done    RSV VACCINE (Pregnancy & 60+) (1 - 1-dose 60+ series) Never done    MICROALBUMIN  04/23/2016    Pneumococcal Vaccine: 65+ Years (3 of 3 - PPSV23 or PCV20) 01/28/2020    COLORECTAL CANCER SCREENING  11/09/2021    MEDICARE ANNUAL WELLNESS VISIT  04/29/2022    COVID-19 Vaccine (8 - 2023-24 season) 05/16/2024    LIPID  09/21/2024    BMP  09/28/2024    HEMOGLOBIN  12/28/2024    DTAP/TDAP/TD IMMUNIZATION (3 - Td or Tdap) 03/09/2025    FALL RISK ASSESSMENT  03/21/2025    GLUCOSE  06/28/2027    PARATHYROID  Completed    PHOSPHORUS   Completed    HEPATITIS C SCREENING  Completed    PHQ-2 (once per calendar year)  Completed    INFLUENZA VACCINE  Completed    URINALYSIS  Completed    ALK PHOS  Completed    IPV IMMUNIZATION  Aged Out    HPV IMMUNIZATION  Aged Out    MENINGITIS IMMUNIZATION  Aged Out    RSV MONOCLONAL ANTIBODY  Aged Out      STEPHON  No data recorded  No data recorded    PHQ-9  No data recorded  No data recorded      Aracelis Shahid, MEGHANN  [facility] [place]

## 2024-07-12 NOTE — CONFIDENTIAL NOTE
OK (former patient of graduated resident). I see he has an appt to re-establish with Dr. Velázquez soon.

## 2024-08-07 NOTE — TELEPHONE ENCOUNTER
Reason for visit: follow up biopsy results     Relevant information: prostate biopsy scheduled 9/4/24    Records/imaging/labs/orders: pathology to be resulted    Pt called: No need for a call    At Rooming: virtual visit    Ricardo Melissa  8/7/2024  2:48 AM

## 2024-08-19 NOTE — TELEPHONE ENCOUNTER
Reason for visit: Fx Prostate Biopsy     Relevant information: Lesion #1 PIRADS 4 / Lesion #2 PIRADS 4 / Lesion #3 PIRADS 3    Records/imaging/labs/orders: Located in [facility]    Pt called: Yes, left voicemail advising patient to call back to be given proper preparation instructions and their preferred pharmacy. Dash Hudson message sent with prep instructions. Expecting a call back from patient.    At Rooming: Standard fx prostate biopsy rooming. Confirm prep was done correctly and administer gentamicin.     Rowan Lam  8/19/2024  9:11 AM

## 2024-08-21 NOTE — TELEPHONE ENCOUNTER
Spoke to patient and review bx prep- enema, blood thinners, eating, arrival time, cipro, he will notify his transplant coordinator of the 3 day course incase they would like to check a extra blood level due to possible increase in tacro levels,     Thank you,  Jenni Baptiste,BERRYN RN-Triage-Urology]

## 2024-09-12 ENCOUNTER — POST MORTEM DOCUMENTATION (OUTPATIENT)
Dept: TRANSPLANT | Facility: CLINIC | Age: 74
End: 2024-09-12

## 2024-09-12 NOTE — PROGRESS NOTES
Received notification on 24 at 1206 of patient's death from RN in [facility].  Place of death was reported as [facility] inpatient.  Graft status at the time of death was reported as Unknown.  TIS verification is: Complete  The Transplant Office has been notified that patient is . The Post Mortem Encounter has been completed. Notifications have been sent to the Care team, Donor team, Immunology lab, Transplant Financial , and Social Work.   Instructions have been sent to cancel pending appointments, discontinue pending orders, and send a sympathy card to the family.

## 2024-09-12 NOTE — PROGRESS NOTES
Received notification on 24 at  of patient's death from   Cause of Death: Ventricular fibrillation, cardiac arrest.  Place of death was reported as [facility].  Graft status at the time of death was reported as Functioning.  Additional information:   TIS verification is: Pending  The Transplant Office has been notified that patient is . The Post Mortem Encounter has been completed. Notifications have been sent to the Care team.   Instructions have been sent to cancel pending appointments, discontinue pending orders, eliminate paper chart, and send a sympathy card to the family.    HUMERA GREGG  Received notification of patient's death from [facility].  Place of death was reported as [facility].  Graft status at the time of death was reported as Functioning.  TIS verification is: Complete

## 2025-07-23 ENCOUNTER — PATIENT OUTREACH (OUTPATIENT)
Dept: NEPHROLOGY | Facility: CLINIC | Age: 75
End: 2025-07-23
Payer: MEDICARE

## 2025-07-23 NOTE — PROGRESS NOTES
Pt  2024. CKD Journey resolved. General Nephrology RNCC removed from care team. Neph tracking updated to the fullest extent possible.    Neph Tracking Flowsheet Last Filled Values       Preferred Modality --  previously on HD prior to transplant    Patient's Referral Dates Auto Populate Patient's Referral Dates    Dietician Referral 23    Home Care Referral 16    Journey Referral 24    Transplant Evaluation Referral 23    Transplant Status  Referred          Dede Regalado, RN, BSN  Nephrology Clinic Manager  [facility]

## (undated) DEVICE — KIT PATIENT CARE HANA TABLE PROFX SUPINE 6855

## (undated) DEVICE — SU VICRYL 2-0 CT-2 27" UND J269H

## (undated) DEVICE — DRILL BIT QUICK COUPLING 3 FLUTE 4.2MMX330/100MM CALIBRATE

## (undated) DEVICE — DRAPE STERI U 1015

## (undated) DEVICE — GLOVE PROTEXIS W/NEU-THERA 7.0  2D73TE70

## (undated) DEVICE — LINEN TOWEL PACK X5 5464

## (undated) DEVICE — SOL NACL 0.9% IRRIG 1000ML BOTTLE 2F7124

## (undated) DEVICE — DRSG XEROFORM 1X8"

## (undated) DEVICE — ESU GROUND PAD UNIVERSAL W/O CORD

## (undated) DEVICE — SU VICRYL 0 CT-1 27" J340H

## (undated) DEVICE — Device

## (undated) DEVICE — DRSG TEGADERM 6X8" 1628

## (undated) DEVICE — TAPE DRSG UNIVERSAL CLOTH 2" WHITE LATEX 881-2

## (undated) DEVICE — DRILL BIL CANNULATED 6MM/9MM 03.037.022

## (undated) DEVICE — PACK HIP NAILING SOP15HNSB

## (undated) DEVICE — WIRE GUIDE 3.2X400MM  357.399

## (undated) DEVICE — BLADE CLIPPER SGL USE 9680

## (undated) DEVICE — MANIFOLD NEPTUNE 4 PORT 700-20

## (undated) DEVICE — SU VICRYL 2-0 FS-1 27" UND  J443H

## (undated) DEVICE — NDL 22GA 1.5"

## (undated) DEVICE — SOL WATER IRRIG 1000ML BOTTLE 2F7114

## (undated) DEVICE — PREP CHLORAPREP 26ML TINTED ORANGE  260815

## (undated) DEVICE — DRAPE C-ARMOR 5 SIDED 5523

## (undated) DEVICE — SYR 10ML FINGER CONTROL W/O NDL 309695

## (undated) DEVICE — GLOVE PROTEXIS BLUE W/NEU-THERA 7.5  2D73EB75

## (undated) DEVICE — STPL SKIN 35W ROTATING HEAD PRW35

## (undated) RX ORDER — HYDROMORPHONE HYDROCHLORIDE 1 MG/ML
INJECTION, SOLUTION INTRAMUSCULAR; INTRAVENOUS; SUBCUTANEOUS
Status: DISPENSED
Start: 2020-12-06

## (undated) RX ORDER — FENTANYL CITRATE 50 UG/ML
INJECTION, SOLUTION INTRAMUSCULAR; INTRAVENOUS
Status: DISPENSED
Start: 2022-06-03

## (undated) RX ORDER — ALBUMIN, HUMAN INJ 5% 5 %
SOLUTION INTRAVENOUS
Status: DISPENSED
Start: 2020-12-06

## (undated) RX ORDER — BUPIVACAINE HYDROCHLORIDE AND EPINEPHRINE 5; 5 MG/ML; UG/ML
INJECTION, SOLUTION EPIDURAL; INTRACAUDAL; PERINEURAL
Status: DISPENSED
Start: 2020-12-06

## (undated) RX ORDER — SODIUM CHLORIDE 9 MG/ML
INJECTION, SOLUTION INTRAVENOUS
Status: DISPENSED
Start: 2021-10-19

## (undated) RX ORDER — ONDANSETRON 2 MG/ML
INJECTION INTRAMUSCULAR; INTRAVENOUS
Status: DISPENSED
Start: 2022-06-03

## (undated) RX ORDER — SODIUM CHLORIDE 9 MG/ML
INJECTION, SOLUTION INTRAVENOUS
Status: DISPENSED
Start: 2022-06-03

## (undated) RX ORDER — LIDOCAINE HYDROCHLORIDE 10 MG/ML
INJECTION, SOLUTION EPIDURAL; INFILTRATION; INTRACAUDAL; PERINEURAL
Status: DISPENSED
Start: 2021-10-19

## (undated) RX ORDER — ONDANSETRON 2 MG/ML
INJECTION INTRAMUSCULAR; INTRAVENOUS
Status: DISPENSED
Start: 2021-10-19

## (undated) RX ORDER — ONDANSETRON 2 MG/ML
INJECTION INTRAMUSCULAR; INTRAVENOUS
Status: DISPENSED
Start: 2020-12-06

## (undated) RX ORDER — LIDOCAINE HYDROCHLORIDE 20 MG/ML
INJECTION, SOLUTION EPIDURAL; INFILTRATION; INTRACAUDAL; PERINEURAL
Status: DISPENSED
Start: 2020-12-06

## (undated) RX ORDER — LIDOCAINE HYDROCHLORIDE 10 MG/ML
INJECTION, SOLUTION EPIDURAL; INFILTRATION; INTRACAUDAL; PERINEURAL
Status: DISPENSED
Start: 2022-06-03

## (undated) RX ORDER — FENTANYL CITRATE 50 UG/ML
INJECTION, SOLUTION INTRAMUSCULAR; INTRAVENOUS
Status: DISPENSED
Start: 2020-12-06

## (undated) RX ORDER — SCOLOPAMINE TRANSDERMAL SYSTEM 1 MG/1
PATCH, EXTENDED RELEASE TRANSDERMAL
Status: DISPENSED
Start: 2020-12-06

## (undated) RX ORDER — CEFAZOLIN SODIUM 2 G/100ML
INJECTION, SOLUTION INTRAVENOUS
Status: DISPENSED
Start: 2020-12-06

## (undated) RX ORDER — PROPOFOL 10 MG/ML
INJECTION, EMULSION INTRAVENOUS
Status: DISPENSED
Start: 2020-12-06

## (undated) RX ORDER — FENTANYL CITRATE 50 UG/ML
INJECTION, SOLUTION INTRAMUSCULAR; INTRAVENOUS
Status: DISPENSED
Start: 2021-10-19